# Patient Record
Sex: MALE
[De-identification: names, ages, dates, MRNs, and addresses within clinical notes are randomized per-mention and may not be internally consistent; named-entity substitution may affect disease eponyms.]

---

## 2019-01-01 ENCOUNTER — RESULT REVIEW (OUTPATIENT)
Age: 39
End: 2019-01-01

## 2019-01-01 ENCOUNTER — INPATIENT (INPATIENT)
Facility: HOSPITAL | Age: 39
LOS: 29 days | DRG: 4 | End: 2019-12-10
Attending: INTERNAL MEDICINE | Admitting: INTERNAL MEDICINE
Payer: MEDICAID

## 2019-01-01 VITALS — HEART RATE: 60 BPM | OXYGEN SATURATION: 60 %

## 2019-01-01 VITALS — OXYGEN SATURATION: 99 % | TEMPERATURE: 99 F | RESPIRATION RATE: 26 BRPM | HEART RATE: 110 BPM

## 2019-01-01 DIAGNOSIS — I38 ENDOCARDITIS, VALVE UNSPECIFIED: ICD-10-CM

## 2019-01-01 DIAGNOSIS — I33.9 ACUTE AND SUBACUTE ENDOCARDITIS, UNSPECIFIED: ICD-10-CM

## 2019-01-01 DIAGNOSIS — N17.9 ACUTE KIDNEY FAILURE, UNSPECIFIED: ICD-10-CM

## 2019-01-01 LAB
-  AMPICILLIN/SULBACTAM: SIGNIFICANT CHANGE UP
-  AMPICILLIN/SULBACTAM: SIGNIFICANT CHANGE UP
-  AMPICILLIN: SIGNIFICANT CHANGE UP
-  AMPICILLIN: SIGNIFICANT CHANGE UP
-  AZITHROMYCIN: SIGNIFICANT CHANGE UP
-  CEFAZOLIN: SIGNIFICANT CHANGE UP
-  CEFTRIAXONE: SIGNIFICANT CHANGE UP
-  CEFTRIAXONE: SIGNIFICANT CHANGE UP
-  CHLORAMPHENICOL: SIGNIFICANT CHANGE UP
-  CIPROFLOXACIN: SIGNIFICANT CHANGE UP
-  CIPROFLOXACIN: SIGNIFICANT CHANGE UP
-  CLARITHROMYCIN: SIGNIFICANT CHANGE UP
-  CLINDAMYCIN: SIGNIFICANT CHANGE UP
-  COAGULASE NEGATIVE STAPHYLOCOCCUS: SIGNIFICANT CHANGE UP
-  COAGULASE NEGATIVE STAPHYLOCOCCUS: SIGNIFICANT CHANGE UP
-  DAPTOMYCIN: SIGNIFICANT CHANGE UP
-  ERYTHROMYCIN: SIGNIFICANT CHANGE UP
-  GENTAMICIN: SIGNIFICANT CHANGE UP
-  LEVOFLOXACIN: SIGNIFICANT CHANGE UP
-  LEVOFLOXACIN: SIGNIFICANT CHANGE UP
-  LINEZOLID: SIGNIFICANT CHANGE UP
-  MEROPENEM: SIGNIFICANT CHANGE UP
-  MOXIFLOXACIN(AEROBIC): SIGNIFICANT CHANGE UP
-  OXACILLIN: SIGNIFICANT CHANGE UP
-  PENICILLIN: SIGNIFICANT CHANGE UP
-  RIFAMPIN: SIGNIFICANT CHANGE UP
-  TETRACYCLINE: SIGNIFICANT CHANGE UP
-  TRIMETHOPRIM/SULFAMETHOXAZOLE: SIGNIFICANT CHANGE UP
-  VANCOMYCIN: SIGNIFICANT CHANGE UP
24R-OH-CALCIDIOL SERPL-MCNC: <5 NG/ML — LOW (ref 30–80)
4/8 RATIO: 1.21 RATIO — SIGNIFICANT CHANGE UP (ref 0.9–3.6)
ABS CD8: 104 /UL — LOW (ref 142–740)
ALBUMIN FLD-MCNC: 1.3 G/DL — SIGNIFICANT CHANGE UP
ALBUMIN FLD-MCNC: 1.3 G/DL — SIGNIFICANT CHANGE UP
ALBUMIN SERPL ELPH-MCNC: 1.8 G/DL — LOW (ref 3.3–5)
ALBUMIN SERPL ELPH-MCNC: 1.9 G/DL — LOW (ref 3.3–5)
ALBUMIN SERPL ELPH-MCNC: 2 G/DL — LOW (ref 3.3–5)
ALBUMIN SERPL ELPH-MCNC: 2.1 G/DL — LOW (ref 3.3–5)
ALBUMIN SERPL ELPH-MCNC: 2.2 G/DL — LOW (ref 3.3–5)
ALBUMIN SERPL ELPH-MCNC: 2.2 G/DL — LOW (ref 3.3–5)
ALBUMIN SERPL ELPH-MCNC: 2.3 G/DL — LOW (ref 3.3–5)
ALBUMIN SERPL ELPH-MCNC: 2.4 G/DL — LOW (ref 3.3–5)
ALBUMIN SERPL ELPH-MCNC: 2.5 G/DL — LOW (ref 3.3–5)
ALBUMIN SERPL ELPH-MCNC: 2.5 G/DL — LOW (ref 3.3–5)
ALBUMIN SERPL ELPH-MCNC: 2.6 G/DL — LOW (ref 3.3–5)
ALBUMIN SERPL ELPH-MCNC: 2.7 G/DL — LOW (ref 3.3–5)
ALBUMIN SERPL ELPH-MCNC: 2.7 G/DL — LOW (ref 3.3–5)
ALBUMIN SERPL ELPH-MCNC: 2.8 G/DL — LOW (ref 3.3–5)
ALBUMIN SERPL ELPH-MCNC: 2.9 G/DL — LOW (ref 3.3–5)
ALBUMIN SERPL ELPH-MCNC: 3.4 G/DL — SIGNIFICANT CHANGE UP (ref 3.3–5)
ALP SERPL-CCNC: 100 U/L — SIGNIFICANT CHANGE UP (ref 40–120)
ALP SERPL-CCNC: 103 U/L — SIGNIFICANT CHANGE UP (ref 40–120)
ALP SERPL-CCNC: 105 U/L — SIGNIFICANT CHANGE UP (ref 40–120)
ALP SERPL-CCNC: 116 U/L — SIGNIFICANT CHANGE UP (ref 40–120)
ALP SERPL-CCNC: 119 U/L — SIGNIFICANT CHANGE UP (ref 40–120)
ALP SERPL-CCNC: 124 U/L — HIGH (ref 40–120)
ALP SERPL-CCNC: 125 U/L — HIGH (ref 40–120)
ALP SERPL-CCNC: 128 U/L — HIGH (ref 40–120)
ALP SERPL-CCNC: 140 U/L — HIGH (ref 40–120)
ALP SERPL-CCNC: 154 U/L — HIGH (ref 40–120)
ALP SERPL-CCNC: 157 U/L — HIGH (ref 40–120)
ALP SERPL-CCNC: 177 U/L — HIGH (ref 40–120)
ALP SERPL-CCNC: 218 U/L — HIGH (ref 40–120)
ALP SERPL-CCNC: 221 U/L — HIGH (ref 40–120)
ALP SERPL-CCNC: 227 U/L — HIGH (ref 40–120)
ALP SERPL-CCNC: 70 U/L — SIGNIFICANT CHANGE UP (ref 40–120)
ALP SERPL-CCNC: 71 U/L — SIGNIFICANT CHANGE UP (ref 40–120)
ALP SERPL-CCNC: 75 U/L — SIGNIFICANT CHANGE UP (ref 40–120)
ALP SERPL-CCNC: 75 U/L — SIGNIFICANT CHANGE UP (ref 40–120)
ALP SERPL-CCNC: 81 U/L — SIGNIFICANT CHANGE UP (ref 40–120)
ALP SERPL-CCNC: 82 U/L — SIGNIFICANT CHANGE UP (ref 40–120)
ALP SERPL-CCNC: 82 U/L — SIGNIFICANT CHANGE UP (ref 40–120)
ALP SERPL-CCNC: 83 U/L — SIGNIFICANT CHANGE UP (ref 40–120)
ALP SERPL-CCNC: 84 U/L — SIGNIFICANT CHANGE UP (ref 40–120)
ALP SERPL-CCNC: 84 U/L — SIGNIFICANT CHANGE UP (ref 40–120)
ALP SERPL-CCNC: 85 U/L — SIGNIFICANT CHANGE UP (ref 40–120)
ALP SERPL-CCNC: 86 U/L — SIGNIFICANT CHANGE UP (ref 40–120)
ALP SERPL-CCNC: 86 U/L — SIGNIFICANT CHANGE UP (ref 40–120)
ALP SERPL-CCNC: 88 U/L — SIGNIFICANT CHANGE UP (ref 40–120)
ALP SERPL-CCNC: 91 U/L — SIGNIFICANT CHANGE UP (ref 40–120)
ALP SERPL-CCNC: 94 U/L — SIGNIFICANT CHANGE UP (ref 40–120)
ALP SERPL-CCNC: 97 U/L — SIGNIFICANT CHANGE UP (ref 40–120)
ALT FLD-CCNC: 1012 U/L — HIGH (ref 10–45)
ALT FLD-CCNC: 109 U/L — HIGH (ref 10–45)
ALT FLD-CCNC: 111 U/L — HIGH (ref 10–45)
ALT FLD-CCNC: 114 U/L — HIGH (ref 10–45)
ALT FLD-CCNC: 1249 U/L — HIGH (ref 10–45)
ALT FLD-CCNC: 13 U/L — SIGNIFICANT CHANGE UP (ref 10–45)
ALT FLD-CCNC: 14 U/L — SIGNIFICANT CHANGE UP (ref 10–45)
ALT FLD-CCNC: 1460 U/L — HIGH (ref 10–45)
ALT FLD-CCNC: 15 U/L — SIGNIFICANT CHANGE UP (ref 10–45)
ALT FLD-CCNC: 154 U/L — HIGH (ref 10–45)
ALT FLD-CCNC: 16 U/L — SIGNIFICANT CHANGE UP (ref 10–45)
ALT FLD-CCNC: 19 U/L — SIGNIFICANT CHANGE UP (ref 10–45)
ALT FLD-CCNC: 21 U/L — SIGNIFICANT CHANGE UP (ref 10–45)
ALT FLD-CCNC: 22 U/L — SIGNIFICANT CHANGE UP (ref 10–45)
ALT FLD-CCNC: 221 U/L — HIGH (ref 10–45)
ALT FLD-CCNC: 26 U/L — SIGNIFICANT CHANGE UP (ref 10–45)
ALT FLD-CCNC: 264 U/L — HIGH (ref 10–45)
ALT FLD-CCNC: 28 U/L — SIGNIFICANT CHANGE UP (ref 10–45)
ALT FLD-CCNC: 36 U/L — SIGNIFICANT CHANGE UP (ref 10–45)
ALT FLD-CCNC: 37 U/L — SIGNIFICANT CHANGE UP (ref 10–45)
ALT FLD-CCNC: 37 U/L — SIGNIFICANT CHANGE UP (ref 10–45)
ALT FLD-CCNC: 40 U/L — SIGNIFICANT CHANGE UP (ref 10–45)
ALT FLD-CCNC: 43 U/L — SIGNIFICANT CHANGE UP (ref 10–45)
ALT FLD-CCNC: 465 U/L — HIGH (ref 10–45)
ALT FLD-CCNC: 51 U/L — HIGH (ref 10–45)
ALT FLD-CCNC: 53 U/L — HIGH (ref 10–45)
ALT FLD-CCNC: 642 U/L — HIGH (ref 10–45)
ALT FLD-CCNC: 71 U/L — HIGH (ref 10–45)
ALT FLD-CCNC: 93 U/L — HIGH (ref 10–45)
ANION GAP SERPL CALC-SCNC: 10 MMOL/L — SIGNIFICANT CHANGE UP (ref 5–17)
ANION GAP SERPL CALC-SCNC: 11 MMOL/L — SIGNIFICANT CHANGE UP (ref 5–17)
ANION GAP SERPL CALC-SCNC: 12 MMOL/L — SIGNIFICANT CHANGE UP (ref 5–17)
ANION GAP SERPL CALC-SCNC: 13 MMOL/L — SIGNIFICANT CHANGE UP (ref 5–17)
ANION GAP SERPL CALC-SCNC: 14 MMOL/L — SIGNIFICANT CHANGE UP (ref 5–17)
ANION GAP SERPL CALC-SCNC: 15 MMOL/L — SIGNIFICANT CHANGE UP (ref 5–17)
ANION GAP SERPL CALC-SCNC: 16 MMOL/L — SIGNIFICANT CHANGE UP (ref 5–17)
ANION GAP SERPL CALC-SCNC: 17 MMOL/L — SIGNIFICANT CHANGE UP (ref 5–17)
ANION GAP SERPL CALC-SCNC: 18 MMOL/L — HIGH (ref 5–17)
ANION GAP SERPL CALC-SCNC: 8 MMOL/L — SIGNIFICANT CHANGE UP (ref 5–17)
ANION GAP SERPL CALC-SCNC: 9 MMOL/L — SIGNIFICANT CHANGE UP (ref 5–17)
APPEARANCE UR: ABNORMAL
APPEARANCE UR: CLEAR — SIGNIFICANT CHANGE UP
APTT 50/50 2HOUR INCUB: 39.1 SEC — HIGH (ref 27.5–37.4)
APTT BLD: 29.2 SECS — SIGNIFICANT CHANGE UP (ref 27.5–37.4)
APTT BLD: 30.8 SEC — SIGNIFICANT CHANGE UP (ref 27.5–36.3)
APTT BLD: 32.3 SEC — SIGNIFICANT CHANGE UP (ref 27.5–36.3)
APTT BLD: 33.3 SEC — SIGNIFICANT CHANGE UP (ref 27.5–36.3)
APTT BLD: 33.7 SEC — SIGNIFICANT CHANGE UP (ref 27.5–36.3)
APTT BLD: 34.4 SEC — SIGNIFICANT CHANGE UP (ref 27.5–36.3)
APTT BLD: 35.1 SEC — SIGNIFICANT CHANGE UP (ref 27.5–36.3)
APTT BLD: 35.9 SEC — SIGNIFICANT CHANGE UP (ref 27.5–36.3)
APTT BLD: 36.1 SEC — SIGNIFICANT CHANGE UP (ref 27.5–36.3)
APTT BLD: 36.1 SEC — SIGNIFICANT CHANGE UP (ref 27.5–36.3)
APTT BLD: 36.4 SEC — HIGH (ref 27.5–36.3)
APTT BLD: 37.2 SEC — HIGH (ref 27.5–36.3)
APTT BLD: 38.8 SEC — HIGH (ref 27.5–36.3)
APTT BLD: 39.6 SEC — HIGH (ref 27.5–36.3)
APTT BLD: 40.1 SEC — HIGH (ref 27.5–36.3)
APTT BLD: 40.2 SEC — HIGH (ref 27.5–36.3)
APTT BLD: 40.8 SEC — HIGH (ref 27.5–36.3)
APTT BLD: 41.5 SEC — HIGH (ref 27.5–36.3)
APTT BLD: 41.7 SEC — HIGH (ref 27.5–36.3)
APTT BLD: 43.1 SEC — HIGH (ref 27.5–36.3)
APTT BLD: 43.5 SEC — HIGH (ref 27.5–36.3)
APTT BLD: 44.8 SEC — HIGH (ref 27.5–36.3)
APTT BLD: 46.7 SEC — HIGH (ref 27.5–36.3)
APTT BLD: 54.4 SEC — HIGH (ref 27.5–36.3)
APTT BLD: 98.3 SEC — HIGH (ref 27.5–36.3)
AST SERPL-CCNC: 1078 U/L — HIGH (ref 10–40)
AST SERPL-CCNC: 109 U/L — HIGH (ref 10–40)
AST SERPL-CCNC: 181 U/L — HIGH (ref 10–40)
AST SERPL-CCNC: 19 U/L — SIGNIFICANT CHANGE UP (ref 10–40)
AST SERPL-CCNC: 1918 U/L — HIGH (ref 10–40)
AST SERPL-CCNC: 213 U/L — HIGH (ref 10–40)
AST SERPL-CCNC: 22 U/L — SIGNIFICANT CHANGE UP (ref 10–40)
AST SERPL-CCNC: 22 U/L — SIGNIFICANT CHANGE UP (ref 10–40)
AST SERPL-CCNC: 25 U/L — SIGNIFICANT CHANGE UP (ref 10–40)
AST SERPL-CCNC: 26 U/L — SIGNIFICANT CHANGE UP (ref 10–40)
AST SERPL-CCNC: 27 U/L — SIGNIFICANT CHANGE UP (ref 10–40)
AST SERPL-CCNC: 27 U/L — SIGNIFICANT CHANGE UP (ref 10–40)
AST SERPL-CCNC: 31 U/L — SIGNIFICANT CHANGE UP (ref 10–40)
AST SERPL-CCNC: 32 U/L — SIGNIFICANT CHANGE UP (ref 10–40)
AST SERPL-CCNC: 33 U/L — SIGNIFICANT CHANGE UP (ref 10–40)
AST SERPL-CCNC: 3344 U/L — HIGH (ref 10–40)
AST SERPL-CCNC: 36 U/L — SIGNIFICANT CHANGE UP (ref 10–40)
AST SERPL-CCNC: 37 U/L — SIGNIFICANT CHANGE UP (ref 10–40)
AST SERPL-CCNC: 40 U/L — SIGNIFICANT CHANGE UP (ref 10–40)
AST SERPL-CCNC: 40 U/L — SIGNIFICANT CHANGE UP (ref 10–40)
AST SERPL-CCNC: 45 U/L — HIGH (ref 10–40)
AST SERPL-CCNC: 45 U/L — HIGH (ref 10–40)
AST SERPL-CCNC: 48 U/L — HIGH (ref 10–40)
AST SERPL-CCNC: 52 U/L — HIGH (ref 10–40)
AST SERPL-CCNC: 53 U/L — HIGH (ref 10–40)
AST SERPL-CCNC: 53 U/L — HIGH (ref 10–40)
AST SERPL-CCNC: 5348 U/L — HIGH (ref 10–40)
AST SERPL-CCNC: 581 U/L — HIGH (ref 10–40)
AST SERPL-CCNC: 69 U/L — HIGH (ref 10–40)
AST SERPL-CCNC: 80 U/L — HIGH (ref 10–40)
AST SERPL-CCNC: 81 U/L — HIGH (ref 10–40)
AST SERPL-CCNC: 89 U/L — HIGH (ref 10–40)
B PERT IGG+IGM PNL SER: SIGNIFICANT CHANGE UP
BASE EXCESS BLDA CALC-SCNC: -0.5 MMOL/L — SIGNIFICANT CHANGE UP (ref -2–3)
BASE EXCESS BLDA CALC-SCNC: -1.1 MMOL/L — SIGNIFICANT CHANGE UP (ref -2–3)
BASE EXCESS BLDA CALC-SCNC: -1.1 MMOL/L — SIGNIFICANT CHANGE UP (ref -2–3)
BASE EXCESS BLDA CALC-SCNC: -1.7 MMOL/L — SIGNIFICANT CHANGE UP (ref -2–3)
BASE EXCESS BLDA CALC-SCNC: -3 MMOL/L — LOW (ref -2–3)
BASE EXCESS BLDA CALC-SCNC: -3.3 MMOL/L — LOW (ref -2–3)
BASE EXCESS BLDA CALC-SCNC: -4.6 MMOL/L — LOW (ref -2–3)
BASE EXCESS BLDA CALC-SCNC: -4.7 MMOL/L — LOW (ref -2–3)
BASE EXCESS BLDA CALC-SCNC: 1.8 MMOL/L — SIGNIFICANT CHANGE UP (ref -2–3)
BASOPHILS # BLD AUTO: 0 K/UL — SIGNIFICANT CHANGE UP (ref 0–0.2)
BASOPHILS # BLD AUTO: 0.01 K/UL — SIGNIFICANT CHANGE UP (ref 0–0.2)
BASOPHILS # BLD AUTO: 0.02 K/UL — SIGNIFICANT CHANGE UP (ref 0–0.2)
BASOPHILS # BLD AUTO: 0.02 K/UL — SIGNIFICANT CHANGE UP (ref 0–0.2)
BASOPHILS # BLD AUTO: 0.03 K/UL — SIGNIFICANT CHANGE UP (ref 0–0.2)
BASOPHILS # BLD AUTO: 0.07 K/UL — SIGNIFICANT CHANGE UP (ref 0–0.2)
BASOPHILS # BLD AUTO: 0.08 K/UL — SIGNIFICANT CHANGE UP (ref 0–0.2)
BASOPHILS # BLD AUTO: 0.18 K/UL — SIGNIFICANT CHANGE UP (ref 0–0.2)
BASOPHILS NFR BLD AUTO: 0 % — SIGNIFICANT CHANGE UP (ref 0–2)
BASOPHILS NFR BLD AUTO: 0.1 % — SIGNIFICANT CHANGE UP (ref 0–2)
BASOPHILS NFR BLD AUTO: 0.2 % — SIGNIFICANT CHANGE UP (ref 0–2)
BASOPHILS NFR BLD AUTO: 0.2 % — SIGNIFICANT CHANGE UP (ref 0–2)
BASOPHILS NFR BLD AUTO: 0.3 % — SIGNIFICANT CHANGE UP (ref 0–2)
BASOPHILS NFR BLD AUTO: 0.3 % — SIGNIFICANT CHANGE UP (ref 0–2)
BASOPHILS NFR BLD AUTO: 0.9 % — SIGNIFICANT CHANGE UP (ref 0–2)
BASOPHILS NFR BLD AUTO: 0.9 % — SIGNIFICANT CHANGE UP (ref 0–2)
BASOPHILS NFR BLD AUTO: 1.9 % — SIGNIFICANT CHANGE UP (ref 0–2)
BASOPHILS NFR BLD AUTO: 2 % — SIGNIFICANT CHANGE UP (ref 0–2)
BILIRUB DIRECT SERPL-MCNC: 0.7 MG/DL — HIGH (ref 0–0.2)
BILIRUB DIRECT SERPL-MCNC: 1.2 MG/DL — HIGH (ref 0–0.2)
BILIRUB DIRECT SERPL-MCNC: 1.2 MG/DL — HIGH (ref 0–0.2)
BILIRUB DIRECT SERPL-MCNC: 1.4 MG/DL — HIGH (ref 0–0.2)
BILIRUB DIRECT SERPL-MCNC: 1.5 MG/DL — HIGH (ref 0–0.2)
BILIRUB DIRECT SERPL-MCNC: 1.5 MG/DL — HIGH (ref 0–0.2)
BILIRUB DIRECT SERPL-MCNC: 1.7 MG/DL — HIGH (ref 0–0.2)
BILIRUB DIRECT SERPL-MCNC: 2.9 MG/DL — HIGH (ref 0–0.2)
BILIRUB DIRECT SERPL-MCNC: 5.6 MG/DL — HIGH (ref 0–0.2)
BILIRUB DIRECT SERPL-MCNC: 6 MG/DL — HIGH (ref 0–0.2)
BILIRUB DIRECT SERPL-MCNC: 6.2 MG/DL — HIGH (ref 0–0.2)
BILIRUB DIRECT SERPL-MCNC: 7 MG/DL — HIGH (ref 0–0.2)
BILIRUB DIRECT SERPL-MCNC: 8.5 MG/DL — HIGH (ref 0–0.2)
BILIRUB DIRECT SERPL-MCNC: 8.8 MG/DL — HIGH (ref 0–0.2)
BILIRUB DIRECT SERPL-MCNC: 9.4 MG/DL — HIGH (ref 0–0.2)
BILIRUB INDIRECT FLD-MCNC: 0.3 MG/DL — SIGNIFICANT CHANGE UP (ref 0.2–1)
BILIRUB INDIRECT FLD-MCNC: 0.5 MG/DL — SIGNIFICANT CHANGE UP (ref 0.2–1)
BILIRUB INDIRECT FLD-MCNC: 0.5 MG/DL — SIGNIFICANT CHANGE UP (ref 0.2–1)
BILIRUB INDIRECT FLD-MCNC: 0.6 MG/DL — SIGNIFICANT CHANGE UP (ref 0.2–1)
BILIRUB INDIRECT FLD-MCNC: 0.7 MG/DL — SIGNIFICANT CHANGE UP (ref 0.2–1)
BILIRUB INDIRECT FLD-MCNC: 1.2 MG/DL — HIGH (ref 0.2–1)
BILIRUB INDIRECT FLD-MCNC: 1.5 MG/DL — HIGH (ref 0.2–1)
BILIRUB SERPL-MCNC: 1 MG/DL — SIGNIFICANT CHANGE UP (ref 0.2–1.2)
BILIRUB SERPL-MCNC: 1 MG/DL — SIGNIFICANT CHANGE UP (ref 0.2–1.2)
BILIRUB SERPL-MCNC: 1.2 MG/DL — SIGNIFICANT CHANGE UP (ref 0.2–1.2)
BILIRUB SERPL-MCNC: 1.2 MG/DL — SIGNIFICANT CHANGE UP (ref 0.2–1.2)
BILIRUB SERPL-MCNC: 1.3 MG/DL — HIGH (ref 0.2–1.2)
BILIRUB SERPL-MCNC: 1.3 MG/DL — HIGH (ref 0.2–1.2)
BILIRUB SERPL-MCNC: 1.5 MG/DL — HIGH (ref 0.2–1.2)
BILIRUB SERPL-MCNC: 1.7 MG/DL — HIGH (ref 0.2–1.2)
BILIRUB SERPL-MCNC: 1.7 MG/DL — HIGH (ref 0.2–1.2)
BILIRUB SERPL-MCNC: 1.8 MG/DL — HIGH (ref 0.2–1.2)
BILIRUB SERPL-MCNC: 1.9 MG/DL — HIGH (ref 0.2–1.2)
BILIRUB SERPL-MCNC: 10.5 MG/DL — HIGH (ref 0.2–1.2)
BILIRUB SERPL-MCNC: 10.9 MG/DL — HIGH (ref 0.2–1.2)
BILIRUB SERPL-MCNC: 11.3 MG/DL — HIGH (ref 0.2–1.2)
BILIRUB SERPL-MCNC: 11.8 MG/DL — HIGH (ref 0.2–1.2)
BILIRUB SERPL-MCNC: 2 MG/DL — HIGH (ref 0.2–1.2)
BILIRUB SERPL-MCNC: 2 MG/DL — HIGH (ref 0.2–1.2)
BILIRUB SERPL-MCNC: 2.1 MG/DL — HIGH (ref 0.2–1.2)
BILIRUB SERPL-MCNC: 2.1 MG/DL — HIGH (ref 0.2–1.2)
BILIRUB SERPL-MCNC: 2.4 MG/DL — HIGH (ref 0.2–1.2)
BILIRUB SERPL-MCNC: 2.4 MG/DL — HIGH (ref 0.2–1.2)
BILIRUB SERPL-MCNC: 3.6 MG/DL — HIGH (ref 0.2–1.2)
BILIRUB SERPL-MCNC: 6.2 MG/DL — HIGH (ref 0.2–1.2)
BILIRUB SERPL-MCNC: 6.8 MG/DL — HIGH (ref 0.2–1.2)
BILIRUB SERPL-MCNC: 6.8 MG/DL — HIGH (ref 0.2–1.2)
BILIRUB SERPL-MCNC: 7.2 MG/DL — HIGH (ref 0.2–1.2)
BILIRUB SERPL-MCNC: 7.5 MG/DL — HIGH (ref 0.2–1.2)
BILIRUB SERPL-MCNC: 7.8 MG/DL — HIGH (ref 0.2–1.2)
BILIRUB SERPL-MCNC: 8 MG/DL — HIGH (ref 0.2–1.2)
BILIRUB SERPL-MCNC: 8.4 MG/DL — HIGH (ref 0.2–1.2)
BILIRUB SERPL-MCNC: 8.4 MG/DL — HIGH (ref 0.2–1.2)
BILIRUB SERPL-MCNC: 9.1 MG/DL — HIGH (ref 0.2–1.2)
BILIRUB SERPL-MCNC: 9.5 MG/DL — HIGH (ref 0.2–1.2)
BILIRUB SERPL-MCNC: 9.9 MG/DL — HIGH (ref 0.2–1.2)
BILIRUB SERPL-MCNC: 9.9 MG/DL — HIGH (ref 0.2–1.2)
BILIRUB UR-MCNC: ABNORMAL
BILIRUB UR-MCNC: ABNORMAL
BLD GP AB SCN SERPL QL: NEGATIVE — SIGNIFICANT CHANGE UP
BUN SERPL-MCNC: 10 MG/DL — SIGNIFICANT CHANGE UP (ref 7–23)
BUN SERPL-MCNC: 10 MG/DL — SIGNIFICANT CHANGE UP (ref 7–23)
BUN SERPL-MCNC: 106 MG/DL — HIGH (ref 7–23)
BUN SERPL-MCNC: 106 MG/DL — HIGH (ref 7–23)
BUN SERPL-MCNC: 107 MG/DL — HIGH (ref 7–23)
BUN SERPL-MCNC: 11 MG/DL — SIGNIFICANT CHANGE UP (ref 7–23)
BUN SERPL-MCNC: 12 MG/DL — SIGNIFICANT CHANGE UP (ref 7–23)
BUN SERPL-MCNC: 12 MG/DL — SIGNIFICANT CHANGE UP (ref 7–23)
BUN SERPL-MCNC: 121 MG/DL — HIGH (ref 7–23)
BUN SERPL-MCNC: 13 MG/DL — SIGNIFICANT CHANGE UP (ref 7–23)
BUN SERPL-MCNC: 13 MG/DL — SIGNIFICANT CHANGE UP (ref 7–23)
BUN SERPL-MCNC: 14 MG/DL — SIGNIFICANT CHANGE UP (ref 7–23)
BUN SERPL-MCNC: 15 MG/DL — SIGNIFICANT CHANGE UP (ref 7–23)
BUN SERPL-MCNC: 16 MG/DL — SIGNIFICANT CHANGE UP (ref 7–23)
BUN SERPL-MCNC: 17 MG/DL — SIGNIFICANT CHANGE UP (ref 7–23)
BUN SERPL-MCNC: 18 MG/DL — SIGNIFICANT CHANGE UP (ref 7–23)
BUN SERPL-MCNC: 19 MG/DL — SIGNIFICANT CHANGE UP (ref 7–23)
BUN SERPL-MCNC: 20 MG/DL — SIGNIFICANT CHANGE UP (ref 7–23)
BUN SERPL-MCNC: 20 MG/DL — SIGNIFICANT CHANGE UP (ref 7–23)
BUN SERPL-MCNC: 21 MG/DL — SIGNIFICANT CHANGE UP (ref 7–23)
BUN SERPL-MCNC: 22 MG/DL — SIGNIFICANT CHANGE UP (ref 7–23)
BUN SERPL-MCNC: 23 MG/DL — SIGNIFICANT CHANGE UP (ref 7–23)
BUN SERPL-MCNC: 23 MG/DL — SIGNIFICANT CHANGE UP (ref 7–23)
BUN SERPL-MCNC: 25 MG/DL — HIGH (ref 7–23)
BUN SERPL-MCNC: 26 MG/DL — HIGH (ref 7–23)
BUN SERPL-MCNC: 27 MG/DL — HIGH (ref 7–23)
BUN SERPL-MCNC: 29 MG/DL — HIGH (ref 7–23)
BUN SERPL-MCNC: 29 MG/DL — HIGH (ref 7–23)
BUN SERPL-MCNC: 30 MG/DL — HIGH (ref 7–23)
BUN SERPL-MCNC: 31 MG/DL — HIGH (ref 7–23)
BUN SERPL-MCNC: 35 MG/DL — HIGH (ref 7–23)
BUN SERPL-MCNC: 36 MG/DL — HIGH (ref 7–23)
BUN SERPL-MCNC: 39 MG/DL — HIGH (ref 7–23)
BUN SERPL-MCNC: 43 MG/DL — HIGH (ref 7–23)
BUN SERPL-MCNC: 44 MG/DL — HIGH (ref 7–23)
BUN SERPL-MCNC: 59 MG/DL — HIGH (ref 7–23)
BUN SERPL-MCNC: 66 MG/DL — HIGH (ref 7–23)
BUN SERPL-MCNC: 8 MG/DL — SIGNIFICANT CHANGE UP (ref 7–23)
BUN SERPL-MCNC: 89 MG/DL — HIGH (ref 7–23)
BUN SERPL-MCNC: 9 MG/DL — SIGNIFICANT CHANGE UP (ref 7–23)
BUN SERPL-MCNC: 90 MG/DL — HIGH (ref 7–23)
C DIFF GDH STL QL: NEGATIVE — SIGNIFICANT CHANGE UP
C DIFF GDH STL QL: NEGATIVE — SIGNIFICANT CHANGE UP
C DIFF GDH STL QL: SIGNIFICANT CHANGE UP
C DIFF GDH STL QL: SIGNIFICANT CHANGE UP
CALCIUM SERPL-MCNC: 4.9 MG/DL — CRITICAL LOW (ref 8.4–10.5)
CALCIUM SERPL-MCNC: 5 MG/DL — CRITICAL LOW (ref 8.4–10.5)
CALCIUM SERPL-MCNC: 5.4 MG/DL — CRITICAL LOW (ref 8.4–10.5)
CALCIUM SERPL-MCNC: 5.7 MG/DL — CRITICAL LOW (ref 8.4–10.5)
CALCIUM SERPL-MCNC: 6.1 MG/DL — CRITICAL LOW (ref 8.4–10.5)
CALCIUM SERPL-MCNC: 6.1 MG/DL — CRITICAL LOW (ref 8.4–10.5)
CALCIUM SERPL-MCNC: 6.2 MG/DL — CRITICAL LOW (ref 8.4–10.5)
CALCIUM SERPL-MCNC: 6.4 MG/DL — CRITICAL LOW (ref 8.4–10.5)
CALCIUM SERPL-MCNC: 6.6 MG/DL — LOW (ref 8.4–10.5)
CALCIUM SERPL-MCNC: 7 MG/DL — LOW (ref 8.4–10.5)
CALCIUM SERPL-MCNC: 7.1 MG/DL — LOW (ref 8.4–10.5)
CALCIUM SERPL-MCNC: 7.2 MG/DL — LOW (ref 8.4–10.5)
CALCIUM SERPL-MCNC: 7.5 MG/DL — LOW (ref 8.4–10.5)
CALCIUM SERPL-MCNC: 7.6 MG/DL — LOW (ref 8.4–10.5)
CALCIUM SERPL-MCNC: 7.7 MG/DL — LOW (ref 8.4–10.5)
CALCIUM SERPL-MCNC: 7.7 MG/DL — LOW (ref 8.4–10.5)
CALCIUM SERPL-MCNC: 7.8 MG/DL — LOW (ref 8.4–10.5)
CALCIUM SERPL-MCNC: 7.9 MG/DL — LOW (ref 8.4–10.5)
CALCIUM SERPL-MCNC: 8 MG/DL — LOW (ref 8.4–10.5)
CALCIUM SERPL-MCNC: 8.1 MG/DL — LOW (ref 8.4–10.5)
CALCIUM SERPL-MCNC: 8.2 MG/DL — LOW (ref 8.4–10.5)
CALCIUM SERPL-MCNC: 8.3 MG/DL — LOW (ref 8.4–10.5)
CALCIUM SERPL-MCNC: 8.4 MG/DL — SIGNIFICANT CHANGE UP (ref 8.4–10.5)
CALCIUM SERPL-MCNC: 8.5 MG/DL — SIGNIFICANT CHANGE UP (ref 8.4–10.5)
CALCIUM SERPL-MCNC: 8.6 MG/DL — SIGNIFICANT CHANGE UP (ref 8.4–10.5)
CALCIUM SERPL-MCNC: 8.8 MG/DL — SIGNIFICANT CHANGE UP (ref 8.4–10.5)
CALCIUM SERPL-MCNC: 8.8 MG/DL — SIGNIFICANT CHANGE UP (ref 8.4–10.5)
CALCIUM SERPL-MCNC: 8.9 MG/DL — SIGNIFICANT CHANGE UP (ref 8.4–10.5)
CALCIUM SERPL-MCNC: 9 MG/DL — SIGNIFICANT CHANGE UP (ref 8.4–10.5)
CALCIUM SERPL-MCNC: 9.2 MG/DL — SIGNIFICANT CHANGE UP (ref 8.4–10.5)
CD3 BLASTS SPEC-ACNC: 236 /UL — LOW (ref 672–1870)
CD3 BLASTS SPEC-ACNC: 78 % — SIGNIFICANT CHANGE UP (ref 59–83)
CD4 %: 41 % — SIGNIFICANT CHANGE UP (ref 30–62)
CD8 %: 34 % — SIGNIFICANT CHANGE UP (ref 12–36)
CEA SERPL-MCNC: 4 NG/ML — HIGH (ref 0–3.8)
CHLORIDE SERPL-SCNC: 100 MMOL/L — SIGNIFICANT CHANGE UP (ref 96–108)
CHLORIDE SERPL-SCNC: 101 MMOL/L — SIGNIFICANT CHANGE UP (ref 96–108)
CHLORIDE SERPL-SCNC: 102 MMOL/L — SIGNIFICANT CHANGE UP (ref 96–108)
CHLORIDE SERPL-SCNC: 103 MMOL/L — SIGNIFICANT CHANGE UP (ref 96–108)
CHLORIDE SERPL-SCNC: 104 MMOL/L — SIGNIFICANT CHANGE UP (ref 96–108)
CHLORIDE SERPL-SCNC: 85 MMOL/L — LOW (ref 96–108)
CHLORIDE SERPL-SCNC: 89 MMOL/L — LOW (ref 96–108)
CHLORIDE SERPL-SCNC: 91 MMOL/L — LOW (ref 96–108)
CHLORIDE SERPL-SCNC: 93 MMOL/L — LOW (ref 96–108)
CHLORIDE SERPL-SCNC: 93 MMOL/L — LOW (ref 96–108)
CHLORIDE SERPL-SCNC: 94 MMOL/L — LOW (ref 96–108)
CHLORIDE SERPL-SCNC: 95 MMOL/L — LOW (ref 96–108)
CHLORIDE SERPL-SCNC: 96 MMOL/L — SIGNIFICANT CHANGE UP (ref 96–108)
CHLORIDE SERPL-SCNC: 97 MMOL/L — SIGNIFICANT CHANGE UP (ref 96–108)
CHLORIDE SERPL-SCNC: 97 MMOL/L — SIGNIFICANT CHANGE UP (ref 96–108)
CHLORIDE SERPL-SCNC: 98 MMOL/L — SIGNIFICANT CHANGE UP (ref 96–108)
CHLORIDE SERPL-SCNC: 99 MMOL/L — SIGNIFICANT CHANGE UP (ref 96–108)
CHOLEST FLD-MCNC: 39 MG/DL — SIGNIFICANT CHANGE UP
CHOLEST FLD-MCNC: 40 MG/DL — SIGNIFICANT CHANGE UP
CHOLEST SERPL-MCNC: 146 MG/DL — SIGNIFICANT CHANGE UP (ref 10–199)
CHOLEST SERPL-MCNC: 51 MG/DL — SIGNIFICANT CHANGE UP (ref 10–199)
CK MB CFR SERPL CALC: 1.1 NG/ML — SIGNIFICANT CHANGE UP (ref 0–6.7)
CK MB CFR SERPL CALC: 5.7 NG/ML — SIGNIFICANT CHANGE UP (ref 0–6.7)
CK SERPL-CCNC: 16 U/L — LOW (ref 30–200)
CK SERPL-CCNC: 1664 U/L — HIGH (ref 30–200)
CO2 SERPL-SCNC: 17 MMOL/L — LOW (ref 22–31)
CO2 SERPL-SCNC: 18 MMOL/L — LOW (ref 22–31)
CO2 SERPL-SCNC: 18 MMOL/L — LOW (ref 22–31)
CO2 SERPL-SCNC: 19 MMOL/L — LOW (ref 22–31)
CO2 SERPL-SCNC: 20 MMOL/L — LOW (ref 22–31)
CO2 SERPL-SCNC: 21 MMOL/L — LOW (ref 22–31)
CO2 SERPL-SCNC: 22 MMOL/L — SIGNIFICANT CHANGE UP (ref 22–31)
CO2 SERPL-SCNC: 23 MMOL/L — SIGNIFICANT CHANGE UP (ref 22–31)
CO2 SERPL-SCNC: 23 MMOL/L — SIGNIFICANT CHANGE UP (ref 22–31)
CO2 SERPL-SCNC: 24 MMOL/L — SIGNIFICANT CHANGE UP (ref 22–31)
CO2 SERPL-SCNC: 25 MMOL/L — SIGNIFICANT CHANGE UP (ref 22–31)
CO2 SERPL-SCNC: 26 MMOL/L — SIGNIFICANT CHANGE UP (ref 22–31)
CO2 SERPL-SCNC: 27 MMOL/L — SIGNIFICANT CHANGE UP (ref 22–31)
CO2 SERPL-SCNC: 27 MMOL/L — SIGNIFICANT CHANGE UP (ref 22–31)
COLOR FLD: SIGNIFICANT CHANGE UP
COLOR SPEC: YELLOW — SIGNIFICANT CHANGE UP
COLOR SPEC: YELLOW — SIGNIFICANT CHANGE UP
COMMENT - FLUIDS: SIGNIFICANT CHANGE UP
CORTICOSTEROID BINDING GLOBULIN RESULT: 1 MG/DL — LOW
CORTIS F/TOTAL MFR SERPL: 50 % — SIGNIFICANT CHANGE UP
CORTIS SERPL-MCNC: 13 UG/DL — SIGNIFICANT CHANGE UP
CORTISOL, FREE RESULT: 6.5 UG/DL — HIGH
CREAT FLD-MCNC: 2.81 MG/DL — SIGNIFICANT CHANGE UP
CREAT FLD-MCNC: 2.82 MG/DL — SIGNIFICANT CHANGE UP
CREAT SERPL-MCNC: 0.64 MG/DL — SIGNIFICANT CHANGE UP (ref 0.5–1.3)
CREAT SERPL-MCNC: 0.67 MG/DL — SIGNIFICANT CHANGE UP (ref 0.5–1.3)
CREAT SERPL-MCNC: 0.69 MG/DL — SIGNIFICANT CHANGE UP (ref 0.5–1.3)
CREAT SERPL-MCNC: 0.69 MG/DL — SIGNIFICANT CHANGE UP (ref 0.5–1.3)
CREAT SERPL-MCNC: 0.7 MG/DL — SIGNIFICANT CHANGE UP (ref 0.5–1.3)
CREAT SERPL-MCNC: 0.71 MG/DL — SIGNIFICANT CHANGE UP (ref 0.5–1.3)
CREAT SERPL-MCNC: 0.72 MG/DL — SIGNIFICANT CHANGE UP (ref 0.5–1.3)
CREAT SERPL-MCNC: 0.73 MG/DL — SIGNIFICANT CHANGE UP (ref 0.5–1.3)
CREAT SERPL-MCNC: 0.74 MG/DL — SIGNIFICANT CHANGE UP (ref 0.5–1.3)
CREAT SERPL-MCNC: 0.76 MG/DL — SIGNIFICANT CHANGE UP (ref 0.5–1.3)
CREAT SERPL-MCNC: 0.78 MG/DL — SIGNIFICANT CHANGE UP (ref 0.5–1.3)
CREAT SERPL-MCNC: 0.79 MG/DL — SIGNIFICANT CHANGE UP (ref 0.5–1.3)
CREAT SERPL-MCNC: 0.82 MG/DL — SIGNIFICANT CHANGE UP (ref 0.5–1.3)
CREAT SERPL-MCNC: 0.87 MG/DL — SIGNIFICANT CHANGE UP (ref 0.5–1.3)
CREAT SERPL-MCNC: 0.9 MG/DL — SIGNIFICANT CHANGE UP (ref 0.5–1.3)
CREAT SERPL-MCNC: 0.93 MG/DL — SIGNIFICANT CHANGE UP (ref 0.5–1.3)
CREAT SERPL-MCNC: 0.94 MG/DL — SIGNIFICANT CHANGE UP (ref 0.5–1.3)
CREAT SERPL-MCNC: 0.94 MG/DL — SIGNIFICANT CHANGE UP (ref 0.5–1.3)
CREAT SERPL-MCNC: 0.95 MG/DL — SIGNIFICANT CHANGE UP (ref 0.5–1.3)
CREAT SERPL-MCNC: 0.96 MG/DL — SIGNIFICANT CHANGE UP (ref 0.5–1.3)
CREAT SERPL-MCNC: 0.97 MG/DL — SIGNIFICANT CHANGE UP (ref 0.5–1.3)
CREAT SERPL-MCNC: 0.97 MG/DL — SIGNIFICANT CHANGE UP (ref 0.5–1.3)
CREAT SERPL-MCNC: 0.99 MG/DL — SIGNIFICANT CHANGE UP (ref 0.5–1.3)
CREAT SERPL-MCNC: 1 MG/DL — SIGNIFICANT CHANGE UP (ref 0.5–1.3)
CREAT SERPL-MCNC: 1.02 MG/DL — SIGNIFICANT CHANGE UP (ref 0.5–1.3)
CREAT SERPL-MCNC: 1.03 MG/DL — SIGNIFICANT CHANGE UP (ref 0.5–1.3)
CREAT SERPL-MCNC: 1.04 MG/DL — SIGNIFICANT CHANGE UP (ref 0.5–1.3)
CREAT SERPL-MCNC: 1.04 MG/DL — SIGNIFICANT CHANGE UP (ref 0.5–1.3)
CREAT SERPL-MCNC: 1.05 MG/DL — SIGNIFICANT CHANGE UP (ref 0.5–1.3)
CREAT SERPL-MCNC: 1.07 MG/DL — SIGNIFICANT CHANGE UP (ref 0.5–1.3)
CREAT SERPL-MCNC: 1.11 MG/DL — SIGNIFICANT CHANGE UP (ref 0.5–1.3)
CREAT SERPL-MCNC: 1.16 MG/DL — SIGNIFICANT CHANGE UP (ref 0.5–1.3)
CREAT SERPL-MCNC: 1.17 MG/DL — SIGNIFICANT CHANGE UP (ref 0.5–1.3)
CREAT SERPL-MCNC: 1.21 MG/DL — SIGNIFICANT CHANGE UP (ref 0.5–1.3)
CREAT SERPL-MCNC: 1.42 MG/DL — HIGH (ref 0.5–1.3)
CREAT SERPL-MCNC: 1.44 MG/DL — HIGH (ref 0.5–1.3)
CREAT SERPL-MCNC: 1.52 MG/DL — HIGH (ref 0.5–1.3)
CREAT SERPL-MCNC: 1.54 MG/DL — HIGH (ref 0.5–1.3)
CREAT SERPL-MCNC: 1.58 MG/DL — HIGH (ref 0.5–1.3)
CREAT SERPL-MCNC: 1.63 MG/DL — HIGH (ref 0.5–1.3)
CREAT SERPL-MCNC: 1.68 MG/DL — HIGH (ref 0.5–1.3)
CREAT SERPL-MCNC: 1.69 MG/DL — HIGH (ref 0.5–1.3)
CREAT SERPL-MCNC: 1.85 MG/DL — HIGH (ref 0.5–1.3)
CREAT SERPL-MCNC: 1.99 MG/DL — HIGH (ref 0.5–1.3)
CREAT SERPL-MCNC: 2.02 MG/DL — HIGH (ref 0.5–1.3)
CREAT SERPL-MCNC: 2.13 MG/DL — HIGH (ref 0.5–1.3)
CREAT SERPL-MCNC: 2.2 MG/DL — HIGH (ref 0.5–1.3)
CREAT SERPL-MCNC: 2.2 MG/DL — HIGH (ref 0.5–1.3)
CREAT SERPL-MCNC: 2.26 MG/DL — HIGH (ref 0.5–1.3)
CREAT SERPL-MCNC: 2.29 MG/DL — HIGH (ref 0.5–1.3)
CREAT SERPL-MCNC: 2.42 MG/DL — HIGH (ref 0.5–1.3)
CREAT SERPL-MCNC: 2.47 MG/DL — HIGH (ref 0.5–1.3)
CREAT SERPL-MCNC: 2.54 MG/DL — HIGH (ref 0.5–1.3)
CREAT SERPL-MCNC: 2.69 MG/DL — HIGH (ref 0.5–1.3)
CREAT SERPL-MCNC: 2.88 MG/DL — HIGH (ref 0.5–1.3)
CREAT SERPL-MCNC: 2.92 MG/DL — HIGH (ref 0.5–1.3)
CREAT SERPL-MCNC: 2.94 MG/DL — HIGH (ref 0.5–1.3)
CREAT SERPL-MCNC: 3 MG/DL — HIGH (ref 0.5–1.3)
CREAT SERPL-MCNC: 3.02 MG/DL — HIGH (ref 0.5–1.3)
CREAT SERPL-MCNC: 3.12 MG/DL — HIGH (ref 0.5–1.3)
CREAT SERPL-MCNC: 3.89 MG/DL — HIGH (ref 0.5–1.3)
CREAT SERPL-MCNC: 4.1 MG/DL — HIGH (ref 0.5–1.3)
CREAT SERPL-MCNC: 4.39 MG/DL — HIGH (ref 0.5–1.3)
CREAT SERPL-MCNC: 4.55 MG/DL — HIGH (ref 0.5–1.3)
CREAT SERPL-MCNC: 4.65 MG/DL — HIGH (ref 0.5–1.3)
CREAT SERPL-MCNC: 5.1 MG/DL — HIGH (ref 0.5–1.3)
CULTURE RESULTS: NO GROWTH — SIGNIFICANT CHANGE UP
CULTURE RESULTS: NO GROWTH — SIGNIFICANT CHANGE UP
CULTURE RESULTS: SIGNIFICANT CHANGE UP
D DIMER BLD IA.RAPID-MCNC: 2409 NG/ML DDU — HIGH
DAT POLY-SP REAG RBC QL: NEGATIVE — SIGNIFICANT CHANGE UP
DIFF PNL FLD: ABNORMAL
DIFF PNL FLD: ABNORMAL
EOSINOPHIL # BLD AUTO: 0 K/UL — SIGNIFICANT CHANGE UP (ref 0–0.5)
EOSINOPHIL # BLD AUTO: 0.01 K/UL — SIGNIFICANT CHANGE UP (ref 0–0.5)
EOSINOPHIL # BLD AUTO: 0.02 K/UL — SIGNIFICANT CHANGE UP (ref 0–0.5)
EOSINOPHIL # BLD AUTO: 0.09 K/UL — SIGNIFICANT CHANGE UP (ref 0–0.5)
EOSINOPHIL # BLD AUTO: 0.19 K/UL — SIGNIFICANT CHANGE UP (ref 0–0.5)
EOSINOPHIL NFR BLD AUTO: 0 % — SIGNIFICANT CHANGE UP (ref 0–6)
EOSINOPHIL NFR BLD AUTO: 0.4 % — SIGNIFICANT CHANGE UP (ref 0–6)
EOSINOPHIL NFR BLD AUTO: 1 % — SIGNIFICANT CHANGE UP (ref 0–6)
EOSINOPHIL NFR BLD AUTO: 1 % — SIGNIFICANT CHANGE UP (ref 0–6)
EOSINOPHIL NFR BLD AUTO: 2 % — SIGNIFICANT CHANGE UP (ref 0–6)
FACT VII ACT/NOR PPP: 30 % — LOW (ref 53–149)
FACT VIII ACT/NOR PPP: 322 % — HIGH (ref 51–138)
FIBRINOGEN PPP-MCNC: 318 MG/DL — SIGNIFICANT CHANGE UP (ref 258–438)
FIBRINOGEN PPP-MCNC: 325 MG/DL — SIGNIFICANT CHANGE UP (ref 258–438)
FIBRINOGEN PPP-MCNC: 329 MG/DL — SIGNIFICANT CHANGE UP (ref 258–438)
FIBRINOGEN PPP-MCNC: 341 MG/DL — SIGNIFICANT CHANGE UP (ref 258–438)
FIBRINOGEN PPP-MCNC: 357 MG/DL — SIGNIFICANT CHANGE UP (ref 258–438)
FIBRINOGEN PPP-MCNC: 360 MG/DL — SIGNIFICANT CHANGE UP (ref 258–438)
FIBRINOGEN PPP-MCNC: 365 MG/DL — SIGNIFICANT CHANGE UP (ref 258–438)
FIBRINOGEN PPP-MCNC: 391 MG/DL — SIGNIFICANT CHANGE UP (ref 258–438)
FIBRINOGEN PPP-MCNC: 396 MG/DL — SIGNIFICANT CHANGE UP (ref 258–438)
FIBRINOGEN PPP-MCNC: 396 MG/DL — SIGNIFICANT CHANGE UP (ref 258–438)
FIBRINOGEN PPP-MCNC: 401 MG/DL — SIGNIFICANT CHANGE UP (ref 258–438)
FIBRINOGEN PPP-MCNC: 406 MG/DL — SIGNIFICANT CHANGE UP (ref 258–438)
FIBRINOGEN PPP-MCNC: 418 MG/DL — SIGNIFICANT CHANGE UP (ref 258–438)
FIBRINOGEN PPP-MCNC: 448 MG/DL — HIGH (ref 258–438)
FIBRINOGEN PPP-MCNC: 461 MG/DL — HIGH (ref 258–438)
FIBRINOGEN PPP-MCNC: 475 MG/DL — HIGH (ref 258–438)
FLUID INTAKE SUBSTANCE CLASS: SIGNIFICANT CHANGE UP
FLUID SEGMENTED GRANULOCYTES: 45 % — SIGNIFICANT CHANGE UP
FLUID SEGMENTED GRANULOCYTES: 93 % — SIGNIFICANT CHANGE UP
FLUID SEGMENTED GRANULOCYTES: 95 % — SIGNIFICANT CHANGE UP
FOLATE SERPL-MCNC: 9.3 NG/ML — SIGNIFICANT CHANGE UP
FSP PPP-MCNC: >=5 <20 UG/ML
FUNGITELL B-D-GLUCAN,  BRONCHIAL LAVAGE: SIGNIFICANT CHANGE UP
FUNGITELL B-D-GLUCAN,  BRONCHIAL LAVAGE: SIGNIFICANT CHANGE UP
FUNGITELL B-D-GLUCAN,  BRONCHIAL WASH: SIGNIFICANT CHANGE UP
FUNGITELL: 133 PG/ML — HIGH
FUNGITELL: >500 PG/ML — HIGH
GAS PNL BLDA: SIGNIFICANT CHANGE UP
GAS PNL BLDV: SIGNIFICANT CHANGE UP
GLUCOSE BLDC GLUCOMTR-MCNC: 101 MG/DL — HIGH (ref 70–99)
GLUCOSE BLDC GLUCOMTR-MCNC: 103 MG/DL — HIGH (ref 70–99)
GLUCOSE BLDC GLUCOMTR-MCNC: 103 MG/DL — HIGH (ref 70–99)
GLUCOSE BLDC GLUCOMTR-MCNC: 104 MG/DL — HIGH (ref 70–99)
GLUCOSE BLDC GLUCOMTR-MCNC: 105 MG/DL — HIGH (ref 70–99)
GLUCOSE BLDC GLUCOMTR-MCNC: 106 MG/DL — HIGH (ref 70–99)
GLUCOSE BLDC GLUCOMTR-MCNC: 107 MG/DL — HIGH (ref 70–99)
GLUCOSE BLDC GLUCOMTR-MCNC: 108 MG/DL — HIGH (ref 70–99)
GLUCOSE BLDC GLUCOMTR-MCNC: 109 MG/DL — HIGH (ref 70–99)
GLUCOSE BLDC GLUCOMTR-MCNC: 111 MG/DL — HIGH (ref 70–99)
GLUCOSE BLDC GLUCOMTR-MCNC: 112 MG/DL — HIGH (ref 70–99)
GLUCOSE BLDC GLUCOMTR-MCNC: 114 MG/DL — HIGH (ref 70–99)
GLUCOSE BLDC GLUCOMTR-MCNC: 115 MG/DL — HIGH (ref 70–99)
GLUCOSE BLDC GLUCOMTR-MCNC: 116 MG/DL — HIGH (ref 70–99)
GLUCOSE BLDC GLUCOMTR-MCNC: 117 MG/DL — HIGH (ref 70–99)
GLUCOSE BLDC GLUCOMTR-MCNC: 118 MG/DL — HIGH (ref 70–99)
GLUCOSE BLDC GLUCOMTR-MCNC: 119 MG/DL — HIGH (ref 70–99)
GLUCOSE BLDC GLUCOMTR-MCNC: 120 MG/DL — HIGH (ref 70–99)
GLUCOSE BLDC GLUCOMTR-MCNC: 122 MG/DL — HIGH (ref 70–99)
GLUCOSE BLDC GLUCOMTR-MCNC: 122 MG/DL — HIGH (ref 70–99)
GLUCOSE BLDC GLUCOMTR-MCNC: 123 MG/DL — HIGH (ref 70–99)
GLUCOSE BLDC GLUCOMTR-MCNC: 124 MG/DL — HIGH (ref 70–99)
GLUCOSE BLDC GLUCOMTR-MCNC: 124 MG/DL — HIGH (ref 70–99)
GLUCOSE BLDC GLUCOMTR-MCNC: 128 MG/DL — HIGH (ref 70–99)
GLUCOSE BLDC GLUCOMTR-MCNC: 133 MG/DL — HIGH (ref 70–99)
GLUCOSE BLDC GLUCOMTR-MCNC: 135 MG/DL — HIGH (ref 70–99)
GLUCOSE BLDC GLUCOMTR-MCNC: 135 MG/DL — HIGH (ref 70–99)
GLUCOSE BLDC GLUCOMTR-MCNC: 137 MG/DL — HIGH (ref 70–99)
GLUCOSE BLDC GLUCOMTR-MCNC: 140 MG/DL — HIGH (ref 70–99)
GLUCOSE BLDC GLUCOMTR-MCNC: 142 MG/DL — HIGH (ref 70–99)
GLUCOSE BLDC GLUCOMTR-MCNC: 144 MG/DL — HIGH (ref 70–99)
GLUCOSE BLDC GLUCOMTR-MCNC: 144 MG/DL — HIGH (ref 70–99)
GLUCOSE BLDC GLUCOMTR-MCNC: 145 MG/DL — HIGH (ref 70–99)
GLUCOSE BLDC GLUCOMTR-MCNC: 145 MG/DL — HIGH (ref 70–99)
GLUCOSE BLDC GLUCOMTR-MCNC: 147 MG/DL — HIGH (ref 70–99)
GLUCOSE BLDC GLUCOMTR-MCNC: 151 MG/DL — HIGH (ref 70–99)
GLUCOSE BLDC GLUCOMTR-MCNC: 151 MG/DL — HIGH (ref 70–99)
GLUCOSE BLDC GLUCOMTR-MCNC: 154 MG/DL — HIGH (ref 70–99)
GLUCOSE BLDC GLUCOMTR-MCNC: 160 MG/DL — HIGH (ref 70–99)
GLUCOSE BLDC GLUCOMTR-MCNC: 164 MG/DL — HIGH (ref 70–99)
GLUCOSE BLDC GLUCOMTR-MCNC: 180 MG/DL — HIGH (ref 70–99)
GLUCOSE BLDC GLUCOMTR-MCNC: 30 MG/DL — CRITICAL LOW (ref 70–99)
GLUCOSE BLDC GLUCOMTR-MCNC: 345 MG/DL — HIGH (ref 70–99)
GLUCOSE BLDC GLUCOMTR-MCNC: 40 MG/DL — CRITICAL LOW (ref 70–99)
GLUCOSE BLDC GLUCOMTR-MCNC: 65 MG/DL — LOW (ref 70–99)
GLUCOSE BLDC GLUCOMTR-MCNC: 66 MG/DL — LOW (ref 70–99)
GLUCOSE BLDC GLUCOMTR-MCNC: 66 MG/DL — LOW (ref 70–99)
GLUCOSE BLDC GLUCOMTR-MCNC: 67 MG/DL — LOW (ref 70–99)
GLUCOSE BLDC GLUCOMTR-MCNC: 70 MG/DL — SIGNIFICANT CHANGE UP (ref 70–99)
GLUCOSE BLDC GLUCOMTR-MCNC: 70 MG/DL — SIGNIFICANT CHANGE UP (ref 70–99)
GLUCOSE BLDC GLUCOMTR-MCNC: 73 MG/DL — SIGNIFICANT CHANGE UP (ref 70–99)
GLUCOSE BLDC GLUCOMTR-MCNC: 76 MG/DL — SIGNIFICANT CHANGE UP (ref 70–99)
GLUCOSE BLDC GLUCOMTR-MCNC: 79 MG/DL — SIGNIFICANT CHANGE UP (ref 70–99)
GLUCOSE BLDC GLUCOMTR-MCNC: 80 MG/DL — SIGNIFICANT CHANGE UP (ref 70–99)
GLUCOSE BLDC GLUCOMTR-MCNC: 80 MG/DL — SIGNIFICANT CHANGE UP (ref 70–99)
GLUCOSE BLDC GLUCOMTR-MCNC: 81 MG/DL — SIGNIFICANT CHANGE UP (ref 70–99)
GLUCOSE BLDC GLUCOMTR-MCNC: 82 MG/DL — SIGNIFICANT CHANGE UP (ref 70–99)
GLUCOSE BLDC GLUCOMTR-MCNC: 83 MG/DL — SIGNIFICANT CHANGE UP (ref 70–99)
GLUCOSE BLDC GLUCOMTR-MCNC: 84 MG/DL — SIGNIFICANT CHANGE UP (ref 70–99)
GLUCOSE BLDC GLUCOMTR-MCNC: 85 MG/DL — SIGNIFICANT CHANGE UP (ref 70–99)
GLUCOSE BLDC GLUCOMTR-MCNC: 85 MG/DL — SIGNIFICANT CHANGE UP (ref 70–99)
GLUCOSE BLDC GLUCOMTR-MCNC: 86 MG/DL — SIGNIFICANT CHANGE UP (ref 70–99)
GLUCOSE BLDC GLUCOMTR-MCNC: 86 MG/DL — SIGNIFICANT CHANGE UP (ref 70–99)
GLUCOSE BLDC GLUCOMTR-MCNC: 87 MG/DL — SIGNIFICANT CHANGE UP (ref 70–99)
GLUCOSE BLDC GLUCOMTR-MCNC: 88 MG/DL — SIGNIFICANT CHANGE UP (ref 70–99)
GLUCOSE BLDC GLUCOMTR-MCNC: 88 MG/DL — SIGNIFICANT CHANGE UP (ref 70–99)
GLUCOSE BLDC GLUCOMTR-MCNC: 89 MG/DL — SIGNIFICANT CHANGE UP (ref 70–99)
GLUCOSE BLDC GLUCOMTR-MCNC: 90 MG/DL — SIGNIFICANT CHANGE UP (ref 70–99)
GLUCOSE BLDC GLUCOMTR-MCNC: 91 MG/DL — SIGNIFICANT CHANGE UP (ref 70–99)
GLUCOSE BLDC GLUCOMTR-MCNC: 91 MG/DL — SIGNIFICANT CHANGE UP (ref 70–99)
GLUCOSE BLDC GLUCOMTR-MCNC: 92 MG/DL — SIGNIFICANT CHANGE UP (ref 70–99)
GLUCOSE BLDC GLUCOMTR-MCNC: 92 MG/DL — SIGNIFICANT CHANGE UP (ref 70–99)
GLUCOSE BLDC GLUCOMTR-MCNC: 93 MG/DL — SIGNIFICANT CHANGE UP (ref 70–99)
GLUCOSE BLDC GLUCOMTR-MCNC: 94 MG/DL — SIGNIFICANT CHANGE UP (ref 70–99)
GLUCOSE BLDC GLUCOMTR-MCNC: 95 MG/DL — SIGNIFICANT CHANGE UP (ref 70–99)
GLUCOSE BLDC GLUCOMTR-MCNC: 95 MG/DL — SIGNIFICANT CHANGE UP (ref 70–99)
GLUCOSE BLDC GLUCOMTR-MCNC: 96 MG/DL — SIGNIFICANT CHANGE UP (ref 70–99)
GLUCOSE BLDC GLUCOMTR-MCNC: 97 MG/DL — SIGNIFICANT CHANGE UP (ref 70–99)
GLUCOSE BLDC GLUCOMTR-MCNC: 97 MG/DL — SIGNIFICANT CHANGE UP (ref 70–99)
GLUCOSE BLDC GLUCOMTR-MCNC: 98 MG/DL — SIGNIFICANT CHANGE UP (ref 70–99)
GLUCOSE BLDC GLUCOMTR-MCNC: 99 MG/DL — SIGNIFICANT CHANGE UP (ref 70–99)
GLUCOSE BLDC GLUCOMTR-MCNC: 99 MG/DL — SIGNIFICANT CHANGE UP (ref 70–99)
GLUCOSE FLD-MCNC: 2 MG/DL — SIGNIFICANT CHANGE UP
GLUCOSE FLD-MCNC: 45 MG/DL — SIGNIFICANT CHANGE UP
GLUCOSE SERPL-MCNC: 101 MG/DL — HIGH (ref 70–99)
GLUCOSE SERPL-MCNC: 102 MG/DL — HIGH (ref 70–99)
GLUCOSE SERPL-MCNC: 103 MG/DL — HIGH (ref 70–99)
GLUCOSE SERPL-MCNC: 104 MG/DL — HIGH (ref 70–99)
GLUCOSE SERPL-MCNC: 104 MG/DL — HIGH (ref 70–99)
GLUCOSE SERPL-MCNC: 106 MG/DL — HIGH (ref 70–99)
GLUCOSE SERPL-MCNC: 108 MG/DL — HIGH (ref 70–99)
GLUCOSE SERPL-MCNC: 108 MG/DL — HIGH (ref 70–99)
GLUCOSE SERPL-MCNC: 109 MG/DL — HIGH (ref 70–99)
GLUCOSE SERPL-MCNC: 109 MG/DL — HIGH (ref 70–99)
GLUCOSE SERPL-MCNC: 110 MG/DL — HIGH (ref 70–99)
GLUCOSE SERPL-MCNC: 110 MG/DL — HIGH (ref 70–99)
GLUCOSE SERPL-MCNC: 113 MG/DL — HIGH (ref 70–99)
GLUCOSE SERPL-MCNC: 117 MG/DL — HIGH (ref 70–99)
GLUCOSE SERPL-MCNC: 120 MG/DL — HIGH (ref 70–99)
GLUCOSE SERPL-MCNC: 121 MG/DL — HIGH (ref 70–99)
GLUCOSE SERPL-MCNC: 122 MG/DL — HIGH (ref 70–99)
GLUCOSE SERPL-MCNC: 122 MG/DL — HIGH (ref 70–99)
GLUCOSE SERPL-MCNC: 123 MG/DL — HIGH (ref 70–99)
GLUCOSE SERPL-MCNC: 125 MG/DL — HIGH (ref 70–99)
GLUCOSE SERPL-MCNC: 127 MG/DL — HIGH (ref 70–99)
GLUCOSE SERPL-MCNC: 128 MG/DL — HIGH (ref 70–99)
GLUCOSE SERPL-MCNC: 130 MG/DL — HIGH (ref 70–99)
GLUCOSE SERPL-MCNC: 132 MG/DL — HIGH (ref 70–99)
GLUCOSE SERPL-MCNC: 132 MG/DL — HIGH (ref 70–99)
GLUCOSE SERPL-MCNC: 133 MG/DL — HIGH (ref 70–99)
GLUCOSE SERPL-MCNC: 134 MG/DL — HIGH (ref 70–99)
GLUCOSE SERPL-MCNC: 135 MG/DL — HIGH (ref 70–99)
GLUCOSE SERPL-MCNC: 136 MG/DL — HIGH (ref 70–99)
GLUCOSE SERPL-MCNC: 136 MG/DL — HIGH (ref 70–99)
GLUCOSE SERPL-MCNC: 139 MG/DL — HIGH (ref 70–99)
GLUCOSE SERPL-MCNC: 140 MG/DL — HIGH (ref 70–99)
GLUCOSE SERPL-MCNC: 140 MG/DL — HIGH (ref 70–99)
GLUCOSE SERPL-MCNC: 142 MG/DL — HIGH (ref 70–99)
GLUCOSE SERPL-MCNC: 142 MG/DL — HIGH (ref 70–99)
GLUCOSE SERPL-MCNC: 147 MG/DL — HIGH (ref 70–99)
GLUCOSE SERPL-MCNC: 150 MG/DL — HIGH (ref 70–99)
GLUCOSE SERPL-MCNC: 150 MG/DL — HIGH (ref 70–99)
GLUCOSE SERPL-MCNC: 151 MG/DL — HIGH (ref 70–99)
GLUCOSE SERPL-MCNC: 152 MG/DL — HIGH (ref 70–99)
GLUCOSE SERPL-MCNC: 153 MG/DL — HIGH (ref 70–99)
GLUCOSE SERPL-MCNC: 154 MG/DL — HIGH (ref 70–99)
GLUCOSE SERPL-MCNC: 156 MG/DL — HIGH (ref 70–99)
GLUCOSE SERPL-MCNC: 156 MG/DL — HIGH (ref 70–99)
GLUCOSE SERPL-MCNC: 164 MG/DL — HIGH (ref 70–99)
GLUCOSE SERPL-MCNC: 170 MG/DL — HIGH (ref 70–99)
GLUCOSE SERPL-MCNC: 171 MG/DL — HIGH (ref 70–99)
GLUCOSE SERPL-MCNC: 173 MG/DL — HIGH (ref 70–99)
GLUCOSE SERPL-MCNC: 177 MG/DL — HIGH (ref 70–99)
GLUCOSE SERPL-MCNC: 85 MG/DL — SIGNIFICANT CHANGE UP (ref 70–99)
GLUCOSE SERPL-MCNC: 86 MG/DL — SIGNIFICANT CHANGE UP (ref 70–99)
GLUCOSE SERPL-MCNC: 87 MG/DL — SIGNIFICANT CHANGE UP (ref 70–99)
GLUCOSE SERPL-MCNC: 88 MG/DL — SIGNIFICANT CHANGE UP (ref 70–99)
GLUCOSE SERPL-MCNC: 88 MG/DL — SIGNIFICANT CHANGE UP (ref 70–99)
GLUCOSE SERPL-MCNC: 90 MG/DL — SIGNIFICANT CHANGE UP (ref 70–99)
GLUCOSE SERPL-MCNC: 91 MG/DL — SIGNIFICANT CHANGE UP (ref 70–99)
GLUCOSE SERPL-MCNC: 92 MG/DL — SIGNIFICANT CHANGE UP (ref 70–99)
GLUCOSE SERPL-MCNC: 95 MG/DL — SIGNIFICANT CHANGE UP (ref 70–99)
GLUCOSE SERPL-MCNC: 95 MG/DL — SIGNIFICANT CHANGE UP (ref 70–99)
GLUCOSE SERPL-MCNC: 96 MG/DL — SIGNIFICANT CHANGE UP (ref 70–99)
GLUCOSE SERPL-MCNC: 96 MG/DL — SIGNIFICANT CHANGE UP (ref 70–99)
GLUCOSE SERPL-MCNC: 97 MG/DL — SIGNIFICANT CHANGE UP (ref 70–99)
GLUCOSE SERPL-MCNC: 97 MG/DL — SIGNIFICANT CHANGE UP (ref 70–99)
GLUCOSE SERPL-MCNC: 99 MG/DL — SIGNIFICANT CHANGE UP (ref 70–99)
GLUCOSE UR QL: NEGATIVE — SIGNIFICANT CHANGE UP
GLUCOSE UR QL: NEGATIVE — SIGNIFICANT CHANGE UP
GRAM STN FLD: SIGNIFICANT CHANGE UP
HAPTOGLOB SERPL-MCNC: 103 MG/DL — SIGNIFICANT CHANGE UP (ref 34–200)
HAPTOGLOB SERPL-MCNC: 116 MG/DL — SIGNIFICANT CHANGE UP (ref 34–200)
HAPTOGLOB SERPL-MCNC: 13 MG/DL — LOW (ref 34–200)
HAPTOGLOB SERPL-MCNC: 15 MG/DL — LOW (ref 34–200)
HAPTOGLOB SERPL-MCNC: 18 MG/DL — LOW (ref 34–200)
HAPTOGLOB SERPL-MCNC: 25 MG/DL — LOW (ref 34–200)
HAPTOGLOB SERPL-MCNC: 45 MG/DL — SIGNIFICANT CHANGE UP (ref 34–200)
HAPTOGLOB SERPL-MCNC: 65 MG/DL — SIGNIFICANT CHANGE UP (ref 34–200)
HAPTOGLOB SERPL-MCNC: 70 MG/DL — SIGNIFICANT CHANGE UP (ref 34–200)
HAPTOGLOB SERPL-MCNC: 82 MG/DL — SIGNIFICANT CHANGE UP (ref 34–200)
HAPTOGLOB SERPL-MCNC: 83 MG/DL — SIGNIFICANT CHANGE UP (ref 34–200)
HAPTOGLOB SERPL-MCNC: 86 MG/DL — SIGNIFICANT CHANGE UP (ref 34–200)
HAPTOGLOB SERPL-MCNC: <10 MG/DL — LOW (ref 34–200)
HAV IGM SER-ACNC: SIGNIFICANT CHANGE UP
HAV IGM SER-ACNC: SIGNIFICANT CHANGE UP
HBA1C BLD-MCNC: 5.7 % — HIGH (ref 4–5.6)
HBV CORE AB SER-ACNC: SIGNIFICANT CHANGE UP
HBV CORE IGM SER-ACNC: SIGNIFICANT CHANGE UP
HBV CORE IGM SER-ACNC: SIGNIFICANT CHANGE UP
HBV SURFACE AB SER-ACNC: REACTIVE — SIGNIFICANT CHANGE UP
HBV SURFACE AG SER-ACNC: SIGNIFICANT CHANGE UP
HCO3 BLDA-SCNC: 20 MMOL/L — LOW (ref 21–28)
HCO3 BLDA-SCNC: 21 MMOL/L — SIGNIFICANT CHANGE UP (ref 21–28)
HCO3 BLDA-SCNC: 21 MMOL/L — SIGNIFICANT CHANGE UP (ref 21–28)
HCO3 BLDA-SCNC: 22 MMOL/L — SIGNIFICANT CHANGE UP (ref 21–28)
HCO3 BLDA-SCNC: 23 MMOL/L — SIGNIFICANT CHANGE UP (ref 21–28)
HCO3 BLDA-SCNC: 24 MMOL/L — SIGNIFICANT CHANGE UP (ref 21–28)
HCO3 BLDA-SCNC: 26 MMOL/L — SIGNIFICANT CHANGE UP (ref 21–28)
HCT VFR BLD CALC: 17.9 % — CRITICAL LOW (ref 39–50)
HCT VFR BLD CALC: 18.9 % — CRITICAL LOW (ref 39–50)
HCT VFR BLD CALC: 20.2 % — CRITICAL LOW (ref 39–50)
HCT VFR BLD CALC: 20.6 % — CRITICAL LOW (ref 39–50)
HCT VFR BLD CALC: 20.9 % — CRITICAL LOW (ref 39–50)
HCT VFR BLD CALC: 21.4 % — LOW (ref 39–50)
HCT VFR BLD CALC: 21.5 % — LOW (ref 39–50)
HCT VFR BLD CALC: 21.7 % — LOW (ref 39–50)
HCT VFR BLD CALC: 21.9 % — LOW (ref 39–50)
HCT VFR BLD CALC: 21.9 % — LOW (ref 39–50)
HCT VFR BLD CALC: 22 % — LOW (ref 39–50)
HCT VFR BLD CALC: 22.1 % — LOW (ref 39–50)
HCT VFR BLD CALC: 22.2 % — LOW (ref 39–50)
HCT VFR BLD CALC: 22.3 % — LOW (ref 39–50)
HCT VFR BLD CALC: 22.4 % — LOW (ref 39–50)
HCT VFR BLD CALC: 22.4 % — LOW (ref 39–50)
HCT VFR BLD CALC: 22.7 % — LOW (ref 39–50)
HCT VFR BLD CALC: 22.7 % — LOW (ref 39–50)
HCT VFR BLD CALC: 22.9 % — LOW (ref 39–50)
HCT VFR BLD CALC: 23.1 % — LOW (ref 39–50)
HCT VFR BLD CALC: 23.1 % — LOW (ref 39–50)
HCT VFR BLD CALC: 23.6 % — LOW (ref 39–50)
HCT VFR BLD CALC: 24.4 % — LOW (ref 39–50)
HCT VFR BLD CALC: 24.4 % — LOW (ref 39–50)
HCT VFR BLD CALC: 24.6 % — LOW (ref 39–50)
HCT VFR BLD CALC: 24.7 % — LOW (ref 39–50)
HCT VFR BLD CALC: 24.7 % — LOW (ref 39–50)
HCT VFR BLD CALC: 24.8 % — LOW (ref 39–50)
HCT VFR BLD CALC: 24.9 % — LOW (ref 39–50)
HCT VFR BLD CALC: 25.3 % — LOW (ref 39–50)
HCT VFR BLD CALC: 25.4 % — LOW (ref 39–50)
HCT VFR BLD CALC: 25.4 % — LOW (ref 39–50)
HCT VFR BLD CALC: 25.5 % — LOW (ref 39–50)
HCT VFR BLD CALC: 25.6 % — LOW (ref 39–50)
HCT VFR BLD CALC: 25.6 % — LOW (ref 39–50)
HCT VFR BLD CALC: 25.7 % — LOW (ref 39–50)
HCT VFR BLD CALC: 25.9 % — LOW (ref 39–50)
HCT VFR BLD CALC: 25.9 % — LOW (ref 39–50)
HCT VFR BLD CALC: 26 % — LOW (ref 39–50)
HCT VFR BLD CALC: 26.2 % — LOW (ref 39–50)
HCT VFR BLD CALC: 26.6 % — LOW (ref 39–50)
HCT VFR BLD CALC: 27 % — LOW (ref 39–50)
HCT VFR BLD CALC: 27.1 % — LOW (ref 39–50)
HCT VFR BLD CALC: 27.1 % — LOW (ref 39–50)
HCT VFR BLD CALC: 27.3 % — LOW (ref 39–50)
HCT VFR BLD CALC: 27.4 % — LOW (ref 39–50)
HCT VFR BLD CALC: 28.4 % — LOW (ref 39–50)
HCT VFR BLD CALC: 29.6 % — LOW (ref 39–50)
HCV AB S/CO SERPL IA: 0.19 S/CO — SIGNIFICANT CHANGE UP
HCV AB S/CO SERPL IA: 0.19 S/CO — SIGNIFICANT CHANGE UP
HCV AB S/CO SERPL IA: 0.24 S/CO — SIGNIFICANT CHANGE UP
HCV AB SERPL-IMP: SIGNIFICANT CHANGE UP
HDLC SERPL-MCNC: 12 MG/DL — LOW
HDLC SERPL-MCNC: 9 MG/DL — LOW
HGB BLD-MCNC: 10.1 G/DL — LOW (ref 13–17)
HGB BLD-MCNC: 6 G/DL — CRITICAL LOW (ref 13–17)
HGB BLD-MCNC: 6 G/DL — CRITICAL LOW (ref 13–17)
HGB BLD-MCNC: 6.4 G/DL — CRITICAL LOW (ref 13–17)
HGB BLD-MCNC: 6.9 G/DL — CRITICAL LOW (ref 13–17)
HGB BLD-MCNC: 7 G/DL — CRITICAL LOW (ref 13–17)
HGB BLD-MCNC: 7.1 G/DL — LOW (ref 13–17)
HGB BLD-MCNC: 7.2 G/DL — LOW (ref 13–17)
HGB BLD-MCNC: 7.3 G/DL — LOW (ref 13–17)
HGB BLD-MCNC: 7.3 G/DL — LOW (ref 13–17)
HGB BLD-MCNC: 7.5 G/DL — LOW (ref 13–17)
HGB BLD-MCNC: 7.7 G/DL — LOW (ref 13–17)
HGB BLD-MCNC: 7.7 G/DL — LOW (ref 13–17)
HGB BLD-MCNC: 7.8 G/DL — LOW (ref 13–17)
HGB BLD-MCNC: 8 G/DL — LOW (ref 13–17)
HGB BLD-MCNC: 8.1 G/DL — LOW (ref 13–17)
HGB BLD-MCNC: 8.2 G/DL — LOW (ref 13–17)
HGB BLD-MCNC: 8.2 G/DL — LOW (ref 13–17)
HGB BLD-MCNC: 8.3 G/DL — LOW (ref 13–17)
HGB BLD-MCNC: 8.3 G/DL — LOW (ref 13–17)
HGB BLD-MCNC: 8.4 G/DL — LOW (ref 13–17)
HGB BLD-MCNC: 8.5 G/DL — LOW (ref 13–17)
HGB BLD-MCNC: 8.7 G/DL — LOW (ref 13–17)
HGB BLD-MCNC: 8.8 G/DL — LOW (ref 13–17)
HGB BLD-MCNC: 8.8 G/DL — LOW (ref 13–17)
HGB BLD-MCNC: 8.9 G/DL — LOW (ref 13–17)
HGB BLD-MCNC: 8.9 G/DL — LOW (ref 13–17)
HGB BLD-MCNC: 9.4 G/DL — LOW (ref 13–17)
HGB BLD-MCNC: 9.6 G/DL — LOW (ref 13–17)
HIV 1+2 AB+HIV1 P24 AG SERPL QL IA: SIGNIFICANT CHANGE UP
HIV 1+2 AB+HIV1 P24 AG SERPL QL IA: SIGNIFICANT CHANGE UP
HIV-1 VIRAL LOAD RESULT: SIGNIFICANT CHANGE UP
HIV1 RNA # SERPL NAA+PROBE: SIGNIFICANT CHANGE UP
HIV1 RNA SER-IMP: SIGNIFICANT CHANGE UP
HIV1 RNA SERPL NAA+PROBE-ACNC: SIGNIFICANT CHANGE UP
HIV1 RNA SERPL NAA+PROBE-LOG#: SIGNIFICANT CHANGE UP LG COP/ML
IMM GRANULOCYTES NFR BLD AUTO: 0.6 % — SIGNIFICANT CHANGE UP (ref 0–1.5)
IMM GRANULOCYTES NFR BLD AUTO: 0.7 % — SIGNIFICANT CHANGE UP (ref 0–1.5)
IMM GRANULOCYTES NFR BLD AUTO: 0.8 % — SIGNIFICANT CHANGE UP (ref 0–1.5)
IMM GRANULOCYTES NFR BLD AUTO: 0.8 % — SIGNIFICANT CHANGE UP (ref 0–1.5)
IMM GRANULOCYTES NFR BLD AUTO: 1 % — SIGNIFICANT CHANGE UP (ref 0–1.5)
IMM GRANULOCYTES NFR BLD AUTO: 4.6 % — HIGH (ref 0–1.5)
INR BLD: 1.02 — SIGNIFICANT CHANGE UP (ref 0.88–1.16)
INR BLD: 1.06 — SIGNIFICANT CHANGE UP (ref 0.88–1.16)
INR BLD: 1.13 — SIGNIFICANT CHANGE UP (ref 0.88–1.16)
INR BLD: 1.15 — SIGNIFICANT CHANGE UP (ref 0.88–1.16)
INR BLD: 1.15 — SIGNIFICANT CHANGE UP (ref 0.88–1.16)
INR BLD: 1.16 — SIGNIFICANT CHANGE UP (ref 0.88–1.16)
INR BLD: 1.24 — HIGH (ref 0.88–1.16)
INR BLD: 1.24 — HIGH (ref 0.88–1.16)
INR BLD: 1.26 — HIGH (ref 0.88–1.16)
INR BLD: 1.3 — HIGH (ref 0.88–1.16)
INR BLD: 1.36 — HIGH (ref 0.88–1.16)
INR BLD: 1.42 — HIGH (ref 0.88–1.16)
INR BLD: 1.44 — HIGH (ref 0.88–1.16)
INR BLD: 1.49 — HIGH (ref 0.88–1.16)
INR BLD: 1.5 — HIGH (ref 0.88–1.16)
INR BLD: 1.64 — HIGH (ref 0.88–1.16)
INR BLD: 1.71 — HIGH (ref 0.88–1.16)
INR BLD: 1.9 — HIGH (ref 0.88–1.16)
INR BLD: 1.98 — HIGH (ref 0.88–1.16)
INR BLD: 2.28 — HIGH (ref 0.88–1.16)
IRON SATN MFR SERPL: 51 UG/DL — SIGNIFICANT CHANGE UP (ref 45–165)
IRON SATN MFR SERPL: 53 % — SIGNIFICANT CHANGE UP (ref 16–55)
KETONES UR-MCNC: NEGATIVE — SIGNIFICANT CHANGE UP
KETONES UR-MCNC: NEGATIVE — SIGNIFICANT CHANGE UP
LACTATE SERPL-SCNC: 1.3 MMOL/L — SIGNIFICANT CHANGE UP (ref 0.5–2)
LACTATE SERPL-SCNC: 1.4 MMOL/L — SIGNIFICANT CHANGE UP (ref 0.5–2)
LACTATE SERPL-SCNC: 1.4 MMOL/L — SIGNIFICANT CHANGE UP (ref 0.5–2)
LACTATE SERPL-SCNC: 1.5 MMOL/L — SIGNIFICANT CHANGE UP (ref 0.5–2)
LACTATE SERPL-SCNC: 1.5 MMOL/L — SIGNIFICANT CHANGE UP (ref 0.5–2)
LACTATE SERPL-SCNC: 1.6 MMOL/L — SIGNIFICANT CHANGE UP (ref 0.5–2)
LACTATE SERPL-SCNC: 1.9 MMOL/L — SIGNIFICANT CHANGE UP (ref 0.5–2)
LACTATE SERPL-SCNC: 2 MMOL/L — SIGNIFICANT CHANGE UP (ref 0.5–2)
LACTATE SERPL-SCNC: 2 MMOL/L — SIGNIFICANT CHANGE UP (ref 0.5–2)
LACTATE SERPL-SCNC: 2.1 MMOL/L — HIGH (ref 0.5–2)
LACTATE SERPL-SCNC: 2.2 MMOL/L — HIGH (ref 0.5–2)
LACTATE SERPL-SCNC: 2.9 MMOL/L — HIGH (ref 0.5–2)
LDH SERPL L TO P-CCNC: 316 U/L — HIGH (ref 50–242)
LDH SERPL L TO P-CCNC: 321 U/L — HIGH (ref 50–242)
LDH SERPL L TO P-CCNC: 324 U/L — HIGH (ref 50–242)
LDH SERPL L TO P-CCNC: 334 U/L — HIGH (ref 50–242)
LDH SERPL L TO P-CCNC: 337 U/L — HIGH (ref 50–242)
LDH SERPL L TO P-CCNC: 339 U/L — HIGH (ref 50–242)
LDH SERPL L TO P-CCNC: 340 U/L — HIGH (ref 50–242)
LDH SERPL L TO P-CCNC: 347 U/L — HIGH (ref 50–242)
LDH SERPL L TO P-CCNC: 348 U/L — HIGH (ref 50–242)
LDH SERPL L TO P-CCNC: 348 U/L — HIGH (ref 50–242)
LDH SERPL L TO P-CCNC: 352 U/L — HIGH (ref 50–242)
LDH SERPL L TO P-CCNC: 354 U/L — HIGH (ref 50–242)
LDH SERPL L TO P-CCNC: 365 U/L — HIGH (ref 50–242)
LDH SERPL L TO P-CCNC: 367 U/L — HIGH (ref 50–242)
LDH SERPL L TO P-CCNC: 376 U/L — HIGH (ref 50–242)
LDH SERPL L TO P-CCNC: 382 U/L — HIGH (ref 50–242)
LDH SERPL L TO P-CCNC: 391 U/L — HIGH (ref 50–242)
LDH SERPL L TO P-CCNC: 438 U/L — HIGH (ref 50–242)
LDH SERPL L TO P-CCNC: 634 U/L — HIGH (ref 50–242)
LDH SERPL L TO P-CCNC: 652 U/L — HIGH (ref 50–242)
LDH SERPL L TO P-CCNC: 857 U/L — HIGH (ref 50–242)
LDH SERPL L TO P-CCNC: >1000 U/L — SIGNIFICANT CHANGE UP
LDH SERPL L TO P-CCNC: >2500 U/L — SIGNIFICANT CHANGE UP
LDLC SERPL DIRECT ASSAY-MCNC: 28 MG/DL — SIGNIFICANT CHANGE UP
LEUKOCYTE ESTERASE UR-ACNC: NEGATIVE — SIGNIFICANT CHANGE UP
LEUKOCYTE ESTERASE UR-ACNC: NEGATIVE — SIGNIFICANT CHANGE UP
LIPID PNL WITH DIRECT LDL SERPL: 12 MG/DL — SIGNIFICANT CHANGE UP
LIPID PNL WITH DIRECT LDL SERPL: SIGNIFICANT CHANGE UP MG/DL
LYMPHOCYTES # BLD AUTO: 0.08 K/UL — LOW (ref 1–3.3)
LYMPHOCYTES # BLD AUTO: 0.15 K/UL — LOW (ref 1–3.3)
LYMPHOCYTES # BLD AUTO: 0.16 K/UL — LOW (ref 1–3.3)
LYMPHOCYTES # BLD AUTO: 0.24 K/UL — LOW (ref 1–3.3)
LYMPHOCYTES # BLD AUTO: 0.27 K/UL — LOW (ref 1–3.3)
LYMPHOCYTES # BLD AUTO: 0.27 K/UL — LOW (ref 1–3.3)
LYMPHOCYTES # BLD AUTO: 0.31 K/UL — LOW (ref 1–3.3)
LYMPHOCYTES # BLD AUTO: 0.35 K/UL — LOW (ref 1–3.3)
LYMPHOCYTES # BLD AUTO: 0.38 K/UL — LOW (ref 1–3.3)
LYMPHOCYTES # BLD AUTO: 0.39 K/UL — LOW (ref 1–3.3)
LYMPHOCYTES # BLD AUTO: 0.4 K/UL — LOW (ref 1–3.3)
LYMPHOCYTES # BLD AUTO: 0.41 K/UL — LOW (ref 1–3.3)
LYMPHOCYTES # BLD AUTO: 0.44 K/UL — LOW (ref 1–3.3)
LYMPHOCYTES # BLD AUTO: 0.46 K/UL — LOW (ref 1–3.3)
LYMPHOCYTES # BLD AUTO: 0.5 K/UL — LOW (ref 1–3.3)
LYMPHOCYTES # BLD AUTO: 0.53 K/UL — LOW (ref 1–3.3)
LYMPHOCYTES # BLD AUTO: 0.56 K/UL — LOW (ref 1–3.3)
LYMPHOCYTES # BLD AUTO: 0.57 K/UL — LOW (ref 1–3.3)
LYMPHOCYTES # BLD AUTO: 0.6 K/UL — LOW (ref 1–3.3)
LYMPHOCYTES # BLD AUTO: 0.65 K/UL — LOW (ref 1–3.3)
LYMPHOCYTES # BLD AUTO: 0.66 K/UL — LOW (ref 1–3.3)
LYMPHOCYTES # BLD AUTO: 0.71 K/UL — LOW (ref 1–3.3)
LYMPHOCYTES # BLD AUTO: 0.74 K/UL — LOW (ref 1–3.3)
LYMPHOCYTES # BLD AUTO: 0.83 K/UL — LOW (ref 1–3.3)
LYMPHOCYTES # BLD AUTO: 0.9 % — LOW (ref 13–44)
LYMPHOCYTES # BLD AUTO: 1.33 K/UL — SIGNIFICANT CHANGE UP (ref 1–3.3)
LYMPHOCYTES # BLD AUTO: 1.8 % — LOW (ref 13–44)
LYMPHOCYTES # BLD AUTO: 1.8 % — LOW (ref 13–44)
LYMPHOCYTES # BLD AUTO: 1.9 % — LOW (ref 13–44)
LYMPHOCYTES # BLD AUTO: 1.9 % — LOW (ref 13–44)
LYMPHOCYTES # BLD AUTO: 10 % — LOW (ref 13–44)
LYMPHOCYTES # BLD AUTO: 13.5 % — SIGNIFICANT CHANGE UP (ref 13–44)
LYMPHOCYTES # BLD AUTO: 16 % — SIGNIFICANT CHANGE UP (ref 13–44)
LYMPHOCYTES # BLD AUTO: 2 % — LOW (ref 13–44)
LYMPHOCYTES # BLD AUTO: 2.7 % — LOW (ref 13–44)
LYMPHOCYTES # BLD AUTO: 2.9 % — LOW (ref 13–44)
LYMPHOCYTES # BLD AUTO: 21 % — SIGNIFICANT CHANGE UP (ref 13–44)
LYMPHOCYTES # BLD AUTO: 24 % — SIGNIFICANT CHANGE UP (ref 13–44)
LYMPHOCYTES # BLD AUTO: 3 % — LOW (ref 13–44)
LYMPHOCYTES # BLD AUTO: 3.1 % — LOW (ref 13–44)
LYMPHOCYTES # BLD AUTO: 3.6 % — LOW (ref 13–44)
LYMPHOCYTES # BLD AUTO: 3.6 % — LOW (ref 13–44)
LYMPHOCYTES # BLD AUTO: 3.8 % — LOW (ref 13–44)
LYMPHOCYTES # BLD AUTO: 4.4 % — LOW (ref 13–44)
LYMPHOCYTES # BLD AUTO: 4.4 % — LOW (ref 13–44)
LYMPHOCYTES # BLD AUTO: 4.6 % — LOW (ref 13–44)
LYMPHOCYTES # BLD AUTO: 5.2 % — LOW (ref 13–44)
LYMPHOCYTES # BLD AUTO: 5.7 % — LOW (ref 13–44)
LYMPHOCYTES # BLD AUTO: 5.7 % — LOW (ref 13–44)
LYMPHOCYTES # BLD AUTO: 6.6 % — LOW (ref 13–44)
LYMPHOCYTES # BLD AUTO: 7.2 % — LOW (ref 13–44)
LYMPHOCYTES # BLD AUTO: 8.4 % — LOW (ref 13–44)
LYMPHOCYTES # FLD: 1 % — SIGNIFICANT CHANGE UP
LYMPHOCYTES # FLD: 2 % — SIGNIFICANT CHANGE UP
MAGNESIUM SERPL-MCNC: 1.7 MG/DL — SIGNIFICANT CHANGE UP (ref 1.6–2.6)
MAGNESIUM SERPL-MCNC: 1.8 MG/DL — SIGNIFICANT CHANGE UP (ref 1.6–2.6)
MAGNESIUM SERPL-MCNC: 1.9 MG/DL — SIGNIFICANT CHANGE UP (ref 1.6–2.6)
MAGNESIUM SERPL-MCNC: 2 MG/DL — SIGNIFICANT CHANGE UP (ref 1.6–2.6)
MAGNESIUM SERPL-MCNC: 2.1 MG/DL — SIGNIFICANT CHANGE UP (ref 1.6–2.6)
MAGNESIUM SERPL-MCNC: 2.2 MG/DL — SIGNIFICANT CHANGE UP (ref 1.6–2.6)
MAGNESIUM SERPL-MCNC: 2.3 MG/DL — SIGNIFICANT CHANGE UP (ref 1.6–2.6)
MAGNESIUM SERPL-MCNC: 2.4 MG/DL — SIGNIFICANT CHANGE UP (ref 1.6–2.6)
MAGNESIUM SERPL-MCNC: 2.5 MG/DL — SIGNIFICANT CHANGE UP (ref 1.6–2.6)
MAGNESIUM SERPL-MCNC: 2.8 MG/DL — HIGH (ref 1.6–2.6)
MAGNESIUM SERPL-MCNC: 2.8 MG/DL — HIGH (ref 1.6–2.6)
MAGNESIUM SERPL-MCNC: 3.1 MG/DL — HIGH (ref 1.6–2.6)
MAGNESIUM SERPL-MCNC: 3.2 MG/DL — HIGH (ref 1.6–2.6)
MAGNESIUM SERPL-MCNC: 3.4 MG/DL — HIGH (ref 1.6–2.6)
MCHC RBC-ENTMCNC: 26.9 PG — LOW (ref 27–34)
MCHC RBC-ENTMCNC: 27 PG — SIGNIFICANT CHANGE UP (ref 27–34)
MCHC RBC-ENTMCNC: 27.1 PG — SIGNIFICANT CHANGE UP (ref 27–34)
MCHC RBC-ENTMCNC: 27.1 PG — SIGNIFICANT CHANGE UP (ref 27–34)
MCHC RBC-ENTMCNC: 27.5 PG — SIGNIFICANT CHANGE UP (ref 27–34)
MCHC RBC-ENTMCNC: 27.6 PG — SIGNIFICANT CHANGE UP (ref 27–34)
MCHC RBC-ENTMCNC: 27.7 PG — SIGNIFICANT CHANGE UP (ref 27–34)
MCHC RBC-ENTMCNC: 27.8 PG — SIGNIFICANT CHANGE UP (ref 27–34)
MCHC RBC-ENTMCNC: 28 PG — SIGNIFICANT CHANGE UP (ref 27–34)
MCHC RBC-ENTMCNC: 28 PG — SIGNIFICANT CHANGE UP (ref 27–34)
MCHC RBC-ENTMCNC: 28.1 PG — SIGNIFICANT CHANGE UP (ref 27–34)
MCHC RBC-ENTMCNC: 28.2 PG — SIGNIFICANT CHANGE UP (ref 27–34)
MCHC RBC-ENTMCNC: 28.3 PG — SIGNIFICANT CHANGE UP (ref 27–34)
MCHC RBC-ENTMCNC: 28.4 PG — SIGNIFICANT CHANGE UP (ref 27–34)
MCHC RBC-ENTMCNC: 28.6 PG — SIGNIFICANT CHANGE UP (ref 27–34)
MCHC RBC-ENTMCNC: 28.7 PG — SIGNIFICANT CHANGE UP (ref 27–34)
MCHC RBC-ENTMCNC: 28.9 PG — SIGNIFICANT CHANGE UP (ref 27–34)
MCHC RBC-ENTMCNC: 29.1 PG — SIGNIFICANT CHANGE UP (ref 27–34)
MCHC RBC-ENTMCNC: 29.4 PG — SIGNIFICANT CHANGE UP (ref 27–34)
MCHC RBC-ENTMCNC: 29.5 PG — SIGNIFICANT CHANGE UP (ref 27–34)
MCHC RBC-ENTMCNC: 29.9 PG — SIGNIFICANT CHANGE UP (ref 27–34)
MCHC RBC-ENTMCNC: 30.3 GM/DL — LOW (ref 32–36)
MCHC RBC-ENTMCNC: 30.5 PG — SIGNIFICANT CHANGE UP (ref 27–34)
MCHC RBC-ENTMCNC: 30.7 GM/DL — LOW (ref 32–36)
MCHC RBC-ENTMCNC: 30.7 GM/DL — LOW (ref 32–36)
MCHC RBC-ENTMCNC: 30.7 PG — SIGNIFICANT CHANGE UP (ref 27–34)
MCHC RBC-ENTMCNC: 30.8 GM/DL — LOW (ref 32–36)
MCHC RBC-ENTMCNC: 30.9 GM/DL — LOW (ref 32–36)
MCHC RBC-ENTMCNC: 30.9 PG — SIGNIFICANT CHANGE UP (ref 27–34)
MCHC RBC-ENTMCNC: 31.2 GM/DL — LOW (ref 32–36)
MCHC RBC-ENTMCNC: 31.3 PG — SIGNIFICANT CHANGE UP (ref 27–34)
MCHC RBC-ENTMCNC: 31.4 GM/DL — LOW (ref 32–36)
MCHC RBC-ENTMCNC: 31.4 PG — SIGNIFICANT CHANGE UP (ref 27–34)
MCHC RBC-ENTMCNC: 31.5 PG — SIGNIFICANT CHANGE UP (ref 27–34)
MCHC RBC-ENTMCNC: 31.6 GM/DL — LOW (ref 32–36)
MCHC RBC-ENTMCNC: 31.7 GM/DL — LOW (ref 32–36)
MCHC RBC-ENTMCNC: 31.8 GM/DL — LOW (ref 32–36)
MCHC RBC-ENTMCNC: 31.8 PG — SIGNIFICANT CHANGE UP (ref 27–34)
MCHC RBC-ENTMCNC: 31.9 PG — SIGNIFICANT CHANGE UP (ref 27–34)
MCHC RBC-ENTMCNC: 31.9 PG — SIGNIFICANT CHANGE UP (ref 27–34)
MCHC RBC-ENTMCNC: 32 GM/DL — SIGNIFICANT CHANGE UP (ref 32–36)
MCHC RBC-ENTMCNC: 32 PG — SIGNIFICANT CHANGE UP (ref 27–34)
MCHC RBC-ENTMCNC: 32 PG — SIGNIFICANT CHANGE UP (ref 27–34)
MCHC RBC-ENTMCNC: 32.1 GM/DL — SIGNIFICANT CHANGE UP (ref 32–36)
MCHC RBC-ENTMCNC: 32.1 GM/DL — SIGNIFICANT CHANGE UP (ref 32–36)
MCHC RBC-ENTMCNC: 32.1 PG — SIGNIFICANT CHANGE UP (ref 27–34)
MCHC RBC-ENTMCNC: 32.2 GM/DL — SIGNIFICANT CHANGE UP (ref 32–36)
MCHC RBC-ENTMCNC: 32.3 GM/DL — SIGNIFICANT CHANGE UP (ref 32–36)
MCHC RBC-ENTMCNC: 32.3 PG — SIGNIFICANT CHANGE UP (ref 27–34)
MCHC RBC-ENTMCNC: 32.5 GM/DL — SIGNIFICANT CHANGE UP (ref 32–36)
MCHC RBC-ENTMCNC: 32.5 GM/DL — SIGNIFICANT CHANGE UP (ref 32–36)
MCHC RBC-ENTMCNC: 32.5 PG — SIGNIFICANT CHANGE UP (ref 27–34)
MCHC RBC-ENTMCNC: 32.6 GM/DL — SIGNIFICANT CHANGE UP (ref 32–36)
MCHC RBC-ENTMCNC: 32.6 PG — SIGNIFICANT CHANGE UP (ref 27–34)
MCHC RBC-ENTMCNC: 32.7 GM/DL — SIGNIFICANT CHANGE UP (ref 32–36)
MCHC RBC-ENTMCNC: 32.7 GM/DL — SIGNIFICANT CHANGE UP (ref 32–36)
MCHC RBC-ENTMCNC: 32.7 PG — SIGNIFICANT CHANGE UP (ref 27–34)
MCHC RBC-ENTMCNC: 32.7 PG — SIGNIFICANT CHANGE UP (ref 27–34)
MCHC RBC-ENTMCNC: 32.8 GM/DL — SIGNIFICANT CHANGE UP (ref 32–36)
MCHC RBC-ENTMCNC: 32.9 GM/DL — SIGNIFICANT CHANGE UP (ref 32–36)
MCHC RBC-ENTMCNC: 33 GM/DL — SIGNIFICANT CHANGE UP (ref 32–36)
MCHC RBC-ENTMCNC: 33 GM/DL — SIGNIFICANT CHANGE UP (ref 32–36)
MCHC RBC-ENTMCNC: 33 PG — SIGNIFICANT CHANGE UP (ref 27–34)
MCHC RBC-ENTMCNC: 33.1 GM/DL — SIGNIFICANT CHANGE UP (ref 32–36)
MCHC RBC-ENTMCNC: 33.2 GM/DL — SIGNIFICANT CHANGE UP (ref 32–36)
MCHC RBC-ENTMCNC: 33.2 PG — SIGNIFICANT CHANGE UP (ref 27–34)
MCHC RBC-ENTMCNC: 33.3 GM/DL — SIGNIFICANT CHANGE UP (ref 32–36)
MCHC RBC-ENTMCNC: 33.3 PG — SIGNIFICANT CHANGE UP (ref 27–34)
MCHC RBC-ENTMCNC: 33.5 GM/DL — SIGNIFICANT CHANGE UP (ref 32–36)
MCHC RBC-ENTMCNC: 33.6 GM/DL — SIGNIFICANT CHANGE UP (ref 32–36)
MCHC RBC-ENTMCNC: 33.8 GM/DL — SIGNIFICANT CHANGE UP (ref 32–36)
MCHC RBC-ENTMCNC: 33.9 GM/DL — SIGNIFICANT CHANGE UP (ref 32–36)
MCHC RBC-ENTMCNC: 34.1 GM/DL — SIGNIFICANT CHANGE UP (ref 32–36)
MCHC RBC-ENTMCNC: 34.1 GM/DL — SIGNIFICANT CHANGE UP (ref 32–36)
MCHC RBC-ENTMCNC: 34.3 GM/DL — SIGNIFICANT CHANGE UP (ref 32–36)
MCHC RBC-ENTMCNC: 34.5 GM/DL — SIGNIFICANT CHANGE UP (ref 32–36)
MCV RBC AUTO: 100 FL — SIGNIFICANT CHANGE UP (ref 80–100)
MCV RBC AUTO: 101.8 FL — HIGH (ref 80–100)
MCV RBC AUTO: 101.8 FL — HIGH (ref 80–100)
MCV RBC AUTO: 102.5 FL — HIGH (ref 80–100)
MCV RBC AUTO: 102.7 FL — HIGH (ref 80–100)
MCV RBC AUTO: 104.6 FL — HIGH (ref 80–100)
MCV RBC AUTO: 79.8 FL — LOW (ref 80–100)
MCV RBC AUTO: 80.9 FL — SIGNIFICANT CHANGE UP (ref 80–100)
MCV RBC AUTO: 81.8 FL — SIGNIFICANT CHANGE UP (ref 80–100)
MCV RBC AUTO: 82 FL — SIGNIFICANT CHANGE UP (ref 80–100)
MCV RBC AUTO: 82 FL — SIGNIFICANT CHANGE UP (ref 80–100)
MCV RBC AUTO: 82.4 FL — SIGNIFICANT CHANGE UP (ref 80–100)
MCV RBC AUTO: 82.7 FL — SIGNIFICANT CHANGE UP (ref 80–100)
MCV RBC AUTO: 82.8 FL — SIGNIFICANT CHANGE UP (ref 80–100)
MCV RBC AUTO: 83.3 FL — SIGNIFICANT CHANGE UP (ref 80–100)
MCV RBC AUTO: 83.6 FL — SIGNIFICANT CHANGE UP (ref 80–100)
MCV RBC AUTO: 84.4 FL — SIGNIFICANT CHANGE UP (ref 80–100)
MCV RBC AUTO: 85.1 FL — SIGNIFICANT CHANGE UP (ref 80–100)
MCV RBC AUTO: 87.6 FL — SIGNIFICANT CHANGE UP (ref 80–100)
MCV RBC AUTO: 88 FL — SIGNIFICANT CHANGE UP (ref 80–100)
MCV RBC AUTO: 88.2 FL — SIGNIFICANT CHANGE UP (ref 80–100)
MCV RBC AUTO: 89.7 FL — SIGNIFICANT CHANGE UP (ref 80–100)
MCV RBC AUTO: 90.5 FL — SIGNIFICANT CHANGE UP (ref 80–100)
MCV RBC AUTO: 92.8 FL — SIGNIFICANT CHANGE UP (ref 80–100)
MCV RBC AUTO: 93.8 FL — SIGNIFICANT CHANGE UP (ref 80–100)
MCV RBC AUTO: 94.4 FL — SIGNIFICANT CHANGE UP (ref 80–100)
MCV RBC AUTO: 95.7 FL — SIGNIFICANT CHANGE UP (ref 80–100)
MCV RBC AUTO: 95.7 FL — SIGNIFICANT CHANGE UP (ref 80–100)
MCV RBC AUTO: 95.9 FL — SIGNIFICANT CHANGE UP (ref 80–100)
MCV RBC AUTO: 95.9 FL — SIGNIFICANT CHANGE UP (ref 80–100)
MCV RBC AUTO: 96 FL — SIGNIFICANT CHANGE UP (ref 80–100)
MCV RBC AUTO: 96.2 FL — SIGNIFICANT CHANGE UP (ref 80–100)
MCV RBC AUTO: 97.1 FL — SIGNIFICANT CHANGE UP (ref 80–100)
MCV RBC AUTO: 97.3 FL — SIGNIFICANT CHANGE UP (ref 80–100)
MCV RBC AUTO: 97.4 FL — SIGNIFICANT CHANGE UP (ref 80–100)
MCV RBC AUTO: 97.7 FL — SIGNIFICANT CHANGE UP (ref 80–100)
MCV RBC AUTO: 98.1 FL — SIGNIFICANT CHANGE UP (ref 80–100)
MCV RBC AUTO: 98.3 FL — SIGNIFICANT CHANGE UP (ref 80–100)
MCV RBC AUTO: 98.4 FL — SIGNIFICANT CHANGE UP (ref 80–100)
MCV RBC AUTO: 99.1 FL — SIGNIFICANT CHANGE UP (ref 80–100)
MCV RBC AUTO: 99.2 FL — SIGNIFICANT CHANGE UP (ref 80–100)
MCV RBC AUTO: 99.2 FL — SIGNIFICANT CHANGE UP (ref 80–100)
MCV RBC AUTO: 99.3 FL — SIGNIFICANT CHANGE UP (ref 80–100)
MCV RBC AUTO: 99.3 FL — SIGNIFICANT CHANGE UP (ref 80–100)
MCV RBC AUTO: 99.6 FL — SIGNIFICANT CHANGE UP (ref 80–100)
METHOD TYPE: SIGNIFICANT CHANGE UP
MISCELLANEOUS TEST NAME: SIGNIFICANT CHANGE UP
MONOCYTES # BLD AUTO: 0 K/UL — SIGNIFICANT CHANGE UP (ref 0–0.9)
MONOCYTES # BLD AUTO: 0 K/UL — SIGNIFICANT CHANGE UP (ref 0–0.9)
MONOCYTES # BLD AUTO: 0.05 K/UL — SIGNIFICANT CHANGE UP (ref 0–0.9)
MONOCYTES # BLD AUTO: 0.06 K/UL — SIGNIFICANT CHANGE UP (ref 0–0.9)
MONOCYTES # BLD AUTO: 0.08 K/UL — SIGNIFICANT CHANGE UP (ref 0–0.9)
MONOCYTES # BLD AUTO: 0.09 K/UL — SIGNIFICANT CHANGE UP (ref 0–0.9)
MONOCYTES # BLD AUTO: 0.09 K/UL — SIGNIFICANT CHANGE UP (ref 0–0.9)
MONOCYTES # BLD AUTO: 0.17 K/UL — SIGNIFICANT CHANGE UP (ref 0–0.9)
MONOCYTES # BLD AUTO: 0.18 K/UL — SIGNIFICANT CHANGE UP (ref 0–0.9)
MONOCYTES # BLD AUTO: 0.19 K/UL — SIGNIFICANT CHANGE UP (ref 0–0.9)
MONOCYTES # BLD AUTO: 0.2 K/UL — SIGNIFICANT CHANGE UP (ref 0–0.9)
MONOCYTES # BLD AUTO: 0.25 K/UL — SIGNIFICANT CHANGE UP (ref 0–0.9)
MONOCYTES # BLD AUTO: 0.27 K/UL — SIGNIFICANT CHANGE UP (ref 0–0.9)
MONOCYTES # BLD AUTO: 0.28 K/UL — SIGNIFICANT CHANGE UP (ref 0–0.9)
MONOCYTES # BLD AUTO: 0.3 K/UL — SIGNIFICANT CHANGE UP (ref 0–0.9)
MONOCYTES # BLD AUTO: 0.3 K/UL — SIGNIFICANT CHANGE UP (ref 0–0.9)
MONOCYTES # BLD AUTO: 0.32 K/UL — SIGNIFICANT CHANGE UP (ref 0–0.9)
MONOCYTES # BLD AUTO: 0.33 K/UL — SIGNIFICANT CHANGE UP (ref 0–0.9)
MONOCYTES # BLD AUTO: 0.34 K/UL — SIGNIFICANT CHANGE UP (ref 0–0.9)
MONOCYTES # BLD AUTO: 0.36 K/UL — SIGNIFICANT CHANGE UP (ref 0–0.9)
MONOCYTES # BLD AUTO: 0.36 K/UL — SIGNIFICANT CHANGE UP (ref 0–0.9)
MONOCYTES # BLD AUTO: 0.4 K/UL — SIGNIFICANT CHANGE UP (ref 0–0.9)
MONOCYTES # BLD AUTO: 0.46 K/UL — SIGNIFICANT CHANGE UP (ref 0–0.9)
MONOCYTES # BLD AUTO: 0.67 K/UL — SIGNIFICANT CHANGE UP (ref 0–0.9)
MONOCYTES # BLD AUTO: 0.72 K/UL — SIGNIFICANT CHANGE UP (ref 0–0.9)
MONOCYTES NFR BLD AUTO: 0 % — LOW (ref 2–14)
MONOCYTES NFR BLD AUTO: 0 % — LOW (ref 2–14)
MONOCYTES NFR BLD AUTO: 0.9 % — LOW (ref 2–14)
MONOCYTES NFR BLD AUTO: 1 % — LOW (ref 2–14)
MONOCYTES NFR BLD AUTO: 1.7 % — LOW (ref 2–14)
MONOCYTES NFR BLD AUTO: 1.9 % — LOW (ref 2–14)
MONOCYTES NFR BLD AUTO: 1.9 % — LOW (ref 2–14)
MONOCYTES NFR BLD AUTO: 2 % — SIGNIFICANT CHANGE UP (ref 2–14)
MONOCYTES NFR BLD AUTO: 2.7 % — SIGNIFICANT CHANGE UP (ref 2–14)
MONOCYTES NFR BLD AUTO: 2.9 % — SIGNIFICANT CHANGE UP (ref 2–14)
MONOCYTES NFR BLD AUTO: 2.9 % — SIGNIFICANT CHANGE UP (ref 2–14)
MONOCYTES NFR BLD AUTO: 3.3 % — SIGNIFICANT CHANGE UP (ref 2–14)
MONOCYTES NFR BLD AUTO: 3.7 % — SIGNIFICANT CHANGE UP (ref 2–14)
MONOCYTES NFR BLD AUTO: 3.7 % — SIGNIFICANT CHANGE UP (ref 2–14)
MONOCYTES NFR BLD AUTO: 4.3 % — SIGNIFICANT CHANGE UP (ref 2–14)
MONOCYTES NFR BLD AUTO: 4.4 % — SIGNIFICANT CHANGE UP (ref 2–14)
MONOCYTES NFR BLD AUTO: 5 % — SIGNIFICANT CHANGE UP (ref 2–14)
MONOCYTES NFR BLD AUTO: 5 % — SIGNIFICANT CHANGE UP (ref 2–14)
MONOCYTES NFR BLD AUTO: 7 % — SIGNIFICANT CHANGE UP (ref 2–14)
MONOCYTES NFR BLD AUTO: 7.5 % — SIGNIFICANT CHANGE UP (ref 2–14)
MONOCYTES NFR BLD AUTO: 9 % — SIGNIFICANT CHANGE UP (ref 2–14)
MONOS+MACROS # FLD: 4 % — SIGNIFICANT CHANGE UP
MONOS+MACROS # FLD: 53 % — SIGNIFICANT CHANGE UP
MONOS+MACROS # FLD: 7 % — SIGNIFICANT CHANGE UP
MRSA PCR RESULT.: NEGATIVE — SIGNIFICANT CHANGE UP
MSSA DNA SPEC QL NAA+PROBE: SIGNIFICANT CHANGE UP
MSSA DNA SPEC QL NAA+PROBE: SIGNIFICANT CHANGE UP
NEUTROPHILS # BLD AUTO: 1.84 K/UL — SIGNIFICANT CHANGE UP (ref 1.8–7.4)
NEUTROPHILS # BLD AUTO: 15.29 K/UL — HIGH (ref 1.8–7.4)
NEUTROPHILS # BLD AUTO: 17.08 K/UL — HIGH (ref 1.8–7.4)
NEUTROPHILS # BLD AUTO: 17.21 K/UL — HIGH (ref 1.8–7.4)
NEUTROPHILS # BLD AUTO: 17.62 K/UL — HIGH (ref 1.8–7.4)
NEUTROPHILS # BLD AUTO: 17.76 K/UL — HIGH (ref 1.8–7.4)
NEUTROPHILS # BLD AUTO: 19.03 K/UL — HIGH (ref 1.8–7.4)
NEUTROPHILS # BLD AUTO: 19.1 K/UL — HIGH (ref 1.8–7.4)
NEUTROPHILS # BLD AUTO: 2.35 K/UL — SIGNIFICANT CHANGE UP (ref 1.8–7.4)
NEUTROPHILS # BLD AUTO: 23.17 K/UL — HIGH (ref 1.8–7.4)
NEUTROPHILS # BLD AUTO: 3.05 K/UL — SIGNIFICANT CHANGE UP (ref 1.8–7.4)
NEUTROPHILS # BLD AUTO: 3.74 K/UL — SIGNIFICANT CHANGE UP (ref 1.8–7.4)
NEUTROPHILS # BLD AUTO: 3.91 K/UL — SIGNIFICANT CHANGE UP (ref 1.8–7.4)
NEUTROPHILS # BLD AUTO: 5.07 K/UL — SIGNIFICANT CHANGE UP (ref 1.8–7.4)
NEUTROPHILS # BLD AUTO: 6.78 K/UL — SIGNIFICANT CHANGE UP (ref 1.8–7.4)
NEUTROPHILS # BLD AUTO: 6.82 K/UL — SIGNIFICANT CHANGE UP (ref 1.8–7.4)
NEUTROPHILS # BLD AUTO: 6.93 K/UL — SIGNIFICANT CHANGE UP (ref 1.8–7.4)
NEUTROPHILS # BLD AUTO: 7.26 K/UL — SIGNIFICANT CHANGE UP (ref 1.8–7.4)
NEUTROPHILS # BLD AUTO: 7.8 K/UL — HIGH (ref 1.8–7.4)
NEUTROPHILS # BLD AUTO: 8.15 K/UL — HIGH (ref 1.8–7.4)
NEUTROPHILS # BLD AUTO: 8.15 K/UL — HIGH (ref 1.8–7.4)
NEUTROPHILS # BLD AUTO: 8.28 K/UL — HIGH (ref 1.8–7.4)
NEUTROPHILS # BLD AUTO: 8.33 K/UL — HIGH (ref 1.8–7.4)
NEUTROPHILS # BLD AUTO: 8.56 K/UL — HIGH (ref 1.8–7.4)
NEUTROPHILS # BLD AUTO: 8.92 K/UL — HIGH (ref 1.8–7.4)
NEUTROPHILS # BLD AUTO: 9.76 K/UL — HIGH (ref 1.8–7.4)
NEUTROPHILS # BLD AUTO: 9.78 K/UL — HIGH (ref 1.8–7.4)
NEUTROPHILS NFR BLD AUTO: 65 % — SIGNIFICANT CHANGE UP (ref 43–77)
NEUTROPHILS NFR BLD AUTO: 74 % — SIGNIFICANT CHANGE UP (ref 43–77)
NEUTROPHILS NFR BLD AUTO: 74 % — SIGNIFICANT CHANGE UP (ref 43–77)
NEUTROPHILS NFR BLD AUTO: 77.4 % — HIGH (ref 43–77)
NEUTROPHILS NFR BLD AUTO: 83 % — HIGH (ref 43–77)
NEUTROPHILS NFR BLD AUTO: 85.4 % — HIGH (ref 43–77)
NEUTROPHILS NFR BLD AUTO: 86.9 % — HIGH (ref 43–77)
NEUTROPHILS NFR BLD AUTO: 87 % — HIGH (ref 43–77)
NEUTROPHILS NFR BLD AUTO: 89 % — HIGH (ref 43–77)
NEUTROPHILS NFR BLD AUTO: 89.5 % — HIGH (ref 43–77)
NEUTROPHILS NFR BLD AUTO: 89.5 % — HIGH (ref 43–77)
NEUTROPHILS NFR BLD AUTO: 90 % — HIGH (ref 43–77)
NEUTROPHILS NFR BLD AUTO: 91.3 % — HIGH (ref 43–77)
NEUTROPHILS NFR BLD AUTO: 91.4 % — HIGH (ref 43–77)
NEUTROPHILS NFR BLD AUTO: 91.4 % — HIGH (ref 43–77)
NEUTROPHILS NFR BLD AUTO: 91.8 % — HIGH (ref 43–77)
NEUTROPHILS NFR BLD AUTO: 92.1 % — HIGH (ref 43–77)
NEUTROPHILS NFR BLD AUTO: 92.2 % — HIGH (ref 43–77)
NEUTROPHILS NFR BLD AUTO: 92.6 % — HIGH (ref 43–77)
NEUTROPHILS NFR BLD AUTO: 93.7 % — HIGH (ref 43–77)
NEUTROPHILS NFR BLD AUTO: 94.3 % — HIGH (ref 43–77)
NEUTROPHILS NFR BLD AUTO: 94.4 % — HIGH (ref 43–77)
NEUTROPHILS NFR BLD AUTO: 95.3 % — HIGH (ref 43–77)
NEUTROPHILS NFR BLD AUTO: 95.5 % — HIGH (ref 43–77)
NEUTROPHILS NFR BLD AUTO: 95.5 % — HIGH (ref 43–77)
NEUTROPHILS NFR BLD AUTO: 96.9 % — HIGH (ref 43–77)
NEUTROPHILS NFR BLD AUTO: 97.3 % — HIGH (ref 43–77)
NIGHT BLUE STAIN TISS: SIGNIFICANT CHANGE UP
NITRITE UR-MCNC: NEGATIVE — SIGNIFICANT CHANGE UP
NITRITE UR-MCNC: POSITIVE
NON-GYNECOLOGICAL CYTOLOGY STUDY: SIGNIFICANT CHANGE UP
NRBC # BLD: 0 /100 WBCS — SIGNIFICANT CHANGE UP (ref 0–0)
NRBC # BLD: SIGNIFICANT CHANGE UP /100 WBCS (ref 0–0)
NT-PROBNP SERPL-SCNC: HIGH PG/ML (ref 0–300)
ORGANISM # SPEC MICROSCOPIC CNT: SIGNIFICANT CHANGE UP
PAT CTL 2H: 38.6 SEC — HIGH (ref 27.5–37.4)
PCO2 BLDA: 30 MMHG — LOW (ref 35–48)
PCO2 BLDA: 31 MMHG — LOW (ref 35–48)
PCO2 BLDA: 34 MMHG — LOW (ref 35–48)
PCO2 BLDA: 35 MMHG — SIGNIFICANT CHANGE UP (ref 35–48)
PCO2 BLDA: 35 MMHG — SIGNIFICANT CHANGE UP (ref 35–48)
PCO2 BLDA: 38 MMHG — SIGNIFICANT CHANGE UP (ref 35–48)
PCO2 BLDA: 39 MMHG — SIGNIFICANT CHANGE UP (ref 35–48)
PCO2 BLDA: 39 MMHG — SIGNIFICANT CHANGE UP (ref 35–48)
PCO2 BLDA: 50 MMHG — HIGH (ref 35–48)
PH BLDA: 7.26 — LOW (ref 7.35–7.45)
PH BLDA: 7.35 — SIGNIFICANT CHANGE UP (ref 7.35–7.45)
PH BLDA: 7.41 — SIGNIFICANT CHANGE UP (ref 7.35–7.45)
PH BLDA: 7.41 — SIGNIFICANT CHANGE UP (ref 7.35–7.45)
PH BLDA: 7.43 — SIGNIFICANT CHANGE UP (ref 7.35–7.45)
PH BLDA: 7.44 — SIGNIFICANT CHANGE UP (ref 7.35–7.45)
PH BLDA: 7.44 — SIGNIFICANT CHANGE UP (ref 7.35–7.45)
PH BLDA: 7.45 — SIGNIFICANT CHANGE UP (ref 7.35–7.45)
PH BLDA: 7.48 — HIGH (ref 7.35–7.45)
PH FLD: 7.03 — SIGNIFICANT CHANGE UP
PH FLD: 7.72 — SIGNIFICANT CHANGE UP
PH UR: 6 — SIGNIFICANT CHANGE UP (ref 5–8)
PH UR: 6 — SIGNIFICANT CHANGE UP (ref 5–8)
PHOSPHATE SERPL-MCNC: 2 MG/DL — LOW (ref 2.5–4.5)
PHOSPHATE SERPL-MCNC: 2.1 MG/DL — LOW (ref 2.5–4.5)
PHOSPHATE SERPL-MCNC: 2.3 MG/DL — LOW (ref 2.5–4.5)
PHOSPHATE SERPL-MCNC: 2.3 MG/DL — LOW (ref 2.5–4.5)
PHOSPHATE SERPL-MCNC: 2.5 MG/DL — SIGNIFICANT CHANGE UP (ref 2.5–4.5)
PHOSPHATE SERPL-MCNC: 2.6 MG/DL — SIGNIFICANT CHANGE UP (ref 2.5–4.5)
PHOSPHATE SERPL-MCNC: 2.6 MG/DL — SIGNIFICANT CHANGE UP (ref 2.5–4.5)
PHOSPHATE SERPL-MCNC: 2.7 MG/DL — SIGNIFICANT CHANGE UP (ref 2.5–4.5)
PHOSPHATE SERPL-MCNC: 2.7 MG/DL — SIGNIFICANT CHANGE UP (ref 2.5–4.5)
PHOSPHATE SERPL-MCNC: 2.8 MG/DL — SIGNIFICANT CHANGE UP (ref 2.5–4.5)
PHOSPHATE SERPL-MCNC: 2.9 MG/DL — SIGNIFICANT CHANGE UP (ref 2.5–4.5)
PHOSPHATE SERPL-MCNC: 3 MG/DL — SIGNIFICANT CHANGE UP (ref 2.5–4.5)
PHOSPHATE SERPL-MCNC: 3.1 MG/DL — SIGNIFICANT CHANGE UP (ref 2.5–4.5)
PHOSPHATE SERPL-MCNC: 3.2 MG/DL — SIGNIFICANT CHANGE UP (ref 2.5–4.5)
PHOSPHATE SERPL-MCNC: 3.3 MG/DL — SIGNIFICANT CHANGE UP (ref 2.5–4.5)
PHOSPHATE SERPL-MCNC: 3.3 MG/DL — SIGNIFICANT CHANGE UP (ref 2.5–4.5)
PHOSPHATE SERPL-MCNC: 3.4 MG/DL — SIGNIFICANT CHANGE UP (ref 2.5–4.5)
PHOSPHATE SERPL-MCNC: 3.4 MG/DL — SIGNIFICANT CHANGE UP (ref 2.5–4.5)
PHOSPHATE SERPL-MCNC: 3.5 MG/DL — SIGNIFICANT CHANGE UP (ref 2.5–4.5)
PHOSPHATE SERPL-MCNC: 3.6 MG/DL — SIGNIFICANT CHANGE UP (ref 2.5–4.5)
PHOSPHATE SERPL-MCNC: 3.7 MG/DL — SIGNIFICANT CHANGE UP (ref 2.5–4.5)
PHOSPHATE SERPL-MCNC: 3.8 MG/DL — SIGNIFICANT CHANGE UP (ref 2.5–4.5)
PHOSPHATE SERPL-MCNC: 3.9 MG/DL — SIGNIFICANT CHANGE UP (ref 2.5–4.5)
PHOSPHATE SERPL-MCNC: 4 MG/DL — SIGNIFICANT CHANGE UP (ref 2.5–4.5)
PHOSPHATE SERPL-MCNC: 4 MG/DL — SIGNIFICANT CHANGE UP (ref 2.5–4.5)
PHOSPHATE SERPL-MCNC: 4.1 MG/DL — SIGNIFICANT CHANGE UP (ref 2.5–4.5)
PHOSPHATE SERPL-MCNC: 4.2 MG/DL — SIGNIFICANT CHANGE UP (ref 2.5–4.5)
PHOSPHATE SERPL-MCNC: 4.3 MG/DL — SIGNIFICANT CHANGE UP (ref 2.5–4.5)
PHOSPHATE SERPL-MCNC: 4.3 MG/DL — SIGNIFICANT CHANGE UP (ref 2.5–4.5)
PHOSPHATE SERPL-MCNC: 4.4 MG/DL — SIGNIFICANT CHANGE UP (ref 2.5–4.5)
PHOSPHATE SERPL-MCNC: 4.5 MG/DL — SIGNIFICANT CHANGE UP (ref 2.5–4.5)
PHOSPHATE SERPL-MCNC: 4.6 MG/DL — HIGH (ref 2.5–4.5)
PHOSPHATE SERPL-MCNC: 4.8 MG/DL — HIGH (ref 2.5–4.5)
PHOSPHATE SERPL-MCNC: 4.9 MG/DL — HIGH (ref 2.5–4.5)
PHOSPHATE SERPL-MCNC: 4.9 MG/DL — HIGH (ref 2.5–4.5)
PHOSPHATE SERPL-MCNC: 5 MG/DL — HIGH (ref 2.5–4.5)
PHOSPHATE SERPL-MCNC: 5 MG/DL — HIGH (ref 2.5–4.5)
PHOSPHATE SERPL-MCNC: 5.7 MG/DL — HIGH (ref 2.5–4.5)
PHOSPHATE SERPL-MCNC: 5.8 MG/DL — HIGH (ref 2.5–4.5)
PHOSPHATE SERPL-MCNC: 6.3 MG/DL — HIGH (ref 2.5–4.5)
PHOSPHATE SERPL-MCNC: 6.8 MG/DL — HIGH (ref 2.5–4.5)
PHOSPHATE SERPL-MCNC: 7.9 MG/DL — HIGH (ref 2.5–4.5)
PHOSPHATE SERPL-MCNC: 8.6 MG/DL — HIGH (ref 2.5–4.5)
PHOSPHATE SERPL-MCNC: 8.8 MG/DL — HIGH (ref 2.5–4.5)
PLATELET # BLD AUTO: 101 K/UL — LOW (ref 150–400)
PLATELET # BLD AUTO: 108 K/UL — LOW (ref 150–400)
PLATELET # BLD AUTO: 120 K/UL — LOW (ref 150–400)
PLATELET # BLD AUTO: 142 K/UL — LOW (ref 150–400)
PLATELET # BLD AUTO: 144 K/UL — LOW (ref 150–400)
PLATELET # BLD AUTO: 154 K/UL — SIGNIFICANT CHANGE UP (ref 150–400)
PLATELET # BLD AUTO: 158 K/UL — SIGNIFICANT CHANGE UP (ref 150–400)
PLATELET # BLD AUTO: 163 K/UL — SIGNIFICANT CHANGE UP (ref 150–400)
PLATELET # BLD AUTO: 170 K/UL — SIGNIFICANT CHANGE UP (ref 150–400)
PLATELET # BLD AUTO: 190 K/UL — SIGNIFICANT CHANGE UP (ref 150–400)
PLATELET # BLD AUTO: 200 K/UL — SIGNIFICANT CHANGE UP (ref 150–400)
PLATELET # BLD AUTO: 237 K/UL — SIGNIFICANT CHANGE UP (ref 150–400)
PLATELET # BLD AUTO: 43 K/UL — LOW (ref 150–400)
PLATELET # BLD AUTO: 45 K/UL — LOW (ref 150–400)
PLATELET # BLD AUTO: 46 K/UL — LOW (ref 150–400)
PLATELET # BLD AUTO: 49 K/UL — LOW (ref 150–400)
PLATELET # BLD AUTO: 52 K/UL — LOW (ref 150–400)
PLATELET # BLD AUTO: 53 K/UL — LOW (ref 150–400)
PLATELET # BLD AUTO: 54 K/UL — LOW (ref 150–400)
PLATELET # BLD AUTO: 55 K/UL — LOW (ref 150–400)
PLATELET # BLD AUTO: 55 K/UL — LOW (ref 150–400)
PLATELET # BLD AUTO: 58 K/UL — LOW (ref 150–400)
PLATELET # BLD AUTO: 59 K/UL — LOW (ref 150–400)
PLATELET # BLD AUTO: 59 K/UL — LOW (ref 150–400)
PLATELET # BLD AUTO: 60 K/UL — LOW (ref 150–400)
PLATELET # BLD AUTO: 62 K/UL — LOW (ref 150–400)
PLATELET # BLD AUTO: 64 K/UL — LOW (ref 150–400)
PLATELET # BLD AUTO: 64 K/UL — LOW (ref 150–400)
PLATELET # BLD AUTO: 65 K/UL — LOW (ref 150–400)
PLATELET # BLD AUTO: 66 K/UL — LOW (ref 150–400)
PLATELET # BLD AUTO: 66 K/UL — LOW (ref 150–400)
PLATELET # BLD AUTO: 67 K/UL — LOW (ref 150–400)
PLATELET # BLD AUTO: 68 K/UL — LOW (ref 150–400)
PLATELET # BLD AUTO: 70 K/UL — LOW (ref 150–400)
PLATELET # BLD AUTO: 73 K/UL — LOW (ref 150–400)
PLATELET # BLD AUTO: 75 K/UL — LOW (ref 150–400)
PLATELET # BLD AUTO: 76 K/UL — LOW (ref 150–400)
PLATELET # BLD AUTO: 78 K/UL — LOW (ref 150–400)
PLATELET # BLD AUTO: 82 K/UL — LOW (ref 150–400)
PLATELET # BLD AUTO: 83 K/UL — LOW (ref 150–400)
PLATELET # BLD AUTO: 90 K/UL — LOW (ref 150–400)
PLATELET # BLD AUTO: 90 K/UL — LOW (ref 150–400)
PLATELET # BLD AUTO: 94 K/UL — LOW (ref 150–400)
PLATELET # BLD AUTO: 96 K/UL — LOW (ref 150–400)
PO2 BLDA: 130 MMHG — HIGH (ref 83–108)
PO2 BLDA: 134 MMHG — HIGH (ref 83–108)
PO2 BLDA: 139 MMHG — HIGH (ref 83–108)
PO2 BLDA: 145 MMHG — HIGH (ref 83–108)
PO2 BLDA: 150 MMHG — HIGH (ref 83–108)
PO2 BLDA: 154 MMHG — HIGH (ref 83–108)
PO2 BLDA: 162 MMHG — HIGH (ref 83–108)
PO2 BLDA: 167 MMHG — HIGH (ref 83–108)
PO2 BLDA: 99 MMHG — SIGNIFICANT CHANGE UP (ref 83–108)
POTASSIUM SERPL-MCNC: 2.8 MMOL/L — CRITICAL LOW (ref 3.5–5.3)
POTASSIUM SERPL-MCNC: 2.9 MMOL/L — CRITICAL LOW (ref 3.5–5.3)
POTASSIUM SERPL-MCNC: 3 MMOL/L — LOW (ref 3.5–5.3)
POTASSIUM SERPL-MCNC: 3.2 MMOL/L — LOW (ref 3.5–5.3)
POTASSIUM SERPL-MCNC: 3.3 MMOL/L — LOW (ref 3.5–5.3)
POTASSIUM SERPL-MCNC: 3.3 MMOL/L — LOW (ref 3.5–5.3)
POTASSIUM SERPL-MCNC: 3.4 MMOL/L — LOW (ref 3.5–5.3)
POTASSIUM SERPL-MCNC: 3.5 MMOL/L — SIGNIFICANT CHANGE UP (ref 3.5–5.3)
POTASSIUM SERPL-MCNC: 3.6 MMOL/L — SIGNIFICANT CHANGE UP (ref 3.5–5.3)
POTASSIUM SERPL-MCNC: 3.7 MMOL/L — SIGNIFICANT CHANGE UP (ref 3.5–5.3)
POTASSIUM SERPL-MCNC: 3.8 MMOL/L — SIGNIFICANT CHANGE UP (ref 3.5–5.3)
POTASSIUM SERPL-MCNC: 3.9 MMOL/L — SIGNIFICANT CHANGE UP (ref 3.5–5.3)
POTASSIUM SERPL-MCNC: 4 MMOL/L — SIGNIFICANT CHANGE UP (ref 3.5–5.3)
POTASSIUM SERPL-MCNC: 4.1 MMOL/L — SIGNIFICANT CHANGE UP (ref 3.5–5.3)
POTASSIUM SERPL-MCNC: 4.2 MMOL/L — SIGNIFICANT CHANGE UP (ref 3.5–5.3)
POTASSIUM SERPL-MCNC: 4.3 MMOL/L — SIGNIFICANT CHANGE UP (ref 3.5–5.3)
POTASSIUM SERPL-MCNC: 4.4 MMOL/L — SIGNIFICANT CHANGE UP (ref 3.5–5.3)
POTASSIUM SERPL-MCNC: 4.5 MMOL/L — SIGNIFICANT CHANGE UP (ref 3.5–5.3)
POTASSIUM SERPL-MCNC: 4.6 MMOL/L — SIGNIFICANT CHANGE UP (ref 3.5–5.3)
POTASSIUM SERPL-MCNC: 4.8 MMOL/L — SIGNIFICANT CHANGE UP (ref 3.5–5.3)
POTASSIUM SERPL-MCNC: 4.8 MMOL/L — SIGNIFICANT CHANGE UP (ref 3.5–5.3)
POTASSIUM SERPL-MCNC: 4.9 MMOL/L — SIGNIFICANT CHANGE UP (ref 3.5–5.3)
POTASSIUM SERPL-MCNC: 5 MMOL/L — SIGNIFICANT CHANGE UP (ref 3.5–5.3)
POTASSIUM SERPL-MCNC: 5.1 MMOL/L — SIGNIFICANT CHANGE UP (ref 3.5–5.3)
POTASSIUM SERPL-MCNC: 5.1 MMOL/L — SIGNIFICANT CHANGE UP (ref 3.5–5.3)
POTASSIUM SERPL-MCNC: 5.2 MMOL/L — SIGNIFICANT CHANGE UP (ref 3.5–5.3)
POTASSIUM SERPL-MCNC: 5.7 MMOL/L — HIGH (ref 3.5–5.3)
POTASSIUM SERPL-MCNC: 5.9 MMOL/L — HIGH (ref 3.5–5.3)
POTASSIUM SERPL-MCNC: 5.9 MMOL/L — HIGH (ref 3.5–5.3)
POTASSIUM SERPL-MCNC: 6.1 MMOL/L — HIGH (ref 3.5–5.3)
POTASSIUM SERPL-SCNC: 2.8 MMOL/L — CRITICAL LOW (ref 3.5–5.3)
POTASSIUM SERPL-SCNC: 2.9 MMOL/L — CRITICAL LOW (ref 3.5–5.3)
POTASSIUM SERPL-SCNC: 3 MMOL/L — LOW (ref 3.5–5.3)
POTASSIUM SERPL-SCNC: 3.2 MMOL/L — LOW (ref 3.5–5.3)
POTASSIUM SERPL-SCNC: 3.3 MMOL/L — LOW (ref 3.5–5.3)
POTASSIUM SERPL-SCNC: 3.3 MMOL/L — LOW (ref 3.5–5.3)
POTASSIUM SERPL-SCNC: 3.4 MMOL/L — LOW (ref 3.5–5.3)
POTASSIUM SERPL-SCNC: 3.5 MMOL/L — SIGNIFICANT CHANGE UP (ref 3.5–5.3)
POTASSIUM SERPL-SCNC: 3.6 MMOL/L — SIGNIFICANT CHANGE UP (ref 3.5–5.3)
POTASSIUM SERPL-SCNC: 3.7 MMOL/L — SIGNIFICANT CHANGE UP (ref 3.5–5.3)
POTASSIUM SERPL-SCNC: 3.8 MMOL/L — SIGNIFICANT CHANGE UP (ref 3.5–5.3)
POTASSIUM SERPL-SCNC: 3.9 MMOL/L — SIGNIFICANT CHANGE UP (ref 3.5–5.3)
POTASSIUM SERPL-SCNC: 4 MMOL/L — SIGNIFICANT CHANGE UP (ref 3.5–5.3)
POTASSIUM SERPL-SCNC: 4.1 MMOL/L — SIGNIFICANT CHANGE UP (ref 3.5–5.3)
POTASSIUM SERPL-SCNC: 4.2 MMOL/L — SIGNIFICANT CHANGE UP (ref 3.5–5.3)
POTASSIUM SERPL-SCNC: 4.3 MMOL/L — SIGNIFICANT CHANGE UP (ref 3.5–5.3)
POTASSIUM SERPL-SCNC: 4.4 MMOL/L — SIGNIFICANT CHANGE UP (ref 3.5–5.3)
POTASSIUM SERPL-SCNC: 4.5 MMOL/L — SIGNIFICANT CHANGE UP (ref 3.5–5.3)
POTASSIUM SERPL-SCNC: 4.6 MMOL/L — SIGNIFICANT CHANGE UP (ref 3.5–5.3)
POTASSIUM SERPL-SCNC: 4.8 MMOL/L — SIGNIFICANT CHANGE UP (ref 3.5–5.3)
POTASSIUM SERPL-SCNC: 4.8 MMOL/L — SIGNIFICANT CHANGE UP (ref 3.5–5.3)
POTASSIUM SERPL-SCNC: 4.9 MMOL/L — SIGNIFICANT CHANGE UP (ref 3.5–5.3)
POTASSIUM SERPL-SCNC: 5 MMOL/L — SIGNIFICANT CHANGE UP (ref 3.5–5.3)
POTASSIUM SERPL-SCNC: 5.1 MMOL/L — SIGNIFICANT CHANGE UP (ref 3.5–5.3)
POTASSIUM SERPL-SCNC: 5.1 MMOL/L — SIGNIFICANT CHANGE UP (ref 3.5–5.3)
POTASSIUM SERPL-SCNC: 5.2 MMOL/L — SIGNIFICANT CHANGE UP (ref 3.5–5.3)
POTASSIUM SERPL-SCNC: 5.7 MMOL/L — HIGH (ref 3.5–5.3)
POTASSIUM SERPL-SCNC: 5.9 MMOL/L — HIGH (ref 3.5–5.3)
POTASSIUM SERPL-SCNC: 5.9 MMOL/L — HIGH (ref 3.5–5.3)
POTASSIUM SERPL-SCNC: 6.1 MMOL/L — HIGH (ref 3.5–5.3)
PROT FLD-MCNC: 3.8 G/DL — SIGNIFICANT CHANGE UP
PROT FLD-MCNC: 3.8 G/DL — SIGNIFICANT CHANGE UP
PROT SERPL-MCNC: 5.3 G/DL — LOW (ref 6–8.3)
PROT SERPL-MCNC: 5.5 G/DL — LOW (ref 6–8.3)
PROT SERPL-MCNC: 5.5 G/DL — LOW (ref 6–8.3)
PROT SERPL-MCNC: 5.6 G/DL — LOW (ref 6–8.3)
PROT SERPL-MCNC: 5.6 G/DL — LOW (ref 6–8.3)
PROT SERPL-MCNC: 5.7 G/DL — LOW (ref 6–8.3)
PROT SERPL-MCNC: 5.7 G/DL — LOW (ref 6–8.3)
PROT SERPL-MCNC: 5.8 G/DL — LOW (ref 6–8.3)
PROT SERPL-MCNC: 5.8 G/DL — LOW (ref 6–8.3)
PROT SERPL-MCNC: 6 G/DL — SIGNIFICANT CHANGE UP (ref 6–8.3)
PROT SERPL-MCNC: 6.1 G/DL — SIGNIFICANT CHANGE UP (ref 6–8.3)
PROT SERPL-MCNC: 6.2 G/DL — SIGNIFICANT CHANGE UP (ref 6–8.3)
PROT SERPL-MCNC: 6.3 G/DL — SIGNIFICANT CHANGE UP (ref 6–8.3)
PROT SERPL-MCNC: 6.3 G/DL — SIGNIFICANT CHANGE UP (ref 6–8.3)
PROT SERPL-MCNC: 6.4 G/DL — SIGNIFICANT CHANGE UP (ref 6–8.3)
PROT SERPL-MCNC: 6.5 G/DL — SIGNIFICANT CHANGE UP (ref 6–8.3)
PROT SERPL-MCNC: 6.6 G/DL — SIGNIFICANT CHANGE UP (ref 6–8.3)
PROT SERPL-MCNC: 6.7 G/DL — SIGNIFICANT CHANGE UP (ref 6–8.3)
PROT SERPL-MCNC: 6.8 G/DL — SIGNIFICANT CHANGE UP (ref 6–8.3)
PROT SERPL-MCNC: 7 G/DL — SIGNIFICANT CHANGE UP (ref 6–8.3)
PROT SERPL-MCNC: 7.4 G/DL — SIGNIFICANT CHANGE UP (ref 6–8.3)
PROT UR-MCNC: 100 MG/DL
PROT UR-MCNC: 30 MG/DL
PROTHROM AB SERPL-ACNC: 11.5 SEC — SIGNIFICANT CHANGE UP (ref 10–12.9)
PROTHROM AB SERPL-ACNC: 12 SEC — SIGNIFICANT CHANGE UP (ref 10–12.9)
PROTHROM AB SERPL-ACNC: 12.8 SEC — SIGNIFICANT CHANGE UP (ref 10–12.9)
PROTHROM AB SERPL-ACNC: 13 SEC — HIGH (ref 10–12.9)
PROTHROM AB SERPL-ACNC: 13.1 SEC — HIGH (ref 10–12.9)
PROTHROM AB SERPL-ACNC: 13.2 SEC — HIGH (ref 10–12.9)
PROTHROM AB SERPL-ACNC: 14.1 SEC — HIGH (ref 10–12.9)
PROTHROM AB SERPL-ACNC: 14.1 SEC — HIGH (ref 10–12.9)
PROTHROM AB SERPL-ACNC: 14.3 SEC — HIGH (ref 10–12.9)
PROTHROM AB SERPL-ACNC: 14.8 SEC — HIGH (ref 10–12.9)
PROTHROM AB SERPL-ACNC: 15.5 SEC — HIGH (ref 10–12.9)
PROTHROM AB SERPL-ACNC: 16.2 SEC — HIGH (ref 10–12.9)
PROTHROM AB SERPL-ACNC: 16.4 SEC — HIGH (ref 10–12.9)
PROTHROM AB SERPL-ACNC: 17 SEC — HIGH (ref 10–12.9)
PROTHROM AB SERPL-ACNC: 17.2 SEC — HIGH (ref 10–12.9)
PROTHROM AB SERPL-ACNC: 18.8 SEC — HIGH (ref 10–12.9)
PROTHROM AB SERPL-ACNC: 19.7 SEC — HIGH (ref 10–12.9)
PROTHROM AB SERPL-ACNC: 21.9 SEC — HIGH (ref 10–12.9)
PROTHROM AB SERPL-ACNC: 22.9 SEC — HIGH (ref 10–12.9)
PROTHROM AB SERPL-ACNC: 26.4 SEC — HIGH (ref 10–12.9)
PT 50/50: 13.3 SECS — SIGNIFICANT CHANGE UP (ref 9.7–13.5)
PTH-INTACT FLD-MCNC: 300 PG/ML — HIGH (ref 15–65)
RBC # BLD: 1.82 M/UL — LOW (ref 4.2–5.8)
RBC # BLD: 1.84 M/UL — LOW (ref 4.2–5.8)
RBC # BLD: 1.97 M/UL — LOW (ref 4.2–5.8)
RBC # BLD: 2.17 M/UL — LOW (ref 4.2–5.8)
RBC # BLD: 2.19 M/UL — LOW (ref 4.2–5.8)
RBC # BLD: 2.19 M/UL — LOW (ref 4.2–5.8)
RBC # BLD: 2.2 M/UL — LOW (ref 4.2–5.8)
RBC # BLD: 2.2 M/UL — LOW (ref 4.2–5.8)
RBC # BLD: 2.21 M/UL — LOW (ref 4.2–5.8)
RBC # BLD: 2.25 M/UL — LOW (ref 4.2–5.8)
RBC # BLD: 2.26 M/UL — LOW (ref 4.2–5.8)
RBC # BLD: 2.26 M/UL — LOW (ref 4.2–5.8)
RBC # BLD: 2.29 M/UL — LOW (ref 4.2–5.8)
RBC # BLD: 2.32 M/UL — LOW (ref 4.2–5.8)
RBC # BLD: 2.34 M/UL — LOW (ref 4.2–5.8)
RBC # BLD: 2.36 M/UL — LOW (ref 4.2–5.8)
RBC # BLD: 2.38 M/UL — LOW (ref 4.2–5.8)
RBC # BLD: 2.4 M/UL — LOW (ref 4.2–5.8)
RBC # BLD: 2.4 M/UL — LOW (ref 4.2–5.8)
RBC # BLD: 2.41 M/UL — LOW (ref 4.2–5.8)
RBC # BLD: 2.46 M/UL — LOW (ref 4.2–5.8)
RBC # BLD: 2.46 M/UL — LOW (ref 4.2–5.8)
RBC # BLD: 2.55 M/UL — LOW (ref 4.2–5.8)
RBC # BLD: 2.57 M/UL — LOW (ref 4.2–5.8)
RBC # BLD: 2.58 M/UL — LOW (ref 4.2–5.8)
RBC # BLD: 2.6 M/UL — LOW (ref 4.2–5.8)
RBC # BLD: 2.61 M/UL — LOW (ref 4.2–5.8)
RBC # BLD: 2.63 M/UL — LOW (ref 4.2–5.8)
RBC # BLD: 2.63 M/UL — LOW (ref 4.2–5.8)
RBC # BLD: 2.64 M/UL — LOW (ref 4.2–5.8)
RBC # BLD: 2.68 M/UL — LOW (ref 4.2–5.8)
RBC # BLD: 2.7 M/UL — LOW (ref 4.2–5.8)
RBC # BLD: 2.7 M/UL — LOW (ref 4.2–5.8)
RBC # BLD: 2.72 M/UL — LOW (ref 4.2–5.8)
RBC # BLD: 2.72 M/UL — LOW (ref 4.2–5.8)
RBC # BLD: 2.73 M/UL — LOW (ref 4.2–5.8)
RBC # BLD: 2.74 M/UL — LOW (ref 4.2–5.8)
RBC # BLD: 2.74 M/UL — LOW (ref 4.2–5.8)
RBC # BLD: 2.8 M/UL — LOW (ref 4.2–5.8)
RBC # BLD: 2.82 M/UL — LOW (ref 4.2–5.8)
RBC # BLD: 2.82 M/UL — LOW (ref 4.2–5.8)
RBC # BLD: 2.89 M/UL — LOW (ref 4.2–5.8)
RBC # BLD: 2.91 M/UL — LOW (ref 4.2–5.8)
RBC # BLD: 2.92 M/UL — LOW (ref 4.2–5.8)
RBC # BLD: 2.99 M/UL — LOW (ref 4.2–5.8)
RBC # BLD: 3.01 M/UL — LOW (ref 4.2–5.8)
RBC # BLD: 3.07 M/UL — LOW (ref 4.2–5.8)
RBC # BLD: 3.07 M/UL — LOW (ref 4.2–5.8)
RBC # BLD: 3.08 M/UL — LOW (ref 4.2–5.8)
RBC # BLD: 3.11 M/UL — LOW (ref 4.2–5.8)
RBC # BLD: 3.3 M/UL — LOW (ref 4.2–5.8)
RBC # BLD: 3.35 M/UL — LOW (ref 4.2–5.8)
RBC # BLD: 3.35 M/UL — LOW (ref 4.2–5.8)
RBC # BLD: 3.43 M/UL — LOW (ref 4.2–5.8)
RBC # BLD: 3.61 M/UL — LOW (ref 4.2–5.8)
RBC # FLD: 15.5 % — HIGH (ref 10.3–14.5)
RBC # FLD: 15.8 % — HIGH (ref 10.3–14.5)
RBC # FLD: 15.9 % — HIGH (ref 10.3–14.5)
RBC # FLD: 16 % — HIGH (ref 10.3–14.5)
RBC # FLD: 16.1 % — HIGH (ref 10.3–14.5)
RBC # FLD: 16.2 % — HIGH (ref 10.3–14.5)
RBC # FLD: 16.2 % — HIGH (ref 10.3–14.5)
RBC # FLD: 17.2 % — HIGH (ref 10.3–14.5)
RBC # FLD: 19.4 % — HIGH (ref 10.3–14.5)
RBC # FLD: 19.6 % — HIGH (ref 10.3–14.5)
RBC # FLD: 20.1 % — HIGH (ref 10.3–14.5)
RBC # FLD: 21.3 % — HIGH (ref 10.3–14.5)
RBC # FLD: 22.2 % — HIGH (ref 10.3–14.5)
RBC # FLD: 22.8 % — HIGH (ref 10.3–14.5)
RBC # FLD: 23.2 % — HIGH (ref 10.3–14.5)
RBC # FLD: 23.4 % — HIGH (ref 10.3–14.5)
RBC # FLD: 23.4 % — HIGH (ref 10.3–14.5)
RBC # FLD: 23.9 % — HIGH (ref 10.3–14.5)
RBC # FLD: 24.5 % — HIGH (ref 10.3–14.5)
RBC # FLD: 24.8 % — HIGH (ref 10.3–14.5)
RBC # FLD: 24.8 % — HIGH (ref 10.3–14.5)
RBC # FLD: 25 % — HIGH (ref 10.3–14.5)
RBC # FLD: 25 % — HIGH (ref 10.3–14.5)
RBC # FLD: 25.1 % — HIGH (ref 10.3–14.5)
RBC # FLD: 25.2 % — HIGH (ref 10.3–14.5)
RBC # FLD: 25.2 % — HIGH (ref 10.3–14.5)
RBC # FLD: 25.4 % — HIGH (ref 10.3–14.5)
RBC # FLD: 25.5 % — HIGH (ref 10.3–14.5)
RBC # FLD: 25.6 % — HIGH (ref 10.3–14.5)
RBC # FLD: 25.6 % — HIGH (ref 10.3–14.5)
RBC # FLD: 25.7 % — HIGH (ref 10.3–14.5)
RBC # FLD: 26 % — HIGH (ref 10.3–14.5)
RBC # FLD: 26.2 % — HIGH (ref 10.3–14.5)
RBC # FLD: 26.2 % — HIGH (ref 10.3–14.5)
RBC # FLD: 26.3 % — HIGH (ref 10.3–14.5)
RBC # FLD: 26.5 % — HIGH (ref 10.3–14.5)
RBC # FLD: 26.5 % — HIGH (ref 10.3–14.5)
RBC # FLD: 26.9 % — HIGH (ref 10.3–14.5)
RBC # FLD: 27 % — HIGH (ref 10.3–14.5)
RBC # FLD: SIGNIFICANT CHANGE UP (ref 10.3–14.5)
RCV VOL RI: 236 /UL — HIGH (ref 0–5)
RCV VOL RI: HIGH /UL (ref 0–5)
RCV VOL RI: HIGH /UL (ref 0–5)
RESPIRATORY PATH PNL SPEC CULT: ABNORMAL
RESPIRATORY PATH PNL SPEC CULT: ABNORMAL
RESPIRATORY PATH PNL SPEC CULT: SIGNIFICANT CHANGE UP
RETICS #: 102.7 K/UL — SIGNIFICANT CHANGE UP (ref 25–125)
RETICS #: 113.2 K/UL — SIGNIFICANT CHANGE UP (ref 25–125)
RETICS #: 113.6 K/UL — SIGNIFICANT CHANGE UP (ref 25–125)
RETICS #: 122.9 K/UL — SIGNIFICANT CHANGE UP (ref 25–125)
RETICS #: 126.9 K/UL — HIGH (ref 25–125)
RETICS #: 132.9 K/UL — HIGH (ref 25–125)
RETICS #: 143.4 K/UL — HIGH (ref 25–125)
RETICS #: 145.1 K/UL — HIGH (ref 25–125)
RETICS #: 183.8 K/UL — HIGH (ref 25–125)
RETICS #: 184.7 K/UL — HIGH (ref 25–125)
RETICS #: 185.8 K/UL — HIGH (ref 25–125)
RETICS #: 255.4 K/UL — HIGH (ref 25–125)
RETICS #: 276.1 K/UL — HIGH (ref 25–125)
RETICS #: 99.4 K/UL — SIGNIFICANT CHANGE UP (ref 25–125)
RETICS/RBC NFR: 10.7 % — HIGH (ref 0.5–2.5)
RETICS/RBC NFR: 3.4 % — HIGH (ref 0.5–2.5)
RETICS/RBC NFR: 3.7 % — HIGH (ref 0.5–2.5)
RETICS/RBC NFR: 4 % — HIGH (ref 0.5–2.5)
RETICS/RBC NFR: 4.3 % — HIGH (ref 0.5–2.5)
RETICS/RBC NFR: 4.3 % — HIGH (ref 0.5–2.5)
RETICS/RBC NFR: 4.7 % — HIGH (ref 0.5–2.5)
RETICS/RBC NFR: 5.6 % — HIGH (ref 0.5–2.5)
RETICS/RBC NFR: 5.6 % — HIGH (ref 0.5–2.5)
RETICS/RBC NFR: 6.5 % — HIGH (ref 0.5–2.5)
RETICS/RBC NFR: 6.7 % — HIGH (ref 0.5–2.5)
RETICS/RBC NFR: 6.9 % — HIGH (ref 0.5–2.5)
RETICS/RBC NFR: 7.2 % — HIGH (ref 0.5–2.5)
RETICS/RBC NFR: 9.8 % — HIGH (ref 0.5–2.5)
RH IG SCN BLD-IMP: POSITIVE — SIGNIFICANT CHANGE UP
S AUREUS DNA NOSE QL NAA+PROBE: POSITIVE
SAO2 % BLDA: 98 % — SIGNIFICANT CHANGE UP (ref 95–100)
SAO2 % BLDA: 98 % — SIGNIFICANT CHANGE UP (ref 95–100)
SAO2 % BLDA: 99 % — SIGNIFICANT CHANGE UP (ref 95–100)
SODIUM SERPL-SCNC: 123 MMOL/L — LOW (ref 135–145)
SODIUM SERPL-SCNC: 125 MMOL/L — LOW (ref 135–145)
SODIUM SERPL-SCNC: 127 MMOL/L — LOW (ref 135–145)
SODIUM SERPL-SCNC: 127 MMOL/L — LOW (ref 135–145)
SODIUM SERPL-SCNC: 129 MMOL/L — LOW (ref 135–145)
SODIUM SERPL-SCNC: 129 MMOL/L — LOW (ref 135–145)
SODIUM SERPL-SCNC: 130 MMOL/L — LOW (ref 135–145)
SODIUM SERPL-SCNC: 131 MMOL/L — LOW (ref 135–145)
SODIUM SERPL-SCNC: 132 MMOL/L — LOW (ref 135–145)
SODIUM SERPL-SCNC: 133 MMOL/L — LOW (ref 135–145)
SODIUM SERPL-SCNC: 134 MMOL/L — LOW (ref 135–145)
SODIUM SERPL-SCNC: 135 MMOL/L — SIGNIFICANT CHANGE UP (ref 135–145)
SODIUM SERPL-SCNC: 136 MMOL/L — SIGNIFICANT CHANGE UP (ref 135–145)
SODIUM SERPL-SCNC: 137 MMOL/L — SIGNIFICANT CHANGE UP (ref 135–145)
SODIUM SERPL-SCNC: 138 MMOL/L — SIGNIFICANT CHANGE UP (ref 135–145)
SP GR SPEC: 1.01 — SIGNIFICANT CHANGE UP (ref 1–1.03)
SP GR SPEC: 1.02 — SIGNIFICANT CHANGE UP (ref 1–1.03)
SPECIMEN SOURCE FLD: SIGNIFICANT CHANGE UP
SPECIMEN SOURCE: SIGNIFICANT CHANGE UP
T-CELL CD4 SUBSET PNL BLD: 126 /UL — LOW (ref 489–1457)
TIBC SERPL-MCNC: 96 UG/DL — LOW (ref 220–430)
TOTAL CHOLESTEROL/HDL RATIO MEASUREMENT: 12.2 RATIO — HIGH (ref 3.4–9.6)
TOTAL CHOLESTEROL/HDL RATIO MEASUREMENT: 5.7 RATIO — SIGNIFICANT CHANGE UP (ref 3.4–9.6)
TOTAL NUCLEATED CELL COUNT, BODY FLUID: 112 /UL — HIGH (ref 0–5)
TOTAL NUCLEATED CELL COUNT, BODY FLUID: HIGH /UL (ref 0–5)
TOTAL NUCLEATED CELL COUNT, BODY FLUID: HIGH /UL (ref 0–5)
TRIGL SERPL-MCNC: 150 MG/DL — HIGH (ref 10–149)
TRIGL SERPL-MCNC: 235 MG/DL — HIGH (ref 10–149)
TRIGL SERPL-MCNC: 258 MG/DL — HIGH (ref 10–149)
TRIGL SERPL-MCNC: 320 MG/DL — HIGH (ref 10–149)
TRIGL SERPL-MCNC: 331 MG/DL — HIGH (ref 10–149)
TRIGL SERPL-MCNC: 341 MG/DL — HIGH (ref 10–149)
TRIGL SERPL-MCNC: 350 MG/DL — HIGH (ref 10–149)
TRIGL SERPL-MCNC: 385 MG/DL — HIGH (ref 10–149)
TRIGL SERPL-MCNC: 387 MG/DL — HIGH (ref 10–149)
TRIGL SERPL-MCNC: 418 MG/DL — HIGH (ref 10–149)
TRIGL SERPL-MCNC: 481 MG/DL — HIGH (ref 10–149)
TRIGL SERPL-MCNC: 532 MG/DL — HIGH (ref 10–149)
TRIGL SERPL-MCNC: 558 MG/DL — HIGH (ref 10–149)
TRIGL SERPL-MCNC: 558 MG/DL — HIGH (ref 10–149)
TRIGL SERPL-MCNC: 561 MG/DL — HIGH (ref 10–149)
TROPONIN T SERPL-MCNC: 0.06 NG/ML — CRITICAL HIGH (ref 0–0.01)
TROPONIN T SERPL-MCNC: 0.07 NG/ML — CRITICAL HIGH (ref 0–0.01)
TROPONIN T SERPL-MCNC: 0.07 NG/ML — CRITICAL HIGH (ref 0–0.01)
TROPONIN T SERPL-MCNC: 0.09 NG/ML — CRITICAL HIGH (ref 0–0.01)
TROPONIN T SERPL-MCNC: 0.1 NG/ML — CRITICAL HIGH (ref 0–0.01)
TROPONIN T SERPL-MCNC: 0.12 NG/ML — CRITICAL HIGH (ref 0–0.01)
TROPONIN T SERPL-MCNC: 0.29 NG/ML — CRITICAL HIGH (ref 0–0.01)
TSH SERPL-MCNC: 0.23 UIU/ML — LOW (ref 0.35–4.94)
TSH SERPL-MCNC: 3.86 UIU/ML — SIGNIFICANT CHANGE UP (ref 0.35–4.94)
TUBE TYPE: SIGNIFICANT CHANGE UP
UIBC SERPL-MCNC: 45 UG/DL — LOW (ref 110–370)
UROBILINOGEN FLD QL: 0.2 E.U./DL — SIGNIFICANT CHANGE UP
UROBILINOGEN FLD QL: 1 E.U./DL — SIGNIFICANT CHANGE UP
VANCOMYCIN FLD-MCNC: 37 UG/ML
VANCOMYCIN FLD-MCNC: <4 UG/ML — SIGNIFICANT CHANGE UP
VANCOMYCIN TROUGH SERPL-MCNC: 42.4 UG/ML — CRITICAL HIGH (ref 10–20)
VIT B12 SERPL-MCNC: >2000 PG/ML — HIGH (ref 232–1245)
WBC # BLD: 10.07 K/UL — SIGNIFICANT CHANGE UP (ref 3.8–10.5)
WBC # BLD: 10.17 K/UL — SIGNIFICANT CHANGE UP (ref 3.8–10.5)
WBC # BLD: 10.43 K/UL — SIGNIFICANT CHANGE UP (ref 3.8–10.5)
WBC # BLD: 10.44 K/UL — SIGNIFICANT CHANGE UP (ref 3.8–10.5)
WBC # BLD: 13.02 K/UL — HIGH (ref 3.8–10.5)
WBC # BLD: 15.17 K/UL — HIGH (ref 3.8–10.5)
WBC # BLD: 15.19 K/UL — HIGH (ref 3.8–10.5)
WBC # BLD: 15.73 K/UL — HIGH (ref 3.8–10.5)
WBC # BLD: 16.34 K/UL — HIGH (ref 3.8–10.5)
WBC # BLD: 16.75 K/UL — HIGH (ref 3.8–10.5)
WBC # BLD: 17.71 K/UL — HIGH (ref 3.8–10.5)
WBC # BLD: 18.45 K/UL — HIGH (ref 3.8–10.5)
WBC # BLD: 18.57 K/UL — HIGH (ref 3.8–10.5)
WBC # BLD: 18.6 K/UL — HIGH (ref 3.8–10.5)
WBC # BLD: 18.99 K/UL — HIGH (ref 3.8–10.5)
WBC # BLD: 19.02 K/UL — HIGH (ref 3.8–10.5)
WBC # BLD: 19.6 K/UL — HIGH (ref 3.8–10.5)
WBC # BLD: 19.63 K/UL — HIGH (ref 3.8–10.5)
WBC # BLD: 19.82 K/UL — HIGH (ref 3.8–10.5)
WBC # BLD: 2.48 K/UL — LOW (ref 3.8–10.5)
WBC # BLD: 20.16 K/UL — HIGH (ref 3.8–10.5)
WBC # BLD: 20.17 K/UL — HIGH (ref 3.8–10.5)
WBC # BLD: 20.31 K/UL — HIGH (ref 3.8–10.5)
WBC # BLD: 21.19 K/UL — HIGH (ref 3.8–10.5)
WBC # BLD: 23.78 K/UL — HIGH (ref 3.8–10.5)
WBC # BLD: 24.34 K/UL — HIGH (ref 3.8–10.5)
WBC # BLD: 24.96 K/UL — HIGH (ref 3.8–10.5)
WBC # BLD: 3.36 K/UL — LOW (ref 3.8–10.5)
WBC # BLD: 3.39 K/UL — LOW (ref 3.8–10.5)
WBC # BLD: 4 K/UL — SIGNIFICANT CHANGE UP (ref 3.8–10.5)
WBC # BLD: 4.7 K/UL — SIGNIFICANT CHANGE UP (ref 3.8–10.5)
WBC # BLD: 4.82 K/UL — SIGNIFICANT CHANGE UP (ref 3.8–10.5)
WBC # BLD: 4.83 K/UL — SIGNIFICANT CHANGE UP (ref 3.8–10.5)
WBC # BLD: 4.96 K/UL — SIGNIFICANT CHANGE UP (ref 3.8–10.5)
WBC # BLD: 5.5 K/UL — SIGNIFICANT CHANGE UP (ref 3.8–10.5)
WBC # BLD: 7.16 K/UL — SIGNIFICANT CHANGE UP (ref 3.8–10.5)
WBC # BLD: 7.5 K/UL — SIGNIFICANT CHANGE UP (ref 3.8–10.5)
WBC # BLD: 7.78 K/UL — SIGNIFICANT CHANGE UP (ref 3.8–10.5)
WBC # BLD: 7.97 K/UL — SIGNIFICANT CHANGE UP (ref 3.8–10.5)
WBC # BLD: 8.02 K/UL — SIGNIFICANT CHANGE UP (ref 3.8–10.5)
WBC # BLD: 8.51 K/UL — SIGNIFICANT CHANGE UP (ref 3.8–10.5)
WBC # BLD: 8.62 K/UL — SIGNIFICANT CHANGE UP (ref 3.8–10.5)
WBC # BLD: 8.73 K/UL — SIGNIFICANT CHANGE UP (ref 3.8–10.5)
WBC # BLD: 8.91 K/UL — SIGNIFICANT CHANGE UP (ref 3.8–10.5)
WBC # BLD: 9.25 K/UL — SIGNIFICANT CHANGE UP (ref 3.8–10.5)
WBC # BLD: 9.38 K/UL — SIGNIFICANT CHANGE UP (ref 3.8–10.5)
WBC # BLD: 9.84 K/UL — SIGNIFICANT CHANGE UP (ref 3.8–10.5)
WBC # BLD: 9.85 K/UL — SIGNIFICANT CHANGE UP (ref 3.8–10.5)
WBC # FLD AUTO: 10.07 K/UL — SIGNIFICANT CHANGE UP (ref 3.8–10.5)
WBC # FLD AUTO: 10.17 K/UL — SIGNIFICANT CHANGE UP (ref 3.8–10.5)
WBC # FLD AUTO: 10.43 K/UL — SIGNIFICANT CHANGE UP (ref 3.8–10.5)
WBC # FLD AUTO: 10.44 K/UL — SIGNIFICANT CHANGE UP (ref 3.8–10.5)
WBC # FLD AUTO: 13.02 K/UL — HIGH (ref 3.8–10.5)
WBC # FLD AUTO: 15.17 K/UL — HIGH (ref 3.8–10.5)
WBC # FLD AUTO: 15.19 K/UL — HIGH (ref 3.8–10.5)
WBC # FLD AUTO: 15.73 K/UL — HIGH (ref 3.8–10.5)
WBC # FLD AUTO: 16.34 K/UL — HIGH (ref 3.8–10.5)
WBC # FLD AUTO: 16.75 K/UL — HIGH (ref 3.8–10.5)
WBC # FLD AUTO: 17.71 K/UL — HIGH (ref 3.8–10.5)
WBC # FLD AUTO: 18.45 K/UL — HIGH (ref 3.8–10.5)
WBC # FLD AUTO: 18.57 K/UL — HIGH (ref 3.8–10.5)
WBC # FLD AUTO: 18.6 K/UL — HIGH (ref 3.8–10.5)
WBC # FLD AUTO: 18.99 K/UL — HIGH (ref 3.8–10.5)
WBC # FLD AUTO: 19.02 K/UL — HIGH (ref 3.8–10.5)
WBC # FLD AUTO: 19.6 K/UL — HIGH (ref 3.8–10.5)
WBC # FLD AUTO: 19.63 K/UL — HIGH (ref 3.8–10.5)
WBC # FLD AUTO: 19.82 K/UL — HIGH (ref 3.8–10.5)
WBC # FLD AUTO: 2.48 K/UL — LOW (ref 3.8–10.5)
WBC # FLD AUTO: 20.16 K/UL — HIGH (ref 3.8–10.5)
WBC # FLD AUTO: 20.17 K/UL — HIGH (ref 3.8–10.5)
WBC # FLD AUTO: 20.31 K/UL — HIGH (ref 3.8–10.5)
WBC # FLD AUTO: 21.19 K/UL — HIGH (ref 3.8–10.5)
WBC # FLD AUTO: 23.78 K/UL — HIGH (ref 3.8–10.5)
WBC # FLD AUTO: 24.34 K/UL — HIGH (ref 3.8–10.5)
WBC # FLD AUTO: 24.96 K/UL — HIGH (ref 3.8–10.5)
WBC # FLD AUTO: 3.36 K/UL — LOW (ref 3.8–10.5)
WBC # FLD AUTO: 3.39 K/UL — LOW (ref 3.8–10.5)
WBC # FLD AUTO: 4 K/UL — SIGNIFICANT CHANGE UP (ref 3.8–10.5)
WBC # FLD AUTO: 4.7 K/UL — SIGNIFICANT CHANGE UP (ref 3.8–10.5)
WBC # FLD AUTO: 4.82 K/UL — SIGNIFICANT CHANGE UP (ref 3.8–10.5)
WBC # FLD AUTO: 4.83 K/UL — SIGNIFICANT CHANGE UP (ref 3.8–10.5)
WBC # FLD AUTO: 4.96 K/UL — SIGNIFICANT CHANGE UP (ref 3.8–10.5)
WBC # FLD AUTO: 5.5 K/UL — SIGNIFICANT CHANGE UP (ref 3.8–10.5)
WBC # FLD AUTO: 7.16 K/UL — SIGNIFICANT CHANGE UP (ref 3.8–10.5)
WBC # FLD AUTO: 7.5 K/UL — SIGNIFICANT CHANGE UP (ref 3.8–10.5)
WBC # FLD AUTO: 7.78 K/UL — SIGNIFICANT CHANGE UP (ref 3.8–10.5)
WBC # FLD AUTO: 7.97 K/UL — SIGNIFICANT CHANGE UP (ref 3.8–10.5)
WBC # FLD AUTO: 8.02 K/UL — SIGNIFICANT CHANGE UP (ref 3.8–10.5)
WBC # FLD AUTO: 8.51 K/UL — SIGNIFICANT CHANGE UP (ref 3.8–10.5)
WBC # FLD AUTO: 8.62 K/UL — SIGNIFICANT CHANGE UP (ref 3.8–10.5)
WBC # FLD AUTO: 8.73 K/UL — SIGNIFICANT CHANGE UP (ref 3.8–10.5)
WBC # FLD AUTO: 8.91 K/UL — SIGNIFICANT CHANGE UP (ref 3.8–10.5)
WBC # FLD AUTO: 9.25 K/UL — SIGNIFICANT CHANGE UP (ref 3.8–10.5)
WBC # FLD AUTO: 9.38 K/UL — SIGNIFICANT CHANGE UP (ref 3.8–10.5)
WBC # FLD AUTO: 9.84 K/UL — SIGNIFICANT CHANGE UP (ref 3.8–10.5)
WBC # FLD AUTO: 9.85 K/UL — SIGNIFICANT CHANGE UP (ref 3.8–10.5)

## 2019-01-01 PROCEDURE — 93306 TTE W/DOPPLER COMPLETE: CPT | Mod: 26

## 2019-01-01 PROCEDURE — 71045 X-RAY EXAM CHEST 1 VIEW: CPT | Mod: 26

## 2019-01-01 PROCEDURE — 99233 SBSQ HOSP IP/OBS HIGH 50: CPT

## 2019-01-01 PROCEDURE — 82378 CARCINOEMBRYONIC ANTIGEN: CPT

## 2019-01-01 PROCEDURE — 86901 BLOOD TYPING SEROLOGIC RH(D): CPT

## 2019-01-01 PROCEDURE — 99233 SBSQ HOSP IP/OBS HIGH 50: CPT | Mod: GC

## 2019-01-01 PROCEDURE — 99232 SBSQ HOSP IP/OBS MODERATE 35: CPT | Mod: GC

## 2019-01-01 PROCEDURE — 87536 HIV-1 QUANT&REVRSE TRNSCRPJ: CPT

## 2019-01-01 PROCEDURE — 85025 COMPLETE CBC W/AUTO DIFF WBC: CPT

## 2019-01-01 PROCEDURE — 93308 TTE F-UP OR LMTD: CPT | Mod: 26

## 2019-01-01 PROCEDURE — 93880 EXTRACRANIAL BILAT STUDY: CPT | Mod: 26

## 2019-01-01 PROCEDURE — 71045 X-RAY EXAM CHEST 1 VIEW: CPT | Mod: 26,76

## 2019-01-01 PROCEDURE — 70450 CT HEAD/BRAIN W/O DYE: CPT | Mod: 26

## 2019-01-01 PROCEDURE — 94640 AIRWAY INHALATION TREATMENT: CPT

## 2019-01-01 PROCEDURE — 99291 CRITICAL CARE FIRST HOUR: CPT

## 2019-01-01 PROCEDURE — 87040 BLOOD CULTURE FOR BACTERIA: CPT

## 2019-01-01 PROCEDURE — 83550 IRON BINDING TEST: CPT

## 2019-01-01 PROCEDURE — 93307 TTE W/O DOPPLER COMPLETE: CPT

## 2019-01-01 PROCEDURE — 86923 COMPATIBILITY TEST ELECTRIC: CPT

## 2019-01-01 PROCEDURE — 83970 ASSAY OF PARATHORMONE: CPT

## 2019-01-01 PROCEDURE — 31622 DX BRONCHOSCOPE/WASH: CPT | Mod: GC

## 2019-01-01 PROCEDURE — 74018 RADEX ABDOMEN 1 VIEW: CPT | Mod: 26

## 2019-01-01 PROCEDURE — 87206 SMEAR FLUORESCENT/ACID STAI: CPT

## 2019-01-01 PROCEDURE — 86900 BLOOD TYPING SEROLOGIC ABO: CPT

## 2019-01-01 PROCEDURE — 71045 X-RAY EXAM CHEST 1 VIEW: CPT

## 2019-01-01 PROCEDURE — 94003 VENT MGMT INPAT SUBQ DAY: CPT

## 2019-01-01 PROCEDURE — 90945 DIALYSIS ONE EVALUATION: CPT

## 2019-01-01 PROCEDURE — 99223 1ST HOSP IP/OBS HIGH 75: CPT

## 2019-01-01 PROCEDURE — 88305 TISSUE EXAM BY PATHOLOGIST: CPT

## 2019-01-01 PROCEDURE — 99291 CRITICAL CARE FIRST HOUR: CPT | Mod: 25

## 2019-01-01 PROCEDURE — 93010 ELECTROCARDIOGRAM REPORT: CPT

## 2019-01-01 PROCEDURE — 99231 SBSQ HOSP IP/OBS SF/LOW 25: CPT

## 2019-01-01 PROCEDURE — P9016: CPT

## 2019-01-01 PROCEDURE — 31600 PLANNED TRACHEOSTOMY: CPT

## 2019-01-01 PROCEDURE — 85732 THROMBOPLASTIN TIME PARTIAL: CPT

## 2019-01-01 PROCEDURE — 82310 ASSAY OF CALCIUM: CPT

## 2019-01-01 PROCEDURE — 90945 DIALYSIS ONE EVALUATION: CPT | Mod: GC

## 2019-01-01 PROCEDURE — 84450 TRANSFERASE (AST) (SGOT): CPT

## 2019-01-01 PROCEDURE — 83010 ASSAY OF HAPTOGLOBIN QUANT: CPT

## 2019-01-01 PROCEDURE — 99232 SBSQ HOSP IP/OBS MODERATE 35: CPT

## 2019-01-01 PROCEDURE — 97530 THERAPEUTIC ACTIVITIES: CPT

## 2019-01-01 PROCEDURE — 83036 HEMOGLOBIN GLYCOSYLATED A1C: CPT

## 2019-01-01 PROCEDURE — 87184 SC STD DISK METHOD PER PLATE: CPT

## 2019-01-01 PROCEDURE — 31624 DX BRONCHOSCOPE/LAVAGE: CPT | Mod: GC

## 2019-01-01 PROCEDURE — 87507 IADNA-DNA/RNA PROBE TQ 12-25: CPT

## 2019-01-01 PROCEDURE — 93306 TTE W/DOPPLER COMPLETE: CPT

## 2019-01-01 PROCEDURE — 86880 COOMBS TEST DIRECT: CPT

## 2019-01-01 PROCEDURE — 86704 HEP B CORE ANTIBODY TOTAL: CPT

## 2019-01-01 PROCEDURE — 93880 EXTRACRANIAL BILAT STUDY: CPT

## 2019-01-01 PROCEDURE — 71045 X-RAY EXAM CHEST 1 VIEW: CPT | Mod: 26,77

## 2019-01-01 PROCEDURE — 85230 CLOT FACTOR VII PROCONVERTIN: CPT

## 2019-01-01 PROCEDURE — 74176 CT ABD & PELVIS W/O CONTRAST: CPT | Mod: 26

## 2019-01-01 PROCEDURE — 83735 ASSAY OF MAGNESIUM: CPT

## 2019-01-01 PROCEDURE — 84157 ASSAY OF PROTEIN OTHER: CPT

## 2019-01-01 PROCEDURE — 94002 VENT MGMT INPAT INIT DAY: CPT

## 2019-01-01 PROCEDURE — 82553 CREATINE MB FRACTION: CPT

## 2019-01-01 PROCEDURE — 82330 ASSAY OF CALCIUM: CPT

## 2019-01-01 PROCEDURE — 82746 ASSAY OF FOLIC ACID SERUM: CPT

## 2019-01-01 PROCEDURE — 87340 HEPATITIS B SURFACE AG IA: CPT

## 2019-01-01 PROCEDURE — 70450 CT HEAD/BRAIN W/O DYE: CPT

## 2019-01-01 PROCEDURE — 84478 ASSAY OF TRIGLYCERIDES: CPT

## 2019-01-01 PROCEDURE — 99233 SBSQ HOSP IP/OBS HIGH 50: CPT | Mod: GC,25

## 2019-01-01 PROCEDURE — 82306 VITAMIN D 25 HYDROXY: CPT

## 2019-01-01 PROCEDURE — 93005 ELECTROCARDIOGRAM TRACING: CPT

## 2019-01-01 PROCEDURE — 81001 URINALYSIS AUTO W/SCOPE: CPT

## 2019-01-01 PROCEDURE — 76705 ECHO EXAM OF ABDOMEN: CPT

## 2019-01-01 PROCEDURE — 87015 SPECIMEN INFECT AGNT CONCNTJ: CPT

## 2019-01-01 PROCEDURE — 80048 BASIC METABOLIC PNL TOTAL CA: CPT

## 2019-01-01 PROCEDURE — 82962 GLUCOSE BLOOD TEST: CPT

## 2019-01-01 PROCEDURE — 97110 THERAPEUTIC EXERCISES: CPT

## 2019-01-01 PROCEDURE — 74177 CT ABD & PELVIS W/CONTRAST: CPT

## 2019-01-01 PROCEDURE — 74177 CT ABD & PELVIS W/CONTRAST: CPT | Mod: 26

## 2019-01-01 PROCEDURE — 86803 HEPATITIS C AB TEST: CPT

## 2019-01-01 PROCEDURE — 84311 SPECTROPHOTOMETRY: CPT

## 2019-01-01 PROCEDURE — 76705 ECHO EXAM OF ABDOMEN: CPT | Mod: 26

## 2019-01-01 PROCEDURE — P9035: CPT

## 2019-01-01 PROCEDURE — 84295 ASSAY OF SERUM SODIUM: CPT

## 2019-01-01 PROCEDURE — 82728 ASSAY OF FERRITIN: CPT

## 2019-01-01 PROCEDURE — 87186 SC STD MICRODIL/AGAR DIL: CPT

## 2019-01-01 PROCEDURE — 99231 SBSQ HOSP IP/OBS SF/LOW 25: CPT | Mod: GC

## 2019-01-01 PROCEDURE — 76376 3D RENDER W/INTRP POSTPROCES: CPT | Mod: 26

## 2019-01-01 PROCEDURE — 83880 ASSAY OF NATRIURETIC PEPTIDE: CPT

## 2019-01-01 PROCEDURE — 74174 CTA ABD&PLVS W/CONTRAST: CPT | Mod: 26

## 2019-01-01 PROCEDURE — 74018 RADEX ABDOMEN 1 VIEW: CPT

## 2019-01-01 PROCEDURE — 99292 CRITICAL CARE ADDL 30 MIN: CPT

## 2019-01-01 PROCEDURE — 82248 BILIRUBIN DIRECT: CPT

## 2019-01-01 PROCEDURE — 87046 STOOL CULTR AEROBIC BACT EA: CPT

## 2019-01-01 PROCEDURE — 85240 CLOT FACTOR VIII AHG 1 STAGE: CPT

## 2019-01-01 PROCEDURE — 82570 ASSAY OF URINE CREATININE: CPT

## 2019-01-01 PROCEDURE — 83721 ASSAY OF BLOOD LIPOPROTEIN: CPT

## 2019-01-01 PROCEDURE — 85610 PROTHROMBIN TIME: CPT

## 2019-01-01 PROCEDURE — 84460 ALANINE AMINO (ALT) (SGPT): CPT

## 2019-01-01 PROCEDURE — 36415 COLL VENOUS BLD VENIPUNCTURE: CPT

## 2019-01-01 PROCEDURE — 74176 CT ABD & PELVIS W/O CONTRAST: CPT

## 2019-01-01 PROCEDURE — 87086 URINE CULTURE/COLONY COUNT: CPT

## 2019-01-01 PROCEDURE — 32551 INSERTION OF CHEST TUBE: CPT | Mod: GC

## 2019-01-01 PROCEDURE — 87070 CULTURE OTHR SPECIMN AEROBIC: CPT

## 2019-01-01 PROCEDURE — 74174 CTA ABD&PLVS W/CONTRAST: CPT

## 2019-01-01 PROCEDURE — P9047: CPT

## 2019-01-01 PROCEDURE — 85045 AUTOMATED RETICULOCYTE COUNT: CPT

## 2019-01-01 PROCEDURE — 88342 IMHCHEM/IMCYTCHM 1ST ANTB: CPT

## 2019-01-01 PROCEDURE — 36556 INSERT NON-TUNNEL CV CATH: CPT | Mod: GC

## 2019-01-01 PROCEDURE — 32557 INSERT CATH PLEURA W/ IMAGE: CPT

## 2019-01-01 PROCEDURE — 87252 VIRUS INOCULATION TISSUE: CPT

## 2019-01-01 PROCEDURE — 31500 INSERT EMERGENCY AIRWAY: CPT

## 2019-01-01 PROCEDURE — 86367 STEM CELLS TOTAL COUNT: CPT

## 2019-01-01 PROCEDURE — 84075 ASSAY ALKALINE PHOSPHATASE: CPT

## 2019-01-01 PROCEDURE — 87641 MR-STAPH DNA AMP PROBE: CPT

## 2019-01-01 PROCEDURE — P9045: CPT

## 2019-01-01 PROCEDURE — 82042 OTHER SOURCE ALBUMIN QUAN EA: CPT

## 2019-01-01 PROCEDURE — 87389 HIV-1 AG W/HIV-1&-2 AB AG IA: CPT

## 2019-01-01 PROCEDURE — 31645 BRNCHSC W/THER ASPIR 1ST: CPT

## 2019-01-01 PROCEDURE — 71260 CT THORAX DX C+: CPT | Mod: 26

## 2019-01-01 PROCEDURE — 93312 ECHO TRANSESOPHAGEAL: CPT

## 2019-01-01 PROCEDURE — 89051 BODY FLUID CELL COUNT: CPT

## 2019-01-01 PROCEDURE — 82550 ASSAY OF CK (CPK): CPT

## 2019-01-01 PROCEDURE — 99221 1ST HOSP IP/OBS SF/LOW 40: CPT

## 2019-01-01 PROCEDURE — 83615 LACTATE (LD) (LDH) ENZYME: CPT

## 2019-01-01 PROCEDURE — 87205 SMEAR GRAM STAIN: CPT

## 2019-01-01 PROCEDURE — 85027 COMPLETE CBC AUTOMATED: CPT

## 2019-01-01 PROCEDURE — 90935 HEMODIALYSIS ONE EVALUATION: CPT

## 2019-01-01 PROCEDURE — 93312 ECHO TRANSESOPHAGEAL: CPT | Mod: 26

## 2019-01-01 PROCEDURE — 99252 IP/OBS CONSLTJ NEW/EST SF 35: CPT

## 2019-01-01 PROCEDURE — 99238 HOSP IP/OBS DSCHRG MGMT 30/<: CPT | Mod: GC

## 2019-01-01 PROCEDURE — 87449 NOS EACH ORGANISM AG IA: CPT

## 2019-01-01 PROCEDURE — 90935 HEMODIALYSIS ONE EVALUATION: CPT | Mod: GC

## 2019-01-01 PROCEDURE — 80076 HEPATIC FUNCTION PANEL: CPT

## 2019-01-01 PROCEDURE — 85379 FIBRIN DEGRADATION QUANT: CPT

## 2019-01-01 PROCEDURE — 87324 CLOSTRIDIUM AG IA: CPT

## 2019-01-01 PROCEDURE — 99222 1ST HOSP IP/OBS MODERATE 55: CPT

## 2019-01-01 PROCEDURE — 86706 HEP B SURFACE ANTIBODY: CPT

## 2019-01-01 PROCEDURE — 36430 TRANSFUSION BLD/BLD COMPNT: CPT

## 2019-01-01 PROCEDURE — P9017: CPT

## 2019-01-01 PROCEDURE — ZZZZZ: CPT

## 2019-01-01 PROCEDURE — 85730 THROMBOPLASTIN TIME PARTIAL: CPT

## 2019-01-01 PROCEDURE — 99222 1ST HOSP IP/OBS MODERATE 55: CPT | Mod: 25

## 2019-01-01 PROCEDURE — 83986 ASSAY PH BODY FLUID NOS: CPT

## 2019-01-01 PROCEDURE — 86359 T CELLS TOTAL COUNT: CPT

## 2019-01-01 PROCEDURE — 80053 COMPREHEN METABOLIC PANEL: CPT

## 2019-01-01 PROCEDURE — 87150 DNA/RNA AMPLIFIED PROBE: CPT

## 2019-01-01 PROCEDURE — 80074 ACUTE HEPATITIS PANEL: CPT

## 2019-01-01 PROCEDURE — 82247 BILIRUBIN TOTAL: CPT

## 2019-01-01 PROCEDURE — 80202 ASSAY OF VANCOMYCIN: CPT

## 2019-01-01 PROCEDURE — 84443 ASSAY THYROID STIM HORMONE: CPT

## 2019-01-01 PROCEDURE — 99292 CRITICAL CARE ADDL 30 MIN: CPT | Mod: 25

## 2019-01-01 PROCEDURE — 71260 CT THORAX DX C+: CPT

## 2019-01-01 PROCEDURE — 84484 ASSAY OF TROPONIN QUANT: CPT

## 2019-01-01 PROCEDURE — 80061 LIPID PANEL: CPT

## 2019-01-01 PROCEDURE — 99223 1ST HOSP IP/OBS HIGH 75: CPT | Mod: GC

## 2019-01-01 PROCEDURE — 76937 US GUIDE VASCULAR ACCESS: CPT | Mod: 26

## 2019-01-01 PROCEDURE — 84132 ASSAY OF SERUM POTASSIUM: CPT

## 2019-01-01 PROCEDURE — 87045 FECES CULTURE AEROBIC BACT: CPT

## 2019-01-01 PROCEDURE — 85362 FIBRIN DEGRADATION PRODUCTS: CPT

## 2019-01-01 PROCEDURE — 90937 HEMODIALYSIS REPEATED EVAL: CPT | Mod: GC

## 2019-01-01 PROCEDURE — 82945 GLUCOSE OTHER FLUID: CPT

## 2019-01-01 PROCEDURE — 85384 FIBRINOGEN ACTIVITY: CPT

## 2019-01-01 PROCEDURE — 82803 BLOOD GASES ANY COMBINATION: CPT

## 2019-01-01 PROCEDURE — 87102 FUNGUS ISOLATION CULTURE: CPT

## 2019-01-01 PROCEDURE — 85611 PROTHROMBIN TEST: CPT

## 2019-01-01 PROCEDURE — 87116 MYCOBACTERIA CULTURE: CPT

## 2019-01-01 PROCEDURE — 88112 CYTOPATH CELL ENHANCE TECH: CPT

## 2019-01-01 PROCEDURE — 36556 INSERT NON-TUNNEL CV CATH: CPT

## 2019-01-01 PROCEDURE — 83540 ASSAY OF IRON: CPT

## 2019-01-01 PROCEDURE — 87075 CULTR BACTERIA EXCEPT BLOOD: CPT

## 2019-01-01 PROCEDURE — 97161 PT EVAL LOW COMPLEX 20 MIN: CPT

## 2019-01-01 PROCEDURE — 82607 VITAMIN B-12: CPT

## 2019-01-01 PROCEDURE — 86850 RBC ANTIBODY SCREEN: CPT

## 2019-01-01 PROCEDURE — 84100 ASSAY OF PHOSPHORUS: CPT

## 2019-01-01 PROCEDURE — 83605 ASSAY OF LACTIC ACID: CPT

## 2019-01-01 PROCEDURE — 88312 SPECIAL STAINS GROUP 1: CPT

## 2019-01-01 RX ORDER — RADIOPAQUE PVC MARKERS/BARIUM 24MARKERS
450 CAPSULE ORAL ONCE
Refills: 0 | Status: COMPLETED | OUTPATIENT
Start: 2019-01-01 | End: 2019-01-01

## 2019-01-01 RX ORDER — PANTOPRAZOLE SODIUM 20 MG/1
40 TABLET, DELAYED RELEASE ORAL DAILY
Refills: 0 | Status: DISCONTINUED | OUTPATIENT
Start: 2019-01-01 | End: 2019-01-01

## 2019-01-01 RX ORDER — DORNASE ALFA 1 MG/ML
5 SOLUTION RESPIRATORY (INHALATION) EVERY 12 HOURS
Refills: 0 | Status: DISCONTINUED | OUTPATIENT
Start: 2019-01-01 | End: 2019-01-01

## 2019-01-01 RX ORDER — POTASSIUM CHLORIDE 20 MEQ
10 PACKET (EA) ORAL
Refills: 0 | Status: COMPLETED | OUTPATIENT
Start: 2019-01-01 | End: 2019-01-01

## 2019-01-01 RX ORDER — CALCIUM GLUCONATE 100 MG/ML
2 VIAL (ML) INTRAVENOUS ONCE
Refills: 0 | Status: COMPLETED | OUTPATIENT
Start: 2019-01-01 | End: 2019-01-01

## 2019-01-01 RX ORDER — DOBUTAMINE HCL 250MG/20ML
2.5 VIAL (ML) INTRAVENOUS
Qty: 500 | Refills: 0 | Status: DISCONTINUED | OUTPATIENT
Start: 2019-01-01 | End: 2019-01-01

## 2019-01-01 RX ORDER — MULTIVIT-MIN/FERROUS GLUCONATE 9 MG/15 ML
15 LIQUID (ML) ORAL DAILY
Refills: 0 | Status: DISCONTINUED | OUTPATIENT
Start: 2019-01-01 | End: 2019-01-01

## 2019-01-01 RX ORDER — CHLORHEXIDINE GLUCONATE 213 G/1000ML
1 SOLUTION TOPICAL DAILY
Refills: 0 | Status: DISCONTINUED | OUTPATIENT
Start: 2019-01-01 | End: 2019-01-01

## 2019-01-01 RX ORDER — DEXTROSE 10 % IN WATER 10 %
1000 INTRAVENOUS SOLUTION INTRAVENOUS
Refills: 0 | Status: DISCONTINUED | OUTPATIENT
Start: 2019-01-01 | End: 2019-01-01

## 2019-01-01 RX ORDER — POTASSIUM CHLORIDE 20 MEQ
40 PACKET (EA) ORAL ONCE
Refills: 0 | Status: COMPLETED | OUTPATIENT
Start: 2019-01-01 | End: 2019-01-01

## 2019-01-01 RX ORDER — NEOSTIGMINE METHYLSULFATE 1 MG/ML
2 VIAL (ML) INJECTION ONCE
Refills: 0 | Status: COMPLETED | OUTPATIENT
Start: 2019-01-01 | End: 2019-01-01

## 2019-01-01 RX ORDER — ALTEPLASE 100 MG
10 KIT INTRAVENOUS EVERY 12 HOURS
Refills: 0 | Status: DISCONTINUED | OUTPATIENT
Start: 2019-01-01 | End: 2019-01-01

## 2019-01-01 RX ORDER — POTASSIUM CHLORIDE 20 MEQ
20 PACKET (EA) ORAL
Refills: 0 | Status: COMPLETED | OUTPATIENT
Start: 2019-01-01 | End: 2019-01-01

## 2019-01-01 RX ORDER — DEXTROSE 50 % IN WATER 50 %
25 SYRINGE (ML) INTRAVENOUS ONCE
Refills: 0 | Status: COMPLETED | OUTPATIENT
Start: 2019-01-01 | End: 2019-01-01

## 2019-01-01 RX ORDER — FENTANYL CITRATE 50 UG/ML
0.5 INJECTION INTRAVENOUS
Qty: 2500 | Refills: 0 | Status: DISCONTINUED | OUTPATIENT
Start: 2019-01-01 | End: 2019-01-01

## 2019-01-01 RX ORDER — NAFCILLIN 10 G/100ML
2 INJECTION, POWDER, FOR SOLUTION INTRAVENOUS EVERY 4 HOURS
Refills: 0 | Status: DISCONTINUED | OUTPATIENT
Start: 2019-01-01 | End: 2019-01-01

## 2019-01-01 RX ORDER — ASCORBIC ACID 60 MG
500 TABLET,CHEWABLE ORAL DAILY
Refills: 0 | Status: DISCONTINUED | OUTPATIENT
Start: 2019-01-01 | End: 2019-01-01

## 2019-01-01 RX ORDER — SODIUM CHLORIDE 9 MG/ML
250 INJECTION INTRAMUSCULAR; INTRAVENOUS; SUBCUTANEOUS ONCE
Refills: 0 | Status: COMPLETED | OUTPATIENT
Start: 2019-01-01 | End: 2019-01-01

## 2019-01-01 RX ORDER — ALBUMIN HUMAN 25 %
50 VIAL (ML) INTRAVENOUS EVERY 8 HOURS
Refills: 0 | Status: DISCONTINUED | OUTPATIENT
Start: 2019-01-01 | End: 2019-01-01

## 2019-01-01 RX ORDER — SODIUM CHLORIDE 9 MG/ML
1000 INJECTION, SOLUTION INTRAVENOUS
Refills: 0 | Status: DISCONTINUED | OUTPATIENT
Start: 2019-01-01 | End: 2019-01-01

## 2019-01-01 RX ORDER — IOHEXOL 300 MG/ML
30 INJECTION, SOLUTION INTRAVENOUS ONCE
Refills: 0 | Status: COMPLETED | OUTPATIENT
Start: 2019-01-01 | End: 2019-01-01

## 2019-01-01 RX ORDER — ACETAMINOPHEN 500 MG
650 TABLET ORAL EVERY 6 HOURS
Refills: 0 | Status: DISCONTINUED | OUTPATIENT
Start: 2019-01-01 | End: 2019-01-01

## 2019-01-01 RX ORDER — PROPOFOL 10 MG/ML
5 INJECTION, EMULSION INTRAVENOUS
Qty: 1000 | Refills: 0 | Status: DISCONTINUED | OUTPATIENT
Start: 2019-01-01 | End: 2019-01-01

## 2019-01-01 RX ORDER — QUETIAPINE FUMARATE 200 MG/1
25 TABLET, FILM COATED ORAL ONCE
Refills: 0 | Status: COMPLETED | OUTPATIENT
Start: 2019-01-01 | End: 2019-01-01

## 2019-01-01 RX ORDER — HEPARIN SODIUM 5000 [USP'U]/ML
800 INJECTION INTRAVENOUS; SUBCUTANEOUS
Qty: 25000 | Refills: 0 | Status: DISCONTINUED | OUTPATIENT
Start: 2019-01-01 | End: 2019-01-01

## 2019-01-01 RX ORDER — ACYCLOVIR SODIUM 500 MG
740 VIAL (EA) INTRAVENOUS EVERY 24 HOURS
Refills: 0 | Status: DISCONTINUED | OUTPATIENT
Start: 2019-01-01 | End: 2019-01-01

## 2019-01-01 RX ORDER — ALBUMIN HUMAN 25 %
50 VIAL (ML) INTRAVENOUS
Refills: 0 | Status: COMPLETED | OUTPATIENT
Start: 2019-01-01 | End: 2019-01-01

## 2019-01-01 RX ORDER — CALCIUM GLUCONATE 100 MG/ML
1 VIAL (ML) INTRAVENOUS ONCE
Refills: 0 | Status: DISCONTINUED | OUTPATIENT
Start: 2019-01-01 | End: 2019-01-01

## 2019-01-01 RX ORDER — DEXMEDETOMIDINE HYDROCHLORIDE IN 0.9% SODIUM CHLORIDE 4 UG/ML
0.2 INJECTION INTRAVENOUS
Qty: 200 | Refills: 0 | Status: DISCONTINUED | OUTPATIENT
Start: 2019-01-01 | End: 2019-01-01

## 2019-01-01 RX ORDER — MIDAZOLAM HYDROCHLORIDE 1 MG/ML
4 INJECTION, SOLUTION INTRAMUSCULAR; INTRAVENOUS ONCE
Refills: 0 | Status: DISCONTINUED | OUTPATIENT
Start: 2019-01-01 | End: 2019-01-01

## 2019-01-01 RX ORDER — METOCLOPRAMIDE HCL 10 MG
10 TABLET ORAL ONCE
Refills: 0 | Status: COMPLETED | OUTPATIENT
Start: 2019-01-01 | End: 2019-01-01

## 2019-01-01 RX ORDER — NOREPINEPHRINE BITARTRATE/D5W 8 MG/250ML
0.05 PLASTIC BAG, INJECTION (ML) INTRAVENOUS
Qty: 8 | Refills: 0 | Status: DISCONTINUED | OUTPATIENT
Start: 2019-01-01 | End: 2019-01-01

## 2019-01-01 RX ORDER — MIDODRINE HYDROCHLORIDE 2.5 MG/1
5 TABLET ORAL EVERY 8 HOURS
Refills: 0 | Status: DISCONTINUED | OUTPATIENT
Start: 2019-01-01 | End: 2019-01-01

## 2019-01-01 RX ORDER — ALBUMIN HUMAN 25 %
250 VIAL (ML) INTRAVENOUS ONCE
Refills: 0 | Status: COMPLETED | OUTPATIENT
Start: 2019-01-01 | End: 2019-01-01

## 2019-01-01 RX ORDER — ACYCLOVIR SODIUM 500 MG
600 VIAL (EA) INTRAVENOUS EVERY 24 HOURS
Refills: 0 | Status: DISCONTINUED | OUTPATIENT
Start: 2019-01-01 | End: 2019-01-01

## 2019-01-01 RX ORDER — MAGNESIUM SULFATE 500 MG/ML
1 VIAL (ML) INJECTION ONCE
Refills: 0 | Status: COMPLETED | OUTPATIENT
Start: 2019-01-01 | End: 2019-01-01

## 2019-01-01 RX ORDER — FENTANYL CITRATE 50 UG/ML
100 INJECTION INTRAVENOUS ONCE
Refills: 0 | Status: DISCONTINUED | OUTPATIENT
Start: 2019-01-01 | End: 2019-01-01

## 2019-01-01 RX ORDER — POTASSIUM CHLORIDE 20 MEQ
10 PACKET (EA) ORAL
Refills: 0 | Status: DISCONTINUED | OUTPATIENT
Start: 2019-01-01 | End: 2019-01-01

## 2019-01-01 RX ORDER — VANCOMYCIN HCL 1 G
1000 VIAL (EA) INTRAVENOUS ONCE
Refills: 0 | Status: COMPLETED | OUTPATIENT
Start: 2019-01-01 | End: 2019-01-01

## 2019-01-01 RX ORDER — VITAMIN E 100 UNIT
400 CAPSULE ORAL DAILY
Refills: 0 | Status: DISCONTINUED | OUTPATIENT
Start: 2019-01-01 | End: 2019-01-01

## 2019-01-01 RX ORDER — VASOPRESSIN 20 [USP'U]/ML
0.04 INJECTION INTRAVENOUS
Qty: 50 | Refills: 0 | Status: DISCONTINUED | OUTPATIENT
Start: 2019-01-01 | End: 2019-01-01

## 2019-01-01 RX ORDER — INSULIN HUMAN 100 [IU]/ML
10 INJECTION, SOLUTION SUBCUTANEOUS ONCE
Refills: 0 | Status: COMPLETED | OUTPATIENT
Start: 2019-01-01 | End: 2019-01-01

## 2019-01-01 RX ORDER — FENTANYL CITRATE 50 UG/ML
3.5 INJECTION INTRAVENOUS
Qty: 2500 | Refills: 0 | Status: DISCONTINUED | OUTPATIENT
Start: 2019-01-01 | End: 2019-01-01

## 2019-01-01 RX ORDER — NOREPINEPHRINE BITARTRATE/D5W 8 MG/250ML
0.05 PLASTIC BAG, INJECTION (ML) INTRAVENOUS
Qty: 16 | Refills: 0 | Status: DISCONTINUED | OUTPATIENT
Start: 2019-01-01 | End: 2019-01-01

## 2019-01-01 RX ORDER — INSULIN LISPRO 100/ML
VIAL (ML) SUBCUTANEOUS
Refills: 0 | Status: DISCONTINUED | OUTPATIENT
Start: 2019-01-01 | End: 2019-01-01

## 2019-01-01 RX ORDER — IPRATROPIUM/ALBUTEROL SULFATE 18-103MCG
3 AEROSOL WITH ADAPTER (GRAM) INHALATION EVERY 4 HOURS
Refills: 0 | Status: DISCONTINUED | OUTPATIENT
Start: 2019-01-01 | End: 2019-01-01

## 2019-01-01 RX ORDER — VASOPRESSIN 20 [USP'U]/ML
0.02 INJECTION INTRAVENOUS
Qty: 50 | Refills: 0 | Status: DISCONTINUED | OUTPATIENT
Start: 2019-01-01 | End: 2019-01-01

## 2019-01-01 RX ORDER — DEXMEDETOMIDINE HYDROCHLORIDE IN 0.9% SODIUM CHLORIDE 4 UG/ML
0.3 INJECTION INTRAVENOUS
Qty: 200 | Refills: 0 | Status: DISCONTINUED | OUTPATIENT
Start: 2019-01-01 | End: 2019-01-01

## 2019-01-01 RX ORDER — METRONIDAZOLE 500 MG
500 TABLET ORAL EVERY 8 HOURS
Refills: 0 | Status: DISCONTINUED | OUTPATIENT
Start: 2019-01-01 | End: 2019-01-01

## 2019-01-01 RX ORDER — SODIUM CHLORIDE 9 MG/ML
1000 INJECTION INTRAMUSCULAR; INTRAVENOUS; SUBCUTANEOUS ONCE
Refills: 0 | Status: COMPLETED | OUTPATIENT
Start: 2019-01-01 | End: 2019-01-01

## 2019-01-01 RX ORDER — RADIOPAQUE PVC MARKERS/BARIUM 24MARKERS
30 CAPSULE ORAL ONCE
Refills: 0 | Status: DISCONTINUED | OUTPATIENT
Start: 2019-01-01 | End: 2019-01-01

## 2019-01-01 RX ORDER — ERYTHROPOIETIN 10000 [IU]/ML
10000 INJECTION, SOLUTION INTRAVENOUS; SUBCUTANEOUS ONCE
Refills: 0 | Status: COMPLETED | OUTPATIENT
Start: 2019-01-01 | End: 2019-01-01

## 2019-01-01 RX ORDER — DEXTROSE 50 % IN WATER 50 %
15 SYRINGE (ML) INTRAVENOUS ONCE
Refills: 0 | Status: DISCONTINUED | OUTPATIENT
Start: 2019-01-01 | End: 2019-01-01

## 2019-01-01 RX ORDER — CHLORHEXIDINE GLUCONATE 213 G/1000ML
15 SOLUTION TOPICAL EVERY 12 HOURS
Refills: 0 | Status: DISCONTINUED | OUTPATIENT
Start: 2019-01-01 | End: 2019-01-01

## 2019-01-01 RX ORDER — FENTANYL CITRATE 50 UG/ML
75 INJECTION INTRAVENOUS ONCE
Refills: 0 | Status: DISCONTINUED | OUTPATIENT
Start: 2019-01-01 | End: 2019-01-01

## 2019-01-01 RX ORDER — VORICONAZOLE 10 MG/ML
300 INJECTION, POWDER, LYOPHILIZED, FOR SOLUTION INTRAVENOUS EVERY 12 HOURS
Refills: 0 | Status: DISCONTINUED | OUTPATIENT
Start: 2019-01-01 | End: 2019-01-01

## 2019-01-01 RX ORDER — PROPOFOL 10 MG/ML
45.29 INJECTION, EMULSION INTRAVENOUS
Qty: 1000 | Refills: 0 | Status: DISCONTINUED | OUTPATIENT
Start: 2019-01-01 | End: 2019-01-01

## 2019-01-01 RX ORDER — POLYETHYLENE GLYCOL 3350 17 G/17G
17 POWDER, FOR SOLUTION ORAL EVERY 12 HOURS
Refills: 0 | Status: DISCONTINUED | OUTPATIENT
Start: 2019-01-01 | End: 2019-01-01

## 2019-01-01 RX ORDER — ACYCLOVIR SODIUM 500 MG
370 VIAL (EA) INTRAVENOUS EVERY 24 HOURS
Refills: 0 | Status: COMPLETED | OUTPATIENT
Start: 2019-01-01 | End: 2019-01-01

## 2019-01-01 RX ORDER — PROPOFOL 10 MG/ML
10 INJECTION, EMULSION INTRAVENOUS
Qty: 1000 | Refills: 0 | Status: DISCONTINUED | OUTPATIENT
Start: 2019-01-01 | End: 2019-01-01

## 2019-01-01 RX ORDER — GABAPENTIN 400 MG/1
100 CAPSULE ORAL
Refills: 0 | Status: DISCONTINUED | OUTPATIENT
Start: 2019-01-01 | End: 2019-01-01

## 2019-01-01 RX ORDER — DESMOPRESSIN ACETATE 0.1 MG/1
20 TABLET ORAL ONCE
Refills: 0 | Status: COMPLETED | OUTPATIENT
Start: 2019-01-01 | End: 2019-01-01

## 2019-01-01 RX ORDER — QUETIAPINE FUMARATE 200 MG/1
25 TABLET, FILM COATED ORAL AT BEDTIME
Refills: 0 | Status: DISCONTINUED | OUTPATIENT
Start: 2019-01-01 | End: 2019-01-01

## 2019-01-01 RX ORDER — HYDROCORTISONE 20 MG
25 TABLET ORAL EVERY 12 HOURS
Refills: 0 | Status: DISCONTINUED | OUTPATIENT
Start: 2019-01-01 | End: 2019-01-01

## 2019-01-01 RX ORDER — ACETAMINOPHEN 500 MG
1000 TABLET ORAL ONCE
Refills: 0 | Status: COMPLETED | OUTPATIENT
Start: 2019-01-01 | End: 2019-01-01

## 2019-01-01 RX ORDER — MEROPENEM 1 G/30ML
1000 INJECTION INTRAVENOUS ONCE
Refills: 0 | Status: COMPLETED | OUTPATIENT
Start: 2019-01-01 | End: 2019-01-01

## 2019-01-01 RX ORDER — CISATRACURIUM BESYLATE 2 MG/ML
10 INJECTION INTRAVENOUS ONCE
Refills: 0 | Status: COMPLETED | OUTPATIENT
Start: 2019-01-01 | End: 2019-01-01

## 2019-01-01 RX ORDER — DEXTROSE 50 % IN WATER 50 %
50 SYRINGE (ML) INTRAVENOUS ONCE
Refills: 0 | Status: COMPLETED | OUTPATIENT
Start: 2019-01-01 | End: 2019-01-01

## 2019-01-01 RX ORDER — HEPARIN SODIUM 5000 [USP'U]/ML
1000 INJECTION INTRAVENOUS; SUBCUTANEOUS
Qty: 25000 | Refills: 0 | Status: DISCONTINUED | OUTPATIENT
Start: 2019-01-01 | End: 2019-01-01

## 2019-01-01 RX ORDER — POLYETHYLENE GLYCOL 3350 17 G/17G
17 POWDER, FOR SOLUTION ORAL AT BEDTIME
Refills: 0 | Status: DISCONTINUED | OUTPATIENT
Start: 2019-01-01 | End: 2019-01-01

## 2019-01-01 RX ORDER — DEXTROSE 50 % IN WATER 50 %
25 SYRINGE (ML) INTRAVENOUS ONCE
Refills: 0 | Status: DISCONTINUED | OUTPATIENT
Start: 2019-01-01 | End: 2019-01-01

## 2019-01-01 RX ORDER — CEFTRIAXONE 500 MG/1
2000 INJECTION, POWDER, FOR SOLUTION INTRAMUSCULAR; INTRAVENOUS EVERY 24 HOURS
Refills: 0 | Status: DISCONTINUED | OUTPATIENT
Start: 2019-01-01 | End: 2019-01-01

## 2019-01-01 RX ORDER — CHLORPROMAZINE HCL 10 MG
25 TABLET ORAL EVERY 8 HOURS
Refills: 0 | Status: DISCONTINUED | OUTPATIENT
Start: 2019-01-01 | End: 2019-01-01

## 2019-01-01 RX ORDER — FUROSEMIDE 40 MG
80 TABLET ORAL ONCE
Refills: 0 | Status: COMPLETED | OUTPATIENT
Start: 2019-01-01 | End: 2019-01-01

## 2019-01-01 RX ORDER — MEROPENEM 1 G/30ML
1 INJECTION INTRAVENOUS EVERY 8 HOURS
Refills: 0 | Status: DISCONTINUED | OUTPATIENT
Start: 2019-01-01 | End: 2019-01-01

## 2019-01-01 RX ORDER — HYDROCORTISONE 20 MG
50 TABLET ORAL EVERY 8 HOURS
Refills: 0 | Status: DISCONTINUED | OUTPATIENT
Start: 2019-01-01 | End: 2019-01-01

## 2019-01-01 RX ORDER — DESMOPRESSIN ACETATE 0.1 MG/1
20 TABLET ORAL ONCE
Refills: 0 | Status: DISCONTINUED | OUTPATIENT
Start: 2019-01-01 | End: 2019-01-01

## 2019-01-01 RX ORDER — QUETIAPINE FUMARATE 200 MG/1
100 TABLET, FILM COATED ORAL EVERY 12 HOURS
Refills: 0 | Status: DISCONTINUED | OUTPATIENT
Start: 2019-01-01 | End: 2019-01-01

## 2019-01-01 RX ORDER — INSULIN HUMAN 100 [IU]/ML
10 INJECTION, SOLUTION SUBCUTANEOUS ONCE
Refills: 0 | Status: DISCONTINUED | OUTPATIENT
Start: 2019-01-01 | End: 2019-01-01

## 2019-01-01 RX ORDER — MAGNESIUM SULFATE 500 MG/ML
2 VIAL (ML) INJECTION ONCE
Refills: 0 | Status: COMPLETED | OUTPATIENT
Start: 2019-01-01 | End: 2019-01-01

## 2019-01-01 RX ORDER — HYDROCORTISONE 20 MG
50 TABLET ORAL EVERY 12 HOURS
Refills: 0 | Status: DISCONTINUED | OUTPATIENT
Start: 2019-01-01 | End: 2019-01-01

## 2019-01-01 RX ORDER — QUETIAPINE FUMARATE 200 MG/1
50 TABLET, FILM COATED ORAL EVERY 12 HOURS
Refills: 0 | Status: DISCONTINUED | OUTPATIENT
Start: 2019-01-01 | End: 2019-01-01

## 2019-01-01 RX ORDER — ACYCLOVIR SODIUM 500 MG
370 VIAL (EA) INTRAVENOUS EVERY 24 HOURS
Refills: 0 | Status: DISCONTINUED | OUTPATIENT
Start: 2019-01-01 | End: 2019-01-01

## 2019-01-01 RX ORDER — INSULIN HUMAN 100 [IU]/ML
1 INJECTION, SOLUTION SUBCUTANEOUS
Qty: 100 | Refills: 0 | Status: DISCONTINUED | OUTPATIENT
Start: 2019-01-01 | End: 2019-01-01

## 2019-01-01 RX ORDER — MEROPENEM 1 G/30ML
1000 INJECTION INTRAVENOUS EVERY 8 HOURS
Refills: 0 | Status: DISCONTINUED | OUTPATIENT
Start: 2019-01-01 | End: 2019-01-01

## 2019-01-01 RX ORDER — SENNA PLUS 8.6 MG/1
2 TABLET ORAL AT BEDTIME
Refills: 0 | Status: DISCONTINUED | OUTPATIENT
Start: 2019-01-01 | End: 2019-01-01

## 2019-01-01 RX ORDER — POLYETHYLENE GLYCOL 3350 17 G/17G
17 POWDER, FOR SOLUTION ORAL
Refills: 0 | Status: DISCONTINUED | OUTPATIENT
Start: 2019-01-01 | End: 2019-01-01

## 2019-01-01 RX ORDER — MIDAZOLAM HYDROCHLORIDE 1 MG/ML
6 INJECTION, SOLUTION INTRAMUSCULAR; INTRAVENOUS ONCE
Refills: 0 | Status: DISCONTINUED | OUTPATIENT
Start: 2019-01-01 | End: 2019-01-01

## 2019-01-01 RX ORDER — FENTANYL CITRATE 50 UG/ML
1 INJECTION INTRAVENOUS
Qty: 2500 | Refills: 0 | Status: DISCONTINUED | OUTPATIENT
Start: 2019-01-01 | End: 2019-01-01

## 2019-01-01 RX ORDER — POLYETHYLENE GLYCOL 3350 17 G/17G
17 POWDER, FOR SOLUTION ORAL EVERY 24 HOURS
Refills: 0 | Status: DISCONTINUED | OUTPATIENT
Start: 2019-01-01 | End: 2019-01-01

## 2019-01-01 RX ORDER — CALCIUM ACETATE 667 MG
1334 TABLET ORAL
Refills: 0 | Status: DISCONTINUED | OUTPATIENT
Start: 2019-01-01 | End: 2019-01-01

## 2019-01-01 RX ORDER — FENTANYL CITRATE 50 UG/ML
50 INJECTION INTRAVENOUS ONCE
Refills: 0 | Status: DISCONTINUED | OUTPATIENT
Start: 2019-01-01 | End: 2019-01-01

## 2019-01-01 RX ORDER — CALCIUM GLUCONATE 100 MG/ML
2 VIAL (ML) INTRAVENOUS
Refills: 0 | Status: COMPLETED | OUTPATIENT
Start: 2019-01-01 | End: 2019-01-01

## 2019-01-01 RX ORDER — HEPARIN SODIUM 5000 [USP'U]/ML
5000 INJECTION INTRAVENOUS; SUBCUTANEOUS EVERY 8 HOURS
Refills: 0 | Status: DISCONTINUED | OUTPATIENT
Start: 2019-01-01 | End: 2019-01-01

## 2019-01-01 RX ORDER — CALCIUM GLUCONATE 100 MG/ML
1 VIAL (ML) INTRAVENOUS ONCE
Refills: 0 | Status: COMPLETED | OUTPATIENT
Start: 2019-01-01 | End: 2019-01-01

## 2019-01-01 RX ORDER — HYDROCORTISONE 20 MG
50 TABLET ORAL EVERY 6 HOURS
Refills: 0 | Status: DISCONTINUED | OUTPATIENT
Start: 2019-01-01 | End: 2019-01-01

## 2019-01-01 RX ORDER — GLUCAGON INJECTION, SOLUTION 0.5 MG/.1ML
1 INJECTION, SOLUTION SUBCUTANEOUS ONCE
Refills: 0 | Status: DISCONTINUED | OUTPATIENT
Start: 2019-01-01 | End: 2019-01-01

## 2019-01-01 RX ORDER — ATROPINE SULFATE 0.1 MG/ML
0.5 SYRINGE (ML) INJECTION ONCE
Refills: 0 | Status: DISCONTINUED | OUTPATIENT
Start: 2019-01-01 | End: 2019-01-01

## 2019-01-01 RX ORDER — QUETIAPINE FUMARATE 200 MG/1
25 TABLET, FILM COATED ORAL EVERY 12 HOURS
Refills: 0 | Status: DISCONTINUED | OUTPATIENT
Start: 2019-01-01 | End: 2019-01-01

## 2019-01-01 RX ORDER — INSULIN LISPRO 100/ML
VIAL (ML) SUBCUTANEOUS EVERY 6 HOURS
Refills: 0 | Status: DISCONTINUED | OUTPATIENT
Start: 2019-01-01 | End: 2019-01-01

## 2019-01-01 RX ORDER — QUETIAPINE FUMARATE 200 MG/1
75 TABLET, FILM COATED ORAL EVERY 12 HOURS
Refills: 0 | Status: DISCONTINUED | OUTPATIENT
Start: 2019-01-01 | End: 2019-01-01

## 2019-01-01 RX ORDER — SODIUM CHLORIDE 9 MG/ML
1000 INJECTION INTRAMUSCULAR; INTRAVENOUS; SUBCUTANEOUS
Refills: 0 | Status: DISCONTINUED | OUTPATIENT
Start: 2019-01-01 | End: 2019-01-01

## 2019-01-01 RX ORDER — IOHEXOL 300 MG/ML
30 INJECTION, SOLUTION INTRAVENOUS ONCE
Refills: 0 | Status: DISCONTINUED | OUTPATIENT
Start: 2019-01-01 | End: 2019-01-01

## 2019-01-01 RX ORDER — METOCLOPRAMIDE HCL 10 MG
5 TABLET ORAL EVERY 8 HOURS
Refills: 0 | Status: DISCONTINUED | OUTPATIENT
Start: 2019-01-01 | End: 2019-01-01

## 2019-01-01 RX ORDER — SODIUM CHLORIDE 9 MG/ML
3 INJECTION INTRAMUSCULAR; INTRAVENOUS; SUBCUTANEOUS EVERY 8 HOURS
Refills: 0 | Status: DISCONTINUED | OUTPATIENT
Start: 2019-01-01 | End: 2019-01-01

## 2019-01-01 RX ORDER — ZINC SULFATE TAB 220 MG (50 MG ZINC EQUIVALENT) 220 (50 ZN) MG
220 TAB ORAL DAILY
Refills: 0 | Status: DISCONTINUED | OUTPATIENT
Start: 2019-01-01 | End: 2019-01-01

## 2019-01-01 RX ORDER — MIDODRINE HYDROCHLORIDE 2.5 MG/1
10 TABLET ORAL EVERY 8 HOURS
Refills: 0 | Status: DISCONTINUED | OUTPATIENT
Start: 2019-01-01 | End: 2019-01-01

## 2019-01-01 RX ORDER — CISATRACURIUM BESYLATE 2 MG/ML
20 INJECTION INTRAVENOUS ONCE
Refills: 0 | Status: COMPLETED | OUTPATIENT
Start: 2019-01-01 | End: 2019-01-01

## 2019-01-01 RX ORDER — DESMOPRESSIN ACETATE 0.1 MG/1
30 TABLET ORAL ONCE
Refills: 0 | Status: COMPLETED | OUTPATIENT
Start: 2019-01-01 | End: 2019-01-01

## 2019-01-01 RX ORDER — NOREPINEPHRINE BITARTRATE/D5W 8 MG/250ML
0.03 PLASTIC BAG, INJECTION (ML) INTRAVENOUS
Qty: 8 | Refills: 0 | Status: DISCONTINUED | OUTPATIENT
Start: 2019-01-01 | End: 2019-01-01

## 2019-01-01 RX ORDER — DEXTROSE 50 % IN WATER 50 %
12.5 SYRINGE (ML) INTRAVENOUS ONCE
Refills: 0 | Status: DISCONTINUED | OUTPATIENT
Start: 2019-01-01 | End: 2019-01-01

## 2019-01-01 RX ORDER — PIPERACILLIN AND TAZOBACTAM 4; .5 G/20ML; G/20ML
2.25 INJECTION, POWDER, LYOPHILIZED, FOR SOLUTION INTRAVENOUS EVERY 6 HOURS
Refills: 0 | Status: DISCONTINUED | OUTPATIENT
Start: 2019-01-01 | End: 2019-01-01

## 2019-01-01 RX ORDER — BUMETANIDE 0.25 MG/ML
2 INJECTION INTRAMUSCULAR; INTRAVENOUS
Qty: 20 | Refills: 0 | Status: DISCONTINUED | OUTPATIENT
Start: 2019-01-01 | End: 2019-01-01

## 2019-01-01 RX ORDER — ALBUMIN HUMAN 25 %
50 VIAL (ML) INTRAVENOUS
Refills: 0 | Status: DISCONTINUED | OUTPATIENT
Start: 2019-01-01 | End: 2019-01-01

## 2019-01-01 RX ORDER — FENTANYL CITRATE 50 UG/ML
0.1 INJECTION INTRAVENOUS
Qty: 2500 | Refills: 0 | Status: DISCONTINUED | OUTPATIENT
Start: 2019-01-01 | End: 2019-01-01

## 2019-01-01 RX ORDER — ONDANSETRON 8 MG/1
4 TABLET, FILM COATED ORAL ONCE
Refills: 0 | Status: COMPLETED | OUTPATIENT
Start: 2019-01-01 | End: 2019-01-01

## 2019-01-01 RX ORDER — FENTANYL CITRATE 50 UG/ML
1 INJECTION INTRAVENOUS
Refills: 0 | Status: DISCONTINUED | OUTPATIENT
Start: 2019-01-01 | End: 2019-01-01

## 2019-01-01 RX ORDER — AZITHROMYCIN 500 MG/1
500 TABLET, FILM COATED ORAL EVERY 24 HOURS
Refills: 0 | Status: DISCONTINUED | OUTPATIENT
Start: 2019-01-01 | End: 2019-01-01

## 2019-01-01 RX ORDER — MIDAZOLAM HYDROCHLORIDE 1 MG/ML
0.01 INJECTION, SOLUTION INTRAMUSCULAR; INTRAVENOUS
Qty: 100 | Refills: 0 | Status: DISCONTINUED | OUTPATIENT
Start: 2019-01-01 | End: 2019-01-01

## 2019-01-01 RX ORDER — POTASSIUM CHLORIDE 20 MEQ
20 PACKET (EA) ORAL ONCE
Refills: 0 | Status: COMPLETED | OUTPATIENT
Start: 2019-01-01 | End: 2019-01-01

## 2019-01-01 RX ORDER — ACETAMINOPHEN 500 MG
1000 TABLET ORAL ONCE
Refills: 0 | Status: DISCONTINUED | OUTPATIENT
Start: 2019-01-01 | End: 2019-01-01

## 2019-01-01 RX ORDER — CHLOROTHIAZIDE 500 MG
500 TABLET ORAL ONCE
Refills: 0 | Status: COMPLETED | OUTPATIENT
Start: 2019-01-01 | End: 2019-01-01

## 2019-01-01 RX ORDER — POTASSIUM CHLORIDE 20 MEQ
40 PACKET (EA) ORAL EVERY 4 HOURS
Refills: 0 | Status: COMPLETED | OUTPATIENT
Start: 2019-01-01 | End: 2019-01-01

## 2019-01-01 RX ADMIN — Medication 1 DROP(S): at 13:25

## 2019-01-01 RX ADMIN — SODIUM CHLORIDE 3 MILLILITER(S): 9 INJECTION INTRAMUSCULAR; INTRAVENOUS; SUBCUTANEOUS at 13:36

## 2019-01-01 RX ADMIN — Medication 3 MILLILITER(S): at 17:41

## 2019-01-01 RX ADMIN — ZINC SULFATE TAB 220 MG (50 MG ZINC EQUIVALENT) 220 MILLIGRAM(S): 220 (50 ZN) TAB at 10:24

## 2019-01-01 RX ADMIN — Medication 2 MILLIGRAM(S): at 21:17

## 2019-01-01 RX ADMIN — Medication 400 INTERNATIONAL UNIT(S): at 15:09

## 2019-01-01 RX ADMIN — SODIUM CHLORIDE 50 MILLILITER(S): 9 INJECTION, SOLUTION INTRAVENOUS at 06:46

## 2019-01-01 RX ADMIN — NAFCILLIN 200 GRAM(S): 10 INJECTION, POWDER, FOR SOLUTION INTRAVENOUS at 17:05

## 2019-01-01 RX ADMIN — Medication 25 MILLIGRAM(S): at 18:25

## 2019-01-01 RX ADMIN — Medication 2 MILLIGRAM(S): at 05:36

## 2019-01-01 RX ADMIN — NAFCILLIN 200 GRAM(S): 10 INJECTION, POWDER, FOR SOLUTION INTRAVENOUS at 21:39

## 2019-01-01 RX ADMIN — HEPARIN SODIUM 5000 UNIT(S): 5000 INJECTION INTRAVENOUS; SUBCUTANEOUS at 13:09

## 2019-01-01 RX ADMIN — Medication 3 MILLILITER(S): at 01:29

## 2019-01-01 RX ADMIN — CISATRACURIUM BESYLATE 10 MILLIGRAM(S): 2 INJECTION INTRAVENOUS at 14:00

## 2019-01-01 RX ADMIN — Medication 3 MILLILITER(S): at 01:35

## 2019-01-01 RX ADMIN — NAFCILLIN 200 GRAM(S): 10 INJECTION, POWDER, FOR SOLUTION INTRAVENOUS at 17:13

## 2019-01-01 RX ADMIN — Medication 107.4 MILLIGRAM(S): at 17:32

## 2019-01-01 RX ADMIN — Medication 400 INTERNATIONAL UNIT(S): at 10:25

## 2019-01-01 RX ADMIN — IOHEXOL 30 MILLILITER(S): 300 INJECTION, SOLUTION INTRAVENOUS at 13:52

## 2019-01-01 RX ADMIN — Medication 1 DROP(S): at 12:54

## 2019-01-01 RX ADMIN — ERYTHROPOIETIN 10000 UNIT(S): 10000 INJECTION, SOLUTION INTRAVENOUS; SUBCUTANEOUS at 17:34

## 2019-01-01 RX ADMIN — Medication 1 DROP(S): at 23:13

## 2019-01-01 RX ADMIN — FENTANYL CITRATE 0.74 MICROGRAM(S)/KG/HR: 50 INJECTION INTRAVENOUS at 07:21

## 2019-01-01 RX ADMIN — Medication 1 DROP(S): at 00:19

## 2019-01-01 RX ADMIN — SODIUM CHLORIDE 3 MILLILITER(S): 9 INJECTION INTRAMUSCULAR; INTRAVENOUS; SUBCUTANEOUS at 05:13

## 2019-01-01 RX ADMIN — POLYETHYLENE GLYCOL 3350 17 GRAM(S): 17 POWDER, FOR SOLUTION ORAL at 19:25

## 2019-01-01 RX ADMIN — SODIUM CHLORIDE 110 MILLILITER(S): 9 INJECTION INTRAMUSCULAR; INTRAVENOUS; SUBCUTANEOUS at 03:36

## 2019-01-01 RX ADMIN — QUETIAPINE FUMARATE 100 MILLIGRAM(S): 200 TABLET, FILM COATED ORAL at 05:28

## 2019-01-01 RX ADMIN — PANTOPRAZOLE SODIUM 40 MILLIGRAM(S): 20 TABLET, DELAYED RELEASE ORAL at 11:44

## 2019-01-01 RX ADMIN — Medication 3.45 MICROGRAM(S)/KG/MIN: at 18:58

## 2019-01-01 RX ADMIN — PROPOFOL 2.21 MICROGRAM(S)/KG/MIN: 10 INJECTION, EMULSION INTRAVENOUS at 05:27

## 2019-01-01 RX ADMIN — VORICONAZOLE 300 MILLIGRAM(S): 10 INJECTION, POWDER, LYOPHILIZED, FOR SOLUTION INTRAVENOUS at 06:01

## 2019-01-01 RX ADMIN — HEPARIN SODIUM 5000 UNIT(S): 5000 INJECTION INTRAVENOUS; SUBCUTANEOUS at 21:43

## 2019-01-01 RX ADMIN — Medication 15 MILLILITER(S): at 12:15

## 2019-01-01 RX ADMIN — SODIUM CHLORIDE 3 MILLILITER(S): 9 INJECTION INTRAMUSCULAR; INTRAVENOUS; SUBCUTANEOUS at 21:30

## 2019-01-01 RX ADMIN — NAFCILLIN 200 GRAM(S): 10 INJECTION, POWDER, FOR SOLUTION INTRAVENOUS at 02:05

## 2019-01-01 RX ADMIN — HEPARIN SODIUM 5000 UNIT(S): 5000 INJECTION INTRAVENOUS; SUBCUTANEOUS at 15:08

## 2019-01-01 RX ADMIN — NAFCILLIN 200 GRAM(S): 10 INJECTION, POWDER, FOR SOLUTION INTRAVENOUS at 22:01

## 2019-01-01 RX ADMIN — SODIUM CHLORIDE 3 MILLILITER(S): 9 INJECTION INTRAMUSCULAR; INTRAVENOUS; SUBCUTANEOUS at 14:00

## 2019-01-01 RX ADMIN — CEFTRIAXONE 100 MILLIGRAM(S): 500 INJECTION, POWDER, FOR SOLUTION INTRAMUSCULAR; INTRAVENOUS at 13:06

## 2019-01-01 RX ADMIN — Medication 1 DROP(S): at 17:38

## 2019-01-01 RX ADMIN — Medication 100 MILLIEQUIVALENT(S): at 07:48

## 2019-01-01 RX ADMIN — SODIUM CHLORIDE 3 MILLILITER(S): 9 INJECTION INTRAMUSCULAR; INTRAVENOUS; SUBCUTANEOUS at 21:42

## 2019-01-01 RX ADMIN — PROPOFOL 2.21 MICROGRAM(S)/KG/MIN: 10 INJECTION, EMULSION INTRAVENOUS at 03:19

## 2019-01-01 RX ADMIN — Medication 650 MILLIGRAM(S): at 06:40

## 2019-01-01 RX ADMIN — Medication 1 DROP(S): at 05:29

## 2019-01-01 RX ADMIN — CHLORHEXIDINE GLUCONATE 15 MILLILITER(S): 213 SOLUTION TOPICAL at 12:34

## 2019-01-01 RX ADMIN — NAFCILLIN 200 GRAM(S): 10 INJECTION, POWDER, FOR SOLUTION INTRAVENOUS at 10:06

## 2019-01-01 RX ADMIN — Medication 2 MILLIGRAM(S): at 12:55

## 2019-01-01 RX ADMIN — Medication 3 MILLILITER(S): at 21:33

## 2019-01-01 RX ADMIN — MIDODRINE HYDROCHLORIDE 10 MILLIGRAM(S): 2.5 TABLET ORAL at 18:33

## 2019-01-01 RX ADMIN — Medication 50 MILLILITER(S): at 06:15

## 2019-01-01 RX ADMIN — Medication 100 MILLIEQUIVALENT(S): at 09:30

## 2019-01-01 RX ADMIN — MEROPENEM 100 MILLIGRAM(S): 1 INJECTION INTRAVENOUS at 12:03

## 2019-01-01 RX ADMIN — Medication 25 MILLIGRAM(S): at 17:35

## 2019-01-01 RX ADMIN — Medication 3 MILLILITER(S): at 01:18

## 2019-01-01 RX ADMIN — Medication 650 MILLIGRAM(S): at 06:12

## 2019-01-01 RX ADMIN — Medication 1 DROP(S): at 19:17

## 2019-01-01 RX ADMIN — Medication 50 MILLIGRAM(S): at 17:27

## 2019-01-01 RX ADMIN — HEPARIN SODIUM 5000 UNIT(S): 5000 INJECTION INTRAVENOUS; SUBCUTANEOUS at 13:40

## 2019-01-01 RX ADMIN — Medication 1000 MILLIGRAM(S): at 04:25

## 2019-01-01 RX ADMIN — SODIUM CHLORIDE 3 MILLILITER(S): 9 INJECTION INTRAMUSCULAR; INTRAVENOUS; SUBCUTANEOUS at 05:25

## 2019-01-01 RX ADMIN — SODIUM CHLORIDE 3 MILLILITER(S): 9 INJECTION INTRAMUSCULAR; INTRAVENOUS; SUBCUTANEOUS at 05:21

## 2019-01-01 RX ADMIN — Medication 50 MILLIGRAM(S): at 11:42

## 2019-01-01 RX ADMIN — MEROPENEM 100 MILLIGRAM(S): 1 INJECTION INTRAVENOUS at 19:50

## 2019-01-01 RX ADMIN — SODIUM CHLORIDE 3 MILLILITER(S): 9 INJECTION INTRAMUSCULAR; INTRAVENOUS; SUBCUTANEOUS at 05:43

## 2019-01-01 RX ADMIN — FENTANYL CITRATE 3.68 MICROGRAM(S)/KG/HR: 50 INJECTION INTRAVENOUS at 05:46

## 2019-01-01 RX ADMIN — FENTANYL CITRATE 3.68 MICROGRAM(S)/KG/HR: 50 INJECTION INTRAVENOUS at 19:18

## 2019-01-01 RX ADMIN — Medication 1 DROP(S): at 18:10

## 2019-01-01 RX ADMIN — Medication 3.45 MICROGRAM(S)/KG/MIN: at 23:36

## 2019-01-01 RX ADMIN — Medication 3 MILLILITER(S): at 13:07

## 2019-01-01 RX ADMIN — Medication 2 MILLIGRAM(S): at 17:32

## 2019-01-01 RX ADMIN — HEPARIN SODIUM 5000 UNIT(S): 5000 INJECTION INTRAVENOUS; SUBCUTANEOUS at 16:02

## 2019-01-01 RX ADMIN — POLYETHYLENE GLYCOL 3350 17 GRAM(S): 17 POWDER, FOR SOLUTION ORAL at 21:22

## 2019-01-01 RX ADMIN — SODIUM CHLORIDE 3 MILLILITER(S): 9 INJECTION INTRAMUSCULAR; INTRAVENOUS; SUBCUTANEOUS at 22:40

## 2019-01-01 RX ADMIN — MEROPENEM 100 MILLIGRAM(S): 1 INJECTION INTRAVENOUS at 19:31

## 2019-01-01 RX ADMIN — QUETIAPINE FUMARATE 100 MILLIGRAM(S): 200 TABLET, FILM COATED ORAL at 06:15

## 2019-01-01 RX ADMIN — CHLORHEXIDINE GLUCONATE 15 MILLILITER(S): 213 SOLUTION TOPICAL at 17:30

## 2019-01-01 RX ADMIN — Medication 5 MILLIGRAM(S): at 05:28

## 2019-01-01 RX ADMIN — Medication 50 MILLIGRAM(S): at 05:43

## 2019-01-01 RX ADMIN — MIDODRINE HYDROCHLORIDE 10 MILLIGRAM(S): 2.5 TABLET ORAL at 14:16

## 2019-01-01 RX ADMIN — NAFCILLIN 200 GRAM(S): 10 INJECTION, POWDER, FOR SOLUTION INTRAVENOUS at 05:43

## 2019-01-01 RX ADMIN — Medication 650 MILLIGRAM(S): at 16:52

## 2019-01-01 RX ADMIN — MIDODRINE HYDROCHLORIDE 5 MILLIGRAM(S): 2.5 TABLET ORAL at 11:58

## 2019-01-01 RX ADMIN — NAFCILLIN 200 GRAM(S): 10 INJECTION, POWDER, FOR SOLUTION INTRAVENOUS at 01:02

## 2019-01-01 RX ADMIN — Medication 3 MILLILITER(S): at 09:43

## 2019-01-01 RX ADMIN — Medication 1 DROP(S): at 11:25

## 2019-01-01 RX ADMIN — VORICONAZOLE 300 MILLIGRAM(S): 10 INJECTION, POWDER, LYOPHILIZED, FOR SOLUTION INTRAVENOUS at 05:28

## 2019-01-01 RX ADMIN — MIDODRINE HYDROCHLORIDE 10 MILLIGRAM(S): 2.5 TABLET ORAL at 09:36

## 2019-01-01 RX ADMIN — SODIUM CHLORIDE 250 MILLILITER(S): 9 INJECTION INTRAMUSCULAR; INTRAVENOUS; SUBCUTANEOUS at 01:10

## 2019-01-01 RX ADMIN — CHLORHEXIDINE GLUCONATE 15 MILLILITER(S): 213 SOLUTION TOPICAL at 21:55

## 2019-01-01 RX ADMIN — POLYETHYLENE GLYCOL 3350 17 GRAM(S): 17 POWDER, FOR SOLUTION ORAL at 18:19

## 2019-01-01 RX ADMIN — Medication 100 MILLIEQUIVALENT(S): at 17:27

## 2019-01-01 RX ADMIN — NAFCILLIN 200 GRAM(S): 10 INJECTION, POWDER, FOR SOLUTION INTRAVENOUS at 05:04

## 2019-01-01 RX ADMIN — NAFCILLIN 200 GRAM(S): 10 INJECTION, POWDER, FOR SOLUTION INTRAVENOUS at 18:04

## 2019-01-01 RX ADMIN — PANTOPRAZOLE SODIUM 40 MILLIGRAM(S): 20 TABLET, DELAYED RELEASE ORAL at 14:17

## 2019-01-01 RX ADMIN — Medication 50 MILLIGRAM(S): at 05:47

## 2019-01-01 RX ADMIN — Medication 25 MILLIGRAM(S): at 18:31

## 2019-01-01 RX ADMIN — Medication 50 MILLILITER(S): at 15:40

## 2019-01-01 RX ADMIN — HEPARIN SODIUM 5000 UNIT(S): 5000 INJECTION INTRAVENOUS; SUBCUTANEOUS at 05:44

## 2019-01-01 RX ADMIN — Medication 650 MILLIGRAM(S): at 07:00

## 2019-01-01 RX ADMIN — MIDODRINE HYDROCHLORIDE 10 MILLIGRAM(S): 2.5 TABLET ORAL at 18:10

## 2019-01-01 RX ADMIN — VORICONAZOLE 300 MILLIGRAM(S): 10 INJECTION, POWDER, LYOPHILIZED, FOR SOLUTION INTRAVENOUS at 17:18

## 2019-01-01 RX ADMIN — Medication 1 DROP(S): at 00:27

## 2019-01-01 RX ADMIN — CHLORHEXIDINE GLUCONATE 15 MILLILITER(S): 213 SOLUTION TOPICAL at 05:13

## 2019-01-01 RX ADMIN — NAFCILLIN 200 GRAM(S): 10 INJECTION, POWDER, FOR SOLUTION INTRAVENOUS at 14:12

## 2019-01-01 RX ADMIN — Medication 40 MILLIEQUIVALENT(S): at 06:37

## 2019-01-01 RX ADMIN — Medication 50 MILLIEQUIVALENT(S): at 08:36

## 2019-01-01 RX ADMIN — Medication 50 MILLIEQUIVALENT(S): at 05:36

## 2019-01-01 RX ADMIN — NAFCILLIN 200 GRAM(S): 10 INJECTION, POWDER, FOR SOLUTION INTRAVENOUS at 10:35

## 2019-01-01 RX ADMIN — Medication 100 MILLIEQUIVALENT(S): at 21:55

## 2019-01-01 RX ADMIN — NAFCILLIN 200 GRAM(S): 10 INJECTION, POWDER, FOR SOLUTION INTRAVENOUS at 18:23

## 2019-01-01 RX ADMIN — SODIUM CHLORIDE 25 MILLILITER(S): 9 INJECTION, SOLUTION INTRAVENOUS at 22:25

## 2019-01-01 RX ADMIN — NAFCILLIN 200 GRAM(S): 10 INJECTION, POWDER, FOR SOLUTION INTRAVENOUS at 22:52

## 2019-01-01 RX ADMIN — FENTANYL CITRATE 3.68 MICROGRAM(S)/KG/HR: 50 INJECTION INTRAVENOUS at 08:53

## 2019-01-01 RX ADMIN — Medication 3 MILLILITER(S): at 14:23

## 2019-01-01 RX ADMIN — SODIUM CHLORIDE 3 MILLILITER(S): 9 INJECTION INTRAMUSCULAR; INTRAVENOUS; SUBCUTANEOUS at 06:05

## 2019-01-01 RX ADMIN — HEPARIN SODIUM 5000 UNIT(S): 5000 INJECTION INTRAVENOUS; SUBCUTANEOUS at 05:41

## 2019-01-01 RX ADMIN — Medication 50 MILLIEQUIVALENT(S): at 10:06

## 2019-01-01 RX ADMIN — Medication 50 MILLILITER(S): at 11:12

## 2019-01-01 RX ADMIN — Medication 650 MILLIGRAM(S): at 19:05

## 2019-01-01 RX ADMIN — MIDODRINE HYDROCHLORIDE 5 MILLIGRAM(S): 2.5 TABLET ORAL at 06:09

## 2019-01-01 RX ADMIN — FENTANYL CITRATE 7.36 MICROGRAM(S)/KG/HR: 50 INJECTION INTRAVENOUS at 15:13

## 2019-01-01 RX ADMIN — PROPOFOL 2.21 MICROGRAM(S)/KG/MIN: 10 INJECTION, EMULSION INTRAVENOUS at 20:45

## 2019-01-01 RX ADMIN — Medication 2 MILLIGRAM(S): at 18:10

## 2019-01-01 RX ADMIN — SODIUM CHLORIDE 3 MILLILITER(S): 9 INJECTION INTRAMUSCULAR; INTRAVENOUS; SUBCUTANEOUS at 05:28

## 2019-01-01 RX ADMIN — Medication 25 MILLIGRAM(S): at 06:04

## 2019-01-01 RX ADMIN — Medication 100 GRAM(S): at 15:52

## 2019-01-01 RX ADMIN — NAFCILLIN 200 GRAM(S): 10 INJECTION, POWDER, FOR SOLUTION INTRAVENOUS at 18:57

## 2019-01-01 RX ADMIN — Medication 3 MILLILITER(S): at 21:13

## 2019-01-01 RX ADMIN — NAFCILLIN 200 GRAM(S): 10 INJECTION, POWDER, FOR SOLUTION INTRAVENOUS at 06:09

## 2019-01-01 RX ADMIN — Medication 4.14 MICROGRAM(S)/KG/MIN: at 13:21

## 2019-01-01 RX ADMIN — HEPARIN SODIUM 5000 UNIT(S): 5000 INJECTION INTRAVENOUS; SUBCUTANEOUS at 14:10

## 2019-01-01 RX ADMIN — Medication 50 MILLIGRAM(S): at 05:13

## 2019-01-01 RX ADMIN — Medication 2 MILLIGRAM(S): at 11:55

## 2019-01-01 RX ADMIN — PROPOFOL 2.21 MICROGRAM(S)/KG/MIN: 10 INJECTION, EMULSION INTRAVENOUS at 02:00

## 2019-01-01 RX ADMIN — SODIUM CHLORIDE 3 MILLILITER(S): 9 INJECTION INTRAMUSCULAR; INTRAVENOUS; SUBCUTANEOUS at 21:51

## 2019-01-01 RX ADMIN — NAFCILLIN 200 GRAM(S): 10 INJECTION, POWDER, FOR SOLUTION INTRAVENOUS at 05:28

## 2019-01-01 RX ADMIN — Medication 650 MILLIGRAM(S): at 01:44

## 2019-01-01 RX ADMIN — PANTOPRAZOLE SODIUM 40 MILLIGRAM(S): 20 TABLET, DELAYED RELEASE ORAL at 12:05

## 2019-01-01 RX ADMIN — Medication 1 DROP(S): at 11:54

## 2019-01-01 RX ADMIN — CHLORHEXIDINE GLUCONATE 15 MILLILITER(S): 213 SOLUTION TOPICAL at 10:25

## 2019-01-01 RX ADMIN — CHLORHEXIDINE GLUCONATE 1 APPLICATION(S): 213 SOLUTION TOPICAL at 05:23

## 2019-01-01 RX ADMIN — Medication 1 DROP(S): at 00:08

## 2019-01-01 RX ADMIN — HEPARIN SODIUM 5000 UNIT(S): 5000 INJECTION INTRAVENOUS; SUBCUTANEOUS at 21:59

## 2019-01-01 RX ADMIN — Medication 15 MILLILITER(S): at 11:41

## 2019-01-01 RX ADMIN — Medication 15 MILLILITER(S): at 13:07

## 2019-01-01 RX ADMIN — Medication 107.4 MILLIGRAM(S): at 18:53

## 2019-01-01 RX ADMIN — Medication 50 MILLIEQUIVALENT(S): at 05:59

## 2019-01-01 RX ADMIN — Medication 3 MILLILITER(S): at 05:58

## 2019-01-01 RX ADMIN — Medication 50 MILLIGRAM(S): at 11:04

## 2019-01-01 RX ADMIN — SODIUM CHLORIDE 3 MILLILITER(S): 9 INJECTION INTRAMUSCULAR; INTRAVENOUS; SUBCUTANEOUS at 05:54

## 2019-01-01 RX ADMIN — Medication 500 MILLIGRAM(S): at 10:57

## 2019-01-01 RX ADMIN — Medication 1 DROP(S): at 17:06

## 2019-01-01 RX ADMIN — PROPOFOL 2.21 MICROGRAM(S)/KG/MIN: 10 INJECTION, EMULSION INTRAVENOUS at 06:20

## 2019-01-01 RX ADMIN — Medication 1 DROP(S): at 06:11

## 2019-01-01 RX ADMIN — Medication 1000 MILLIGRAM(S): at 18:36

## 2019-01-01 RX ADMIN — ZINC SULFATE TAB 220 MG (50 MG ZINC EQUIVALENT) 220 MILLIGRAM(S): 220 (50 ZN) TAB at 11:48

## 2019-01-01 RX ADMIN — NAFCILLIN 200 GRAM(S): 10 INJECTION, POWDER, FOR SOLUTION INTRAVENOUS at 06:25

## 2019-01-01 RX ADMIN — ALTEPLASE 10 MILLIGRAM(S): KIT at 21:00

## 2019-01-01 RX ADMIN — SODIUM CHLORIDE 3 MILLILITER(S): 9 INJECTION INTRAMUSCULAR; INTRAVENOUS; SUBCUTANEOUS at 13:11

## 2019-01-01 RX ADMIN — MIDODRINE HYDROCHLORIDE 10 MILLIGRAM(S): 2.5 TABLET ORAL at 09:37

## 2019-01-01 RX ADMIN — PANTOPRAZOLE SODIUM 40 MILLIGRAM(S): 20 TABLET, DELAYED RELEASE ORAL at 09:36

## 2019-01-01 RX ADMIN — Medication 1 DROP(S): at 11:48

## 2019-01-01 RX ADMIN — Medication 1 DROP(S): at 05:37

## 2019-01-01 RX ADMIN — SODIUM CHLORIDE 1000 MILLILITER(S): 9 INJECTION INTRAMUSCULAR; INTRAVENOUS; SUBCUTANEOUS at 02:35

## 2019-01-01 RX ADMIN — Medication 50 MILLIGRAM(S): at 00:13

## 2019-01-01 RX ADMIN — CHLORHEXIDINE GLUCONATE 15 MILLILITER(S): 213 SOLUTION TOPICAL at 17:13

## 2019-01-01 RX ADMIN — Medication 650 MILLIGRAM(S): at 11:24

## 2019-01-01 RX ADMIN — Medication 50 MILLILITER(S): at 07:18

## 2019-01-01 RX ADMIN — Medication 15 MILLILITER(S): at 11:33

## 2019-01-01 RX ADMIN — Medication 100 MILLIGRAM(S): at 02:11

## 2019-01-01 RX ADMIN — Medication 3 MILLILITER(S): at 23:00

## 2019-01-01 RX ADMIN — MIDODRINE HYDROCHLORIDE 10 MILLIGRAM(S): 2.5 TABLET ORAL at 18:52

## 2019-01-01 RX ADMIN — Medication 1 DROP(S): at 05:48

## 2019-01-01 RX ADMIN — Medication 3.45 MICROGRAM(S)/KG/MIN: at 05:37

## 2019-01-01 RX ADMIN — Medication 1 DROP(S): at 23:07

## 2019-01-01 RX ADMIN — MIDODRINE HYDROCHLORIDE 10 MILLIGRAM(S): 2.5 TABLET ORAL at 22:00

## 2019-01-01 RX ADMIN — FENTANYL CITRATE 3.68 MICROGRAM(S)/KG/HR: 50 INJECTION INTRAVENOUS at 04:00

## 2019-01-01 RX ADMIN — Medication 50 MILLIGRAM(S): at 11:54

## 2019-01-01 RX ADMIN — Medication 1 DROP(S): at 10:24

## 2019-01-01 RX ADMIN — Medication 50 MILLIGRAM(S): at 14:40

## 2019-01-01 RX ADMIN — Medication 50 MILLIGRAM(S): at 22:21

## 2019-01-01 RX ADMIN — Medication 15 MILLILITER(S): at 09:31

## 2019-01-01 RX ADMIN — CHLORHEXIDINE GLUCONATE 15 MILLILITER(S): 213 SOLUTION TOPICAL at 16:00

## 2019-01-01 RX ADMIN — Medication 50 MILLIGRAM(S): at 05:44

## 2019-01-01 RX ADMIN — Medication 50 MILLIGRAM(S): at 18:04

## 2019-01-01 RX ADMIN — PROPOFOL 2.21 MICROGRAM(S)/KG/MIN: 10 INJECTION, EMULSION INTRAVENOUS at 19:18

## 2019-01-01 RX ADMIN — PROPOFOL 4.42 MICROGRAM(S)/KG/MIN: 10 INJECTION, EMULSION INTRAVENOUS at 07:29

## 2019-01-01 RX ADMIN — Medication 1 DROP(S): at 01:12

## 2019-01-01 RX ADMIN — MIDODRINE HYDROCHLORIDE 10 MILLIGRAM(S): 2.5 TABLET ORAL at 18:26

## 2019-01-01 RX ADMIN — PROPOFOL 4.42 MICROGRAM(S)/KG/MIN: 10 INJECTION, EMULSION INTRAVENOUS at 00:31

## 2019-01-01 RX ADMIN — SODIUM CHLORIDE 3 MILLILITER(S): 9 INJECTION INTRAMUSCULAR; INTRAVENOUS; SUBCUTANEOUS at 14:13

## 2019-01-01 RX ADMIN — CHLORHEXIDINE GLUCONATE 15 MILLILITER(S): 213 SOLUTION TOPICAL at 18:05

## 2019-01-01 RX ADMIN — Medication 1 DROP(S): at 01:00

## 2019-01-01 RX ADMIN — MIDODRINE HYDROCHLORIDE 10 MILLIGRAM(S): 2.5 TABLET ORAL at 02:10

## 2019-01-01 RX ADMIN — Medication 3 MILLILITER(S): at 13:02

## 2019-01-01 RX ADMIN — CHLORHEXIDINE GLUCONATE 15 MILLILITER(S): 213 SOLUTION TOPICAL at 17:53

## 2019-01-01 RX ADMIN — Medication 1 DROP(S): at 23:17

## 2019-01-01 RX ADMIN — NAFCILLIN 200 GRAM(S): 10 INJECTION, POWDER, FOR SOLUTION INTRAVENOUS at 10:10

## 2019-01-01 RX ADMIN — NAFCILLIN 200 GRAM(S): 10 INJECTION, POWDER, FOR SOLUTION INTRAVENOUS at 02:36

## 2019-01-01 RX ADMIN — HEPARIN SODIUM 5000 UNIT(S): 5000 INJECTION INTRAVENOUS; SUBCUTANEOUS at 13:59

## 2019-01-01 RX ADMIN — Medication 100 GRAM(S): at 08:32

## 2019-01-01 RX ADMIN — QUETIAPINE FUMARATE 25 MILLIGRAM(S): 200 TABLET, FILM COATED ORAL at 22:34

## 2019-01-01 RX ADMIN — NAFCILLIN 200 GRAM(S): 10 INJECTION, POWDER, FOR SOLUTION INTRAVENOUS at 10:56

## 2019-01-01 RX ADMIN — NAFCILLIN 200 GRAM(S): 10 INJECTION, POWDER, FOR SOLUTION INTRAVENOUS at 14:13

## 2019-01-01 RX ADMIN — Medication 2 MILLIGRAM(S): at 19:19

## 2019-01-01 RX ADMIN — SODIUM CHLORIDE 3 MILLILITER(S): 9 INJECTION INTRAMUSCULAR; INTRAVENOUS; SUBCUTANEOUS at 17:46

## 2019-01-01 RX ADMIN — Medication 50 GRAM(S): at 23:15

## 2019-01-01 RX ADMIN — NAFCILLIN 200 GRAM(S): 10 INJECTION, POWDER, FOR SOLUTION INTRAVENOUS at 09:30

## 2019-01-01 RX ADMIN — SODIUM CHLORIDE 3 MILLILITER(S): 9 INJECTION INTRAMUSCULAR; INTRAVENOUS; SUBCUTANEOUS at 06:02

## 2019-01-01 RX ADMIN — CHLORHEXIDINE GLUCONATE 1 APPLICATION(S): 213 SOLUTION TOPICAL at 05:42

## 2019-01-01 RX ADMIN — SODIUM CHLORIDE 3 MILLILITER(S): 9 INJECTION INTRAMUSCULAR; INTRAVENOUS; SUBCUTANEOUS at 21:56

## 2019-01-01 RX ADMIN — NAFCILLIN 200 GRAM(S): 10 INJECTION, POWDER, FOR SOLUTION INTRAVENOUS at 21:48

## 2019-01-01 RX ADMIN — Medication 5 MILLIGRAM(S): at 21:31

## 2019-01-01 RX ADMIN — ZINC SULFATE TAB 220 MG (50 MG ZINC EQUIVALENT) 220 MILLIGRAM(S): 220 (50 ZN) TAB at 11:55

## 2019-01-01 RX ADMIN — NAFCILLIN 200 GRAM(S): 10 INJECTION, POWDER, FOR SOLUTION INTRAVENOUS at 15:34

## 2019-01-01 RX ADMIN — Medication 3 MILLILITER(S): at 23:28

## 2019-01-01 RX ADMIN — NAFCILLIN 200 GRAM(S): 10 INJECTION, POWDER, FOR SOLUTION INTRAVENOUS at 21:25

## 2019-01-01 RX ADMIN — MEROPENEM 100 MILLIGRAM(S): 1 INJECTION INTRAVENOUS at 03:15

## 2019-01-01 RX ADMIN — Medication 4.14 MICROGRAM(S)/KG/MIN: at 01:03

## 2019-01-01 RX ADMIN — Medication 50 MILLILITER(S): at 08:55

## 2019-01-01 RX ADMIN — PROPOFOL 2.21 MICROGRAM(S)/KG/MIN: 10 INJECTION, EMULSION INTRAVENOUS at 21:17

## 2019-01-01 RX ADMIN — Medication 3 MILLILITER(S): at 21:48

## 2019-01-01 RX ADMIN — ZINC SULFATE TAB 220 MG (50 MG ZINC EQUIVALENT) 220 MILLIGRAM(S): 220 (50 ZN) TAB at 12:36

## 2019-01-01 RX ADMIN — DESMOPRESSIN ACETATE 220 MICROGRAM(S): 0.1 TABLET ORAL at 16:44

## 2019-01-01 RX ADMIN — Medication 3 MILLILITER(S): at 09:30

## 2019-01-01 RX ADMIN — NAFCILLIN 200 GRAM(S): 10 INJECTION, POWDER, FOR SOLUTION INTRAVENOUS at 05:27

## 2019-01-01 RX ADMIN — PROPOFOL 2.21 MICROGRAM(S)/KG/MIN: 10 INJECTION, EMULSION INTRAVENOUS at 14:22

## 2019-01-01 RX ADMIN — HEPARIN SODIUM 5000 UNIT(S): 5000 INJECTION INTRAVENOUS; SUBCUTANEOUS at 21:24

## 2019-01-01 RX ADMIN — PANTOPRAZOLE SODIUM 40 MILLIGRAM(S): 20 TABLET, DELAYED RELEASE ORAL at 12:30

## 2019-01-01 RX ADMIN — HEPARIN SODIUM 5000 UNIT(S): 5000 INJECTION INTRAVENOUS; SUBCUTANEOUS at 21:17

## 2019-01-01 RX ADMIN — Medication 500 MILLIGRAM(S): at 11:53

## 2019-01-01 RX ADMIN — SODIUM CHLORIDE 3 MILLILITER(S): 9 INJECTION INTRAMUSCULAR; INTRAVENOUS; SUBCUTANEOUS at 14:04

## 2019-01-01 RX ADMIN — Medication 3.45 MICROGRAM(S)/KG/MIN: at 17:35

## 2019-01-01 RX ADMIN — MIDODRINE HYDROCHLORIDE 10 MILLIGRAM(S): 2.5 TABLET ORAL at 05:07

## 2019-01-01 RX ADMIN — Medication 50 MILLILITER(S): at 00:31

## 2019-01-01 RX ADMIN — PROPOFOL 4.42 MICROGRAM(S)/KG/MIN: 10 INJECTION, EMULSION INTRAVENOUS at 15:42

## 2019-01-01 RX ADMIN — NAFCILLIN 200 GRAM(S): 10 INJECTION, POWDER, FOR SOLUTION INTRAVENOUS at 09:53

## 2019-01-01 RX ADMIN — Medication 250 MILLIGRAM(S): at 12:10

## 2019-01-01 RX ADMIN — HEPARIN SODIUM 5000 UNIT(S): 5000 INJECTION INTRAVENOUS; SUBCUTANEOUS at 21:45

## 2019-01-01 RX ADMIN — CHLORHEXIDINE GLUCONATE 15 MILLILITER(S): 213 SOLUTION TOPICAL at 17:55

## 2019-01-01 RX ADMIN — HEPARIN SODIUM 5000 UNIT(S): 5000 INJECTION INTRAVENOUS; SUBCUTANEOUS at 22:18

## 2019-01-01 RX ADMIN — Medication 4.14 MICROGRAM(S)/KG/MIN: at 00:34

## 2019-01-01 RX ADMIN — FENTANYL CITRATE 50 MICROGRAM(S): 50 INJECTION INTRAVENOUS at 19:50

## 2019-01-01 RX ADMIN — Medication 3.45 MICROGRAM(S)/KG/MIN: at 22:43

## 2019-01-01 RX ADMIN — Medication 50 MILLILITER(S): at 16:40

## 2019-01-01 RX ADMIN — NAFCILLIN 200 GRAM(S): 10 INJECTION, POWDER, FOR SOLUTION INTRAVENOUS at 02:08

## 2019-01-01 RX ADMIN — POLYETHYLENE GLYCOL 3350 17 GRAM(S): 17 POWDER, FOR SOLUTION ORAL at 19:13

## 2019-01-01 RX ADMIN — Medication 50 MILLIGRAM(S): at 14:11

## 2019-01-01 RX ADMIN — Medication 400 INTERNATIONAL UNIT(S): at 09:30

## 2019-01-01 RX ADMIN — FENTANYL CITRATE 3.68 MICROGRAM(S)/KG/HR: 50 INJECTION INTRAVENOUS at 10:06

## 2019-01-01 RX ADMIN — MIDODRINE HYDROCHLORIDE 5 MILLIGRAM(S): 2.5 TABLET ORAL at 13:57

## 2019-01-01 RX ADMIN — SODIUM CHLORIDE 3 MILLILITER(S): 9 INJECTION INTRAMUSCULAR; INTRAVENOUS; SUBCUTANEOUS at 13:07

## 2019-01-01 RX ADMIN — NAFCILLIN 200 GRAM(S): 10 INJECTION, POWDER, FOR SOLUTION INTRAVENOUS at 17:30

## 2019-01-01 RX ADMIN — CHLORHEXIDINE GLUCONATE 1 APPLICATION(S): 213 SOLUTION TOPICAL at 05:08

## 2019-01-01 RX ADMIN — FENTANYL CITRATE 1 PATCH: 50 INJECTION INTRAVENOUS at 11:52

## 2019-01-01 RX ADMIN — CHLORHEXIDINE GLUCONATE 15 MILLILITER(S): 213 SOLUTION TOPICAL at 22:40

## 2019-01-01 RX ADMIN — Medication 50 MILLILITER(S): at 19:26

## 2019-01-01 RX ADMIN — Medication 1 DROP(S): at 00:16

## 2019-01-01 RX ADMIN — Medication 3 MILLILITER(S): at 09:48

## 2019-01-01 RX ADMIN — NAFCILLIN 200 GRAM(S): 10 INJECTION, POWDER, FOR SOLUTION INTRAVENOUS at 13:45

## 2019-01-01 RX ADMIN — ZINC SULFATE TAB 220 MG (50 MG ZINC EQUIVALENT) 220 MILLIGRAM(S): 220 (50 ZN) TAB at 12:58

## 2019-01-01 RX ADMIN — Medication 2: at 23:47

## 2019-01-01 RX ADMIN — PROPOFOL 2.21 MICROGRAM(S)/KG/MIN: 10 INJECTION, EMULSION INTRAVENOUS at 11:54

## 2019-01-01 RX ADMIN — Medication 3 MILLILITER(S): at 17:45

## 2019-01-01 RX ADMIN — QUETIAPINE FUMARATE 25 MILLIGRAM(S): 200 TABLET, FILM COATED ORAL at 23:13

## 2019-01-01 RX ADMIN — NAFCILLIN 200 GRAM(S): 10 INJECTION, POWDER, FOR SOLUTION INTRAVENOUS at 10:55

## 2019-01-01 RX ADMIN — HEPARIN SODIUM 5000 UNIT(S): 5000 INJECTION INTRAVENOUS; SUBCUTANEOUS at 22:52

## 2019-01-01 RX ADMIN — Medication 650 MILLIGRAM(S): at 02:39

## 2019-01-01 RX ADMIN — Medication 3 MILLILITER(S): at 01:27

## 2019-01-01 RX ADMIN — Medication 50 MILLIEQUIVALENT(S): at 00:00

## 2019-01-01 RX ADMIN — Medication 50 MILLIGRAM(S): at 00:54

## 2019-01-01 RX ADMIN — Medication 3 MILLILITER(S): at 13:54

## 2019-01-01 RX ADMIN — MIDODRINE HYDROCHLORIDE 10 MILLIGRAM(S): 2.5 TABLET ORAL at 01:01

## 2019-01-01 RX ADMIN — Medication 3 MILLILITER(S): at 01:56

## 2019-01-01 RX ADMIN — MIDAZOLAM HYDROCHLORIDE 0.74 MG/KG/HR: 1 INJECTION, SOLUTION INTRAMUSCULAR; INTRAVENOUS at 20:00

## 2019-01-01 RX ADMIN — Medication 3 MILLILITER(S): at 16:36

## 2019-01-01 RX ADMIN — Medication 50 MILLIEQUIVALENT(S): at 22:23

## 2019-01-01 RX ADMIN — Medication 264.8 MILLIGRAM(S): at 16:03

## 2019-01-01 RX ADMIN — Medication 650 MILLIGRAM(S): at 16:00

## 2019-01-01 RX ADMIN — FENTANYL CITRATE 3.68 MICROGRAM(S)/KG/HR: 50 INJECTION INTRAVENOUS at 11:56

## 2019-01-01 RX ADMIN — HEPARIN SODIUM 5000 UNIT(S): 5000 INJECTION INTRAVENOUS; SUBCUTANEOUS at 05:42

## 2019-01-01 RX ADMIN — NAFCILLIN 200 GRAM(S): 10 INJECTION, POWDER, FOR SOLUTION INTRAVENOUS at 15:48

## 2019-01-01 RX ADMIN — Medication 500 MILLIGRAM(S): at 12:05

## 2019-01-01 RX ADMIN — Medication 3 MILLILITER(S): at 06:05

## 2019-01-01 RX ADMIN — PROPOFOL 2.21 MICROGRAM(S)/KG/MIN: 10 INJECTION, EMULSION INTRAVENOUS at 20:14

## 2019-01-01 RX ADMIN — Medication 3 MILLILITER(S): at 05:36

## 2019-01-01 RX ADMIN — Medication 400 MILLIGRAM(S): at 18:04

## 2019-01-01 RX ADMIN — SODIUM CHLORIDE 3 MILLILITER(S): 9 INJECTION INTRAMUSCULAR; INTRAVENOUS; SUBCUTANEOUS at 06:11

## 2019-01-01 RX ADMIN — Medication 5 MILLIGRAM(S): at 15:39

## 2019-01-01 RX ADMIN — NAFCILLIN 200 GRAM(S): 10 INJECTION, POWDER, FOR SOLUTION INTRAVENOUS at 21:55

## 2019-01-01 RX ADMIN — SODIUM CHLORIDE 3 MILLILITER(S): 9 INJECTION INTRAMUSCULAR; INTRAVENOUS; SUBCUTANEOUS at 14:08

## 2019-01-01 RX ADMIN — CHLORHEXIDINE GLUCONATE 15 MILLILITER(S): 213 SOLUTION TOPICAL at 22:34

## 2019-01-01 RX ADMIN — HEPARIN SODIUM 5000 UNIT(S): 5000 INJECTION INTRAVENOUS; SUBCUTANEOUS at 22:22

## 2019-01-01 RX ADMIN — QUETIAPINE FUMARATE 100 MILLIGRAM(S): 200 TABLET, FILM COATED ORAL at 17:32

## 2019-01-01 RX ADMIN — Medication 400 INTERNATIONAL UNIT(S): at 11:20

## 2019-01-01 RX ADMIN — VORICONAZOLE 300 MILLIGRAM(S): 10 INJECTION, POWDER, LYOPHILIZED, FOR SOLUTION INTRAVENOUS at 18:59

## 2019-01-01 RX ADMIN — Medication 50 MILLIGRAM(S): at 21:28

## 2019-01-01 RX ADMIN — HEPARIN SODIUM 5000 UNIT(S): 5000 INJECTION INTRAVENOUS; SUBCUTANEOUS at 21:22

## 2019-01-01 RX ADMIN — Medication 3 MILLILITER(S): at 17:03

## 2019-01-01 RX ADMIN — SODIUM CHLORIDE 50 MILLILITER(S): 9 INJECTION, SOLUTION INTRAVENOUS at 01:29

## 2019-01-01 RX ADMIN — Medication 650 MILLIGRAM(S): at 14:10

## 2019-01-01 RX ADMIN — Medication 1 DROP(S): at 18:23

## 2019-01-01 RX ADMIN — Medication 650 MILLIGRAM(S): at 06:46

## 2019-01-01 RX ADMIN — Medication 3 MILLILITER(S): at 22:05

## 2019-01-01 RX ADMIN — MEROPENEM 100 MILLIGRAM(S): 1 INJECTION INTRAVENOUS at 19:13

## 2019-01-01 RX ADMIN — PANTOPRAZOLE SODIUM 40 MILLIGRAM(S): 20 TABLET, DELAYED RELEASE ORAL at 11:51

## 2019-01-01 RX ADMIN — Medication 100 GRAM(S): at 10:11

## 2019-01-01 RX ADMIN — PROPOFOL 2.21 MICROGRAM(S)/KG/MIN: 10 INJECTION, EMULSION INTRAVENOUS at 15:46

## 2019-01-01 RX ADMIN — NAFCILLIN 200 GRAM(S): 10 INJECTION, POWDER, FOR SOLUTION INTRAVENOUS at 06:34

## 2019-01-01 RX ADMIN — Medication 3 MILLILITER(S): at 05:13

## 2019-01-01 RX ADMIN — Medication 3 MILLILITER(S): at 05:01

## 2019-01-01 RX ADMIN — CHLORHEXIDINE GLUCONATE 15 MILLILITER(S): 213 SOLUTION TOPICAL at 21:28

## 2019-01-01 RX ADMIN — NAFCILLIN 200 GRAM(S): 10 INJECTION, POWDER, FOR SOLUTION INTRAVENOUS at 14:08

## 2019-01-01 RX ADMIN — Medication 50 MILLIGRAM(S): at 05:06

## 2019-01-01 RX ADMIN — Medication 523.44 MILLIGRAM(S): at 15:49

## 2019-01-01 RX ADMIN — Medication 1 DROP(S): at 12:15

## 2019-01-01 RX ADMIN — CISATRACURIUM BESYLATE 10 MILLIGRAM(S): 2 INJECTION INTRAVENOUS at 12:40

## 2019-01-01 RX ADMIN — HEPARIN SODIUM 5000 UNIT(S): 5000 INJECTION INTRAVENOUS; SUBCUTANEOUS at 21:36

## 2019-01-01 RX ADMIN — Medication 15 MILLILITER(S): at 11:21

## 2019-01-01 RX ADMIN — SODIUM CHLORIDE 3 MILLILITER(S): 9 INJECTION INTRAMUSCULAR; INTRAVENOUS; SUBCUTANEOUS at 14:10

## 2019-01-01 RX ADMIN — HEPARIN SODIUM 5000 UNIT(S): 5000 INJECTION INTRAVENOUS; SUBCUTANEOUS at 15:49

## 2019-01-01 RX ADMIN — Medication 50 MILLIGRAM(S): at 23:17

## 2019-01-01 RX ADMIN — Medication 500 MILLIGRAM(S): at 05:37

## 2019-01-01 RX ADMIN — Medication 200 GRAM(S): at 21:39

## 2019-01-01 RX ADMIN — NAFCILLIN 200 GRAM(S): 10 INJECTION, POWDER, FOR SOLUTION INTRAVENOUS at 09:28

## 2019-01-01 RX ADMIN — FENTANYL CITRATE 3.68 MICROGRAM(S)/KG/HR: 50 INJECTION INTRAVENOUS at 18:48

## 2019-01-01 RX ADMIN — NAFCILLIN 200 GRAM(S): 10 INJECTION, POWDER, FOR SOLUTION INTRAVENOUS at 02:01

## 2019-01-01 RX ADMIN — Medication 1 MILLIGRAM(S): at 11:35

## 2019-01-01 RX ADMIN — FENTANYL CITRATE 3.68 MICROGRAM(S)/KG/HR: 50 INJECTION INTRAVENOUS at 03:20

## 2019-01-01 RX ADMIN — Medication 650 MILLIGRAM(S): at 18:00

## 2019-01-01 RX ADMIN — Medication 650 MILLIGRAM(S): at 12:32

## 2019-01-01 RX ADMIN — SODIUM CHLORIDE 3 MILLILITER(S): 9 INJECTION INTRAMUSCULAR; INTRAVENOUS; SUBCUTANEOUS at 05:29

## 2019-01-01 RX ADMIN — HEPARIN SODIUM 5000 UNIT(S): 5000 INJECTION INTRAVENOUS; SUBCUTANEOUS at 05:04

## 2019-01-01 RX ADMIN — FENTANYL CITRATE 3.68 MICROGRAM(S)/KG/HR: 50 INJECTION INTRAVENOUS at 17:35

## 2019-01-01 RX ADMIN — Medication 4.14 MICROGRAM(S)/KG/MIN: at 21:00

## 2019-01-01 RX ADMIN — Medication 4.14 MICROGRAM(S)/KG/MIN: at 16:07

## 2019-01-01 RX ADMIN — Medication 3 MILLILITER(S): at 18:01

## 2019-01-01 RX ADMIN — HEPARIN SODIUM 5000 UNIT(S): 5000 INJECTION INTRAVENOUS; SUBCUTANEOUS at 14:13

## 2019-01-01 RX ADMIN — Medication 50 MILLIGRAM(S): at 23:46

## 2019-01-01 RX ADMIN — NAFCILLIN 200 GRAM(S): 10 INJECTION, POWDER, FOR SOLUTION INTRAVENOUS at 21:46

## 2019-01-01 RX ADMIN — Medication 15 MILLILITER(S): at 11:17

## 2019-01-01 RX ADMIN — NAFCILLIN 200 GRAM(S): 10 INJECTION, POWDER, FOR SOLUTION INTRAVENOUS at 14:05

## 2019-01-01 RX ADMIN — Medication 650 MILLIGRAM(S): at 08:36

## 2019-01-01 RX ADMIN — Medication 1 DROP(S): at 05:52

## 2019-01-01 RX ADMIN — MIDODRINE HYDROCHLORIDE 10 MILLIGRAM(S): 2.5 TABLET ORAL at 10:36

## 2019-01-01 RX ADMIN — FENTANYL CITRATE 3.68 MICROGRAM(S)/KG/HR: 50 INJECTION INTRAVENOUS at 07:50

## 2019-01-01 RX ADMIN — AZITHROMYCIN 255 MILLIGRAM(S): 500 TABLET, FILM COATED ORAL at 04:17

## 2019-01-01 RX ADMIN — SODIUM CHLORIDE 3 MILLILITER(S): 9 INJECTION INTRAMUSCULAR; INTRAVENOUS; SUBCUTANEOUS at 05:23

## 2019-01-01 RX ADMIN — Medication 650 MILLIGRAM(S): at 22:39

## 2019-01-01 RX ADMIN — QUETIAPINE FUMARATE 25 MILLIGRAM(S): 200 TABLET, FILM COATED ORAL at 10:22

## 2019-01-01 RX ADMIN — NAFCILLIN 200 GRAM(S): 10 INJECTION, POWDER, FOR SOLUTION INTRAVENOUS at 15:07

## 2019-01-01 RX ADMIN — SODIUM CHLORIDE 3 MILLILITER(S): 9 INJECTION INTRAMUSCULAR; INTRAVENOUS; SUBCUTANEOUS at 21:23

## 2019-01-01 RX ADMIN — CHLORHEXIDINE GLUCONATE 1 APPLICATION(S): 213 SOLUTION TOPICAL at 06:32

## 2019-01-01 RX ADMIN — Medication 650 MILLIGRAM(S): at 05:26

## 2019-01-01 RX ADMIN — Medication 1 DROP(S): at 12:58

## 2019-01-01 RX ADMIN — Medication 500 MILLIGRAM(S): at 14:12

## 2019-01-01 RX ADMIN — Medication 500 MILLIGRAM(S): at 11:35

## 2019-01-01 RX ADMIN — NAFCILLIN 200 GRAM(S): 10 INJECTION, POWDER, FOR SOLUTION INTRAVENOUS at 21:36

## 2019-01-01 RX ADMIN — NAFCILLIN 200 GRAM(S): 10 INJECTION, POWDER, FOR SOLUTION INTRAVENOUS at 06:23

## 2019-01-01 RX ADMIN — SODIUM CHLORIDE 3 MILLILITER(S): 9 INJECTION INTRAMUSCULAR; INTRAVENOUS; SUBCUTANEOUS at 00:32

## 2019-01-01 RX ADMIN — CHLORHEXIDINE GLUCONATE 15 MILLILITER(S): 213 SOLUTION TOPICAL at 21:39

## 2019-01-01 RX ADMIN — Medication 1 DROP(S): at 05:51

## 2019-01-01 RX ADMIN — Medication 500 MILLIGRAM(S): at 21:57

## 2019-01-01 RX ADMIN — CHLORHEXIDINE GLUCONATE 1 APPLICATION(S): 213 SOLUTION TOPICAL at 05:29

## 2019-01-01 RX ADMIN — SODIUM CHLORIDE 2000 MILLILITER(S): 9 INJECTION INTRAMUSCULAR; INTRAVENOUS; SUBCUTANEOUS at 02:50

## 2019-01-01 RX ADMIN — Medication 3 MILLILITER(S): at 17:50

## 2019-01-01 RX ADMIN — Medication 2 MILLIGRAM(S): at 23:51

## 2019-01-01 RX ADMIN — Medication 1 DROP(S): at 11:46

## 2019-01-01 RX ADMIN — NAFCILLIN 200 GRAM(S): 10 INJECTION, POWDER, FOR SOLUTION INTRAVENOUS at 02:00

## 2019-01-01 RX ADMIN — NAFCILLIN 200 GRAM(S): 10 INJECTION, POWDER, FOR SOLUTION INTRAVENOUS at 10:23

## 2019-01-01 RX ADMIN — NAFCILLIN 200 GRAM(S): 10 INJECTION, POWDER, FOR SOLUTION INTRAVENOUS at 17:54

## 2019-01-01 RX ADMIN — NAFCILLIN 200 GRAM(S): 10 INJECTION, POWDER, FOR SOLUTION INTRAVENOUS at 14:03

## 2019-01-01 RX ADMIN — ZINC SULFATE TAB 220 MG (50 MG ZINC EQUIVALENT) 220 MILLIGRAM(S): 220 (50 ZN) TAB at 11:42

## 2019-01-01 RX ADMIN — MIDODRINE HYDROCHLORIDE 10 MILLIGRAM(S): 2.5 TABLET ORAL at 05:44

## 2019-01-01 RX ADMIN — Medication 1 DROP(S): at 06:01

## 2019-01-01 RX ADMIN — POLYETHYLENE GLYCOL 3350 17 GRAM(S): 17 POWDER, FOR SOLUTION ORAL at 17:57

## 2019-01-01 RX ADMIN — Medication 650 MILLIGRAM(S): at 05:42

## 2019-01-01 RX ADMIN — CHLORHEXIDINE GLUCONATE 15 MILLILITER(S): 213 SOLUTION TOPICAL at 21:31

## 2019-01-01 RX ADMIN — CHLORHEXIDINE GLUCONATE 15 MILLILITER(S): 213 SOLUTION TOPICAL at 05:55

## 2019-01-01 RX ADMIN — PROPOFOL 2.21 MICROGRAM(S)/KG/MIN: 10 INJECTION, EMULSION INTRAVENOUS at 07:30

## 2019-01-01 RX ADMIN — MIDODRINE HYDROCHLORIDE 10 MILLIGRAM(S): 2.5 TABLET ORAL at 09:24

## 2019-01-01 RX ADMIN — PANTOPRAZOLE SODIUM 40 MILLIGRAM(S): 20 TABLET, DELAYED RELEASE ORAL at 10:03

## 2019-01-01 RX ADMIN — MIDODRINE HYDROCHLORIDE 5 MILLIGRAM(S): 2.5 TABLET ORAL at 21:26

## 2019-01-01 RX ADMIN — ZINC SULFATE TAB 220 MG (50 MG ZINC EQUIVALENT) 220 MILLIGRAM(S): 220 (50 ZN) TAB at 11:23

## 2019-01-01 RX ADMIN — NAFCILLIN 200 GRAM(S): 10 INJECTION, POWDER, FOR SOLUTION INTRAVENOUS at 18:34

## 2019-01-01 RX ADMIN — Medication 3 MILLILITER(S): at 22:32

## 2019-01-01 RX ADMIN — NAFCILLIN 200 GRAM(S): 10 INJECTION, POWDER, FOR SOLUTION INTRAVENOUS at 18:48

## 2019-01-01 RX ADMIN — PANTOPRAZOLE SODIUM 40 MILLIGRAM(S): 20 TABLET, DELAYED RELEASE ORAL at 11:32

## 2019-01-01 RX ADMIN — HEPARIN SODIUM 5000 UNIT(S): 5000 INJECTION INTRAVENOUS; SUBCUTANEOUS at 14:11

## 2019-01-01 RX ADMIN — FENTANYL CITRATE 3.68 MICROGRAM(S)/KG/HR: 50 INJECTION INTRAVENOUS at 23:16

## 2019-01-01 RX ADMIN — Medication 50 MILLILITER(S): at 17:03

## 2019-01-01 RX ADMIN — CHLORHEXIDINE GLUCONATE 15 MILLILITER(S): 213 SOLUTION TOPICAL at 23:55

## 2019-01-01 RX ADMIN — PANTOPRAZOLE SODIUM 40 MILLIGRAM(S): 20 TABLET, DELAYED RELEASE ORAL at 11:05

## 2019-01-01 RX ADMIN — PROPOFOL 2.21 MICROGRAM(S)/KG/MIN: 10 INJECTION, EMULSION INTRAVENOUS at 15:34

## 2019-01-01 RX ADMIN — Medication 400 MILLIGRAM(S): at 02:34

## 2019-01-01 RX ADMIN — FENTANYL CITRATE 100 MICROGRAM(S): 50 INJECTION INTRAVENOUS at 08:00

## 2019-01-01 RX ADMIN — FENTANYL CITRATE 7.36 MICROGRAM(S)/KG/HR: 50 INJECTION INTRAVENOUS at 18:05

## 2019-01-01 RX ADMIN — NAFCILLIN 200 GRAM(S): 10 INJECTION, POWDER, FOR SOLUTION INTRAVENOUS at 01:01

## 2019-01-01 RX ADMIN — PROPOFOL 4.42 MICROGRAM(S)/KG/MIN: 10 INJECTION, EMULSION INTRAVENOUS at 21:41

## 2019-01-01 RX ADMIN — Medication 3 MILLILITER(S): at 02:27

## 2019-01-01 RX ADMIN — POLYETHYLENE GLYCOL 3350 17 GRAM(S): 17 POWDER, FOR SOLUTION ORAL at 11:35

## 2019-01-01 RX ADMIN — NAFCILLIN 200 GRAM(S): 10 INJECTION, POWDER, FOR SOLUTION INTRAVENOUS at 22:17

## 2019-01-01 RX ADMIN — SODIUM CHLORIDE 3 MILLILITER(S): 9 INJECTION INTRAMUSCULAR; INTRAVENOUS; SUBCUTANEOUS at 21:50

## 2019-01-01 RX ADMIN — NAFCILLIN 200 GRAM(S): 10 INJECTION, POWDER, FOR SOLUTION INTRAVENOUS at 13:52

## 2019-01-01 RX ADMIN — Medication 3 MILLILITER(S): at 00:42

## 2019-01-01 RX ADMIN — Medication 400 INTERNATIONAL UNIT(S): at 11:40

## 2019-01-01 RX ADMIN — Medication 3 MILLILITER(S): at 01:05

## 2019-01-01 RX ADMIN — ZINC SULFATE TAB 220 MG (50 MG ZINC EQUIVALENT) 220 MILLIGRAM(S): 220 (50 ZN) TAB at 12:05

## 2019-01-01 RX ADMIN — Medication 15 MILLILITER(S): at 11:05

## 2019-01-01 RX ADMIN — CHLORHEXIDINE GLUCONATE 15 MILLILITER(S): 213 SOLUTION TOPICAL at 05:42

## 2019-01-01 RX ADMIN — SODIUM CHLORIDE 3 MILLILITER(S): 9 INJECTION INTRAMUSCULAR; INTRAVENOUS; SUBCUTANEOUS at 13:41

## 2019-01-01 RX ADMIN — HEPARIN SODIUM 5000 UNIT(S): 5000 INJECTION INTRAVENOUS; SUBCUTANEOUS at 21:29

## 2019-01-01 RX ADMIN — Medication 2 MILLIGRAM(S): at 23:58

## 2019-01-01 RX ADMIN — Medication 3 MILLILITER(S): at 01:12

## 2019-01-01 RX ADMIN — Medication 3.45 MICROGRAM(S)/KG/MIN: at 06:36

## 2019-01-01 RX ADMIN — HEPARIN SODIUM 5000 UNIT(S): 5000 INJECTION INTRAVENOUS; SUBCUTANEOUS at 06:15

## 2019-01-01 RX ADMIN — IOHEXOL 30 MILLILITER(S): 300 INJECTION, SOLUTION INTRAVENOUS at 18:10

## 2019-01-01 RX ADMIN — Medication 50 MILLIGRAM(S): at 15:48

## 2019-01-01 RX ADMIN — NAFCILLIN 200 GRAM(S): 10 INJECTION, POWDER, FOR SOLUTION INTRAVENOUS at 17:31

## 2019-01-01 RX ADMIN — CEFTRIAXONE 100 MILLIGRAM(S): 500 INJECTION, POWDER, FOR SOLUTION INTRAMUSCULAR; INTRAVENOUS at 12:58

## 2019-01-01 RX ADMIN — PROPOFOL 2.21 MICROGRAM(S)/KG/MIN: 10 INJECTION, EMULSION INTRAVENOUS at 07:43

## 2019-01-01 RX ADMIN — Medication 500 MILLIGRAM(S): at 21:45

## 2019-01-01 RX ADMIN — NAFCILLIN 200 GRAM(S): 10 INJECTION, POWDER, FOR SOLUTION INTRAVENOUS at 10:18

## 2019-01-01 RX ADMIN — FENTANYL CITRATE 7.36 MICROGRAM(S)/KG/HR: 50 INJECTION INTRAVENOUS at 04:09

## 2019-01-01 RX ADMIN — Medication 3 MILLILITER(S): at 05:59

## 2019-01-01 RX ADMIN — Medication 50 MILLIGRAM(S): at 13:56

## 2019-01-01 RX ADMIN — QUETIAPINE FUMARATE 50 MILLIGRAM(S): 200 TABLET, FILM COATED ORAL at 05:44

## 2019-01-01 RX ADMIN — NAFCILLIN 200 GRAM(S): 10 INJECTION, POWDER, FOR SOLUTION INTRAVENOUS at 21:22

## 2019-01-01 RX ADMIN — PROPOFOL 4.42 MICROGRAM(S)/KG/MIN: 10 INJECTION, EMULSION INTRAVENOUS at 02:09

## 2019-01-01 RX ADMIN — FENTANYL CITRATE 0.74 MICROGRAM(S)/KG/HR: 50 INJECTION INTRAVENOUS at 20:00

## 2019-01-01 RX ADMIN — Medication 3 MILLILITER(S): at 21:29

## 2019-01-01 RX ADMIN — NAFCILLIN 200 GRAM(S): 10 INJECTION, POWDER, FOR SOLUTION INTRAVENOUS at 01:24

## 2019-01-01 RX ADMIN — SODIUM CHLORIDE 3 MILLILITER(S): 9 INJECTION INTRAMUSCULAR; INTRAVENOUS; SUBCUTANEOUS at 14:01

## 2019-01-01 RX ADMIN — NAFCILLIN 200 GRAM(S): 10 INJECTION, POWDER, FOR SOLUTION INTRAVENOUS at 14:07

## 2019-01-01 RX ADMIN — FENTANYL CITRATE 1 PATCH: 50 INJECTION INTRAVENOUS at 19:02

## 2019-01-01 RX ADMIN — NAFCILLIN 200 GRAM(S): 10 INJECTION, POWDER, FOR SOLUTION INTRAVENOUS at 22:24

## 2019-01-01 RX ADMIN — Medication 1 DROP(S): at 05:44

## 2019-01-01 RX ADMIN — Medication 100 GRAM(S): at 16:06

## 2019-01-01 RX ADMIN — Medication 25 MILLILITER(S): at 00:50

## 2019-01-01 RX ADMIN — SODIUM CHLORIDE 3 MILLILITER(S): 9 INJECTION INTRAMUSCULAR; INTRAVENOUS; SUBCUTANEOUS at 05:20

## 2019-01-01 RX ADMIN — Medication 400 INTERNATIONAL UNIT(S): at 12:06

## 2019-01-01 RX ADMIN — ZINC SULFATE TAB 220 MG (50 MG ZINC EQUIVALENT) 220 MILLIGRAM(S): 220 (50 ZN) TAB at 11:43

## 2019-01-01 RX ADMIN — FENTANYL CITRATE 3.68 MICROGRAM(S)/KG/HR: 50 INJECTION INTRAVENOUS at 03:25

## 2019-01-01 RX ADMIN — MIDAZOLAM HYDROCHLORIDE 6 MILLIGRAM(S): 1 INJECTION, SOLUTION INTRAMUSCULAR; INTRAVENOUS at 15:30

## 2019-01-01 RX ADMIN — SODIUM CHLORIDE 3 MILLILITER(S): 9 INJECTION INTRAMUSCULAR; INTRAVENOUS; SUBCUTANEOUS at 14:20

## 2019-01-01 RX ADMIN — Medication 3 MILLILITER(S): at 10:01

## 2019-01-01 RX ADMIN — SENNA PLUS 2 TABLET(S): 8.6 TABLET ORAL at 21:41

## 2019-01-01 RX ADMIN — Medication 200 GRAM(S): at 23:30

## 2019-01-01 RX ADMIN — HEPARIN SODIUM 5000 UNIT(S): 5000 INJECTION INTRAVENOUS; SUBCUTANEOUS at 06:05

## 2019-01-01 RX ADMIN — Medication 2 MILLIGRAM(S): at 17:22

## 2019-01-01 RX ADMIN — CHLORHEXIDINE GLUCONATE 1 APPLICATION(S): 213 SOLUTION TOPICAL at 06:11

## 2019-01-01 RX ADMIN — CHLORHEXIDINE GLUCONATE 15 MILLILITER(S): 213 SOLUTION TOPICAL at 10:21

## 2019-01-01 RX ADMIN — PROPOFOL 2.21 MICROGRAM(S)/KG/MIN: 10 INJECTION, EMULSION INTRAVENOUS at 03:15

## 2019-01-01 RX ADMIN — QUETIAPINE FUMARATE 100 MILLIGRAM(S): 200 TABLET, FILM COATED ORAL at 07:23

## 2019-01-01 RX ADMIN — CHLORHEXIDINE GLUCONATE 1 APPLICATION(S): 213 SOLUTION TOPICAL at 05:04

## 2019-01-01 RX ADMIN — NAFCILLIN 200 GRAM(S): 10 INJECTION, POWDER, FOR SOLUTION INTRAVENOUS at 14:41

## 2019-01-01 RX ADMIN — Medication 1 DROP(S): at 06:53

## 2019-01-01 RX ADMIN — Medication 3.45 MICROGRAM(S)/KG/MIN: at 21:48

## 2019-01-01 RX ADMIN — CHLORHEXIDINE GLUCONATE 1 APPLICATION(S): 213 SOLUTION TOPICAL at 05:45

## 2019-01-01 RX ADMIN — NAFCILLIN 200 GRAM(S): 10 INJECTION, POWDER, FOR SOLUTION INTRAVENOUS at 03:19

## 2019-01-01 RX ADMIN — HEPARIN SODIUM 5000 UNIT(S): 5000 INJECTION INTRAVENOUS; SUBCUTANEOUS at 13:39

## 2019-01-01 RX ADMIN — Medication 107.4 MILLIGRAM(S): at 15:36

## 2019-01-01 RX ADMIN — Medication 3 MILLILITER(S): at 02:24

## 2019-01-01 RX ADMIN — Medication 3 MILLILITER(S): at 21:52

## 2019-01-01 RX ADMIN — Medication 400 INTERNATIONAL UNIT(S): at 12:55

## 2019-01-01 RX ADMIN — Medication 1 DROP(S): at 23:28

## 2019-01-01 RX ADMIN — PROPOFOL 2.21 MICROGRAM(S)/KG/MIN: 10 INJECTION, EMULSION INTRAVENOUS at 08:56

## 2019-01-01 RX ADMIN — MIDODRINE HYDROCHLORIDE 5 MILLIGRAM(S): 2.5 TABLET ORAL at 05:04

## 2019-01-01 RX ADMIN — NAFCILLIN 200 GRAM(S): 10 INJECTION, POWDER, FOR SOLUTION INTRAVENOUS at 22:16

## 2019-01-01 RX ADMIN — SODIUM CHLORIDE 3 MILLILITER(S): 9 INJECTION INTRAMUSCULAR; INTRAVENOUS; SUBCUTANEOUS at 06:58

## 2019-01-01 RX ADMIN — NAFCILLIN 200 GRAM(S): 10 INJECTION, POWDER, FOR SOLUTION INTRAVENOUS at 21:59

## 2019-01-01 RX ADMIN — NAFCILLIN 200 GRAM(S): 10 INJECTION, POWDER, FOR SOLUTION INTRAVENOUS at 10:34

## 2019-01-01 RX ADMIN — PROPOFOL 4.42 MICROGRAM(S)/KG/MIN: 10 INJECTION, EMULSION INTRAVENOUS at 12:23

## 2019-01-01 RX ADMIN — Medication 500 MILLIGRAM(S): at 15:10

## 2019-01-01 RX ADMIN — NAFCILLIN 200 GRAM(S): 10 INJECTION, POWDER, FOR SOLUTION INTRAVENOUS at 15:00

## 2019-01-01 RX ADMIN — SODIUM CHLORIDE 3 MILLILITER(S): 9 INJECTION INTRAMUSCULAR; INTRAVENOUS; SUBCUTANEOUS at 21:05

## 2019-01-01 RX ADMIN — Medication 5 MILLIGRAM(S): at 21:45

## 2019-01-01 RX ADMIN — HEPARIN SODIUM 5000 UNIT(S): 5000 INJECTION INTRAVENOUS; SUBCUTANEOUS at 05:47

## 2019-01-01 RX ADMIN — NAFCILLIN 200 GRAM(S): 10 INJECTION, POWDER, FOR SOLUTION INTRAVENOUS at 05:37

## 2019-01-01 RX ADMIN — SODIUM CHLORIDE 3 MILLILITER(S): 9 INJECTION INTRAMUSCULAR; INTRAVENOUS; SUBCUTANEOUS at 06:22

## 2019-01-01 RX ADMIN — Medication 50 MILLILITER(S): at 00:11

## 2019-01-01 RX ADMIN — HEPARIN SODIUM 5000 UNIT(S): 5000 INJECTION INTRAVENOUS; SUBCUTANEOUS at 14:57

## 2019-01-01 RX ADMIN — Medication 3.45 MICROGRAM(S)/KG/MIN: at 16:53

## 2019-01-01 RX ADMIN — ONDANSETRON 4 MILLIGRAM(S): 8 TABLET, FILM COATED ORAL at 08:52

## 2019-01-01 RX ADMIN — Medication 50 MILLIGRAM(S): at 15:04

## 2019-01-01 RX ADMIN — Medication 5 MILLIGRAM(S): at 05:36

## 2019-01-01 RX ADMIN — PROPOFOL 2.21 MICROGRAM(S)/KG/MIN: 10 INJECTION, EMULSION INTRAVENOUS at 03:26

## 2019-01-01 RX ADMIN — CHLORHEXIDINE GLUCONATE 15 MILLILITER(S): 213 SOLUTION TOPICAL at 18:40

## 2019-01-01 RX ADMIN — NAFCILLIN 200 GRAM(S): 10 INJECTION, POWDER, FOR SOLUTION INTRAVENOUS at 01:29

## 2019-01-01 RX ADMIN — Medication 25 MILLIGRAM(S): at 05:28

## 2019-01-01 RX ADMIN — CHLORHEXIDINE GLUCONATE 1 APPLICATION(S): 213 SOLUTION TOPICAL at 05:26

## 2019-01-01 RX ADMIN — NAFCILLIN 200 GRAM(S): 10 INJECTION, POWDER, FOR SOLUTION INTRAVENOUS at 19:12

## 2019-01-01 RX ADMIN — HEPARIN SODIUM 5000 UNIT(S): 5000 INJECTION INTRAVENOUS; SUBCUTANEOUS at 13:42

## 2019-01-01 RX ADMIN — Medication 50 MILLILITER(S): at 14:34

## 2019-01-01 RX ADMIN — CHLORHEXIDINE GLUCONATE 15 MILLILITER(S): 213 SOLUTION TOPICAL at 09:23

## 2019-01-01 RX ADMIN — Medication 25 MILLIGRAM(S): at 05:36

## 2019-01-01 RX ADMIN — Medication 500 MILLIGRAM(S): at 11:51

## 2019-01-01 RX ADMIN — Medication 6.9 MICROGRAM(S)/KG/MIN: at 01:00

## 2019-01-01 RX ADMIN — Medication 2 MILLIGRAM(S): at 05:26

## 2019-01-01 RX ADMIN — MIDODRINE HYDROCHLORIDE 5 MILLIGRAM(S): 2.5 TABLET ORAL at 21:36

## 2019-01-01 RX ADMIN — NAFCILLIN 200 GRAM(S): 10 INJECTION, POWDER, FOR SOLUTION INTRAVENOUS at 05:35

## 2019-01-01 RX ADMIN — Medication 1 DROP(S): at 11:55

## 2019-01-01 RX ADMIN — Medication 400 INTERNATIONAL UNIT(S): at 11:15

## 2019-01-01 RX ADMIN — Medication 50 MILLIEQUIVALENT(S): at 01:24

## 2019-01-01 RX ADMIN — Medication 1 DROP(S): at 00:25

## 2019-01-01 RX ADMIN — NAFCILLIN 200 GRAM(S): 10 INJECTION, POWDER, FOR SOLUTION INTRAVENOUS at 05:25

## 2019-01-01 RX ADMIN — HEPARIN SODIUM 5000 UNIT(S): 5000 INJECTION INTRAVENOUS; SUBCUTANEOUS at 11:43

## 2019-01-01 RX ADMIN — CHLORHEXIDINE GLUCONATE 1 APPLICATION(S): 213 SOLUTION TOPICAL at 05:36

## 2019-01-01 RX ADMIN — HEPARIN SODIUM 5000 UNIT(S): 5000 INJECTION INTRAVENOUS; SUBCUTANEOUS at 22:24

## 2019-01-01 RX ADMIN — Medication 3 MILLILITER(S): at 02:10

## 2019-01-01 RX ADMIN — SODIUM CHLORIDE 3 MILLILITER(S): 9 INJECTION INTRAMUSCULAR; INTRAVENOUS; SUBCUTANEOUS at 06:14

## 2019-01-01 RX ADMIN — Medication 112 MILLIGRAM(S): at 16:18

## 2019-01-01 RX ADMIN — CHLORHEXIDINE GLUCONATE 1 APPLICATION(S): 213 SOLUTION TOPICAL at 05:44

## 2019-01-01 RX ADMIN — FENTANYL CITRATE 3.68 MICROGRAM(S)/KG/HR: 50 INJECTION INTRAVENOUS at 12:02

## 2019-01-01 RX ADMIN — Medication 3 MILLILITER(S): at 09:12

## 2019-01-01 RX ADMIN — NAFCILLIN 200 GRAM(S): 10 INJECTION, POWDER, FOR SOLUTION INTRAVENOUS at 05:47

## 2019-01-01 RX ADMIN — CISATRACURIUM BESYLATE 10 MILLIGRAM(S): 2 INJECTION INTRAVENOUS at 18:32

## 2019-01-01 RX ADMIN — MEROPENEM 100 MILLIGRAM(S): 1 INJECTION INTRAVENOUS at 03:10

## 2019-01-01 RX ADMIN — Medication 3 MILLILITER(S): at 13:11

## 2019-01-01 RX ADMIN — Medication 40 MILLIEQUIVALENT(S): at 10:35

## 2019-01-01 RX ADMIN — Medication 1 DROP(S): at 06:10

## 2019-01-01 RX ADMIN — CHLORHEXIDINE GLUCONATE 15 MILLILITER(S): 213 SOLUTION TOPICAL at 09:12

## 2019-01-01 RX ADMIN — Medication 3 MILLILITER(S): at 14:20

## 2019-01-01 RX ADMIN — CHLORHEXIDINE GLUCONATE 15 MILLILITER(S): 213 SOLUTION TOPICAL at 09:30

## 2019-01-01 RX ADMIN — FENTANYL CITRATE 100 MICROGRAM(S): 50 INJECTION INTRAVENOUS at 07:45

## 2019-01-01 RX ADMIN — POLYETHYLENE GLYCOL 3350 17 GRAM(S): 17 POWDER, FOR SOLUTION ORAL at 09:53

## 2019-01-01 RX ADMIN — POLYETHYLENE GLYCOL 3350 17 GRAM(S): 17 POWDER, FOR SOLUTION ORAL at 18:01

## 2019-01-01 RX ADMIN — PANTOPRAZOLE SODIUM 40 MILLIGRAM(S): 20 TABLET, DELAYED RELEASE ORAL at 11:45

## 2019-01-01 RX ADMIN — NAFCILLIN 200 GRAM(S): 10 INJECTION, POWDER, FOR SOLUTION INTRAVENOUS at 02:31

## 2019-01-01 RX ADMIN — MIDODRINE HYDROCHLORIDE 5 MILLIGRAM(S): 2.5 TABLET ORAL at 14:40

## 2019-01-01 RX ADMIN — HEPARIN SODIUM 5000 UNIT(S): 5000 INJECTION INTRAVENOUS; SUBCUTANEOUS at 15:04

## 2019-01-01 RX ADMIN — FENTANYL CITRATE 3.68 MICROGRAM(S)/KG/HR: 50 INJECTION INTRAVENOUS at 06:58

## 2019-01-01 RX ADMIN — Medication 400 MILLIGRAM(S): at 10:55

## 2019-01-01 RX ADMIN — Medication 3 MILLILITER(S): at 15:02

## 2019-01-01 RX ADMIN — Medication 3 MILLILITER(S): at 13:32

## 2019-01-01 RX ADMIN — PANTOPRAZOLE SODIUM 40 MILLIGRAM(S): 20 TABLET, DELAYED RELEASE ORAL at 13:06

## 2019-01-01 RX ADMIN — FENTANYL CITRATE 3.68 MICROGRAM(S)/KG/HR: 50 INJECTION INTRAVENOUS at 16:31

## 2019-01-01 RX ADMIN — Medication 3 MILLILITER(S): at 13:55

## 2019-01-01 RX ADMIN — FENTANYL CITRATE 3.68 MICROGRAM(S)/KG/HR: 50 INJECTION INTRAVENOUS at 04:11

## 2019-01-01 RX ADMIN — Medication 400 INTERNATIONAL UNIT(S): at 11:43

## 2019-01-01 RX ADMIN — PROPOFOL 2.21 MICROGRAM(S)/KG/MIN: 10 INJECTION, EMULSION INTRAVENOUS at 03:08

## 2019-01-01 RX ADMIN — HEPARIN SODIUM 5000 UNIT(S): 5000 INJECTION INTRAVENOUS; SUBCUTANEOUS at 22:00

## 2019-01-01 RX ADMIN — HEPARIN SODIUM 5000 UNIT(S): 5000 INJECTION INTRAVENOUS; SUBCUTANEOUS at 14:09

## 2019-01-01 RX ADMIN — Medication 15 MILLILITER(S): at 11:53

## 2019-01-01 RX ADMIN — Medication 500 MILLIGRAM(S): at 11:32

## 2019-01-01 RX ADMIN — Medication 3 MILLILITER(S): at 05:30

## 2019-01-01 RX ADMIN — Medication 50 MILLIEQUIVALENT(S): at 10:22

## 2019-01-01 RX ADMIN — Medication 25 MILLIGRAM(S): at 05:35

## 2019-01-01 RX ADMIN — Medication 15 MILLILITER(S): at 11:52

## 2019-01-01 RX ADMIN — NAFCILLIN 200 GRAM(S): 10 INJECTION, POWDER, FOR SOLUTION INTRAVENOUS at 18:03

## 2019-01-01 RX ADMIN — NAFCILLIN 200 GRAM(S): 10 INJECTION, POWDER, FOR SOLUTION INTRAVENOUS at 09:33

## 2019-01-01 RX ADMIN — Medication 50 MILLIGRAM(S): at 13:09

## 2019-01-01 RX ADMIN — Medication 1 DROP(S): at 18:29

## 2019-01-01 RX ADMIN — Medication 50 MILLILITER(S): at 00:50

## 2019-01-01 RX ADMIN — HEPARIN SODIUM 5000 UNIT(S): 5000 INJECTION INTRAVENOUS; SUBCUTANEOUS at 22:40

## 2019-01-01 RX ADMIN — CHLORHEXIDINE GLUCONATE 15 MILLILITER(S): 213 SOLUTION TOPICAL at 21:01

## 2019-01-01 RX ADMIN — Medication 1 DROP(S): at 05:46

## 2019-01-01 RX ADMIN — CHLORHEXIDINE GLUCONATE 15 MILLILITER(S): 213 SOLUTION TOPICAL at 21:49

## 2019-01-01 RX ADMIN — ZINC SULFATE TAB 220 MG (50 MG ZINC EQUIVALENT) 220 MILLIGRAM(S): 220 (50 ZN) TAB at 11:51

## 2019-01-01 RX ADMIN — SODIUM CHLORIDE 3 MILLILITER(S): 9 INJECTION INTRAMUSCULAR; INTRAVENOUS; SUBCUTANEOUS at 21:04

## 2019-01-01 RX ADMIN — Medication 3 MILLILITER(S): at 13:35

## 2019-01-01 RX ADMIN — MEROPENEM 100 MILLIGRAM(S): 1 INJECTION INTRAVENOUS at 12:44

## 2019-01-01 RX ADMIN — Medication 15 MILLILITER(S): at 11:48

## 2019-01-01 RX ADMIN — Medication 1 DROP(S): at 18:05

## 2019-01-01 RX ADMIN — PROPOFOL 2.21 MICROGRAM(S)/KG/MIN: 10 INJECTION, EMULSION INTRAVENOUS at 16:19

## 2019-01-01 RX ADMIN — Medication 50 MILLILITER(S): at 07:03

## 2019-01-01 RX ADMIN — Medication 1 DROP(S): at 12:37

## 2019-01-01 RX ADMIN — MIDODRINE HYDROCHLORIDE 5 MILLIGRAM(S): 2.5 TABLET ORAL at 05:28

## 2019-01-01 RX ADMIN — FENTANYL CITRATE 1 PATCH: 50 INJECTION INTRAVENOUS at 11:07

## 2019-01-01 RX ADMIN — NAFCILLIN 200 GRAM(S): 10 INJECTION, POWDER, FOR SOLUTION INTRAVENOUS at 02:04

## 2019-01-01 RX ADMIN — Medication 500 MILLIGRAM(S): at 13:43

## 2019-01-01 RX ADMIN — Medication 3 MILLILITER(S): at 21:05

## 2019-01-01 RX ADMIN — PROPOFOL 2.21 MICROGRAM(S)/KG/MIN: 10 INJECTION, EMULSION INTRAVENOUS at 00:13

## 2019-01-01 RX ADMIN — Medication 500 MILLIGRAM(S): at 05:28

## 2019-01-01 RX ADMIN — SODIUM CHLORIDE 3 MILLILITER(S): 9 INJECTION INTRAMUSCULAR; INTRAVENOUS; SUBCUTANEOUS at 22:00

## 2019-01-01 RX ADMIN — Medication 15 MILLILITER(S): at 11:45

## 2019-01-01 RX ADMIN — ZINC SULFATE TAB 220 MG (50 MG ZINC EQUIVALENT) 220 MILLIGRAM(S): 220 (50 ZN) TAB at 11:45

## 2019-01-01 RX ADMIN — HEPARIN SODIUM 5000 UNIT(S): 5000 INJECTION INTRAVENOUS; SUBCUTANEOUS at 21:42

## 2019-01-01 RX ADMIN — Medication 1 DROP(S): at 05:04

## 2019-01-01 RX ADMIN — Medication 40 MILLIEQUIVALENT(S): at 05:36

## 2019-01-01 RX ADMIN — MIDODRINE HYDROCHLORIDE 10 MILLIGRAM(S): 2.5 TABLET ORAL at 02:49

## 2019-01-01 RX ADMIN — Medication 400 INTERNATIONAL UNIT(S): at 13:10

## 2019-01-01 RX ADMIN — CHLORHEXIDINE GLUCONATE 15 MILLILITER(S): 213 SOLUTION TOPICAL at 10:34

## 2019-01-01 RX ADMIN — HEPARIN SODIUM 5000 UNIT(S): 5000 INJECTION INTRAVENOUS; SUBCUTANEOUS at 06:24

## 2019-01-01 RX ADMIN — PROPOFOL 2.21 MICROGRAM(S)/KG/MIN: 10 INJECTION, EMULSION INTRAVENOUS at 22:40

## 2019-01-01 RX ADMIN — Medication 200 GRAM(S): at 17:00

## 2019-01-01 RX ADMIN — ZINC SULFATE TAB 220 MG (50 MG ZINC EQUIVALENT) 220 MILLIGRAM(S): 220 (50 ZN) TAB at 14:16

## 2019-01-01 RX ADMIN — NAFCILLIN 200 GRAM(S): 10 INJECTION, POWDER, FOR SOLUTION INTRAVENOUS at 01:49

## 2019-01-01 RX ADMIN — SENNA PLUS 2 TABLET(S): 8.6 TABLET ORAL at 22:01

## 2019-01-01 RX ADMIN — Medication 100 MILLIEQUIVALENT(S): at 17:16

## 2019-01-01 RX ADMIN — Medication 50 MILLIGRAM(S): at 06:26

## 2019-01-01 RX ADMIN — Medication 50 MILLILITER(S): at 16:39

## 2019-01-01 RX ADMIN — SODIUM CHLORIDE 25 MILLILITER(S): 9 INJECTION, SOLUTION INTRAVENOUS at 19:10

## 2019-01-01 RX ADMIN — NAFCILLIN 200 GRAM(S): 10 INJECTION, POWDER, FOR SOLUTION INTRAVENOUS at 21:04

## 2019-01-01 RX ADMIN — HEPARIN SODIUM 5000 UNIT(S): 5000 INJECTION INTRAVENOUS; SUBCUTANEOUS at 06:56

## 2019-01-01 RX ADMIN — Medication 50 MILLILITER(S): at 09:16

## 2019-01-01 RX ADMIN — Medication 3.45 MICROGRAM(S)/KG/MIN: at 11:32

## 2019-01-01 RX ADMIN — ZINC SULFATE TAB 220 MG (50 MG ZINC EQUIVALENT) 220 MILLIGRAM(S): 220 (50 ZN) TAB at 13:06

## 2019-01-01 RX ADMIN — Medication 3 MILLILITER(S): at 13:08

## 2019-01-01 RX ADMIN — SODIUM CHLORIDE 3 MILLILITER(S): 9 INJECTION INTRAMUSCULAR; INTRAVENOUS; SUBCUTANEOUS at 13:29

## 2019-01-01 RX ADMIN — NAFCILLIN 200 GRAM(S): 10 INJECTION, POWDER, FOR SOLUTION INTRAVENOUS at 02:30

## 2019-01-01 RX ADMIN — MIDODRINE HYDROCHLORIDE 5 MILLIGRAM(S): 2.5 TABLET ORAL at 21:28

## 2019-01-01 RX ADMIN — PROPOFOL 2.21 MICROGRAM(S)/KG/MIN: 10 INJECTION, EMULSION INTRAVENOUS at 15:49

## 2019-01-01 RX ADMIN — PROPOFOL 2.21 MICROGRAM(S)/KG/MIN: 10 INJECTION, EMULSION INTRAVENOUS at 17:11

## 2019-01-01 RX ADMIN — Medication 650 MILLIGRAM(S): at 14:34

## 2019-01-01 RX ADMIN — QUETIAPINE FUMARATE 100 MILLIGRAM(S): 200 TABLET, FILM COATED ORAL at 18:57

## 2019-01-01 RX ADMIN — ZINC SULFATE TAB 220 MG (50 MG ZINC EQUIVALENT) 220 MILLIGRAM(S): 220 (50 ZN) TAB at 11:35

## 2019-01-01 RX ADMIN — HEPARIN SODIUM 5000 UNIT(S): 5000 INJECTION INTRAVENOUS; SUBCUTANEOUS at 05:23

## 2019-01-01 RX ADMIN — Medication 650 MILLIGRAM(S): at 07:08

## 2019-01-01 RX ADMIN — MIDODRINE HYDROCHLORIDE 10 MILLIGRAM(S): 2.5 TABLET ORAL at 10:21

## 2019-01-01 RX ADMIN — HEPARIN SODIUM 5000 UNIT(S): 5000 INJECTION INTRAVENOUS; SUBCUTANEOUS at 14:17

## 2019-01-01 RX ADMIN — FENTANYL CITRATE 3.68 MICROGRAM(S)/KG/HR: 50 INJECTION INTRAVENOUS at 15:49

## 2019-01-01 RX ADMIN — Medication 3 MILLILITER(S): at 06:43

## 2019-01-01 RX ADMIN — Medication 15 MILLILITER(S): at 19:15

## 2019-01-01 RX ADMIN — Medication 3 MILLILITER(S): at 06:52

## 2019-01-01 RX ADMIN — HEPARIN SODIUM 5000 UNIT(S): 5000 INJECTION INTRAVENOUS; SUBCUTANEOUS at 05:36

## 2019-01-01 RX ADMIN — Medication 5 MILLIGRAM(S): at 21:22

## 2019-01-01 RX ADMIN — Medication 50 MILLILITER(S): at 08:54

## 2019-01-01 RX ADMIN — PROPOFOL 4.42 MICROGRAM(S)/KG/MIN: 10 INJECTION, EMULSION INTRAVENOUS at 18:05

## 2019-01-01 RX ADMIN — Medication 125 MILLILITER(S): at 17:01

## 2019-01-01 RX ADMIN — Medication 50 MILLIGRAM(S): at 15:35

## 2019-01-01 RX ADMIN — CHLORHEXIDINE GLUCONATE 15 MILLILITER(S): 213 SOLUTION TOPICAL at 21:26

## 2019-01-01 RX ADMIN — HEPARIN SODIUM 5000 UNIT(S): 5000 INJECTION INTRAVENOUS; SUBCUTANEOUS at 05:06

## 2019-01-01 RX ADMIN — SODIUM CHLORIDE 3 MILLILITER(S): 9 INJECTION INTRAMUSCULAR; INTRAVENOUS; SUBCUTANEOUS at 21:32

## 2019-01-01 RX ADMIN — NAFCILLIN 200 GRAM(S): 10 INJECTION, POWDER, FOR SOLUTION INTRAVENOUS at 10:03

## 2019-01-01 RX ADMIN — SODIUM CHLORIDE 3 MILLILITER(S): 9 INJECTION INTRAMUSCULAR; INTRAVENOUS; SUBCUTANEOUS at 05:35

## 2019-01-01 RX ADMIN — Medication 50 MILLIGRAM(S): at 21:35

## 2019-01-01 RX ADMIN — ERYTHROPOIETIN 10000 UNIT(S): 10000 INJECTION, SOLUTION INTRAVENOUS; SUBCUTANEOUS at 18:58

## 2019-01-01 RX ADMIN — QUETIAPINE FUMARATE 100 MILLIGRAM(S): 200 TABLET, FILM COATED ORAL at 17:18

## 2019-01-01 RX ADMIN — NAFCILLIN 200 GRAM(S): 10 INJECTION, POWDER, FOR SOLUTION INTRAVENOUS at 17:27

## 2019-01-01 RX ADMIN — Medication 500 MILLIGRAM(S): at 05:27

## 2019-01-01 RX ADMIN — CHLORHEXIDINE GLUCONATE 15 MILLILITER(S): 213 SOLUTION TOPICAL at 05:41

## 2019-01-01 RX ADMIN — MEROPENEM 100 MILLIGRAM(S): 1 INJECTION INTRAVENOUS at 11:05

## 2019-01-01 RX ADMIN — PANTOPRAZOLE SODIUM 40 MILLIGRAM(S): 20 TABLET, DELAYED RELEASE ORAL at 12:21

## 2019-01-01 RX ADMIN — Medication 50 MILLIEQUIVALENT(S): at 09:29

## 2019-01-01 RX ADMIN — FENTANYL CITRATE 3.68 MICROGRAM(S)/KG/HR: 50 INJECTION INTRAVENOUS at 16:22

## 2019-01-01 RX ADMIN — SODIUM CHLORIDE 3 MILLILITER(S): 9 INJECTION INTRAMUSCULAR; INTRAVENOUS; SUBCUTANEOUS at 13:00

## 2019-01-01 RX ADMIN — FENTANYL CITRATE 7.36 MICROGRAM(S)/KG/HR: 50 INJECTION INTRAVENOUS at 18:04

## 2019-01-01 RX ADMIN — NAFCILLIN 200 GRAM(S): 10 INJECTION, POWDER, FOR SOLUTION INTRAVENOUS at 17:18

## 2019-01-01 RX ADMIN — Medication 50 MILLIGRAM(S): at 21:48

## 2019-01-01 RX ADMIN — Medication 15 MILLILITER(S): at 12:56

## 2019-01-01 RX ADMIN — SODIUM CHLORIDE 3 MILLILITER(S): 9 INJECTION INTRAMUSCULAR; INTRAVENOUS; SUBCUTANEOUS at 21:28

## 2019-01-01 RX ADMIN — Medication 50 MILLIGRAM(S): at 22:34

## 2019-01-01 RX ADMIN — NAFCILLIN 200 GRAM(S): 10 INJECTION, POWDER, FOR SOLUTION INTRAVENOUS at 14:00

## 2019-01-01 RX ADMIN — POLYETHYLENE GLYCOL 3350 17 GRAM(S): 17 POWDER, FOR SOLUTION ORAL at 11:32

## 2019-01-01 RX ADMIN — PROPOFOL 2.21 MICROGRAM(S)/KG/MIN: 10 INJECTION, EMULSION INTRAVENOUS at 04:23

## 2019-01-01 RX ADMIN — QUETIAPINE FUMARATE 100 MILLIGRAM(S): 200 TABLET, FILM COATED ORAL at 18:11

## 2019-01-01 RX ADMIN — PROPOFOL 2.21 MICROGRAM(S)/KG/MIN: 10 INJECTION, EMULSION INTRAVENOUS at 17:35

## 2019-01-01 RX ADMIN — Medication 80 MILLIGRAM(S): at 21:00

## 2019-01-01 RX ADMIN — Medication 3 MILLILITER(S): at 17:39

## 2019-01-01 RX ADMIN — Medication 400 INTERNATIONAL UNIT(S): at 11:51

## 2019-01-01 RX ADMIN — NAFCILLIN 200 GRAM(S): 10 INJECTION, POWDER, FOR SOLUTION INTRAVENOUS at 02:55

## 2019-01-01 RX ADMIN — SODIUM CHLORIDE 3 MILLILITER(S): 9 INJECTION INTRAMUSCULAR; INTRAVENOUS; SUBCUTANEOUS at 21:21

## 2019-01-01 RX ADMIN — PANTOPRAZOLE SODIUM 40 MILLIGRAM(S): 20 TABLET, DELAYED RELEASE ORAL at 12:15

## 2019-01-01 RX ADMIN — Medication 50 MILLIEQUIVALENT(S): at 11:20

## 2019-01-01 RX ADMIN — Medication 1 DROP(S): at 00:17

## 2019-01-01 RX ADMIN — Medication 3 MILLILITER(S): at 17:40

## 2019-01-01 RX ADMIN — MIDODRINE HYDROCHLORIDE 10 MILLIGRAM(S): 2.5 TABLET ORAL at 02:04

## 2019-01-01 RX ADMIN — Medication 3 MILLILITER(S): at 17:25

## 2019-01-01 RX ADMIN — PANTOPRAZOLE SODIUM 40 MILLIGRAM(S): 20 TABLET, DELAYED RELEASE ORAL at 11:35

## 2019-01-01 RX ADMIN — Medication 100 MILLIEQUIVALENT(S): at 09:37

## 2019-01-01 RX ADMIN — Medication 50 MILLILITER(S): at 07:02

## 2019-01-01 RX ADMIN — NAFCILLIN 200 GRAM(S): 10 INJECTION, POWDER, FOR SOLUTION INTRAVENOUS at 01:00

## 2019-01-01 RX ADMIN — Medication 3 MILLILITER(S): at 06:22

## 2019-01-01 RX ADMIN — Medication 3 MILLILITER(S): at 17:28

## 2019-01-01 RX ADMIN — NAFCILLIN 200 GRAM(S): 10 INJECTION, POWDER, FOR SOLUTION INTRAVENOUS at 02:19

## 2019-01-01 RX ADMIN — CHLORHEXIDINE GLUCONATE 15 MILLILITER(S): 213 SOLUTION TOPICAL at 21:22

## 2019-01-01 RX ADMIN — Medication 400 INTERNATIONAL UNIT(S): at 11:33

## 2019-01-01 RX ADMIN — NAFCILLIN 200 GRAM(S): 10 INJECTION, POWDER, FOR SOLUTION INTRAVENOUS at 10:21

## 2019-01-01 RX ADMIN — MIDODRINE HYDROCHLORIDE 10 MILLIGRAM(S): 2.5 TABLET ORAL at 17:17

## 2019-01-01 RX ADMIN — Medication 1 DROP(S): at 05:08

## 2019-01-01 RX ADMIN — Medication 3 MILLILITER(S): at 14:12

## 2019-01-01 RX ADMIN — CHLORHEXIDINE GLUCONATE 15 MILLILITER(S): 213 SOLUTION TOPICAL at 10:56

## 2019-01-01 RX ADMIN — Medication 650 MILLIGRAM(S): at 20:00

## 2019-01-01 RX ADMIN — PANTOPRAZOLE SODIUM 40 MILLIGRAM(S): 20 TABLET, DELAYED RELEASE ORAL at 12:54

## 2019-01-01 RX ADMIN — CHLORHEXIDINE GLUCONATE 15 MILLILITER(S): 213 SOLUTION TOPICAL at 10:23

## 2019-01-01 RX ADMIN — FENTANYL CITRATE 3.68 MICROGRAM(S)/KG/HR: 50 INJECTION INTRAVENOUS at 14:26

## 2019-01-01 RX ADMIN — Medication 50 MILLILITER(S): at 16:53

## 2019-01-01 RX ADMIN — PROPOFOL 2.21 MICROGRAM(S)/KG/MIN: 10 INJECTION, EMULSION INTRAVENOUS at 23:41

## 2019-01-01 RX ADMIN — MIDODRINE HYDROCHLORIDE 10 MILLIGRAM(S): 2.5 TABLET ORAL at 19:03

## 2019-01-01 RX ADMIN — CEFTRIAXONE 100 MILLIGRAM(S): 500 INJECTION, POWDER, FOR SOLUTION INTRAMUSCULAR; INTRAVENOUS at 11:41

## 2019-01-01 RX ADMIN — GABAPENTIN 100 MILLIGRAM(S): 400 CAPSULE ORAL at 10:21

## 2019-01-01 RX ADMIN — Medication 50 MILLILITER(S): at 00:32

## 2019-01-01 RX ADMIN — Medication 500 MILLIGRAM(S): at 11:20

## 2019-01-01 RX ADMIN — Medication 3.45 MICROGRAM(S)/KG/MIN: at 23:10

## 2019-01-01 RX ADMIN — Medication 3 MILLILITER(S): at 19:56

## 2019-01-01 RX ADMIN — NAFCILLIN 200 GRAM(S): 10 INJECTION, POWDER, FOR SOLUTION INTRAVENOUS at 21:17

## 2019-01-01 RX ADMIN — QUETIAPINE FUMARATE 100 MILLIGRAM(S): 200 TABLET, FILM COATED ORAL at 05:36

## 2019-01-01 RX ADMIN — POLYETHYLENE GLYCOL 3350 17 GRAM(S): 17 POWDER, FOR SOLUTION ORAL at 17:27

## 2019-01-01 RX ADMIN — NAFCILLIN 200 GRAM(S): 10 INJECTION, POWDER, FOR SOLUTION INTRAVENOUS at 14:30

## 2019-01-01 RX ADMIN — Medication 1 DROP(S): at 19:03

## 2019-01-01 RX ADMIN — CHLORHEXIDINE GLUCONATE 15 MILLILITER(S): 213 SOLUTION TOPICAL at 06:05

## 2019-01-01 RX ADMIN — PANTOPRAZOLE SODIUM 40 MILLIGRAM(S): 20 TABLET, DELAYED RELEASE ORAL at 12:23

## 2019-01-01 RX ADMIN — Medication 100 MILLIEQUIVALENT(S): at 11:16

## 2019-01-01 RX ADMIN — CHLORHEXIDINE GLUCONATE 15 MILLILITER(S): 213 SOLUTION TOPICAL at 09:50

## 2019-01-01 RX ADMIN — Medication 3 MILLILITER(S): at 14:33

## 2019-01-01 RX ADMIN — CHLORHEXIDINE GLUCONATE 15 MILLILITER(S): 213 SOLUTION TOPICAL at 21:24

## 2019-01-01 RX ADMIN — SODIUM CHLORIDE 3 MILLILITER(S): 9 INJECTION INTRAMUSCULAR; INTRAVENOUS; SUBCUTANEOUS at 14:57

## 2019-01-01 RX ADMIN — PROPOFOL 2.21 MICROGRAM(S)/KG/MIN: 10 INJECTION, EMULSION INTRAVENOUS at 11:14

## 2019-01-01 RX ADMIN — HEPARIN SODIUM 5000 UNIT(S): 5000 INJECTION INTRAVENOUS; SUBCUTANEOUS at 06:30

## 2019-01-01 RX ADMIN — NAFCILLIN 200 GRAM(S): 10 INJECTION, POWDER, FOR SOLUTION INTRAVENOUS at 14:10

## 2019-01-01 RX ADMIN — MIDODRINE HYDROCHLORIDE 10 MILLIGRAM(S): 2.5 TABLET ORAL at 02:08

## 2019-01-01 RX ADMIN — Medication 50 MILLIGRAM(S): at 06:52

## 2019-01-01 RX ADMIN — QUETIAPINE FUMARATE 75 MILLIGRAM(S): 200 TABLET, FILM COATED ORAL at 17:27

## 2019-01-01 RX ADMIN — Medication 10 MILLIGRAM(S): at 11:55

## 2019-01-01 RX ADMIN — Medication 400 INTERNATIONAL UNIT(S): at 14:16

## 2019-01-01 RX ADMIN — NAFCILLIN 200 GRAM(S): 10 INJECTION, POWDER, FOR SOLUTION INTRAVENOUS at 03:15

## 2019-01-01 RX ADMIN — NAFCILLIN 200 GRAM(S): 10 INJECTION, POWDER, FOR SOLUTION INTRAVENOUS at 18:39

## 2019-01-01 RX ADMIN — NAFCILLIN 200 GRAM(S): 10 INJECTION, POWDER, FOR SOLUTION INTRAVENOUS at 21:56

## 2019-01-01 RX ADMIN — Medication 5 MILLIGRAM(S): at 14:12

## 2019-01-01 RX ADMIN — Medication 107.4 MILLIGRAM(S): at 21:11

## 2019-01-01 RX ADMIN — NAFCILLIN 200 GRAM(S): 10 INJECTION, POWDER, FOR SOLUTION INTRAVENOUS at 15:05

## 2019-01-01 RX ADMIN — Medication 3.45 MICROGRAM(S)/KG/MIN: at 16:18

## 2019-01-01 RX ADMIN — HEPARIN SODIUM 5000 UNIT(S): 5000 INJECTION INTRAVENOUS; SUBCUTANEOUS at 21:49

## 2019-01-01 RX ADMIN — MIDODRINE HYDROCHLORIDE 10 MILLIGRAM(S): 2.5 TABLET ORAL at 18:57

## 2019-01-01 RX ADMIN — Medication 15 MILLILITER(S): at 11:36

## 2019-01-01 RX ADMIN — SODIUM CHLORIDE 3 MILLILITER(S): 9 INJECTION INTRAMUSCULAR; INTRAVENOUS; SUBCUTANEOUS at 22:01

## 2019-01-01 RX ADMIN — NAFCILLIN 200 GRAM(S): 10 INJECTION, POWDER, FOR SOLUTION INTRAVENOUS at 10:48

## 2019-01-01 RX ADMIN — Medication 5 MILLIGRAM(S): at 13:43

## 2019-01-01 RX ADMIN — FENTANYL CITRATE 3.68 MICROGRAM(S)/KG/HR: 50 INJECTION INTRAVENOUS at 15:58

## 2019-01-01 RX ADMIN — Medication 650 MILLIGRAM(S): at 17:57

## 2019-01-01 RX ADMIN — Medication 3 MILLILITER(S): at 02:35

## 2019-01-01 RX ADMIN — ZINC SULFATE TAB 220 MG (50 MG ZINC EQUIVALENT) 220 MILLIGRAM(S): 220 (50 ZN) TAB at 09:36

## 2019-01-01 RX ADMIN — Medication 25 MILLIGRAM(S): at 06:09

## 2019-01-01 RX ADMIN — NAFCILLIN 200 GRAM(S): 10 INJECTION, POWDER, FOR SOLUTION INTRAVENOUS at 09:23

## 2019-01-01 RX ADMIN — PROPOFOL 2.21 MICROGRAM(S)/KG/MIN: 10 INJECTION, EMULSION INTRAVENOUS at 11:03

## 2019-01-01 RX ADMIN — Medication 50 MILLIGRAM(S): at 06:09

## 2019-01-01 RX ADMIN — FENTANYL CITRATE 75 MICROGRAM(S): 50 INJECTION INTRAVENOUS at 16:13

## 2019-01-01 RX ADMIN — NAFCILLIN 200 GRAM(S): 10 INJECTION, POWDER, FOR SOLUTION INTRAVENOUS at 17:26

## 2019-01-01 RX ADMIN — FENTANYL CITRATE 7.34 MICROGRAM(S)/KG/HR: 50 INJECTION INTRAVENOUS at 16:19

## 2019-01-01 RX ADMIN — Medication 3 MILLILITER(S): at 10:02

## 2019-01-01 RX ADMIN — QUETIAPINE FUMARATE 75 MILLIGRAM(S): 200 TABLET, FILM COATED ORAL at 05:47

## 2019-01-01 RX ADMIN — Medication 3.45 MICROGRAM(S)/KG/MIN: at 09:25

## 2019-01-01 RX ADMIN — PANTOPRAZOLE SODIUM 40 MILLIGRAM(S): 20 TABLET, DELAYED RELEASE ORAL at 12:02

## 2019-01-01 RX ADMIN — MIDODRINE HYDROCHLORIDE 10 MILLIGRAM(S): 2.5 TABLET ORAL at 03:15

## 2019-01-01 RX ADMIN — Medication 10 MILLIGRAM(S): at 18:09

## 2019-01-01 RX ADMIN — Medication 400 MILLIGRAM(S): at 23:00

## 2019-01-01 RX ADMIN — PROPOFOL 2.21 MICROGRAM(S)/KG/MIN: 10 INJECTION, EMULSION INTRAVENOUS at 04:00

## 2019-01-01 RX ADMIN — MEROPENEM 100 MILLIGRAM(S): 1 INJECTION INTRAVENOUS at 11:53

## 2019-01-01 RX ADMIN — Medication 1 DROP(S): at 00:00

## 2019-01-01 RX ADMIN — SODIUM CHLORIDE 3 MILLILITER(S): 9 INJECTION INTRAMUSCULAR; INTRAVENOUS; SUBCUTANEOUS at 14:07

## 2019-01-01 RX ADMIN — Medication 2: at 11:50

## 2019-01-01 RX ADMIN — ZINC SULFATE TAB 220 MG (50 MG ZINC EQUIVALENT) 220 MILLIGRAM(S): 220 (50 ZN) TAB at 11:32

## 2019-01-01 RX ADMIN — POLYETHYLENE GLYCOL 3350 17 GRAM(S): 17 POWDER, FOR SOLUTION ORAL at 06:51

## 2019-01-01 RX ADMIN — MEROPENEM 100 MILLIGRAM(S): 1 INJECTION INTRAVENOUS at 03:09

## 2019-01-01 RX ADMIN — NAFCILLIN 200 GRAM(S): 10 INJECTION, POWDER, FOR SOLUTION INTRAVENOUS at 22:10

## 2019-01-01 RX ADMIN — PROPOFOL 4.42 MICROGRAM(S)/KG/MIN: 10 INJECTION, EMULSION INTRAVENOUS at 11:35

## 2019-01-01 RX ADMIN — Medication 3 MILLILITER(S): at 01:50

## 2019-01-01 RX ADMIN — PROPOFOL 2.21 MICROGRAM(S)/KG/MIN: 10 INJECTION, EMULSION INTRAVENOUS at 10:26

## 2019-01-01 RX ADMIN — CEFTRIAXONE 100 MILLIGRAM(S): 500 INJECTION, POWDER, FOR SOLUTION INTRAMUSCULAR; INTRAVENOUS at 12:55

## 2019-01-01 RX ADMIN — HEPARIN SODIUM 5000 UNIT(S): 5000 INJECTION INTRAVENOUS; SUBCUTANEOUS at 15:36

## 2019-01-01 RX ADMIN — MIDODRINE HYDROCHLORIDE 10 MILLIGRAM(S): 2.5 TABLET ORAL at 17:22

## 2019-01-01 RX ADMIN — NAFCILLIN 200 GRAM(S): 10 INJECTION, POWDER, FOR SOLUTION INTRAVENOUS at 18:11

## 2019-01-01 RX ADMIN — NAFCILLIN 200 GRAM(S): 10 INJECTION, POWDER, FOR SOLUTION INTRAVENOUS at 17:56

## 2019-01-01 RX ADMIN — MIDAZOLAM HYDROCHLORIDE 4 MILLIGRAM(S): 1 INJECTION, SOLUTION INTRAMUSCULAR; INTRAVENOUS at 07:45

## 2019-01-01 RX ADMIN — PROPOFOL 2.21 MICROGRAM(S)/KG/MIN: 10 INJECTION, EMULSION INTRAVENOUS at 23:29

## 2019-01-01 RX ADMIN — NAFCILLIN 200 GRAM(S): 10 INJECTION, POWDER, FOR SOLUTION INTRAVENOUS at 13:55

## 2019-01-01 RX ADMIN — MIDODRINE HYDROCHLORIDE 10 MILLIGRAM(S): 2.5 TABLET ORAL at 11:24

## 2019-01-01 RX ADMIN — Medication 1 DROP(S): at 19:11

## 2019-01-01 RX ADMIN — IOHEXOL 30 MILLILITER(S): 300 INJECTION, SOLUTION INTRAVENOUS at 14:56

## 2019-01-01 RX ADMIN — ZINC SULFATE TAB 220 MG (50 MG ZINC EQUIVALENT) 220 MILLIGRAM(S): 220 (50 ZN) TAB at 12:15

## 2019-01-01 RX ADMIN — Medication 3 MILLILITER(S): at 17:09

## 2019-01-01 RX ADMIN — PROPOFOL 2.21 MICROGRAM(S)/KG/MIN: 10 INJECTION, EMULSION INTRAVENOUS at 20:50

## 2019-01-01 RX ADMIN — SODIUM CHLORIDE 3 MILLILITER(S): 9 INJECTION INTRAMUSCULAR; INTRAVENOUS; SUBCUTANEOUS at 13:31

## 2019-01-01 RX ADMIN — POLYETHYLENE GLYCOL 3350 17 GRAM(S): 17 POWDER, FOR SOLUTION ORAL at 06:49

## 2019-01-01 RX ADMIN — HEPARIN SODIUM 5000 UNIT(S): 5000 INJECTION INTRAVENOUS; SUBCUTANEOUS at 14:16

## 2019-01-01 RX ADMIN — CHLORHEXIDINE GLUCONATE 1 APPLICATION(S): 213 SOLUTION TOPICAL at 06:26

## 2019-01-01 RX ADMIN — Medication 25 MILLIGRAM(S): at 17:31

## 2019-01-01 RX ADMIN — Medication 3 MILLILITER(S): at 09:33

## 2019-01-01 RX ADMIN — Medication 3 MILLILITER(S): at 06:07

## 2019-01-01 RX ADMIN — Medication 3 MILLILITER(S): at 18:18

## 2019-01-01 RX ADMIN — HEPARIN SODIUM 5000 UNIT(S): 5000 INJECTION INTRAVENOUS; SUBCUTANEOUS at 22:34

## 2019-01-01 RX ADMIN — Medication 100 MILLIEQUIVALENT(S): at 07:05

## 2019-01-01 RX ADMIN — NAFCILLIN 200 GRAM(S): 10 INJECTION, POWDER, FOR SOLUTION INTRAVENOUS at 02:29

## 2019-01-01 RX ADMIN — NAFCILLIN 200 GRAM(S): 10 INJECTION, POWDER, FOR SOLUTION INTRAVENOUS at 05:23

## 2019-01-01 RX ADMIN — FENTANYL CITRATE 3.68 MICROGRAM(S)/KG/HR: 50 INJECTION INTRAVENOUS at 02:00

## 2019-01-01 RX ADMIN — POLYETHYLENE GLYCOL 3350 17 GRAM(S): 17 POWDER, FOR SOLUTION ORAL at 05:45

## 2019-01-01 RX ADMIN — QUETIAPINE FUMARATE 100 MILLIGRAM(S): 200 TABLET, FILM COATED ORAL at 05:27

## 2019-01-01 RX ADMIN — FENTANYL CITRATE 3.68 MICROGRAM(S)/KG/HR: 50 INJECTION INTRAVENOUS at 07:30

## 2019-01-01 RX ADMIN — PANTOPRAZOLE SODIUM 40 MILLIGRAM(S): 20 TABLET, DELAYED RELEASE ORAL at 11:56

## 2019-01-01 RX ADMIN — Medication 3 MILLILITER(S): at 13:36

## 2019-01-01 RX ADMIN — Medication 3 MILLILITER(S): at 01:00

## 2019-01-01 RX ADMIN — Medication 650 MILLIGRAM(S): at 19:42

## 2019-01-01 RX ADMIN — Medication 50 MILLIGRAM(S): at 14:16

## 2019-01-01 RX ADMIN — HEPARIN SODIUM 5000 UNIT(S): 5000 INJECTION INTRAVENOUS; SUBCUTANEOUS at 14:07

## 2019-01-01 RX ADMIN — HEPARIN SODIUM 5000 UNIT(S): 5000 INJECTION INTRAVENOUS; SUBCUTANEOUS at 06:34

## 2019-01-01 RX ADMIN — Medication 1 DROP(S): at 23:58

## 2019-01-01 RX ADMIN — FENTANYL CITRATE 3.68 MICROGRAM(S)/KG/HR: 50 INJECTION INTRAVENOUS at 12:13

## 2019-01-01 RX ADMIN — Medication 500 MILLIGRAM(S): at 15:39

## 2019-01-01 RX ADMIN — Medication 3 MILLILITER(S): at 06:09

## 2019-01-01 RX ADMIN — Medication 3 MILLILITER(S): at 09:09

## 2019-01-01 RX ADMIN — Medication 3 MILLILITER(S): at 03:11

## 2019-01-01 RX ADMIN — NAFCILLIN 200 GRAM(S): 10 INJECTION, POWDER, FOR SOLUTION INTRAVENOUS at 14:16

## 2019-01-01 RX ADMIN — PANTOPRAZOLE SODIUM 40 MILLIGRAM(S): 20 TABLET, DELAYED RELEASE ORAL at 11:42

## 2019-01-01 RX ADMIN — SODIUM CHLORIDE 3 MILLILITER(S): 9 INJECTION INTRAMUSCULAR; INTRAVENOUS; SUBCUTANEOUS at 21:41

## 2019-01-01 RX ADMIN — PANTOPRAZOLE SODIUM 40 MILLIGRAM(S): 20 TABLET, DELAYED RELEASE ORAL at 10:58

## 2019-01-01 RX ADMIN — Medication 50 MILLIGRAM(S): at 05:27

## 2019-01-01 RX ADMIN — NAFCILLIN 200 GRAM(S): 10 INJECTION, POWDER, FOR SOLUTION INTRAVENOUS at 09:50

## 2019-01-01 RX ADMIN — NAFCILLIN 200 GRAM(S): 10 INJECTION, POWDER, FOR SOLUTION INTRAVENOUS at 22:00

## 2019-01-01 RX ADMIN — HEPARIN SODIUM 5000 UNIT(S): 5000 INJECTION INTRAVENOUS; SUBCUTANEOUS at 06:09

## 2019-01-01 RX ADMIN — CHLORHEXIDINE GLUCONATE 15 MILLILITER(S): 213 SOLUTION TOPICAL at 10:06

## 2019-01-01 RX ADMIN — NAFCILLIN 200 GRAM(S): 10 INJECTION, POWDER, FOR SOLUTION INTRAVENOUS at 06:05

## 2019-01-01 RX ADMIN — QUETIAPINE FUMARATE 50 MILLIGRAM(S): 200 TABLET, FILM COATED ORAL at 13:10

## 2019-01-01 RX ADMIN — HEPARIN SODIUM 5000 UNIT(S): 5000 INJECTION INTRAVENOUS; SUBCUTANEOUS at 14:20

## 2019-01-01 RX ADMIN — NAFCILLIN 200 GRAM(S): 10 INJECTION, POWDER, FOR SOLUTION INTRAVENOUS at 02:20

## 2019-01-01 RX ADMIN — PIPERACILLIN AND TAZOBACTAM 200 GRAM(S): 4; .5 INJECTION, POWDER, LYOPHILIZED, FOR SOLUTION INTRAVENOUS at 07:09

## 2019-01-01 RX ADMIN — Medication 50 MILLIGRAM(S): at 17:39

## 2019-01-01 RX ADMIN — Medication 5 MILLIGRAM(S): at 21:57

## 2019-01-01 RX ADMIN — Medication 62.5 MILLIMOLE(S): at 21:04

## 2019-01-01 RX ADMIN — Medication 125 MILLILITER(S): at 18:36

## 2019-01-01 RX ADMIN — Medication 200 GRAM(S): at 05:00

## 2019-01-01 RX ADMIN — HEPARIN SODIUM 5000 UNIT(S): 5000 INJECTION INTRAVENOUS; SUBCUTANEOUS at 21:14

## 2019-01-01 RX ADMIN — Medication 1 DROP(S): at 18:30

## 2019-01-01 RX ADMIN — CHLORHEXIDINE GLUCONATE 15 MILLILITER(S): 213 SOLUTION TOPICAL at 05:23

## 2019-01-01 RX ADMIN — Medication 1 DROP(S): at 18:47

## 2019-01-01 RX ADMIN — Medication 15 MILLILITER(S): at 10:25

## 2019-01-01 RX ADMIN — NAFCILLIN 200 GRAM(S): 10 INJECTION, POWDER, FOR SOLUTION INTRAVENOUS at 10:00

## 2019-01-01 RX ADMIN — Medication 6.9 MICROGRAM(S)/KG/MIN: at 01:51

## 2019-01-01 RX ADMIN — Medication 1 DROP(S): at 05:26

## 2019-01-01 RX ADMIN — HEPARIN SODIUM 5000 UNIT(S): 5000 INJECTION INTRAVENOUS; SUBCUTANEOUS at 06:03

## 2019-01-01 RX ADMIN — PANTOPRAZOLE SODIUM 40 MILLIGRAM(S): 20 TABLET, DELAYED RELEASE ORAL at 12:35

## 2019-01-01 RX ADMIN — NAFCILLIN 200 GRAM(S): 10 INJECTION, POWDER, FOR SOLUTION INTRAVENOUS at 06:39

## 2019-01-01 RX ADMIN — SODIUM CHLORIDE 250 MILLILITER(S): 9 INJECTION INTRAMUSCULAR; INTRAVENOUS; SUBCUTANEOUS at 12:15

## 2019-01-01 RX ADMIN — Medication 15 MILLILITER(S): at 15:14

## 2019-01-01 RX ADMIN — FENTANYL CITRATE 25.76 MICROGRAM(S)/KG/HR: 50 INJECTION INTRAVENOUS at 10:43

## 2019-01-01 RX ADMIN — QUETIAPINE FUMARATE 75 MILLIGRAM(S): 200 TABLET, FILM COATED ORAL at 18:53

## 2019-01-01 RX ADMIN — Medication 1 DROP(S): at 17:23

## 2019-01-01 RX ADMIN — Medication 25 MILLIGRAM(S): at 05:27

## 2019-01-01 RX ADMIN — ALTEPLASE 10 MILLIGRAM(S): KIT at 21:30

## 2019-01-01 RX ADMIN — Medication 3 MILLILITER(S): at 13:27

## 2019-01-01 RX ADMIN — Medication 400 INTERNATIONAL UNIT(S): at 11:53

## 2019-01-01 RX ADMIN — Medication 1000 MILLIGRAM(S): at 11:24

## 2019-01-01 RX ADMIN — NAFCILLIN 200 GRAM(S): 10 INJECTION, POWDER, FOR SOLUTION INTRAVENOUS at 17:44

## 2019-01-01 RX ADMIN — NAFCILLIN 200 GRAM(S): 10 INJECTION, POWDER, FOR SOLUTION INTRAVENOUS at 02:09

## 2019-01-01 RX ADMIN — NAFCILLIN 200 GRAM(S): 10 INJECTION, POWDER, FOR SOLUTION INTRAVENOUS at 19:32

## 2019-01-01 RX ADMIN — QUETIAPINE FUMARATE 25 MILLIGRAM(S): 200 TABLET, FILM COATED ORAL at 11:23

## 2019-01-01 RX ADMIN — Medication 500 MILLIGRAM(S): at 11:04

## 2019-01-01 RX ADMIN — Medication 2: at 17:56

## 2019-01-01 RX ADMIN — PANTOPRAZOLE SODIUM 40 MILLIGRAM(S): 20 TABLET, DELAYED RELEASE ORAL at 11:20

## 2019-01-01 RX ADMIN — Medication 650 MILLIGRAM(S): at 14:30

## 2019-01-01 RX ADMIN — Medication 25 MILLIGRAM(S): at 17:22

## 2019-01-01 RX ADMIN — HEPARIN SODIUM 5000 UNIT(S): 5000 INJECTION INTRAVENOUS; SUBCUTANEOUS at 05:13

## 2019-01-01 RX ADMIN — SODIUM CHLORIDE 3 MILLILITER(S): 9 INJECTION INTRAMUSCULAR; INTRAVENOUS; SUBCUTANEOUS at 05:46

## 2019-01-01 RX ADMIN — CHLORHEXIDINE GLUCONATE 1 APPLICATION(S): 213 SOLUTION TOPICAL at 06:08

## 2019-01-01 RX ADMIN — NAFCILLIN 200 GRAM(S): 10 INJECTION, POWDER, FOR SOLUTION INTRAVENOUS at 22:12

## 2019-01-01 RX ADMIN — QUETIAPINE FUMARATE 75 MILLIGRAM(S): 200 TABLET, FILM COATED ORAL at 06:51

## 2019-01-01 RX ADMIN — FENTANYL CITRATE 3.68 MICROGRAM(S)/KG/HR: 50 INJECTION INTRAVENOUS at 17:30

## 2019-01-01 RX ADMIN — Medication 3 MILLILITER(S): at 21:01

## 2019-01-01 RX ADMIN — Medication 3 MILLILITER(S): at 06:16

## 2019-01-01 RX ADMIN — QUETIAPINE FUMARATE 100 MILLIGRAM(S): 200 TABLET, FILM COATED ORAL at 05:26

## 2019-01-01 RX ADMIN — DORNASE ALFA 5 MILLIGRAM(S): 1 SOLUTION RESPIRATORY (INHALATION) at 21:30

## 2019-01-01 RX ADMIN — Medication 50 MILLILITER(S): at 10:56

## 2019-01-01 RX ADMIN — Medication 1 DROP(S): at 06:08

## 2019-01-01 RX ADMIN — SODIUM CHLORIDE 3 MILLILITER(S): 9 INJECTION INTRAMUSCULAR; INTRAVENOUS; SUBCUTANEOUS at 05:05

## 2019-01-01 RX ADMIN — MIDODRINE HYDROCHLORIDE 10 MILLIGRAM(S): 2.5 TABLET ORAL at 09:28

## 2019-01-01 RX ADMIN — Medication 1 DROP(S): at 05:28

## 2019-01-01 RX ADMIN — VASOPRESSIN 2.4 UNIT(S)/MIN: 20 INJECTION INTRAVENOUS at 11:15

## 2019-01-01 RX ADMIN — NAFCILLIN 200 GRAM(S): 10 INJECTION, POWDER, FOR SOLUTION INTRAVENOUS at 05:44

## 2019-01-01 RX ADMIN — POLYETHYLENE GLYCOL 3350 17 GRAM(S): 17 POWDER, FOR SOLUTION ORAL at 18:36

## 2019-01-01 RX ADMIN — HEPARIN SODIUM 5000 UNIT(S): 5000 INJECTION INTRAVENOUS; SUBCUTANEOUS at 06:04

## 2019-01-01 RX ADMIN — PROPOFOL 4.42 MICROGRAM(S)/KG/MIN: 10 INJECTION, EMULSION INTRAVENOUS at 00:33

## 2019-01-01 RX ADMIN — PROPOFOL 2.21 MICROGRAM(S)/KG/MIN: 10 INJECTION, EMULSION INTRAVENOUS at 18:23

## 2019-01-01 RX ADMIN — VORICONAZOLE 300 MILLIGRAM(S): 10 INJECTION, POWDER, LYOPHILIZED, FOR SOLUTION INTRAVENOUS at 19:14

## 2019-01-01 RX ADMIN — Medication 50 GRAM(S): at 05:36

## 2019-01-01 RX ADMIN — Medication 25 MILLIGRAM(S): at 18:10

## 2019-01-01 RX ADMIN — FENTANYL CITRATE 3.68 MICROGRAM(S)/KG/HR: 50 INJECTION INTRAVENOUS at 00:49

## 2019-01-01 RX ADMIN — PROPOFOL 2.21 MICROGRAM(S)/KG/MIN: 10 INJECTION, EMULSION INTRAVENOUS at 18:29

## 2019-01-01 RX ADMIN — Medication 50 MILLIGRAM(S): at 06:35

## 2019-01-01 RX ADMIN — Medication 3 MILLILITER(S): at 14:14

## 2019-01-01 RX ADMIN — Medication 2 MILLIGRAM(S): at 09:26

## 2019-01-01 RX ADMIN — Medication 650 MILLIGRAM(S): at 21:15

## 2019-01-01 RX ADMIN — Medication 5 MILLIGRAM(S): at 05:35

## 2019-01-01 RX ADMIN — SODIUM CHLORIDE 3 MILLILITER(S): 9 INJECTION INTRAMUSCULAR; INTRAVENOUS; SUBCUTANEOUS at 05:17

## 2019-01-01 RX ADMIN — PANTOPRAZOLE SODIUM 40 MILLIGRAM(S): 20 TABLET, DELAYED RELEASE ORAL at 23:08

## 2019-01-01 RX ADMIN — PANTOPRAZOLE SODIUM 40 MILLIGRAM(S): 20 TABLET, DELAYED RELEASE ORAL at 12:40

## 2019-01-01 RX ADMIN — Medication 50 MILLIGRAM(S): at 21:14

## 2019-01-01 RX ADMIN — VASOPRESSIN 1 UNIT(S)/MIN: 20 INJECTION INTRAVENOUS at 21:00

## 2019-01-01 RX ADMIN — POLYETHYLENE GLYCOL 3350 17 GRAM(S): 17 POWDER, FOR SOLUTION ORAL at 10:07

## 2019-01-01 RX ADMIN — NAFCILLIN 200 GRAM(S): 10 INJECTION, POWDER, FOR SOLUTION INTRAVENOUS at 02:12

## 2019-01-01 RX ADMIN — Medication 500 MILLIGRAM(S): at 12:36

## 2019-01-01 RX ADMIN — HEPARIN SODIUM 5000 UNIT(S): 5000 INJECTION INTRAVENOUS; SUBCUTANEOUS at 21:39

## 2019-01-01 RX ADMIN — MIDODRINE HYDROCHLORIDE 10 MILLIGRAM(S): 2.5 TABLET ORAL at 15:04

## 2019-01-01 RX ADMIN — CHLORHEXIDINE GLUCONATE 15 MILLILITER(S): 213 SOLUTION TOPICAL at 06:26

## 2019-01-01 RX ADMIN — ZINC SULFATE TAB 220 MG (50 MG ZINC EQUIVALENT) 220 MILLIGRAM(S): 220 (50 ZN) TAB at 11:40

## 2019-01-01 RX ADMIN — NAFCILLIN 200 GRAM(S): 10 INJECTION, POWDER, FOR SOLUTION INTRAVENOUS at 06:54

## 2019-01-01 RX ADMIN — NAFCILLIN 200 GRAM(S): 10 INJECTION, POWDER, FOR SOLUTION INTRAVENOUS at 05:06

## 2019-01-01 RX ADMIN — HEPARIN SODIUM 5000 UNIT(S): 5000 INJECTION INTRAVENOUS; SUBCUTANEOUS at 05:55

## 2019-01-01 RX ADMIN — NAFCILLIN 200 GRAM(S): 10 INJECTION, POWDER, FOR SOLUTION INTRAVENOUS at 10:05

## 2019-01-01 RX ADMIN — CHLORHEXIDINE GLUCONATE 15 MILLILITER(S): 213 SOLUTION TOPICAL at 21:59

## 2019-01-01 RX ADMIN — MIDODRINE HYDROCHLORIDE 10 MILLIGRAM(S): 2.5 TABLET ORAL at 01:02

## 2019-01-01 RX ADMIN — SODIUM CHLORIDE 3 MILLILITER(S): 9 INJECTION INTRAMUSCULAR; INTRAVENOUS; SUBCUTANEOUS at 14:24

## 2019-01-01 RX ADMIN — CHLORHEXIDINE GLUCONATE 1 APPLICATION(S): 213 SOLUTION TOPICAL at 05:52

## 2019-01-01 RX ADMIN — SODIUM CHLORIDE 3 MILLILITER(S): 9 INJECTION INTRAMUSCULAR; INTRAVENOUS; SUBCUTANEOUS at 21:39

## 2019-01-01 RX ADMIN — Medication 3 MILLILITER(S): at 23:09

## 2019-01-01 RX ADMIN — CHLORHEXIDINE GLUCONATE 15 MILLILITER(S): 213 SOLUTION TOPICAL at 21:14

## 2019-01-01 RX ADMIN — NAFCILLIN 200 GRAM(S): 10 INJECTION, POWDER, FOR SOLUTION INTRAVENOUS at 13:40

## 2019-01-01 RX ADMIN — Medication 1 DROP(S): at 12:34

## 2019-01-01 RX ADMIN — HEPARIN SODIUM 5000 UNIT(S): 5000 INJECTION INTRAVENOUS; SUBCUTANEOUS at 05:43

## 2019-01-01 RX ADMIN — CHLORHEXIDINE GLUCONATE 1 APPLICATION(S): 213 SOLUTION TOPICAL at 06:05

## 2019-01-01 RX ADMIN — Medication 450 MILLILITER(S): at 14:02

## 2019-01-01 RX ADMIN — PROPOFOL 4.42 MICROGRAM(S)/KG/MIN: 10 INJECTION, EMULSION INTRAVENOUS at 07:51

## 2019-01-01 RX ADMIN — HEPARIN SODIUM 5000 UNIT(S): 5000 INJECTION INTRAVENOUS; SUBCUTANEOUS at 21:04

## 2019-01-01 RX ADMIN — Medication 650 MILLIGRAM(S): at 13:16

## 2019-01-01 RX ADMIN — SODIUM CHLORIDE 3 MILLILITER(S): 9 INJECTION INTRAMUSCULAR; INTRAVENOUS; SUBCUTANEOUS at 22:32

## 2019-01-01 RX ADMIN — CHLORHEXIDINE GLUCONATE 1 APPLICATION(S): 213 SOLUTION TOPICAL at 06:24

## 2019-01-01 RX ADMIN — Medication 3 MILLILITER(S): at 09:24

## 2019-01-01 RX ADMIN — SODIUM CHLORIDE 3 MILLILITER(S): 9 INJECTION INTRAMUSCULAR; INTRAVENOUS; SUBCUTANEOUS at 15:00

## 2019-01-01 RX ADMIN — Medication 3 MILLILITER(S): at 18:02

## 2019-01-01 RX ADMIN — Medication 25 MILLILITER(S): at 05:52

## 2019-01-01 RX ADMIN — PROPOFOL 2.21 MICROGRAM(S)/KG/MIN: 10 INJECTION, EMULSION INTRAVENOUS at 23:40

## 2019-01-01 RX ADMIN — QUETIAPINE FUMARATE 100 MILLIGRAM(S): 200 TABLET, FILM COATED ORAL at 18:28

## 2019-01-01 RX ADMIN — NAFCILLIN 200 GRAM(S): 10 INJECTION, POWDER, FOR SOLUTION INTRAVENOUS at 18:29

## 2019-01-01 RX ADMIN — Medication 3 MILLILITER(S): at 06:15

## 2019-01-01 RX ADMIN — Medication 107.4 MILLIGRAM(S): at 18:33

## 2019-01-01 RX ADMIN — MIDAZOLAM HYDROCHLORIDE 4 MILLIGRAM(S): 1 INJECTION, SOLUTION INTRAMUSCULAR; INTRAVENOUS at 03:32

## 2019-01-01 RX ADMIN — CHLORHEXIDINE GLUCONATE 1 APPLICATION(S): 213 SOLUTION TOPICAL at 05:38

## 2019-01-01 RX ADMIN — Medication 1 DROP(S): at 17:58

## 2019-01-01 RX ADMIN — NAFCILLIN 200 GRAM(S): 10 INJECTION, POWDER, FOR SOLUTION INTRAVENOUS at 18:53

## 2019-01-01 RX ADMIN — CHLORHEXIDINE GLUCONATE 1 APPLICATION(S): 213 SOLUTION TOPICAL at 06:53

## 2019-01-01 RX ADMIN — FENTANYL CITRATE 3.68 MICROGRAM(S)/KG/HR: 50 INJECTION INTRAVENOUS at 11:05

## 2019-01-01 RX ADMIN — POLYETHYLENE GLYCOL 3350 17 GRAM(S): 17 POWDER, FOR SOLUTION ORAL at 17:55

## 2019-01-01 RX ADMIN — MIDODRINE HYDROCHLORIDE 10 MILLIGRAM(S): 2.5 TABLET ORAL at 21:49

## 2019-01-01 RX ADMIN — CHLORHEXIDINE GLUCONATE 1 APPLICATION(S): 213 SOLUTION TOPICAL at 06:35

## 2019-01-01 RX ADMIN — Medication 500 MILLIGRAM(S): at 11:42

## 2019-01-01 RX ADMIN — Medication 1 DROP(S): at 11:44

## 2019-01-01 RX ADMIN — GABAPENTIN 100 MILLIGRAM(S): 400 CAPSULE ORAL at 10:03

## 2019-01-01 RX ADMIN — NAFCILLIN 200 GRAM(S): 10 INJECTION, POWDER, FOR SOLUTION INTRAVENOUS at 22:40

## 2019-01-01 RX ADMIN — Medication 200 GRAM(S): at 22:12

## 2019-01-01 RX ADMIN — SODIUM CHLORIDE 3 MILLILITER(S): 9 INJECTION INTRAMUSCULAR; INTRAVENOUS; SUBCUTANEOUS at 21:01

## 2019-01-01 RX ADMIN — CHLORHEXIDINE GLUCONATE 1 APPLICATION(S): 213 SOLUTION TOPICAL at 06:40

## 2019-01-01 RX ADMIN — FENTANYL CITRATE 3.68 MICROGRAM(S)/KG/HR: 50 INJECTION INTRAVENOUS at 11:35

## 2019-01-01 RX ADMIN — SODIUM CHLORIDE 3 MILLILITER(S): 9 INJECTION INTRAMUSCULAR; INTRAVENOUS; SUBCUTANEOUS at 05:08

## 2019-01-01 RX ADMIN — Medication 50 MILLILITER(S): at 18:38

## 2019-01-01 RX ADMIN — Medication 3 MILLILITER(S): at 09:38

## 2019-01-01 RX ADMIN — CHLORHEXIDINE GLUCONATE 1 APPLICATION(S): 213 SOLUTION TOPICAL at 12:23

## 2019-01-01 RX ADMIN — Medication 1 DROP(S): at 11:40

## 2019-01-01 RX ADMIN — NAFCILLIN 200 GRAM(S): 10 INJECTION, POWDER, FOR SOLUTION INTRAVENOUS at 13:39

## 2019-01-01 RX ADMIN — Medication 6.9 MICROGRAM(S)/KG/MIN: at 13:47

## 2019-01-01 RX ADMIN — Medication 1 DROP(S): at 17:43

## 2019-01-01 RX ADMIN — NAFCILLIN 200 GRAM(S): 10 INJECTION, POWDER, FOR SOLUTION INTRAVENOUS at 10:45

## 2019-01-01 RX ADMIN — Medication 3 MILLILITER(S): at 10:54

## 2019-01-01 RX ADMIN — Medication 1 DROP(S): at 12:36

## 2019-01-01 RX ADMIN — BUMETANIDE 10 MG/HR: 0.25 INJECTION INTRAMUSCULAR; INTRAVENOUS at 23:00

## 2019-01-01 RX ADMIN — HEPARIN SODIUM 5000 UNIT(S): 5000 INJECTION INTRAVENOUS; SUBCUTANEOUS at 21:55

## 2019-01-01 RX ADMIN — Medication 1 DROP(S): at 11:04

## 2019-01-01 RX ADMIN — FENTANYL CITRATE 3.68 MICROGRAM(S)/KG/HR: 50 INJECTION INTRAVENOUS at 21:48

## 2019-01-01 RX ADMIN — Medication 3 MILLILITER(S): at 09:42

## 2019-01-01 RX ADMIN — CHLORHEXIDINE GLUCONATE 15 MILLILITER(S): 213 SOLUTION TOPICAL at 19:31

## 2019-01-01 RX ADMIN — SODIUM CHLORIDE 3 MILLILITER(S): 9 INJECTION INTRAMUSCULAR; INTRAVENOUS; SUBCUTANEOUS at 21:25

## 2019-01-01 RX ADMIN — Medication 1 DROP(S): at 11:45

## 2019-01-01 RX ADMIN — Medication 5 MILLIGRAM(S): at 05:27

## 2019-01-01 RX ADMIN — CHLORHEXIDINE GLUCONATE 15 MILLILITER(S): 213 SOLUTION TOPICAL at 10:48

## 2019-01-01 RX ADMIN — NAFCILLIN 200 GRAM(S): 10 INJECTION, POWDER, FOR SOLUTION INTRAVENOUS at 06:04

## 2019-01-01 RX ADMIN — PROPOFOL 2.21 MICROGRAM(S)/KG/MIN: 10 INJECTION, EMULSION INTRAVENOUS at 06:09

## 2019-01-01 RX ADMIN — Medication 3 MILLILITER(S): at 13:52

## 2019-01-01 RX ADMIN — Medication 1 DROP(S): at 18:36

## 2019-01-01 RX ADMIN — Medication 50 MILLILITER(S): at 09:13

## 2019-01-01 RX ADMIN — MIDODRINE HYDROCHLORIDE 10 MILLIGRAM(S): 2.5 TABLET ORAL at 17:27

## 2019-01-01 RX ADMIN — HEPARIN SODIUM 5000 UNIT(S): 5000 INJECTION INTRAVENOUS; SUBCUTANEOUS at 14:40

## 2019-01-01 RX ADMIN — CHLORHEXIDINE GLUCONATE 15 MILLILITER(S): 213 SOLUTION TOPICAL at 09:33

## 2019-01-01 RX ADMIN — Medication 50 MILLILITER(S): at 17:15

## 2019-01-01 RX ADMIN — Medication 1000 MILLIGRAM(S): at 23:30

## 2019-01-01 RX ADMIN — HEPARIN SODIUM 5000 UNIT(S): 5000 INJECTION INTRAVENOUS; SUBCUTANEOUS at 05:25

## 2019-01-01 RX ADMIN — ZINC SULFATE TAB 220 MG (50 MG ZINC EQUIVALENT) 220 MILLIGRAM(S): 220 (50 ZN) TAB at 10:57

## 2019-01-01 RX ADMIN — Medication 10 MILLIGRAM(S): at 12:54

## 2019-01-01 RX ADMIN — FENTANYL CITRATE 100 MICROGRAM(S): 50 INJECTION INTRAVENOUS at 07:35

## 2019-01-01 RX ADMIN — FENTANYL CITRATE 7.36 MICROGRAM(S)/KG/HR: 50 INJECTION INTRAVENOUS at 16:24

## 2019-01-01 RX ADMIN — NAFCILLIN 200 GRAM(S): 10 INJECTION, POWDER, FOR SOLUTION INTRAVENOUS at 18:26

## 2019-01-01 RX ADMIN — SODIUM CHLORIDE 500 MILLILITER(S): 9 INJECTION INTRAMUSCULAR; INTRAVENOUS; SUBCUTANEOUS at 11:21

## 2019-01-01 RX ADMIN — CHLORHEXIDINE GLUCONATE 15 MILLILITER(S): 213 SOLUTION TOPICAL at 06:55

## 2019-01-01 RX ADMIN — CHLORHEXIDINE GLUCONATE 1 APPLICATION(S): 213 SOLUTION TOPICAL at 05:43

## 2019-01-01 RX ADMIN — CHLORHEXIDINE GLUCONATE 1 APPLICATION(S): 213 SOLUTION TOPICAL at 06:10

## 2019-01-01 RX ADMIN — NAFCILLIN 200 GRAM(S): 10 INJECTION, POWDER, FOR SOLUTION INTRAVENOUS at 14:20

## 2019-01-01 RX ADMIN — Medication 3 MILLILITER(S): at 09:13

## 2019-01-01 RX ADMIN — Medication 3 MILLILITER(S): at 01:11

## 2019-01-01 RX ADMIN — SODIUM CHLORIDE 3 MILLILITER(S): 9 INJECTION INTRAMUSCULAR; INTRAVENOUS; SUBCUTANEOUS at 21:22

## 2019-01-01 RX ADMIN — FENTANYL CITRATE 3.68 MICROGRAM(S)/KG/HR: 50 INJECTION INTRAVENOUS at 18:59

## 2019-01-01 RX ADMIN — HEPARIN SODIUM 5000 UNIT(S): 5000 INJECTION INTRAVENOUS; SUBCUTANEOUS at 14:05

## 2019-01-01 RX ADMIN — Medication 25 MILLIGRAM(S): at 17:06

## 2019-01-01 RX ADMIN — CHLORHEXIDINE GLUCONATE 1 APPLICATION(S): 213 SOLUTION TOPICAL at 05:47

## 2019-01-01 RX ADMIN — NAFCILLIN 200 GRAM(S): 10 INJECTION, POWDER, FOR SOLUTION INTRAVENOUS at 21:44

## 2019-01-01 RX ADMIN — FENTANYL CITRATE 3.68 MICROGRAM(S)/KG/HR: 50 INJECTION INTRAVENOUS at 08:32

## 2019-01-01 RX ADMIN — POLYETHYLENE GLYCOL 3350 17 GRAM(S): 17 POWDER, FOR SOLUTION ORAL at 17:44

## 2019-01-01 RX ADMIN — Medication 500 MILLIGRAM(S): at 03:55

## 2019-01-01 RX ADMIN — Medication 50 MILLIEQUIVALENT(S): at 06:49

## 2019-01-01 RX ADMIN — Medication 3 MILLILITER(S): at 01:15

## 2019-01-01 RX ADMIN — CHLORHEXIDINE GLUCONATE 15 MILLILITER(S): 213 SOLUTION TOPICAL at 21:17

## 2019-01-01 RX ADMIN — Medication 50 MILLIEQUIVALENT(S): at 00:13

## 2019-01-01 RX ADMIN — NAFCILLIN 200 GRAM(S): 10 INJECTION, POWDER, FOR SOLUTION INTRAVENOUS at 01:54

## 2019-01-01 RX ADMIN — NAFCILLIN 200 GRAM(S): 10 INJECTION, POWDER, FOR SOLUTION INTRAVENOUS at 11:39

## 2019-01-01 RX ADMIN — PANTOPRAZOLE SODIUM 40 MILLIGRAM(S): 20 TABLET, DELAYED RELEASE ORAL at 11:40

## 2019-01-01 RX ADMIN — MIDODRINE HYDROCHLORIDE 5 MILLIGRAM(S): 2.5 TABLET ORAL at 14:07

## 2019-01-01 RX ADMIN — CISATRACURIUM BESYLATE 10 MILLIGRAM(S): 2 INJECTION INTRAVENOUS at 12:10

## 2019-01-01 RX ADMIN — Medication 3 MILLILITER(S): at 09:10

## 2019-01-01 RX ADMIN — Medication 3 MILLILITER(S): at 05:44

## 2019-01-01 RX ADMIN — Medication 650 MILLIGRAM(S): at 15:08

## 2019-01-01 RX ADMIN — Medication 25 MILLILITER(S): at 18:58

## 2019-01-01 RX ADMIN — NAFCILLIN 200 GRAM(S): 10 INJECTION, POWDER, FOR SOLUTION INTRAVENOUS at 01:14

## 2019-01-01 RX ADMIN — Medication 650 MILLIGRAM(S): at 22:09

## 2019-01-01 RX ADMIN — MIDODRINE HYDROCHLORIDE 10 MILLIGRAM(S): 2.5 TABLET ORAL at 17:32

## 2019-01-01 RX ADMIN — HEPARIN SODIUM 5000 UNIT(S): 5000 INJECTION INTRAVENOUS; SUBCUTANEOUS at 21:54

## 2019-01-01 RX ADMIN — NAFCILLIN 200 GRAM(S): 10 INJECTION, POWDER, FOR SOLUTION INTRAVENOUS at 18:55

## 2019-01-01 RX ADMIN — NAFCILLIN 200 GRAM(S): 10 INJECTION, POWDER, FOR SOLUTION INTRAVENOUS at 18:05

## 2019-01-01 RX ADMIN — Medication 1 DROP(S): at 17:27

## 2019-01-01 RX ADMIN — NAFCILLIN 200 GRAM(S): 10 INJECTION, POWDER, FOR SOLUTION INTRAVENOUS at 06:35

## 2019-01-01 RX ADMIN — NAFCILLIN 200 GRAM(S): 10 INJECTION, POWDER, FOR SOLUTION INTRAVENOUS at 01:48

## 2019-01-01 RX ADMIN — Medication 650 MILLIGRAM(S): at 18:29

## 2019-01-01 RX ADMIN — Medication 50 MILLIEQUIVALENT(S): at 12:33

## 2019-01-01 RX ADMIN — NAFCILLIN 200 GRAM(S): 10 INJECTION, POWDER, FOR SOLUTION INTRAVENOUS at 09:12

## 2019-01-01 RX ADMIN — CHLORHEXIDINE GLUCONATE 1 APPLICATION(S): 213 SOLUTION TOPICAL at 05:51

## 2019-01-01 RX ADMIN — POLYETHYLENE GLYCOL 3350 17 GRAM(S): 17 POWDER, FOR SOLUTION ORAL at 05:06

## 2019-01-01 RX ADMIN — Medication 15 MILLILITER(S): at 12:37

## 2019-01-01 RX ADMIN — NAFCILLIN 200 GRAM(S): 10 INJECTION, POWDER, FOR SOLUTION INTRAVENOUS at 21:28

## 2019-01-01 RX ADMIN — SODIUM CHLORIDE 3 MILLILITER(S): 9 INJECTION INTRAMUSCULAR; INTRAVENOUS; SUBCUTANEOUS at 17:16

## 2019-01-01 RX ADMIN — Medication 15 MILLILITER(S): at 12:06

## 2019-01-01 RX ADMIN — Medication 3 MILLILITER(S): at 18:05

## 2019-01-01 RX ADMIN — ZINC SULFATE TAB 220 MG (50 MG ZINC EQUIVALENT) 220 MILLIGRAM(S): 220 (50 ZN) TAB at 11:20

## 2019-01-01 RX ADMIN — Medication 50 MILLILITER(S): at 17:09

## 2019-01-01 RX ADMIN — Medication 1 DROP(S): at 23:36

## 2019-01-01 RX ADMIN — Medication 15 MILLILITER(S): at 11:56

## 2019-01-01 RX ADMIN — CISATRACURIUM BESYLATE 20 MILLIGRAM(S): 2 INJECTION INTRAVENOUS at 08:03

## 2019-01-01 RX ADMIN — FENTANYL CITRATE 3.68 MICROGRAM(S)/KG/HR: 50 INJECTION INTRAVENOUS at 21:51

## 2019-01-01 RX ADMIN — Medication 3 MILLILITER(S): at 18:00

## 2019-01-01 RX ADMIN — Medication 125 MILLILITER(S): at 22:30

## 2019-01-01 RX ADMIN — Medication 3 MILLILITER(S): at 06:06

## 2019-01-01 RX ADMIN — SODIUM CHLORIDE 3 MILLILITER(S): 9 INJECTION INTRAMUSCULAR; INTRAVENOUS; SUBCUTANEOUS at 14:17

## 2019-01-01 RX ADMIN — CHLORHEXIDINE GLUCONATE 15 MILLILITER(S): 213 SOLUTION TOPICAL at 21:56

## 2019-01-01 RX ADMIN — Medication 1 DROP(S): at 15:08

## 2019-01-01 RX ADMIN — Medication 400 INTERNATIONAL UNIT(S): at 12:37

## 2019-01-01 RX ADMIN — FENTANYL CITRATE 50 MICROGRAM(S): 50 INJECTION INTRAVENOUS at 20:20

## 2019-01-01 RX ADMIN — Medication 50 MILLIGRAM(S): at 06:13

## 2019-01-01 RX ADMIN — FENTANYL CITRATE 1 PATCH: 50 INJECTION INTRAVENOUS at 06:58

## 2019-01-01 RX ADMIN — Medication 3 MILLILITER(S): at 17:31

## 2019-01-01 RX ADMIN — Medication 3 MILLILITER(S): at 21:21

## 2019-01-01 RX ADMIN — Medication 50 MILLILITER(S): at 01:02

## 2019-01-01 RX ADMIN — NAFCILLIN 200 GRAM(S): 10 INJECTION, POWDER, FOR SOLUTION INTRAVENOUS at 14:35

## 2019-01-01 RX ADMIN — POLYETHYLENE GLYCOL 3350 17 GRAM(S): 17 POWDER, FOR SOLUTION ORAL at 10:21

## 2019-01-01 RX ADMIN — Medication 400 INTERNATIONAL UNIT(S): at 11:55

## 2019-01-01 RX ADMIN — Medication 50 MILLIGRAM(S): at 05:04

## 2019-01-01 RX ADMIN — Medication 50 MILLILITER(S): at 01:13

## 2019-01-01 RX ADMIN — MIDODRINE HYDROCHLORIDE 10 MILLIGRAM(S): 2.5 TABLET ORAL at 19:14

## 2019-01-01 RX ADMIN — PROPOFOL 2.21 MICROGRAM(S)/KG/MIN: 10 INJECTION, EMULSION INTRAVENOUS at 05:45

## 2019-01-01 RX ADMIN — Medication 400 INTERNATIONAL UNIT(S): at 11:35

## 2019-01-01 RX ADMIN — Medication 1 DROP(S): at 07:05

## 2019-01-01 RX ADMIN — NAFCILLIN 200 GRAM(S): 10 INJECTION, POWDER, FOR SOLUTION INTRAVENOUS at 06:01

## 2019-01-01 RX ADMIN — MIDODRINE HYDROCHLORIDE 10 MILLIGRAM(S): 2.5 TABLET ORAL at 02:55

## 2019-01-01 RX ADMIN — QUETIAPINE FUMARATE 100 MILLIGRAM(S): 200 TABLET, FILM COATED ORAL at 18:33

## 2019-01-01 RX ADMIN — CEFTRIAXONE 100 MILLIGRAM(S): 500 INJECTION, POWDER, FOR SOLUTION INTRAMUSCULAR; INTRAVENOUS at 12:15

## 2019-01-01 RX ADMIN — Medication 3 MILLILITER(S): at 17:15

## 2019-01-01 RX ADMIN — NAFCILLIN 200 GRAM(S): 10 INJECTION, POWDER, FOR SOLUTION INTRAVENOUS at 15:38

## 2019-01-01 RX ADMIN — Medication 10 MILLIGRAM(S): at 09:53

## 2019-01-01 RX ADMIN — MIDAZOLAM HYDROCHLORIDE 4 MILLIGRAM(S): 1 INJECTION, SOLUTION INTRAMUSCULAR; INTRAVENOUS at 07:35

## 2019-01-01 RX ADMIN — SODIUM CHLORIDE 3 MILLILITER(S): 9 INJECTION INTRAMUSCULAR; INTRAVENOUS; SUBCUTANEOUS at 06:01

## 2019-01-01 RX ADMIN — DEXMEDETOMIDINE HYDROCHLORIDE IN 0.9% SODIUM CHLORIDE 5.52 MICROGRAM(S)/KG/HR: 4 INJECTION INTRAVENOUS at 18:48

## 2019-01-01 RX ADMIN — PROPOFOL 2.21 MICROGRAM(S)/KG/MIN: 10 INJECTION, EMULSION INTRAVENOUS at 22:30

## 2019-01-01 RX ADMIN — PANTOPRAZOLE SODIUM 40 MILLIGRAM(S): 20 TABLET, DELAYED RELEASE ORAL at 11:48

## 2019-01-01 RX ADMIN — VORICONAZOLE 300 MILLIGRAM(S): 10 INJECTION, POWDER, LYOPHILIZED, FOR SOLUTION INTRAVENOUS at 05:37

## 2019-01-01 RX ADMIN — ZINC SULFATE TAB 220 MG (50 MG ZINC EQUIVALENT) 220 MILLIGRAM(S): 220 (50 ZN) TAB at 11:04

## 2019-01-01 RX ADMIN — Medication 200 GRAM(S): at 05:54

## 2019-01-01 RX ADMIN — Medication 15 MILLILITER(S): at 11:44

## 2019-01-01 RX ADMIN — Medication 400 INTERNATIONAL UNIT(S): at 10:57

## 2019-01-01 RX ADMIN — PROPOFOL 2.21 MICROGRAM(S)/KG/MIN: 10 INJECTION, EMULSION INTRAVENOUS at 09:35

## 2019-01-01 RX ADMIN — Medication 107.4 MILLIGRAM(S): at 18:36

## 2019-01-01 RX ADMIN — PROPOFOL 2.21 MICROGRAM(S)/KG/MIN: 10 INJECTION, EMULSION INTRAVENOUS at 10:54

## 2019-01-01 RX ADMIN — Medication 6.9 MICROGRAM(S)/KG/MIN: at 23:10

## 2019-01-01 RX ADMIN — FENTANYL CITRATE 3.68 MICROGRAM(S)/KG/HR: 50 INJECTION INTRAVENOUS at 12:32

## 2019-01-01 RX ADMIN — NAFCILLIN 200 GRAM(S): 10 INJECTION, POWDER, FOR SOLUTION INTRAVENOUS at 02:25

## 2019-01-01 RX ADMIN — NAFCILLIN 200 GRAM(S): 10 INJECTION, POWDER, FOR SOLUTION INTRAVENOUS at 22:35

## 2019-01-01 RX ADMIN — INSULIN HUMAN 10 UNIT(S): 100 INJECTION, SOLUTION SUBCUTANEOUS at 07:18

## 2019-01-01 RX ADMIN — PROPOFOL 2.21 MICROGRAM(S)/KG/MIN: 10 INJECTION, EMULSION INTRAVENOUS at 19:12

## 2019-01-01 RX ADMIN — Medication 25 MILLIGRAM(S): at 21:34

## 2019-01-01 RX ADMIN — CHLORHEXIDINE GLUCONATE 15 MILLILITER(S): 213 SOLUTION TOPICAL at 21:46

## 2019-01-01 RX ADMIN — CHLORHEXIDINE GLUCONATE 1 APPLICATION(S): 213 SOLUTION TOPICAL at 06:33

## 2019-01-01 RX ADMIN — Medication 50 MILLIGRAM(S): at 17:05

## 2019-01-01 RX ADMIN — SODIUM CHLORIDE 3 MILLILITER(S): 9 INJECTION INTRAMUSCULAR; INTRAVENOUS; SUBCUTANEOUS at 07:09

## 2019-01-01 RX ADMIN — Medication 15 MILLILITER(S): at 12:31

## 2019-01-01 RX ADMIN — Medication 15 MILLILITER(S): at 14:18

## 2019-01-01 RX ADMIN — SENNA PLUS 2 TABLET(S): 8.6 TABLET ORAL at 21:39

## 2019-01-01 RX ADMIN — HEPARIN SODIUM 5000 UNIT(S): 5000 INJECTION INTRAVENOUS; SUBCUTANEOUS at 05:27

## 2019-01-01 RX ADMIN — ZINC SULFATE TAB 220 MG (50 MG ZINC EQUIVALENT) 220 MILLIGRAM(S): 220 (50 ZN) TAB at 15:10

## 2019-01-01 RX ADMIN — Medication 3 MILLILITER(S): at 09:11

## 2019-01-01 RX ADMIN — HEPARIN SODIUM 5000 UNIT(S): 5000 INJECTION INTRAVENOUS; SUBCUTANEOUS at 21:01

## 2019-01-01 RX ADMIN — Medication 3 MILLILITER(S): at 06:14

## 2019-01-01 RX ADMIN — CHLORHEXIDINE GLUCONATE 15 MILLILITER(S): 213 SOLUTION TOPICAL at 22:21

## 2019-01-01 RX ADMIN — Medication 107.4 MILLIGRAM(S): at 17:26

## 2019-01-01 RX ADMIN — Medication 85 MILLIMOLE(S): at 02:54

## 2019-01-01 RX ADMIN — FENTANYL CITRATE 7.36 MICROGRAM(S)/KG/HR: 50 INJECTION INTRAVENOUS at 01:00

## 2019-01-01 RX ADMIN — MIDODRINE HYDROCHLORIDE 10 MILLIGRAM(S): 2.5 TABLET ORAL at 10:34

## 2019-01-01 RX ADMIN — ZINC SULFATE TAB 220 MG (50 MG ZINC EQUIVALENT) 220 MILLIGRAM(S): 220 (50 ZN) TAB at 11:15

## 2019-01-01 RX ADMIN — MIDODRINE HYDROCHLORIDE 10 MILLIGRAM(S): 2.5 TABLET ORAL at 11:45

## 2019-01-01 RX ADMIN — PROPOFOL 2.21 MICROGRAM(S)/KG/MIN: 10 INJECTION, EMULSION INTRAVENOUS at 11:56

## 2019-01-01 RX ADMIN — HEPARIN SODIUM 5000 UNIT(S): 5000 INJECTION INTRAVENOUS; SUBCUTANEOUS at 13:17

## 2019-01-01 RX ADMIN — Medication 2: at 07:26

## 2019-01-01 RX ADMIN — NAFCILLIN 200 GRAM(S): 10 INJECTION, POWDER, FOR SOLUTION INTRAVENOUS at 17:22

## 2019-01-01 RX ADMIN — NAFCILLIN 200 GRAM(S): 10 INJECTION, POWDER, FOR SOLUTION INTRAVENOUS at 14:11

## 2019-01-01 RX ADMIN — NAFCILLIN 200 GRAM(S): 10 INJECTION, POWDER, FOR SOLUTION INTRAVENOUS at 06:03

## 2019-01-01 RX ADMIN — Medication 25 MILLIGRAM(S): at 05:26

## 2019-01-01 RX ADMIN — PROPOFOL 2.21 MICROGRAM(S)/KG/MIN: 10 INJECTION, EMULSION INTRAVENOUS at 10:05

## 2019-01-01 RX ADMIN — FENTANYL CITRATE 75 MICROGRAM(S): 50 INJECTION INTRAVENOUS at 15:30

## 2019-01-01 RX ADMIN — Medication 400 INTERNATIONAL UNIT(S): at 12:15

## 2019-01-01 RX ADMIN — HEPARIN SODIUM 5000 UNIT(S): 5000 INJECTION INTRAVENOUS; SUBCUTANEOUS at 13:20

## 2019-01-01 RX ADMIN — FENTANYL CITRATE 7.36 MICROGRAM(S)/KG/HR: 50 INJECTION INTRAVENOUS at 21:08

## 2019-01-01 RX ADMIN — Medication 1 DROP(S): at 09:36

## 2019-01-01 RX ADMIN — Medication 3 MILLILITER(S): at 10:21

## 2019-01-01 RX ADMIN — Medication 50 MILLIEQUIVALENT(S): at 14:20

## 2019-01-01 RX ADMIN — PANTOPRAZOLE SODIUM 40 MILLIGRAM(S): 20 TABLET, DELAYED RELEASE ORAL at 11:57

## 2019-01-01 RX ADMIN — NAFCILLIN 200 GRAM(S): 10 INJECTION, POWDER, FOR SOLUTION INTRAVENOUS at 10:01

## 2019-01-01 RX ADMIN — PROPOFOL 2.21 MICROGRAM(S)/KG/MIN: 10 INJECTION, EMULSION INTRAVENOUS at 03:09

## 2019-01-01 RX ADMIN — PROPOFOL 2.21 MICROGRAM(S)/KG/MIN: 10 INJECTION, EMULSION INTRAVENOUS at 08:33

## 2019-01-01 RX ADMIN — NAFCILLIN 200 GRAM(S): 10 INJECTION, POWDER, FOR SOLUTION INTRAVENOUS at 21:31

## 2019-01-01 RX ADMIN — MIDODRINE HYDROCHLORIDE 5 MILLIGRAM(S): 2.5 TABLET ORAL at 05:27

## 2019-01-01 RX ADMIN — CHLORHEXIDINE GLUCONATE 15 MILLILITER(S): 213 SOLUTION TOPICAL at 10:54

## 2019-01-01 RX ADMIN — MIDODRINE HYDROCHLORIDE 10 MILLIGRAM(S): 2.5 TABLET ORAL at 01:13

## 2019-01-01 RX ADMIN — FENTANYL CITRATE 3.68 MICROGRAM(S)/KG/HR: 50 INJECTION INTRAVENOUS at 20:56

## 2019-01-01 RX ADMIN — Medication 400 INTERNATIONAL UNIT(S): at 11:04

## 2019-01-01 RX ADMIN — Medication 3 MILLILITER(S): at 01:23

## 2019-01-01 RX ADMIN — PROPOFOL 2.21 MICROGRAM(S)/KG/MIN: 10 INJECTION, EMULSION INTRAVENOUS at 23:36

## 2019-01-01 RX ADMIN — Medication 3.45 MICROGRAM(S)/KG/MIN: at 11:03

## 2019-01-01 RX ADMIN — POLYETHYLENE GLYCOL 3350 17 GRAM(S): 17 POWDER, FOR SOLUTION ORAL at 05:44

## 2019-01-01 RX ADMIN — Medication 5 MILLIGRAM(S): at 14:11

## 2019-01-01 RX ADMIN — MIDODRINE HYDROCHLORIDE 5 MILLIGRAM(S): 2.5 TABLET ORAL at 10:56

## 2019-01-01 RX ADMIN — Medication 50 MILLIGRAM(S): at 12:02

## 2019-01-01 RX ADMIN — CHLORHEXIDINE GLUCONATE 1 APPLICATION(S): 213 SOLUTION TOPICAL at 06:06

## 2019-01-01 RX ADMIN — HEPARIN SODIUM 5000 UNIT(S): 5000 INJECTION INTRAVENOUS; SUBCUTANEOUS at 06:26

## 2019-01-01 RX ADMIN — Medication 107.4 MILLIGRAM(S): at 16:52

## 2019-01-01 RX ADMIN — Medication 400 INTERNATIONAL UNIT(S): at 11:45

## 2019-01-01 RX ADMIN — FENTANYL CITRATE 3.68 MICROGRAM(S)/KG/HR: 50 INJECTION INTRAVENOUS at 07:41

## 2019-01-01 RX ADMIN — POLYETHYLENE GLYCOL 3350 17 GRAM(S): 17 POWDER, FOR SOLUTION ORAL at 06:25

## 2019-01-01 RX ADMIN — NAFCILLIN 200 GRAM(S): 10 INJECTION, POWDER, FOR SOLUTION INTRAVENOUS at 06:28

## 2019-01-01 RX ADMIN — QUETIAPINE FUMARATE 100 MILLIGRAM(S): 200 TABLET, FILM COATED ORAL at 17:22

## 2019-01-01 RX ADMIN — IOHEXOL 30 MILLILITER(S): 300 INJECTION, SOLUTION INTRAVENOUS at 17:23

## 2019-01-01 RX ADMIN — Medication 3 MILLILITER(S): at 21:42

## 2019-01-01 RX ADMIN — CHLORHEXIDINE GLUCONATE 15 MILLILITER(S): 213 SOLUTION TOPICAL at 10:18

## 2019-01-01 RX ADMIN — Medication 3 MILLILITER(S): at 17:04

## 2019-01-01 RX ADMIN — Medication 650 MILLIGRAM(S): at 15:00

## 2019-01-01 RX ADMIN — GABAPENTIN 100 MILLIGRAM(S): 400 CAPSULE ORAL at 10:34

## 2019-01-01 RX ADMIN — Medication 3 MILLILITER(S): at 14:15

## 2019-01-01 RX ADMIN — Medication 40 MILLIEQUIVALENT(S): at 06:11

## 2019-01-01 RX ADMIN — PANTOPRAZOLE SODIUM 40 MILLIGRAM(S): 20 TABLET, DELAYED RELEASE ORAL at 11:28

## 2019-01-01 RX ADMIN — Medication 3 MILLILITER(S): at 17:17

## 2019-01-01 RX ADMIN — CHLORHEXIDINE GLUCONATE 1 APPLICATION(S): 213 SOLUTION TOPICAL at 16:35

## 2019-01-01 RX ADMIN — HEPARIN SODIUM 5000 UNIT(S): 5000 INJECTION INTRAVENOUS; SUBCUTANEOUS at 13:56

## 2019-01-01 RX ADMIN — NAFCILLIN 200 GRAM(S): 10 INJECTION, POWDER, FOR SOLUTION INTRAVENOUS at 14:57

## 2019-01-01 RX ADMIN — NAFCILLIN 200 GRAM(S): 10 INJECTION, POWDER, FOR SOLUTION INTRAVENOUS at 13:17

## 2019-01-01 RX ADMIN — PROPOFOL 4.42 MICROGRAM(S)/KG/MIN: 10 INJECTION, EMULSION INTRAVENOUS at 02:30

## 2019-01-01 RX ADMIN — SODIUM CHLORIDE 3 MILLILITER(S): 9 INJECTION INTRAMUSCULAR; INTRAVENOUS; SUBCUTANEOUS at 18:37

## 2019-01-01 RX ADMIN — Medication 3 MILLILITER(S): at 22:41

## 2019-01-01 RX ADMIN — ZINC SULFATE TAB 220 MG (50 MG ZINC EQUIVALENT) 220 MILLIGRAM(S): 220 (50 ZN) TAB at 11:53

## 2019-01-01 RX ADMIN — Medication 500 MILLIGRAM(S): at 14:17

## 2019-01-01 RX ADMIN — Medication 1 DROP(S): at 11:17

## 2019-01-01 RX ADMIN — Medication 15 MILLILITER(S): at 11:31

## 2019-01-01 RX ADMIN — PROPOFOL 4.42 MICROGRAM(S)/KG/MIN: 10 INJECTION, EMULSION INTRAVENOUS at 03:15

## 2019-01-01 RX ADMIN — HEPARIN SODIUM 10 UNIT(S)/HR: 5000 INJECTION INTRAVENOUS; SUBCUTANEOUS at 00:09

## 2019-01-01 RX ADMIN — HEPARIN SODIUM 5000 UNIT(S): 5000 INJECTION INTRAVENOUS; SUBCUTANEOUS at 21:21

## 2019-01-01 RX ADMIN — Medication 3 MILLILITER(S): at 23:23

## 2019-01-01 RX ADMIN — Medication 50 MILLILITER(S): at 10:09

## 2019-01-01 RX ADMIN — Medication 50 MILLILITER(S): at 01:31

## 2019-01-01 RX ADMIN — Medication 3 MILLILITER(S): at 06:19

## 2019-01-01 RX ADMIN — Medication 50 MILLIGRAM(S): at 21:59

## 2019-01-01 RX ADMIN — Medication 100 MILLIEQUIVALENT(S): at 10:03

## 2019-01-01 RX ADMIN — NAFCILLIN 200 GRAM(S): 10 INJECTION, POWDER, FOR SOLUTION INTRAVENOUS at 01:22

## 2019-01-01 RX ADMIN — Medication 50 MILLIEQUIVALENT(S): at 09:27

## 2019-01-01 RX ADMIN — DORNASE ALFA 5 MILLIGRAM(S): 1 SOLUTION RESPIRATORY (INHALATION) at 21:00

## 2019-01-01 RX ADMIN — SODIUM CHLORIDE 3 MILLILITER(S): 9 INJECTION INTRAMUSCULAR; INTRAVENOUS; SUBCUTANEOUS at 15:16

## 2019-01-01 RX ADMIN — Medication 1 DROP(S): at 05:38

## 2019-01-01 RX ADMIN — CHLORHEXIDINE GLUCONATE 15 MILLILITER(S): 213 SOLUTION TOPICAL at 09:27

## 2019-01-01 RX ADMIN — Medication 3.45 MICROGRAM(S)/KG/MIN: at 09:51

## 2019-01-01 RX ADMIN — POLYETHYLENE GLYCOL 3350 17 GRAM(S): 17 POWDER, FOR SOLUTION ORAL at 11:51

## 2019-01-01 RX ADMIN — Medication 3 MILLILITER(S): at 10:14

## 2019-01-01 RX ADMIN — PANTOPRAZOLE SODIUM 40 MILLIGRAM(S): 20 TABLET, DELAYED RELEASE ORAL at 10:25

## 2019-01-01 RX ADMIN — Medication 500 MILLIGRAM(S): at 11:43

## 2019-01-01 RX ADMIN — NAFCILLIN 200 GRAM(S): 10 INJECTION, POWDER, FOR SOLUTION INTRAVENOUS at 06:44

## 2019-01-01 RX ADMIN — NAFCILLIN 200 GRAM(S): 10 INJECTION, POWDER, FOR SOLUTION INTRAVENOUS at 10:44

## 2019-01-01 RX ADMIN — SODIUM CHLORIDE 3 MILLILITER(S): 9 INJECTION INTRAMUSCULAR; INTRAVENOUS; SUBCUTANEOUS at 21:36

## 2019-01-01 RX ADMIN — Medication 1 DROP(S): at 23:37

## 2019-01-01 RX ADMIN — Medication 2 MILLIGRAM(S): at 23:25

## 2019-01-01 RX ADMIN — Medication 650 MILLIGRAM(S): at 11:43

## 2019-01-01 RX ADMIN — NAFCILLIN 200 GRAM(S): 10 INJECTION, POWDER, FOR SOLUTION INTRAVENOUS at 21:02

## 2019-01-01 RX ADMIN — NAFCILLIN 200 GRAM(S): 10 INJECTION, POWDER, FOR SOLUTION INTRAVENOUS at 10:22

## 2019-01-01 RX ADMIN — Medication 50 MILLIGRAM(S): at 11:11

## 2019-01-01 RX ADMIN — Medication 500 MILLIGRAM(S): at 21:31

## 2019-01-01 RX ADMIN — QUETIAPINE FUMARATE 100 MILLIGRAM(S): 200 TABLET, FILM COATED ORAL at 05:37

## 2019-01-01 RX ADMIN — DESMOPRESSIN ACETATE 230 MICROGRAM(S): 0.1 TABLET ORAL at 20:43

## 2019-01-01 RX ADMIN — HEPARIN SODIUM 5000 UNIT(S): 5000 INJECTION INTRAVENOUS; SUBCUTANEOUS at 06:52

## 2019-01-01 RX ADMIN — Medication 50 MILLILITER(S): at 17:53

## 2019-01-01 RX ADMIN — Medication 1 DROP(S): at 06:27

## 2019-01-01 RX ADMIN — PANTOPRAZOLE SODIUM 40 MILLIGRAM(S): 20 TABLET, DELAYED RELEASE ORAL at 11:54

## 2019-01-01 RX ADMIN — Medication 3 MILLILITER(S): at 21:41

## 2019-01-01 RX ADMIN — Medication 62.5 MILLIMOLE(S): at 07:46

## 2019-01-01 RX ADMIN — Medication 50 MILLIGRAM(S): at 17:54

## 2019-01-01 RX ADMIN — CHLORHEXIDINE GLUCONATE 15 MILLILITER(S): 213 SOLUTION TOPICAL at 09:38

## 2019-01-01 RX ADMIN — Medication 100 MILLIEQUIVALENT(S): at 17:05

## 2019-01-01 RX ADMIN — Medication 1 DROP(S): at 11:32

## 2019-11-08 PROBLEM — Z00.00 ENCOUNTER FOR PREVENTIVE HEALTH EXAMINATION: Status: ACTIVE | Noted: 2019-01-01

## 2019-11-10 NOTE — H&P ADULT - HISTORY OF PRESENT ILLNESS
This is a 38 y/o M w/ PMH of IVDU and active smoker who came to [facility] ED c/o productive cough with hemoptysis, progressive SOB, pleuritic chest pain, nausea, non-bloody emesis, abd pain, chillsx3 days. Pt denied, fever or diarrhea. As per outside hospital charting, at that time pt stated he was starting to cut down on his heroin use for approximately 2 weeks. While in ED pt was found to be septic and hypotensive. Cultures were drawn and pt was empirically started on Vanc/Zosyn/ ?azithromycin. Labs pertinent for elevated LDH (613), +trops, WBC 24, BUN/Cr 65/1.91, lactic acid 3.5. UTox positive for benozs/opitiates/marijuana/cocaine. CT chest revealed b/l PNA w/ multiple cavitary and non-cavitary pulmonary nodules, suggestive of possible septic emboli. Echocardiogram as per report revealed vegetation on TV with "low EF". Pt was admitted to SICU, intubated and with worsening sepsis requiring increased pressors/inotropic support. Today, pt was transferred to [facility] under the care of Dr. Ruelas for possible surgical evaluation. At time on admission, pt was intubated and sedated.

## 2019-11-10 NOTE — H&P ADULT - NSHPPHYSICALEXAM_GEN_ALL_CORE
Physical Exam  CONSTITUTIONAL:                                                              WNL  NEURO:                                                                       WNL                      EYES:                                                                                WNL  ENMT:                                                                               WNL  CV:                                                                                   WNL  RESPIRATORY:                                                                 WNL  GI:                                                                                     WNL  : MAN + / -                                                                  WNL  MUSKULOSKELETAL:                                                       WNL  SKIN / BREAST:                                                                  WNL Physical Exam  CONSTITUTIONAL:  Laying in stretcher, intubated and sedated                                                                NEURO: Intubated and sedated, unresponsive       EYES: pinpoint pupiils, reactive  ENMT:      WNL  CV:      S1S2, tachycardic, no murmurs appreciated   RESPIRATORY:   CTA b/l  GI:  +Bs, soft, NT/ND  : +MAN.   MUSKULOSKELETAL:   WWP, no edema, distal extremities cyanostic/necrosis, track marks along both UE  SKIN / BREAST:    +R IJ TLC, L GROIN A LINE, R groin previous line site.

## 2019-11-10 NOTE — H&P ADULT - ASSESSMENT
40 y/o M w/ PMH of IVDU and active smoker who came to [facility] ED c/o productive cough with hemoptysis, progressive SOB, pleuritic chest pain, nausea, non-bloody emesis, abd pain, chillsx3 days. Pt denied, fever or diarrhea. As per outside hospital charting, at that time pt stated he was starting to cut down on his heroin use for approximately 2 weeks. While in ED pt was found to be septic and hypotensive. Cultures were drawn and pt was empirically started on Vanc/Zosyn/ ?azithromycin. Labs pertinent for elevated LDH (613), +trops, WBC 24, BUN/Cr 65/1.91, lactic acid 3.5. UTox positive for benozs/opitiates/marijuana/cocaine. CT chest revealed b/l PNA w/ multiple cavitary and non-cavitary pulmonary nodules, suggestive of possible septic emboli. Echocardiogram as per report revealed vegetation on TV with "low EF". Pt was admitted to SICU, intubated and with worsening sepsis requiring increased pressors/inotropic support. Today, pt was transferred to [facility] under the care of Dr. Ruelas for possible surgical evaluation. At time on admission, pt was intubated and sedated.     A/P:  Neurovascular: No delirium. Pain well controlled with current regimen.  -continue versed gtt for sedation, transition to propofol     Cardiovascular: +??MSSA endocarditis TV   -continue to monitor on tele   -continue levo/vaso  - will need ELIAN tomorrow   -will need pan scan to assess for septic emboli when medically more stable   -f/u admission blood cultures  -obtain cultures from outside hospital     Respiratory: conitnue mechanical vent   -wean vent as tolerated   -Encourage C+DB and Use of IS 10x / hr while awake.  -CXR- f/u on admission     GI: Stable.  -NPO   -PPX- protonix    Renal / : CAMERON at outside hospital, f/u admission labs.   -raya in place   -Monitor renal function.  -Monitor I/O's.    Endocrine:  no h/o DM or thyroid disease, f/u admission labs     Hematologic: pending     ID: acute TV endocarditis ?? MSSA - awaiting cultures from outside hospital   -as per report on Vano/Zosyn at outside hospital   -Temp- afebrile on arrival   -CBC- pending   -Observe for SIRS/Sepsis Syndrome.    Prophylaxis:  -DVT prophylaxis with 5000 SubQ Heparin q8h???  -SCD's    Disposition:  -ICU for frequent monitoring. 38 y/o M w/ PMH of IVDU and active smoker who came to [facility] ED c/o productive cough with hemoptysis, progressive SOB, pleuritic chest pain, nausea, non-bloody emesis, abd pain, chillsx3 days. Pt denied, fever or diarrhea. As per outside hospital charting, at that time pt stated he was starting to cut down on his heroin use for approximately 2 weeks. While in ED pt was found to be septic and hypotensive. Cultures were drawn and pt was empirically started on Vanc/Zosyn/ ?azithromycin. Labs pertinent for elevated LDH (613), +trops, WBC 24, BUN/Cr 65/1.91, lactic acid 3.5. UTox positive for benozs/opitiates/marijuana/cocaine. CT chest revealed b/l PNA w/ multiple cavitary and non-cavitary pulmonary nodules, suggestive of possible septic emboli. Echocardiogram as per report revealed vegetation on TV with "low EF". Pt was admitted to SICU, intubated and with worsening sepsis requiring increased pressors/inotropic support. Today, pt was transferred to [facility] under the care of Dr. Ruelas for possible surgical evaluation. At time on admission, pt was intubated and sedated.     A/P:  Neurovascular: No delirium. Pain well controlled with current regimen.  -continue versed gtt for sedation, transition to propofol     Cardiovascular: +MSSA endocarditis TV   -continue to monitor on tele   -continue levo/vaso  - will need ELIAN tomorrow   -will need pan scan to assess for septic emboli when medically more stable   -f/u admission blood cultures- in chart   -obtain cultures from outside hospital     Respiratory: conitnue mechanical vent   -wean vent as tolerated   -Encourage C+DB and Use of IS 10x / hr while awake.  -CXR- f/u on admission     GI: Stable.  -NPO   -PPX- protonix    Renal / : CAMERON at outside hospital, f/u admission labs.   -raya in place   -Monitor renal function.  -Monitor I/O's.    Endocrine:  no h/o DM or thyroid disease, f/u admission labs     Hematologic: pending     ID: acute TV endocarditis MSSA - awaiting cultures from outside hospital   -as per report on Vano/Zosyn at outside hospital   -started nafcillin 2fg q4hr for MSSA   -Temp- afebrile on arrival   -CBC- pending   -Observe for SIRS/Sepsis Syndrome.    Prophylaxis:  -DVT prophylaxis with 5000 SubQ Heparin q8h  -SCD's    Disposition:  -ICU for frequent monitoring.

## 2019-11-10 NOTE — PROGRESS NOTE ADULT - SUBJECTIVE AND OBJECTIVE BOX
ULI HOLLAND   MRN#: [id number]     The patient is a 39y Male who was seen, evaluated, & examined with the CTICU staff with a multidisciplinary care plan formulated & implemented.  All available clinical, laboratory, radiographic, pharmacologic, and electrocardiographic data were reviewed & analyzed.      The patient was in the CTICU in critical condition secondary to:     persistent cardiopulmonary dysfunction  vasodilatory shock  hyperlactatemia-acidosis    For support and evaluation & prevention of further decompensation secondary to persistent cardiopulmonary dysfunction, respiratory status required:     supplemental oxygen with full ventilatory support / mechanical ventilation  continuous pulse oximetry monitoring  following ABGs with A-line monitoring  IV Versed infusion  IV Fentanyl infusion     Invasive hemodynamic monitoring with     an A-line was required for continuous MAP/BP monitoring     to ensure adequate cardiovascular support and to evaluate for & help prevent decompensation while receiving     IV Levophed infusion  IV Vasopressin infusion    secondary to     vasodilatory shock  hyperlactatemia-acidosis    In addition:  MSSA tricuspid valve endocarditis with emboli to lungs transferred from outside hospital  Sedated with Versed and Fentanyl - Versed off, Fentanyl decreased to lowest level given active heroin abuse - attempt to assess neuro status once awake  Pressor requirement likely due to septic/vasodilatory shock although he has impaired LV function (EF 25% on my bedside assessment) and central venous sat is 61% so he likely has a component of cardiogenic shock - weaning as tolerated  Vegetation noted on both septal and anterior leaflets on my bedside echo with enlarged RV - ELIAN tomorrow  On vent, PEEP down to 5 from 8, RR down to 20 from 26 - also with likely emboli to lungs  NPO, elevated liver enzymes likely a result of right-sided overload and shock - will trend  Acute kidney injury, poor urine output that is not responding to Lasix 80 IV, B-lines on ultrasound with BNP elevated - starting Bumex drip  Per cultures from outside hospital patient is positive for MSSA although he was on multiple antibiotics including Vancomycin, Zosyn, Ceftriaxone and Azithromycin - Vancomycin level 42 - starting Nafcillin for MSSA, holding all other antibiotics    The patient required critical care management and I personally provided 120 minutes of non-continuous care to the patient, excluding separate procedures, in addition to  discussing the patient and plan at length with the CTICU staff and helping coordinate care.

## 2019-11-11 NOTE — CONSULT NOTE ADULT - ASSESSMENT
patient is a 39 y o White male with PMH of IVDU who was transferred from [facility] in regards to IE with tricupsid valve vegetations.   Nephrology consult is placed in regards to anuric holly and electrolytes disturbances.       holly  oligoanuric  bun/cr noted  ddx atn ( from shock and vancomycin toxicity  and Rhabdomyolysis) vs GN 2nd to infections related gn  ua noted   ck noted  volume status wet  on pressors  electrolytes noted  check c3, c4,   plan to initiate cvvhd with dfr 3500 ml/hr, bfr 200 -250 ml/min, ufr to be started @ 25 cc/hr with goal to achieve even to negative fluid balance  please check calcium, mag, phos, calcium, k q 6hrs while on cvvhd  renally dose abx       acidosis  likely 2nd to uremia  lactate noted    hyperkalemia  K @ 6.1  check ekg to rule changes  likely 2nd to holly  plan to proceed with cvvhd with dfr 3500 ml/hr, bfr 200 -250 ml/min,    electrolytes imbalance  hypocalcemia=-=> check ionized calcium   corrected calcium @ 7.46  mag noted   unsure if patient had received bicarbonate drip  check pth, Vit D   could be 2nd to rhabdomyolysis due to phos binding to calcium   s/p calcium gluconate  continue monitoring patient is a 39 y o White male with PMH of IVDU who was transferred from [facility] in regards to IE with tricupsid valve vegetations.   Nephrology consult is placed in regards to anuric holly and electrolytes disturbances.       holly  oligoanuric  bun/cr noted  ddx atn ( from shock and vancomycin toxicity  and Rhabdomyolysis) vs GN 2nd to infections related gn  ua noted   ck noted  volume status wet  on pressors  electrolytes noted  check c3, c4,   plan to initiate cvvhd with dfr 3500 ml/hr, bfr 200 -250 ml/min, ufr to be started @ 25 cc/hr with goal to achieve even to negative fluid balance  please check calcium, mag, phos, calcium, k q 6hrs while on cvvhd  renally dose abx       acidosis  likely 2nd to uremia  lactate noted    hyperkalemia  K @ 6.1  check ekg to rule changes  likely 2nd to holly  plan to proceed with cvvhd with dfr 3500 ml/hr, bfr 200 -250 ml/min,    electrolytes imbalance  hypocalcemia=-=> check ionized calcium   corrected calcium @ 7.46  mag noted   unsure if patient had received bicarbonate drip  check pth, Vit D   could be 2nd to rhabdomyolysis due to phos binding to calcium   s/p calcium gluconate  continue monitoring    hyperphosphatemia  CRRT to be started  anticipating improvement

## 2019-11-11 NOTE — CONSULT NOTE ADULT - ASSESSMENT
40 y/o M w/ PMH of IVDU and active smoker who came to [facility]    #MSSA endocarditis TV   - ELIAN done today with evidence of severe tricuspid regurgitation   - will need pan scan to assess for septic emboli when medically more stable   - f/u admission blood cultures      #Hypoxic respiratory failure   - 2/2 alveolar hemorrhage in the setting of septic embolic causing numerous cavitaries at OSH CT scan   - s/p bronchoscopy today with bloody   - f/u BAL cultures      GI: Stable.  -NPO   -PPX- protonix    Renal / : CAMERON at outside hospital, f/u admission labs.   -raya in place   -Monitor renal function.  -Monitor I/O's.    Endocrine:  no h/o DM or thyroid disease, f/u admission labs     Hematologic: pending     ID: acute TV endocarditis MSSA - awaiting cultures from outside hospital   -as per report on Vano/Zosyn at outside hospital   -started nafcillin 2fg q4hr for MSSA   -Temp- afebrile on arrival   -CBC- pending   -Observe for SIRS/Sepsis Syndrome.    Dispo: 7east under Dr. Worley   Disscussed with attending Dr. Meir Griffiths PGY2 38 y/o M w/ PMH of IVDU and active smoker who came from [facility] in septic shock secondary to tricuspid valve endocarditis deemed not a surgical candidate with hospital course complicated by acute hypoxic respiratory failure and multisystem organ failure 2/2 hypoperfusion.     #Septic shock 2/2 MSSA endocarditis  - Continues to require multiple pressors, levophed and vasopressin  - ELIAN done today with evidence of severe tricuspid regurgitation with 3.8cmx1.1cm vegetation on posterior leaflet with RV overload, LVEF of 40-45%  - As per CT surgery, not a surgical candidate given right sided nature as well as septic shock requiring medical management prior to evaluation for surgery.   - will need pan scan to assess for septic emboli when medically more stable   - ID consulted, will continued with Nafcillin 2g q4 (Sensitive to Oxacillin as per OSH records)  -Remains afebrile though axillary temps  -Obtain rectal Temp    -f/u H bcx cultures   -s/p bronchoscopy, f/u BAL cultures         #Acute renal failure 2/2 ATN from hypoperfusion  -Minimal urine output,   -Require multiple pressors at OSH   -Vancomycin level 37 on arrival so may be component of drug induced nephropathy  -Renal following, c/w CVVHD as tolerated   -Monitor renal function.  -Monitor I/O's.    #Electrolytes abnormalities   -Requires q6 BMP, Mg, and Phos while on CVVHD  -Hyperkalemia to 6.1, no EKG changes, will repeat after CVVHD  -Hypocalcemia 5, corrected 7.5 (given Calcium Carbonate)     #Hypoxic respiratory failure   - 2/2 alveolar hemorrhage in the setting of septic embolic causing numerous cavitary lesions at OSH CT scan  - CXR with scattered infiltrates and pleural effusions   - remains on Vent, Fi02 40%  - s/p bronchoscopy today with bloody mucosa  - f/u BAL cultures    GI:  -NPO   -PPX- protonix    Lines: right IJ TLC and Ernesto, Left femoral A-line       Dispo: 7[facility] under Dr. Genao   Disscussed with attending Dr. Meir Griffiths PGY2 38 y/o M w/ PMH of IVDU and active smoker who came from [facility] in septic shock secondary to tricuspid valve endocarditis deemed not a surgical candidate with hospital course complicated by acute hypoxic respiratory failure and multisystem organ failure 2/2 hypoperfusion.     #Septic shock 2/2 MSSA endocarditis  - Continues to require multiple pressors, levophed and vasopressin  - ELIAN done today with evidence of severe tricuspid regurgitation with 3.8cmx1.1cm vegetation on posterior leaflet with RV overload, LVEF of 40-45%  - As per CT surgery, not a surgical candidate given right sided nature as well as septic shock requiring medical management prior to evaluation for surgery.   - ID consulted, will continued with Nafcillin 2g q4 (Sensitive to Oxacillin as per OSH records)  - Remains afebrile though borderline elevated axillary temps  - Obtain rectal Temp    - f/u H bcx cultures   - s/p bronchoscopy, f/u BAL cultures         #Acute renal failure 2/2 ATN from hypoperfusion  -Minimal urine output, oliguric with 15-20cc/hr  -Require multiple pressors at OSH   -Vancomycin level 37 on arrival so may be component of drug induced nephropathy  -Renal following, c/w CVVHD as tolerated   -Monitor renal function.  -Monitor I/O's.    #Electrolytes abnormalities   -Requires q6 BMP, Mg, and Phos while on CVVHD  -Hyperkalemia to 6.1, no EKG changes, will repeat after CVVHD  -Hypocalcemia 5, corrected 7.5 (given Calcium Carbonate)     #Hypoxic respiratory failure   - 2/2 alveolar hemorrhage in the setting of septic embolic causing numerous cavitary lesions at OSH CT scan  - CXR with scattered infiltrates and pleural effusions   - remains on Vent, Fi02 40% on CMV,   - Stat ABG for CO2 retention given neuromuscular agent used, will switch to VC/AC with PEEP 5 and Fio2 40%, Tidal volume of 470  - s/p bronchoscopy today with bloody mucosa, f/u BAL culture  - Bilateral pleural effusion now s/p right sided chest tube with serosanguinous fluid (700cc) possibly from volume overload, f/u cultures, send for send count, LDH, and protein.      GI:  -NPO   -PPX- protonix    Lines: right IJ TLC and Ernesto, Left femoral A-line       Dispo: 7east under Dr. Kirshenbaum   Disscussed with attending Dr. Meir Griffiths PGY2 40 y/o M w/ PMH of IVDU and active smoker who came from [facility] in septic shock secondary to tricuspid valve endocarditis deemed not a surgical candidate with hospital course complicated by acute hypoxic respiratory failure and multisystem organ failure 2/2 hypoperfusion.     #Septic shock 2/2 MSSA endocarditis  - Continues to require multiple pressors, levophed and vasopressin  - ELIAN done today with evidence of severe tricuspid regurgitation with 3.8cmx1.1cm vegetation on posterior leaflet with RV overload, LVEF of 40-45%  - As per CT surgery, not a surgical candidate given right sided nature as well as septic shock requiring medical management prior to evaluation for surgery.   - ID consulted, will continued with Nafcillin 2g q4 (Sensitive to Oxacillin as per OSH records)  - Remains afebrile though borderline elevated axillary temps  - Obtain rectal Temp    - f/u H bcx cultures   - s/p bronchoscopy, f/u BAL cultures         #Acute renal failure 2/2 ATN from hypoperfusion  -Minimal urine output, oliguric with 15-20cc/hr  -Require multiple pressors at OSH   -Vancomycin level 37 on arrival so may be component of drug induced nephropathy  -Renal following, c/w CVVHD as tolerated   -Monitor renal function.  -Monitor I/O's.    #Electrolytes abnormalities   -Requires q6 BMP, Mg, and Phos while on CVVHD  -Hyperkalemia to 6.1, no EKG changes, will repeat after CVVHD  -Hypocalcemia 5, corrected 7.5 (given Calcium Carbonate)     #Hypoxic respiratory failure   - 2/2 alveolar hemorrhage in the setting of septic embolic causing numerous cavitary lesions at OSH CT scan  - CXR with scattered infiltrates and pleural effusions   - remains on Vent, Fi02 40% on CMV,   - Stat ABG for CO2 retention given neuromuscular agent used, will switch to VC/AC with PEEP 5 and Fio2 40%, Tidal volume of 470  - s/p bronchoscopy today with bloody mucosa, f/u BAL culture  - Bilateral pleural effusion now s/p right sided chest tube with serosanguinous fluid (700cc) possibly from volume overload, f/u cultures, send for send count, LDH, and protein.      GI:  OG on arrival with 600cc of bilious fluid, no bowel movement since arrival with distended abdomen   -CXR abdomen to evaluate for SBO   -NPO   -PPX- protonix    Lines: right IJ TLC and Ernesto, Left femoral A-line       Dispo: 7east under Dr. Genao   Disscussed with attending Dr. Meir Griffiths PGY2

## 2019-11-11 NOTE — CONSULT NOTE ADULT - SUBJECTIVE AND OBJECTIVE BOX
Renal consult  patient is a 39 y o White male with PMH of IVDU who was transferred from [facility] in regards to IE with tricupsid valve vegetations.   HE was admitted with diagnosis of DKA initially.    Nephrology consult is placed in regards to anuric holly and electrolytes disturbances.   Upon reviewing transfer chart, on , patient had a cr @ 1.86-1.9 compared to now @ 5.30.     This is a 38 y/o M w/ PMH of IVDU and active smoker who came to [facility] ED c/o productive cough with hemoptysis, progressive SOB, pleuritic chest pain, nausea, non-bloody emesis, abd pain, chillsx3 days. Pt denied, fever or diarrhea. As per outside hospital charting, at that time pt stated he was starting to cut down on his heroin use for approximately 2 weeks. While in ED pt was found to be septic and hypotensive. Cultures were drawn and pt was empirically started on Vanc/Zosyn/ ?azithromycin. Labs pertinent for elevated LDH (613), +trops, WBC 24, BUN/Cr 65/1.91, lactic acid 3.5. UTox positive for benozs/opitiates/marijuana/cocaine. CT chest revealed b/l PNA w/ multiple cavitary and non-cavitary pulmonary nodules, suggestive of possible septic emboli. Echocardiogram as per report revealed vegetation on TV with "low EF". Pt was admitted to SICU, intubated and with worsening sepsis requiring increased pressors/inotropic support. Today, pt was transferred to [facility] under the care of Dr. Ruelas for possible surgical evaluation. At time on admission, pt was intubated and sedated. (10 Nov 2019 18:12)      PAST MEDICAL & SURGICAL HISTORY:  Endocarditis  IVDU (intravenous drug user)  Smoker  No significant past surgical history      Allergies    Allergy Status Unknown    Intolerances        FAMILY HISTORY:  No pertinent family history in first degree relatives      SOCIAL HISTORY:    MEDICATIONS  (STANDING):  buMETAnide Infusion 2 mG/Hr (10 mL/Hr) IV Continuous <Continuous>  chlorhexidine 0.12% Liquid 15 milliLiter(s) Oral Mucosa every 12 hours  chlorhexidine 2% Cloths 1 Application(s) Topical daily  CRRT Treatment    <Continuous>  fentaNYL   Infusion 0.1 MICROgram(s)/kG/Hr (0.736 mL/Hr) IV Continuous <Continuous>  heparin  Injectable 5000 Unit(s) SubCutaneous every 8 hours  nafcillin  IVPB 2 Gram(s) IV Intermittent every 4 hours  norepinephrine Infusion 0.03 MICROgram(s)/kG/Min (4.14 mL/Hr) IV Continuous <Continuous>  pantoprazole  Injectable 40 milliGRAM(s) IV Push daily  PureFlow Dialysate RFP-400 (K 2 / Ca 3) 5000 milliLiter(s) (3500 mL/Hr) CRRT <Continuous>  sodium chloride 0.9% lock flush 3 milliLiter(s) IV Push every 8 hours  vasopressin Infusion 0.017 Unit(s)/Min (1 mL/Hr) IV Continuous <Continuous>    MEDICATIONS  (PRN):      REVIEW OF SYSTEMS:    CONSTITUTIONAL: No fever or chills, No fatigue or tiredness.  EYES: No blurred or double vision.  ENT: No recent URI or sore throat  RESPIRATORY: No shortness of breath, cough, hemoptysis  CARDIOVASCULAR: No Chest pain or shortness of breath  GASTROINTESTINAL: NO abdominal or flank pain, No nausea or vomiting, No diarrhea  GENITOURINARY: No dysuria or urinary burning, No difficulty passing urine, No hematuria  NEUROLOGICAL: No headaches or blurred vision  SKIN: No skin rashes   MUSCULOSKELETAL: No arthralgia, Joint pain, leg edema, No muscle pains        PHYSICAL EXAM:  GENERAL: NAD, well-developed, well nourished, alert, awake, no acute distress at present  HEAD:  Atraumatic, Normocephalic,   EYES: Bilateral conjuctiva and sclera normal,  Oral cavity: Oral mucosa dry and pink  NECK: Neck supple, No JVD  CHEST/LUNG: Clear to auscultation bilaterally; No wheeze, no rales, no crepitations  HEART: Regular rate and rhythm. JEFFREY II/VI at LPSB, No gallop, no rub   ABDOMEN: Soft, Nontender, BS+nt, No flank tenderness.   EXTREMITIES: No clubbing, cyanosis, or edema, no calf tenderness  Neurology: AAOx3, no focal neurological deficit  SKIN: No rashes or lesions      CAPILLARY BLOOD GLUCOSE      POCT Blood Glucose.: 119 mg/dL (2019 11:50)  POCT Blood Glucose.: 180 mg/dL (2019 08:26)      I&O's Summary    10 Nov 2019 07:01  -  2019 07:00  --------------------------------------------------------  IN: 1332.4 mL / OUT: 705 mL / NET: 627.4 mL    2019 07:01  -  2019 12:49  --------------------------------------------------------  IN: 70.8 mL / OUT: 70 mL / NET: 0.8 mL          LABS:                            8.1    19.02 )-----------( 58       ( 2019 11:55 )             24.9       PT/INR - ( 2019 11:55 )   PT: 21.9 sec;   INR: 1.90          PTT - ( 2019 11:55 )  PTT:43.1 sec  CARDIAC MARKERS ( 10 Nov 2019 19:28 )  x     / 0.29 ng/mL / 1664 U/L / x     / 5.7 ng/mL      Urinalysis Basic - ( 2019 11:55 )    Color: Yellow / Appearance: Clear / S.020 / pH: x  Gluc: x / Ketone: NEGATIVE  / Bili: Small / Urobili: 1.0 E.U./dL   Blood: x / Protein: 30 mg/dL / Nitrite: NEGATIVE   Leuk Esterase: NEGATIVE / RBC: 5-10 /HPF / WBC < 5 /HPF   Sq Epi: x / Non Sq Epi: 0-5 /HPF / Bacteria: Present /HPF        RADIOLOGY & ADDITIONAL TESTS:    EKG/Telemetry: Reviewed Renal consult  patient is a 39 y o White male with PMH of IVDU who was transferred from [facility] in regards to IE with tricupsid valve vegetations.   Nephrology consult is placed in regards to anuric holly and electrolytes disturbances.   Upon reviewing transfer chart, on , patient had a cr @ 1.86-1.9 compared to now @ 5.30.   patient also did receive vancomycin and zosyn. most recent vanc level @ 37.  Unsure if patient has contrAST exposure there. however, he had ct chest--> multiple cavitary lesions and pulmonary edema  currently on bumex drip  k @ 6.1/abg---7.41/32/146/20  ua proteinuria and blood   He is currently intubated- on Fio2@ 100 % while getting Line placed by inensivists.   Obligatory fluid intake 20-40cc/hr without taking into account abx.  HE is on pressors- levophed @6 ml/hr and vasopressin @ 4 ml/hr   Plan for ELIAN today - bedside        PAST MEDICAL & SURGICAL HISTORY:  Endocarditis  IVDU (intravenous drug user)  Smoker  No significant past surgical history      Allergies    Allergy Status Unknown    Intolerances        FAMILY HISTORY:  No pertinent family history in first degree relatives      SOCIAL HISTORY: polysubstance abuse    MEDICATIONS  (STANDING):  buMETAnide Infusion 2 mG/Hr (10 mL/Hr) IV Continuous <Continuous>  chlorhexidine 0.12% Liquid 15 milliLiter(s) Oral Mucosa every 12 hours  chlorhexidine 2% Cloths 1 Application(s) Topical daily  CRRT Treatment    <Continuous>  fentaNYL   Infusion 0.1 MICROgram(s)/kG/Hr (0.736 mL/Hr) IV Continuous <Continuous>  heparin  Injectable 5000 Unit(s) SubCutaneous every 8 hours  nafcillin  IVPB 2 Gram(s) IV Intermittent every 4 hours  norepinephrine Infusion 0.03 MICROgram(s)/kG/Min (4.14 mL/Hr) IV Continuous <Continuous>  pantoprazole  Injectable 40 milliGRAM(s) IV Push daily  PureFlow Dialysate RFP-400 (K 2 / Ca 3) 5000 milliLiter(s) (3500 mL/Hr) CRRT <Continuous>  sodium chloride 0.9% lock flush 3 milliLiter(s) IV Push every 8 hours  vasopressin Infusion 0.017 Unit(s)/Min (1 mL/Hr) IV Continuous <Continuous>    MEDICATIONS  (PRN):      REVIEW OF SYSTEMS:  NA        PHYSICAL EXAM: Bp112/62, hr 114, osat 100 % on 100 % fio2   GENERAL: intubated, sedated   HEAD:  Atraumatic, Normocephalic,   EYES: Bilateral conjunctiva and sclera normal,  Oral cavity: ET in place  NECK: Neck supple, No JVD  CHEST/LUNG: limited exam   HEART: tachycardic, full exam not performed as procedure ongoing   ABDOMEN: Soft, Nontender, BS+nt, No flank tenderness.   EXTREMITIES: grossly edematous, necrotic toes  Neurology: AAOx3, no focal neurological deficit  SKIN: tattoos, necrotic toes blt       CAPILLARY BLOOD GLUCOSE      POCT Blood Glucose.: 119 mg/dL (2019 11:50)  POCT Blood Glucose.: 180 mg/dL (2019 08:26)      I&O's Summary    10 Nov 2019 07:  -  2019 07:00  --------------------------------------------------------  IN: 1332.4 mL / OUT: 705 mL / NET: 627.4 mL    2019 07:01  -  2019 12:49  --------------------------------------------------------  IN: 70.8 mL / OUT: 70 mL / NET: 0.8 mL          LABS:                            8.1    19.02 )-----------( 58       ( 2019 11:55 )             24.9       PT/INR - ( 2019 11:55 )   PT: 21.9 sec;   INR: 1.90          PTT - ( 2019 11:55 )  PTT:43.1 sec  CARDIAC MARKERS ( 10 Nov 2019 19:28 )  x     / 0.29 ng/mL / 1664 U/L / x     / 5.7 ng/mL      Urinalysis Basic - ( 2019 11:55 )    Color: Yellow / Appearance: Clear / S.020 / pH: x  Gluc: x / Ketone: NEGATIVE  / Bili: Small / Urobili: 1.0 E.U./dL   Blood: x / Protein: 30 mg/dL / Nitrite: NEGATIVE   Leuk Esterase: NEGATIVE / RBC: 5-10 /HPF / WBC < 5 /HPF   Sq Epi: x / Non Sq Epi: 0-5 /HPF / Bacteria: Present /HPF        RADIOLOGY & ADDITIONAL TESTS:    EKG/Telemetry: Reviewed

## 2019-11-11 NOTE — PROGRESS NOTE ADULT - SUBJECTIVE AND OBJECTIVE BOX
OVERNIGHT EVENTS:    SUBJECTIVE / INTERVAL HPI: Patient seen and examined at bedside.     VITAL SIGNS:  Vital Signs Last 24 Hrs  T(C): 37.1 (2019 17:41), Max: 38.1 (2019 09:00)  T(F): 98.7 (2019 17:41), Max: 100.5 (2019 09:00)  HR: 92 (2019 18:00) (92 - 118)  BP: --  BP(mean): --  RR: 18 (2019 18:00) (11 - 36)  SpO2: 100% (2019 18:00) (98% - 100%)    PHYSICAL EXAM:    General: WDWN  HEENT: NC/AT; PERRL, anicteric sclera; MMM  Neck: supple  Cardiovascular: +S1/S2; RRR  Respiratory: CTA B/L; no W/R/R  Gastrointestinal: soft, NT/ND; +BSx4  Extremities: WWP; no edema, clubbing or cyanosis  Vascular: 2+ radial, DP/PT pulses B/L  Neurological: AAOx3; no focal deficits    MEDICATIONS:  MEDICATIONS  (STANDING):  chlorhexidine 0.12% Liquid 15 milliLiter(s) Oral Mucosa every 12 hours  chlorhexidine 2% Cloths 1 Application(s) Topical daily  cisatracurium Injectable 10 milliGRAM(s) IV Push once  CRRT Treatment    <Continuous>  fentaNYL   Infusion 0.1 MICROgram(s)/kG/Hr (0.736 mL/Hr) IV Continuous <Continuous>  heparin  Injectable 5000 Unit(s) SubCutaneous every 8 hours  nafcillin  IVPB 2 Gram(s) IV Intermittent every 4 hours  norepinephrine Infusion 0.03 MICROgram(s)/kG/Min (4.14 mL/Hr) IV Continuous <Continuous>  pantoprazole  Injectable 40 milliGRAM(s) IV Push daily  PureFlow Dialysate RFP-400 (K 2 / Ca 3) 5000 milliLiter(s) (3500 mL/Hr) CRRT <Continuous>  sodium chloride 0.9% lock flush 3 milliLiter(s) IV Push every 8 hours  vasopressin Infusion 0.017 Unit(s)/Min (1 mL/Hr) IV Continuous <Continuous>    MEDICATIONS  (PRN):      ALLERGIES:  Allergies    Allergy Status Unknown    Intolerances        LABS:                        8.1    19.02 )-----------( 58       ( 2019 11:55 )             24.9         138  |  101  |  121<H>  ----------------------------<  132<H>  6.1<H>   |  19<L>  |  5.10<H>    Ca    5.7<LL>      2019 11:55  Phos  8.8       Mg     3.4         TPro  6.3  /  Alb  1.8<L>  /  TBili  8.4<H>  /  DBili  x   /  AST  1918<H>  /  ALT  1012<H>  /  AlkPhos  218<H>      PT/INR - ( 2019 11:55 )   PT: 21.9 sec;   INR: 1.90          PTT - ( 2019 11:55 )  PTT:43.1 sec  Urinalysis Basic - ( 2019 11:55 )    Color: Yellow / Appearance: Clear / S.020 / pH: x  Gluc: x / Ketone: NEGATIVE  / Bili: Small / Urobili: 1.0 E.U./dL   Blood: x / Protein: 30 mg/dL / Nitrite: NEGATIVE   Leuk Esterase: NEGATIVE / RBC: 5-10 /HPF / WBC < 5 /HPF   Sq Epi: x / Non Sq Epi: 0-5 /HPF / Bacteria: Present /HPF      CAPILLARY BLOOD GLUCOSE      POCT Blood Glucose.: 119 mg/dL (2019 11:50)      RADIOLOGY & ADDITIONAL TESTS: Reviewed. HOSPITAL COURSE:  40 y/o M w/ PMH of IVDU and active smoker who came to [facility] ED c/o productive cough with hemoptysis, progressive SOB, pleuritic chest pain, nausea, non-bloody emesis, abd pain, chillsx3 days. Pt denied, fever or diarrhea. As per outside hospital charting, at that time pt stated he was starting to cut down on his heroin use for approximately 2 weeks. While in ED pt was found to be septic and hypotensive. Cultures were drawn and pt was empirically started on Vanc/Zosyn/ ?azithromycin. Labs pertinent for elevated LDH (613), +trops, WBC 24, BUN/Cr 65/1.91, lactic acid 3.5. UTox positive for benozs/opitiates/marijuana/cocaine. CT chest revealed b/l PNA w/ multiple cavitary and non-cavitary pulmonary nodules, suggestive of possible septic emboli. Echocardiogram as per report revealed vegetation on TV with "low EF". Pt was admitted to SICU, intubated and with worsening sepsis requiring increased pressors/inotropic support and transferred to [facility] under the CT surgery service for evaluation for tricuspid valve replacement. A ELIAN performed showed severe right sided tricuspid regurgitation, LV EF 40-45%  with RV volume overload. Patient is being treated with Nafcillin. His coruse has been complicated by septic pulmonary emboli, hypoxic respiratory failure, congestive hepatopathy, renal failure. At this time patient is no longer a surgical candidate and will be transferred to the MICU for further medical management.       SUBJECTIVE / INTERVAL HPI: Patient seen and examined at bedside. Patient resting in bed, intubated sedated on Propofol. Unable to obtain ROS.      VITAL SIGNS:  Vital Signs Last 24 Hrs  T(C): 37.1 (2019 17:41), Max: 38.1 (2019 09:00)  T(F): 98.7 (2019 17:41), Max: 100.5 (2019 09:00)  HR: 92 (2019 18:00) (92 - 118)  BP: --  BP(mean): --  RR: 18 (2019 18:00) (11 - 36)  SpO2: 100% (2019 18:00) (98% - 100%)    PHYSICAL EXAM:    General: WDWN, resting in bed, sedated, unresponsive to vocal/painful stimuli   HEENT: NC/AT; constricted pupils sluggishly react to light, icteric sclera; MMM  Neck: supple  Cardiovascular: +S1/S2; tachycardic normal rhythm   Respiratory: intubated, diffuse rhonchi with decreased breath sounds b/l   Gastrointestinal: soft, NT, distended, no bowel sounds heard   Extremities: pedal edema in feet b/l; ischemic fingertips of toes and right middle finger  Vascular: 2+ radial, DP/PT pulses B/L  Neurological: sedated on propofol, unresponsive to verbal/painful stimuli     MEDICATIONS:  MEDICATIONS  (STANDING):  chlorhexidine 0.12% Liquid 15 milliLiter(s) Oral Mucosa every 12 hours  chlorhexidine 2% Cloths 1 Application(s) Topical daily  cisatracurium Injectable 10 milliGRAM(s) IV Push once  CRRT Treatment    <Continuous>  fentaNYL   Infusion 0.1 MICROgram(s)/kG/Hr (0.736 mL/Hr) IV Continuous <Continuous>  heparin  Injectable 5000 Unit(s) SubCutaneous every 8 hours  nafcillin  IVPB 2 Gram(s) IV Intermittent every 4 hours  norepinephrine Infusion 0.03 MICROgram(s)/kG/Min (4.14 mL/Hr) IV Continuous <Continuous>  pantoprazole  Injectable 40 milliGRAM(s) IV Push daily  PureFlow Dialysate RFP-400 (K 2 / Ca 3) 5000 milliLiter(s) (3500 mL/Hr) CRRT <Continuous>  sodium chloride 0.9% lock flush 3 milliLiter(s) IV Push every 8 hours  vasopressin Infusion 0.017 Unit(s)/Min (1 mL/Hr) IV Continuous <Continuous>    MEDICATIONS  (PRN):      ALLERGIES:  Allergies    Allergy Status Unknown    Intolerances        LABS:                        8.1    19.02 )-----------( 58       ( 2019 11:55 )             24.9     11-11    138  |  101  |  121<H>  ----------------------------<  132<H>  6.1<H>   |  19<L>  |  5.10<H>    Ca    5.7<LL>      2019 11:55  Phos  8.8       Mg     3.4         TPro  6.3  /  Alb  1.8<L>  /  TBili  8.4<H>  /  DBili  x   /  AST  1918<H>  /  ALT  1012<H>  /  AlkPhos  218<H>  1111    PT/INR - ( 2019 11:55 )   PT: 21.9 sec;   INR: 1.90          PTT - ( 2019 11:55 )  PTT:43.1 sec  Urinalysis Basic - ( 2019 11:55 )    Color: Yellow / Appearance: Clear / S.020 / pH: x  Gluc: x / Ketone: NEGATIVE  / Bili: Small / Urobili: 1.0 E.U./dL   Blood: x / Protein: 30 mg/dL / Nitrite: NEGATIVE   Leuk Esterase: NEGATIVE / RBC: 5-10 /HPF / WBC < 5 /HPF   Sq Epi: x / Non Sq Epi: 0-5 /HPF / Bacteria: Present /HPF      CAPILLARY BLOOD GLUCOSE      POCT Blood Glucose.: 119 mg/dL (2019 11:50)      RADIOLOGY & ADDITIONAL TESTS: Reviewed.

## 2019-11-11 NOTE — PROGRESS NOTE ADULT - SUBJECTIVE AND OBJECTIVE BOX
CTICU  CRITICAL  CARE  PROCEDURE  NOTE      				     Name of procedure – Flexible fiberoptic bronchoscopy    Indication –   Respiratory failure    Flexible fiberoptic bronchoscopy was performed under propofol and fentanyl sedation while the patient was intubated for controlled mechanical ventilation  Pre-procedural assessment reveals no risk of Tb  Ventilator settings were adjusted to reduce airway pressures to reduce the risk of ptx  The scope was advanced past the ett which was noted to be 2 cm the karissa   The karissa was sharp  Right LL and RML air way were patent , mucopur thick secretions  Lavaged and sent for culture  Left LL air way  with copious purulent secretions, lavaged, and sent for culture  CXR confirmed no pneumothorax post bronch  There were no complications  The patient tolerated the procedure well

## 2019-11-11 NOTE — PROCEDURAL SAFETY CHECKLIST WITH OR WITHOUT SEDATION - NSPROCEDPERFORMDFREE_GEN_ALL_CORE
89 years old white female known to have renal failure on dialysis comes in with coughing of bright red blood coin-sized lesions for the past couple of days not associate any night sweats fever chills cough weight loss.  There is no chest pain chest pressure orthopnea PND leg swelling leg pain.  The chest x-ray shows bilateral pleural effusions more so on the right than the left increased when compared to prior studies.    Past medical history significant for hypertension hyperlipidemia depression COPD Severy which is unknown she quit smoking 4 years ago end-stage renal failure on dialysis abdominal hernia psoriasis renal artery stenosis diabetes mellitus type 2 chronic low back pain and rheumatoid arthritis.  She had history of stent placement in the renal arteries in the past hysterectomy right arm AV graft placement for dialysis      Social history no longer smoker since quit smoking 4 years ago was a heavy smoker prior to that there is no pulmonary function test available    Allergies (2) Active Reaction  Nickel rash  sulfADIAZINE rash    Patient History: Home Medications  atorvastatin (atorvastatin 20 mg oral tablet) 20 mg = 1 tab, PO, qPM  06/05/2019 19:05  calcium acetate (calcium acetate 667 mg oral tablet) 2,001 mg = 3 tab, PO, TIDWM  06/05/2019 19:05  fluticasone nasal (fluticasone 0.05 mg/inh nasal spray) 1 spray, qDay, PRN Allergy Symptoms  06/05/2019 19:05  guaiFENesin (guaiFENesin 400 mg oral tablet) 400 mg = 1 tab, PO, TID, PRN as needed for cough  06/05/2019 19:05  lutein (lutein 20 mg oral capsule) 20 mg = 1 cap(s), PO, qDay, stop now per anesthesia guidelines, # 30 cap(s), Cap  06/05/2019 19:05  midodrine (midodrine 5 mg oral tablet) 5 mg = 1 tab, PO, Mo & We & Fr, with dialysis  06/05/2019 19:05  multivitamin (Renal Caps oral capsule) 1 cap(s), PO, qPM, Cap  06/05/2019 19:05  ubiquinone (Co-Q10 100 mg oral capsule) 100 mg = 1 cap, PO, qPM, stop now per anesthesia guidelines  06/05/2019 19:05   levothyroxine (levothyroxine 25 mcg (0.025 mg) oral tablet) 25 mcg = 1 tab, PO, qAM  01/03/2019 15:25  psyllium (Metamucil) 5 tab, PO, qDay  01/03/2019 15:24  amLODIPine (amLODIPine 10 mg oral tablet) 10 mg = 1 tab(s), PO, Tab, qPM, # 30 tab(s)  01/03/2019 15:24  mirtazapine (mirtazapine 7.5 mg oral tablet) 15 mg = 2 tab, PO, qHS  02/22/2018 10:23  Medications (15) Active  Scheduled: (9)  amLODIPine 5mg Tab  10 mg 2 tab, PO, qPM  atorvastatin 20mg Tab  20 mg 1 tab, PO, qPM  calcium acetate 667 mg Cap  2,001 mg 3 cap, PO, TIDWM  levothyroxine 25mcg Tab  25 mcg 1 tab, PO, qDayACB  midodrine 5 mg Tab  5 mg 1 tab, PO, Mo & We & Fr  mirtazapine 15 mg Tab  15 mg 1 tab, PO, qHS  multi-vitamin Renal Cap  1 cap, PO, qDay  piperacillin-tazo IVPB  3.375 g 50 mL, IV Piggyback, q12hr  psyllium 3.4g Packet  1 packet, PO, qDay  Continuous: (0)  PRN: (6)  acetaminophen 325mg Tab  650 mg 2 tab, PO, q6hr  albumin human 25% 100 mL PREMIX + Premix diluent 100ml 100 mL  25 g 100 mL, IV Piggyback, PRN  albuterol-ipratropium 2.5 mg-0.5 mg/3 mL Soln UD  3 mL, Nebulized, Q6RT  fluticasone 0.05mg/spray Nasal Spray  50 mcg 1 spray, Each Nostril, qDay  guaiFENesin 200 mg Tab  400 mg 2 tab, PO, TID  ondansetron 4 mg/2 mL Vial  4 mg 2 mL, IV Push, q6hr    Vital Signs (last 24 hrs)_____ Last Charted___________Minimum____________ Maximum____________  Temp    98.2  (JUN 06 07:57) L 97.7 (JACKELINE 06 04:55) 98.2  (JUN 06 07:57)  Heart Rate   74  (JUN 06 07:57) L 56 (JUN 05 22:46) 74  (JUN 06 07:57)  Resp Rate       16  (JUN 06 07:57) 16  (JUN 05 20:00) H 24 (JUN 05 15:57)  SBP    138  (JUN 06 07:57) L 15 (JUN 05 15:57) H 168 (JUN 05 20:00)  DBP    L 58 (JUN 06 07:57) L 41 (JUN 05 18:58) 64  (JUN 05 20:00)  Small size lady no JVD bruits or lymph nodes diminished the bilateral bases regular soft no edema DVT ischemia or cellulitis no clubbing or cyanosis no palpable adenopathies      Labs (Last four charted values)  WBC                  H 12.0 (JUN 06) H  12.3 (JACKELINE 05)   Hgb                  L 9.9 (JUN 06) L 9.5 (JACKELINE 05)   Hct                  L 32 (JUN 06) L 30 (JACKELINE 05)   Plt                  260 (JACKELINE 06) 262 (JACKELINE 05)   Na                   138 (JACKELINE 06) 138 (JACKELINE 05)   K                    4.8 (JACKELINE 06) L 3.3 (JACKELINE 05)   CO2                  26 (JACKELINE 06) H 31 (JACKELINE 05)   Cl                   100 (JUN 06) 97 (JACKELINE 05)   Cr                   H 3.14 (JUN 06) H 2.09 (JACKELINE 05)   BUN                  H 27 (JUN 06) 14 (JACKELINE 05)   Glucose              82 (JACKELINE 06) 90 (JACKELINE 05)   Ca                   9.2 (JUN 06) 9.4 (JACKELINE 05)   PT                   10.4 (JUN 05)   INR                  1.0 (JUN 05)   PTT                  25 (JUN 05)   Troponin             H 0.20 (JACKELINE 05)     Impression  Hemoptysis could be malignancy could be fluid overload or other etiologies pleural effusions are most likely related to a renal etiology however a malignant pleural effusion underlying that may need to be excluded  COPD    Plan  As discussed with the nurse practitioner from [facility]  Thoracentesis and bronchoscopy tomorrow and depending on the results further recommendation will be made  CAT scan of the chest without contrast will be done today.      .   R Pigtail Inertion

## 2019-11-11 NOTE — CONSULT NOTE ADULT - SUBJECTIVE AND OBJECTIVE BOX
ICU CONSULT    Patient is a 39y old  Male who presents with a chief complaint of Endocarditis h/o IVDU (2019 12:48)      38 y/o M w/ PMH of IVDU and active smoker who came to [facility] ED c/o productive cough with hemoptysis, progressive SOB, pleuritic chest pain, nausea, non-bloody emesis, abd pain, chillsx3 days. Pt denied, fever or diarrhea. As per outside hospital charting, at that time pt stated he was starting to cut down on his heroin use for approximately 2 weeks. While in ED pt was found to be septic and hypotensive. Cultures were drawn and pt was empirically started on Vanc/Zosyn/ ?azithromycin. Labs pertinent for elevated LDH (613), +trops, WBC 24, BUN/Cr 65/1.91, lactic acid 3.5. UTox positive for benozs/opitiates/marijuana/cocaine. CT chest revealed b/l PNA w/ multiple cavitary and non-cavitary pulmonary nodules, suggestive of possible septic emboli. Echocardiogram as per report revealed vegetation on TV with "low EF". Pt was admitted to SICU, intubated and with worsening sepsis requiring increased pressors/inotropic support. Today, pt was transferred to [facility] under the care of Dr. Ruelas for possible surgical evaluation. At time on admission, pt was intubated and sedated.      History taken from sibling  PMHx - no medical history   PSx - no surgical history   Meds -   Allergies - nkda  FHx - none   Sx - IV heroin user, alcohol drinker, smoker unclear amount       PHYSICAL EXAM   Vital Signs Last 24 Hrs  T(C): 37.7 (2019 14:00), Max: 38.1 (2019 09:00)  T(F): 99.9 (2019 14:00), Max: 100.5 (2019 09:00)  HR: 98 (2019 17:00) (84 - 118)  BP: --  BP(mean): --  RR: 18 (2019 17:00) (11 - 36)  SpO2: 100% (2019 17:00) (98% - 100%)      General - young male, intubated and sedated on CVVHD occasional spontaneous head movements  HEENT - dmm, pinpoint pupils reactive to light, anicteric sclera  CV - s1/s2, tachycardic, no murmur appreciated  Resp - ventilated, rales b/l with crackles at the bases   Abdomen - soft, mildly distended, hypoactive bowel sounds   Extremities - cold in the extremities, ischemic fingertips primarily bluish/black primarily in the feet b/l and in the hands  Skin - cold to touch, multiple abrasions, tattoos and needle marks      LABS                        8.1    19.02 )-----------( 58       ( 2019 11:55 )             24.9         138  |  101  |  121<H>  ----------------------------<  132<H>  6.1<H>   |  19<L>  |  5.10<H>    Ca    5.7<LL>      2019 11:55  Phos  8.8       Mg     3.4         TPro  6.3  /  Alb  1.8<L>  /  TBili  8.4<H>  /  DBili  x   /  AST  1918<H>  /  ALT  1012<H>  /  AlkPhos  218<H>      PT/INR - ( 2019 11:55 )   PT: 21.9 sec;   INR: 1.90        PTT - ( 2019 11:55 )  PTT:43.1 sec  Lactate, Blood: 2.0 mmoL/L (19 @ 11:55)  Lactate, Blood: 2.2 mmoL/L (19 @ 03:26)  Lactate, Blood: 2.9 mmoL/L (11-10-19 @ 19:28)    Urinalysis Basic - ( 2019 11:55 )    Color: Yellow / Appearance: Clear / S.020 / pH: x  Gluc: x / Ketone: NEGATIVE  / Bili: Small / Urobili: 1.0 E.U./dL   Blood: x / Protein: 30 mg/dL / Nitrite: NEGATIVE   Leuk Esterase: NEGATIVE / RBC: 5-10 /HPF / WBC < 5 /HPF   Sq Epi: x / Non Sq Epi: 0-5 /HPF / Bacteria: Present /HPF ICU CONSULT    Patient is a 39y old  Male who presents with a chief complaint of Endocarditis h/o IVDU (2019 12:48)      40 y/o M w/ PMH of IVDU and active smoker who came to [facility] ED c/o productive cough with hemoptysis, progressive SOB, pleuritic chest pain, nausea, non-bloody emesis, abd pain, chillsx3 days. Pt denied, fever or diarrhea. As per outside hospital charting, at that time pt stated he was starting to cut down on his heroin use for approximately 2 weeks. While in ED pt was found to be septic and hypotensive. Cultures were drawn and pt was empirically started on Vanc/Zosyn/ ?azithromycin. Labs pertinent for elevated LDH (613), +trops, WBC 24, BUN/Cr 65/1.91, lactic acid 3.5. UTox positive for benozs/opitiates/marijuana/cocaine. CT chest revealed b/l PNA w/ multiple cavitary and non-cavitary pulmonary nodules, suggestive of possible septic emboli. Echocardiogram as per report revealed vegetation on TV with "low EF". Pt was admitted to SICU, intubated and with worsening sepsis requiring increased pressors/inotropic support and transferred to [facility] under the CT surgery service for evaluation for tricuspid valve replacement. A ELIAN performed showed right sided      History taken from sibling  PMHx - no medical history   PSx - no surgical history   Meds -   Allergies - nkda  FHx - none   Sx - IV heroin user, alcohol drinker, smoker unclear amount       PHYSICAL EXAM   Vital Signs Last 24 Hrs  T(C): 37.7 (2019 14:00), Max: 38.1 (2019 09:00)  T(F): 99.9 (2019 14:00), Max: 100.5 (2019 09:00)  HR: 98 (2019 17:00) (84 - 118)  BP: --  BP(mean): --  RR: 18 (2019 17:00) (11 - 36)  SpO2: 100% (2019 17:00) (98% - 100%)      General - young male, intubated and sedated on CVVHD occasional spontaneous head movements  HEENT - dmm, pinpoint pupils reactive to light, anicteric sclera  CV - s1/s2, tachycardic, no murmur appreciated  Resp - ventilated, rales b/l with crackles at the bases   Abdomen - soft, mildly distended, hypoactive bowel sounds   Extremities - cold in the extremities, ischemic fingertips primarily bluish/black primarily in the feet b/l and in the hands  Skin - cold to touch, multiple abrasions, tattoos and needle marks      LABS                        8.1    19.02 )-----------( 58       ( 2019 11:55 )             24.9         138  |  101  |  121<H>  ----------------------------<  132<H>  6.1<H>   |  19<L>  |  5.10<H>    Ca    5.7<LL>      2019 11:55  Phos  8.8       Mg     3.4         TPro  6.3  /  Alb  1.8<L>  /  TBili  8.4<H>  /  DBili  x   /  AST  1918<H>  /  ALT  1012<H>  /  AlkPhos  218<H>      PT/INR - ( 2019 11:55 )   PT: 21.9 sec;   INR: 1.90        PTT - ( 2019 11:55 )  PTT:43.1 sec  Lactate, Blood: 2.0 mmoL/L (19 @ 11:55)  Lactate, Blood: 2.2 mmoL/L (19 @ 03:26)  Lactate, Blood: 2.9 mmoL/L (11-10-19 @ 19:28)    Urinalysis Basic - ( 2019 11:55 )    Color: Yellow / Appearance: Clear / S.020 / pH: x  Gluc: x / Ketone: NEGATIVE  / Bili: Small / Urobili: 1.0 E.U./dL   Blood: x / Protein: 30 mg/dL / Nitrite: NEGATIVE   Leuk Esterase: NEGATIVE / RBC: 5-10 /HPF / WBC < 5 /HPF   Sq Epi: x / Non Sq Epi: 0-5 /HPF / Bacteria: Present /HPF

## 2019-11-11 NOTE — PROGRESS NOTE ADULT - SUBJECTIVE AND OBJECTIVE BOX
CTICU  CRITICAL  CARE  attending     Hand off received 					   Pertinent clinical, laboratory, radiographic, hemodynamic, echocardiographic, respiratory data, microbiologic data and chart were reviewed and analyzed frequently throughout the course of the day and night  Patient seen and examined with CTS/ SH attending at bedside  Pt is a 39y , Male, HEALTH ISSUES - PROBLEM Dx:      , FAMILY HISTORY:  No pertinent family history in first degree relatives  PAST MEDICAL & SURGICAL HISTORY:  Endocarditis  IVDU (intravenous drug user)  Smoker  No significant past surgical history    Patient is a 39y old  Male who presents with a chief complaint of Endocarditis h/o IVDU (2019 09:31)      14 system review was unremarkable    Vital signs, hemodynamic and respiratory parameters were reviewed from the bedside nursing flowsheet.  ICU Vital Signs Last 24 Hrs  T(C): 37.2 (2019 10:44), Max: 37.7 (2019 14:00)  T(F): 98.9 (2019 10:44), Max: 99.9 (2019 14:00)  HR: 97 (2019 12:28) (7 - 114)  BP: 92/58 (2019 09:00) (87/59 - 96/62)  BP(mean): 68 (2019 09:00) (65 - 80)  ABP: 100/58 (2019 10:00) (78/46 - 128/70)  ABP(mean): 72 (2019 10:00) (58 - 94)  RR: 20 (2019 10:00) (10 - 36)  SpO2: 99% (2019 12:28) (99% - 100%)    Adult Advanced Hemodynamics Last 24 Hrs  CVP(mm Hg): 6 (2019 11:00) (5 - 20)  CVP(cm H2O): --  CO: --  CI: --  PA: --  PA(mean): --  PCWP: --  SVR: --  SVRI: --  PVR: --  PVRI: --, ABG - ( 2019 00:06 )  pH, Arterial: 7.43  pH, Blood: x     /  pCO2: 35    /  pO2: 137   / HCO3: 23    / Base Excess: -1.5  /  SaO2: 98                Mode: AC/ CMV (Assist Control/ Continuous Mandatory Ventilation)  RR (machine): 18  TV (machine): 500  FiO2: 40  PEEP: 5  ITime: 1  MAP: 9  PIP: 15    Intake and output was reviewed and the fluid balance was calculated  Daily     Daily Weight in k (2019 10:00)  I&O's Summary    2019 07:  -  2019 07:00  --------------------------------------------------------  IN: 1908.6 mL / OUT: 2023 mL / NET: -114.4 mL    2019 07:01  -  2019 12:40  --------------------------------------------------------  IN: 504.8 mL / OUT: 44 mL / NET: 460.8 mL        All lines and drain sites were assessed  [facility]      POCT Blood Glucose.: 93 mg/dL (2019 11:36)    No acute change in mental status  Auscultation of the chest reveals equal bs  Abdomen is soft  Extremities are warm and well perfused  Wounds appear clean and unremarkable  Antibiotics are periop    labs  CBC Full  -  ( 2019 07:05 )  WBC Count : 23.78 K/uL  RBC Count : 2.72 M/uL  Hemoglobin : 7.5 g/dL  Hematocrit : 22.0 %  Platelet Count - Automated : 94 K/uL  Mean Cell Volume : 80.9 fl  Mean Cell Hemoglobin : 27.6 pg  Mean Cell Hemoglobin Concentration : 34.1 gm/dL  Auto Neutrophil # : x  Auto Lymphocyte # : x  Auto Monocyte # : x  Auto Eosinophil # : x  Auto Basophil # : x  Auto Neutrophil % : x  Auto Lymphocyte % : x  Auto Monocyte % : x  Auto Eosinophil % : x  Auto Basophil % : x    12    135  |  100  |  89<H>  ----------------------------<  104<H>  5.2   |  22  |  4.10<H>    Ca    6.1<LL>      2019 04:24  Phos  7.9     -12  Mg     2.8     11-12    TPro  5.5<L>  /  Alb  2.1<L>  /  TBili  8.4<H>  /  DBili  x   /  AST  581<H>  /  ALT  465<H>  /  AlkPhos  154<H>      PT/INR - ( 2019 04:24 )   PT: 19.7 sec;   INR: 1.71          PTT - ( 2019 04:24 )  PTT:43.5 sec  The current medications were reviewed   MEDICATIONS  (STANDING):  bisacodyl Suppository 10 milliGRAM(s) Rectal daily  chlorhexidine 0.12% Liquid 15 milliLiter(s) Oral Mucosa every 12 hours  chlorhexidine 2% Cloths 1 Application(s) Topical daily  CRRT Treatment    <Continuous>  dextrose 5%. 1000 milliLiter(s) (50 mL/Hr) IV Continuous <Continuous>  dextrose 50% Injectable 12.5 Gram(s) IV Push once  dextrose 50% Injectable 25 Gram(s) IV Push once  dextrose 50% Injectable 25 Gram(s) IV Push once  fentaNYL   Infusion 0.1 MICROgram(s)/kG/Hr (0.736 mL/Hr) IV Continuous <Continuous>  heparin  Injectable 5000 Unit(s) SubCutaneous every 8 hours  insulin lispro (HumaLOG) corrective regimen sliding scale   SubCutaneous every 6 hours  nafcillin  IVPB 2 Gram(s) IV Intermittent every 4 hours  norepinephrine Infusion 0.03 MICROgram(s)/kG/Min (4.14 mL/Hr) IV Continuous <Continuous>  pantoprazole  Injectable 40 milliGRAM(s) IV Push daily  polyethylene glycol 3350 17 Gram(s) Oral every 12 hours  propofol Infusion 10 MICROgram(s)/kG/Min (4.416 mL/Hr) IV Continuous <Continuous>  PureFlow Dialysate RFP-400 (K 2 / Ca 3) 5000 milliLiter(s) (3500 mL/Hr) CRRT <Continuous>  sodium chloride 0.9% lock flush 3 milliLiter(s) IV Push every 8 hours    MEDICATIONS  (PRN):  dextrose 40% Gel 15 Gram(s) Oral once PRN Blood Glucose LESS THAN 70 milliGRAM(s)/deciliter  glucagon  Injectable 1 milliGRAM(s) IntraMuscular once PRN Glucose LESS THAN 70 milligrams/deciliter       PROBLEM LIST/ ASSESSMENT:  HEALTH ISSUES - PROBLEM Dx:      ,   Patient is a 39y old  Male who presents with a chief complaint of Endocarditis h/o IVDU (2019 09:31)                  My plan includes :  close hemodynamic, ventilatory and drain monitoring and management     Monitor for arrhythmias and monitor parameters for organ perfusion  beta blockade not administered due to hemodynamic instability and bradycardia  monitor neurologic status  Head of the bed should remain elevated to 45 deg .     monitor adequacy of oxygenation and ventilation and attempt to wean oxygen  antibiotic regimen will be tailored to the clinical, laboratory and microbiologic data    Stress ulcer and VTE prophylaxis will be achieved    Glycemic control is satisfactory  Electrolytes have been repleted as necessary and wound care has been carried out. Pain control has been achieved.   agressive physical therapy and early mobility and ambulation goals will be met   The family was updated about the course and plan  CRITICAL CARE TIME personally provided by me  in evaluation and management, reassessments, review and interpretation of labs and x-rays, ventilator and hemodynamic management, formulating a plan and coordinating care: ___90____ MIN.  Time does not include procedural time.  CTICU ATTENDING     					    Ilia Gross MD

## 2019-11-12 NOTE — CONSULT NOTE ADULT - ASSESSMENT
38 y/o M w/ PMH of IVDU and active smoker who came from 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 After being transferred to  CTICU for surgical evaluation, pt was deemed to not be a surgical candidate and transferred to MICU for medical mgmt. ID consulted for medical management of septic shock 2/2 tricuspid valve MSSA bacteremia    #Septic Shock 2/2 MSSA bacteremia 2/2 IE of tricuspid valve: Patient with ELIAN results demonstrating mobile vegetation of the tricuspid valve with severe TR.   -c/w IV Nafcillin 2g q4hrs   -BCxs NGTD  -monitor for conduction abnormalities  -wean pressors as tolerated   -trend leukocytosis. currently trending down.     #Transaminitis: Possibly 2/2 shock liver in the setting of septic shock. Unlikely antibx induced. 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 After being transferred to  CTICU for surgical evaluation, pt was deemed to not be a surgical candidate and transferred to MICU for medical mgmt. ID consulted for medical management of septic shock 2/2 tricuspid valve MSSA bacteremia    #Septic Shock 2/2 MSSA bacteremia 2/2 IE of tricuspid valve: Patient with LEIAN results demonstrating mobile vegetation of the tricuspid valve with severe TR.   -c/w IV Nafcillin 2g q4hrs   -BCxs NGTD  -monitor for conduction abnormalities  -wean pressors as tolerated   -trend leukocytosis. currently trending down.     #Transaminitis: Possibly 2/2 shock liver in the setting of septic shock. Unlikely antibx induced. would r/o acute viral hepatitis  -f/u hepatitis panel (hep A IgG, hep A IgM, Hep B panel, Hep C IgG, IgM)      Discussed with Dr. 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 After being transferred to  CTICU for surgical evaluation, pt was deemed to not be a surgical candidate and transferred to MICU for medical mgmt. ID consulted for medical management of septic shock 2/2 tricuspid valve MSSA bacteremia    #Septic Shock 2/2 MSSA bacteremia 2/2 IE of tricuspid valve: Patient with ELIAN results demonstrating mobile vegetation of the tricuspid valve with severe TR.   -c/w IV Nafcillin 2g q4hrs   -BCxs NGTD  -monitor for conduction abnormalities  -wean pressors as tolerated   -trend leukocytosis. currently trending down.     #Transaminitis: Possibly 2/2 shock liver in the setting of septic shock. Unlikely antibx induced. would r/o acute viral hepatitis.   -f/u hepatitis panel (hep A IgG, hep A IgM, Hep B panel, Hep C IgG, IgM)   -consider HIV screen  -trend LFTs. Currently downtrending      Discussed with Dr. Chapin

## 2019-11-12 NOTE — PROGRESS NOTE ADULT - ATTENDING COMMENTS
Pt remains on vasopressors and agree with transfusion and repeat VBG sat.  Lactate <2. Keep I=O with CVVHD. Repeat Bc neg but empyema on right noted and left sided cjhest tube to be placed as well. CT Abd ordered with increased NGT output overnight and concern for ischemic bowel  vs SBO noted on abd xray with bowel noted to have thickened wall . risk vs benefit of CT with IV contrast  in setting of renal failure discussed with family/HCP. Surgery consult called. continue NPO and continue Nafcillin. no wean.

## 2019-11-12 NOTE — CHART NOTE - NSCHARTNOTEFT_GEN_A_CORE
Consent was obtained from patient's mother and brother regarding CT abdomen/pelvis with contrast. RIsks and benefits were discussed including patient's low kidney function and the likelihood that the contrast may further worsen his renal function, however the test is necessary in order to rule out possible mesenteric ischemia from septic emboli which may be life threatening and lead to poor prognosis.

## 2019-11-12 NOTE — DIETITIAN INITIAL EVALUATION ADULT. - ADD RECOMMEND
1. Please see EN recs above *endorsed to MD 2. Monitor lytes and replete prn. POC BG q6hrs 3. Pain and bowel regimens per team 4. Micronutrient supplementation per team

## 2019-11-12 NOTE — DIETITIAN INITIAL EVALUATION ADULT. - PERTINENT LABORATORY DATA
WBC 23.78 (H, trending up), H/H 7.5/22%, na 135, K 5.2, Mg 2.8 (H), Phos 7.9 (H), BUN 89, Cr 4.1, , LFTs (H, trending down),

## 2019-11-12 NOTE — CONSULT NOTE ADULT - SUBJECTIVE AND OBJECTIVE BOX
INCOMPLETE NOTE    HPI:  This is a 38 y/o M w/ PMH of IVDU and active smoker who came to [facility] ED c/o productive cough with hemoptysis, progressive SOB, pleuritic chest pain, nausea, non-bloody emesis, abd pain, chillsx3 days. Pt denied, fever or diarrhea. As per outside hospital charting, at that time pt stated he was starting to cut down on his heroin use for approximately 2 weeks. While in ED pt was found to be septic and hypotensive. Cultures were drawn and pt was empirically started on Vanc/Zosyn/ ?azithromycin. Labs pertinent for elevated LDH (613), +trops, WBC 24, BUN/Cr 65/1.91, lactic acid 3.5. UTox positive for benozs/opitiates/marijuana/cocaine. CT chest revealed b/l PNA w/ multiple cavitary and non-cavitary pulmonary nodules, suggestive of possible septic emboli. Echocardiogram as per report revealed vegetation on TV with "low EF". Pt was admitted to SICU, intubated and with worsening sepsis requiring increased pressors/inotropic support. Today, pt was transferred to [facility] under the care of Dr. Ruelas for possible surgical evaluation. At time on admission, pt was intubated and sedated. (10 Nov 2019 18:12)      PAST MEDICAL & SURGICAL HISTORY:  Endocarditis  IVDU (intravenous drug user)  Smoker  No significant past surgical history        REVIEW OF SYSTEMS:  Negative except for above.       MEDICATIONS  (STANDING):  albumin human 25% IVPB 50 milliLiter(s) IV Intermittent every 8 hours  chlorhexidine 0.12% Liquid 15 milliLiter(s) Oral Mucosa every 12 hours  chlorhexidine 2% Cloths 1 Application(s) Topical daily  CRRT Treatment    <Continuous>  desmopressin Injectable 20 MICROGram(s) IV Push once  dextrose 5%. 1000 milliLiter(s) (50 mL/Hr) IV Continuous <Continuous>  dextrose 50% Injectable 12.5 Gram(s) IV Push once  dextrose 50% Injectable 25 Gram(s) IV Push once  dextrose 50% Injectable 25 Gram(s) IV Push once  fentaNYL   Infusion 0.1 MICROgram(s)/kG/Hr (0.736 mL/Hr) IV Continuous <Continuous>  heparin  Injectable 5000 Unit(s) SubCutaneous every 8 hours  insulin lispro (HumaLOG) corrective regimen sliding scale   SubCutaneous every 6 hours  nafcillin  IVPB 2 Gram(s) IV Intermittent every 4 hours  norepinephrine Infusion 0.03 MICROgram(s)/kG/Min (4.14 mL/Hr) IV Continuous <Continuous>  pantoprazole  Injectable 40 milliGRAM(s) IV Push daily  polyethylene glycol 3350 17 Gram(s) Oral every 12 hours  propofol Infusion 10 MICROgram(s)/kG/Min (4.416 mL/Hr) IV Continuous <Continuous>  PureFlow Dialysate RFP-400 (K 2 / Ca 3) 5000 milliLiter(s) (3500 mL/Hr) CRRT <Continuous>  sodium chloride 0.9% lock flush 3 milliLiter(s) IV Push every 8 hours    MEDICATIONS  (PRN):  dextrose 40% Gel 15 Gram(s) Oral once PRN Blood Glucose LESS THAN 70 milliGRAM(s)/deciliter  glucagon  Injectable 1 milliGRAM(s) IntraMuscular once PRN Glucose LESS THAN 70 milligrams/deciliter      Allergies    Allergy Status Unknown    Intolerances        Vital Signs Last 24 Hrs  T(C): 37.2 (2019 10:44), Max: 37.2 (2019 10:44)  T(F): 98.9 (2019 10:44), Max: 98.9 (2019 10:44)  HR: 96 (2019 13:00) (7 - 106)  BP: 92/58 (2019 09:00) (87/59 - 96/62)  BP(mean): 68 (2019 09:00) (65 - 80)  RR: 20 (2019 13:00) (10 - 22)  SpO2: 100% (2019 13:00) (98% - 100%)    PHYSICAL EXAM:  Constitutional: NAD. Well-developed, well nourished  HEENT: Conjunctiva clear, no oral lesion, no sinus tenderness on percussion	  Neck: no JVD, no lymphadenopathy, supple  Respiratory: CTAB. No w/r/r.  Cardiovascular: RRR with no murmurs  Gastrointestinal:soft, (+) BS, no HSM, nondistened, nonrigid.  Extremities: LE WWP b/l. No LE edema b/l.   Vascular: 2+ DP pulses b/l    LABS                        7.5    23.78 )-----------( 94       ( 2019 07:05 )             22.0         135  |  100  |  89<H>  ----------------------------<  104<H>  5.2   |  22  |  4.10<H>    Ca    6.1<LL>      2019 04:24  Phos  7.9       Mg     2.8         TPro  5.5<L>  /  Alb  2.1<L>  /  TBili  8.4<H>  /  DBili  x   /  AST  581<H>  /  ALT  465<H>  /  AlkPhos  154<H>      PT/INR - ( 2019 04:24 )   PT: 19.7 sec;   INR: 1.71          PTT - ( 2019 04:24 )  PTT:43.5 sec  Urinalysis Basic - ( 2019 11:55 )    Color: Yellow / Appearance: Clear / S.020 / pH: x  Gluc: x / Ketone: NEGATIVE  / Bili: Small / Urobili: 1.0 E.U./dL   Blood: x / Protein: 30 mg/dL / Nitrite: NEGATIVE   Leuk Esterase: NEGATIVE / RBC: 5-10 /HPF / WBC < 5 /HPF   Sq Epi: x / Non Sq Epi: 0-5 /HPF / Bacteria: Present /HPF        MICROBIOLOGY:    RADIOLOGY & ADDITIONAL STUDIES: Critical   Care    INCOMPLETE NOTE    HPI:  This is a 40 y/o M w/ PMH of IVDU and active smoker who came to [facility] ED c/o productive cough with hemoptysis, progressive SOB, pleuritic chest pain, nausea, non-bloody emesis, abd pain, chillsx3 days. Pt denied, fever or diarrhea. As per outside hospital charting, at that time pt stated he was starting to cut down on his heroin use for approximately 2 weeks. While in ED pt was found to be septic and hypotensive. Cultures were drawn and pt was empirically started on Vanc/Zosyn/ ?azithromycin. Labs pertinent for elevated LDH (613), +trops, WBC 24, BUN/Cr 65/1.91, lactic acid 3.5. UTox positive for benozs/opitiates/marijuana/cocaine. CT chest revealed b/l PNA w/ multiple cavitary and non-cavitary pulmonary nodules, suggestive of possible septic emboli. Echocardiogram as per report revealed vegetation on TV with "low EF". Pt was admitted to SICU, intubated and with worsening sepsis requiring increased pressors/inotropic support. Today, pt was transferred to [facility] under the care of Dr. Ruelas for possible surgical evaluation. At time on admission, pt was intubated and sedated. (10 Nov 2019 18:12)      PAST MEDICAL & SURGICAL HISTORY:  Endocarditis  IVDU (intravenous drug user)  Smoker  No significant past surgical history        REVIEW OF SYSTEMS:  Negative except for above.       MEDICATIONS  (STANDING):  albumin human 25% IVPB 50 milliLiter(s) IV Intermittent every 8 hours  chlorhexidine 0.12% Liquid 15 milliLiter(s) Oral Mucosa every 12 hours  chlorhexidine 2% Cloths 1 Application(s) Topical daily  CRRT Treatment    <Continuous>  desmopressin Injectable 20 MICROGram(s) IV Push once  dextrose 5%. 1000 milliLiter(s) (50 mL/Hr) IV Continuous <Continuous>  dextrose 50% Injectable 12.5 Gram(s) IV Push once  dextrose 50% Injectable 25 Gram(s) IV Push once  dextrose 50% Injectable 25 Gram(s) IV Push once  fentaNYL   Infusion 0.1 MICROgram(s)/kG/Hr (0.736 mL/Hr) IV Continuous <Continuous>  heparin  Injectable 5000 Unit(s) SubCutaneous every 8 hours  insulin lispro (HumaLOG) corrective regimen sliding scale   SubCutaneous every 6 hours  nafcillin  IVPB 2 Gram(s) IV Intermittent every 4 hours  norepinephrine Infusion 0.03 MICROgram(s)/kG/Min (4.14 mL/Hr) IV Continuous <Continuous>  pantoprazole  Injectable 40 milliGRAM(s) IV Push daily  polyethylene glycol 3350 17 Gram(s) Oral every 12 hours  propofol Infusion 10 MICROgram(s)/kG/Min (4.416 mL/Hr) IV Continuous <Continuous>  PureFlow Dialysate RFP-400 (K 2 / Ca 3) 5000 milliLiter(s) (3500 mL/Hr) CRRT <Continuous>  sodium chloride 0.9% lock flush 3 milliLiter(s) IV Push every 8 hours    MEDICATIONS  (PRN):  dextrose 40% Gel 15 Gram(s) Oral once PRN Blood Glucose LESS THAN 70 milliGRAM(s)/deciliter  glucagon  Injectable 1 milliGRAM(s) IntraMuscular once PRN Glucose LESS THAN 70 milligrams/deciliter      Allergies    Allergy Status Unknown    Intolerances        Vital Signs Last 24 Hrs  T(C): 37.2 (2019 10:44), Max: 37.2 (2019 10:44)  T(F): 98.9 (2019 10:44), Max: 98.9 (2019 10:44)  HR: 96 (2019 13:00) (7 - 106)  BP: 92/58 (2019 09:00) (87/59 - 96/62)  BP(mean): 68 (2019 09:00) (65 - 80)  RR: 20 (2019 13:00) (10 - 22)  SpO2: 100% (2019 13:00) (98% - 100%)    PHYSICAL EXAM:  Constitutional: NAD. Well-developed, well nourished  HEENT: Conjunctiva clear, no oral lesion, no sinus tenderness on percussion	  Neck: no JVD, no lymphadenopathy, supple  Respiratory: CTAB. No w/r/r.  Cardiovascular: RRR with no murmurs  Gastrointestinal:soft, (+) BS, no HSM, nondistened, nonrigid.  Extremities: LE WWP b/l. No LE edema b/l.   Vascular: 2+ DP pulses b/l    LABS                        7.5    23.78 )-----------( 94       ( 2019 07:05 )             22.0         135  |  100  |  89<H>  ----------------------------<  104<H>  5.2   |  22  |  4.10<H>    Ca    6.1<LL>      2019 04:24  Phos  7.9       Mg     2.8         TPro  5.5<L>  /  Alb  2.1<L>  /  TBili  8.4<H>  /  DBili  x   /  AST  581<H>  /  ALT  465<H>  /  AlkPhos  154<H>      PT/INR - ( 2019 04:24 )   PT: 19.7 sec;   INR: 1.71          PTT - ( 2019 04:24 )  PTT:43.5 sec  Urinalysis Basic - ( 2019 11:55 )    Color: Yellow / Appearance: Clear / S.020 / pH: x  Gluc: x / Ketone: NEGATIVE  / Bili: Small / Urobili: 1.0 E.U./dL   Blood: x / Protein: 30 mg/dL / Nitrite: NEGATIVE   Leuk Esterase: NEGATIVE / RBC: 5-10 /HPF / WBC < 5 /HPF   Sq Epi: x / Non Sq Epi: 0-5 /HPF / Bacteria: Present /HPF        MICROBIOLOGY:    RADIOLOGY & ADDITIONAL STUDIES: Critical   Care    HPI:  This is a 40 y/o M w/ PMH of IVDU and active smoker who came to [facility] ED c/o productive cough with hemoptysis, progressive SOB, pleuritic chest pain, nausea, non-bloody emesis, abd pain, chillsx3 days. Pt denied, fever or diarrhea. As per outside hospital charting, at that time pt stated he was starting to cut down on his heroin use for approximately 2 weeks. While in ED pt was found to be septic and hypotensive. Cultures were drawn and pt was empirically started on Vanc/Zosyn/ ?azithromycin. Labs pertinent for elevated LDH (613), +trops, WBC 24, BUN/Cr 65/1.91, lactic acid 3.5. UTox positive for benozs/opitiates/marijuana/cocaine. CT chest revealed b/l PNA w/ multiple cavitary and non-cavitary pulmonary nodules, suggestive of possible septic emboli. Echocardiogram as per report revealed vegetation on TV with "low EF". Pt was admitted to SICU, intubated and with worsening sepsis requiring increased pressors/inotropic support. Today, pt was transferred to [facility] under the care of Dr. Ruelas for possible surgical evaluation. At time on admission, pt was intubated and sedated. (10 Nov 2019 18:12)    Patient intubated and sedated at time of evaluation. Unable to properly assess ROS at this time.     PAST MEDICAL & SURGICAL HISTORY:  Endocarditis  IVDU (intravenous drug user)  Smoker  No significant past surgical history        REVIEW OF SYSTEMS:  Negative except for above.       MEDICATIONS  (STANDING):  albumin human 25% IVPB 50 milliLiter(s) IV Intermittent every 8 hours  chlorhexidine 0.12% Liquid 15 milliLiter(s) Oral Mucosa every 12 hours  chlorhexidine 2% Cloths 1 Application(s) Topical daily  CRRT Treatment    <Continuous>  desmopressin Injectable 20 MICROGram(s) IV Push once  dextrose 5%. 1000 milliLiter(s) (50 mL/Hr) IV Continuous <Continuous>  dextrose 50% Injectable 12.5 Gram(s) IV Push once  dextrose 50% Injectable 25 Gram(s) IV Push once  dextrose 50% Injectable 25 Gram(s) IV Push once  fentaNYL   Infusion 0.1 MICROgram(s)/kG/Hr (0.736 mL/Hr) IV Continuous <Continuous>  heparin  Injectable 5000 Unit(s) SubCutaneous every 8 hours  insulin lispro (HumaLOG) corrective regimen sliding scale   SubCutaneous every 6 hours  nafcillin  IVPB 2 Gram(s) IV Intermittent every 4 hours  norepinephrine Infusion 0.03 MICROgram(s)/kG/Min (4.14 mL/Hr) IV Continuous <Continuous>  pantoprazole  Injectable 40 milliGRAM(s) IV Push daily  polyethylene glycol 3350 17 Gram(s) Oral every 12 hours  propofol Infusion 10 MICROgram(s)/kG/Min (4.416 mL/Hr) IV Continuous <Continuous>  PureFlow Dialysate RFP-400 (K 2 / Ca 3) 5000 milliLiter(s) (3500 mL/Hr) CRRT <Continuous>  sodium chloride 0.9% lock flush 3 milliLiter(s) IV Push every 8 hours    MEDICATIONS  (PRN):  dextrose 40% Gel 15 Gram(s) Oral once PRN Blood Glucose LESS THAN 70 milliGRAM(s)/deciliter  glucagon  Injectable 1 milliGRAM(s) IntraMuscular once PRN Glucose LESS THAN 70 milligrams/deciliter      Allergies    Allergy Status Unknown    Intolerances        Vital Signs Last 24 Hrs  T(C): 37.2 (2019 10:44), Max: 37.2 (2019 10:44)  T(F): 98.9 (2019 10:44), Max: 98.9 (2019 10:44)  HR: 96 (2019 13:00) (7 - 106)  BP: 92/58 (2019 09:00) (87/59 - 96/62)  BP(mean): 68 (2019 09:00) (65 - 80)  RR: 20 (2019 13:00) (10 - 22)  SpO2: 100% (2019 13:00) (98% - 100%)    PHYSICAL EXAM:  Constitutional: NAD. Intubated and sedated.   HEENT: Intubated	  Neck: supple  Respiratory: Rhonchi throuhgout  Cardiovascular: systolic murmur appreciated on left 4th ICS  Gastrointestinal:soft, (+) BS, mildly distended. RUQ tenderness to palpation appreciated by wincing with palpation.   Extremities: Extremities warm throughout. Tips of b/l toes cyanotic     LABS                        7.5    23.78 )-----------( 94       ( 2019 07:05 )             22.0     11-12    135  |  100  |  89<H>  ----------------------------<  104<H>  5.2   |  22  |  4.10<H>    Ca    6.1<LL>      2019 04:24  Phos  7.9       Mg     2.8         TPro  5.5<L>  /  Alb  2.1<L>  /  TBili  8.4<H>  /  DBili  x   /  AST  581<H>  /  ALT  465<H>  /  AlkPhos  154<H>      PT/INR - ( 2019 04:24 )   PT: 19.7 sec;   INR: 1.71          PTT - ( 2019 04:24 )  PTT:43.5 sec  Urinalysis Basic - ( 2019 11:55 )    Color: Yellow / Appearance: Clear / S.020 / pH: x  Gluc: x / Ketone: NEGATIVE  / Bili: Small / Urobili: 1.0 E.U./dL   Blood: x / Protein: 30 mg/dL / Nitrite: NEGATIVE   Leuk Esterase: NEGATIVE / RBC: 5-10 /HPF / WBC < 5 /HPF   Sq Epi: x / Non Sq Epi: 0-5 /HPF / Bacteria: Present /HPF        MICROBIOLOGY:    RADIOLOGY & ADDITIONAL STUDIES:

## 2019-11-12 NOTE — PROGRESS NOTE ADULT - ASSESSMENT
40 y/o M w/ PMH of IVDU and active smoker who came from [facility] in septic shock secondary to tricuspid valve endocarditis deemed not a surgical candidate with hospital course complicated by acute hypoxic respiratory failure and multisystem organ failure 2/2 hypoperfusion. Patient now being transferred from to the MICU for further medical management as patient a poor candidate for surgery at this time     Neuro  Sedated on Propofol, Fentanyl     Cardiovascular   Currently on Levophed, will titrate as needed. Echo with EF 45%. ELIAN with EF 40-45%, 3.8 x1cm vegetation on TR valve, with severe TR, trivial pericardial effusion, L sided pleural effusion. Rv failure likely 2/2 TR.   - Maintain MAP>65.   - Titrate Levophed as necessary.   -Continue with Abx for endocarditis coverage     Respiratory  #Acute hypoxic respiratory failure   2/2 alveolar hemorrhage in the setting of septic embolic causing numerous cavitary lesions at OSH CT scan  - CXR with scattered infiltrates and pleural effusions   - remains on Vent   - Stat ABG for CO2 retention given neuromuscular agent used, on VC/AC with PEEP 5 and Fio2 40%, Tidal volume of 470  - s/p bronchoscopy today with bloody mucosa, f/u BAL culture  - Bilateral pleural effusion now s/p right sided chest tube with serosanguinous fluid (700cc) possibly from volume overload, f/u cultures, send for send count, LDH, and protein.    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   - ID consulted, will continued with Nafcillin 2g q4 (Sensitive to Oxacillin as per OSH records), f/u recs   - Remains afebrile though borderline elevated axillary temps  - Obtain rectal Temp    - f/u [facility] bcx cultures   - s/p bronchoscopy, f/u BAL cultures     Renal  #Acute renal failure 2/2 ATN from hypoperfusion  -Minimal urine output, oliguric with 15-20cc/hr  -Require multiple pressors at OSH   -Vancomycin level 37 on arrival so may be component of drug induced nephropathy  -Renal following, c/w CVVHD as tolerated   -Monitor renal function.  -Monitor I/Os    #Electrolytes abnormalities   -Requires q6 BMP, Mg, and Phos while on CVVHD  -Hyperkalemia to 6.1, no EKG changes, will repeat after CVVHD  -Hypocalcemia 5, corrected 7.5 (given Calcium Carbonate)   -Add Phoslo    GI  OG on arrival with 600cc of bilious fluid, no bowel movement since arrival with distended abdomen   -XR abdomen to evaluate for SBO   -NPO   -PPX- protonix  -Miralax BID   -transaminates and coag derangements likely 2/2 to septic shock (congestive hepatopathy)   -f/u direct bili   -do not advise giving Tylenol in setting of transaminitis     Lines: right IJ TLC and Ernesto, Left femoral A-line     F: PureFlow  E: replete K <4, Mg <2  N: NPO   GI Ppx: Protonix   DVT Ppx: Heparin   Code status: FULL   Dispo: MICU 40 y/o M w/ PMH of IVDU and active smoker who came from [facility] on 11/8/19 in 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 After being transferred to  CTICU for surgical evaluation, pt was deemed to not be a surgical candidate and transferred to MICU for medical mgmt. ID and Nephrology following.    Neuro  Sedated on Propofol, Fentanyl     Cardiovascular     #Hypotension  Most likely 2/2 septic shock from infective endocarditis and RV failure 2/2 tricuspid regurgitation (ELIAN shows EF of 40-50%). Currently on Levophed, will titrate as needed. Echo with EF 45%. ELIAN with EF 40-45%, 3.8 x1cm vegetation on TR valve, with severe TR, trivial pericardial effusion.   - Maintain MAP>65.   - Titrate Levophed as necessary.   - F/U Bubble Echocardiogram to r/o PFO and potential source of paradoxical emboli.  - C/W medical mgmt of infective endocarditis as below.    Respiratory    #Acute hypoxic respiratory failure   Most likely 2/2 alveolar hemorrhage in the setting of septic embolic causing numerous cavitary lesions on CT chest. CXR with scattered infiltrates and pleural effusions.  - C/W ventilator support   - F/U bronchoscopy BAL culture from yesterday    #Bilateral pulmonary effusion  Most likely empyema in setting of septic shock 2/2 infective endocarditis. Right CT in place, confirmed by CXR, draining serosanguinous fluid (700 cc overnight).   - Monitor output of right-sided chest tube  - F/U cytology, LDH, and protein of pleural fluid from R CT  - Place left-sided chest tube for drainage of complex pleural effusion visualized on U/S and CXR, monitor output.    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 Not a surgical candidate as per CT surgery. Lactate 1.3.  - ID consulted, will continued with Nafcillin 2g q4 (Sensitive to Oxacillin as per OSH records), f/u recs   - Monitor temps, has remained afebrile although borderline elevated axillary temps.  - Obtain rectal Temp    - F/U [facility] bcx cultures, x2  - F/U BAL cultures   - Consider CT head for evidence of intracranial embolic events  - F/U CBC and diff    Renal    #Acute renal failure 2/2 ATN from hypoperfusion, Minimal urine output, oliguric with 15-20cc/hr. Vancomycin level 37 on arrival so may be component of drug induced nephropathy  -F/U nephrology recs.  - C/W CVVHD as tolerated   - Monitor renal function for worsening of BUN/Cr  - Monitor I/Os  - F/U CT abdomen/pelvis for evidence of renal infarcts    #Electrolytes abnormalities   -Requires q6 BMP, Mg, and Phos while on CVVHD  -Hyperkalemia to 6.1, no EKG changes, will repeat after CVVHD  -Hypocalcemia 5, corrected 7.5 (given Calcium Carbonate)   -Add Phoslo    GI    OG on arrival with 600cc of bilious fluid, no bowel movement since arrival with distended abdomen. Abd XR read as evidence for SBO vs. bowel ischemia.  -F/U CT abdomen and pelvis for evidence of bowel ischemia vs. SBO  -NPO   -Miralax BID     #Congestive Hepatopathy   Transaminitis, coag derangements and hyperbilirubinemia most likely 2/2 hepatic congestion, due to severe TR in setting of infective endocarditis.   -F/U AST/ALT, d.bili, coags  -F/U Hepatic panel  - Avoid Tylenol    Lines: right IJ TLC and Ernesto, Left femoral A-line     F: PureFlow  E: replete K <4, Mg <2  N: NPO   GI Ppx: Protonix   DVT Ppx: Heparin   Code status: FULL   Dispo: MICU 40 y/o M w/ PMH of IVDU and active smoker who came from [facility] on 11/8/19 in 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 After being transferred to  CTICU for surgical evaluation, pt was deemed to not be a surgical candidate and transferred to MICU for medical mgmt. ID and Nephrology following.    Neuro  Sedated on Propofol, Fentanyl     Cardiovascular     #Hypotension  Most likely 2/2 septic shock from infective endocarditis and RV failure 2/2 tricuspid regurgitation (ELIAN shows EF of 40-50%). Currently on Levophed, will titrate as needed. Echo with EF 45%. ELIAN with EF 40-45%, 3.8 x1cm vegetation on TR valve, with severe TR, trivial pericardial effusion.   - Maintain MAP>65.   - Titrate Levophed as necessary.   - F/U Bubble Echocardiogram to r/o PFO and potential source of paradoxical emboli.  - C/W medical mgmt of infective endocarditis as below.    Respiratory    #Acute hypoxic respiratory failure   Most likely 2/2 alveolar hemorrhage in the setting of septic embolic causing numerous cavitary lesions on CT chest. CXR with scattered infiltrates and pleural effusions.  - C/W ventilator support   - F/U bronchoscopy BAL culture from yesterday    #Bilateral pulmonary effusion  Most likely empyema in setting of septic shock 2/2 infective endocarditis. Right CT in place, confirmed by CXR, draining serosanguinous fluid (700 cc overnight).   - Monitor output of right-sided chest tube  - F/U cytology, LDH, and protein of pleural fluid from R CT  - Place left-sided chest tube for drainage of complex pleural effusion visualized on U/S and CXR, monitor output.    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 Not a surgical candidate as per CT surgery. Lactate 1.3.  - ID consulted, will continued with Nafcillin 2g q4 (Sensitive to Oxacillin as per OSH records), f/u recs   - Monitor temps, has remained afebrile although borderline elevated axillary temps.  - Obtain rectal Temp    - F/U [facility] bcx cultures, x2  - F/U BAL cultures   - Consider CT head for evidence of intracranial embolic events  - F/U CBC and diff    Renal    #Acute renal failure 2/2 ATN from hypoperfusion, Minimal urine output, oliguric with 15-20cc/hr. Vancomycin level 37 on arrival so may be component of drug induced nephropathy  -F/U nephrology recs.  - C/W CVVHD as tolerated   - Monitor renal function for worsening of BUN/Cr  - Monitor I/Os  - F/U CT abdomen/pelvis for evidence of renal infarcts    #Electrolytes abnormalities   -Requires q6 BMP, Mg, and Phos while on CVVHD  -Hyperkalemia to 6.1, no EKG changes, will repeat after CVVHD  -Hypocalcemia 5, corrected 7.5 (given Calcium Carbonate)   -Add Phoslo    GI    OG on arrival with 600cc of bilious fluid, no bowel movement since arrival with distended abdomen. Abd XR read as evidence for SBO vs. bowel ischemia.  -F/U CT abdomen and pelvis for evidence of bowel ischemia vs. SBO  -NPO   -Miralax BID     #Congestive Hepatopathy   Transaminitis, coag derangements and hyperbilirubinemia most likely 2/2 hepatic congestion, due to severe TR in setting of infective endocarditis.   -F/U AST/ALT, d.bili, coags  -F/U Hepatitis panel  - Avoid Tylenol    Lines: right IJ TLC and Ernesto (11/11), Axillary A-line (11/11), Left IJ (11/11)     F: 25% Albumin 50cc  E: replete K <4, Mg <2  N: NPO   GI Ppx: Protonix   DVT Ppx: Heparin   Code status: FULL   Dispo: MICU 40 y/o M w/ PMH of IVDU and active smoker who came from [facility] on 11/8/19 in 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 After being transferred to  CTICU for surgical evaluation, pt was deemed to not be a surgical candidate and transferred to MICU for medical mgmt. ID and Nephrology following.    Neuro  Sedated on Propofol, Fentanyl     Cardiovascular     #Hypotension  Most likely 2/2 septic shock from infective endocarditis and RV failure 2/2 tricuspid regurgitation (ELIAN shows EF of 40-50%). Currently on Levophed, will titrate as needed. Echo with EF 45%. ELIAN with EF 40-45%, 3.8 x1cm vegetation on TR valve, with severe TR, trivial pericardial effusion.   - Maintain MAP>65.   - Titrate Levophed as necessary.   - F/U Bubble Echocardiogram to r/o PFO and potential source of paradoxical emboli.  - C/W medical mgmt of infective endocarditis as below.    Respiratory    #Acute hypoxic respiratory failure   Most likely 2/2 alveolar hemorrhage in the setting of septic embolic causing numerous cavitary lesions on CT chest. CXR with scattered infiltrates and pleural effusions.  - C/W ventilator support   - F/U bronchoscopy BAL culture from yesterday    #Bilateral pulmonary effusion  Most likely empyema in setting of septic shock 2/2 infective endocarditis. Right CT in place, confirmed by CXR, draining serosanguinous fluid (700 cc overnight).   - Monitor output of right-sided chest tube  - F/U cytology, LDH, and protein of pleural fluid from R CT  - Place left-sided chest tube for drainage of complex pleural effusion visualized on U/S and CXR, monitor output.    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 Not a surgical candidate for a tricuspid valve replacement at this time as per CT surgery. Lactate 1.3.  - ID consulted, will continued with Nafcillin 2g q4 (Sensitive to Oxacillin as per OSH records), f/u recs   - Monitor temps, has remained afebrile although borderline elevated axillary temps.  - Obtain rectal Temp    - F/U [facility] bcx cultures, x2  - F/U BAL cultures   - Consider CT head for evidence of intracranial embolic events  - F/U CBC and diff    Renal    #Acute renal failure 2/2 ATN from hypoperfusion, Minimal urine output, oliguric with 15-20cc/hr. Vancomycin level 37 on arrival so may be component of drug induced nephropathy  -F/U nephrology recs.  - C/W CVVHD as tolerated   - Monitor renal function for worsening of BUN/Cr  - Monitor I/Os  - F/U CT abdomen/pelvis for evidence of renal infarcts    #Electrolytes abnormalities   -Requires q6 BMP, Mg, and Phos while on CVVHD  -Hyperkalemia to 6.1, no EKG changes, will repeat after CVVHD  -Hypocalcemia 5, corrected 7.5 (given Calcium Carbonate)   -Add Phoslo    GI    OG on arrival with 600cc of bilious fluid, no bowel movement since arrival with distended abdomen. Abd XR read as evidence for SBO vs. bowel ischemia.  -F/U CT abdomen and pelvis for evidence of bowel ischemia vs. SBO  -NPO   -Miralax BID     #Congestive Hepatopathy   Transaminitis, coag derangements and hyperbilirubinemia most likely 2/2 hepatic congestion, due to severe TR in setting of infective endocarditis.   -F/U AST/ALT, d.bili, coags  -F/U Hepatitis panel  - Avoid Tylenol    Lines: right IJ TLC and Ernesto (11/11), Axillary A-line (11/11), Left IJ (11/11)     F: 25% Albumin 50cc  E: replete K <4, Mg <2  N: NPO   GI Ppx: Protonix   DVT Ppx: Heparin   Code status: FULL   Dispo: MICU

## 2019-11-12 NOTE — PROGRESS NOTE ADULT - ASSESSMENT
patient is a 39 y o White male with PMH of IVDU who was transferred from [facility] in regards to IE with tricupsid valve vegetations.   Nephrology consult is placed in regards to anuric holly and electrolytes disturbances.       holly  oligoanuric  bun/cr noted  ddx atn ( from shock and vancomycin toxicity  and Rhabdomyolysis) vs GN 2nd to infections related gn  ua noted   ck noted  volume status wet  on pressors  electrolytes noted  check c3, c4,   plan to initiate cvvhd with dfr 3500 ml/hr, bfr 200 -250 ml/min, ufr to be started @ 25 cc/hr with goal to achieve even to negative fluid balance  please check calcium, mag, phos, calcium, k q 6hrs while on cvvhd  renally dose abx       acidosis  likely 2nd to uremia  lactate noted    hyperkalemia  K @ 6.1  check ekg to rule changes  likely 2nd to holly  plan to proceed with cvvhd with dfr 3500 ml/hr, bfr 200 -250 ml/min,    electrolytes imbalance  hypocalcemia=-=> check ionized calcium   corrected calcium @ 7.46  mag noted   unsure if patient had received bicarbonate drip  check pth, Vit D   could be 2nd to rhabdomyolysis due to phos binding to calcium   s/p calcium gluconate  continue monitoring    hyperphosphatemia  CRRT to be started  anticipating improvement 38 yo White male with PMH of IVDU who was transferred from [facility] in regards to IE with tricupsid valve vegetations.   Nephrology consult is placed in regards to anuric cameron and electrolytes disturbances.       # CAMERON   - oligoanuric  - ddx ATN (from shock, vancomycin toxicity, rhabdomyolysis) vs infection related GN   - pt with peripheral edema, remains stable with respiratory settings of FiO2 40% and PEEP 5, noted to have CVP of 6, plan for fluid challenge and 1u pRBC as well as add vasopression and possible inotrop to improve hemodynamics   - will continue on cvvhd with net even   - check k, phos, mg Q6hr while on cvvhd (keep K >4, phos >3, Mg >2)    # Infective endocarditis  - renally adjusted ABx  - follow bubble studies to r/o PFO    # Anemia  - Hb 7.5 g/dl  - plan for 1u pRBC as per primary team    # Acidosis  - likely 2nd to uremia  - improved    # Hypocalcemia  - check PTH, Vit D25  - s/p calcium gluconate    Discussed with attending Dr Pinzon and primary team.

## 2019-11-12 NOTE — PROGRESS NOTE ADULT - ATTENDING COMMENTS
CAMERON, endocarditis-  seen and evaluated while on CVVHD  now on & [place]  reviewed with MICU team  tolerating CVVHD- limiting UF  for CT abd with contrast to exclude ischemic bowel given present parameters- agree with urgency despite renal risks

## 2019-11-12 NOTE — DIETITIAN INITIAL EVALUATION ADULT. - OTHER INFO
38 yo/male with PMHx IVDA, presented to OSH w/cough, hemoptysis, and N/V. Found to be septic and hypotensive. CT chest +pna, pulmonary nodules w/septic emboli, and ELIAN +vegetation. Pt ultimately intubated and started on pressors. [facility] where he was deemed not to be a surgical candidate. Course c/b renal failure and B/L pleural effusions, s/p R. CT placement. Started on CVVH overnight. Pt seen in room and discussed during MICU rounds. Pt intubated on VC/AC mode, sedated on propofol @ 5mL/hr (132kcal/day from lipids), and fentanyl. Levophed requirements trending up- MAP 72. Pt hypothermic- kelly hugger in place. R. CT to suction; plan for L. thoracentesis today. NPO w/NGT to LIWS w/bilious output. If output downtrending, will consider replacing sump w/dobhoff and starting TF, per MD. VENEGAS. Skin noted with R. toes necrosis, B/L ankle DTIs and blisters. Will continue to follow per RD protocol.

## 2019-11-12 NOTE — PROCEDURE NOTE - NSPOSTPRCRAD_GEN_A_CORE
post-procedure radiography performed/central line located in the/central line located in the superior vena cava/line adjusted to depth of insertion

## 2019-11-12 NOTE — DIETITIAN INITIAL EVALUATION ADULT. - ENTERAL
As medically feasible, recommend starting Nepro @ 33mL/hr x 24hrs plus 4 ProStat (400kcal, 60g pro) via NGT. Provides: 792mL TV, 1958kcal, 124g pro, 576mL free H2O, 94% RDI, 1.85g/kg IBW protein, 22 npc/kg IBW. Recommend starting at 20mL/hr and advancing by [id number] to goal as tolerated. Additional free H2O per team. Monitor for s/s intolerance; maintain aspiration precautions at all times. Please follow VBF protocol as indicated.

## 2019-11-12 NOTE — DIETITIAN INITIAL EVALUATION ADULT. - NAME AND PHONE
Samantha Gitlin, RD, CDN, [facility], [phone] or available on [facility]

## 2019-11-12 NOTE — PROGRESS NOTE ADULT - SUBJECTIVE AND OBJECTIVE BOX
Patient is a 39y Male seen and evaluated at bedside  Nephrology is following for oligoanuric CAMERON  started on cvvhd  due to hypotension requiring pressors  overnight pt was transferred from  to [facility] and cvvhd restarted at 5 am with net even      Meds:    bisacodyl Suppository 10 daily  chlorhexidine 0.12% Liquid 15 every 12 hours  chlorhexidine 2% Cloths 1 daily  CRRT Treatment  <Continuous>  dextrose 40% Gel 15 once PRN  dextrose 5%. 1000 <Continuous>  dextrose 50% Injectable 12.5 once  dextrose 50% Injectable 25 once  dextrose 50% Injectable 25 once  fentaNYL   Infusion 0.1 <Continuous>  glucagon  Injectable 1 once PRN  heparin  Injectable 5000 every 8 hours  insulin lispro (HumaLOG) corrective regimen sliding scale  every 6 hours  nafcillin  IVPB 2 every 4 hours  norepinephrine Infusion 0.03 <Continuous>  pantoprazole  Injectable 40 daily  polyethylene glycol 3350 17 every 12 hours  propofol Infusion 10 <Continuous>  PureFlow Dialysate RFP-400 (K 2 / Ca 3) 5000 <Continuous>  sodium chloride 0.9% lock flush 3 every 8 hours      T(C): , Max: 37.7 (19 @ 14:00)  T(F): , Max: 99.9 (19 @ 14:00)  HR: 92 (19 @ 09:00)  BP: 91/55 (19 @ 05:00)  BP(mean): 66 (19 @ 05:00)  RR: 19 (19 @ 09:00)  SpO2: 100% (19 @ 09:00)  Wt(kg): --     @ :  -   @ 07:00  --------------------------------------------------------  IN: 1908.6 mL / OUT:  mL / NET: -114.4 mL     @ 07:01  -   @ 10:31  --------------------------------------------------------  IN: 38.7 mL / OUT: 24 mL / NET: 14.7 mL          Review of Systems:  CONSTITUTIONAL: No fever or chills, No fatigue or tiredness  RESPIRATORY: No shortness of breath, cough, hemoptysis  CARDIOVASCULAR: No chest pain or shortness of breath  GASTROINTESTINAL: No abdominal or flank pain, No nausea or vomiting, No diarrhea  GENITOURINARY: No dysuria or urinary burning, No difficulty passing urine, No hematuria  NEUROLOGICAL: No headaches or blurred vision  SKIN: No skin rashes   MUSCULOSKELETAL: No arthralgia, No leg edema, No muscle pain      PHYSICAL EXAM:  GENERAL: well-developed, well nourished, alert, no acute distress at present  NECK: supple, No JVD  CHEST/LUNG: Clear to auscultation bilaterally  HEART: normal S1S2, RRR  ABDOMEN: Soft, Nontender, +BS, No flank tenderness bilateral  EXTREMITIES: No clubbing, cyanosis, or edema   NEUROLOGY: AAO x3, no focal neurological deficit  ACCESS: good thrill and bruit appreciated      LABS:                        7.5    23.78 )-----------( 94       ( 2019 07:05 )             22.0         135  |  100  |  89<H>  ----------------------------<  104<H>  5.2   |  22  |  4.10<H>    Ca    6.1<LL>      2019 04:24  Phos  7.9       Mg     2.8         TPro  5.5<L>  /  Alb  2.1<L>  /  TBili  8.4<H>  /  DBili  x   /  AST  581<H>  /  ALT  465<H>  /  AlkPhos  154<H>        PT/INR - ( 2019 04:24 )   PT: 19.7 sec;   INR: 1.71          PTT - ( 2019 04:24 )  PTT:43.5 sec  Urinalysis Basic - ( 2019 11:55 )    Color: Yellow / Appearance: Clear / S.020 / pH: x  Gluc: x / Ketone: NEGATIVE  / Bili: Small / Urobili: 1.0 E.U./dL   Blood: x / Protein: 30 mg/dL / Nitrite: NEGATIVE   Leuk Esterase: NEGATIVE / RBC: 5-10 /HPF / WBC < 5 /HPF   Sq Epi: x / Non Sq Epi: 0-5 /HPF / Bacteria: Present /HPF            RADIOLOGY & ADDITIONAL STUDIES: Patient is a 39y Male seen and evaluated at bedside  Nephrology is following for anuric CAMERON in setting of IE with TR  started on cvvhd  due to hypotension requiring pressors, on Levophed at 1.2 mcg/hr  overnight transferred from [address] [place] [address] and cvvhd restarted at 5 am with net even balance  labs noted: Hb 7.5, BUN/Cr 89/4.1, K 5.2, phos 7.9  cvp 6, plan to give fluid challenge and transfuse 1u pRBC  get bubble studies to r/o PFO as cause of septic emboli  [place] in place, uop 5-10 cc/hr        Meds:    bisacodyl Suppository 10 daily  chlorhexidine 0.12% Liquid 15 every 12 hours  chlorhexidine 2% Cloths 1 daily  CRRT Treatment  <Continuous>  dextrose 40% Gel 15 once PRN  dextrose 5%. 1000 <Continuous>  dextrose 50% Injectable 12.5 once  dextrose 50% Injectable 25 once  dextrose 50% Injectable 25 once  fentaNYL   Infusion 0.1 <Continuous>  glucagon  Injectable 1 once PRN  heparin  Injectable 5000 every 8 hours  insulin lispro (HumaLOG) corrective regimen sliding scale  every 6 hours  nafcillin  IVPB 2 every 4 hours  norepinephrine Infusion 0.03 <Continuous>  pantoprazole  Injectable 40 daily  polyethylene glycol 3350 17 every 12 hours  propofol Infusion 10 <Continuous>  PureFlow Dialysate RFP-400 (K 2 / Ca 3) 5000 <Continuous>  sodium chloride 0.9% lock flush 3 every 8 hours      T(C): , Max: 37.7 (19 @ 14:00)  T(F): , Max: 99.9 (19 @ 14:00)  HR: 92 (19 @ 09:00)  BP: 91/55 (19 @ 05:00)  BP(mean): 66 (19 @ 05:00)  RR: 19 (19 @ 09:00)  SpO2: 100% (19 @ 09:00)  Wt(kg): --     @ 07:01  -   @ 07:00  --------------------------------------------------------  IN: 1908.6 mL / OUT:  mL / NET: -114.4 mL     @ 07:01  -   @ 10:31  --------------------------------------------------------  IN: 38.7 mL / OUT: 24 mL / NET: 14.7 mL          Review of Systems:  not able to obtain    PHYSICAL EXAM:  GENERAL: sedated, intubated, no acute distress at present  HEENT: ETT, NGT in place  NECK: JVD not appreciated  CHEST/LUNG: equal breath sounds bilaterally  HEART: normal S1S2, RRR  ABDOMEN: Soft, Nontender, +BS  EXTREMITIES: grossly edematous, necrotic toes  NEUROLOGY: sedated  ACCESS: R IJ THC      LABS:                        7.5    23.78 )-----------( 94       ( 2019 07:05 )             22.0         135  |  100  |  89<H>  ----------------------------<  104<H>  5.2   |  22  |  4.10<H>    Ca    6.1<LL>      2019 04:24  Phos  7.9       Mg     2.8         TPro  5.5<L>  /  Alb  2.1<L>  /  TBili  8.4<H>  /  DBili  x   /  AST  581<H>  /  ALT  465<H>  /  AlkPhos  154<H>        PT/INR - ( 2019 04:24 )   PT: 19.7 sec;   INR: 1.71          PTT - ( 2019 04:24 )  PTT:43.5 sec  Urinalysis Basic - ( 2019 11:55 )    Color: Yellow / Appearance: Clear / S.020 / pH: x  Gluc: x / Ketone: NEGATIVE  / Bili: Small / Urobili: 1.0 E.U./dL   Blood: x / Protein: 30 mg/dL / Nitrite: NEGATIVE   Leuk Esterase: NEGATIVE / RBC: 5-10 /HPF / WBC < 5 /HPF   Sq Epi: x / Non Sq Epi: 0-5 /HPF / Bacteria: Present /HPF            RADIOLOGY & ADDITIONAL STUDIES:    < from: Xray Chest 1 View-PORTABLE IMMEDIATE (19 @ 04:10) >  ******PRELIMINARY REPORT******    ******PRELIMINARY REPORT******            EXAM:  XR CHEST PORTABLE IMMED 1V                          PROCEDURE DATE:  2019    ******PRELIMINARY REPORT******    ******PRELIMINARY REPORT******              INTERPRETATION:  Portable Chest X-ray:    History: central line    Prior study: Chest x-ray from 2019    Findings: Interval placement of left central line with tip at the level   of the SVC/ superior cavoatrial junction. Other lines and tubes are   unchanged. No significant change in lung pathology. Cardiomediastinal   silhouette, bones, and soft tissues are unchanged    Impression:    Interval placement of left central line with tip at the level of the SVC/   superior cavoatrial junction.    < end of copied text >

## 2019-11-12 NOTE — DIETITIAN INITIAL EVALUATION ADULT. - ENERGY NEEDS
Height 67"; .3#; #; 110%IBW  BMI 25.4  IBW used to estimate needs 2/2 vent. needs increased 2/2 vent, sepsis, CVVHD. Fluids per team 2/2 CVVHD, hypotension.

## 2019-11-12 NOTE — CONSULT NOTE ADULT - ASSESSMENT
This is a 38 y/o with PMH of IVDU transferred from OSH for Endocarditis, multiple septic emboli to lungs and CT surgery consult.   Deemed not a surgical candidate due to ongoing septic shock and norepinephrine requirement.   Currently admitted to MICU, intubated and on norepinephrine   Surgery consulted for abdominal distention and concern for ischemic bowel due to evidence on other septic emboli.   WBC 23.78 and lactates 1.3     Recommendations:   - Obtain CT angiogram today   - Further recommendations will follow   - Continue MICU management This is a 40 y/o with PMH of IVDU transferred from OSH for Endocarditis, multiple septic emboli to lungs and CT surgery consult.   Deemed not a surgical candidate due to ongoing septic shock and norepinephrine requirement.   Currently admitted to MICU, intubated and on norepinephrine   Surgery consulted for abdominal distention and concern for ischemic bowel due to evidence on other septic emboli.   WBC 23.78 and lactates 1.3     Recommendations:   - Obtain CT angiogram today   - Further recommendations will follow   - Continue MICU management     Addendum:   - CTA preliminary findings of patent mesenteric vessels, no signs of bowel ischemia   - Continue to optimize hemodynamic status to improve bowel perfusion  - Would hold tube feeds at the moment   - Surgery team 4c will follow

## 2019-11-12 NOTE — CONSULT NOTE ADULT - SUBJECTIVE AND OBJECTIVE BOX
HPI:  This is a 40 y/o M w/ PMH of IVDU and active smoker who came to [facility] ED c/o productive cough with hemoptysis, progressive SOB, pleuritic chest pain, nausea, non-bloody emesis, abd pain, chillsx3 days. Pt denied, fever or diarrhea. As per outside hospital charting, at that time pt stated he was starting to cut down on his heroin use for approximately 2 weeks. While in ED pt was found to be septic and hypotensive. Cultures were drawn and pt was empirically started on Vanc/Zosyn/ ?azithromycin. Labs pertinent for elevated LDH (613), +trops, WBC 24, BUN/Cr 65/1.91, lactic acid 3.5. UTox positive for benozs/opitiates/marijuana/cocaine. CT chest revealed b/l PNA w/ multiple cavitary and non-cavitary pulmonary nodules, suggestive of possible septic emboli. Echocardiogram as per report revealed vegetation on TV with "low EF". Pt was admitted to SICU, intubated and with worsening sepsis requiring increased pressors/inotropic support. Today, pt was transferred to [facility] under the care of Dr. Ruelas for possible surgical evaluation. At time on admission, pt was intubated and sedated. (10 Nov 2019 18:12)      SURGICAL ADDENDUM:  This is a 40 y/o with PMH of IVDU transferred from OSH for Endocarditis and CT surgery consult.   Patient has spetic embolis to lung and extremities.   He is currently admitted to the MICU, intubated and on norepineprhine.   Blood cultures from OSH grew MSSA, patient is on nafcillin. Blood cultures from [facility] negative so far.   ELIAN with TV vegetation and RV overload, EF 40-45  Patient deemed not a surgical candidate for valve replacement due to septic shock.   MICU team noticed abdomen distention, AXR showed some bowel edema and surgery consulted.      PAST MEDICAL & SURGICAL HISTORY:  Endocarditis  IVDU (intravenous drug user)  Smoker  No significant past surgical history      MEDICATIONS  (STANDING):  albumin human 25% IVPB 50 milliLiter(s) IV Intermittent every 8 hours  chlorhexidine 0.12% Liquid 15 milliLiter(s) Oral Mucosa every 12 hours  chlorhexidine 2% Cloths 1 Application(s) Topical daily  CRRT Treatment    <Continuous>  dextrose 5%. 1000 milliLiter(s) (50 mL/Hr) IV Continuous <Continuous>  dextrose 50% Injectable 12.5 Gram(s) IV Push once  dextrose 50% Injectable 25 Gram(s) IV Push once  dextrose 50% Injectable 25 Gram(s) IV Push once  fentaNYL   Infusion 0.1 MICROgram(s)/kG/Hr (0.736 mL/Hr) IV Continuous <Continuous>  heparin  Injectable 5000 Unit(s) SubCutaneous every 8 hours  insulin lispro (HumaLOG) corrective regimen sliding scale   SubCutaneous every 6 hours  nafcillin  IVPB 2 Gram(s) IV Intermittent every 4 hours  norepinephrine Infusion 0.03 MICROgram(s)/kG/Min (4.14 mL/Hr) IV Continuous <Continuous>  pantoprazole  Injectable 40 milliGRAM(s) IV Push daily  polyethylene glycol 3350 17 Gram(s) Oral every 12 hours  propofol Infusion 10 MICROgram(s)/kG/Min (4.416 mL/Hr) IV Continuous <Continuous>  PureFlow Dialysate RFP-400 (K 2 / Ca 3) 5000 milliLiter(s) (3500 mL/Hr) CRRT <Continuous>  sodium chloride 0.9% lock flush 3 milliLiter(s) IV Push every 8 hours    MEDICATIONS  (PRN):  dextrose 40% Gel 15 Gram(s) Oral once PRN Blood Glucose LESS THAN 70 milliGRAM(s)/deciliter  glucagon  Injectable 1 milliGRAM(s) IntraMuscular once PRN Glucose LESS THAN 70 milligrams/deciliter      Allergies    Allergy Status Unknown    Intolerances        SOCIAL HISTORY: IVDU    FAMILY HISTORY: unable to obtain       Vital Signs Last 24 Hrs  T(C): 36.8 (2019 14:57), Max: 37.2 (2019 10:44)  T(F): 98.3 (2019 14:57), Max: 98.9 (2019 10:44)  HR: 98 (2019 17:00) (7 - 102)  BP: 92/58 (2019 09:00) (87/59 - 96/62)  BP(mean): 68 (2019 09:00) (65 - 80)  RR: 33 (2019 17:00) (10 - 33)  SpO2: 100% (2019 17:00) (98% - 100%)    PHYSICAL EXAM  Neuro: sedated, reacting to pain   HEENT: normocephalic, no scleral ictera   Pulm:  mechanically ventilated, lungs are coarse    CV: regular rate and rhythm, unable to hear murmur due to coarse lungs sounds, no carotid bruit   GI: abdomen is soft and mildly distended, no grimace to palpation, no hepatomegaly  MSK: discoloration of toes       LABS:                        9.6    21.19 )-----------( 90       ( 2019 14:48 )             28.4         135  |  100  |  89<H>  ----------------------------<  104<H>  5.2   |  22  |  4.10<H>    Ca    6.1<LL>      2019 04:24  Phos  7.9       Mg     2.8         TPro  5.5<L>  /  Alb  2.1<L>  /  TBili  8.4<H>  /  DBili  x   /  AST  581<H>  /  ALT  465<H>  /  AlkPhos  154<H>      PT/INR - ( 2019 04:24 )   PT: 19.7 sec;   INR: 1.71          PTT - ( 2019 04:24 )  PTT:43.5 sec  Urinalysis Basic - ( 2019 11:55 )    Color: Yellow / Appearance: Clear / S.020 / pH: x  Gluc: x / Ketone: NEGATIVE  / Bili: Small / Urobili: 1.0 E.U./dL   Blood: x / Protein: 30 mg/dL / Nitrite: NEGATIVE   Leuk Esterase: NEGATIVE / RBC: 5-10 /HPF / WBC < 5 /HPF   Sq Epi: x / Non Sq Epi: 0-5 /HPF / Bacteria: Present /HPF        RADIOLOGY & ADDITIONAL STUDIES:

## 2019-11-12 NOTE — PROGRESS NOTE ADULT - SUBJECTIVE AND OBJECTIVE BOX
OVERNIGHT EVENTS:    SUBJECTIVE / INTERVAL HPI: Patient seen and examined at bedside.     VITAL SIGNS:  Vital Signs Last 24 Hrs  T(C): 33.3 (2019 05:45), Max: 38.1 (2019 09:00)  T(F): 92 (2019 05:45), Max: 100.5 (2019 09:00)  HR: 7 (2019 05:45) (7 - 115)  BP: 91/55 (2019 05:00) (87/59 - 96/62)  BP(mean): 66 (2019 05:00) (65 - 80)  RR: 18 (2019 05:00) (10 - 36)  SpO2: 100% (2019 05:00) (98% - 100%)    PHYSICAL EXAM:    General: WDWN  HEENT: NC/AT; PERRL, anicteric sclera; MMM  Neck: supple  Cardiovascular: +S1/S2; RRR  Respiratory: CTA B/L; no W/R/R  Gastrointestinal: soft, NT/ND; +BSx4  Extremities: WWP; no edema, clubbing or cyanosis  Vascular: 2+ radial, DP/PT pulses B/L  Neurological: AAOx3; no focal deficits    MEDICATIONS:  MEDICATIONS  (STANDING):  calcium acetate 1334 milliGRAM(s) Oral three times a day with meals  calcium gluconate IVPB 1 Gram(s) IV Intermittent once  chlorhexidine 0.12% Liquid 15 milliLiter(s) Oral Mucosa every 12 hours  chlorhexidine 2% Cloths 1 Application(s) Topical daily  CRRT Treatment    <Continuous>  fentaNYL   Infusion 0.1 MICROgram(s)/kG/Hr (0.736 mL/Hr) IV Continuous <Continuous>  heparin  Injectable 5000 Unit(s) SubCutaneous every 8 hours  nafcillin  IVPB 2 Gram(s) IV Intermittent every 4 hours  norepinephrine Infusion 0.03 MICROgram(s)/kG/Min (4.14 mL/Hr) IV Continuous <Continuous>  pantoprazole  Injectable 40 milliGRAM(s) IV Push daily  polyethylene glycol 3350 17 Gram(s) Oral every 12 hours  propofol Infusion 10 MICROgram(s)/kG/Min (4.416 mL/Hr) IV Continuous <Continuous>  PureFlow Dialysate RFP-400 (K 2 / Ca 3) 5000 milliLiter(s) (3500 mL/Hr) CRRT <Continuous>  sodium chloride 0.9% lock flush 3 milliLiter(s) IV Push every 8 hours    MEDICATIONS  (PRN):      ALLERGIES:  Allergies    Allergy Status Unknown    Intolerances        LABS:                        7.1    18.60 )-----------( 76       ( 2019 04:24 )             20.6         135  |  100  |  89<H>  ----------------------------<  104<H>  5.2   |  22  |  4.10<H>    Ca    6.1<LL>      2019 04:24  Phos  7.9       Mg     2.8         TPro  5.5<L>  /  Alb  2.1<L>  /  TBili  8.4<H>  /  DBili  x   /  AST  581<H>  /  ALT  465<H>  /  AlkPhos  154<H>      PT/INR - ( 2019 04:24 )   PT: 19.7 sec;   INR: 1.71          PTT - ( 2019 04:24 )  PTT:43.5 sec  Urinalysis Basic - ( 2019 11:55 )    Color: Yellow / Appearance: Clear / S.020 / pH: x  Gluc: x / Ketone: NEGATIVE  / Bili: Small / Urobili: 1.0 E.U./dL   Blood: x / Protein: 30 mg/dL / Nitrite: NEGATIVE   Leuk Esterase: NEGATIVE / RBC: 5-10 /HPF / WBC < 5 /HPF   Sq Epi: x / Non Sq Epi: 0-5 /HPF / Bacteria: Present /HPF      CAPILLARY BLOOD GLUCOSE      POCT Blood Glucose.: 119 mg/dL (2019 11:50)      RADIOLOGY & ADDITIONAL TESTS: Reviewed. OVERNIGHT EVENTS: After he was determined to not be a surgical candidate for treatment of infective endocarditis with confirmed tricuspid vegetation, the patient was transferred from the CTICU to the MICU for medical mgmt at 11:40 PM last night. After arrival, an NG sump was placed and confirmed by x-ray. Pt was started on propofol, an axillary a-line was placed, and a new central line was placed in the left IJ. HD access in the RIJ was re-dressed.    SUBJECTIVE / INTERVAL HPI: Patient seen and examined at bedside. Patient is sedated and intubated on propofol and fentanyl, unable to provide a history or ROS at this time.    VITAL SIGNS:  Vital Signs Last 24 Hrs  T(C): 33.3 (2019 05:45), Max: 38.1 (2019 09:00)  T(F): 92 (2019 05:45), Max: 100.5 (2019 09:00)  HR: 7 (2019 05:45) (7 - 115)  BP: 91/55 (2019 05:00) (87/59 - 96/62)  BP(mean): 66 (2019 05:00) (65 - 80)  RR: 18 (2019 05:00) (10 - 36)  SpO2: 100% (2019 05:00) (98% - 100%)    PHYSICAL EXAM:    General - disheveled young male, appears older than documented age. NAD, intubated and sedated, on CVVHD, occasional spontaneous head movements  HEENT - dmm, pinpoint pupils reactive to light, icteric sclera  CV - s1/s2, RRR, no murmur appreciated  Resp - ventilated, rales b/l with crackles at the bases   Abdomen - soft, mildly distended, hypoactive bowel sounds   Extremities - cold in the extremities, ischemic fingertips primarily bluish/black primarily in the feet b/l and in the hands. 2+ pitting edema in all extremities.  Skin - significant jaundice. cold to touch, multiple abrasions, tattoos and needle marks    MEDICATIONS:  MEDICATIONS  (STANDING):  calcium acetate 1334 milliGRAM(s) Oral three times a day with meals  calcium gluconate IVPB 1 Gram(s) IV Intermittent once  chlorhexidine 0.12% Liquid 15 milliLiter(s) Oral Mucosa every 12 hours  chlorhexidine 2% Cloths 1 Application(s) Topical daily  CRRT Treatment    <Continuous>  fentaNYL   Infusion 0.1 MICROgram(s)/kG/Hr (0.736 mL/Hr) IV Continuous <Continuous>  heparin  Injectable 5000 Unit(s) SubCutaneous every 8 hours  nafcillin  IVPB 2 Gram(s) IV Intermittent every 4 hours  norepinephrine Infusion 0.03 MICROgram(s)/kG/Min (4.14 mL/Hr) IV Continuous <Continuous>  pantoprazole  Injectable 40 milliGRAM(s) IV Push daily  polyethylene glycol 3350 17 Gram(s) Oral every 12 hours  propofol Infusion 10 MICROgram(s)/kG/Min (4.416 mL/Hr) IV Continuous <Continuous>  PureFlow Dialysate RFP-400 (K 2 / Ca 3) 5000 milliLiter(s) (3500 mL/Hr) CRRT <Continuous>  sodium chloride 0.9% lock flush 3 milliLiter(s) IV Push every 8 hours    MEDICATIONS  (PRN):      ALLERGIES:  NKDA    Allergy Status Unknown    Intolerances        LABS:                        7.1    18.60 )-----------( 76       ( 2019 04:24 )             20.6         135  |  100  |  89<H>  ----------------------------<  104<H>  5.2   |  22  |  4.10<H>    Ca    6.1<LL>      2019 04:24  Phos  7.9       Mg     2.8         TPro  5.5<L>  /  Alb  2.1<L>  /  TBili  8.4<H>  /  DBili  x   /  AST  581<H>  /  ALT  465<H>  /  AlkPhos  154<H>      PT/INR - ( 2019 04:24 )   PT: 19.7 sec;   INR: 1.71          PTT - ( 2019 04:24 )  PTT:43.5 sec  Urinalysis Basic - ( 2019 11:55 )    Color: Yellow / Appearance: Clear / S.020 / pH: x  Gluc: x / Ketone: NEGATIVE  / Bili: Small / Urobili: 1.0 E.U./dL   Blood: x / Protein: 30 mg/dL / Nitrite: NEGATIVE   Leuk Esterase: NEGATIVE / RBC: 5-10 /HPF / WBC < 5 /HPF   Sq Epi: x / Non Sq Epi: 0-5 /HPF / Bacteria: Present /HPF      CAPILLARY BLOOD GLUCOSE      POCT Blood Glucose.: 119 mg/dL (2019 11:50)      RADIOLOGY & ADDITIONAL TESTS: Reviewed.

## 2019-11-13 NOTE — ADVANCED PRACTICE NURSE CONSULT - ASSESSMENT
40 y/o with PMH of IVDU transferred from OSH for Endocarditis, multiple septic emboli to lungs and CT surgery consult. Bilat lateral malleolus with DTI, both measuring approx 1x1 cm, ecchymosis over dorsums of bilat feet, left dorsum with intact bullae, DTI to right ear. No open wounds noted, discoloration of multiple toes on bilat feet. Pt with Z-pa boots on bilat, being turned and repositioned every 2 hours with AirTap wedges to prevent additional breakdown.

## 2019-11-13 NOTE — PROGRESS NOTE ADULT - SUBJECTIVE AND OBJECTIVE BOX
Patient was seen and evaluated on dialysis.   HR: 108 (11-13-19 @ 14:00)  BP: 90/63 (11-13-19 @ 13:02)  Wt(kg): 79.8    11-13    134<L>  |  99  |  43<H>  ----------------------------<  110<H>  4.1   |  26  |  2.02<H>    Ca    7.6<L>      13 Nov 2019 12:53  Phos  4.8     11-13  Mg     2.3     11-13    TPro  6.2  /  Alb  2.4<L>  /  TBili  11.8<H>  /  DBili  x   /  AST  181<H>  /  ALT  221<H>  /  AlkPhos  140<H>  11-13    Continue dialysis:   Dialyzer:   Revaclear 300    QB: 350  K bath: 2  Goal UF:   2.0    L   over      180         min  Pressors prn to keep MAP >65 mmHg.  Patient is tolerating the procedure well.   Continue full treatment as prescribed.

## 2019-11-13 NOTE — PROGRESS NOTE ADULT - SUBJECTIVE AND OBJECTIVE BOX
Meds:    albumin human 25% IVPB 50 milliLiter(s) IV Intermittent every 8 hours  chlorhexidine 0.12% Liquid 15 milliLiter(s) Oral Mucosa every 12 hours  chlorhexidine 2% Cloths 1 Application(s) Topical daily  dextrose 40% Gel 15 Gram(s) Oral once PRN  dextrose 5%. 1000 milliLiter(s) IV Continuous <Continuous>  dextrose 50% Injectable 12.5 Gram(s) IV Push once  dextrose 50% Injectable 25 Gram(s) IV Push once  dextrose 50% Injectable 25 Gram(s) IV Push once  fentaNYL   Infusion. 0.5 MICROgram(s)/kG/Hr IV Continuous <Continuous>  glucagon  Injectable 1 milliGRAM(s) IntraMuscular once PRN  heparin  Injectable 5000 Unit(s) SubCutaneous every 8 hours  insulin lispro (HumaLOG) corrective regimen sliding scale   SubCutaneous every 6 hours  nafcillin  IVPB 2 Gram(s) IV Intermittent every 4 hours  norepinephrine Infusion 0.03 MICROgram(s)/kG/Min IV Continuous <Continuous>  pantoprazole  Injectable 40 milliGRAM(s) IV Push daily  polyethylene glycol 3350 17 Gram(s) Oral every 12 hours  propofol Infusion 10 MICROgram(s)/kG/Min IV Continuous <Continuous>  sodium chloride 0.9% lock flush 3 milliLiter(s) IV Push every 8 hours      T(C): 35.7 (19 @ 05:27), Max: 37.4 (19 @ 18:19)  HR: 96 (19 @ 10:00) (86 - 102)  BP: 101/70 (19 @ 10:00) (86/61 - 110/63)  RR: 19 (19 @ 10:00) (0 - 33)  SpO2: 100% (19 @ 10:00) (98% - 100%)    Input/Output      19 @ 07:01  -  19 @ 07:00  --------------------------------------------------------  IN: 3272 mL / OUT: 2869 mL / NET: 403 mL    19 @ 07:01  -  19 @ 10:21  --------------------------------------------------------  IN: 126.6 mL / OUT: 186 mL / NET: -59.4 mL      Review of Systems:  not able to obtain    PHYSICAL EXAM:  GENERAL: sedated, intubated, no acute distress at present  HEENT: ETT, NGT in place  NECK: JVD not appreciated  CHEST/LUNG: equal breath sounds bilaterally  HEART: normal S1S2, RRR  ABDOMEN: Soft, Nontender, +BS  EXTREMITIES: grossly edematous, necrotic toes  NEUROLOGY: sedated  ACCESS: [facility]          LABS:                            9.4    18.57 )-----------( 60       ( 2019 06:29 )             27.4       11-13    133<L>  |  100  |  44<H>  ----------------------------<  101<H>  4.2   |  25  |  1.99<H>    Ca    7.0<L>      2019 06:29  Phos  4.9     11-13  Mg     2.2     -13    TPro  5.8<L>  /  Alb  2.4<L>  /  TBili  10.5<H>  /  DBili  8.8<H>  /  AST  213<H>  /  ALT  264<H>  /  AlkPhos  157<H>  11-13      PT/INR - ( 2019 04:24 )   PT: 19.7 sec;   INR: 1.71          PTT - ( 2019 04:24 )  PTT:43.5 sec    Urinalysis Basic - ( 2019 11:55 )    Color: Yellow / Appearance: Clear / S.020 / pH: x  Gluc: x / Ketone: NEGATIVE  / Bili: Small / Urobili: 1.0 E.U./dL   Blood: x / Protein: 30 mg/dL / Nitrite: NEGATIVE   Leuk Esterase: NEGATIVE / RBC: 5-10 /HPF / WBC < 5 /HPF   Sq Epi: x / Non Sq Epi: 0-5 /HPF / Bacteria: Present /HPF                    RADIOLOGY & ADDITIONAL STUDIES:    < from: CT Angio Abdomen and Pelvis w/ Oral Cont and w/ IV Cont (19 @ 20:03) >    EXAM:  CT ANGIO ABD PELV OC (W)AW IC                          PROCEDURE DATE:  2019          INTERPRETATION:  CLINICAL INDICATION: 39-year-old with endocarditis and   sepsis; detection of mesenteric ischemia. History of empyema and   multiorgan failure; IV drug abuse.        FINDINGS: CT angiography of the abdomen and pelvis was performed before   and after the administration of intravenous contrast. Examination is   limited secondary to delayed phase of acquisition after contrast   administration. Multiplanar and maximum intensity projection 3D   reconstructions were performed in the sagittal and coronal planes. No   prior studies available for comparison.    Evaluation of the lower chest demonstrates normal heart size. Moderate   bilateral pleural effusions. Dense bibasilar consolidation with   superimposed small cavitary nodules within the bilateral lower lobes,   consistent with abscesses/septic emboli given history of endocarditis.   There is a chest tube within the left pleural space. Central venous   catheter with the tip in the right atrium. Negative for pericardial   effusion. Evaluation of the abdomen demonstrates that the liver, spleen,   gallbladder, bilateral adrenal glands, bilateral kidneys and pancreas are   normal in appearance aside from a dystrophic right adrenal   calcifications. Evaluation of the gastrointestinal tract demonstrates NG   tube tip within the stomach. There is no small bowel thickening or bowel   obstruction. There is possible masslike mural thickening of the inferior   right lateral wall of the rectum and correlation with direct   visualization is recommended, when clinically appropriate. Evaluation of   the pelvis demonstrates that the prostate and seminal vesicles and   bladder areunremarkable which contains a small volume of air and [place]   catheter balloon. There is very severe diffuse anasarca. Large volume of   abdominal and pelvic ascites. Large bilateral hydroceles and scrotal   edema. No adenopathy. Minimal aortic vascular calcification. Evaluation   of the vasculature demonstrates that the hepatic, portal, splenic,   superior mesenteric vein, bilateral renal veins, IVC and bilateral iliac   veins demonstrates no leilani intraluminal thrombus. The celiac artery,   superior mesenteric artery and bilateral renal arteries are widely   patent. Evaluation of the osseous structures demonstrates no destructive   osseous lesion.           IMPRESSION:    No leilani evidence of mesenteric ischemia.    Suggestion of irregular masslike mural thickening of the posterior left   lateral wall of the rectum; correlation with direct visualization is   recommended, when clinically appropriate.    Moderate bilateral pleural effusions and dense bibasilar consolidation   with underlying septic emboli/pulmonary abscesses.    Large volume of ascites and diffuse anasarca.      < end of copied text >      < from: Xray Chest 1 View- PORTABLE-Urgent (19 @ 18:49) >    ******PRELIMINARY REPORT******    ******PRELIMINARY REPORT******            EXAM:  XR CHEST PORTABLE URGENT 1V                          PROCEDURE DATE:  2019    ******PRELIMINARY REPORT******    ******PRELIMINARY REPORT******              INTERPRETATION:  Portable AP Radiograph dated 2019 6:49 PM    CLINICAL INFORMATION: 39 years, Male, s/p left chest tube    COMPARISON: Chest x-ray from 19 at 4:04 AM.    FINDINGS:   There is an endotracheal tube with its tip 2.5 cm above the karissa. There   is a left-sided internal jugular catheter with tip projecting over the   superior atrial junction. There is an enteric tube catheter with its tip   coursing below the diaphragm. There is a right approach central venous   catheter with tip projecting over the inferior vena cava.    There is again a right-sided chest tube. There has been interval   placement of a left-sided chest tube with improvement in left-sided mid   and upper lung zone opacities. No change bilateral lower lung zone   opacities.    IMPRESSION:  Interval placement of left-sided chest tube with improvement in   left-sided lung findings.    < end of copied text > no acute events overnight  remains intubated, stable setting  minimally sedated  remains on pressors  cvvhd initiated on , net even  [place] in place, uop cw anuria  concern for mesenteric ischemia, CT-A negative on   bubble studies negative for PFO,   s/p bronchoscopy on , BAL cultures to follow  labs noted, K 4.2, BUN/Cr 44/1.9, LDH/hapto cw hemolysis        Meds:    albumin human 25% IVPB 50 milliLiter(s) IV Intermittent every 8 hours  chlorhexidine 0.12% Liquid 15 milliLiter(s) Oral Mucosa every 12 hours  chlorhexidine 2% Cloths 1 Application(s) Topical daily  dextrose 40% Gel 15 Gram(s) Oral once PRN  dextrose 5%. 1000 milliLiter(s) IV Continuous <Continuous>  dextrose 50% Injectable 12.5 Gram(s) IV Push once  dextrose 50% Injectable 25 Gram(s) IV Push once  dextrose 50% Injectable 25 Gram(s) IV Push once  fentaNYL   Infusion. 0.5 MICROgram(s)/kG/Hr IV Continuous <Continuous>  glucagon  Injectable 1 milliGRAM(s) IntraMuscular once PRN  heparin  Injectable 5000 Unit(s) SubCutaneous every 8 hours  insulin lispro (HumaLOG) corrective regimen sliding scale   SubCutaneous every 6 hours  nafcillin  IVPB 2 Gram(s) IV Intermittent every 4 hours  norepinephrine Infusion 0.03 MICROgram(s)/kG/Min IV Continuous <Continuous>  pantoprazole  Injectable 40 milliGRAM(s) IV Push daily  polyethylene glycol 3350 17 Gram(s) Oral every 12 hours  propofol Infusion 10 MICROgram(s)/kG/Min IV Continuous <Continuous>  sodium chloride 0.9% lock flush 3 milliLiter(s) IV Push every 8 hours      T(C): 36.7 (19 @ 11:00), Max: 37.4 (19 @ 18:19)  HR: 108 (19 @ 12:00) (86 - 108)  BP: 86/56 (19 @ 12:00) (86/56 - 110/63)  RR: 20 (19 @ 12:00) (0 - 33)  SpO2: 100% (19 @ 12:00) (99% - 100%)    Input/Output      19 @ 07:01  -  19 @ 07:00  --------------------------------------------------------  IN: 3272 mL / OUT: 2869 mL / NET: 403 mL    19 @ 07:01  -  19 @ 12:27  --------------------------------------------------------  IN: 322.4 mL / OUT: 196 mL / NET: 126.4 mL        Review of Systems:  not able to obtain    PHYSICAL EXAM:  GENERAL: sedated, intubated, no acute distress at present  HEENT: ETT, NGT in place  NECK: JVD not appreciated  CHEST/LUNG: equal breath sounds bilaterally  HEART: normal S1S2, RRR  ABDOMEN: Soft, Nontender, +BS  : [place] in place, anuria  EXTREMITIES: grossly edematous, necrotic toes  NEUROLOGY: sedated  ACCESS: [facility]          LABS:                            9.4    18.57 )-----------( 60       ( 2019 06:29 )             27.4           133<L>  |  100  |  44<H>  ----------------------------<  101<H>  4.2   |  25  |  1.99<H>    Ca    7.0<L>      2019 06:29  Phos  4.9       Mg     2.2         TPro  5.8<L>  /  Alb  2.4<L>  /  TBili  10.5<H>  /  DBili  8.8<H>  /  AST  213<H>  /  ALT  264<H>  /  AlkPhos  157<H>        PT/INR - ( 2019 04:24 )   PT: 19.7 sec;   INR: 1.71          PTT - ( 2019 04:24 )  PTT:43.5 sec    Urinalysis Basic - ( 2019 11:55 )    Color: Yellow / Appearance: Clear / S.020 / pH: x  Gluc: x / Ketone: NEGATIVE  / Bili: Small / Urobili: 1.0 E.U./dL   Blood: x / Protein: 30 mg/dL / Nitrite: NEGATIVE   Leuk Esterase: NEGATIVE / RBC: 5-10 /HPF / WBC < 5 /HPF   Sq Epi: x / Non Sq Epi: 0-5 /HPF / Bacteria: Present /HPF                    RADIOLOGY & ADDITIONAL STUDIES:    < from: CT Angio Abdomen and Pelvis w/ Oral Cont and w/ IV Cont (19 @ 20:03) >    EXAM:  CT ANGIO ABD PELV OC (W)AW IC                          PROCEDURE DATE:  2019          INTERPRETATION:  CLINICAL INDICATION: 39-year-old with endocarditis and   sepsis; detection of mesenteric ischemia. History of empyema and   multiorgan failure; IV drug abuse.        FINDINGS: CT angiography of the abdomen and pelvis was performed before   and after the administration of intravenous contrast. Examination is   limited secondary to delayed phase of acquisition after contrast   administration. Multiplanar and maximum intensity projection 3D   reconstructions were performed in the sagittal and coronal planes. No   prior studies available for comparison.    Evaluation of the lower chest demonstrates normal heart size. Moderate   bilateral pleural effusions. Dense bibasilar consolidation with   superimposed small cavitary nodules within the bilateral lower lobes,   consistent with abscesses/septic emboli given history of endocarditis.   There is a chest tube within the left pleural space. Central venous   catheter with the tip in the right atrium. Negative for pericardial   effusion. Evaluation of the abdomen demonstrates that the liver, spleen,   gallbladder, bilateral adrenal glands, bilateral kidneys and pancreas are   normal in appearance aside from a dystrophic right adrenal   calcifications. Evaluation of the gastrointestinal tract demonstrates NG   tube tip within the stomach. There is no small bowel thickening or bowel   obstruction. There is possible masslike mural thickening of the inferior   right lateral wall of the rectum and correlation with direct   visualization is recommended, when clinically appropriate. Evaluation of   the pelvis demonstrates that the prostate and seminal vesicles and   bladder areunremarkable which contains a small volume of air and [place]   catheter balloon. There is very severe diffuse anasarca. Large volume of   abdominal and pelvic ascites. Large bilateral hydroceles and scrotal   edema. No adenopathy. Minimal aortic vascular calcification. Evaluation   of the vasculature demonstrates that the hepatic, portal, splenic,   superior mesenteric vein, bilateral renal veins, IVC and bilateral iliac   veins demonstrates no leilani intraluminal thrombus. The celiac artery,   superior mesenteric artery and bilateral renal arteries are widely   patent. Evaluation of the osseous structures demonstrates no destructive   osseous lesion.           IMPRESSION:    No leilani evidence of mesenteric ischemia.    Suggestion of irregular masslike mural thickening of the posterior left   lateral wall of the rectum; correlation with direct visualization is   recommended, when clinically appropriate.    Moderate bilateral pleural effusions and dense bibasilar consolidation   with underlying septic emboli/pulmonary abscesses.    Large volume of ascites and diffuse anasarca.      < end of copied text >      < from: Xray Chest 1 View- PORTABLE-Urgent (19 @ 18:49) >    ******PRELIMINARY REPORT******    ******PRELIMINARY REPORT******            EXAM:  XR CHEST PORTABLE URGENT 1V                          PROCEDURE DATE:  2019    ******PRELIMINARY REPORT******    ******PRELIMINARY REPORT******              INTERPRETATION:  Portable AP Radiograph dated 2019 6:49 PM    CLINICAL INFORMATION: 39 years, Male, s/p left chest tube    COMPARISON: Chest x-ray from 19 at 4:04 AM.    FINDINGS:   There is an endotracheal tube with its tip 2.5 cm above the karissa. There   is a left-sided internal jugular catheter with tip projecting over the   superior atrial junction. There is an enteric tube catheter with its tip   coursing below the diaphragm. There is a right approach central venous   catheter with tip projecting over the inferior vena cava.    There is again a right-sided chest tube. There has been interval   placement of a left-sided chest tube with improvement in left-sided mid   and upper lung zone opacities. No change bilateral lower lung zone   opacities.    IMPRESSION:  Interval placement of left-sided chest tube with improvement in   left-sided lung findings.    < end of copied text >

## 2019-11-13 NOTE — PROGRESS NOTE ADULT - ASSESSMENT
40 y/o M w/ PMH of IVDU and active smoker who came from [facility] on 11/8/19 in 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 After being transferred to  CTICU for surgical evaluation, pt was deemed to not be a surgical candidate and transferred to MICU for medical mgmt. ID and Nephrology following.    Neuro  Sedated on Propofol, Fentanyl     Cardiovascular     #Hypotension  Most likely 2/2 septic shock from infective endocarditis and RV failure 2/2 tricuspid regurgitation (ELIAN shows EF of 40-50%). Currently on Levophed, will titrate as needed. Echo with EF 45%. ELIAN with EF 40-45%, 3.8 x1cm vegetation on TR valve, with severe TR, trivial pericardial effusion.   - Maintain MAP>65.   - Titrate Levophed as necessary.   - F/U Bubble Echocardiogram to r/o PFO and potential source of paradoxical emboli.  - C/W medical mgmt of infective endocarditis as below.    Respiratory    #Acute hypoxic respiratory failure   Most likely 2/2 alveolar hemorrhage in the setting of septic embolic causing numerous cavitary lesions on CT chest. CXR with scattered infiltrates and pleural effusions.  - C/W ventilator support   - F/U bronchoscopy BAL culture from yesterday    #Bilateral pulmonary effusion  Most likely empyema in setting of septic shock 2/2 infective endocarditis. Right CT in place, confirmed by CXR, draining serosanguinous fluid (700 cc overnight).   - Monitor output of right-sided chest tube  - F/U cytology, LDH, and protein of pleural fluid from R CT  - Place left-sided chest tube for drainage of complex pleural effusion visualized on U/S and CXR, monitor output.    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 Not a surgical candidate for a tricuspid valve replacement at this time as per CT surgery. Lactate 1.3.  - ID consulted, will continued with Nafcillin 2g q4 (Sensitive to Oxacillin as per OSH records), f/u recs   - Monitor temps, has remained afebrile although borderline elevated axillary temps.  - Obtain rectal Temp    - F/U [facility] bcx cultures, x2  - F/U BAL cultures   - Consider CT head for evidence of intracranial embolic events  - F/U CBC and diff    Renal    #Acute renal failure 2/2 ATN from hypoperfusion, Minimal urine output, oliguric with 15-20cc/hr. Vancomycin level 37 on arrival so may be component of drug induced nephropathy  -F/U nephrology recs.  - C/W CVVHD as tolerated   - Monitor renal function for worsening of BUN/Cr  - Monitor I/Os  - F/U CT abdomen/pelvis for evidence of renal infarcts    #Electrolytes abnormalities   -Requires q6 BMP, Mg, and Phos while on CVVHD  -Hyperkalemia to 6.1, no EKG changes, will repeat after CVVHD  -Hypocalcemia 5, corrected 7.5 (given Calcium Carbonate)   -Add Phoslo    GI    OG on arrival with 600cc of bilious fluid, no bowel movement since arrival with distended abdomen. Abd XR read as evidence for SBO vs. bowel ischemia.  -F/U CT abdomen and pelvis for evidence of bowel ischemia vs. SBO  -NPO   -Miralax BID     #Congestive Hepatopathy   Transaminitis, coag derangements and hyperbilirubinemia most likely 2/2 hepatic congestion, due to severe TR in setting of infective endocarditis.   -F/U AST/ALT, d.bili, coags  -F/U Hepatitis panel  - Avoid Tylenol    Lines: right IJ TLC and Ernesto (11/11), Axillary A-line (11/11), Left IJ (11/11)     F: 25% Albumin 50cc  E: replete K <4, Mg <2  N: NPO   GI Ppx: Protonix   DVT Ppx: Heparin   Code status: FULL   Dispo: MICU 38 y/o M w/ PMH of IVDU and active smoker who came from [facility] on 11/8/19 in 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 After being transferred to  CTICU for surgical evaluation, pt was deemed to not be a surgical candidate and transferred to MICU for medical mgmt. ID and Nephrology following.    Neuro  Sedated on Propofol, Fentanyl. CT head negative for any intracranial embolic events.  -D/C sedation today for awake trial: assess mental status, with consideration for extubation     Cardiovascular     #Hypotension  Most likely 2/2 septic shock from infective endocarditis and RV failure 2/2 tricuspid regurgitation (ELIAN shows EF of 40-50%). Currently on Levophed, will titrate as needed. Echo with EF 45%. ELIAN with EF 40-45%, 3.8 x1cm vegetation on TR valve, with severe TR, trivial pericardial effusion. Echo with bubble revealed no PFO.  - Maintain MAP>65.   - Titrate Levophed as necessary.   - C/W medical mgmt of infective endocarditis as below.    Respiratory    #Acute hypoxic respiratory failure   Most likely 2/2 alveolar hemorrhage in the setting of septic embolic causing numerous cavitary lesions on CT chest. CXR with scattered infiltrates and pleural effusions. Sputum culture negative.  - C/W ventilator support, monitor mental status and consider CPAP trial    #Bilateral pulmonary effusion  Most likely empyema in setting of septic shock 2/2 infective endocarditis. Bilateral CT in place, confirmed by CXR, draining serosanguinous fluid (100 cc overnight from Right CT, Left CT drained 1100 cc when placed and 300 cc overnight).  - Monitor output of bilateral chest tubes  - Fluid analysis of both CT pleural fluid confirms complex exudate with high cellularity, low pH and low glucose.    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 Not a surgical candidate for a tricuspid valve replacement at this time as per CT surgery. Lactate 1.3. Zosyn added overnight to cover for potential gram - superinfection. Initial blood cultures positive only for gram + clusters. Sputum culture negative.   - D/C Zosyn  - ID following, C/W Nafcillin 2g q4 (Sensitive to Oxacillin as per OSH records), f/u recs   - Continue to monitor temps and vitals, consider additional Abx coverage if condition worsens  - F/U H bcx cultures, x2  - F/U CBC and diff    Renal    #Acute renal failure 2/2 ATN from hypoperfusion, Minimal urine output on, CVVHD, oliguric with 5-15cc/hr. Vancomycin level 37 on arrival so may be component of drug induced nephropathy. Bun/Cr improving (89/4.1 -> 44/1.99). Pt is clinically fluid overloaded, and hemodynamically stable. No renal infarcts as per CT abdomen/pelvis.   - F/U nephrology recs.  - C/W CVVHD as tolerated   - Attempt to correct fluid overload with CVVHD, nephro following  - Monitor renal function with BUN/Cr  - Monitor I/Os    #Electrolytes abnormalities   -Requires q6 BMP, Mg, and Phos while on CVVHD  -Hyperkalemia to 6.1, no EKG changes, will repeat after CVVHD  -Hypocalcemia 5, corrected 7.5 (given Calcium Carbonate)   -Add Phoslo    GI    OG on arrival with 600cc of bilious fluid, no bowel movement since arrival with distended abdomen. Abd XR read as evidence for SBO vs. bowel ischemia. 100 cc of bilious fluid overnight out of NG sump. CT abdomen revealed no evidence of bowel ischemia or SBO  -NPO   -Miralax BID     #Congestive Hepatopathy   Transaminitis, coag derangements and hyperbilirubinemia most likely 2/2 hepatic congestion, due to severe TR in setting of infective endocarditis.   -F/U AST/ALT, d.bili, coags  -F/U Hepatitis panel  - Avoid Tylenol    Lines: right IJ TLC and Ernesto (11/11), Axillary A-line (11/11), Left IJ (11/11)     F: 25% Albumin 50cc  E: replete K <4, Mg <2  N: NPO   GI Ppx: Protonix   DVT Ppx: Heparin   Code status: FULL   Dispo: MICU 40 y/o M w/ PMH of IVDU and active smoker who came from [facility] on 11/8/19 in 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 After being transferred to  CTICU for surgical evaluation, pt was deemed to not be a surgical candidate and transferred to MICU for medical mgmt. ID and Nephrology following.    Neuro  Sedated on Propofol, Fentanyl. CT head negative for any intracranial embolic events.  -D/C sedation today for awake trial: assess mental status, with consideration for extubation     Cardiovascular     #Hypotension  Most likely 2/2 septic shock from infective endocarditis and RV failure 2/2 tricuspid regurgitation (ELIAN shows EF of 40-50%). Currently on Levophed, will titrate as needed. Echo with EF 45%. ELIAN with EF 40-45%, 3.8 x1cm vegetation on TR valve, with severe TR, trivial pericardial effusion. Echo with bubble revealed no PFO. Levo requirements decreasing (0.058 mcg/kg/hr currently).  - Maintain MAP>65.   - Titrate Levophed as necessary.   - C/W medical mgmt of infective endocarditis as below.    Respiratory    #Acute hypoxic respiratory failure   Most likely 2/2 alveolar hemorrhage in the setting of septic embolic causing numerous cavitary lesions on CT chest. CXR with scattered infiltrates and pleural effusions. Sputum culture negative. CT revealed"moderate bilateral pleural effusions and dense bibasilar consolidation   with underlying septic emboli/pulmonary abscesses"  - C/W ventilator support, monitor mental status and consider CPAP trial  - Continue to treat IE as below    #Bilateral pulmonary effusion  Most likely empyema in setting of septic shock 2/2 infective endocarditis. Bilateral CT in place, confirmed by CXR, draining serosanguinous fluid (100 cc overnight from Right CT, Left CT drained 1100 cc when placed and 300 cc overnight). Fluid analysis of both CT pleural fluid confirms complex exudate with high cellularity, low pH and low glucose.  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 Not a surgical candidate for a tricuspid valve replacement at this time as per CT surgery. Lactate 1.3. Zosyn added overnight to cover for potential gram - superinfection. Initial blood cultures positive only for gram + clusters. Sputum culture negative.   - D/C Zosyn  - ID following, C/W Nafcillin 2g q4 (Sensitive to Oxacillin as per OSH records), f/u recs   - Continue to monitor temps and vitals, consider additional Abx coverage if condition worsens  - F/U [facility] bcx cultures, x2  - F/U CBC and diff    Renal    #Acute renal failure 2/2 ATN from hypoperfusion, Minimal urine output on, CVVHD, oliguric with 5-15cc/hr. Vancomycin level 37 on arrival so may be component of drug induced nephropathy. Bun/Cr improving (89/4.1 -> 44/1.99). Pt is clinically fluid overloaded, and hemodynamically stable. No renal infarcts as per CT abdomen/pelvis.   - F/U nephrology recs.  - C/W CVVHD as tolerated   - Attempt to correct fluid overload with CVVHD, nephro following  - Monitor renal function with BUN/Cr  - Monitor I/Os    #Electrolytes abnormalities   On CVVHD. Hyperkalemia resolved, hypocalcemia resolved. No EKG changes.  -Requires q6 BMP, Mg, and Phos while on CVVHD  -Monitor Ca     GI    OG on arrival with 600cc of bilious fluid, no bowel movement since arrival with distended abdomen. Abd XR read as evidence for SBO vs. bowel ischemia. 100 cc of bilious fluid overnight out of NG sump. CT abdomen revealed no evidence of bowel ischemia or SBO  - Remove sump  - Place NG tube  - Begin feeds with Jevity 1.5  - Miralax BID   - CT abdomen/pelvis revealed "irregular masslike mural thickening of the posterior left lateral wall of the rectum," F/U with surgery for recs.    #Congestive Hepatopathy   Transaminitis, coag derangements and hyperbilirubinemia most likely 2/2 hepatic congestion, due to severe TR in setting of infective endocarditis. Transaminases and Alk phos improving. Hepatitis panel negative.  - F/U AST/ALT  - Avoid Tylenol    HEME    #Hemolysis  Intravascular hemolysis with haptglobin <10, , T.bili increasing (8.4 -> 11.8), D.Bili 8.8, and clinical jaundice. Most likely 2/2 CVVHD. h/h stable at 10.1/29.6. D-dimer elevate  - Monitor direct and indirect bili, LDH.   - M    Lines: right IJ TLC and Ernesto (11/11), Axillary A-line (11/11), Left IJ (11/11)     F: 25% Albumin 50cc  E: replete K <4, Mg <2  N: NPO   GI Ppx: Protonix   DVT Ppx: Heparin   Code status: FULL   Dispo: MICU 40 y/o M w/ PMH of IVDU and active smoker who came from [facility] on 11/8/19 in 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 After being transferred to  CTICU for surgical evaluation, pt was deemed to not be a surgical candidate and transferred to MICU for medical mgmt. ID and Nephrology following.    Neuro  Sedated on Propofol, Fentanyl. CT head negative for any intracranial embolic events.  -D/C sedation today for awake trial: assess mental status, with consideration for extubation     Cardiovascular     #Hypotension  Most likely 2/2 septic shock from infective endocarditis and RV failure 2/2 tricuspid regurgitation (ELIAN shows EF of 40-50%). Currently on Levophed, will titrate as needed. Echo with EF 45%. ELIAN with EF 40-45%, 3.8 x1cm vegetation on TR valve, with severe TR, trivial pericardial effusion. Echo with bubble revealed no PFO. Levo requirements decreasing (0.058 mcg/kg/hr currently).  - Maintain MAP>65.   - Titrate Levophed as necessary.   - C/W medical mgmt of infective endocarditis as below.    Respiratory    #Acute hypoxic respiratory failure   Most likely 2/2 alveolar hemorrhage in the setting of septic embolic causing numerous cavitary lesions on CT chest. CXR with scattered infiltrates and pleural effusions. Sputum culture negative. CT revealed"moderate bilateral pleural effusions and dense bibasilar consolidation   with underlying septic emboli/pulmonary abscesses"  - C/W ventilator support, monitor mental status and consider CPAP trial  - Continue to treat IE as below    #Bilateral pulmonary effusion  Most likely empyema in setting of septic shock 2/2 infective endocarditis. Bilateral CT in place, confirmed by CXR, draining serosanguinous fluid (100 cc overnight from Right CT, Left CT drained 1100 cc when placed and 300 cc overnight). Fluid analysis of both CT pleural fluid confirms complex exudate with high cellularity, low pH and low glucose.  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 Not a surgical candidate for a tricuspid valve replacement at this time as per CT surgery. Lactate 1.3. Zosyn added overnight to cover for potential gram - superinfection. Initial blood cultures positive only for gram + clusters. Sputum culture negative.   - D/C Zosyn  - ID following, C/W Nafcillin 2g q4 (Sensitive to Oxacillin as per OSH records), f/u recs   - Continue to monitor temps and vitals, consider additional Abx coverage if condition worsens  - F/U [facility] bcx cultures, x2  - F/U CBC and diff    Renal    #Acute renal failure 2/2 ATN from hypoperfusion, Minimal urine output on, CVVHD, oliguric with 5-15cc/hr. Vancomycin level 37 on arrival so may be component of drug induced nephropathy. Bun/Cr improving (89/4.1 -> 44/1.99). Pt is clinically fluid overloaded, and hemodynamically stable. No renal infarcts as per CT abdomen/pelvis.   - F/U nephrology recs.  - C/W CVVHD as tolerated   - Attempt to correct fluid overload with CVVHD, nephro following  - Monitor renal function with BUN/Cr  - Monitor I/Os    #Electrolytes abnormalities   On CVVHD. Hyperkalemia resolved, improved. No EKG changes.  -Requires q6 BMP, Mg, and Phos while on CVVHD  -Monitor serum lytes, Ca     GI    OG on arrival with 600cc of bilious fluid, no bowel movement since arrival with distended abdomen. Abd XR read as evidence for SBO vs. bowel ischemia. 100 cc of bilious fluid overnight out of NG sump. CT abdomen revealed no evidence of bowel ischemia or SBO  - Remove sump  - Place NG tube  - Begin feeds with Jevity 1.5  - Miralax BID   - CT abdomen/pelvis revealed "irregular masslike mural thickening of the posterior left lateral wall of the rectum," F/U with surgery for recs.    #Congestive Hepatopathy   Transaminitis, coag derangements, hyperbilirubinemia most likely 2/2 hepatic congestion, due to severe TR in setting of infective endocarditis. Transaminases and Alk phos improving. Hepatitis panel negative.  - F/U AST/ALT  - Avoid Tylenol    Heme    #Hemolysis  Intravascular hemolysis with haptoglobin <10, , T.bili increasing (8.4 -> 11.8), D.Bili 8.8, and clinical jaundice. Most likely 2/2 CVVHD. h/h stable at 10.1/29.6. D-dimer elevated (2409), normal fibrinogen. Platelets low but stable at 66.  - Heme consulted, follow recs  - Monitor direct and total bili, LDH.     Lines: right IJ TLC and Ernesto (11/11), Axillary A-line (11/11), Left IJ (11/11), right peripheral (11/12)    F: 25% Albumin 50cc  E: replete K <4, Mg <2  N: NPO   GI Ppx: Protonix   DVT Ppx: Heparin   Code status: FULL   Dispo: MICU 38 y/o M w/ PMH of IVDU and active smoker who came from [facility] on 11/8/19 in 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 After being transferred to  CTICU for surgical evaluation, pt was deemed to not be a surgical candidate and transferred to MICU for medical mgmt. ID and Nephrology following.    Neuro  Sedated on Propofol, Fentanyl. CT head negative for any intracranial embolic events.  -D/C sedation today for awake trial: assess mental status, with consideration for extubation     Cardiovascular     #Hypotension  Most likely 2/2 septic shock from infective endocarditis and RV failure 2/2 tricuspid regurgitation (ELIAN shows EF of 40-50%). Currently on Levophed, will titrate as needed. Echo with EF 45%. ELIAN with EF 40-45%, 3.8 x1cm vegetation on TR valve, with severe TR, trivial pericardial effusion. Echo with bubble revealed no PFO. Levo requirements decreasing (0.058 mcg/kg/hr currently).  - Maintain MAP>65.   - Titrate Levophed as necessary.   - C/W medical mgmt of infective endocarditis as below.    Respiratory    #Acute hypoxic respiratory failure   Most likely 2/2 alveolar hemorrhage in the setting of septic embolic causing numerous cavitary lesions on CT chest. CXR with scattered infiltrates and pleural effusions. Sputum culture negative. CT revealed"moderate bilateral pleural effusions and dense bibasilar consolidation   with underlying septic emboli/pulmonary abscesses"  - C/W ventilator support, monitor mental status and consider CPAP trial  - Continue to treat IE as below    #Bilateral pulmonary effusion  Most likely empyema in setting of septic shock 2/2 infective endocarditis. Bilateral CT in place, confirmed by CXR, draining serosanguinous fluid (100 cc overnight from Right CT, Left CT drained 1100 cc when placed and 300 cc overnight). Fluid analysis of both CT pleural fluid confirms complex exudate with high cellularity, low pH and low glucose.  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 Not a surgical candidate for a tricuspid valve replacement at this time as per CT surgery. Lactate 1.3. Zosyn added overnight to cover for potential gram - superinfection. Initial blood cultures positive only for gram + clusters. Sputum culture negative.   - D/C Zosyn  - ID following, C/W Nafcillin 2g q4 (Sensitive to Oxacillin as per OSH records), f/u recs   - Continue to monitor temps and vitals, consider additional Abx coverage if condition worsens  - F/U [facility] bcx cultures, x2  - F/U CBC and diff    Renal    #Acute renal failure 2/2 ATN from hypoperfusion, Minimal urine output on, CVVHD, oliguric with 5-15cc/hr. Vancomycin level 37 on arrival so may be component of drug induced nephropathy. Bun/Cr improving (89/4.1 -> 44/1.99). Pt is clinically fluid overloaded, and hemodynamically stable. No renal infarcts as per CT abdomen/pelvis.   - F/U nephrology recs.  - C/W CVVHD as tolerated   - Attempt to correct fluid overload with CVVHD, goal net -1L today, nephro following  - Monitor renal function with BUN/Cr  - Monitor I/Os    #Electrolytes abnormalities   On CVVHD. Hyperkalemia resolved, improved. No EKG changes.  -Requires q6 BMP, Mg, and Phos while on CVVHD  -Monitor serum lytes, Ca     GI    OG on arrival with 600cc of bilious fluid, no bowel movement since arrival with distended abdomen. Abd XR read as evidence for SBO vs. bowel ischemia. 100 cc of bilious fluid overnight out of NG sump. CT abdomen revealed no evidence of bowel ischemia or SBO  - Remove sump  - Place NG tube  - Begin feeds with Jevity 1.5  - Miralax BID   - CT abdomen/pelvis revealed "irregular masslike mural thickening of the posterior left lateral wall of the rectum," F/U with surgery for recs.    #Congestive Hepatopathy   Transaminitis, coag derangements, hyperbilirubinemia most likely 2/2 hepatic congestion, due to severe TR in setting of infective endocarditis. Transaminases and Alk phos improving. Hepatitis panel negative.  - F/U AST/ALT  - Avoid Tylenol    Heme    #Hemolysis  Intravascular hemolysis with haptoglobin <10, , T.bili increasing (8.4 -> 11.8), D.Bili 8.8, and clinical jaundice. Most likely 2/2 CVVHD. h/h stable at 10.1/29.6. D-dimer elevated (2409), normal fibrinogen. Platelets low but stable at 66.  - Heme consulted, follow recs  - Monitor direct and total bili, LDH.     Lines: right IJ TLC and Ernesto (11/12), Axillary A-line (11/12), Left IJ (11/12), right peripheral (11/12)    F: 25% Albumin 50cc  E: replete K <4, Mg <2  N: Jevity 1.5  GI Ppx: Protonix   DVT Ppx: Heparin   Code status: FULL   Dispo: MICU

## 2019-11-13 NOTE — CONSULT NOTE ADULT - SUBJECTIVE AND OBJECTIVE BOX
***NOTE INCOMPLETE***    Hematology Oncology Consult             The patient denies chest pain or SOB.  No nausea/vomiting/fevers/chills/night sweats.  No fatigue, headaches/dizziness.  Appetite is stable without weight loss.  No abdominal pain/diarrhea/constipation.  No melena or hematochezia.    No dysuria/hematuria.  No history of easy bruising/bleeding.  No gingival bleeding or epistaxis.  No leg pain or leg swelling.    ROS is otherwise negative.    HEALTH MAINTENANCE:  Breast: last mammogram was performed on ....    Cervical: last pap smear was performed on ....  Colon: last colonoscopy was performed on ....  Prostate: last PSA was ....  Lung: a low dose helical CT was performed on....for RF's including x ppy cigs and current smoker/quit <15 yrs ago       PAST MEDICAL & SURGICAL HISTORY:  Endocarditis  IVDU (intravenous drug user)  Smoker  No significant past surgical history      Allergies:      Medications:  heparin  Injectable 5000 Unit(s) SubCutaneous every 8 hours        Social History:    FAMILY HISTORY:  No pertinent family history in first degree relatives      PHYSICAL EXAM:    T(F): 98.1 (11-13-19 @ 11:00), Max: 99.3 (11-12-19 @ 18:19)  HR: 108 (11-13-19 @ 14:00) (86 - 110)  BP: 90/63 (11-13-19 @ 13:02) (86/56 - 110/63)  RR: 23 (11-13-19 @ 14:00) (0 - 33)  SpO2: 100% (11-13-19 @ 14:00) (99% - 100%)  Wt(kg): --    Daily     Daily     Gen: well developed, well nourished, comfortable  HEENT: normocephalic/atraumatic, no conjunctival pallor, no scleral icterus, no oral thrush/mucosal bleeding/mucositis  Neck: supple, no masses, no JVD  Breasts: deferred  Back: nontender  Cardiovascular: RR, nl S1S2, no murmurs/rubs/gallops  Respiratory: clear air entry b/l  Gastrointestinal: BS+, soft, NT/ND, no masses, no splenomegaly, no hepatomegaly, no evidence for ascites  Genitourinary: deferred  Rectal: deferred  Extremities: no clubbing/cyanosis, no edema, no calf tenderness  Vascular:  DP/PT 2+ b/l  Neurological: CN 2-12 grossly intact, no focal deficits  Skin: no rash on visible skin  Lymph Nodes:  no cervical/supraclavicular LAD, no axillary/groin LAD  Musculoskeletal:  full ROM  Psychiatric:  mood stable            Labs:                          9.4    18.57 )-----------( 60       ( 13 Nov 2019 06:29 )             27.4     CBC Full  -  ( 13 Nov 2019 06:29 )  WBC Count : 18.57 K/uL  RBC Count : 3.35 M/uL  Hemoglobin : 9.4 g/dL  Hematocrit : 27.4 %  Platelet Count - Automated : 60 K/uL  Mean Cell Volume : 81.8 fl  Mean Cell Hemoglobin : 28.1 pg  Mean Cell Hemoglobin Concentration : 34.3 gm/dL  Auto Neutrophil # : 17.08 K/uL  Auto Lymphocyte # : 0.56 K/uL  Auto Monocyte # : 0.19 K/uL  Auto Eosinophil # : 0.19 K/uL  Auto Basophil # : 0.00 K/uL  Auto Neutrophil % : 89.0 %  Auto Lymphocyte % : 3.0 %  Auto Monocyte % : 1.0 %  Auto Eosinophil % : 1.0 %  Auto Basophil % : 0.0 %    PT/INR - ( 12 Nov 2019 04:24 )   PT: 19.7 sec;   INR: 1.71          PTT - ( 12 Nov 2019 04:24 )  PTT:43.5 sec    11-13    134<L>  |  99  |  43<H>  ----------------------------<  110<H>  4.1   |  26  |  2.02<H>    Ca    7.6<L>      13 Nov 2019 12:53  Phos  4.8     11-13  Mg     2.3     11-13    TPro  6.2  /  Alb  2.4<L>  /  TBili  11.8<H>  /  DBili  x   /  AST  181<H>  /  ALT  221<H>  /  AlkPhos  140<H>  11-13          Other Labs:    Cultures:    Pathology:    Imaging Studies: ***NOTE INCOMPLETE***    Hematology Oncology Consult     39 with history of IVDU admitted after transfer from [facility]. Patient presented to [facility] ED on 11/10/19 with productive cough with hemoptysis, progressive SOB, pleuritic chest pain, nausea, non-bloody emesis, abd pain, chillsx3 days. Patient admitted to MICU for septic shock 2/2 MSSA endocarditis,currently on IV nafcillin and requiring pressor support w/ Levophed. Also complicated by acute hypoxic respiratory failure requiring intubation and mechanical ventilation, and acute renal failure requiring emergent dialysis. Heme/onc consulted for possible DIC.      ROS: unobtainable, patient intubated and sedated     HEALTH MAINTENANCE:       PAST MEDICAL & SURGICAL HISTORY:  Endocarditis  IVDU (intravenous drug user)  Smoker  No significant past surgical history      Allergies: unknown      Medications:  heparin  Injectable 5000 Unit(s) SubCutaneous every 8 hours      Social History: current everyday smoker; IVDU, heroin use, marijuana use     FAMILY HISTORY:  No pertinent family history in first degree relatives      PHYSICAL EXAM:    T(F): 98.1 (11-13-19 @ 11:00), Max: 99.3 (11-12-19 @ 18:19)  HR: 108 (11-13-19 @ 14:00) (86 - 110)  BP: 90/63 (11-13-19 @ 13:02) (86/56 - 110/63)  RR: 23 (11-13-19 @ 14:00) (0 - 33)  SpO2: 100% (11-13-19 @ 14:00) (99% - 100%)  Wt(kg): --    Daily     Daily     GENERAL: Patient intubated and sedated   HEENT:  atraumatic, normocephalic, conjunctiva and sclera clear; ET tube in place   NECK: supple  CHEST: chest tube in place  LUNG: bibasilar crackles   HEART: regular rate and rhythm; S1 and S2 audible; no murmurs, rubs, or gallops  ABDOMEN: soft, nontender, nondistended; bowel sounds present in all four quadrants  EXTREMITIES:  2+ peripheral pulses bilaterally; no clubbing, cyanosis, or edema  NEUROLOGY: Patient intubated and sedated; moves all extremities       Labs:                          9.4    18.57 )-----------( 60       ( 13 Nov 2019 06:29 )             27.4     CBC Full  -  ( 13 Nov 2019 06:29 )  WBC Count : 18.57 K/uL  RBC Count : 3.35 M/uL  Hemoglobin : 9.4 g/dL  Hematocrit : 27.4 %  Platelet Count - Automated : 60 K/uL  Mean Cell Volume : 81.8 fl  Mean Cell Hemoglobin : 28.1 pg  Mean Cell Hemoglobin Concentration : 34.3 gm/dL  Auto Neutrophil # : 17.08 K/uL  Auto Lymphocyte # : 0.56 K/uL  Auto Monocyte # : 0.19 K/uL  Auto Eosinophil # : 0.19 K/uL  Auto Basophil # : 0.00 K/uL  Auto Neutrophil % : 89.0 %  Auto Lymphocyte % : 3.0 %  Auto Monocyte % : 1.0 %  Auto Eosinophil % : 1.0 %  Auto Basophil % : 0.0 %    PT/INR - ( 12 Nov 2019 04:24 )   PT: 19.7 sec;   INR: 1.71          PTT - ( 12 Nov 2019 04:24 )  PTT:43.5 sec    11-13    134<L>  |  99  |  43<H>  ----------------------------<  110<H>  4.1   |  26  |  2.02<H>    Ca    7.6<L>      13 Nov 2019 12:53  Phos  4.8     11-13  Mg     2.3     11-13    TPro  6.2  /  Alb  2.4<L>  /  TBili  11.8<H>  /  DBili  x   /  AST  181<H>  /  ALT  221<H>  /  AlkPhos  140<H>  11-13            Imaging Studies:    CT abdomen (11/12/19):   No leilani evidence of mesenteric ischemia.     Suggestion of irregular masslike mural thickening of the posterior left   lateral wall of the rectum; correlation with direct visualization is   recommended, when clinically appropriate.     Moderate bilateral pleural effusions and dense bibasilar consolidation with   underlying septic emboli/pulmonary abscesses.     Large volume of ascites and diffuse anasarca.       CXR (11/12/19):  Interval placement of left-sided chest tube with improvement in left-sided   pleural effusion. No pneumothorax. ***NOTE INCOMPLETE***    Hematology Oncology Consult     39 with history of IVDU admitted after transfer from [facility]. Patient presented to [facility] ED on 11/10/19 with productive cough with hemoptysis, progressive SOB, pleuritic chest pain, nausea, non-bloody emesis, abd pain, chillsx3 days. Patient admitted to MICU for septic shock 2/2 MSSA endocarditis,currently on IV nafcillin and requiring pressor support w/ Levophed. Also complicated by acute hypoxic respiratory failure requiring intubation and mechanical ventilation, and acute renal failure requiring emergent dialysis. Heme/onc consulted for possible DIC.    Patient seen and examined at bedside. Remains intubated and minimally sedated and on pressors. Patient awake and lethargic, able to follow simple commands and move all extremities. Rest of ROS unobtainable.       ROS: unobtainable, patient intubated and sedated       PAST MEDICAL & SURGICAL HISTORY:  Endocarditis  IVDU (intravenous drug user)  Smoker  No significant past surgical history      Allergies: unknown      Social History: current everyday smoker; IVDU, heroin use, marijuana use     FAMILY HISTORY:  No pertinent family history in first degree relatives      PHYSICAL EXAM:    T(F): 98.1 (11-13-19 @ 11:00), Max: 99.3 (11-12-19 @ 18:19)  HR: 108 (11-13-19 @ 14:00) (86 - 110)  BP: 90/63 (11-13-19 @ 13:02) (86/56 - 110/63)  RR: 23 (11-13-19 @ 14:00) (0 - 33)  SpO2: 100% (11-13-19 @ 14:00) (99% - 100%)  Wt(kg): --        GENERAL: Patient intubated and minimally sedated   HEENT:  atraumatic, normocephalic, +bilateral scleral icterus; ET tube in place   NECK: supple; RIJ central in in place   CHEST: chest tube in place  LUNG: bibasilar crackles and diffuse bilateral rhonchi   HEART: regular rate and rhythm; S1 and S2 audible; +systolic murmur   ABDOMEN: soft, nontender, nondistended; bowel sounds present in all four quadrants  EXTREMITIES:  2+ peripheral pulses bilaterally; 1+ BLE pitting edema; cyanotic fingertips and toe tips   NEUROLOGY: Patient intubated and minimally sedated; moves all extremities and follows simple commands   Skin: generalized jaundice; needle marks in antecubital area     Labs:                          9.4    18.57 )-----------( 60       ( 13 Nov 2019 06:29 )             27.4     CBC Full  -  ( 13 Nov 2019 06:29 )  WBC Count : 18.57 K/uL  RBC Count : 3.35 M/uL  Hemoglobin : 9.4 g/dL  Hematocrit : 27.4 %  Platelet Count - Automated : 60 K/uL  Mean Cell Volume : 81.8 fl  Mean Cell Hemoglobin : 28.1 pg  Mean Cell Hemoglobin Concentration : 34.3 gm/dL  Auto Neutrophil # : 17.08 K/uL  Auto Lymphocyte # : 0.56 K/uL  Auto Monocyte # : 0.19 K/uL  Auto Eosinophil # : 0.19 K/uL  Auto Basophil # : 0.00 K/uL  Auto Neutrophil % : 89.0 %  Auto Lymphocyte % : 3.0 %  Auto Monocyte % : 1.0 %  Auto Eosinophil % : 1.0 %  Auto Basophil % : 0.0 %    PT/INR - ( 12 Nov 2019 04:24 )   PT: 19.7 sec;   INR: 1.71          PTT - ( 12 Nov 2019 04:24 )  PTT:43.5 sec    11-13    134<L>  |  99  |  43<H>  ----------------------------<  110<H>  4.1   |  26  |  2.02<H>    Ca    7.6<L>      13 Nov 2019 12:53  Phos  4.8     11-13  Mg     2.3     11-13    TPro  6.2  /  Alb  2.4<L>  /  TBili  11.8<H>  /  DBili  x   /  AST  181<H>  /  ALT  221<H>  /  AlkPhos  140<H>  11-13            Imaging Studies:    CT abdomen (11/12/19):   No leilani evidence of mesenteric ischemia.     Suggestion of irregular masslike mural thickening of the posterior left   lateral wall of the rectum; correlation with direct visualization is   recommended, when clinically appropriate.     Moderate bilateral pleural effusions and dense bibasilar consolidation with   underlying septic emboli/pulmonary abscesses.     Large volume of ascites and diffuse anasarca.       CXR (11/12/19):  Interval placement of left-sided chest tube with improvement in left-sided   pleural effusion. No pneumothorax. Hematology Oncology Consult     39 with history of IVDU admitted after transfer from [facility]. Patient presented to [facility] ED on 11/10/19 with productive cough with hemoptysis, progressive SOB, pleuritic chest pain, nausea, non-bloody emesis, abd pain, chillsx3 days. Patient admitted to MICU for septic shock 2/2 MSSA endocarditis,currently on IV nafcillin and requiring pressor support w/ Levophed. Also complicated by acute hypoxic respiratory failure requiring intubation and mechanical ventilation, and acute renal failure requiring emergent dialysis. Heme/onc consulted for possible DIC.    Patient seen and examined at bedside. Remains intubated and minimally sedated and on pressors. Patient awake and lethargic, able to follow simple commands and move all extremities. Rest of ROS unobtainable.       ROS: unobtainable, patient intubated and sedated       PAST MEDICAL & SURGICAL HISTORY:  Endocarditis  IVDU (intravenous drug user)  Smoker  No significant past surgical history      Allergies: unknown      Social History: current everyday smoker; IVDU, heroin use, marijuana use     FAMILY HISTORY:  No pertinent family history in first degree relatives      PHYSICAL EXAM:    T(F): 98.1 (11-13-19 @ 11:00), Max: 99.3 (11-12-19 @ 18:19)  HR: 108 (11-13-19 @ 14:00) (86 - 110)  BP: 90/63 (11-13-19 @ 13:02) (86/56 - 110/63)  RR: 23 (11-13-19 @ 14:00) (0 - 33)  SpO2: 100% (11-13-19 @ 14:00) (99% - 100%)  Wt(kg): --        GENERAL: Patient intubated and minimally sedated   HEENT:  atraumatic, normocephalic, +bilateral scleral icterus; ET tube in place   NECK: supple; RIJ central in in place   CHEST: chest tube in place  LUNG: bibasilar crackles and diffuse bilateral rhonchi   HEART: regular rate and rhythm; S1 and S2 audible; +systolic murmur   ABDOMEN: soft, nontender, nondistended; bowel sounds present in all four quadrants  EXTREMITIES:  2+ peripheral pulses bilaterally; 1+ BLE pitting edema; cyanotic fingertips and toe tips   NEUROLOGY: Patient intubated and minimally sedated; moves all extremities and follows simple commands   Skin: generalized jaundice; needle marks in antecubital area     Labs:                          9.4    18.57 )-----------( 60       ( 13 Nov 2019 06:29 )             27.4     CBC Full  -  ( 13 Nov 2019 06:29 )  WBC Count : 18.57 K/uL  RBC Count : 3.35 M/uL  Hemoglobin : 9.4 g/dL  Hematocrit : 27.4 %  Platelet Count - Automated : 60 K/uL  Mean Cell Volume : 81.8 fl  Mean Cell Hemoglobin : 28.1 pg  Mean Cell Hemoglobin Concentration : 34.3 gm/dL  Auto Neutrophil # : 17.08 K/uL  Auto Lymphocyte # : 0.56 K/uL  Auto Monocyte # : 0.19 K/uL  Auto Eosinophil # : 0.19 K/uL  Auto Basophil # : 0.00 K/uL  Auto Neutrophil % : 89.0 %  Auto Lymphocyte % : 3.0 %  Auto Monocyte % : 1.0 %  Auto Eosinophil % : 1.0 %  Auto Basophil % : 0.0 %    PT/INR - ( 12 Nov 2019 04:24 )   PT: 19.7 sec;   INR: 1.71          PTT - ( 12 Nov 2019 04:24 )  PTT:43.5 sec    11-13    134<L>  |  99  |  43<H>  ----------------------------<  110<H>  4.1   |  26  |  2.02<H>    Ca    7.6<L>      13 Nov 2019 12:53  Phos  4.8     11-13  Mg     2.3     11-13    TPro  6.2  /  Alb  2.4<L>  /  TBili  11.8<H>  /  DBili  x   /  AST  181<H>  /  ALT  221<H>  /  AlkPhos  140<H>  11-13            Imaging Studies:    CT abdomen (11/12/19):   No leilani evidence of mesenteric ischemia.     Suggestion of irregular masslike mural thickening of the posterior left   lateral wall of the rectum; correlation with direct visualization is   recommended, when clinically appropriate.     Moderate bilateral pleural effusions and dense bibasilar consolidation with   underlying septic emboli/pulmonary abscesses.     Large volume of ascites and diffuse anasarca.       CXR (11/12/19):  Interval placement of left-sided chest tube with improvement in left-sided   pleural effusion. No pneumothorax.

## 2019-11-13 NOTE — PROGRESS NOTE ADULT - SUBJECTIVE AND OBJECTIVE BOX
patient seen and examined at bedside   patient remains intubated   on pressors   CT-A negative   minimally sedated     ICU Vital Signs Last 24 Hrs  T(C): 35.7 (13 Nov 2019 05:27), Max: 37.4 (12 Nov 2019 18:19)  T(F): 96.3 (13 Nov 2019 05:27), Max: 99.3 (12 Nov 2019 18:19)  HR: 96 (13 Nov 2019 10:00) (86 - 102)  BP: 101/70 (13 Nov 2019 10:00) (86/61 - 110/63)  BP(mean): 81 (13 Nov 2019 10:00) (68 - 85)  ABP: 102/62 (13 Nov 2019 10:00) (86/54 - 118/78)  ABP(mean): 78 (13 Nov 2019 10:00) (60 - 94)  RR: 19 (13 Nov 2019 10:00) (0 - 33)  SpO2: 100% (13 Nov 2019 10:00) (99% - 100%)    I&O's Summary    12 Nov 2019 07:01  -  13 Nov 2019 07:00  --------------------------------------------------------  IN: 3272 mL / OUT: 2869 mL / NET: 403 mL    13 Nov 2019 07:01  -  13 Nov 2019 11:00  --------------------------------------------------------  IN: 126.6 mL / OUT: 186 mL / NET: -59.4 mL      PHYSICAL EXAM  Neuro: sedated, reacting to pain   HEENT: normocephalic, no scleral ictera   Pulm:  mechanically ventilated, lungs are coarse    CV: regular rate and rhythm, unable to hear murmur due to coarse lungs sounds, no carotid bruit                           9.4    18.57 )-----------( 60       ( 13 Nov 2019 06:29 )             27.4     11-13    133<L>  |  100  |  44<H>  ----------------------------<  101<H>  4.2   |  25  |  1.99<H>    Ca    7.0<L>      13 Nov 2019 06:29  Phos  4.9     11-13  Mg     2.2     11-13    TPro  5.8<L>  /  Alb  2.4<L>  /  TBili  10.5<H>  /  DBili  8.8<H>  /  AST  213<H>  /  ALT  264<H>  /  AlkPhos  157<H>  11-13

## 2019-11-13 NOTE — CONSULT NOTE ADULT - ASSESSMENT
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 Also complicated by acute hypoxic respiratory failure requiring intubation and mechanical ventilation, and acute renal failure requiring emergent dialysis. Heme/onc consulted for possible DIC.      #DIC related to infection  Patient with acute renal failure requiring dialysis  D-dimer elevated to 2409, PT 17.2, INR 1.50, PTT 40.1  Pt also thrombocytopenic on this admission: Plt 66 on 11/13/19  -obtain factor VIII level,  -Treatment of underlying infection (endocarditis) and acute renal failure per primary team    #Anemia  Hb 6.9 -10 on this admission Normocytic.   11/13/19; , Haptoglobin < 10, Tibil 11.8, suggesting possible hemolysis   -obtain Juvencio test, retic count, B12 and folate, iron studies, direct and indirect bilirubin   -Trend CBC; keep active T&S 39 with history of IVDU admitted to MICU on 11/10/19 with 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 Also complicated by acute hypoxic respiratory failure requiring intubation and mechanical ventilation, and acute renal failure requiring emergent dialysis. Heme/onc consulted for management of DIC.     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   1/13/19: dimer elevated to 2409, PT 17.2,  PTT 40.1, Plt 66, fibrinogen 241  No signs of acute bleeding   -f/u factor VIII level  -Treatment of underlying infection (endocarditis) and acute renal failure per primary team  -supportive care: transfuse plts for >10K or or >50K with active bleeding   -transfuse cryoprecipitate if fibrinogen level <100   -Daily coags, fibrinogen    #Anemia  Hb 6.9 -10 on this admission Normocytic.   11/13/19; , Haptoglobin < 10, Tibil 11.8, suggesting possible hemolysis   Iron studies suggesting anemia of chronic disease  Direct Juevncio test negative   -f/u  retic count, B12 and folate, direct and indirect bilirubin   -Trend CBC; keep active T&S; transfuse if actively bleeding or if Hb < 7 g/dL   -Daily hemolysis labs 39 with history of IVDU admitted to MICU on 11/10/19 with 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 Also complicated by acute hypoxic respiratory failure requiring intubation and mechanical ventilation, and acute renal failure requiring emergent dialysis. Heme/onc consulted for management of DIC.     peripheral smear reviewed     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   1/13/19: dimer elevated to 2409, PT 17.2,  PTT 40.1, Plt 66, fibrinogen 241  No signs of acute bleeding   -f/u factor VIII level  -Treatment of underlying infection (endocarditis) and acute renal failure per primary team  -supportive care: transfuse plts for >10K or or >50K with active bleeding   -transfuse cryoprecipitate if fibrinogen level <100   -Daily coags, fibrinogen    #Anemia  Hb 6.9 -10 on this admission Normocytic.   11/13/19; , Haptoglobin < 10, Tibil 11.8, likely related to shock liver with overlapping DIC  Iron studies suggesting anemia of chronic disease  Direct Juvencio test negative   -f/u  retic count, B12 and folate, direct and indirect bilirubin   -Trend CBC; keep active T&S; transfuse if actively bleeding or if Hb < 7 g/dL   -Daily hemolysis labs

## 2019-11-13 NOTE — ADVANCED PRACTICE NURSE CONSULT - RECOMMEDATIONS
Recommend keeping tubing from SCDs away from bony prominences to prevent additional breakdown. Spoke with CARLI Javier.

## 2019-11-13 NOTE — PROGRESS NOTE ADULT - ATTENDING COMMENTS
bilateral CT's in place to drain empyemeas and BC pos from 11/11. Repeat drawn today. continue Nafcilllin.  levophed down trending and lactate normal. Ct abd and surg f/u noted. continue dialysis and begin fluid removal today. Hemolysis may be related to CVVHD memebrane or vegetation/endocarditis. Sedation vacation. Start enteral feeds.

## 2019-11-13 NOTE — PROGRESS NOTE ADULT - ASSESSMENT
38 y/o with PMH of IVDU transferred from OSH for Endocarditis, multiple septic emboli to lungs and CT surgery consult.   Deemed not a surgical candidate due to ongoing septic shock and norepinephrine requirement.   Currently admitted to MICU, intubated and on norepinephrine   Surgery consulted for abdominal distention and concern for ischemic bowel due to evidence on other septic emboli.   CT-A negative for mesenteric ischemia   No acute surgical intervention   Lactate normalized   can re consult surgery with any acute issues / questions

## 2019-11-13 NOTE — PROGRESS NOTE ADULT - SUBJECTIVE AND OBJECTIVE BOX
OVERNIGHT EVENTS:    SUBJECTIVE / INTERVAL HPI: Patient seen and examined at bedside.     VITAL SIGNS:  Vital Signs Last 24 Hrs  T(C): 35.7 (13 Nov 2019 05:27), Max: 37.4 (12 Nov 2019 18:19)  T(F): 96.3 (13 Nov 2019 05:27), Max: 99.3 (12 Nov 2019 18:19)  HR: 102 (13 Nov 2019 11:00) (86 - 102)  BP: 106/61 (13 Nov 2019 11:00) (86/61 - 110/63)  BP(mean): 78 (13 Nov 2019 11:00) (68 - 85)  RR: 20 (13 Nov 2019 11:00) (0 - 33)  SpO2: 100% (13 Nov 2019 11:00) (99% - 100%)    PHYSICAL EXAM:    General: WDWN  HEENT: NC/AT; PERRL, anicteric sclera; MMM  Neck: supple  Cardiovascular: +S1/S2; RRR  Respiratory: CTA B/L; no W/R/R  Gastrointestinal: soft, NT/ND; +BSx4  Extremities: WWP; no edema, clubbing or cyanosis  Vascular: 2+ radial, DP/PT pulses B/L  Neurological: AAOx3; no focal deficits    MEDICATIONS:  MEDICATIONS  (STANDING):  albumin human 25% IVPB 50 milliLiter(s) IV Intermittent every 8 hours  chlorhexidine 0.12% Liquid 15 milliLiter(s) Oral Mucosa every 12 hours  chlorhexidine 2% Cloths 1 Application(s) Topical daily  dextrose 5%. 1000 milliLiter(s) (50 mL/Hr) IV Continuous <Continuous>  dextrose 50% Injectable 12.5 Gram(s) IV Push once  dextrose 50% Injectable 25 Gram(s) IV Push once  dextrose 50% Injectable 25 Gram(s) IV Push once  fentaNYL   Infusion. 0.5 MICROgram(s)/kG/Hr (3.68 mL/Hr) IV Continuous <Continuous>  heparin  Injectable 5000 Unit(s) SubCutaneous every 8 hours  insulin lispro (HumaLOG) corrective regimen sliding scale   SubCutaneous every 6 hours  nafcillin  IVPB 2 Gram(s) IV Intermittent every 4 hours  norepinephrine Infusion 0.03 MICROgram(s)/kG/Min (4.14 mL/Hr) IV Continuous <Continuous>  pantoprazole  Injectable 40 milliGRAM(s) IV Push daily  polyethylene glycol 3350 17 Gram(s) Oral every 12 hours  propofol Infusion 10 MICROgram(s)/kG/Min (4.416 mL/Hr) IV Continuous <Continuous>  sodium chloride 0.9% lock flush 3 milliLiter(s) IV Push every 8 hours    MEDICATIONS  (PRN):  dextrose 40% Gel 15 Gram(s) Oral once PRN Blood Glucose LESS THAN 70 milliGRAM(s)/deciliter  glucagon  Injectable 1 milliGRAM(s) IntraMuscular once PRN Glucose LESS THAN 70 milligrams/deciliter      ALLERGIES:  Allergies    Allergy Status Unknown    Intolerances        LABS:                        9.4    18.57 )-----------( 60       ( 13 Nov 2019 06:29 )             27.4     11-13    133<L>  |  100  |  44<H>  ----------------------------<  101<H>  4.2   |  25  |  1.99<H>    Ca    7.0<L>      13 Nov 2019 06:29  Phos  4.9     11-13  Mg     2.2     11-13    TPro  5.8<L>  /  Alb  2.4<L>  /  TBili  10.5<H>  /  DBili  8.8<H>  /  AST  213<H>  /  ALT  264<H>  /  AlkPhos  157<H>  11-13    PT/INR - ( 12 Nov 2019 04:24 )   PT: 19.7 sec;   INR: 1.71          PTT - ( 12 Nov 2019 04:24 )  PTT:43.5 sec    CAPILLARY BLOOD GLUCOSE      POCT Blood Glucose.: 86 mg/dL (13 Nov 2019 11:34)      RADIOLOGY & ADDITIONAL TESTS: Reviewed. OVERNIGHT EVENTS: Pt was stable overnight. Around 10-11 pm he required uptitration of his levophed drip, to 0.2. This was followed by an episode of hypothermia (Temp of 96 measured rectally at 5 am).    SUBJECTIVE / INTERVAL HPI: Patient seen and examined at bedside.     VITAL SIGNS:  Vital Signs Last 24 Hrs  T(C): 35.7 (13 Nov 2019 05:27), Max: 37.4 (12 Nov 2019 18:19)  T(F): 96.3 (13 Nov 2019 05:27), Max: 99.3 (12 Nov 2019 18:19)  HR: 102 (13 Nov 2019 11:00) (86 - 102)  BP: 106/61 (13 Nov 2019 11:00) (86/61 - 110/63)  BP(mean): 78 (13 Nov 2019 11:00) (68 - 85)  RR: 20 (13 Nov 2019 11:00) (0 - 33)  SpO2: 100% (13 Nov 2019 11:00) (99% - 100%)    PHYSICAL EXAM:    General: WDWN  HEENT: NC/AT; PERRL, anicteric sclera; MMM  Neck: supple  Cardiovascular: +S1/S2; RRR  Respiratory: CTA B/L; no W/R/R  Gastrointestinal: soft, NT/ND; +BSx4  Extremities: WWP; no edema, clubbing or cyanosis  Vascular: 2+ radial, DP/PT pulses B/L  Neurological: AAOx3; no focal deficits    MEDICATIONS:  MEDICATIONS  (STANDING):  albumin human 25% IVPB 50 milliLiter(s) IV Intermittent every 8 hours  chlorhexidine 0.12% Liquid 15 milliLiter(s) Oral Mucosa every 12 hours  chlorhexidine 2% Cloths 1 Application(s) Topical daily  dextrose 5%. 1000 milliLiter(s) (50 mL/Hr) IV Continuous <Continuous>  dextrose 50% Injectable 12.5 Gram(s) IV Push once  dextrose 50% Injectable 25 Gram(s) IV Push once  dextrose 50% Injectable 25 Gram(s) IV Push once  fentaNYL   Infusion. 0.5 MICROgram(s)/kG/Hr (3.68 mL/Hr) IV Continuous <Continuous>  heparin  Injectable 5000 Unit(s) SubCutaneous every 8 hours  insulin lispro (HumaLOG) corrective regimen sliding scale   SubCutaneous every 6 hours  nafcillin  IVPB 2 Gram(s) IV Intermittent every 4 hours  norepinephrine Infusion 0.03 MICROgram(s)/kG/Min (4.14 mL/Hr) IV Continuous <Continuous>  pantoprazole  Injectable 40 milliGRAM(s) IV Push daily  polyethylene glycol 3350 17 Gram(s) Oral every 12 hours  propofol Infusion 10 MICROgram(s)/kG/Min (4.416 mL/Hr) IV Continuous <Continuous>  sodium chloride 0.9% lock flush 3 milliLiter(s) IV Push every 8 hours    MEDICATIONS  (PRN):  dextrose 40% Gel 15 Gram(s) Oral once PRN Blood Glucose LESS THAN 70 milliGRAM(s)/deciliter  glucagon  Injectable 1 milliGRAM(s) IntraMuscular once PRN Glucose LESS THAN 70 milligrams/deciliter      ALLERGIES:  Allergies    Allergy Status Unknown    Intolerances        LABS:                        9.4    18.57 )-----------( 60       ( 13 Nov 2019 06:29 )             27.4     11-13    133<L>  |  100  |  44<H>  ----------------------------<  101<H>  4.2   |  25  |  1.99<H>    Ca    7.0<L>      13 Nov 2019 06:29  Phos  4.9     11-13  Mg     2.2     11-13    TPro  5.8<L>  /  Alb  2.4<L>  /  TBili  10.5<H>  /  DBili  8.8<H>  /  AST  213<H>  /  ALT  264<H>  /  AlkPhos  157<H>  11-13    PT/INR - ( 12 Nov 2019 04:24 )   PT: 19.7 sec;   INR: 1.71          PTT - ( 12 Nov 2019 04:24 )  PTT:43.5 sec    CAPILLARY BLOOD GLUCOSE      POCT Blood Glucose.: 86 mg/dL (13 Nov 2019 11:34)      RADIOLOGY & ADDITIONAL TESTS: Reviewed. OVERNIGHT EVENTS: Pt was stable overnight. Around 10-11 pm he required uptitration of his levophed drip, to 0.2. This was followed by an episode of hypothermia (Temp of 96 measured rectally at 5 am). A Amee hugger was placed, and Zosyn was started to cover for possible superinfection by gram - organism. Temp returned to normal afterwards, and his levophed requirement decreased.     SUBJECTIVE / INTERVAL HPI: Patient seen and examined at bedside. He is intubated and sedated, cannot elicit HPI or ROS.    VITAL SIGNS:  Vital Signs Last 24 Hrs  T(C): 35.7 (13 Nov 2019 05:27), Max: 37.4 (12 Nov 2019 18:19)  T(F): 96.3 (13 Nov 2019 05:27), Max: 99.3 (12 Nov 2019 18:19)  HR: 102 (13 Nov 2019 11:00) (86 - 102)  BP: 106/61 (13 Nov 2019 11:00) (86/61 - 110/63)  BP(mean): 78 (13 Nov 2019 11:00) (68 - 85)  RR: 20 (13 Nov 2019 11:00) (0 - 33)  SpO2: 100% (13 Nov 2019 11:00) (99% - 100%)    PHYSICAL EXAM:    General - disheveled young male, appears older than documented age. NAD, intubated and sedated, on CVVHD, occasional spontaneous head movements  HEENT - dmm, pinpoint pupils sluggishly reactive to light, icteric sclera  CV - s1/s2, RRR, possible blowing systolic murmur appreciated at left sternal border  Resp - ventilated, rales b/l with crackles at the bases, decreased from yesterday  Abdomen - soft, mildly distended, hypoactive bowel sounds   Extremities - cold in the extremities, ischemic fingertips primarily bluish/black primarily in the feet b/l and in the hands. 2+ pitting edema in all extremities.  Skin - significant jaundice. cold to touch in extremities, multiple abrasions, tattoos and needle marks    MEDICATIONS:  MEDICATIONS  (STANDING):  albumin human 25% IVPB 50 milliLiter(s) IV Intermittent every 8 hours  chlorhexidine 0.12% Liquid 15 milliLiter(s) Oral Mucosa every 12 hours  chlorhexidine 2% Cloths 1 Application(s) Topical daily  dextrose 5%. 1000 milliLiter(s) (50 mL/Hr) IV Continuous <Continuous>  dextrose 50% Injectable 12.5 Gram(s) IV Push once  dextrose 50% Injectable 25 Gram(s) IV Push once  dextrose 50% Injectable 25 Gram(s) IV Push once  fentaNYL   Infusion. 0.5 MICROgram(s)/kG/Hr (3.68 mL/Hr) IV Continuous <Continuous>  heparin  Injectable 5000 Unit(s) SubCutaneous every 8 hours  insulin lispro (HumaLOG) corrective regimen sliding scale   SubCutaneous every 6 hours  nafcillin  IVPB 2 Gram(s) IV Intermittent every 4 hours  norepinephrine Infusion 0.03 MICROgram(s)/kG/Min (4.14 mL/Hr) IV Continuous <Continuous>  pantoprazole  Injectable 40 milliGRAM(s) IV Push daily  polyethylene glycol 3350 17 Gram(s) Oral every 12 hours  propofol Infusion 10 MICROgram(s)/kG/Min (4.416 mL/Hr) IV Continuous <Continuous>  sodium chloride 0.9% lock flush 3 milliLiter(s) IV Push every 8 hours    MEDICATIONS  (PRN):  dextrose 40% Gel 15 Gram(s) Oral once PRN Blood Glucose LESS THAN 70 milliGRAM(s)/deciliter  glucagon  Injectable 1 milliGRAM(s) IntraMuscular once PRN Glucose LESS THAN 70 milligrams/deciliter      ALLERGIES:  Allergies    Allergy Status Unknown    Intolerances        LABS:                        9.4    18.57 )-----------( 60       ( 13 Nov 2019 06:29 )             27.4     11-13    133<L>  |  100  |  44<H>  ----------------------------<  101<H>  4.2   |  25  |  1.99<H>    Ca    7.0<L>      13 Nov 2019 06:29  Phos  4.9     11-13  Mg     2.2     11-13    TPro  5.8<L>  /  Alb  2.4<L>  /  TBili  10.5<H>  /  DBili  8.8<H>  /  AST  213<H>  /  ALT  264<H>  /  AlkPhos  157<H>  11-13    PT/INR - ( 12 Nov 2019 04:24 )   PT: 19.7 sec;   INR: 1.71          PTT - ( 12 Nov 2019 04:24 )  PTT:43.5 sec    CAPILLARY BLOOD GLUCOSE      POCT Blood Glucose.: 86 mg/dL (13 Nov 2019 11:34)      RADIOLOGY & ADDITIONAL TESTS: Reviewed. OVERNIGHT EVENTS: Pt was stable overnight. Around 10-11 pm he required uptitration of his levophed drip, to 0.2. This was followed by an episode of hypothermia (Temp of 96 measured rectally at 5 am). A Amee hugger was placed, and Zosyn was started to cover for possible superinfection by gram - organism. Temp returned to normal afterwards, and his levophed requirement decreased.     SUBJECTIVE / INTERVAL HPI: Patient seen and examined at bedside. He is intubated and sedated, cannot elicit HPI or ROS.    VITAL SIGNS:  Vital Signs Last 24 Hrs  T(C): 35.7 (13 Nov 2019 05:27), Max: 37.4 (12 Nov 2019 18:19)  T(F): 96.3 (13 Nov 2019 05:27), Max: 99.3 (12 Nov 2019 18:19)  HR: 102 (13 Nov 2019 11:00) (86 - 102)  BP: 106/61 (13 Nov 2019 11:00) (86/61 - 110/63)  BP(mean): 78 (13 Nov 2019 11:00) (68 - 85)  RR: 20 (13 Nov 2019 11:00) (0 - 33)  SpO2: 100% (13 Nov 2019 11:00) (99% - 100%)    PHYSICAL EXAM:    General - disheveled young male, appears older than documented age. NAD, intubated and sedated, on CVVHD, occasional spontaneous head movements  HEENT - dmm, pinpoint pupils sluggishly reactive to light, icteric sclera  CV - s1/s2, RRR, possible blowing systolic murmur appreciated at left sternal border  Resp - ventilated, rales b/l with crackles at the bases, decreased from yesterday  Abdomen - soft, mildly distended, hypoactive bowel sounds   Extremities - cold in the extremities, ischemic fingertips primarily bluish/black primarily in the feet b/l and in the hands. 2+ pitting edema in all extremities.  Skin - significant jaundice. cold to touch in extremities, multiple abrasions, tattoos and needle marks. Significant anasarca and scrotal edema.    MEDICATIONS:  MEDICATIONS  (STANDING):  albumin human 25% IVPB 50 milliLiter(s) IV Intermittent every 8 hours  chlorhexidine 0.12% Liquid 15 milliLiter(s) Oral Mucosa every 12 hours  chlorhexidine 2% Cloths 1 Application(s) Topical daily  dextrose 5%. 1000 milliLiter(s) (50 mL/Hr) IV Continuous <Continuous>  dextrose 50% Injectable 12.5 Gram(s) IV Push once  dextrose 50% Injectable 25 Gram(s) IV Push once  dextrose 50% Injectable 25 Gram(s) IV Push once  fentaNYL   Infusion. 0.5 MICROgram(s)/kG/Hr (3.68 mL/Hr) IV Continuous <Continuous>  heparin  Injectable 5000 Unit(s) SubCutaneous every 8 hours  insulin lispro (HumaLOG) corrective regimen sliding scale   SubCutaneous every 6 hours  nafcillin  IVPB 2 Gram(s) IV Intermittent every 4 hours  norepinephrine Infusion 0.03 MICROgram(s)/kG/Min (4.14 mL/Hr) IV Continuous <Continuous>  pantoprazole  Injectable 40 milliGRAM(s) IV Push daily  polyethylene glycol 3350 17 Gram(s) Oral every 12 hours  propofol Infusion 10 MICROgram(s)/kG/Min (4.416 mL/Hr) IV Continuous <Continuous>  sodium chloride 0.9% lock flush 3 milliLiter(s) IV Push every 8 hours    MEDICATIONS  (PRN):  dextrose 40% Gel 15 Gram(s) Oral once PRN Blood Glucose LESS THAN 70 milliGRAM(s)/deciliter  glucagon  Injectable 1 milliGRAM(s) IntraMuscular once PRN Glucose LESS THAN 70 milligrams/deciliter      ALLERGIES:  Allergies    Allergy Status Unknown    Intolerances        LABS:                        9.4    18.57 )-----------( 60       ( 13 Nov 2019 06:29 )             27.4     11-13    133<L>  |  100  |  44<H>  ----------------------------<  101<H>  4.2   |  25  |  1.99<H>    Ca    7.0<L>      13 Nov 2019 06:29  Phos  4.9     11-13  Mg     2.2     11-13    TPro  5.8<L>  /  Alb  2.4<L>  /  TBili  10.5<H>  /  DBili  8.8<H>  /  AST  213<H>  /  ALT  264<H>  /  AlkPhos  157<H>  11-13    PT/INR - ( 12 Nov 2019 04:24 )   PT: 19.7 sec;   INR: 1.71          PTT - ( 12 Nov 2019 04:24 )  PTT:43.5 sec    CAPILLARY BLOOD GLUCOSE      POCT Blood Glucose.: 86 mg/dL (13 Nov 2019 11:34)      RADIOLOGY & ADDITIONAL TESTS: Reviewed.

## 2019-11-13 NOTE — PROGRESS NOTE ADULT - ATTENDING COMMENTS
seen and evaluated this AM on CVVHD  tolerating procedure adequately- remains on pressors.  noted to have some hemolysis, and MICU team concerned that CVVHD may be source- hemolysis is uncommon, though not impossible on CVVHD  would consider other etiologies.  Can hold CVVHD this afternoon- proceed with acute HD to optimize clearances  and either restart CVVHD later today or AM OR if needs isolated UF, will add aquapheresis

## 2019-11-13 NOTE — PROGRESS NOTE ADULT - SUBJECTIVE AND OBJECTIVE BOX
INCOMPLETE NOTE    infectious diseases progress note:  ULI HOLLAND is a 39yMale patient    ENDOCARDITIS/SEPSIS        ROS:  CONSTITUTIONAL:  Negative fever or chills, feels well, good appetite  EYES:  Negative  blurry vision or double vision  CARDIOVASCULAR:  Negative for chest pain or palpitations  RESPIRATORY:  Negative for cough, wheezing, or SOB   GASTROINTESTINAL:  Negative for nausea, vomiting, diarrhea, constipation, or abdominal pain  GENITOURINARY:  Negative frequency, urgency or dysuria  NEUROLOGIC:  No headache, confusion, dizziness, lightheadedness    Allergies    Allergy Status Unknown    Intolerances        ANTIBIOTICS/RELEVANT:  antimicrobials  nafcillin  IVPB 2 Gram(s) IV Intermittent every 4 hours    immunologic:    OTHER:  albumin human 25% IVPB 50 milliLiter(s) IV Intermittent every 8 hours  chlorhexidine 0.12% Liquid 15 milliLiter(s) Oral Mucosa every 12 hours  chlorhexidine 2% Cloths 1 Application(s) Topical daily  dextrose 40% Gel 15 Gram(s) Oral once PRN  dextrose 5%. 1000 milliLiter(s) IV Continuous <Continuous>  dextrose 50% Injectable 12.5 Gram(s) IV Push once  dextrose 50% Injectable 25 Gram(s) IV Push once  dextrose 50% Injectable 25 Gram(s) IV Push once  fentaNYL   Infusion. 0.5 MICROgram(s)/kG/Hr IV Continuous <Continuous>  glucagon  Injectable 1 milliGRAM(s) IntraMuscular once PRN  heparin  Injectable 5000 Unit(s) SubCutaneous every 8 hours  insulin lispro (HumaLOG) corrective regimen sliding scale   SubCutaneous every 6 hours  norepinephrine Infusion 0.03 MICROgram(s)/kG/Min IV Continuous <Continuous>  pantoprazole  Injectable 40 milliGRAM(s) IV Push daily  polyethylene glycol 3350 17 Gram(s) Oral every 12 hours  propofol Infusion 10 MICROgram(s)/kG/Min IV Continuous <Continuous>  sodium chloride 0.9% lock flush 3 milliLiter(s) IV Push every 8 hours      Objective:  Vital Signs Last 24 Hrs  T(C): 36.7 (13 Nov 2019 11:00), Max: 37.4 (12 Nov 2019 18:19)  T(F): 98.1 (13 Nov 2019 11:00), Max: 99.3 (12 Nov 2019 18:19)  HR: 109 (13 Nov 2019 13:02) (86 - 109)  BP: 90/63 (13 Nov 2019 13:02) (86/56 - 110/63)  BP(mean): 71 (13 Nov 2019 13:02) (68 - 85)  RR: 20 (13 Nov 2019 13:02) (0 - 33)  SpO2: 100% (13 Nov 2019 13:02) (99% - 100%)    PHYSICAL EXAM:  Constitutional:Well-developed, well nourishe  Eyes:NANCY, EOMI  Ear/Nose/Throat: no oral lesion, no sinus tenderness on percussion	  Neck:no JVD, no lymphadenopathy, supple  Respiratory: CTA hellen  Cardiovascular: S1S2 RRR, no murmurs  Gastrointestinal:soft, (+) BS, no HSM  Extremities:no e/e/c        LABS:                        9.4    18.57 )-----------( 60       ( 13 Nov 2019 06:29 )             27.4     11-13    134<L>  |  99  |  43<H>  ----------------------------<  110<H>  4.1   |  26  |  2.02<H>    Ca    7.6<L>      13 Nov 2019 12:53  Phos  4.8     11-13  Mg     2.3     11-13    TPro  6.2  /  Alb  2.4<L>  /  TBili  11.8<H>  /  DBili  x   /  AST  181<H>  /  ALT  221<H>  /  AlkPhos  140<H>  11-13    PT/INR - ( 12 Nov 2019 04:24 )   PT: 19.7 sec;   INR: 1.71          PTT - ( 12 Nov 2019 04:24 )  PTT:43.5 sec        MICROBIOLOGY:  Culture Results:   Testing in progress (11-13 @ 01:25)  Culture Results:   No growth to date (11-12 @ 19:21)  Culture Results:   No growth at 12 hours (11-12 @ 16:09)  Culture Results:   No growth at 12 hours (11-12 @ 16:09)        RADIOLOGY & ADDITIONAL STUDIES: Infectious diseases progress note:  ULI HOLLAND is a 39yMale patient    ENDOCARDITIS/SEPSIS    Subjective: Patient intubated and sedated. Opens eyes to physical stimulus.     Allergies    Allergy Status Unknown    Intolerances      ANTIBIOTICS/RELEVANT:  antimicrobials  nafcillin  IVPB 2 Gram(s) IV Intermittent every 4 hours    immunologic:    OTHER:  albumin human 25% IVPB 50 milliLiter(s) IV Intermittent every 8 hours  chlorhexidine 0.12% Liquid 15 milliLiter(s) Oral Mucosa every 12 hours  chlorhexidine 2% Cloths 1 Application(s) Topical daily  dextrose 40% Gel 15 Gram(s) Oral once PRN  dextrose 5%. 1000 milliLiter(s) IV Continuous <Continuous>  dextrose 50% Injectable 12.5 Gram(s) IV Push once  dextrose 50% Injectable 25 Gram(s) IV Push once  dextrose 50% Injectable 25 Gram(s) IV Push once  fentaNYL   Infusion. 0.5 MICROgram(s)/kG/Hr IV Continuous <Continuous>  glucagon  Injectable 1 milliGRAM(s) IntraMuscular once PRN  heparin  Injectable 5000 Unit(s) SubCutaneous every 8 hours  insulin lispro (HumaLOG) corrective regimen sliding scale   SubCutaneous every 6 hours  norepinephrine Infusion 0.03 MICROgram(s)/kG/Min IV Continuous <Continuous>  pantoprazole  Injectable 40 milliGRAM(s) IV Push daily  polyethylene glycol 3350 17 Gram(s) Oral every 12 hours  propofol Infusion 10 MICROgram(s)/kG/Min IV Continuous <Continuous>  sodium chloride 0.9% lock flush 3 milliLiter(s) IV Push every 8 hours      Objective:  Vital Signs Last 24 Hrs  T(C): 36.7 (13 Nov 2019 11:00), Max: 37.4 (12 Nov 2019 18:19)  T(F): 98.1 (13 Nov 2019 11:00), Max: 99.3 (12 Nov 2019 18:19)  HR: 109 (13 Nov 2019 13:02) (86 - 109)  BP: 90/63 (13 Nov 2019 13:02) (86/56 - 110/63)  BP(mean): 71 (13 Nov 2019 13:02) (68 - 85)  RR: 20 (13 Nov 2019 13:02) (0 - 33)  SpO2: 100% (13 Nov 2019 13:02) (99% - 100%)    PHYSICAL EXAM:  Constitutional: NAD. Intubated and sedated.   HEENT: Intubated	  Neck: supple  Respiratory: Rhonchi throughout  Cardiovascular: systolic murmur appreciated on left 4th ICS  Gastrointestinal: Distended. Generalized tenderness to palpation appreciated by wincing with palpation. BS normoactive x4 quadrants  Extremities: Extremities warm throughout. Tips of b/l toes cyanotic. B/l pedal edema 2+      LABS:                        9.4    18.57 )-----------( 60       ( 13 Nov 2019 06:29 )             27.4     11-13    134<L>  |  99  |  43<H>  ----------------------------<  110<H>  4.1   |  26  |  2.02<H>    Ca    7.6<L>      13 Nov 2019 12:53  Phos  4.8     11-13  Mg     2.3     11-13    TPro  6.2  /  Alb  2.4<L>  /  TBili  11.8<H>  /  DBili  x   /  AST  181<H>  /  ALT  221<H>  /  AlkPhos  140<H>  11-13    PT/INR - ( 12 Nov 2019 04:24 )   PT: 19.7 sec;   INR: 1.71          PTT - ( 12 Nov 2019 04:24 )  PTT:43.5 sec        MICROBIOLOGY:  Culture Results:   Testing in progress (11-13 @ 01:25)  Culture Results:   No growth to date (11-12 @ 19:21)  Culture Results:   No growth at 12 hours (11-12 @ 16:09)  Culture Results:   No growth at 12 hours (11-12 @ 16:09)        RADIOLOGY & ADDITIONAL STUDIES: Critical Care    Infectious diseases progress note:  ULI HOLLAND is a 39yMale patient    ENDOCARDITIS/SEPSIS    Subjective: Patient intubated and sedated. Opens eyes to physical stimulus.     Allergies    Allergy Status Unknown    Intolerances      ANTIBIOTICS/RELEVANT:  antimicrobials  nafcillin  IVPB 2 Gram(s) IV Intermittent every 4 hours    immunologic:    OTHER:  albumin human 25% IVPB 50 milliLiter(s) IV Intermittent every 8 hours  chlorhexidine 0.12% Liquid 15 milliLiter(s) Oral Mucosa every 12 hours  chlorhexidine 2% Cloths 1 Application(s) Topical daily  dextrose 40% Gel 15 Gram(s) Oral once PRN  dextrose 5%. 1000 milliLiter(s) IV Continuous <Continuous>  dextrose 50% Injectable 12.5 Gram(s) IV Push once  dextrose 50% Injectable 25 Gram(s) IV Push once  dextrose 50% Injectable 25 Gram(s) IV Push once  fentaNYL   Infusion. 0.5 MICROgram(s)/kG/Hr IV Continuous <Continuous>  glucagon  Injectable 1 milliGRAM(s) IntraMuscular once PRN  heparin  Injectable 5000 Unit(s) SubCutaneous every 8 hours  insulin lispro (HumaLOG) corrective regimen sliding scale   SubCutaneous every 6 hours  norepinephrine Infusion 0.03 MICROgram(s)/kG/Min IV Continuous <Continuous>  pantoprazole  Injectable 40 milliGRAM(s) IV Push daily  polyethylene glycol 3350 17 Gram(s) Oral every 12 hours  propofol Infusion 10 MICROgram(s)/kG/Min IV Continuous <Continuous>  sodium chloride 0.9% lock flush 3 milliLiter(s) IV Push every 8 hours      Objective:  Vital Signs Last 24 Hrs  T(C): 36.7 (13 Nov 2019 11:00), Max: 37.4 (12 Nov 2019 18:19)  T(F): 98.1 (13 Nov 2019 11:00), Max: 99.3 (12 Nov 2019 18:19)  HR: 109 (13 Nov 2019 13:02) (86 - 109)  BP: 90/63 (13 Nov 2019 13:02) (86/56 - 110/63)  BP(mean): 71 (13 Nov 2019 13:02) (68 - 85)  RR: 20 (13 Nov 2019 13:02) (0 - 33)  SpO2: 100% (13 Nov 2019 13:02) (99% - 100%)    PHYSICAL EXAM:  Constitutional: NAD. Intubated and sedated.   HEENT: Intubated	  Neck: supple  Respiratory: Rhonchi throughout  Cardiovascular: systolic murmur appreciated on left 4th ICS  Gastrointestinal: Distended. Generalized tenderness to palpation appreciated by wincing with palpation. BS normoactive x4 quadrants  Extremities: Extremities warm throughout. Tips of b/l toes cyanotic. B/l pedal edema 2+      LABS:                        9.4    18.57 )-----------( 60       ( 13 Nov 2019 06:29 )             27.4     11-13    134<L>  |  99  |  43<H>  ----------------------------<  110<H>  4.1   |  26  |  2.02<H>    Ca    7.6<L>      13 Nov 2019 12:53  Phos  4.8     11-13  Mg     2.3     11-13    TPro  6.2  /  Alb  2.4<L>  /  TBili  11.8<H>  /  DBili  x   /  AST  181<H>  /  ALT  221<H>  /  AlkPhos  140<H>  11-13    PT/INR - ( 12 Nov 2019 04:24 )   PT: 19.7 sec;   INR: 1.71          PTT - ( 12 Nov 2019 04:24 )  PTT:43.5 sec        MICROBIOLOGY:  Culture Results:   Testing in progress (11-13 @ 01:25)  Culture Results:   No growth to date (11-12 @ 19:21)  Culture Results:   No growth at 12 hours (11-12 @ 16:09)  Culture Results:   No growth at 12 hours (11-12 @ 16:09)        RADIOLOGY & ADDITIONAL STUDIES:

## 2019-11-13 NOTE — PROGRESS NOTE ADULT - ASSESSMENT
40 yo White male with PMH of IVDU who was transferred from [facility] in regards to IE with tricupsid valve vegetations.   Nephrology consult is placed in regards to anuric cameron and electrolytes disturbances.       # CAMERON   - oligoanuric  - ddx ATN (from shock, vancomycin toxicity, rhabdomyolysis) vs infection related GN   - s/p cvvhd initiated on 11/11, will switch to IHD       # Infective endocarditis  - renally adjusted ABx  - bubble studies nedative          Discussed with attending Dr Pinzon and primary team. 38 yo White male with PMH of IVDU who was transferred from [facility] in regards to IE with tricupsid valve vegetations.   Nephrology consult is placed in regards to anuric cameron and electrolytes disturbances.   CT-A negative for mesenteric ischemia, on 11/12  bubble studies negative for PFO, 11/12  s/p bronchoscopy on 11/12, BAL cultures to follow    # CAMERON   - currently anuric  - ddx ATN (from shock, vancomycin toxicity, rhabdomyolysis) vs infection related GN   - cvvhd initiated on 11/11, net even  - will switch to IHD in setting of hemolysis and need for more clearance, will consider aquapheresis vs cvvhd to keep net negative 1.0 L /24 hr     # Infective endocarditis  - renally adjusted ABx  - bubble studies nedative  - CT-A negative for mesenteric ischemia  - s/p bronchoscopy on 11/12  - BAL cultures, repeat cx to follow    # Anemia  - cw hemolysis, possible transfusion reaction, less likely due to cvvhd  - consider HemOnc evaluation      Discussed with attending Dr Pinzon and primary team.

## 2019-11-14 NOTE — PROGRESS NOTE ADULT - SUBJECTIVE AND OBJECTIVE BOX
INCOMPLETE NOTE    infectious diseases progress note:  ULI HOLLAND is a 39yMale patient    ENDOCARDITIS/SEPSIS    Acute endocarditis due to other organism      ROS:  CONSTITUTIONAL:  Negative fever or chills, feels well, good appetite  EYES:  Negative  blurry vision or double vision  CARDIOVASCULAR:  Negative for chest pain or palpitations  RESPIRATORY:  Negative for cough, wheezing, or SOB   GASTROINTESTINAL:  Negative for nausea, vomiting, diarrhea, constipation, or abdominal pain  GENITOURINARY:  Negative frequency, urgency or dysuria  NEUROLOGIC:  No headache, confusion, dizziness, lightheadedness    Allergies    Allergy Status Unknown    Intolerances        ANTIBIOTICS/RELEVANT:  antimicrobials  nafcillin  IVPB 2 Gram(s) IV Intermittent every 4 hours    immunologic:    OTHER:  albumin human 25% IVPB 50 milliLiter(s) IV Intermittent every 8 hours  ascorbic acid 500 milliGRAM(s) Oral daily  chlorhexidine 0.12% Liquid 15 milliLiter(s) Oral Mucosa every 12 hours  chlorhexidine 2% Cloths 1 Application(s) Topical daily  CRRT Treatment    <Continuous>  dextrose 40% Gel 15 Gram(s) Oral once PRN  dextrose 5%. 1000 milliLiter(s) IV Continuous <Continuous>  dextrose 50% Injectable 12.5 Gram(s) IV Push once  dextrose 50% Injectable 25 Gram(s) IV Push once  dextrose 50% Injectable 25 Gram(s) IV Push once  fentaNYL   Infusion. 0.5 MICROgram(s)/kG/Hr IV Continuous <Continuous>  glucagon  Injectable 1 milliGRAM(s) IntraMuscular once PRN  heparin  Injectable 5000 Unit(s) SubCutaneous every 8 hours  insulin lispro (HumaLOG) corrective regimen sliding scale   SubCutaneous every 6 hours  pantoprazole  Injectable 40 milliGRAM(s) IV Push daily  Phoxillum Filtration BK 4 / 2.5 5000 milliLiter(s) CRRT <Continuous>  polyethylene glycol 3350 17 Gram(s) Oral every 12 hours  propofol Infusion 10 MICROgram(s)/kG/Min IV Continuous <Continuous>  sodium chloride 0.9% lock flush 3 milliLiter(s) IV Push every 8 hours  vitamin E 400 International Unit(s) Oral daily  zinc sulfate 220 milliGRAM(s) Oral daily      Objective:  Vital Signs Last 24 Hrs  T(C): 36.5 (14 Nov 2019 10:30), Max: 37.4 (13 Nov 2019 16:35)  T(F): 97.7 (14 Nov 2019 10:30), Max: 99.4 (13 Nov 2019 16:35)  HR: 100 (14 Nov 2019 11:00) (96 - 122)  BP: 122/72 (14 Nov 2019 10:30) (86/56 - 122/72)  BP(mean): 89 (14 Nov 2019 10:30) (63 - 89)  RR: 20 (14 Nov 2019 11:00) (10 - 32)  SpO2: 100% (14 Nov 2019 11:00) (96% - 100%)    PHYSICAL EXAM:  Constitutional:Well-developed, well nourishe  Eyes:NANCY, EOMI  Ear/Nose/Throat: no oral lesion, no sinus tenderness on percussion	  Neck:no JVD, no lymphadenopathy, supple  Respiratory: CTA hellen  Cardiovascular: S1S2 RRR, no murmurs  Gastrointestinal:soft, (+) BS, no HSM  Extremities:no e/e/c        LABS:                        8.8    20.17 )-----------( 54       ( 14 Nov 2019 09:18 )             26.0     11-14    136  |  102  |  22  ----------------------------<  150<H>  3.0<L>   |  25  |  1.21    Ca    8.0<L>      14 Nov 2019 04:03  Phos  2.6     11-14  Mg     2.0     11-14    TPro  6.1  /  Alb  2.8<L>  /  TBili  11.3<H>  /  DBili  9.4<H>  /  AST  109<H>  /  ALT  154<H>  /  AlkPhos  124<H>  11-14    PT/INR - ( 14 Nov 2019 07:16 )   PT: 17.0 sec;   INR: 1.49          PTT - ( 14 Nov 2019 07:16 )  PTT:40.8 sec        MICROBIOLOGY:        RADIOLOGY & ADDITIONAL STUDIES: INCOMPLETE NOTE    infectious diseases progress note:  ULI HOLLAND is a 39yMale patient    ENDOCARDITIS/SEPSIS    Acute endocarditis due to other organism    Subjective: Patient opens eyes to verbal command however does not contribute in answering questions for ROS.     Allergies    Allergy Status Unknown    Intolerances        ANTIBIOTICS/RELEVANT:  antimicrobials  nafcillin  IVPB 2 Gram(s) IV Intermittent every 4 hours    immunologic:    OTHER:  albumin human 25% IVPB 50 milliLiter(s) IV Intermittent every 8 hours  ascorbic acid 500 milliGRAM(s) Oral daily  chlorhexidine 0.12% Liquid 15 milliLiter(s) Oral Mucosa every 12 hours  chlorhexidine 2% Cloths 1 Application(s) Topical daily  CRRT Treatment    <Continuous>  dextrose 40% Gel 15 Gram(s) Oral once PRN  dextrose 5%. 1000 milliLiter(s) IV Continuous <Continuous>  dextrose 50% Injectable 12.5 Gram(s) IV Push once  dextrose 50% Injectable 25 Gram(s) IV Push once  dextrose 50% Injectable 25 Gram(s) IV Push once  fentaNYL   Infusion. 0.5 MICROgram(s)/kG/Hr IV Continuous <Continuous>  glucagon  Injectable 1 milliGRAM(s) IntraMuscular once PRN  heparin  Injectable 5000 Unit(s) SubCutaneous every 8 hours  insulin lispro (HumaLOG) corrective regimen sliding scale   SubCutaneous every 6 hours  pantoprazole  Injectable 40 milliGRAM(s) IV Push daily  Phoxillum Filtration BK 4 / 2.5 5000 milliLiter(s) CRRT <Continuous>  polyethylene glycol 3350 17 Gram(s) Oral every 12 hours  propofol Infusion 10 MICROgram(s)/kG/Min IV Continuous <Continuous>  sodium chloride 0.9% lock flush 3 milliLiter(s) IV Push every 8 hours  vitamin E 400 International Unit(s) Oral daily  zinc sulfate 220 milliGRAM(s) Oral daily      Objective:  Vital Signs Last 24 Hrs  T(C): 36.5 (14 Nov 2019 10:30), Max: 37.4 (13 Nov 2019 16:35)  T(F): 97.7 (14 Nov 2019 10:30), Max: 99.4 (13 Nov 2019 16:35)  HR: 100 (14 Nov 2019 11:00) (96 - 122)  BP: 122/72 (14 Nov 2019 10:30) (86/56 - 122/72)  BP(mean): 89 (14 Nov 2019 10:30) (63 - 89)  RR: 20 (14 Nov 2019 11:00) (10 - 32)  SpO2: 100% (14 Nov 2019 11:00) (96% - 100%)    PHYSICAL EXAM:  Constitutional: NAD. Intubated and sedated.   HEENT: Intubated, positive scleral icterus	  Neck: supple  Respiratory: Rhonchi and wheezing throughout  Cardiovascular: S1, S2. RRR. systolic murmur appreciated on left 4th ICS  Gastrointestinal: Distended. Generalized tenderness to palpation appreciated by wincing with palpation. BS normoactive x4 quadrants  Extremities: Extremities warm throughout. Tips of b/l toes cyanotic. B/l 2+ pedal edema      LABS:                        8.8    20.17 )-----------( 54       ( 14 Nov 2019 09:18 )             26.0     11-14    136  |  102  |  22  ----------------------------<  150<H>  3.0<L>   |  25  |  1.21    Ca    8.0<L>      14 Nov 2019 04:03  Phos  2.6     11-14  Mg     2.0     11-14    TPro  6.1  /  Alb  2.8<L>  /  TBili  11.3<H>  /  DBili  9.4<H>  /  AST  109<H>  /  ALT  154<H>  /  AlkPhos  124<H>  11-14    PT/INR - ( 14 Nov 2019 07:16 )   PT: 17.0 sec;   INR: 1.49          PTT - ( 14 Nov 2019 07:16 )  PTT:40.8 sec        MICROBIOLOGY:        RADIOLOGY & ADDITIONAL STUDIES: Infectious diseases progress note:  ULI HOLLAND is a 39yMale patient    ENDOCARDITIS/SEPSIS    Acute endocarditis due to other organism    Subjective: Patient opens eyes to verbal command however does not contribute in answering questions for ROS.     Allergies    Allergy Status Unknown    Intolerances        ANTIBIOTICS/RELEVANT:  antimicrobials  nafcillin  IVPB 2 Gram(s) IV Intermittent every 4 hours    immunologic:    OTHER:  albumin human 25% IVPB 50 milliLiter(s) IV Intermittent every 8 hours  ascorbic acid 500 milliGRAM(s) Oral daily  chlorhexidine 0.12% Liquid 15 milliLiter(s) Oral Mucosa every 12 hours  chlorhexidine 2% Cloths 1 Application(s) Topical daily  CRRT Treatment    <Continuous>  dextrose 40% Gel 15 Gram(s) Oral once PRN  dextrose 5%. 1000 milliLiter(s) IV Continuous <Continuous>  dextrose 50% Injectable 12.5 Gram(s) IV Push once  dextrose 50% Injectable 25 Gram(s) IV Push once  dextrose 50% Injectable 25 Gram(s) IV Push once  fentaNYL   Infusion. 0.5 MICROgram(s)/kG/Hr IV Continuous <Continuous>  glucagon  Injectable 1 milliGRAM(s) IntraMuscular once PRN  heparin  Injectable 5000 Unit(s) SubCutaneous every 8 hours  insulin lispro (HumaLOG) corrective regimen sliding scale   SubCutaneous every 6 hours  pantoprazole  Injectable 40 milliGRAM(s) IV Push daily  Phoxillum Filtration BK 4 / 2.5 5000 milliLiter(s) CRRT <Continuous>  polyethylene glycol 3350 17 Gram(s) Oral every 12 hours  propofol Infusion 10 MICROgram(s)/kG/Min IV Continuous <Continuous>  sodium chloride 0.9% lock flush 3 milliLiter(s) IV Push every 8 hours  vitamin E 400 International Unit(s) Oral daily  zinc sulfate 220 milliGRAM(s) Oral daily      Objective:  Vital Signs Last 24 Hrs  T(C): 36.5 (14 Nov 2019 10:30), Max: 37.4 (13 Nov 2019 16:35)  T(F): 97.7 (14 Nov 2019 10:30), Max: 99.4 (13 Nov 2019 16:35)  HR: 100 (14 Nov 2019 11:00) (96 - 122)  BP: 122/72 (14 Nov 2019 10:30) (86/56 - 122/72)  BP(mean): 89 (14 Nov 2019 10:30) (63 - 89)  RR: 20 (14 Nov 2019 11:00) (10 - 32)  SpO2: 100% (14 Nov 2019 11:00) (96% - 100%)    PHYSICAL EXAM:  Constitutional: NAD. Intubated and sedated.   HEENT: Intubated, positive scleral icterus	  Neck: supple  Respiratory: Rhonchi and wheezing throughout  Cardiovascular: S1, S2. RRR. systolic murmur appreciated on left 4th ICS  Gastrointestinal: Distended. Generalized tenderness to palpation appreciated by wincing with palpation. BS normoactive x4 quadrants  Extremities: Extremities warm throughout. Tips of b/l toes cyanotic. B/l 2+ pedal edema      LABS:                        8.8    20.17 )-----------( 54       ( 14 Nov 2019 09:18 )             26.0     11-14    136  |  102  |  22  ----------------------------<  150<H>  3.0<L>   |  25  |  1.21    Ca    8.0<L>      14 Nov 2019 04:03  Phos  2.6     11-14  Mg     2.0     11-14    TPro  6.1  /  Alb  2.8<L>  /  TBili  11.3<H>  /  DBili  9.4<H>  /  AST  109<H>  /  ALT  154<H>  /  AlkPhos  124<H>  11-14    PT/INR - ( 14 Nov 2019 07:16 )   PT: 17.0 sec;   INR: 1.49          PTT - ( 14 Nov 2019 07:16 )  PTT:40.8 sec        MICROBIOLOGY:        RADIOLOGY & ADDITIONAL STUDIES:

## 2019-11-14 NOTE — PROGRESS NOTE ADULT - ATTENDING COMMENTS
I have reviewed the medical record, including laboratory and radiographic studies, examined the patient and discussed the plan with Dr. Carlson, the ID Resident.  Agree with above. He remains very critically ill.  Will continue to follow with you – ID Team 1.

## 2019-11-14 NOTE — PROGRESS NOTE ADULT - ASSESSMENT
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 After being transferred to  CTICU for surgical evaluation, pt was deemed to not be a surgical candidate and transferred to MICU for medical mgmt. ID and Nephrology following.    Neuro  Sedated on Propofol, Fentanyl. CT head negative for any intracranial embolic events.  -D/C sedation today for awake trial: assess mental status, with consideration for extubation     Cardiovascular     #Hypotension  Most likely 2/2 septic shock from infective endocarditis and RV failure 2/2 tricuspid regurgitation (ELIAN shows EF of 40-50%). Currently on Levophed, will titrate as needed. Echo with EF 45%. ELIAN with EF 40-45%, 3.8 x1cm vegetation on TR valve, with severe TR, trivial pericardial effusion. Echo with bubble revealed no PFO. Levo requirements decreasing (0.058 mcg/kg/hr currently).  - Maintain MAP>65.   - Titrate Levophed as necessary.   - C/W medical mgmt of infective endocarditis as below.    Respiratory    #Acute hypoxic respiratory failure   Most likely 2/2 alveolar hemorrhage in the setting of septic embolic causing numerous cavitary lesions on CT chest. CXR with scattered infiltrates and pleural effusions. Sputum culture negative. CT revealed"moderate bilateral pleural effusions and dense bibasilar consolidation   with underlying septic emboli/pulmonary abscesses"  - C/W ventilator support, monitor mental status and consider CPAP trial  - Continue to treat IE as below    #Bilateral pulmonary effusion  Most likely empyema in setting of septic shock 2/2 infective endocarditis. Bilateral CT in place, confirmed by CXR, draining serosanguinous fluid (100 cc overnight from Right CT, Left CT drained 1100 cc when placed and 300 cc overnight). Fluid analysis of both CT pleural fluid confirms complex exudate with high cellularity, low pH and low glucose.  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 Not a surgical candidate for a tricuspid valve replacement at this time as per CT surgery. Lactate 1.3. Zosyn added overnight to cover for potential gram - superinfection. Initial blood cultures positive only for gram + clusters. Sputum culture negative.   - D/C Zosyn  - ID following, C/W Nafcillin 2g q4 (Sensitive to Oxacillin as per OSH records), f/u recs   - Continue to monitor temps and vitals, consider additional Abx coverage if condition worsens  - F/U [facility] bcx cultures, x2  - F/U CBC and diff    Renal    #Acute renal failure 2/2 ATN from hypoperfusion, Minimal urine output on, CVVHD, oliguric with 5-15cc/hr. Vancomycin level 37 on arrival so may be component of drug induced nephropathy. Bun/Cr improving (89/4.1 -> 44/1.99). Pt is clinically fluid overloaded, and hemodynamically stable. No renal infarcts as per CT abdomen/pelvis.   - F/U nephrology recs.  - C/W CVVHD as tolerated   - Attempt to correct fluid overload with CVVHD, goal net -1L today, nephro following  - Monitor renal function with BUN/Cr  - Monitor I/Os    #Electrolytes abnormalities   On CVVHD. Hyperkalemia resolved, improved. No EKG changes.  -Requires q6 BMP, Mg, and Phos while on CVVHD  -Monitor serum lytes, Ca     GI    OG on arrival with 600cc of bilious fluid, no bowel movement since arrival with distended abdomen. Abd XR read as evidence for SBO vs. bowel ischemia. 100 cc of bilious fluid overnight out of NG sump. CT abdomen revealed no evidence of bowel ischemia or SBO  - Remove sump  - Place NG tube  - Begin feeds with Jevity 1.5  - Miralax BID   - CT abdomen/pelvis revealed "irregular masslike mural thickening of the posterior left lateral wall of the rectum," F/U with surgery for recs.    #Congestive Hepatopathy   Transaminitis, coag derangements, hyperbilirubinemia most likely 2/2 hepatic congestion, due to severe TR in setting of infective endocarditis. Transaminases and Alk phos improving. Hepatitis panel negative.  - F/U AST/ALT  - Avoid Tylenol    Heme    #Hemolysis  Intravascular hemolysis with haptoglobin <10, , T.bili increasing (8.4 -> 11.8), D.Bili 8.8, and clinical jaundice. Most likely 2/2 CVVHD. h/h stable at 10.1/29.6. D-dimer elevated (2409), normal fibrinogen. Platelets low but stable at 66.  - Heme consulted, follow recs  - Monitor direct and total bili, LDH.     Lines: right IJ TLC and Ernesto (11/12), Axillary A-line (11/12), Left IJ (11/12), right peripheral (11/12)    F: 25% Albumin 50cc  E: replete K <4, Mg <2  N: Jevity 1.5  GI Ppx: Protonix   DVT Ppx: Heparin   Code status: FULL   Dispo: MICU 38 y/o M w/ PMH of IVDU and active smoker who came from [facility] on 11/8/19 in 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 After being transferred to  CTICU for surgical evaluation, pt was deemed to not be a surgical candidate and transferred to MICU for medical mgmt. ID, Nephrology, heme/onc, surgery following.    Neuro  Light sedatation on Propofol, Fentanyl. CT head negative for any intracranial embolic events.  - Follows commands and moves extremities     Cardiovascular     #Hypotension  Most likely 2/2 septic shock from infective endocarditis and RV failure 2/2 tricuspid regurgitation (ELIAN shows EF of 40-50%). Echo with EF 45%. ELIAN with EF 40-45%, 3.8 x1cm vegetation on TR valve, with severe TR, trivial pericardial effusion. Echo with bubble revealed no PFO. Titrated off Levophed. Can restart if necessary.   - Maintain MAP>65.   - C/W medical mgmt of infective endocarditis as below.    Respiratory    #Acute hypoxic respiratory failure   Most likely 2/2 alveolar hemorrhage in the setting of septic embolic causing numerous cavitary lesions on CT chest. CXR with scattered infiltrates and pleural effusions. Sputum culture negative. CT revealed "moderate bilateral pleural effusions and dense bibasilar consolidation with underlying septic emboli/pulmonary abscesses"  - C/W ventilator support, monitor mental status. Failed CPAP trials in morning and afternoon. Will reassess and attempt in AM   - Continue to treat IE as below    #Bilateral pulmonary effusion  Most likely empyema in setting of septic shock 2/2 infective endocarditis. Bilateral CT in place, confirmed by CXR, continues to drain serosanguinous fluid. Fluid analysis of both CT pleural fluid confirms complex exudate with high cellularity, low pH and low glucose.  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 Not a surgical candidate for a tricuspid valve replacement at this time as per CT surgery. Lactate 2.0 today. Initial blood cultures positive for staph epidermidis, likely a contaminant. Initial sputum culture positive for staph aureus  Repeat blood cultures with NGTD.   - ID following, C/W Nafcillin 2g q4 (Sensitive to Oxacillin as per OSH records), f/u recs   - Continue to monitor temps and vitals, consider additional Abx coverage if condition worsens  - F/u repeat sputum cx   - Monitor CBC     Renal    #Acute renal failure 2/2 ATN from hypoperfusion, Minimal urine output on, CVVHD, oliguric with 5-15cc/hr. Vancomycin level 37 on arrival so may be component of drug induced nephropathy. Bun/Cr continues to improve with CVVHD. Pt is clinically fluid overloaded, and hemodynamically stable. No renal infarcts as per CT abdomen/pelvis.   - F/U nephrology recs.  - C/W CVVHD as tolerated   - Attempt to correct fluid overload with CVVHD, net -1.9L yesterday. Continue goal net -1L today  - Monitor renal function with BUN/Cr  - Monitor I/Os    #Electrolytes abnormalities   On CVVHD. Hyperkalemia resolved. No EKG changes.  -Requires q6 BMP, Mg, and Phos while on CVVHD  -Monitor serum lytes, Ca     GI    OG on arrival with 600cc of bilious fluid, abdomen still distended but had several BMs, some loose. CT abdomen revealed no evidence of bowel ischemia or SBO.  - NG tube in place  -  feeds with Jevity 1.5  - DC Miralax BID as patient with a few loose BMs today   - CT abdomen/pelvis revealed "irregular masslike mural thickening of the posterior left lateral wall of the rectum." Surgery consulted, unlikely perirectal abscess. Recommend GI evaluation for possible scope.    - GI consult, f/u recs     #Congestive Hepatopathy   Transaminitis, coag derangements, hyperbilirubinemia most likely 2/2 hepatic congestion vs hemoylsis, due to severe TR in setting of infective endocarditis. Transaminases and Alk phos improving. Hepatitis panel negative.  - F/U CMP, direct bili   - Avoid Tylenol    Heme    #Hemolysis  Intravascular hemolysis with inital haptoglobin <10, . D-dimer elevated. Fibrinogen 318. Today T.bili elevated but stable, D.Bili increasing at 9.4, and clinical jaundice. h/h stable at 8.8/26. Platelets low but stable at 54. Fact VII/Factor VIII elevated. Unlikely 2/2 CVVHD. Likely 2/2 to sepsis and/or DIC.   - Heme consulted, follow recs  - Monitor direct and total bili, LDH, fibrinogen, platelets   - Transfuse platelets if <10K or bleeding and <50K  - Transfuse pRBCs for hgb <7     Vascular  Vascular surgery consulted in setting of gangrenous distal toes and fingers b/l. Likely 2/2 to pressors. Will follow up recs.     Lines: right IJ TLC and Ernesto (11/12), Axillary A-line (11/12), Left IJ (11/12), right peripheral (11/12)    F: 25% Albumin 50cc  E: replete K <4, Mg <2  N: Jevity 1.5  GI Ppx: Protonix   DVT Ppx: Heparin   Code status: FULL   Dispo: MICU

## 2019-11-14 NOTE — PROGRESS NOTE ADULT - SUBJECTIVE AND OBJECTIVE BOX
no acute events overnight  remains intubated, stable setting  minimally sedated  remains on pressors  cvvhd initiated on , net even  [place] in place, uop cw anuria  concern for mesenteric ischemia, CT-A negative on   bubble studies negative for PFO,   s/p bronchoscopy on , BAL cultures to follow  labs noted, K 4.2, BUN/Cr 44/1.9, LDH/hapto cw hemolysis        Meds:    albumin human 25% IVPB 50 milliLiter(s) IV Intermittent every 8 hours  chlorhexidine 0.12% Liquid 15 milliLiter(s) Oral Mucosa every 12 hours  chlorhexidine 2% Cloths 1 Application(s) Topical daily  dextrose 40% Gel 15 Gram(s) Oral once PRN  dextrose 5%. 1000 milliLiter(s) IV Continuous <Continuous>  dextrose 50% Injectable 12.5 Gram(s) IV Push once  dextrose 50% Injectable 25 Gram(s) IV Push once  dextrose 50% Injectable 25 Gram(s) IV Push once  fentaNYL   Infusion. 0.5 MICROgram(s)/kG/Hr IV Continuous <Continuous>  glucagon  Injectable 1 milliGRAM(s) IntraMuscular once PRN  heparin  Injectable 5000 Unit(s) SubCutaneous every 8 hours  insulin lispro (HumaLOG) corrective regimen sliding scale   SubCutaneous every 6 hours  nafcillin  IVPB 2 Gram(s) IV Intermittent every 4 hours  norepinephrine Infusion 0.03 MICROgram(s)/kG/Min IV Continuous <Continuous>  pantoprazole  Injectable 40 milliGRAM(s) IV Push daily  polyethylene glycol 3350 17 Gram(s) Oral every 12 hours  propofol Infusion 10 MICROgram(s)/kG/Min IV Continuous <Continuous>  sodium chloride 0.9% lock flush 3 milliLiter(s) IV Push every 8 hours      T(C): 36.7 (19 @ 11:00), Max: 37.4 (19 @ 18:19)  HR: 108 (19 @ 12:00) (86 - 108)  BP: 86/56 (19 @ 12:00) (86/56 - 110/63)  RR: 20 (19 @ 12:00) (0 - 33)  SpO2: 100% (19 @ 12:00) (99% - 100%)    Input/Output      19 @ 07:01  -  19 @ 07:00  --------------------------------------------------------  IN: 3272 mL / OUT: 2869 mL / NET: 403 mL    19 @ 07:01  -  19 @ 12:27  --------------------------------------------------------  IN: 322.4 mL / OUT: 196 mL / NET: 126.4 mL        Review of Systems:  not able to obtain    PHYSICAL EXAM:  GENERAL: sedated, intubated, no acute distress at present  HEENT: ETT, NGT in place  NECK: JVD not appreciated  CHEST/LUNG: equal breath sounds bilaterally  HEART: normal S1S2, RRR  ABDOMEN: Soft, Nontender, +BS  : [place] in place, anuria  EXTREMITIES: grossly edematous, necrotic toes  NEUROLOGY: sedated  ACCESS: [facility]          LABS:                            9.4    18.57 )-----------( 60       ( 2019 06:29 )             27.4           133<L>  |  100  |  44<H>  ----------------------------<  101<H>  4.2   |  25  |  1.99<H>    Ca    7.0<L>      2019 06:29  Phos  4.9       Mg     2.2         TPro  5.8<L>  /  Alb  2.4<L>  /  TBili  10.5<H>  /  DBili  8.8<H>  /  AST  213<H>  /  ALT  264<H>  /  AlkPhos  157<H>        PT/INR - ( 2019 04:24 )   PT: 19.7 sec;   INR: 1.71          PTT - ( 2019 04:24 )  PTT:43.5 sec    Urinalysis Basic - ( 2019 11:55 )    Color: Yellow / Appearance: Clear / S.020 / pH: x  Gluc: x / Ketone: NEGATIVE  / Bili: Small / Urobili: 1.0 E.U./dL   Blood: x / Protein: 30 mg/dL / Nitrite: NEGATIVE   Leuk Esterase: NEGATIVE / RBC: 5-10 /HPF / WBC < 5 /HPF   Sq Epi: x / Non Sq Epi: 0-5 /HPF / Bacteria: Present /HPF                    RADIOLOGY & ADDITIONAL STUDIES:    < from: CT Angio Abdomen and Pelvis w/ Oral Cont and w/ IV Cont (19 @ 20:03) >    EXAM:  CT ANGIO ABD PELV OC (W)AW IC                          PROCEDURE DATE:  2019          INTERPRETATION:  CLINICAL INDICATION: 39-year-old with endocarditis and   sepsis; detection of mesenteric ischemia. History of empyema and   multiorgan failure; IV drug abuse.        FINDINGS: CT angiography of the abdomen and pelvis was performed before   and after the administration of intravenous contrast. Examination is   limited secondary to delayed phase of acquisition after contrast   administration. Multiplanar and maximum intensity projection 3D   reconstructions were performed in the sagittal and coronal planes. No   prior studies available for comparison.    Evaluation of the lower chest demonstrates normal heart size. Moderate   bilateral pleural effusions. Dense bibasilar consolidation with   superimposed small cavitary nodules within the bilateral lower lobes,   consistent with abscesses/septic emboli given history of endocarditis.   There is a chest tube within the left pleural space. Central venous   catheter with the tip in the right atrium. Negative for pericardial   effusion. Evaluation of the abdomen demonstrates that the liver, spleen,   gallbladder, bilateral adrenal glands, bilateral kidneys and pancreas are   normal in appearance aside from a dystrophic right adrenal   calcifications. Evaluation of the gastrointestinal tract demonstrates NG   tube tip within the stomach. There is no small bowel thickening or bowel   obstruction. There is possible masslike mural thickening of the inferior   right lateral wall of the rectum and correlation with direct   visualization is recommended, when clinically appropriate. Evaluation of   the pelvis demonstrates that the prostate and seminal vesicles and   bladder areunremarkable which contains a small volume of air and Sevilla   catheter balloon. There is very severe diffuse anasarca. Large volume of   abdominal and pelvic ascites. Large bilateral hydroceles and scrotal   edema. No adenopathy. Minimal aortic vascular calcification. Evaluation   of the vasculature demonstrates that the hepatic, portal, splenic,   superior mesenteric vein, bilateral renal veins, IVC and bilateral iliac   veins demonstrates no leilani intraluminal thrombus. The celiac artery,   superior mesenteric artery and bilateral renal arteries are widely   patent. Evaluation of the osseous structures demonstrates no destructive   osseous lesion.           IMPRESSION:    No leilani evidence of mesenteric ischemia.    Suggestion of irregular masslike mural thickening of the posterior left   lateral wall of the rectum; correlation with direct visualization is   recommended, when clinically appropriate.    Moderate bilateral pleural effusions and dense bibasilar consolidation   with underlying septic emboli/pulmonary abscesses.    Large volume of ascites and diffuse anasarca.      < end of copied text >      < from: Xray Chest 1 View- PORTABLE-Urgent (19 @ 18:49) >    ******PRELIMINARY REPORT******    ******PRELIMINARY REPORT******            EXAM:  XR CHEST PORTABLE URGENT 1V                          PROCEDURE DATE:  2019    ******PRELIMINARY REPORT******    ******PRELIMINARY REPORT******              INTERPRETATION:  Portable AP Radiograph dated 2019 6:49 PM    CLINICAL INFORMATION: 39 years, Male, s/p left chest tube    COMPARISON: Chest x-ray from 19 at 4:04 AM.    FINDINGS:   There is an endotracheal tube with its tip 2.5 cm above the karissa. There   is a left-sided internal jugular catheter with tip projecting over the   superior atrial junction. There is an enteric tube catheter with its tip   coursing below the diaphragm. There is a right approach central venous   catheter with tip projecting over the inferior vena cava.    There is again a right-sided chest tube. There has been interval   placement of a left-sided chest tube with improvement in left-sided mid   and upper lung zone opacities. No change bilateral lower lung zone   opacities.    IMPRESSION:  Interval placement of left-sided chest tube with improvement in   left-sided lung findings.    < end of copied text > no acute events overnight  remains intubated, sedated  hemodynamically stable off pressors  s/p IHD on 11/13, able to tolerate UF 2.4 L  currently on cvvhd, restarted yesterday, net [id number] ml/hr  [place] in place, uop cw anuria  labs reviewed            Meds:    albumin human 25% IVPB 50 milliLiter(s) IV Intermittent every 8 hours  ascorbic acid 500 milliGRAM(s) Oral daily  chlorhexidine 0.12% Liquid 15 milliLiter(s) Oral Mucosa every 12 hours  chlorhexidine 2% Cloths 1 Application(s) Topical daily  CRRT Treatment    <Continuous>  dextrose 40% Gel 15 Gram(s) Oral once PRN  dextrose 5%. 1000 milliLiter(s) IV Continuous <Continuous>  dextrose 50% Injectable 12.5 Gram(s) IV Push once  dextrose 50% Injectable 25 Gram(s) IV Push once  dextrose 50% Injectable 25 Gram(s) IV Push once  fentaNYL   Infusion. 0.5 MICROgram(s)/kG/Hr IV Continuous <Continuous>  glucagon  Injectable 1 milliGRAM(s) IntraMuscular once PRN  heparin  Injectable 5000 Unit(s) SubCutaneous every 8 hours  insulin lispro (HumaLOG) corrective regimen sliding scale   SubCutaneous every 6 hours  multivitamin/minerals/iron Oral Solution (CENTRUM) 15 milliLiter(s) Oral daily  nafcillin  IVPB 2 Gram(s) IV Intermittent every 4 hours  pantoprazole  Injectable 40 milliGRAM(s) IV Push daily  Phoxillum Filtration BK 4 / 2.5 5000 milliLiter(s) CRRT <Continuous>  polyethylene glycol 3350 17 Gram(s) Oral every 12 hours  propofol Infusion 10 MICROgram(s)/kG/Min IV Continuous <Continuous>  sodium chloride 0.9% lock flush 3 milliLiter(s) IV Push every 8 hours  vitamin E 400 International Unit(s) Oral daily  zinc sulfate 220 milliGRAM(s) Oral daily      T(C): 36.5 (11-14-19 @ 10:30), Max: 37.4 (11-13-19 @ 16:35)  HR: 100 (11-14-19 @ 11:00) (96 - 122)  BP: 122/72 (11-14-19 @ 10:30) (86/56 - 122/72)  RR: 20 (11-14-19 @ 11:00) (10 - 32)  SpO2: 100% (11-14-19 @ 11:00) (96% - 100%)    Input/Output      11-13-19 @ 07:01  -  11-14-19 @ 07:00  --------------------------------------------------------  IN: 1782.6 mL / OUT: 3837 mL / NET: -2054.4 mL    11-14-19 @ 07:01  -  11-14-19 @ 11:21  --------------------------------------------------------  IN: 591 mL / OUT: 1007 mL / NET: -416 mL        Review of Systems:  not able to obtain    PHYSICAL EXAM:  GENERAL: sedated, intubated, no acute distress at present  HEENT: ETT, NGT in place  NECK: JVD not appreciated  CHEST/LUNG: equal breath sounds bilaterally  HEART: normal S1S2, RRR  ABDOMEN: Soft, Nontender, +BS  : [place] in place, anuria  EXTREMITIES: grossly edematous, necrotic toes  NEUROLOGY: sedated  ACCESS: [facility]          LABS:                            8.8    20.17 )-----------( 54       ( 14 Nov 2019 09:18 )             26.0       11-14    136  |  102  |  22  ----------------------------<  150<H>  3.0<L>   |  25  |  1.21    Ca    8.0<L>      14 Nov 2019 04:03  Phos  2.6     11-14  Mg     2.0     11-14    TPro  6.1  /  Alb  2.8<L>  /  TBili  11.3<H>  /  DBili  9.4<H>  /  AST  109<H>  /  ALT  154<H>  /  AlkPhos  124<H>  11-14      PT/INR - ( 14 Nov 2019 07:16 )   PT: 17.0 sec;   INR: 1.49          PTT - ( 14 Nov 2019 07:16 )  PTT:40.8 sec        RADIOLOGY & ADDITIONAL STUDIES:     reviewed

## 2019-11-14 NOTE — PROGRESS NOTE ADULT - ASSESSMENT
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 After being transferred to [facility] CTICU for surgical evaluation, pt was decision was made to medically manage at this time and was transferred to MICU for medical mgmt. ID consulted for medical management of septic shock 2/2 tricuspid valve MSSA bacteremia    #Septic Shock 2/2 MSSA bacteremia 2/2 IE of tricuspid valve: Patient with ELIAN results demonstrating mobile vegetation of the tricuspid valve with severe TR. CTA a/p demonstrated evidence moderate b/l pleural effusion with dense bibasilar consolidations with underlying septic emboli/pulmonary abscesses as well as large volume ascites and anasarca. CT head negative for embolic phenomena   -c/w IV Nafcillin 2g q4hrs   -BCxs from [place] (in chart) positive for MSSA bacteremia  -BCxs 11/11 positive for staph epidermidis, likely a contaminant  -BCxs 11/12 NGTD, continue to monitor  -monitor for conduction abnormalities  -monitor HDs   -f/u CT surgery recs regarding possible surgical intervention  -trend leukocytosis. currently stable but elevated    #Empyema: Patient with IE of tricuspid valve with CT evidence of b/l pleural effusions and septic emboli. Now s/p b/l chest tubes.   -sputum cxs 11/11 positive for staph aureus with chest tube cxs on 11/11 positive for staph aureus     -body fluid cxs 11/11 from right chest tube positive for MSSA   -body fluid cxs 11/12 from left chest tube NGTD, continue to monitor  -continue to monitor chest tube output    #Transaminitis: Likely 2/2 shock liver in the setting of septic shock. Unlikely antibx induced  -acute hepatitis panel negative  -HIV negative  -trend LFTs. Currently downtrending      Tentative plan discussed with Dr. 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 After being transferred to [facility] CTICU for surgical evaluation, pt was decision was made to medically manage at this time and was transferred to MICU for medical mgmt. ID consulted for medical management of septic shock 2/2 tricuspid valve MSSA bacteremia    #Septic Shock 2/2 MSSA bacteremia 2/2 IE of tricuspid valve: Patient with ELIAN results demonstrating mobile vegetation of the tricuspid valve with severe TR. CTA a/p demonstrated evidence moderate b/l pleural effusion with dense bibasilar consolidations with underlying septic emboli/pulmonary abscesses as well as large volume ascites and anasarca. CT head negative for embolic phenomena   -c/w IV Nafcillin 2g q4hrs   -BCxs from [place] (in chart) positive for MSSA bacteremia  -BCxs 11/11 positive for staph epidermidis, likely a contaminant  -BCxs 11/12 NGTD, continue to monitor  -monitor for conduction abnormalities  -monitor HDs   -f/u CT surgery recs regarding possible surgical intervention  -trend leukocytosis. currently stable but elevated    #Empyema: Patient with IE of tricuspid valve with CT evidence of b/l pleural effusions and septic emboli. Now s/p b/l chest tubes.   -sputum cxs 11/11 positive for staph aureus with chest tube cxs on 11/11 positive for staph aureus     -body fluid cxs 11/11 from right chest tube positive for MSSA   -body fluid cxs 11/12 from left chest tube NGTD, continue to monitor  -continue to monitor chest tube output    #Transaminitis: Likely 2/2 shock liver in the setting of septic shock. Unlikely antibx induced  -acute hepatitis panel negative  -HIV negative  -trend LFTs. Currently downtrending      Plan discussed with Dr. Simental

## 2019-11-14 NOTE — PROGRESS NOTE ADULT - ASSESSMENT
38 yo White male with PMH of IVDU who was transferred from [facility] in regards to IE with tricupsid valve vegetations.   Nephrology consult is placed in regards to anuric cameron and electrolytes disturbances.   CT-A negative for mesenteric ischemia, on 11/12  bubble studies negative for PFO, 11/12  s/p bronchoscopy on 11/12, BAL cultures to follow    # CAMERON   - currently anuric  - ddx ATN (from shock, vancomycin toxicity, rhabdomyolysis) vs infection related GN   - cvvhd initiated on 11/11, net even  - will switch to IHD in setting of hemolysis and need for more clearance, will consider aquapheresis vs cvvhd to keep net negative 1.0 L /24 hr     # Infective endocarditis  - renally adjusted ABx  - bubble studies nedative  - CT-A negative for mesenteric ischemia  - s/p bronchoscopy on 11/12  - BAL cultures, repeat cx to follow    # Anemia  - cw hemolysis, possible transfusion reaction, less likely due to cvvhd  - consider HemOnc evaluation      Discussed with attending Dr Pinzon and primary team. 40 yo White male with PMH of IVDU who was transferred from [facility] in regards to IE with tricupsid valve vegetations.   Nephrology consult is placed in regards to anuric cameron and electrolytes disturbances.   CT-A negative for mesenteric ischemia, on 11/12  bubble studies negative for PFO, 11/12  s/p bronchoscopy on 11/12, BAL cultures to follow    # CAMERON   - anuric  - ddx ATN (from shock, vancomycin toxicity, rhabdomyolysis) vs infection related GN   - cvvhd initiated on 11/11, switched to IHD 11/13 in setting of hemolysis and need for more clearance with UF 2.4 L tolerated while on pressors  - restarted cvvhd on 11/13 with net negative 100 cc/hr, will continue  - potassium 3.0, supplemented with IV 20 meq KCL x3, will switch dialysate to 4K with phos and calcium, phos noted at 2.6  - monitor lytes Q6hr while on cvvhd, replace as indicated (keep K>4, phos >3, Mg >2)      # Infective endocarditis  - renally adjusted ABx  - bubble studies nedative  - CT-A negative for mesenteric ischemia  - s/p bronchoscopy on 11/12  - BAL cultures, repeat cx to follow      # Anemia/ DIC   - stable Hb  - follow HemOnc recommendations      Discussed with attending Dr Pinzon.

## 2019-11-14 NOTE — PROGRESS NOTE ADULT - ASSESSMENT
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 Also complicated by acute hypoxic respiratory failure requiring intubation and mechanical ventilation, and acute renal failure requiring emergent dialysis. Heme/onc consulted for management of DIC.     peripheral smear reviewed     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   1/13/19: dimer elevated to 2409, PT 17.2,  PTT 40.1, Plt 66, fibrinogen 241  No signs of acute bleeding   -f/u factor VIII level  -Treatment of underlying infection (endocarditis) and acute renal failure per primary team  -supportive care: transfuse plts for >10K or or >50K with active bleeding   -transfuse cryoprecipitate if fibrinogen level <100   -Daily coags, fibrinogen    #Anemia  Hb 6.9 -10 on this admission Normocytic.   11/13/19; , Haptoglobin < 10, Tibil 11.8, likely related to shock liver with overlapping DIC  Iron studies suggesting anemia of chronic disease  Direct Juvencio test negative   -f/u  retic count, B12 and folate, direct and indirect bilirubin   -Trend CBC; keep active T&S; transfuse if actively bleeding or if Hb < 7 g/dL   -Daily hemolysis labs 39 with history of IVDU admitted to MICU on 11/10/19 with 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 Also complicated by acute hypoxic respiratory failure requiring intubation and mechanical ventilation, and acute renal failure requiring emergent dialysis. Heme/onc consulted for management of DIC.     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   1/13/19: d-dimer elevated to 2409, PT 17.2,  PTT 40.1, Plt 66, fibrinogen 241  1/14/19:  PT 17.0,  PTT 40.8, Plt 54, fibrinogen 318  Factor VIII level elevated to 322%, but drawn after patient given FFP   No signs of acute bleeding   peripheral smear reviewed   -Check PT and PTT mixing study   -Treatment of underlying infection (endocarditis) and acute renal failure per primary team  -supportive care: transfuse plts for >10K or or >50K with active bleeding   -transfuse cryoprecipitate if fibrinogen level <100   -Daily coags, fibrinogen    #Anemia  Hb 6.9 -10 on this admission Normocytic.   11/13/19; , Haptoglobin < 10, Tibil 11.8  11/14/19: , Haptoglobin < 10; Tibili 11.3, direct bili 9.4  retic count elevated at 4.7% showing appropriate BM response   likely related to shock liver with overlapping DIC  Iron studies suggesting anemia of chronic disease  Direct Juvencio test negative   -Trend CBC; keep active T&S; transfuse if actively bleeding or if Hb < 7 g/dL   -Daily hemolysis labs 39 with history of IVDU admitted to MICU on 11/10/19 with 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 Also complicated by acute hypoxic respiratory failure requiring intubation and mechanical ventilation, and acute renal failure requiring emergent dialysis. Heme/onc consulted for management of DIC.     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   1/13/19: d-dimer elevated to 2409, PT 17.2,  PTT 40.1, Plt 66, fibrinogen 241  1/14/19:  PT 17.0,  PTT 40.8, Plt 54, fibrinogen 318  Factor VIII level elevated to 322%, but drawn after patient given FFP   PT/PTT mixing studies with correction   No signs of acute bleeding   peripheral smear reviewed    -Treatment of underlying infection (endocarditis) and acute renal failure per primary team  -supportive care: transfuse plts for >10K or or >50K with active bleeding   -transfuse cryoprecipitate if fibrinogen level <100   -Daily coags, fibrinogen    #Anemia  Hb 6.9 - 10 on this admission. Normocytic.   11/13/19; , Haptoglobin < 10, Tibil 11.8  11/14/19: , Haptoglobin < 10; Tibili 11.3, direct bili 9.4  retic count elevated at 4.7% showing appropriate BM response   likely related to shock liver with overlapping DIC  Iron studies suggesting anemia of chronic disease  Direct Juvencio test negative   -Trend CBC; keep active T&S; transfuse if actively bleeding or if Hb < 7 g/dL   -Daily hemolysis labs 39 with history of IVDU admitted to MICU on 11/10/19 with 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 Also complicated by acute hypoxic respiratory failure requiring intubation and mechanical ventilation, and acute renal failure requiring emergent dialysis. Heme/onc consulted for management of DIC.     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   1/13/19: d-dimer elevated to 2409, PT 17.2,  PTT 40.1, Plt 66, fibrinogen 241  1/14/19:  PT 17.0,  PTT 40.8, Plt 54, fibrinogen 318  Factor VIII level elevated to 322%, normally lower in DIC but drawn after patient given FFP, and elevated due to shock liver, Factor VII low despite FFP, likely related to acute liver injury (shortest half -life 3-6hrs)  PT/PTT mixing studies with initial correction, will review 2hr levels   No signs of acute bleeding   peripheral smear reviewed, spherocytes seen, no schistocytes (11/13*)  -Treatment of underlying infection (endocarditis) and acute renal failure per primary team  -supportive care: transfuse plts for >10K or or >50K with active bleeding   -transfuse cryoprecipitate if fibrinogen level <100   -Daily coags, fibrinogen    #Anemia  Hb 6.9 - 10 on this admission. Normocytic.   11/13/19; , Haptoglobin < 10, Tibil 11.8  11/14/19: , Haptoglobin < 10; Tibili 11.3, direct bili 9.4  retic count elevated at 4.7% showing appropriate BM response   likely related to shock liver with overlapping DIC  Iron studies suggesting anemia of chronic disease  Direct Juvencio test negative   -Trend CBC; keep active T&S; transfuse if actively bleeding or if Hb < 7 g/dL   -Daily hemolysis labs       d/w Dr. Flores

## 2019-11-14 NOTE — PROGRESS NOTE ADULT - SUBJECTIVE AND OBJECTIVE BOX
OVERNIGHT EVENTS:    SUBJECTIVE / INTERVAL HPI: Patient seen and examined at bedside.     VITAL SIGNS:  Vital Signs Last 24 Hrs  T(C): 35.9 (14 Nov 2019 14:40), Max: 37.4 (13 Nov 2019 16:35)  T(F): 96.6 (14 Nov 2019 14:40), Max: 99.4 (13 Nov 2019 16:35)  HR: 102 (14 Nov 2019 15:00) (96 - 122)  BP: 122/72 (14 Nov 2019 10:30) (87/47 - 122/72)  BP(mean): 89 (14 Nov 2019 10:30) (63 - 89)  RR: 23 (14 Nov 2019 15:00) (10 - 32)  SpO2: 100% (14 Nov 2019 15:00) (96% - 100%)    PHYSICAL EXAM:    General: WDWN  HEENT: NC/AT; PERRL, anicteric sclera; MMM  Neck: supple  Cardiovascular: +S1/S2; RRR  Respiratory: CTA B/L; no W/R/R  Gastrointestinal: soft, NT/ND; +BSx4  Extremities: WWP; no edema, clubbing or cyanosis  Vascular: 2+ radial, DP/PT pulses B/L  Neurological: AAOx3; no focal deficits    MEDICATIONS:  MEDICATIONS  (STANDING):  albumin human 25% IVPB 50 milliLiter(s) IV Intermittent every 8 hours  ascorbic acid 500 milliGRAM(s) Oral daily  chlorhexidine 0.12% Liquid 15 milliLiter(s) Oral Mucosa every 12 hours  chlorhexidine 2% Cloths 1 Application(s) Topical daily  CRRT Treatment    <Continuous>  dextrose 5%. 1000 milliLiter(s) (50 mL/Hr) IV Continuous <Continuous>  dextrose 50% Injectable 12.5 Gram(s) IV Push once  dextrose 50% Injectable 25 Gram(s) IV Push once  dextrose 50% Injectable 25 Gram(s) IV Push once  fentaNYL   Infusion. 0.5 MICROgram(s)/kG/Hr (3.68 mL/Hr) IV Continuous <Continuous>  heparin  Injectable 5000 Unit(s) SubCutaneous every 8 hours  insulin lispro (HumaLOG) corrective regimen sliding scale   SubCutaneous every 6 hours  multivitamin/minerals/iron Oral Solution (CENTRUM) 15 milliLiter(s) Oral daily  nafcillin  IVPB 2 Gram(s) IV Intermittent every 4 hours  pantoprazole  Injectable 40 milliGRAM(s) IV Push daily  Phoxillum Filtration BK 4 / 2.5 5000 milliLiter(s) (3500 mL/Hr) CRRT <Continuous>  propofol Infusion 10 MICROgram(s)/kG/Min (4.416 mL/Hr) IV Continuous <Continuous>  sodium chloride 0.9% lock flush 3 milliLiter(s) IV Push every 8 hours  vitamin E 400 International Unit(s) Oral daily  zinc sulfate 220 milliGRAM(s) Oral daily    MEDICATIONS  (PRN):  dextrose 40% Gel 15 Gram(s) Oral once PRN Blood Glucose LESS THAN 70 milliGRAM(s)/deciliter  glucagon  Injectable 1 milliGRAM(s) IntraMuscular once PRN Glucose LESS THAN 70 milligrams/deciliter      ALLERGIES:  Allergies    Allergy Status Unknown    Intolerances        LABS:                        8.8    20.17 )-----------( 54       ( 14 Nov 2019 09:18 )             26.0     11-14    136  |  100  |  19  ----------------------------<  127<H>  3.7   |  27  |  1.02    Ca    8.3<L>      14 Nov 2019 11:18  Phos  2.6     11-14  Mg     2.0     11-14    TPro  6.1  /  Alb  2.8<L>  /  TBili  11.3<H>  /  DBili  9.4<H>  /  AST  109<H>  /  ALT  154<H>  /  AlkPhos  124<H>  11-14    PT/INR - ( 14 Nov 2019 07:16 )   PT: 17.0 sec;   INR: 1.49          PTT - ( 14 Nov 2019 07:16 )  PTT:40.8 sec    CAPILLARY BLOOD GLUCOSE      POCT Blood Glucose.: 119 mg/dL (14 Nov 2019 11:14)      RADIOLOGY & ADDITIONAL TESTS: Reviewed.

## 2019-11-14 NOTE — PROGRESS NOTE ADULT - ASSESSMENT
38 y/o M w/ PMH of IVDU and active smoker who came from [facility] on 11/8/19 in 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 After being transferred to  CTICU for surgical evaluation, pt was deemed to not be a surgical candidate and transferred to MICU for medical mgmt. ID and Nephrology following.    Neuro  Sedated on Propofol, Fentanyl. CT head negative for any intracranial embolic events.  -D/C sedation today for awake trial: assess mental status, with consideration for extubation     Cardiovascular     #Hypotension  Most likely 2/2 septic shock from infective endocarditis and RV failure 2/2 tricuspid regurgitation (ELIAN shows EF of 40-50%). Currently on Levophed, will titrate as needed. Echo with EF 45%. ELIAN with EF 40-45%, 3.8 x1cm vegetation on TR valve, with severe TR, trivial pericardial effusion. Echo with bubble revealed no PFO. Levo requirements decreasing (0.058 mcg/kg/hr currently).  - Maintain MAP>65.   - Titrate Levophed as necessary.   - C/W medical mgmt of infective endocarditis as below.    Respiratory    #Acute hypoxic respiratory failure   Most likely 2/2 alveolar hemorrhage in the setting of septic embolic causing numerous cavitary lesions on CT chest. CXR with scattered infiltrates and pleural effusions. Sputum culture negative. CT revealed"moderate bilateral pleural effusions and dense bibasilar consolidation   with underlying septic emboli/pulmonary abscesses"  - C/W ventilator support, monitor mental status and consider CPAP trial  - Continue to treat IE as below    #Bilateral pulmonary effusion  Most likely empyema in setting of septic shock 2/2 infective endocarditis. Bilateral CT in place, confirmed by CXR, draining serosanguinous fluid (100 cc overnight from Right CT, Left CT drained 1100 cc when placed and 300 cc overnight). Fluid analysis of both CT pleural fluid confirms complex exudate with high cellularity, low pH and low glucose.  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 Not a surgical candidate for a tricuspid valve replacement at this time as per CT surgery. Lactate 1.3. Zosyn added overnight to cover for potential gram - superinfection. Initial blood cultures positive only for gram + clusters. Sputum culture negative.   - D/C Zosyn  - ID following, C/W Nafcillin 2g q4 (Sensitive to Oxacillin as per OSH records), f/u recs   - Continue to monitor temps and vitals, consider additional Abx coverage if condition worsens  - F/U [facility] bcx cultures, x2  - F/U CBC and diff    Renal    #Acute renal failure 2/2 ATN from hypoperfusion, Minimal urine output on, CVVHD, oliguric with 5-15cc/hr. Vancomycin level 37 on arrival so may be component of drug induced nephropathy. Bun/Cr improving (89/4.1 -> 44/1.99). Pt is clinically fluid overloaded, and hemodynamically stable. No renal infarcts as per CT abdomen/pelvis.   - F/U nephrology recs.  - C/W CVVHD as tolerated   - Attempt to correct fluid overload with CVVHD, goal net -1L today, nephro following  - Monitor renal function with BUN/Cr  - Monitor I/Os    #Electrolytes abnormalities   On CVVHD. Hyperkalemia resolved, improved. No EKG changes.  -Requires q6 BMP, Mg, and Phos while on CVVHD  -Monitor serum lytes, Ca     GI    OG on arrival with 600cc of bilious fluid, no bowel movement since arrival with distended abdomen. Abd XR read as evidence for SBO vs. bowel ischemia. 100 cc of bilious fluid overnight out of NG sump. CT abdomen revealed no evidence of bowel ischemia or SBO  - Remove sump  - Place NG tube  - Begin feeds with Jevity 1.5  - Miralax BID   - CT abdomen/pelvis revealed "irregular masslike mural thickening of the posterior left lateral wall of the rectum," F/U with surgery for recs.    #Congestive Hepatopathy   Transaminitis, coag derangements, hyperbilirubinemia most likely 2/2 hepatic congestion, due to severe TR in setting of infective endocarditis. Transaminases and Alk phos improving. Hepatitis panel negative.  - F/U AST/ALT  - Avoid Tylenol    Heme    #Hemolysis  Intravascular hemolysis with haptoglobin <10, , T.bili increasing (8.4 -> 11.8), D.Bili 8.8, and clinical jaundice. Most likely 2/2 CVVHD. h/h stable at 10.1/29.6. D-dimer elevated (2409), normal fibrinogen. Platelets low but stable at 66.  - Heme consulted, follow recs  - Monitor direct and total bili, LDH.     Lines: right IJ TLC and Ernesto (11/12), Axillary A-line (11/12), Left IJ (11/12), right peripheral (11/12)    F: 25% Albumin 50cc  E: replete K <4, Mg <2  N: Jevity 1.5  GI Ppx: Protonix   DVT Ppx: Heparin   Code status: FULL   Dispo: MICU

## 2019-11-14 NOTE — CONSULT NOTE ADULT - ASSESSMENT
39M with PMH of IVDU admitted for sepsis 2/2 infective endocarditis requiring multiple pressors, now off pressors, now with ischemic digits on RUE and RLE    Recommendations:  - Pending 39M with PMH of IVDU admitted for sepsis 2/2 infective endocarditis requiring multiple pressors, now off pressors, now with ischemic digits on RUE and RLE    Recommendations:  - Keep off pressors as tolerated  - May need amputation in the future once ischemic tissue fully demarcates, and once patient is medically stable  - Will continue to follow  - Seen and Examined with Chief Resident  - Call [phone] with questions

## 2019-11-14 NOTE — PROGRESS NOTE ADULT - ASSESSMENT
39M PMHx IVDU transferred from OSH for endocarditis, multiple septic emboli to lungs. Surgery called for mesenteric ischemia; negative on CTA. Incidental finding of asymmetric thickening in right posterior rectal wall. Rectal exam negative for perirectal abscess or masses.     CT findings unlikely contributing to acute septic picture.  Too unstable for further workup at this time.  Examined with chief resident.  Will discuss findings with Dr. Biggs in the morning.  Team 4C will continue to follow; please call Team 4 with questions/clinical changes.

## 2019-11-14 NOTE — CONSULT NOTE ADULT - SUBJECTIVE AND OBJECTIVE BOX
Attending: Patricia    HPI:  39M with PMH of IVDU who originally presented to [facility] ED with complaints of cough with hemoptysis, SOB, pleuritic chest pain, nausea, non-bloody emesis, abdominal pain, and chills x3. He was found to be septic and hypotensive. Labs significant for WBC 24, BUN/Cr 65/1.91, lactate 3.5. CT Chest showed b/l PNA with multiple cavitary and non-cavitary pulmonary nodules, suggestive of possible septic emboli. Echo showed tricuspid vegetation. He was admitted to SICU, intubated and placed on pressors. He was subsequently transferred to [facility] to the CT Sx service for possible surgical intervention. Per CT Sx, no intervention for tricuspid vegetation. Currently on IV Abx. Since being at [facility], he is now on the MICU service, was initially on 4 pressors, has now been weaned off as of this morning.    Vascular consulted due to concern for ischemic fingers/toes in the setting of multiple pressor use.    PAST MEDICAL & SURGICAL HISTORY:  Endocarditis  IVDU (intravenous drug user)  Smoker  No significant past surgical history    MEDICATIONS  (STANDING):  albumin human 25% IVPB 50 milliLiter(s) IV Intermittent every 8 hours  ascorbic acid 500 milliGRAM(s) Oral daily  chlorhexidine 0.12% Liquid 15 milliLiter(s) Oral Mucosa every 12 hours  chlorhexidine 2% Cloths 1 Application(s) Topical daily  CRRT Treatment    <Continuous>  dextrose 5%. 1000 milliLiter(s) (50 mL/Hr) IV Continuous <Continuous>  dextrose 50% Injectable 12.5 Gram(s) IV Push once  dextrose 50% Injectable 25 Gram(s) IV Push once  dextrose 50% Injectable 25 Gram(s) IV Push once  fentaNYL   Infusion. 0.5 MICROgram(s)/kG/Hr (3.68 mL/Hr) IV Continuous <Continuous>  heparin  Injectable 5000 Unit(s) SubCutaneous every 8 hours  insulin lispro (HumaLOG) corrective regimen sliding scale   SubCutaneous every 6 hours  multivitamin/minerals/iron Oral Solution (CENTRUM) 15 milliLiter(s) Oral daily  nafcillin  IVPB 2 Gram(s) IV Intermittent every 4 hours  pantoprazole  Injectable 40 milliGRAM(s) IV Push daily  Phoxillum Filtration BK 4 / 2.5 5000 milliLiter(s) (3500 mL/Hr) CRRT <Continuous>  propofol Infusion 10 MICROgram(s)/kG/Min (4.416 mL/Hr) IV Continuous <Continuous>  sodium chloride 0.9% lock flush 3 milliLiter(s) IV Push every 8 hours  vitamin E 400 International Unit(s) Oral daily  zinc sulfate 220 milliGRAM(s) Oral daily    MEDICATIONS  (PRN):  dextrose 40% Gel 15 Gram(s) Oral once PRN Blood Glucose LESS THAN 70 milliGRAM(s)/deciliter  glucagon  Injectable 1 milliGRAM(s) IntraMuscular once PRN Glucose LESS THAN 70 milligrams/deciliter    Allergies  Allergy Status Unknown    FAMILY HISTORY:  No pertinent family history in first degree relatives    Vital Signs Last 24 Hrs  T(C): 36.7 (14 Nov 2019 17:00), Max: 36.8 (13 Nov 2019 22:07)  T(F): 98 (14 Nov 2019 17:00), Max: 98.2 (13 Nov 2019 22:07)  HR: 106 (14 Nov 2019 18:00) (96 - 116)  BP: 122/72 (14 Nov 2019 10:30) (87/47 - 122/72)  BP(mean): 89 (14 Nov 2019 10:30) (63 - 89)  RR: 22 (14 Nov 2019 18:00) (10 - 35)  SpO2: 100% (14 Nov 2019 18:00) (99% - 100%)    I&O's Summary  13 Nov 2019 07:01  -  14 Nov 2019 07:00  --------------------------------------------------------  IN: 1782.6 mL / OUT: 3837 mL / NET: -2054.4 mL    14 Nov 2019 07:01  -  14 Nov 2019 18:39  --------------------------------------------------------  IN: 1312.8 mL / OUT: 2254 mL / NET: -941.2 mL    Physical Exam:  General: Sedated  Pulmonary: Intubated, on vent  Extremities: hands and feet cool to touch  RUE: ischemic/necrotic digits 2 + 3  LUE: cool to touch but no ischemic fingertips  RLE: ischemic/necrotic digits 1-5  LLE: ischemic/necrotic digit 4    Pulses:  RUE: 2+ radial, tri Ulnar, no arch, no digital signals  LUE: 2+ radial, tri Ulnar, tri arch, no digital signals  RLE: 2+ DP, Tri PT  LLE: 2+ DP, Tri PT    LABS:                      8.8    20.17 )-----------( 54       ( 14 Nov 2019 09:18 )             26.0     11-14  136  |  102  |  17  ----------------------------<  128<H>  3.8   |  25  |  0.90    Ca    7.9<L>      14 Nov 2019 17:25  Phos  3.2     11-14  Mg     2.3     11-14    TPro  6.1  /  Alb  2.8<L>  /  TBili  11.3<H>  /  DBili  9.4<H>  /  AST  109<H>  /  ALT  154<H>  /  AlkPhos  124<H>  11-14    PT/INR - ( 14 Nov 2019 07:16 )   PT: 17.0 sec;   INR: 1.49     PTT - ( 14 Nov 2019 07:16 )  PTT:40.8 sec    CAPILLARY BLOOD GLUCOSE  POCT Blood Glucose.: 105 mg/dL (14 Nov 2019 18:30)  POCT Blood Glucose.: 119 mg/dL (14 Nov 2019 11:14)  POCT Blood Glucose.: 145 mg/dL (14 Nov 2019 05:55)  POCT Blood Glucose.: 122 mg/dL (13 Nov 2019 23:37)    LIVER FUNCTIONS - ( 14 Nov 2019 04:03 )  Alb: 2.8 g/dL / Pro: 6.1 g/dL / ALK PHOS: 124 U/L / ALT: 154 U/L / AST: 109 U/L / GGT: x           Cultures:  Culture Results:   Testing in progress (11-13 @ 01:25)  Culture Results:   No growth to date (11-12 @ 19:21)

## 2019-11-14 NOTE — PROGRESS NOTE ADULT - ATTENDING COMMENTS
seen and evaluated while on CVVHD  tolerating the procedure well  will change dialysate to one with phosphate (phoxillum)  reviewed now with pharmacy team and nurse  keeping net neutral- just off pressors- if remains stable, sill begin generating net negative balance

## 2019-11-14 NOTE — PROGRESS NOTE ADULT - ATTENDING COMMENTS
Off pressors. Failed CPAP trial. fluid removal with CVVHD. continue Nafcillin. GI and surgery and Vasc consults appreciated.

## 2019-11-14 NOTE — PROGRESS NOTE ADULT - ASSESSMENT
39M PMHx IVDU transferred from OSH for endocarditis, multiple septic emboli to lungs. Surgery called for mesenteric ischemia; negative on CTA. Incidental finding of asymmetric thickening in right posterior rectal wall. Rectal exam negative for perirectal abscess or masses. Low suspicion for perirectal abscess requiring incision and drainage    CT findings pertaining to the rectum unlikely contributing to acute septic picture.  Recommend GI consult to evaluate rectal wall thickening   Team 4C will sign off at this time

## 2019-11-14 NOTE — PROGRESS NOTE ADULT - SUBJECTIVE AND OBJECTIVE BOX
OVERNIGHT EVENTS:    SUBJECTIVE / INTERVAL HPI: Patient seen and examined at bedside.     VITAL SIGNS:  Vital Signs Last 24 Hrs  T(C): 36.2 (14 Nov 2019 01:03), Max: 37.4 (13 Nov 2019 16:35)  T(F): 97.1 (14 Nov 2019 01:03), Max: 99.4 (13 Nov 2019 16:35)  HR: 104 (14 Nov 2019 06:00) (87 - 122)  BP: 102/61 (14 Nov 2019 02:00) (86/56 - 106/61)  BP(mean): 75 (14 Nov 2019 06:00) (63 - 86)  RR: 16 (14 Nov 2019 06:00) (14 - 25)  SpO2: 100% (14 Nov 2019 06:00) (96% - 100%)    PHYSICAL EXAM:    General: WDWN  HEENT: NC/AT; PERRL, anicteric sclera; MMM  Neck: supple  Cardiovascular: +S1/S2; RRR  Respiratory: CTA B/L; no W/R/R  Gastrointestinal: soft, NT/ND; +BSx4  Extremities: WWP; no edema, clubbing or cyanosis  Vascular: 2+ radial, DP/PT pulses B/L  Neurological: AAOx3; no focal deficits    MEDICATIONS:  MEDICATIONS  (STANDING):  albumin human 25% IVPB 50 milliLiter(s) IV Intermittent every 8 hours  chlorhexidine 0.12% Liquid 15 milliLiter(s) Oral Mucosa every 12 hours  chlorhexidine 2% Cloths 1 Application(s) Topical daily  CRRT Treatment    <Continuous>  dextrose 5%. 1000 milliLiter(s) (50 mL/Hr) IV Continuous <Continuous>  dextrose 50% Injectable 12.5 Gram(s) IV Push once  dextrose 50% Injectable 25 Gram(s) IV Push once  dextrose 50% Injectable 25 Gram(s) IV Push once  fentaNYL   Infusion. 0.5 MICROgram(s)/kG/Hr (3.68 mL/Hr) IV Continuous <Continuous>  heparin  Injectable 5000 Unit(s) SubCutaneous every 8 hours  insulin lispro (HumaLOG) corrective regimen sliding scale   SubCutaneous every 6 hours  nafcillin  IVPB 2 Gram(s) IV Intermittent every 4 hours  norepinephrine Infusion 0.03 MICROgram(s)/kG/Min (4.14 mL/Hr) IV Continuous <Continuous>  pantoprazole  Injectable 40 milliGRAM(s) IV Push daily  polyethylene glycol 3350 17 Gram(s) Oral every 12 hours  potassium chloride  20 mEq/100 mL IVPB 20 milliEquivalent(s) IV Intermittent every 2 hours  propofol Infusion 10 MICROgram(s)/kG/Min (4.416 mL/Hr) IV Continuous <Continuous>  PureFlow Dialysate RFP-400 (K 2 / Ca 3) 5000 milliLiter(s) (3500 mL/Hr) CRRT <Continuous>  sodium chloride 0.9% lock flush 3 milliLiter(s) IV Push every 8 hours    MEDICATIONS  (PRN):  dextrose 40% Gel 15 Gram(s) Oral once PRN Blood Glucose LESS THAN 70 milliGRAM(s)/deciliter  glucagon  Injectable 1 milliGRAM(s) IntraMuscular once PRN Glucose LESS THAN 70 milligrams/deciliter      ALLERGIES:  Allergies    Allergy Status Unknown    Intolerances        LABS:                        8.5    18.45 )-----------( 54       ( 14 Nov 2019 04:03 )             25.9     11-14    136  |  102  |  22  ----------------------------<  150<H>  3.0<L>   |  25  |  1.21    Ca    8.0<L>      14 Nov 2019 04:03  Phos  2.6     11-14  Mg     2.0     11-14    TPro  6.1  /  Alb  2.8<L>  /  TBili  11.3<H>  /  DBili  9.4<H>  /  AST  109<H>  /  ALT  154<H>  /  AlkPhos  124<H>  11-14    PT/INR - ( 13 Nov 2019 14:10 )   PT: 17.2 sec;   INR: 1.50          PTT - ( 13 Nov 2019 14:10 )  PTT:40.1 sec    CAPILLARY BLOOD GLUCOSE      POCT Blood Glucose.: 145 mg/dL (14 Nov 2019 05:55)      RADIOLOGY & ADDITIONAL TESTS: Reviewed. OVERNIGHT EVENTS: CVVHD restarted at 7:30pm. Surgery team consulted regarding rectal wall thickening seen on CT abd/pelvis. Levophed requirements downtrending.     SUBJECTIVE / INTERVAL HPI: Patient seen and examined at bedside. Patient resting in bed, intubated, eyes open.  strength intact, however weak and able to wiggle toes and move UEs. Unable to obtain full ROS.     VITAL SIGNS:  Vital Signs Last 24 Hrs  T(C): 36.2 (14 Nov 2019 01:03), Max: 37.4 (13 Nov 2019 16:35)  T(F): 97.1 (14 Nov 2019 01:03), Max: 99.4 (13 Nov 2019 16:35)  HR: 104 (14 Nov 2019 06:00) (87 - 122)  BP: 102/61 (14 Nov 2019 02:00) (86/56 - 106/61)  BP(mean): 75 (14 Nov 2019 06:00) (63 - 86)  RR: 16 (14 Nov 2019 06:00) (14 - 25)  SpO2: 100% (14 Nov 2019 06:00) (96% - 100%)    PHYSICAL EXAM:    General: WDWN, resting in bed, appears ill and jaundice  HEENT: NC/AT; PERRL, icteric sclera; MMM  Neck: supple  Cardiovascular: +S1/S2; tachycardic, regular rhythm, systolic murmur heard at L sternal border  Respiratory: intubated, diffuse rhonchi with decreased breath sounds b/l   Gastrointestinal: NG tube in place, soft, NT, distended; +BSx4  Extremities: WWP; 2+ pitting edema in LEs/UEs b/l, clubbing or cyanosis  : scrotal edema   Derm: gangrenous toes/fingers b/l   Vascular: 2+ radial, DP/PT pulses B/L  Neurological: awake and alert on light sedation. Follows commands, moves extremities     MEDICATIONS:  MEDICATIONS  (STANDING):  albumin human 25% IVPB 50 milliLiter(s) IV Intermittent every 8 hours  chlorhexidine 0.12% Liquid 15 milliLiter(s) Oral Mucosa every 12 hours  chlorhexidine 2% Cloths 1 Application(s) Topical daily  CRRT Treatment    <Continuous>  dextrose 5%. 1000 milliLiter(s) (50 mL/Hr) IV Continuous <Continuous>  dextrose 50% Injectable 12.5 Gram(s) IV Push once  dextrose 50% Injectable 25 Gram(s) IV Push once  dextrose 50% Injectable 25 Gram(s) IV Push once  fentaNYL   Infusion. 0.5 MICROgram(s)/kG/Hr (3.68 mL/Hr) IV Continuous <Continuous>  heparin  Injectable 5000 Unit(s) SubCutaneous every 8 hours  insulin lispro (HumaLOG) corrective regimen sliding scale   SubCutaneous every 6 hours  nafcillin  IVPB 2 Gram(s) IV Intermittent every 4 hours  norepinephrine Infusion 0.03 MICROgram(s)/kG/Min (4.14 mL/Hr) IV Continuous <Continuous>  pantoprazole  Injectable 40 milliGRAM(s) IV Push daily  polyethylene glycol 3350 17 Gram(s) Oral every 12 hours  potassium chloride  20 mEq/100 mL IVPB 20 milliEquivalent(s) IV Intermittent every 2 hours  propofol Infusion 10 MICROgram(s)/kG/Min (4.416 mL/Hr) IV Continuous <Continuous>  PureFlow Dialysate RFP-400 (K 2 / Ca 3) 5000 milliLiter(s) (3500 mL/Hr) CRRT <Continuous>  sodium chloride 0.9% lock flush 3 milliLiter(s) IV Push every 8 hours    MEDICATIONS  (PRN):  dextrose 40% Gel 15 Gram(s) Oral once PRN Blood Glucose LESS THAN 70 milliGRAM(s)/deciliter  glucagon  Injectable 1 milliGRAM(s) IntraMuscular once PRN Glucose LESS THAN 70 milligrams/deciliter      ALLERGIES:  Allergies    Allergy Status Unknown    Intolerances        LABS:                        8.5    18.45 )-----------( 54       ( 14 Nov 2019 04:03 )             25.9     11-14    136  |  102  |  22  ----------------------------<  150<H>  3.0<L>   |  25  |  1.21    Ca    8.0<L>      14 Nov 2019 04:03  Phos  2.6     11-14  Mg     2.0     11-14    TPro  6.1  /  Alb  2.8<L>  /  TBili  11.3<H>  /  DBili  9.4<H>  /  AST  109<H>  /  ALT  154<H>  /  AlkPhos  124<H>  11-14    PT/INR - ( 13 Nov 2019 14:10 )   PT: 17.2 sec;   INR: 1.50          PTT - ( 13 Nov 2019 14:10 )  PTT:40.1 sec    CAPILLARY BLOOD GLUCOSE      POCT Blood Glucose.: 145 mg/dL (14 Nov 2019 05:55)      RADIOLOGY & ADDITIONAL TESTS: Reviewed.

## 2019-11-14 NOTE — PROGRESS NOTE ADULT - SUBJECTIVE AND OBJECTIVE BOX
***NOTE INCOMPLETE***    INTERVAL HPI/OVERNIGHT EVENTS: CVVHD restarted. Patient weaned off Levophed.     Patient examined at bedside.     REVIEW OF SYSTEMS: unobtainable; patient intubated and sedated     VITAL SIGNS:   Vital Signs Last 24 Hrs  T(C): 36.4 (14 Nov 2019 06:02), Max: 37.4 (13 Nov 2019 16:35)  T(F): 97.6 (14 Nov 2019 06:02), Max: 99.4 (13 Nov 2019 16:35)  HR: 100 (14 Nov 2019 09:00) (96 - 122)  BP: 102/61 (14 Nov 2019 02:00) (86/56 - 106/61)  BP(mean): 87 (14 Nov 2019 07:00) (63 - 87)  RR: 20 (14 Nov 2019 09:00) (12 - 31)  SpO2: 100% (14 Nov 2019 09:00) (96% - 100%)    PHYSICAL EXAM:  GENERAL: Patient intubated and minimally sedated   HEENT:  atraumatic, normocephalic, +bilateral scleral icterus; ET tube in place   NECK: supple; RIJ central in in place   CHEST: chest tube in place  LUNG: bibasilar crackles and diffuse bilateral rhonchi   HEART: regular rate and rhythm; S1 and S2 audible; +systolic murmur   ABDOMEN: soft, nontender, nondistended; bowel sounds present in all four quadrants  EXTREMITIES:  2+ peripheral pulses bilaterally; 1+ BLE pitting edema; cyanotic fingertips and toe tips   NEUROLOGY: Patient intubated and minimally sedated; moves all extremities and follows simple commands   Skin: generalized jaundice; needle marks in antecubital area     LABS:                        8.5    18.45 )-----------( 54       ( 14 Nov 2019 04:03 )             25.9     11-14    136  |  102  |  22  ----------------------------<  150<H>  3.0<L>   |  25  |  1.21    Ca    8.0<L>      14 Nov 2019 04:03  Phos  2.6     11-14  Mg     2.0     11-14    TPro  6.1  /  Alb  2.8<L>  /  TBili  11.3<H>  /  DBili  9.4<H>  /  AST  109<H>  /  ALT  154<H>  /  AlkPhos  124<H>  11-14    LIVER FUNCTIONS - ( 14 Nov 2019 04:03 )  Alb: 2.8 g/dL / Pro: 6.1 g/dL / ALK PHOS: 124 U/L / ALT: 154 U/L / AST: 109 U/L / GGT: x           PT/INR - ( 14 Nov 2019 07:16 )   PT: 17.0 sec;   INR: 1.49          PTT - ( 14 Nov 2019 07:16 )  PTT:40.8 sec      RADIOLOGY & ADDITIONAL TESTS: reviewed ***NOTE INCOMPLETE***    INTERVAL HPI/OVERNIGHT EVENTS: CVVHD restarted. Patient weaned off Levophed.     Patient examined at bedside. Remains intubated and sedated.     REVIEW OF SYSTEMS: unobtainable; patient intubated and sedated     VITAL SIGNS:   Vital Signs Last 24 Hrs  T(C): 36.4 (14 Nov 2019 06:02), Max: 37.4 (13 Nov 2019 16:35)  T(F): 97.6 (14 Nov 2019 06:02), Max: 99.4 (13 Nov 2019 16:35)  HR: 100 (14 Nov 2019 09:00) (96 - 122)  BP: 102/61 (14 Nov 2019 02:00) (86/56 - 106/61)  BP(mean): 87 (14 Nov 2019 07:00) (63 - 87)  RR: 20 (14 Nov 2019 09:00) (12 - 31)  SpO2: 100% (14 Nov 2019 09:00) (96% - 100%)    PHYSICAL EXAM:  GENERAL: Patient intubated and sedated   HEENT:  atraumatic, normocephalic, +bilateral scleral icterus; ET and NG tubes in place   NECK: supple; RIJ central line and axillary a-line in plce   CHEST: chest tube in place  LUNG: bibasilar crackles and diffuse bilateral rhonchi   HEART: regular rate and rhythm; S1 and S2 audible; +systolic murmur   ABDOMEN: soft, nontender, nondistended; bowel sounds present in all four quadrants  : [place] in place   EXTREMITIES:  2+ peripheral pulses bilaterally; 1+ BLE pitting edema; cyanotic fingertips and toe tips   NEUROLOGY: Patient intubated and sedated unresponsive to commands   Skin: generalized jaundice; needle marks in antecubital area     LABS:                        8.5    18.45 )-----------( 54       ( 14 Nov 2019 04:03 )             25.9     11-14    136  |  102  |  22  ----------------------------<  150<H>  3.0<L>   |  25  |  1.21    Ca    8.0<L>      14 Nov 2019 04:03  Phos  2.6     11-14  Mg     2.0     11-14    TPro  6.1  /  Alb  2.8<L>  /  TBili  11.3<H>  /  DBili  9.4<H>  /  AST  109<H>  /  ALT  154<H>  /  AlkPhos  124<H>  11-14    LIVER FUNCTIONS - ( 14 Nov 2019 04:03 )  Alb: 2.8 g/dL / Pro: 6.1 g/dL / ALK PHOS: 124 U/L / ALT: 154 U/L / AST: 109 U/L / GGT: x           PT/INR - ( 14 Nov 2019 07:16 )   PT: 17.0 sec;   INR: 1.49          PTT - ( 14 Nov 2019 07:16 )  PTT:40.8 sec      RADIOLOGY & ADDITIONAL TESTS: reviewed ***NOTE INCOMPLETE***    INTERVAL HPI/OVERNIGHT EVENTS: CVVHD restarted. Patient weaned off Levophed. Continuing with IV antibiotics.     Patient examined at bedside. Remains intubated and sedated.     REVIEW OF SYSTEMS: unobtainable; patient intubated and sedated     VITAL SIGNS:   Vital Signs Last 24 Hrs  T(C): 36.4 (14 Nov 2019 06:02), Max: 37.4 (13 Nov 2019 16:35)  T(F): 97.6 (14 Nov 2019 06:02), Max: 99.4 (13 Nov 2019 16:35)  HR: 100 (14 Nov 2019 09:00) (96 - 122)  BP: 102/61 (14 Nov 2019 02:00) (86/56 - 106/61)  BP(mean): 87 (14 Nov 2019 07:00) (63 - 87)  RR: 20 (14 Nov 2019 09:00) (12 - 31)  SpO2: 100% (14 Nov 2019 09:00) (96% - 100%)    PHYSICAL EXAM:  GENERAL: Patient intubated and sedated   HEENT:  atraumatic, normocephalic, +bilateral scleral icterus; ET and NG tubes in place   NECK: supple; RIJ central line and axillary a-line in plce   CHEST: chest tube in place  LUNG: bibasilar crackles and diffuse bilateral rhonchi   HEART: regular rate and rhythm; S1 and S2 audible; +systolic murmur   ABDOMEN: soft, nontender, nondistended; bowel sounds present in all four quadrants  : [place] in place   EXTREMITIES:  2+ peripheral pulses bilaterally; 1+ BLE pitting edema; cyanotic fingertips and toe tips   NEUROLOGY: Patient intubated and sedated unresponsive to commands   Skin: generalized jaundice; needle marks in antecubital area     LABS:                        8.5    18.45 )-----------( 54       ( 14 Nov 2019 04:03 )             25.9     11-14    136  |  102  |  22  ----------------------------<  150<H>  3.0<L>   |  25  |  1.21    Ca    8.0<L>      14 Nov 2019 04:03  Phos  2.6     11-14  Mg     2.0     11-14    TPro  6.1  /  Alb  2.8<L>  /  TBili  11.3<H>  /  DBili  9.4<H>  /  AST  109<H>  /  ALT  154<H>  /  AlkPhos  124<H>  11-14    LIVER FUNCTIONS - ( 14 Nov 2019 04:03 )  Alb: 2.8 g/dL / Pro: 6.1 g/dL / ALK PHOS: 124 U/L / ALT: 154 U/L / AST: 109 U/L / GGT: x           PT/INR - ( 14 Nov 2019 07:16 )   PT: 17.0 sec;   INR: 1.49          PTT - ( 14 Nov 2019 07:16 )  PTT:40.8 sec      RADIOLOGY & ADDITIONAL TESTS: reviewed INTERVAL HPI/OVERNIGHT EVENTS: CVVHD restarted. Patient weaned off Levophed. Continuing with IV antibiotics.     Patient examined at bedside. Remains intubated and sedated.     REVIEW OF SYSTEMS: unobtainable; patient intubated and sedated     VITAL SIGNS:   Vital Signs Last 24 Hrs  T(C): 36.4 (14 Nov 2019 06:02), Max: 37.4 (13 Nov 2019 16:35)  T(F): 97.6 (14 Nov 2019 06:02), Max: 99.4 (13 Nov 2019 16:35)  HR: 100 (14 Nov 2019 09:00) (96 - 122)  BP: 102/61 (14 Nov 2019 02:00) (86/56 - 106/61)  BP(mean): 87 (14 Nov 2019 07:00) (63 - 87)  RR: 20 (14 Nov 2019 09:00) (12 - 31)  SpO2: 100% (14 Nov 2019 09:00) (96% - 100%)    PHYSICAL EXAM:  GENERAL: Patient intubated and sedated   HEENT:  atraumatic, normocephalic, +bilateral scleral icterus; ET and NG tubes in place   NECK: supple; RIJ central line and axillary a-line in plce   CHEST: chest tube in place  LUNG: bibasilar crackles and diffuse bilateral rhonchi   HEART: regular rate and rhythm; S1 and S2 audible; +systolic murmur   ABDOMEN: soft, nontender, nondistended; bowel sounds present in all four quadrants  : [place] in place   EXTREMITIES:  2+ peripheral pulses bilaterally; 1+ BLE pitting edema; cyanotic fingertips and toe tips   NEUROLOGY: Patient intubated and sedated unresponsive to commands   Skin: generalized jaundice; needle marks in antecubital area     LABS:                        8.5    18.45 )-----------( 54       ( 14 Nov 2019 04:03 )             25.9     11-14    136  |  102  |  22  ----------------------------<  150<H>  3.0<L>   |  25  |  1.21    Ca    8.0<L>      14 Nov 2019 04:03  Phos  2.6     11-14  Mg     2.0     11-14    TPro  6.1  /  Alb  2.8<L>  /  TBili  11.3<H>  /  DBili  9.4<H>  /  AST  109<H>  /  ALT  154<H>  /  AlkPhos  124<H>  11-14    LIVER FUNCTIONS - ( 14 Nov 2019 04:03 )  Alb: 2.8 g/dL / Pro: 6.1 g/dL / ALK PHOS: 124 U/L / ALT: 154 U/L / AST: 109 U/L / GGT: x           PT/INR - ( 14 Nov 2019 07:16 )   PT: 17.0 sec;   INR: 1.49          PTT - ( 14 Nov 2019 07:16 )  PTT:40.8 sec      RADIOLOGY & ADDITIONAL TESTS: reviewed

## 2019-11-14 NOTE — PROGRESS NOTE ADULT - SUBJECTIVE AND OBJECTIVE BOX
SUBJECTIVE: Intubated, sedated, unable to participate in questioning. Surgery reconsulted for asymmetric rectal wall thickening.    General: NAD, resting comfortably in bed, intubated, sedated  C/V: NSR on monitor  Pulm: Nonlabored breathing, no respiratory distress, on VC-AC    RADIOLOGY      There is possible masslike mural thickening of the inferior   right lateral wall of the rectum and correlation with direct   visualization is recommended, when clinically appropriate.

## 2019-11-14 NOTE — PROGRESS NOTE ADULT - SUBJECTIVE AND OBJECTIVE BOX
SUBJECTIVE: Intubated, sedated, unable to participate in questioning. Surgery reconsulted for asymmetric rectal wall thickening.    Vital Signs Last 24 Hrs  T(C): 36.8 (13 Nov 2019 22:07), Max: 37.4 (13 Nov 2019 16:35)  T(F): 98.2 (13 Nov 2019 22:07), Max: 99.4 (13 Nov 2019 16:35)  HR: 102 (14 Nov 2019 00:00) (86 - 122)  BP: 97/72 (14 Nov 2019 00:00) (86/56 - 110/63)  BP(mean): 86 (14 Nov 2019 00:00) (63 - 86)  RR: 16 (14 Nov 2019 00:00) (0 - 25)  SpO2: 100% (14 Nov 2019 00:00) (96% - 100%)  I&O's Detail    12 Nov 2019 07:01  -  13 Nov 2019 07:00  --------------------------------------------------------  IN:    Albumin 25%: 50 mL    Albumin 5%  - 250 mL: 50 mL    Enteral Tube Flush: 1000 mL    fentaNYL  Infusion: 148 mL    norepinephrine Infusion: 589 mL    Packed Red Blood Cells: 300 mL    Plasma: 200 mL    propofol Infusion: 185 mL    Solution: 750 mL  Total IN: 3272 mL    OUT:    Chest Tube: 1510 mL    Chest Tube: 480 mL    Indwelling Catheter - Urethral: 121 mL    Nasoenteral Tube: 200 mL    Other: 558 mL  Total OUT: 2869 mL    Total NET: 403 mL      13 Nov 2019 07:01  -  14 Nov 2019 00:51  --------------------------------------------------------  IN:    Albumin 25%: 100 mL    Enteral Tube Flush: 60 mL    fentaNYL  Infusion: 14.8 mL    fentaNYL Infusion.: 103.6 mL    norepinephrine Infusion: 199 mL    ns in tub fed  [id number]: 240 mL    propofol Infusion: 17.8 mL    propofol Infusion: 66 mL    Solution: 240 mL  Total IN: 1041.2 mL    OUT:    Chest Tube: 60 mL    Indwelling Catheter - Urethral: 70 mL    Other: 247 mL    Other: 2400 mL  Total OUT: 2777 mL    Total NET: -1735.8 mL    General: NAD, resting comfortably in bed, intubated, sedated  C/V: NSR on monitor  Pulm: Nonlabored breathing, no respiratory distress, on VC-AC  Abd: soft, NT/ND, b/l groins soft, no inguinal adenopathy  Rectal: decreased rectal tone, soft chalky stool in the vault, no blood  Extrem: palpable popliteal bilateral, blue discoloration to toes bilaterally, cold toes but warm forefoot and proximally        LABS:                        10.1   19.60 )-----------( 66       ( 13 Nov 2019 14:10 )             29.6     11-13    137  |  102  |  29<H>  ----------------------------<  122<H>  3.8   |  25  |  1.63<H>    Ca    7.1<L>      13 Nov 2019 19:14  Phos  3.7     11-13  Mg     2.0     11-13    TPro  6.2  /  Alb  2.4<L>  /  TBili  11.8<H>  /  DBili  x   /  AST  181<H>  /  ALT  221<H>  /  AlkPhos  140<H>  11-13    RADIOLOGY  < from: CT Angio Abdomen and Pelvis w/ Oral Cont and w/ IV Cont (11.12.19 @ 20:03) >  There is possible masslike mural thickening of the inferior   right lateral wall of the rectum and correlation with direct   visualization is recommended, when clinically appropriate.    < end of copied text >

## 2019-11-15 NOTE — PROGRESS NOTE ADULT - ASSESSMENT
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 After being transferred to [facility] CTICU for surgical evaluation, pt was decision was made to medically manage at this time and was transferred to MICU for medical mgmt. ID consulted for medical management of septic shock 2/2 tricuspid valve MSSA bacteremia    #Septic Shock 2/2 MSSA bacteremia 2/2 IE of tricuspid valve: Patient with ELIAN results demonstrating mobile vegetation of the tricuspid valve with severe TR. CTA a/p demonstrated evidence moderate b/l pleural effusion with dense bibasilar consolidations with underlying septic emboli/pulmonary abscesses as well as large volume ascites and anasarca. CT head negative for embolic phenomena. Although CTH was performed without IV contrast, which would be best for identifying septic emboli, given right sided nature of infection we do not believe that encephalopathy is due to embolic phenomena to the brain  -c/w IV Nafcillin 2g q4hrs   -BCxs from [place] (in chart) positive for MSSA bacteremia  -BCxs 11/11 positive for staph epidermidis, likely a contaminant  -BCxs 11/12 NGTD, continue to monitor  -monitor for conduction abnormalities  -monitor HDs   -f/u CT surgery recs regarding possible surgical intervention  -trend leukocytosis. currently stable but elevated    #Empyema: Patient with IE of tricuspid valve with CT evidence of b/l pleural effusions and septic emboli. Now s/p b/l chest tubes.   -sputum cxs 11/11 positive for staph aureus with chest tube cxs on 11/11 positive for staph aureus     -body fluid cxs 11/11 from right chest tube positive for MSSA   -body fluid cxs 11/12 from left chest tube NGTD, continue to monitor  -continue to monitor chest tube output    #Transaminitis: Likely 2/2 shock liver in the setting of septic shock. Unlikely antibx induced or infectious etiology  -acute hepatitis panel negative  -HIV negative  -trend LFTs. Currently downtrending      Tentative plan discussed with Dr. 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 After being transferred to [facility] CTICU for surgical evaluation, pt was decision was made to medically manage at this time and was transferred to MICU for medical mgmt. ID consulted for medical management of septic shock 2/2 tricuspid valve MSSA bacteremia    #Septic Shock 2/2 MSSA bacteremia 2/2 IE of tricuspid valve: Patient with ELIAN results demonstrating mobile vegetation of the tricuspid valve with severe TR. CTA a/p demonstrated evidence moderate b/l pleural effusion with dense bibasilar consolidations with underlying septic emboli/pulmonary abscesses as well as large volume ascites and anasarca. CT head negative for embolic phenomena. Although CTH was performed without IV contrast, which would be best for identifying septic emboli, given right sided nature of infection we do not believe that encephalopathy is due to embolic phenomena to the brain  -c/w IV Nafcillin 2g q4hrs   -BCxs from [place] (in chart) positive for MSSA bacteremia  -BCxs 11/11 positive for staph epidermidis, likely a contaminant  -BCxs 11/12 NGTD, continue to monitor  -monitor for conduction abnormalities  -monitor HDs   -f/u CT surgery recs regarding possible surgical intervention  -trend leukocytosis. currently stable but elevated    #Empyema: Patient with IE of tricuspid valve with CT evidence of b/l pleural effusions and septic emboli. Now s/p b/l chest tubes.   -sputum cxs 11/11 positive for staph aureus with chest tube cxs on 11/11 positive for staph aureus     -body fluid cxs 11/11 from right chest tube positive for MSSA   -body fluid cxs 11/12 from left chest tube NGTD, continue to monitor  -continue to monitor chest tube output    #Transaminitis: Likely 2/2 shock liver in the setting of septic shock. Unlikely antibx induced or infectious etiology  -acute hepatitis panel negative  -HIV negative  -trend LFTs. Currently downtrending      Plan discussed with Dr. Simental

## 2019-11-15 NOTE — CONSULT NOTE ADULT - ASSESSMENT
40 y/o M smoker w/ PMHx of IVDU presented to [facility] w/ productive cough with hemoptysis found to have septic emboli w/ vegetation of the TV transferred to [facility] for surgical evaluation.  Course complicated by sepsis, possible DIC, acute respiratory failure w/ empyema s/p intubation and b/l thoracostomy tube, CAMERON requiring cvvhd.  Now being tapered off pressor support. GI consulted for evaluation of incidental finding of rectal wall thickening on imaging.    #Rectal lesion:  CT w/ irregular masslike mural thickening of the posterior right lateral wall of the rectum.  Blood culture now with no growth and surgery reports no evidence of abscess formation.  Would plan for flex sig or full colonoscopy for further evaluation.  -Tentative plan for flex sig vs colonoscopy Tuesday  -Keep NPO Monday MN  -CLD on Monday  -Split Dose bowel prep with Golytely on Monday; start at 5PM Monday to complete half, remainder the following Tuesday  morning 5AM-7AM then strict NPO  -If patient remains intubated, would bowel prep via NG tube  -Rest of management per primary team    Recommendation d/w primary team  Case d/w svc attg 38 y/o M smoker w/ PMHx of IVDU presented to [facility] w/ productive cough with hemoptysis found to have septic emboli w/ vegetation of the TV transferred to [facility] for surgical evaluation.  Course complicated by sepsis, DIC, acute respiratory failure w/ empyema s/p intubation and thoracostomy tube, CAMERON requiring cvvhd.  Now being tapered off pressor support. GI consulted for evaluation of incidental finding of rectal wall thickening on imaging.    #Rectal lesion:  CT w/ irregular masslike mural thickening of the posterior right lateral wall of the rectum.  Blood culture now with no growth and surgery reports low suspicion for perirectal abscess.  Would plan for flex sig or full colonoscopy for further evaluation.  -Tentative plan for flex sig vs colonoscopy Tuesday  -Keep NPO Monday MN  -CLD on Monday  -Split Dose bowel prep with Golytely on Monday; start at 5PM Monday to complete half, remainder the following Tuesday  morning 5AM-7AM then strict NPO  -If patient remains intubated, would bowel prep via NG tube  -Rest of management per primary team    Recommendation d/w primary team  Case d/w svc attg

## 2019-11-15 NOTE — PROGRESS NOTE ADULT - SUBJECTIVE AND OBJECTIVE BOX
on cvvhd with net negative 100 cc/hr  net negative 2.0 L/24hr  sings of cartridge clotting  off pressors  remains intubated, on light sedation  failed CPAP trials   bilateral chest tubes for pleural effusion  blood cultures positive for MSSA, on nafcillin  not a surgical candidate for a tricuspid valve replacement at this time as per CT surgery  CT abdomen/pelvis revealed "irregular masslike mural thickening of the posterior left lateral wall of the rectum." Surgery consulted, unlikely perirectal abscess. Recommend GI evaluation for possible scope  CT head negative for any intracranial embolic events              Meds:  albumin human 25% IVPB 50 milliLiter(s) IV Intermittent every 8 hours  ascorbic acid 500 milliGRAM(s) Oral daily  chlorhexidine 0.12% Liquid 15 milliLiter(s) Oral Mucosa every 12 hours  chlorhexidine 2% Cloths 1 Application(s) Topical daily  CRRT Treatment    <Continuous>  dextrose 40% Gel 15 Gram(s) Oral once PRN  dextrose 5%. 1000 milliLiter(s) IV Continuous <Continuous>  dextrose 50% Injectable 12.5 Gram(s) IV Push once  dextrose 50% Injectable 25 Gram(s) IV Push once  dextrose 50% Injectable 25 Gram(s) IV Push once  fentaNYL   Infusion. 0.5 MICROgram(s)/kG/Hr IV Continuous <Continuous>  glucagon  Injectable 1 milliGRAM(s) IntraMuscular once PRN  heparin  Injectable 5000 Unit(s) SubCutaneous every 8 hours  insulin lispro (HumaLOG) corrective regimen sliding scale   SubCutaneous every 6 hours  multivitamin/minerals/iron Oral Solution (CENTRUM) 15 milliLiter(s) Oral daily  nafcillin  IVPB 2 Gram(s) IV Intermittent every 4 hours  pantoprazole  Injectable 40 milliGRAM(s) IV Push daily  polyethylene glycol 3350 17 Gram(s) Oral every 24 hours  propofol Infusion 10 MICROgram(s)/kG/Min IV Continuous <Continuous>  PureFlow Dialysate RFP-401 (K 4 / Ca 3) 5000 milliLiter(s) CRRT <Continuous>  senna 2 Tablet(s) Oral at bedtime  sodium chloride 0.9% lock flush 3 milliLiter(s) IV Push every 8 hours  vitamin E 400 International Unit(s) Oral daily  zinc sulfate 220 milliGRAM(s) Oral daily      T(C): 37.1 (11-15-19 @ 09:00), Max: 37.1 (11-15-19 @ 09:00)  HR: 106 (11-15-19 @ 10:00) (96 - 110)  BP: 110/68  RR: 25 (11-15-19 @ 10:00) (8 - 35)  SpO2: 100% (11-15-19 @ 10:00) (97% - 100%)    Input/Output      11-14-19 @ 07:01  -  11-15-19 @ 07:00  --------------------------------------------------------  IN: 2461 mL / OUT: 4463 mL / NET: -2002 mL    11-15-19 @ 07:01  -  11-15-19 @ 10:58  --------------------------------------------------------  IN: 242.4 mL / OUT: 552 mL / NET: -309.6 mL      Review of Systems:  not able to obtain    PHYSICAL EXAM:  GENERAL: intubated, alert, no acute distress at present  HEENT: ETT, NGT in place  NECK: JVD not appreciated  CHEST/LUNG: equal breath sounds bilaterally, bilateral chest tubes  HEART: normal S1S2, RRR  ABDOMEN: Soft, Nontender, +BS  : [place] in place, anuria  EXTREMITIES: grossly edematous, necrotic toes  NEUROLOGY: no focal deficit  ACCESS: [facility]          LABS:                                         8.2    19.63 )-----------( 49       ( 15 Nov 2019 06:01 )             24.6       11-15    137  |  102  |  15  ----------------------------<  139<H>  3.8   |  26  |  0.93    Ca    8.1<L>      15 Nov 2019 06:01  Phos  2.7     11-15  Mg     2.1     11-15    TPro  6.2  /  Alb  2.6<L>  /  TBili  9.9<H>  /  DBili  8.5<H>  /  AST  81<H>  /  ALT  114<H>  /  AlkPhos  125<H>  11-15      PT/INR - ( 15 Nov 2019 06:01 )   PT: 14.3 sec;   INR: 1.26          PTT - ( 15 Nov 2019 06:01 )  PTT:39.6 sec              RADIOLOGY & ADDITIONAL STUDIES:     < from: Xray Chest 1 View- PORTABLE-Routine (11.15.19 @ 08:41) >  EXAM:  XR CHEST PORTABLE ROUTINE 1V                          PROCEDURE DATE:  11/15/2019          INTERPRETATION:  Portable chest    History: Follow-up abnormal exam    Impression:    Grossly similar appearance to prior exam 11/14/2019 with scattered lung   infiltrates and possible small pleural effusions. Endotracheal tube in   good position. Nasogastric tube tip in region of stomach. No   pneumothorax. No acute lung infiltrate.    < end of copied text >

## 2019-11-15 NOTE — PROVIDER CONTACT NOTE (CHANGE IN STATUS NOTIFICATION) - ASSESSMENT
RR 36-40, . Afebrile. Left chest on water seal and started having bubbling. No subcue air noted. PIP increased to 34. Minimal secretions suctioned. 34 ml of drainage for left chest tube.

## 2019-11-15 NOTE — PROVIDER CONTACT NOTE (CHANGE IN STATUS NOTIFICATION) - RECOMMENDATIONS
Increase fentanyl instead of propofol for sedation to maintain RASS -1. Hold UF at this time and contact renal.

## 2019-11-15 NOTE — PROGRESS NOTE ADULT - SUBJECTIVE AND OBJECTIVE BOX
INCOMPLETE NOTE    Infectious diseases progress note:  ULI HOLLAND is a 39yMale patient    ENDOCARDITIS/SEPSIS    Acute endocarditis due to other organism      ROS:  CONSTITUTIONAL:  Negative fever or chills, feels well, good appetite  EYES:  Negative  blurry vision or double vision  CARDIOVASCULAR:  Negative for chest pain or palpitations  RESPIRATORY:  Negative for cough, wheezing, or SOB   GASTROINTESTINAL:  Negative for nausea, vomiting, diarrhea, constipation, or abdominal pain  GENITOURINARY:  Negative frequency, urgency or dysuria  NEUROLOGIC:  No headache, confusion, dizziness, lightheadedness    Allergies    Allergy Status Unknown    Intolerances        ANTIBIOTICS/RELEVANT:  antimicrobials  nafcillin  IVPB 2 Gram(s) IV Intermittent every 4 hours    immunologic:    OTHER:  albumin human 25% IVPB 50 milliLiter(s) IV Intermittent every 8 hours  ascorbic acid 500 milliGRAM(s) Oral daily  chlorhexidine 0.12% Liquid 15 milliLiter(s) Oral Mucosa every 12 hours  chlorhexidine 2% Cloths 1 Application(s) Topical daily  CRRT Treatment    <Continuous>  dextrose 40% Gel 15 Gram(s) Oral once PRN  dextrose 5%. 1000 milliLiter(s) IV Continuous <Continuous>  dextrose 50% Injectable 12.5 Gram(s) IV Push once  dextrose 50% Injectable 25 Gram(s) IV Push once  dextrose 50% Injectable 25 Gram(s) IV Push once  fentaNYL   Infusion. 0.5 MICROgram(s)/kG/Hr IV Continuous <Continuous>  glucagon  Injectable 1 milliGRAM(s) IntraMuscular once PRN  heparin  Injectable 5000 Unit(s) SubCutaneous every 8 hours  insulin lispro (HumaLOG) corrective regimen sliding scale   SubCutaneous every 6 hours  multivitamin/minerals/iron Oral Solution (CENTRUM) 15 milliLiter(s) Oral daily  pantoprazole  Injectable 40 milliGRAM(s) IV Push daily  polyethylene glycol 3350 17 Gram(s) Oral every 24 hours  propofol Infusion 10 MICROgram(s)/kG/Min IV Continuous <Continuous>  PureFlow Dialysate RFP-401 (K 4 / Ca 3) 5000 milliLiter(s) CRRT <Continuous>  senna 2 Tablet(s) Oral at bedtime  sodium chloride 0.9% lock flush 3 milliLiter(s) IV Push every 8 hours  vitamin E 400 International Unit(s) Oral daily  zinc sulfate 220 milliGRAM(s) Oral daily      Objective:  Vital Signs Last 24 Hrs  T(C): 37.1 (15 Nov 2019 09:00), Max: 37.1 (15 Nov 2019 09:00)  T(F): 98.8 (15 Nov 2019 09:00), Max: 98.8 (15 Nov 2019 09:00)  HR: 104 (15 Nov 2019 12:00) (100 - 110)  BP: --  BP(mean): --  RR: 24 (15 Nov 2019 12:00) (8 - 35)  SpO2: 100% (15 Nov 2019 12:00) (97% - 100%)    PHYSICAL EXAM:  Constitutional:Well-developed, well nourishe  Eyes:NANCY, EOMI  Ear/Nose/Throat: no oral lesion, no sinus tenderness on percussion	  Neck:no JVD, no lymphadenopathy, supple  Respiratory: CTA hellen  Cardiovascular: S1S2 RRR, no murmurs  Gastrointestinal:soft, (+) BS, no HSM  Extremities:no e/e/c        LABS:                        8.1    18.99 )-----------( 52       ( 15 Nov 2019 12:07 )             25.5     11-15    137  |  102  |  13  ----------------------------<  164<H>  3.8   |  27  |  0.82    Ca    8.3<L>      15 Nov 2019 12:07  Phos  2.3     11-15  Mg     1.9     11-15    TPro  6.4  /  Alb  2.8<L>  /  TBili  9.9<H>  /  DBili  x   /  AST  80<H>  /  ALT  109<H>  /  AlkPhos  119  11-15    PT/INR - ( 15 Nov 2019 06:01 )   PT: 14.3 sec;   INR: 1.26          PTT - ( 15 Nov 2019 06:01 )  PTT:39.6 sec        MICROBIOLOGY:        RADIOLOGY & ADDITIONAL STUDIES: INCOMPLETE NOTE    Infectious diseases progress note:  ULI HOLLAND is a 39yMale patient    ENDOCARDITIS/SEPSIS    Acute endocarditis due to other organism    Subjective:     Patient intubated and sedated. Unable to perform ROS.     Allergies    Allergy Status Unknown    Intolerances        ANTIBIOTICS/RELEVANT:  antimicrobials  nafcillin  IVPB 2 Gram(s) IV Intermittent every 4 hours    immunologic:    OTHER:  albumin human 25% IVPB 50 milliLiter(s) IV Intermittent every 8 hours  ascorbic acid 500 milliGRAM(s) Oral daily  chlorhexidine 0.12% Liquid 15 milliLiter(s) Oral Mucosa every 12 hours  chlorhexidine 2% Cloths 1 Application(s) Topical daily  CRRT Treatment    <Continuous>  dextrose 40% Gel 15 Gram(s) Oral once PRN  dextrose 5%. 1000 milliLiter(s) IV Continuous <Continuous>  dextrose 50% Injectable 12.5 Gram(s) IV Push once  dextrose 50% Injectable 25 Gram(s) IV Push once  dextrose 50% Injectable 25 Gram(s) IV Push once  fentaNYL   Infusion. 0.5 MICROgram(s)/kG/Hr IV Continuous <Continuous>  glucagon  Injectable 1 milliGRAM(s) IntraMuscular once PRN  heparin  Injectable 5000 Unit(s) SubCutaneous every 8 hours  insulin lispro (HumaLOG) corrective regimen sliding scale   SubCutaneous every 6 hours  multivitamin/minerals/iron Oral Solution (CENTRUM) 15 milliLiter(s) Oral daily  pantoprazole  Injectable 40 milliGRAM(s) IV Push daily  polyethylene glycol 3350 17 Gram(s) Oral every 24 hours  propofol Infusion 10 MICROgram(s)/kG/Min IV Continuous <Continuous>  PureFlow Dialysate RFP-401 (K 4 / Ca 3) 5000 milliLiter(s) CRRT <Continuous>  senna 2 Tablet(s) Oral at bedtime  sodium chloride 0.9% lock flush 3 milliLiter(s) IV Push every 8 hours  vitamin E 400 International Unit(s) Oral daily  zinc sulfate 220 milliGRAM(s) Oral daily      Objective:  Vital Signs Last 24 Hrs  T(C): 37.1 (15 Nov 2019 09:00), Max: 37.1 (15 Nov 2019 09:00)  T(F): 98.8 (15 Nov 2019 09:00), Max: 98.8 (15 Nov 2019 09:00)  HR: 104 (15 Nov 2019 12:00) (100 - 110)  BP: --  BP(mean): --  RR: 24 (15 Nov 2019 12:00) (8 - 35)  SpO2: 100% (15 Nov 2019 12:00) (97% - 100%)    PHYSICAL EXAM:  Constitutional: NAD. Intubated and sedated.   HEENT: Intubated, positive scleral icterus	  Neck: supple  Respiratory: Rhonchi and wheezing throughout  Cardiovascular: S1, S2. RRR. systolic murmur appreciated on left 4th ICS  Gastrointestinal: Distended. Generalized tenderness to palpation appreciated by wincing with palpation. BS+ x4 quadrants  Extremities: Extremities warm throughout. Tips of b/l toes cyanotic. B/l 2+ pedal edema in upper and lower extremities         LABS:                        8.1    18.99 )-----------( 52       ( 15 Nov 2019 12:07 )             25.5     11-15    137  |  102  |  13  ----------------------------<  164<H>  3.8   |  27  |  0.82    Ca    8.3<L>      15 Nov 2019 12:07  Phos  2.3     11-15  Mg     1.9     11-15    TPro  6.4  /  Alb  2.8<L>  /  TBili  9.9<H>  /  DBili  x   /  AST  80<H>  /  ALT  109<H>  /  AlkPhos  119  11-15    PT/INR - ( 15 Nov 2019 06:01 )   PT: 14.3 sec;   INR: 1.26          PTT - ( 15 Nov 2019 06:01 )  PTT:39.6 sec        MICROBIOLOGY:        RADIOLOGY & ADDITIONAL STUDIES: Infectious diseases progress note:  ULI HOLLAND is a 39yMale patient    ENDOCARDITIS/SEPSIS    Acute endocarditis due to other organism    Subjective:     Patient intubated and sedated. Unable to perform ROS.     Allergies    Allergy Status Unknown    Intolerances        ANTIBIOTICS/RELEVANT:  antimicrobials  nafcillin  IVPB 2 Gram(s) IV Intermittent every 4 hours    immunologic:    OTHER:  albumin human 25% IVPB 50 milliLiter(s) IV Intermittent every 8 hours  ascorbic acid 500 milliGRAM(s) Oral daily  chlorhexidine 0.12% Liquid 15 milliLiter(s) Oral Mucosa every 12 hours  chlorhexidine 2% Cloths 1 Application(s) Topical daily  CRRT Treatment    <Continuous>  dextrose 40% Gel 15 Gram(s) Oral once PRN  dextrose 5%. 1000 milliLiter(s) IV Continuous <Continuous>  dextrose 50% Injectable 12.5 Gram(s) IV Push once  dextrose 50% Injectable 25 Gram(s) IV Push once  dextrose 50% Injectable 25 Gram(s) IV Push once  fentaNYL   Infusion. 0.5 MICROgram(s)/kG/Hr IV Continuous <Continuous>  glucagon  Injectable 1 milliGRAM(s) IntraMuscular once PRN  heparin  Injectable 5000 Unit(s) SubCutaneous every 8 hours  insulin lispro (HumaLOG) corrective regimen sliding scale   SubCutaneous every 6 hours  multivitamin/minerals/iron Oral Solution (CENTRUM) 15 milliLiter(s) Oral daily  pantoprazole  Injectable 40 milliGRAM(s) IV Push daily  polyethylene glycol 3350 17 Gram(s) Oral every 24 hours  propofol Infusion 10 MICROgram(s)/kG/Min IV Continuous <Continuous>  PureFlow Dialysate RFP-401 (K 4 / Ca 3) 5000 milliLiter(s) CRRT <Continuous>  senna 2 Tablet(s) Oral at bedtime  sodium chloride 0.9% lock flush 3 milliLiter(s) IV Push every 8 hours  vitamin E 400 International Unit(s) Oral daily  zinc sulfate 220 milliGRAM(s) Oral daily      Objective:  Vital Signs Last 24 Hrs  T(C): 37.1 (15 Nov 2019 09:00), Max: 37.1 (15 Nov 2019 09:00)  T(F): 98.8 (15 Nov 2019 09:00), Max: 98.8 (15 Nov 2019 09:00)  HR: 104 (15 Nov 2019 12:00) (100 - 110)  BP: --  BP(mean): --  RR: 24 (15 Nov 2019 12:00) (8 - 35)  SpO2: 100% (15 Nov 2019 12:00) (97% - 100%)    PHYSICAL EXAM:  Constitutional: NAD. Intubated and sedated.   HEENT: Intubated, positive scleral icterus	  Neck: supple  Respiratory: Rhonchi and wheezing throughout  Cardiovascular: S1, S2. RRR. systolic murmur appreciated on left 4th ICS  Gastrointestinal: Distended. Generalized tenderness to palpation appreciated by wincing with palpation. BS+ x4 quadrants  Extremities: Extremities warm throughout. Tips of b/l toes cyanotic. B/l 2+ pedal edema in upper and lower extremities         LABS:                        8.1    18.99 )-----------( 52       ( 15 Nov 2019 12:07 )             25.5     11-15    137  |  102  |  13  ----------------------------<  164<H>  3.8   |  27  |  0.82    Ca    8.3<L>      15 Nov 2019 12:07  Phos  2.3     11-15  Mg     1.9     11-15    TPro  6.4  /  Alb  2.8<L>  /  TBili  9.9<H>  /  DBili  x   /  AST  80<H>  /  ALT  109<H>  /  AlkPhos  119  11-15    PT/INR - ( 15 Nov 2019 06:01 )   PT: 14.3 sec;   INR: 1.26          PTT - ( 15 Nov 2019 06:01 )  PTT:39.6 sec        MICROBIOLOGY:        RADIOLOGY & ADDITIONAL STUDIES:

## 2019-11-15 NOTE — PROGRESS NOTE ADULT - SUBJECTIVE AND OBJECTIVE BOX
Vascular Surgery Consult - Progress Note    Subjective:  Intubated/sedated but arousable. Remains off pressors and HD stable.    Vital Signs Last 24 Hrs  T(C): 36.7 (15 Nov 2019 14:30), Max: 37.1 (15 Nov 2019 09:00)  T(F): 98 (15 Nov 2019 14:30), Max: 98.8 (15 Nov 2019 09:00)  HR: 110 (15 Nov 2019 14:00) (100 - 110)  BP: --  BP(mean): --  RR: 22 (15 Nov 2019 14:00) (8 - 35)  SpO2: 100% (15 Nov 2019 14:00) (97% - 100%)    I&O's Summary  14 Nov 2019 07:01  -  15 Nov 2019 07:00  --------------------------------------------------------  IN: 2461 mL / OUT: 4463 mL / NET: -2002 mL    15 Nov 2019 07:01  -  15 Nov 2019 15:02  --------------------------------------------------------  IN: 795.4 mL / OUT: 1465 mL / NET: -669.6 mL    Physical Exam:  General: Sedated  Pulmonary: Intubated, on vent  Extremities: hands and feet cool to touch  RUE: ischemic/necrotic digits 2 + 3  LUE: cool to touch but no ischemic fingertips  RLE: ischemic/necrotic digits 1-5  LLE: ischemic/necrotic digit 4    Pulses:  RUE: 2+ radial, tri Ulnar, no arch, no digital signals  LUE: 2+ radial, tri Ulnar, tri arch, no digital signals  RLE: 2+ DP, Tri PT  LLE: 2+ DP, Tri PT    LABS:                    8.1    18.99 )-----------( 52       ( 15 Nov 2019 12:07 )             25.5     11-15  137  |  102  |  13  ----------------------------<  164<H>  3.8   |  27  |  0.82    Ca    8.3<L>      15 Nov 2019 12:07  Phos  2.3     11-15  Mg     1.9     11-15    TPro  6.4  /  Alb  2.8<L>  /  TBili  9.9<H>  /  DBili  x   /  AST  80<H>  /  ALT  109<H>  /  AlkPhos  119  11-15    PT/INR - ( 15 Nov 2019 06:01 )   PT: 14.3 sec;   INR: 1.26     PTT - ( 15 Nov 2019 06:01 )  PTT:39.6 sec    LIVER FUNCTIONS - ( 15 Nov 2019 12:07 )  Alb: 2.8 g/dL / Pro: 6.4 g/dL / ALK PHOS: 119 U/L / ALT: 109 U/L / AST: 80 U/L / GGT: x           CAPILLARY BLOOD GLUCOSE  POCT Blood Glucose.: 117 mg/dL (15 Nov 2019 11:02)  POCT Blood Glucose.: 135 mg/dL (15 Nov 2019 05:54)  POCT Blood Glucose.: 123 mg/dL (15 Nov 2019 00:02)  POCT Blood Glucose.: 30 mg/dL (14 Nov 2019 23:58)  POCT Blood Glucose.: 105 mg/dL (14 Nov 2019 18:30)

## 2019-11-15 NOTE — CHART NOTE - NSCHARTNOTEFT_GEN_A_CORE
Will change CVVHD settings to keep a goal of net negative fluid balance of 50 cc/hr as patient noted to be hypotensive earlier, requiring initiation of pressors today, 11/15.

## 2019-11-15 NOTE — PROGRESS NOTE ADULT - ASSESSMENT
40 yo White male with PMH of IVDU who was transferred from [facility] in regards to IE with tricupsid valve vegetations.   Nephrology consult is placed in regards to anuric cameron and electrolytes disturbances.   CT-A negative for mesenteric ischemia, on 11/12  bubble studies negative for PFO, 11/12  s/p bronchoscopy on 11/12, BAL cultures to follow  bilateral chest tubes for pleural effusion  blood cultures positive for MSSA, on nafcillin  not a surgical candidate for a tricuspid valve replacement at this time as per CT surgery  CT abdomen/pelvis revealed "irregular masslike mural thickening of the posterior left lateral wall of the rectum." Surgery consulted, unlikely perirectal abscess. Recommend GI evaluation for possible scope  CT head negative for any intracranial embolic events  HemOnc is following for hemolysis likely 2/2 dic/sepsis  Vascular is following for necrotic digits likely 2/2 vasopressors      # CAMERON   - anuric  - ddx ATN (from shock, vancomycin toxicity, rhabdomyolysis) vs infection related GN   - cw CVVHD, with fluid balance of 100 cc/hr net negative  - signs of cartridge clotting, will increase blood flow to 300 cc/min, no AC having thrombocytopenia/ DIC, will consider switching to IHD  - monitor lytes Q6hr while on cvvhd, replace as indicated (keep K>4, phos >3, Mg >2)  - renally adjusted meds/ABx  - monitor H/H, transfuse as indicated    Discussed with attending Dr Pinzon. 40 yo White male with PMH of IVDU who was transferred from [facility] in regards to IE with tricupsid valve vegetations.   Nephrology consult is placed in regards to anuric cameron and electrolytes disturbances.   CT-A negative for mesenteric ischemia, on 11/12  bubble studies negative for PFO, 11/12  s/p bronchoscopy on 11/12, BAL cultures to follow  bilateral chest tubes for pleural effusion  blood cultures positive for MSSA, on nafcillin  not a surgical candidate for a tricuspid valve replacement at this time as per CT surgery  CT abdomen/pelvis revealed "irregular masslike mural thickening of the posterior left lateral wall of the rectum." Surgery consulted, unlikely perirectal abscess. Recommend GI evaluation for possible scope  CT head negative for any intracranial embolic events  HemOnc is following for hemolysis likely 2/2 dic/sepsis  Vascular is following for necrotic digits likely 2/2 vasopressors      # CAMERON   - anuric  - ddx ATN (from shock, vancomycin toxicity, rhabdomyolysis) vs infection related GN   - cw CVVHD, with fluid balance of 100 cc/hr net negative  - signs of cartridge clotting, will increase blood flow to 300 cc/min, no AC having thrombocytopenia/ DIC, will consider switching to IHD  - monitor lytes Q6hr while on cvvhd, replace as indicated (keep K>4, phos >3, Mg >2)  - keep MAP >65 mmHg  - renally adjusted meds/ABx  - monitor H/H, transfuse as indicated    Discussed with attending Dr Pinzon.

## 2019-11-15 NOTE — CHART NOTE - NSCHARTNOTEFT_GEN_A_CORE
Admitting Diagnosis:   Patient is a 39y old  Male who presents with a chief complaint of Endocarditis h/o IVDU (15 Nov 2019 10:44)      PAST MEDICAL & SURGICAL HISTORY:  Endocarditis  IVDU (intravenous drug user)  Smoker  No significant past surgical history      Current Nutrition Order:  Jevity 1.5 Christian @ 40mL/hr x 24hrs plus ProStat TID (300 kcal, 45g protein) via NGT. Provides: 960mL TV, 1740kcal (+348kcal from prop), 106g protein, 730mL free H2O, 96% RDI, 1.58g/kg IBW protein)    PO Intake: Good (%) [   ]  Fair (50-75%) [   ] Poor (<25%) [   ]- NA NPO w/EN    GI Issues: Unable to assess at this time 2/2 vent; 0mL residuals overnight  BM 11/15    Pain: Unable to assess at this time 2/2 vent; sedated    Skin Integrity: Heber 12  B/L ankles DTIs  R. toes necrosis  R. ear DTI     Labs:   11-15    137  |  102  |  15  ----------------------------<  139<H>  3.8   |  26  |  0.93    Ca    8.1<L>      15 Nov 2019 06:01  Phos  2.7     11-15  Mg     2.1     11-15    TPro  6.2  /  Alb  2.6<L>  /  TBili  9.9<H>  /  DBili  8.5<H>  /  AST  81<H>  /  ALT  114<H>  /  AlkPhos  125<H>  11-15    CAPILLARY BLOOD GLUCOSE      POCT Blood Glucose.: 117 mg/dL (15 Nov 2019 11:02)  POCT Blood Glucose.: 135 mg/dL (15 Nov 2019 05:54)  POCT Blood Glucose.: 123 mg/dL (15 Nov 2019 00:02)  POCT Blood Glucose.: 30 mg/dL (14 Nov 2019 23:58)  POCT Blood Glucose.: 105 mg/dL (14 Nov 2019 18:30)      Medications:  MEDICATIONS  (STANDING):  albumin human 25% IVPB 50 milliLiter(s) IV Intermittent every 8 hours  ascorbic acid 500 milliGRAM(s) Oral daily  chlorhexidine 0.12% Liquid 15 milliLiter(s) Oral Mucosa every 12 hours  chlorhexidine 2% Cloths 1 Application(s) Topical daily  CRRT Treatment    <Continuous>  dextrose 5%. 1000 milliLiter(s) (50 mL/Hr) IV Continuous <Continuous>  dextrose 50% Injectable 12.5 Gram(s) IV Push once  dextrose 50% Injectable 25 Gram(s) IV Push once  dextrose 50% Injectable 25 Gram(s) IV Push once  fentaNYL   Infusion. 0.5 MICROgram(s)/kG/Hr (3.68 mL/Hr) IV Continuous <Continuous>  heparin  Injectable 5000 Unit(s) SubCutaneous every 8 hours  insulin lispro (HumaLOG) corrective regimen sliding scale   SubCutaneous every 6 hours  multivitamin/minerals/iron Oral Solution (CENTRUM) 15 milliLiter(s) Oral daily  nafcillin  IVPB 2 Gram(s) IV Intermittent every 4 hours  pantoprazole  Injectable 40 milliGRAM(s) IV Push daily  polyethylene glycol 3350 17 Gram(s) Oral every 24 hours  propofol Infusion 10 MICROgram(s)/kG/Min (4.416 mL/Hr) IV Continuous <Continuous>  PureFlow Dialysate RFP-401 (K 4 / Ca 3) 5000 milliLiter(s) (3500 mL/Hr) CRRT <Continuous>  senna 2 Tablet(s) Oral at bedtime  sodium chloride 0.9% lock flush 3 milliLiter(s) IV Push every 8 hours  vitamin E 400 International Unit(s) Oral daily  zinc sulfate 220 milliGRAM(s) Oral daily    MEDICATIONS  (PRN):  dextrose 40% Gel 15 Gram(s) Oral once PRN Blood Glucose LESS THAN 70 milliGRAM(s)/deciliter  glucagon  Injectable 1 milliGRAM(s) IntraMuscular once PRN Glucose LESS THAN 70 milligrams/deciliter      Weight: 77.4kg (11/13)  Daily     Daily     Weight Change:     Nutrition Focused Physical Exam: Completed [   ]  Not Pertinent [   ]  Muscle Wasting- Temporal [   ]  Clavicle/Pectoral [   ]  Shoulder/Deltoid [   ]  Scapula [   ]  Interosseous [   ]  Quadriceps [   ]  Gastrocnemius [   ]  Fat Wasting- Orbital [   ]  Buccal [   ]  Triceps [   ]  Rib [   ]  Suspect [PCM] 2/2 to physical assessment, [poor intake], and [wt loss]; please see malnutrition chart note.    Estimated energy needs:     Subjective: 38 yo/male with PMHx IVDA, presented to OSH w/cough, hemoptysis, and N/V. Found to be septic and hypotensive. CT chest +pna, pulmonary nodules w/septic emboli, and ELIAN +vegetation. Pt ultimately intubated and started on pressors. Transferred to [facility] CTICU where he was deemed not to be a surgical candidate. Course c/b renal failure and B/L pleural effusions, s/p R. CT placement. Started on CVVH     Previous Nutrition Diagnosis:    Active [   ]  Resolved [   ]    If resolved, new PES:     Goal:    Recommendations:    Education: N/A- ventilator     Risk Level: High [ X  ] Moderate [   ] Low [   ] Admitting Diagnosis:   Patient is a 39y old  Male who presents with a chief complaint of Endocarditis h/o IVDU (15 Nov 2019 10:44)      PAST MEDICAL & SURGICAL HISTORY:  Endocarditis  IVDU (intravenous drug user)  Smoker  No significant past surgical history      Current Nutrition Order:  Jevity 1.5 Christian @ 40mL/hr x 24hrs plus ProStat TID (300 kcal, 45g protein) via NGT. Provides: 960mL TV, 1740kcal (+348kcal from prop), 106g protein, 730mL free H2O, 96% RDI, 1.58g/kg IBW protein)    PO Intake: Good (%) [   ]  Fair (50-75%) [   ] Poor (<25%) [   ]- NA NPO w/EN    GI Issues: Unable to assess at this time 2/2 vent; 0mL residuals overnight  BM 11/15    Pain: Unable to assess at this time 2/2 vent; sedated    Skin Integrity: Heber 12  B/L ankles DTIs  R. toes necrosis  R. ear DTI     Labs:   11-15    137  |  102  |  15  ----------------------------<  139<H>  3.8   |  26  |  0.93    Ca    8.1<L>      15 Nov 2019 06:01  Phos  2.7     11-15  Mg     2.1     11-15    TPro  6.2  /  Alb  2.6<L>  /  TBili  9.9<H>  /  DBili  8.5<H>  /  AST  81<H>  /  ALT  114<H>  /  AlkPhos  125<H>  11-15    CAPILLARY BLOOD GLUCOSE      POCT Blood Glucose.: 117 mg/dL (15 Nov 2019 11:02)  POCT Blood Glucose.: 135 mg/dL (15 Nov 2019 05:54)  POCT Blood Glucose.: 123 mg/dL (15 Nov 2019 00:02)  POCT Blood Glucose.: 30 mg/dL (14 Nov 2019 23:58)  POCT Blood Glucose.: 105 mg/dL (14 Nov 2019 18:30)      Medications:  MEDICATIONS  (STANDING):  albumin human 25% IVPB 50 milliLiter(s) IV Intermittent every 8 hours  ascorbic acid 500 milliGRAM(s) Oral daily  chlorhexidine 0.12% Liquid 15 milliLiter(s) Oral Mucosa every 12 hours  chlorhexidine 2% Cloths 1 Application(s) Topical daily  CRRT Treatment    <Continuous>  dextrose 5%. 1000 milliLiter(s) (50 mL/Hr) IV Continuous <Continuous>  dextrose 50% Injectable 12.5 Gram(s) IV Push once  dextrose 50% Injectable 25 Gram(s) IV Push once  dextrose 50% Injectable 25 Gram(s) IV Push once  fentaNYL   Infusion. 0.5 MICROgram(s)/kG/Hr (3.68 mL/Hr) IV Continuous <Continuous>  heparin  Injectable 5000 Unit(s) SubCutaneous every 8 hours  insulin lispro (HumaLOG) corrective regimen sliding scale   SubCutaneous every 6 hours  multivitamin/minerals/iron Oral Solution (CENTRUM) 15 milliLiter(s) Oral daily  nafcillin  IVPB 2 Gram(s) IV Intermittent every 4 hours  pantoprazole  Injectable 40 milliGRAM(s) IV Push daily  polyethylene glycol 3350 17 Gram(s) Oral every 24 hours  propofol Infusion 10 MICROgram(s)/kG/Min (4.416 mL/Hr) IV Continuous <Continuous>  PureFlow Dialysate RFP-401 (K 4 / Ca 3) 5000 milliLiter(s) (3500 mL/Hr) CRRT <Continuous>  senna 2 Tablet(s) Oral at bedtime  sodium chloride 0.9% lock flush 3 milliLiter(s) IV Push every 8 hours  vitamin E 400 International Unit(s) Oral daily  zinc sulfate 220 milliGRAM(s) Oral daily    MEDICATIONS  (PRN):  dextrose 40% Gel 15 Gram(s) Oral once PRN Blood Glucose LESS THAN 70 milliGRAM(s)/deciliter  glucagon  Injectable 1 milliGRAM(s) IntraMuscular once PRN Glucose LESS THAN 70 milligrams/deciliter      Weight: 77.4kg (11/13)  Daily     Daily     Weight Change: 4kg weight gain from admission, possibly fluid-related d/t HD     Nutrition Focused Physical Exam: Completed [ X-11/15, insignificant at this time. Will continue to re-evaluate  ]  Not Pertinent [   ]  Muscle Wasting- Temporal [X   ]  Clavicle/Pectoral [  X ]  Shoulder/Deltoid [   ]  Scapula [   ]  Interosseous [   ]  Quadriceps [   ]  Gastrocnemius [   ]    Estimated energy needs: IBW (67.3kg) used to estimate needs 2/2 vent. needs increased 2/2 vent, sepsis, CVVHD. Fluids per team 2/2 CVVHD  Calories: 25-30 kcal/kg = 2856-9260 kcal/day  Protein: 1.6-1.8 g/kg = 108-121g protein/day    Subjective: 38 yo/male with PMHx IVDA, presented to OSH w/cough, hemoptysis, and N/V. Found to be septic and hypotensive. CT chest +pna, pulmonary nodules w/septic emboli, and ELIAN +vegetation. Pt ultimately intubated and started on pressors. Transferred to [facility] CTICU where he was deemed not to be a surgical candidate. Course c/b renal failure and B/L pleural effusions, s/p R. CT placement. Started on CVVH. ELIAN w/bubble negative for PFO. CTA negative for mesenteric ischemia and rectal exam negative for perirectal abscess, per surgery note. Pt seen in room and discussed during MICU rounds. Pt remains intubated on VC/AC mode (failed CPAP 2/2 tachypnea), sedated on propofol @ 13.2mL/hr (348kcal/day from lipids), and fentanyl. MAP 83- no longer requiring pressors. Continue to be on CVVHD, though possibly transition to IHD 2/2 stable BP. EN running at goal w/0mL residuals recorded overnight. BM 11/15. B/L chest tubes remain to suction. WBC 19.63 (H), BUN/Cr and Lytes WNL,  (H), POC , 123mg/dL.     Previous Nutrition Diagnosis:  Inadequate Energy Intake RT current NPO status 2/2 vent, sump to suction AEB 0% of EER being met at this time.     Active [   ]  Resolved [ X  ]    If resolved, new PES: Increased protein-calorie needs RT increased demand for protein-calorie intake AEB vent, CVVHD, hypermetabolic state     Goal: Pt will meet % of EER via tolerated route     Recommendations:  1. Cont. with current TF order. Will adjust pending propofol titration. Please cont. to follow VBF protocol as indicated   2. Monitor for s/s intolerance; maintain aspiration precautions at all times   3. Cont. w/micronutrient supplementation   4. Daily wts   5. Pain and bowel regimens per team discretion     Education: N/A- ventilator     Risk Level: High [ X  ] Moderate [   ] Low [   ]

## 2019-11-15 NOTE — CHART NOTE - NSCHARTNOTEFT_GEN_A_CORE
40 y/o M w/ PMH of IVDU and active smoker who went to [facility] on 11/8/19 complaining of productive cough with hemoptysis, progressive SOB, pleuritic chest pain, nausea, non-bloody emesis, abd pain, chillsx3 days. 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 Daniel Ruelas for possible surgical evaluation. Following admission, patient noted to be in acute hypoxic respiratory failure s/p intubation/sedation, acute kidney injury requiring CVVHD, congestive hepatopathy, and mutli-system organ failure 2/2 hypoperfusion. Patient deemed not a surgical candidate, per Dr. Ruelas, and patient transferred to MICU for medical management with IV ABX. ID, nephro, heme/onc, surgery, and vascular following. Primary team called CTSx to re-evalute patient as medical status improving.    Plan:  - Case discussed with Dr. Blanco, deemed not a surgical candidate at this time.    - Continue medical management, c/w IV Nafcillin, Infectious Disease following, appreciate recs.   - Continue to follow blood cx

## 2019-11-15 NOTE — PROGRESS NOTE ADULT - ATTENDING COMMENTS
I have reviewed the medical record, including laboratory and radiographic studies, examined the patient and discussed the plan with Dr. Carlson, the ID Resident.  Agree with above. Will continue to follow with you – ID Team 1.

## 2019-11-15 NOTE — PROVIDER CONTACT NOTE (CHANGE IN STATUS NOTIFICATION) - ASSESSMENT
Blood pressure fluctuating with SBP 79-89 on A-line and correlating with NBP. Currently RASS -1 on propofol at 30 mcgk/kg/hr. Propofol had been increased 1 hour ago from 25 mcg to 30 mcg/kg/hr due to increasing agitation and biting the ETT. UF on CVVHD stopped at this time.

## 2019-11-15 NOTE — CONSULT NOTE ADULT - SUBJECTIVE AND OBJECTIVE BOX
Prelim    HPI:  40 y/o M smoker w/ PMHx of IVDU presented to [facility] w/ productive cough with hemoptysis found to have septic emboli w/ vegetation of the TV transferred to [facility] for surgical evaluation.  Course complicated by acute respiratory failure requiring intubation, CAMERON requir        Allergies    Allergy Status Unknown    Intolerances      Home Medications:    MEDICATIONS:  MEDICATIONS  (STANDING):  albumin human 25% IVPB 50 milliLiter(s) IV Intermittent every 8 hours  ascorbic acid 500 milliGRAM(s) Oral daily  chlorhexidine 0.12% Liquid 15 milliLiter(s) Oral Mucosa every 12 hours  chlorhexidine 2% Cloths 1 Application(s) Topical daily  CRRT Treatment    <Continuous>  dextrose 5%. 1000 milliLiter(s) (50 mL/Hr) IV Continuous <Continuous>  dextrose 50% Injectable 12.5 Gram(s) IV Push once  dextrose 50% Injectable 25 Gram(s) IV Push once  dextrose 50% Injectable 25 Gram(s) IV Push once  fentaNYL   Infusion. 0.5 MICROgram(s)/kG/Hr (3.68 mL/Hr) IV Continuous <Continuous>  heparin  Injectable 5000 Unit(s) SubCutaneous every 8 hours  insulin lispro (HumaLOG) corrective regimen sliding scale   SubCutaneous every 6 hours  multivitamin/minerals/iron Oral Solution (CENTRUM) 15 milliLiter(s) Oral daily  nafcillin  IVPB 2 Gram(s) IV Intermittent every 4 hours  pantoprazole  Injectable 40 milliGRAM(s) IV Push daily  polyethylene glycol 3350 17 Gram(s) Oral every 24 hours  propofol Infusion 10 MICROgram(s)/kG/Min (4.416 mL/Hr) IV Continuous <Continuous>  PureFlow Dialysate RFP-401 (K 4 / Ca 3) 5000 milliLiter(s) (3500 mL/Hr) CRRT <Continuous>  senna 2 Tablet(s) Oral at bedtime  sodium chloride 0.9% lock flush 3 milliLiter(s) IV Push every 8 hours  vitamin E 400 International Unit(s) Oral daily  zinc sulfate 220 milliGRAM(s) Oral daily    MEDICATIONS  (PRN):  dextrose 40% Gel 15 Gram(s) Oral once PRN Blood Glucose LESS THAN 70 milliGRAM(s)/deciliter  glucagon  Injectable 1 milliGRAM(s) IntraMuscular once PRN Glucose LESS THAN 70 milligrams/deciliter    PAST MEDICAL & SURGICAL HISTORY:  Endocarditis  IVDU (intravenous drug user)  Smoker  No significant past surgical history    FAMILY HISTORY:  No pertinent family history in first degree relatives    SOCIAL HISTORY:  Tobacco: [ ] Current, [ ] Former, [ ] Never; Pack Years:  Alcohol:  Illicit Drugs:    REVIEW OF SYSTEMS:  All other 10 review of systems is negative except as indicated in HPI    Vital Signs Last 24 Hrs  T(C): 37.1 (15 Nov 2019 09:00), Max: 37.1 (15 Nov 2019 09:00)  T(F): 98.8 (15 Nov 2019 09:00), Max: 98.8 (15 Nov 2019 09:00)  HR: 106 (15 Nov 2019 10:00) (96 - 110)  BP: --  BP(mean): --  RR: 25 (15 Nov 2019 10:00) (8 - 35)  SpO2: 100% (15 Nov 2019 10:00) (97% - 100%)    11-14 @ 07:01  -  11-15 @ 07:00  --------------------------------------------------------  IN: 2461 mL / OUT: 4463 mL / NET: -2002 mL    11-15 @ 07:01  -  11-15 @ 10:44  --------------------------------------------------------  IN: 242.4 mL / OUT: 552 mL / NET: -309.6 mL      Mode: AC/ CMV (Assist Control/ Continuous Mandatory Ventilation)  RR (machine): 18  TV (machine): 500  FiO2: 40  PEEP: 5  ITime: 1  MAP: 13  PIP: 24    PHYSICAL EXAM:    General: Young male, intubated and sedated  Eyes: moist conjunctivae, sclerae anicteric  HENT: dry mucous membranes, ET tube in place  Neck: Trachea midline, supple  Lungs: Normal respiratory effort and no intercostal retractions  Cardiovascular: RRR, S1S2  Abdomen: Soft, non-tender non-distended; Normal bowel sounds; No rebound or guarding  Extremities: LE swelling, warm to touch  Neurological: sedated, opens eye to verbal stimuli  Skin: Warm and dry.     LABS:                        8.2    19.63 )-----------( 49       ( 15 Nov 2019 06:01 )             24.6     11-15    137  |  102  |  15  ----------------------------<  139<H>  3.8   |  26  |  0.93    Ca    8.1<L>      15 Nov 2019 06:01  Phos  2.7     11-15  Mg     2.1     11-15    TPro  6.2  /  Alb  2.6<L>  /  TBili  9.9<H>  /  DBili  8.5<H>  /  AST  81<H>  /  ALT  114<H>  /  AlkPhos  125<H>  11-15        PT/INR - ( 15 Nov 2019 06:01 )   PT: 14.3 sec;   INR: 1.26          PTT - ( 15 Nov 2019 06:01 )  PTT:39.6 sec    Culture - Acid Fast (collected 13 Nov 2019 12:05)    Culture - Fungal, Body Fluid (collected 13 Nov 2019 01:25)  Source: .Body Fluid pleural fluid  Preliminary Report (13 Nov 2019 08:30):    Testing in progress    Culture - Body Fluid with Gram Stain (collected 12 Nov 2019 19:21)  Source: .Body Fluid pleural fluid  Gram Stain (13 Nov 2019 09:40):    No organisms seen    Few White blood cells  Preliminary Report (13 Nov 2019 12:06):    No growth to date    Culture - Blood (collected 12 Nov 2019 16:09)  Source: .Blood Blood  Preliminary Report (14 Nov 2019 17:00):    No growth at 2 days.    Culture - Blood (collected 12 Nov 2019 16:09)  Source: .Blood Blood  Preliminary Report (14 Nov 2019 17:00):    No growth at 2 days.      RADIOLOGY & ADDITIONAL STUDIES:     < from: CT Angio Abdomen and Pelvis w/ Oral Cont and w/ IV Cont (11.12.19 @ 20:03) >  Suggestion of irregular masslike mural thickening of the posterior RIGHT   lateral wall of the rectum; correlation with direct visualization is   recommended, when clinically appropriate.    < end of copied text >  < from: CT Angio Abdomen and Pelvis w/ Oral Cont and w/ IV Cont (11.12.19 @ 20:03) >   There is very severe diffuse anasarca. Large volume of   abdominal and pelvic ascites.    < end of copied text > HPI:  40 y/o M smoker w/ PMHx of IVDU presented to [facility] w/ productive cough with hemoptysis found to have septic emboli w/ vegetation of the TV transferred to [facility] for surgical evaluation.  Course complicated by sepsis, possible DIC, acute respiratory failure w/ empyema s/p intubation and b/l thoracostomy tube, CAMERON requiring cvvhd.  Now being tapered off pressor support.  During hospital course, evaluation for source control found to have asymmetric rectal wall thickening concerning for alternative etiology.  Surgery consulted and ruled out another infectious source and recommend GI for visualization.  Patient was intubated on evaluation and unable to obtain ROS.          Allergies    Allergy Status Unknown    Intolerances      Home Medications:    MEDICATIONS:  MEDICATIONS  (STANDING):  albumin human 25% IVPB 50 milliLiter(s) IV Intermittent every 8 hours  ascorbic acid 500 milliGRAM(s) Oral daily  chlorhexidine 0.12% Liquid 15 milliLiter(s) Oral Mucosa every 12 hours  chlorhexidine 2% Cloths 1 Application(s) Topical daily  CRRT Treatment    <Continuous>  dextrose 5%. 1000 milliLiter(s) (50 mL/Hr) IV Continuous <Continuous>  dextrose 50% Injectable 12.5 Gram(s) IV Push once  dextrose 50% Injectable 25 Gram(s) IV Push once  dextrose 50% Injectable 25 Gram(s) IV Push once  fentaNYL   Infusion. 0.5 MICROgram(s)/kG/Hr (3.68 mL/Hr) IV Continuous <Continuous>  heparin  Injectable 5000 Unit(s) SubCutaneous every 8 hours  insulin lispro (HumaLOG) corrective regimen sliding scale   SubCutaneous every 6 hours  multivitamin/minerals/iron Oral Solution (CENTRUM) 15 milliLiter(s) Oral daily  nafcillin  IVPB 2 Gram(s) IV Intermittent every 4 hours  pantoprazole  Injectable 40 milliGRAM(s) IV Push daily  polyethylene glycol 3350 17 Gram(s) Oral every 24 hours  propofol Infusion 10 MICROgram(s)/kG/Min (4.416 mL/Hr) IV Continuous <Continuous>  PureFlow Dialysate RFP-401 (K 4 / Ca 3) 5000 milliLiter(s) (3500 mL/Hr) CRRT <Continuous>  senna 2 Tablet(s) Oral at bedtime  sodium chloride 0.9% lock flush 3 milliLiter(s) IV Push every 8 hours  vitamin E 400 International Unit(s) Oral daily  zinc sulfate 220 milliGRAM(s) Oral daily    MEDICATIONS  (PRN):  dextrose 40% Gel 15 Gram(s) Oral once PRN Blood Glucose LESS THAN 70 milliGRAM(s)/deciliter  glucagon  Injectable 1 milliGRAM(s) IntraMuscular once PRN Glucose LESS THAN 70 milligrams/deciliter    PAST MEDICAL & SURGICAL HISTORY:  Endocarditis  IVDU (intravenous drug user)  Smoker  No significant past surgical history    FAMILY HISTORY:  see EMR, unable to obtain from patient    SOCIAL HISTORY:  PER EMR   current everyday smoker  +marijuana, +IVDU + heroin    REVIEW OF SYSTEMS:  All other 10 review of systems is negative except as indicated in HPI    Vital Signs Last 24 Hrs  T(C): 37.1 (15 Nov 2019 09:00), Max: 37.1 (15 Nov 2019 09:00)  T(F): 98.8 (15 Nov 2019 09:00), Max: 98.8 (15 Nov 2019 09:00)  HR: 106 (15 Nov 2019 10:00) (96 - 110)  BP: --  BP(mean): --  RR: 25 (15 Nov 2019 10:00) (8 - 35)  SpO2: 100% (15 Nov 2019 10:00) (97% - 100%)    11-14 @ 07:01  -  11-15 @ 07:00  --------------------------------------------------------  IN: 2461 mL / OUT: 4463 mL / NET: -2002 mL    11-15 @ 07:01  -  11-15 @ 10:44  --------------------------------------------------------  IN: 242.4 mL / OUT: 552 mL / NET: -309.6 mL      Mode: AC/ CMV (Assist Control/ Continuous Mandatory Ventilation)  RR (machine): 18  TV (machine): 500  FiO2: 40  PEEP: 5  ITime: 1  MAP: 13  PIP: 24    PHYSICAL EXAM:    General: Young male, intubated and sedated  Eyes: moist conjunctivae, sclerae anicteric  HENT: dry mucous membranes, ET tube in place  Neck: Trachea midline, supple  Lungs: Normal respiratory effort and no intercostal retractions  Cardiovascular: RRR, S1S2  Abdomen: Soft, non-tender non-distended; Normal bowel sounds; No rebound or guarding  Extremities: LE swelling, warm to touch  Neurological: sedated, opens eye to verbal stimuli  Skin: Warm and dry.     LABS:                        8.2    19.63 )-----------( 49       ( 15 Nov 2019 06:01 )             24.6     11-15    137  |  102  |  15  ----------------------------<  139<H>  3.8   |  26  |  0.93    Ca    8.1<L>      15 Nov 2019 06:01  Phos  2.7     11-15  Mg     2.1     11-15    TPro  6.2  /  Alb  2.6<L>  /  TBili  9.9<H>  /  DBili  8.5<H>  /  AST  81<H>  /  ALT  114<H>  /  AlkPhos  125<H>  11-15        PT/INR - ( 15 Nov 2019 06:01 )   PT: 14.3 sec;   INR: 1.26          PTT - ( 15 Nov 2019 06:01 )  PTT:39.6 sec    Culture - Acid Fast (collected 13 Nov 2019 12:05)    Culture - Fungal, Body Fluid (collected 13 Nov 2019 01:25)  Source: .Body Fluid pleural fluid  Preliminary Report (13 Nov 2019 08:30):    Testing in progress    Culture - Body Fluid with Gram Stain (collected 12 Nov 2019 19:21)  Source: .Body Fluid pleural fluid  Gram Stain (13 Nov 2019 09:40):    No organisms seen    Few White blood cells  Preliminary Report (13 Nov 2019 12:06):    No growth to date    Culture - Blood (collected 12 Nov 2019 16:09)  Source: .Blood Blood  Preliminary Report (14 Nov 2019 17:00):    No growth at 2 days.    Culture - Blood (collected 12 Nov 2019 16:09)  Source: .Blood Blood  Preliminary Report (14 Nov 2019 17:00):    No growth at 2 days.      RADIOLOGY & ADDITIONAL STUDIES:     < from: CT Angio Abdomen and Pelvis w/ Oral Cont and w/ IV Cont (11.12.19 @ 20:03) >  Suggestion of irregular masslike mural thickening of the posterior RIGHT   lateral wall of the rectum; correlation with direct visualization is   recommended, when clinically appropriate.    < end of copied text >  < from: CT Angio Abdomen and Pelvis w/ Oral Cont and w/ IV Cont (11.12.19 @ 20:03) >   There is very severe diffuse anasarca. Large volume of   abdominal and pelvic ascites.    < end of copied text > HPI:  38 y/o M smoker w/ PMHx of IVDU presented to [facility] w/ productive cough with hemoptysis found to have septic emboli w/ vegetation of the TV transferred to [facility] for surgical evaluation.  Course complicated by sepsis, DIC, acute respiratory failure w/ empyema s/p intubation and thoracostomy tube, CAMERON requiring cvvhd.  Now being tapered off pressor support.  During hospital course, evaluation for source control found to have asymmetric rectal wall thickening concerning for alternative etiology.  Surgery consulted and ruled out another infectious source and recommend GI for visualization.  Patient was intubated on evaluation and unable to obtain ROS.          Allergies    Allergy Status Unknown    Intolerances      Home Medications:    MEDICATIONS:  MEDICATIONS  (STANDING):  albumin human 25% IVPB 50 milliLiter(s) IV Intermittent every 8 hours  ascorbic acid 500 milliGRAM(s) Oral daily  chlorhexidine 0.12% Liquid 15 milliLiter(s) Oral Mucosa every 12 hours  chlorhexidine 2% Cloths 1 Application(s) Topical daily  CRRT Treatment    <Continuous>  dextrose 5%. 1000 milliLiter(s) (50 mL/Hr) IV Continuous <Continuous>  dextrose 50% Injectable 12.5 Gram(s) IV Push once  dextrose 50% Injectable 25 Gram(s) IV Push once  dextrose 50% Injectable 25 Gram(s) IV Push once  fentaNYL   Infusion. 0.5 MICROgram(s)/kG/Hr (3.68 mL/Hr) IV Continuous <Continuous>  heparin  Injectable 5000 Unit(s) SubCutaneous every 8 hours  insulin lispro (HumaLOG) corrective regimen sliding scale   SubCutaneous every 6 hours  multivitamin/minerals/iron Oral Solution (CENTRUM) 15 milliLiter(s) Oral daily  nafcillin  IVPB 2 Gram(s) IV Intermittent every 4 hours  pantoprazole  Injectable 40 milliGRAM(s) IV Push daily  polyethylene glycol 3350 17 Gram(s) Oral every 24 hours  propofol Infusion 10 MICROgram(s)/kG/Min (4.416 mL/Hr) IV Continuous <Continuous>  PureFlow Dialysate RFP-401 (K 4 / Ca 3) 5000 milliLiter(s) (3500 mL/Hr) CRRT <Continuous>  senna 2 Tablet(s) Oral at bedtime  sodium chloride 0.9% lock flush 3 milliLiter(s) IV Push every 8 hours  vitamin E 400 International Unit(s) Oral daily  zinc sulfate 220 milliGRAM(s) Oral daily    MEDICATIONS  (PRN):  dextrose 40% Gel 15 Gram(s) Oral once PRN Blood Glucose LESS THAN 70 milliGRAM(s)/deciliter  glucagon  Injectable 1 milliGRAM(s) IntraMuscular once PRN Glucose LESS THAN 70 milligrams/deciliter    PAST MEDICAL & SURGICAL HISTORY:  Endocarditis  IVDU (intravenous drug user)  Smoker  No significant past surgical history    FAMILY HISTORY:  see EMR, unable to obtain from patient    SOCIAL HISTORY:  PER EMR   current everyday smoker  +marijuana, +IVDU + heroin    REVIEW OF SYSTEMS:  All other 10 review of systems is negative except as indicated in HPI    Vital Signs Last 24 Hrs  T(C): 37.1 (15 Nov 2019 09:00), Max: 37.1 (15 Nov 2019 09:00)  T(F): 98.8 (15 Nov 2019 09:00), Max: 98.8 (15 Nov 2019 09:00)  HR: 106 (15 Nov 2019 10:00) (96 - 110)  BP: --  BP(mean): --  RR: 25 (15 Nov 2019 10:00) (8 - 35)  SpO2: 100% (15 Nov 2019 10:00) (97% - 100%)    11-14 @ 07:01  -  11-15 @ 07:00  --------------------------------------------------------  IN: 2461 mL / OUT: 4463 mL / NET: -2002 mL    11-15 @ 07:01  -  11-15 @ 10:44  --------------------------------------------------------  IN: 242.4 mL / OUT: 552 mL / NET: -309.6 mL      Mode: AC/ CMV (Assist Control/ Continuous Mandatory Ventilation)  RR (machine): 18  TV (machine): 500  FiO2: 40  PEEP: 5  ITime: 1  MAP: 13  PIP: 24    PHYSICAL EXAM:    General: Young male, intubated and sedated  Eyes: moist conjunctivae, sclerae anicteric  HENT: dry mucous membranes, ET tube in place  Neck: Trachea midline, supple  Lungs: Normal respiratory effort and no intercostal retractions  Cardiovascular: RRR, S1S2  Abdomen: Soft, non-tender non-distended; Normal bowel sounds; No rebound or guarding  Extremities: LE swelling, discoloration of multiple digits  Neurological: sedated, opens eye to verbal stimuli  Skin: Warm and dry.     LABS:                        8.2    19.63 )-----------( 49       ( 15 Nov 2019 06:01 )             24.6     11-15    137  |  102  |  15  ----------------------------<  139<H>  3.8   |  26  |  0.93    Ca    8.1<L>      15 Nov 2019 06:01  Phos  2.7     11-15  Mg     2.1     11-15    TPro  6.2  /  Alb  2.6<L>  /  TBili  9.9<H>  /  DBili  8.5<H>  /  AST  81<H>  /  ALT  114<H>  /  AlkPhos  125<H>  11-15        PT/INR - ( 15 Nov 2019 06:01 )   PT: 14.3 sec;   INR: 1.26          PTT - ( 15 Nov 2019 06:01 )  PTT:39.6 sec    Culture - Acid Fast (collected 13 Nov 2019 12:05)    Culture - Fungal, Body Fluid (collected 13 Nov 2019 01:25)  Source: .Body Fluid pleural fluid  Preliminary Report (13 Nov 2019 08:30):    Testing in progress    Culture - Body Fluid with Gram Stain (collected 12 Nov 2019 19:21)  Source: .Body Fluid pleural fluid  Gram Stain (13 Nov 2019 09:40):    No organisms seen    Few White blood cells  Preliminary Report (13 Nov 2019 12:06):    No growth to date    Culture - Blood (collected 12 Nov 2019 16:09)  Source: .Blood Blood  Preliminary Report (14 Nov 2019 17:00):    No growth at 2 days.    Culture - Blood (collected 12 Nov 2019 16:09)  Source: .Blood Blood  Preliminary Report (14 Nov 2019 17:00):    No growth at 2 days.      RADIOLOGY & ADDITIONAL STUDIES:     < from: CT Angio Abdomen and Pelvis w/ Oral Cont and w/ IV Cont (11.12.19 @ 20:03) >  Suggestion of irregular masslike mural thickening of the posterior RIGHT   lateral wall of the rectum; correlation with direct visualization is   recommended, when clinically appropriate.    < end of copied text >  < from: CT Angio Abdomen and Pelvis w/ Oral Cont and w/ IV Cont (11.12.19 @ 20:03) >   There is very severe diffuse anasarca. Large volume of   abdominal and pelvic ascites.    < end of copied text >

## 2019-11-15 NOTE — PROVIDER CONTACT NOTE (CHANGE IN STATUS NOTIFICATION) - SITUATION
"On 10/12/2017 at 0948 called patient. Patient stated he is doing well post-operatively. Patient confirmed he has followed up with Dr. Jasmine. Patient states he does not need any further treatment for cancer. Patient states he will have frequent follow-ups with his physicians. Patient states he had a wonderful experience at [facility], patient stated his nurse \"Emilia\" was excellent. Patient denied questions or concerns about follow-up care.   " Patient becoming hypotensive.

## 2019-11-15 NOTE — PROGRESS NOTE ADULT - ATTENDING COMMENTS
seen and evaluated while on CVVHD  tolerating the procedure well  will try to increase net UF  using Phosphate dialysis when available to keep PO$ at 2.5-3 range

## 2019-11-15 NOTE — PROGRESS NOTE ADULT - ASSESSMENT
40 y/o M w/ PMH of IVDU and active smoker who came from [facility] on 11/8/19 in 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 After being transferred to  CTICU for surgical evaluation, pt was deemed to not be a surgical candidate and transferred to MICU for medical mgmt. ID, Nephrology, heme/onc, surgery following.    Neuro  Light sedatation on Propofol, Fentanyl. CT head negative for any intracranial embolic events.  - Follows commands and moves extremities     Cardiovascular     #Hypotension  Most likely 2/2 septic shock from infective endocarditis and RV failure 2/2 tricuspid regurgitation (ELIAN shows EF of 40-50%). Echo with EF 45%. ELIAN with EF 40-45%, 3.8 x1cm vegetation on TR valve, with severe TR, trivial pericardial effusion. Echo with bubble revealed no PFO. Titrated off Levophed. Can restart if necessary.   - Maintain MAP>65.   - C/W medical mgmt of infective endocarditis as below.    Respiratory    #Acute hypoxic respiratory failure   Most likely 2/2 alveolar hemorrhage in the setting of septic embolic causing numerous cavitary lesions on CT chest. CXR with scattered infiltrates and pleural effusions. Sputum culture negative. CT revealed "moderate bilateral pleural effusions and dense bibasilar consolidation with underlying septic emboli/pulmonary abscesses"  - C/W ventilator support, monitor mental status. Failed CPAP trials in morning and afternoon. Will reassess and attempt in AM   - Continue to treat IE as below    #Bilateral pulmonary effusion  Most likely empyema in setting of septic shock 2/2 infective endocarditis. Bilateral CT in place, confirmed by CXR, continues to drain serosanguinous fluid. Fluid analysis of both CT pleural fluid confirms complex exudate with high cellularity, low pH and low glucose.  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 Not a surgical candidate for a tricuspid valve replacement at this time as per CT surgery. Lactate 2.0 today. Initial blood cultures positive for staph epidermidis, likely a contaminant. Initial sputum culture positive for staph aureus  Repeat blood cultures with NGTD.   - ID following, C/W Nafcillin 2g q4 (Sensitive to Oxacillin as per OSH records), f/u recs   - Continue to monitor temps and vitals, consider additional Abx coverage if condition worsens  - F/u repeat sputum cx   - Monitor CBC     Renal    #Acute renal failure 2/2 ATN from hypoperfusion, Minimal urine output on, CVVHD, oliguric with 5-15cc/hr. Vancomycin level 37 on arrival so may be component of drug induced nephropathy. Bun/Cr continues to improve with CVVHD. Pt is clinically fluid overloaded, and hemodynamically stable. No renal infarcts as per CT abdomen/pelvis.   - F/U nephrology recs.  - C/W CVVHD as tolerated   - Attempt to correct fluid overload with CVVHD, net -1.9L yesterday. Continue goal net -1L today  - Monitor renal function with BUN/Cr  - Monitor I/Os    #Electrolytes abnormalities   On CVVHD. Hyperkalemia resolved. No EKG changes.  -Requires q6 BMP, Mg, and Phos while on CVVHD  -Monitor serum lytes, Ca     GI    OG on arrival with 600cc of bilious fluid, abdomen still distended but had several BMs, some loose. CT abdomen revealed no evidence of bowel ischemia or SBO.  - NG tube in place  -  feeds with Jevity 1.5  - DC Miralax BID as patient with a few loose BMs today   - CT abdomen/pelvis revealed "irregular masslike mural thickening of the posterior left lateral wall of the rectum." Surgery consulted, unlikely perirectal abscess. Recommend GI evaluation for possible scope.    - GI consult, f/u recs     #Congestive Hepatopathy   Transaminitis, coag derangements, hyperbilirubinemia most likely 2/2 hepatic congestion vs hemoylsis, due to severe TR in setting of infective endocarditis. Transaminases and Alk phos improving. Hepatitis panel negative.  - F/U CMP, direct bili   - Avoid Tylenol    Heme    #Hemolysis  Intravascular hemolysis with inital haptoglobin <10, . D-dimer elevated. Fibrinogen 318. Today T.bili elevated but stable, D.Bili increasing at 9.4, and clinical jaundice. h/h stable at 8.8/26. Platelets low but stable at 54. Fact VII/Factor VIII elevated. Unlikely 2/2 CVVHD. Likely 2/2 to sepsis and/or DIC.   - Heme consulted, follow recs  - Monitor direct and total bili, LDH, fibrinogen, platelets   - Transfuse platelets if <10K or bleeding and <50K  - Transfuse pRBCs for hgb <7     Vascular  Vascular surgery consulted in setting of gangrenous distal toes and fingers b/l. Likely 2/2 to pressors. Will follow up recs.     Lines: right IJ TLC and Ernesto (11/12), Axillary A-line (11/12), Left IJ (11/12), right peripheral (11/12)    F: 25% Albumin 50cc  E: replete K <4, Mg <2  N: Jevity 1.5  GI Ppx: Protonix   DVT Ppx: Heparin   Code status: FULL   Dispo: MICU 40 y/o M w/ PMH of IVDU and active smoker who came from [facility] on 11/8/19 in 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 After being transferred to  CTICU for surgical evaluation, pt was deemed to not be a surgical candidate and transferred to MICU for medical mgmt. ID, Nephrology, heme/onc, surgery following, vascular.    Neuro  Light sedatation on Propofol, Fentanyl. CT head negative for any intracranial embolic events.  - Follows commands and moves extremities     Cardiovascular     #Hypotension  Resolved. Most likely 2/2 septic shock from infective endocarditis and RV failure 2/2 tricuspid regurgitation (ELIAN shows EF of 40-50%). Echo with EF 45%. ELIAN with EF 40-45%, 3.8 x1cm vegetation on TR valve, with severe TR, trivial pericardial effusion. Echo with bubble revealed no PFO. Titrated off Levophed. Can restart if necessary.   - Maintain MAP>65.   - C/W medical mgmt of infective endocarditis as below.    Respiratory    #Acute hypoxic respiratory failure   Most likely 2/2 alveolar hemorrhage in the setting of septic embolic causing numerous cavitary lesions on CT chest. CXR with scattered infiltrates and pleural effusions. Sputum culture negative. CT revealed "moderate bilateral pleural effusions and dense bibasilar consolidation with underlying septic emboli/pulmonary abscesses." Failed CPAP trials yesterday evening.  - C/W ventilator support, monitor mental status.  - Attempt CPAP trial in PM  - Continue to treat IE as below    #Bilateral pulmonary effusion  Most likely empyema in setting of septic shock 2/2 infective endocarditis. Bilateral CT in place, confirmed by CXR, continues to drain serosanguinous fluid. Fluid analysis of both CT pleural fluid confirms complex exudate with high cellularity, low pH and low glucose. pleural fluid cultures with staph aureus . 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 Not a surgical candidate for a tricuspid valve replacement at this time as per CT surgery. Lactate 2.0 today. Initial blood cultures positive for staph epidermidis, likely a contaminant. Initial sputum culture positive for staph aureus  Repeat blood cultures with NGTD. Sputum cx NGTD.  - ID following, C/W Nafcillin 2g q4 (Sensitive to Oxacillin as per OSH records), f/u recs   - Consult w CT surgery now that pt's condition has improved, to determine if he is now a surgical candidate for repair of his tricuspid valve.  - Continue to monitor temps and vitals, consider additional Abx coverage if condition worsens  - Monitor CBC     Renal    #Acute renal failure most likely 2/2 ATN from hypoperfusion, Minimal urine output, on CVVHD, oliguric with 0-5cc/hr. Vancomycin level 37 on arrival so may be component of drug induced nephropathy. Bun/Cr continues to improve while on CVVHD. Pt is clinically fluid overloaded, and hemodynamically stable. No renal infarcts as per CT abdomen/pelvis. Optimized for Sx as per nephrology.  - F/U nephrology recs.  - C/W CVVHD as tolerated  - Concern that CVVHD is clotting: flow was increased, monitor function, plan to switch to intermittent dialysis when current fails.  - Attempt to correct fluid overload with HD, net -2.0L yesterday. Continue goal net -1L today  - Monitor renal function with BUN/Cr  - Monitor I/Os    #Electrolytes abnormalities   Resolved. On CVVHD. Hyperkalemia resolved. No EKG changes.  -Requires q6 BMP, Mg, and Phos while on CVVHD  -Monitor serum lytes, Ca     GI    OG on arrival with 600cc of bilious fluid, abdomen still distended but had several BMs, some loose. CT abdomen revealed no evidence of bowel ischemia or SBO. SUmp was DCed and replaced with NG tube. CT abdomen/pelvis revealed "irregular masslike mural thickening of the posterior left lateral wall of the rectum." Surgery consulted, unlikely perirectal abscess. Recommend GI evaluation for possible scope.  - NG tube in place  - C/W tube feeds, Jevity 1.5  - F/U GI consult, f/u recs     #Congestive Hepatopathy   Transaminitis, coag derangements, hyperbilirubinemia most likely 2/2 hepatic congestion vs hemolysis, due to severe TR in setting of infective endocarditis. Transaminases and Alk phos improving. Hepatitis panel negative.  - F/U CMP, direct bili   - Avoid Tylenol    Heme    #Hemolysis  Intravascular hemolysis with inital haptoglobin <10, LDH decreasing at 365. D-dimer elevated. Fibrinogen stable 396. Today still clinically jaundiced, with stable bilirubinemia: T.bili 9.9., D.bili 8.5. h/h stable at 8.1/25.5. Platelets decreasing from 54 to 49.. Fact VII/Factor VIII elevated. Unlikely 2/2 CVVHD. Likely 2/2 to sepsis and/or DIC.   - Avoid anticoagulation  - Heme consulted, follow recs  - Monitor direct and total bili, LDH, fibrinogen, platelets   - Transfuse platelets if <10K or bleeding and <50K  - Transfuse pRBCs for hgb <7     Vascular  Vascular surgery consulted in setting of gangrenous distal toes and fingers b/l. Likely 2/2 to pressors. Will follow up recs.     Lines: right IJ TLC and Ernesto (11/12), Axillary A-line (11/12), Left IJ (11/12), right peripheral (11/12)    F: 25% Albumin 50cc  E: replete K <4, Mg <2  N: Jevity 1.5  GI Ppx: Protonix   DVT Ppx: Heparin   Code status: FULL   Dispo: MICU 38 y/o M w/ PMH of IVDU and active smoker who came from [facility] on 11/8/19 in 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 After being transferred to  CTICU for surgical evaluation, pt was deemed to not be a surgical candidate and transferred to MICU for medical mgmt. ID, Nephrology, heme/onc, surgery following, vascular.    Neuro  Light sedatation on Propofol, Fentanyl. CT head negative for any intracranial embolic events.  - Follows commands and moves extremities     Cardiovascular     #Hypotension  Most likely 2/2 septic shock from infective endocarditis and RV failure 2/2 tricuspid regurgitation (ELIAN shows EF of 40-50%). Echo with EF 45%. ELIAN with EF 40-45%, 3.8 x1cm vegetation on TR valve, with severe TR, trivial pericardial effusion. Echo with bubble revealed no PFO. Titrated off Levophed. Can restart if necessary.   - Maintain MAP>65.   - C/W medical mgmt of infective endocarditis as below.    Respiratory    #Acute hypoxic respiratory failure   Most likely 2/2 alveolar hemorrhage in the setting of septic embolic causing numerous cavitary lesions on CT chest. CXR with scattered infiltrates and pleural effusions. Sputum culture negative. CT revealed "moderate bilateral pleural effusions and dense bibasilar consolidation with underlying septic emboli/pulmonary abscesses." Failed CPAP trials yesterday evening.  - C/W ventilator support, monitor mental status.  - Attempt CPAP trial in PM  - Continue to treat IE as below    #Bilateral pulmonary effusion  Most likely empyema in setting of septic shock 2/2 infective endocarditis. Bilateral CT in place, confirmed by CXR, continues to drain serosanguinous fluid. Fluid analysis of both CT pleural fluid confirms complex exudate with high cellularity, low pH and low glucose. pleural fluid cultures with staph aureus . 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 Not a surgical candidate for a tricuspid valve replacement at this time as per CT surgery. Lactate 2.0 today. Initial blood cultures positive for staph epidermidis, likely a contaminant. Initial sputum culture positive for staph aureus  Repeat blood cultures with NGTD. Sputum cx NGTD.  - ID following, C/W Nafcillin 2g q4 (Sensitive to Oxacillin as per OSH records), f/u recs   - Per CT surgery pt is too high risk for any surgery and would likely not survive procedure. Will consult Cardiology to optimize management of his right heart dysfunction.   - Continue to monitor temps and vitals, consider additional Abx coverage if condition worsens  - Monitor CBC     Renal    #Acute renal failure most likely 2/2 ATN from hypoperfusion, Minimal urine output, on CVVHD, oliguric with 0-5cc/hr. Vancomycin level 37 on arrival so may be component of drug induced nephropathy. Bun/Cr continues to improve while on CVVHD. Pt is clinically fluid overloaded, and hemodynamically stable. No renal infarcts as per CT abdomen/pelvis. Optimized for Sx as per nephrology.  - F/U nephrology recs.  - C/W CVVHD as tolerated  - Concern that CVVHD is clotting: flow was increased, monitor function, plan to switch to intermittent dialysis when current fails.  - Attempt to correct fluid overload with HD, net -2.0L yesterday. Continue goal net -1L today  - Monitor renal function with BUN/Cr  - Monitor I/Os    #Electrolytes abnormalities   Improved. On CVVHD. Hyperkalemia resolved. No EKG changes.  -Requires q6 BMP, Mg, and Phos while on CVVHD  -Monitor serum lytes, Ca     GI    OG on arrival with 600cc of bilious fluid, abdomen still distended but had several BMs, some loose. CT abdomen revealed no evidence of bowel ischemia or SBO. SUmp was DCed and replaced with NG tube. CT abdomen/pelvis revealed "irregular masslike mural thickening of the posterior left lateral wall of the rectum." Surgery consulted, unlikely perirectal abscess. Recommend GI evaluation for possible scope.  - NG tube in place  - C/W tube feeds, Jevity 1.5  - F/U GI consult, f/u recs     #Congestive Hepatopathy   Transaminitis, coag derangements, hyperbilirubinemia most likely 2/2 hepatic congestion vs hemolysis, due to severe TR in setting of infective endocarditis. Transaminases and Alk phos improving. Hepatitis panel negative.  - F/U CMP, direct bili   - Avoid Tylenol    Heme    #Hemolysis  Intravascular hemolysis with inital haptoglobin <10, LDH decreasing at 365. D-dimer elevated. Fibrinogen stable 396. Today still clinically jaundiced, with stable bilirubinemia: T.bili 9.9., D.bili 8.5. h/h stable at 8.1/25.5. Platelets decreasing from 54 to 49.. Fact VII/Factor VIII elevated. Unlikely 2/2 CVVHD. Likely 2/2 to sepsis and/or DIC.   - Avoid anticoagulation  - Heme consulted, follow recs  - Monitor direct and total bili, LDH, fibrinogen, platelets   - Transfuse platelets if <10K or bleeding and <50K  - Transfuse pRBCs for hgb <7     Vascular  Vascular surgery consulted in setting of gangrenous distal toes and fingers b/l. Likely 2/2 to pressors. Will follow up recs.     Lines: right IJ TLC and Ernesto (11/12), Axillary A-line (11/12), Left IJ (11/12), right peripheral (11/12)    F: 25% Albumin 50cc  E: replete K <4, Mg <2  N: Jevity 1.5  GI Ppx: Protonix   DVT Ppx: Heparin   Code status: FULL   Dispo: MICU

## 2019-11-15 NOTE — PROGRESS NOTE ADULT - SUBJECTIVE AND OBJECTIVE BOX
OVERNIGHT EVENTS:    SUBJECTIVE / INTERVAL HPI: Patient seen and examined at bedside.     VITAL SIGNS:  Vital Signs Last 24 Hrs  T(C): 36.3 (15 Nov 2019 01:40), Max: 36.7 (14 Nov 2019 17:00)  T(F): 97.4 (15 Nov 2019 01:40), Max: 98 (14 Nov 2019 17:00)  HR: 108 (15 Nov 2019 06:00) (96 - 110)  BP: 122/72 (14 Nov 2019 10:30) (122/72 - 122/72)  BP(mean): 89 (14 Nov 2019 10:30) (87 - 89)  RR: 15 (15 Nov 2019 06:00) (8 - 35)  SpO2: 100% (15 Nov 2019 06:00) (99% - 100%)    PHYSICAL EXAM:    General: WDWN  HEENT: NC/AT; PERRL, anicteric sclera; MMM  Neck: supple  Cardiovascular: +S1/S2; RRR  Respiratory: CTA B/L; no W/R/R  Gastrointestinal: soft, NT/ND; +BSx4  Extremities: WWP; no edema, clubbing or cyanosis  Vascular: 2+ radial, DP/PT pulses B/L  Neurological: AAOx3; no focal deficits    MEDICATIONS:  MEDICATIONS  (STANDING):  albumin human 25% IVPB 50 milliLiter(s) IV Intermittent every 8 hours  ascorbic acid 500 milliGRAM(s) Oral daily  chlorhexidine 0.12% Liquid 15 milliLiter(s) Oral Mucosa every 12 hours  chlorhexidine 2% Cloths 1 Application(s) Topical daily  CRRT Treatment    <Continuous>  dextrose 5%. 1000 milliLiter(s) (50 mL/Hr) IV Continuous <Continuous>  dextrose 50% Injectable 12.5 Gram(s) IV Push once  dextrose 50% Injectable 25 Gram(s) IV Push once  dextrose 50% Injectable 25 Gram(s) IV Push once  fentaNYL   Infusion. 0.5 MICROgram(s)/kG/Hr (3.68 mL/Hr) IV Continuous <Continuous>  heparin  Injectable 5000 Unit(s) SubCutaneous every 8 hours  insulin lispro (HumaLOG) corrective regimen sliding scale   SubCutaneous every 6 hours  multivitamin/minerals/iron Oral Solution (CENTRUM) 15 milliLiter(s) Oral daily  nafcillin  IVPB 2 Gram(s) IV Intermittent every 4 hours  pantoprazole  Injectable 40 milliGRAM(s) IV Push daily  propofol Infusion 10 MICROgram(s)/kG/Min (4.416 mL/Hr) IV Continuous <Continuous>  PureFlow Dialysate RFP-401 (K 4 / Ca 3) 5000 milliLiter(s) (3500 mL/Hr) CRRT <Continuous>  sodium chloride 0.9% lock flush 3 milliLiter(s) IV Push every 8 hours  vitamin E 400 International Unit(s) Oral daily  zinc sulfate 220 milliGRAM(s) Oral daily    MEDICATIONS  (PRN):  dextrose 40% Gel 15 Gram(s) Oral once PRN Blood Glucose LESS THAN 70 milliGRAM(s)/deciliter  glucagon  Injectable 1 milliGRAM(s) IntraMuscular once PRN Glucose LESS THAN 70 milligrams/deciliter      ALLERGIES:  Allergies    Allergy Status Unknown    Intolerances        LABS:                        8.8    20.17 )-----------( 54       ( 14 Nov 2019 09:18 )             26.0     11-15    135  |  101  |  15  ----------------------------<  134<H>  3.8   |  26  |  0.87    Ca    8.3<L>      15 Nov 2019 00:26  Phos  2.9     11-15  Mg     2.1     11-15    TPro  6.1  /  Alb  2.8<L>  /  TBili  11.3<H>  /  DBili  9.4<H>  /  AST  109<H>  /  ALT  154<H>  /  AlkPhos  124<H>  11-14    PT/INR - ( 14 Nov 2019 07:16 )   PT: 17.0 sec;   INR: 1.49          PTT - ( 14 Nov 2019 07:16 )  PTT:40.8 sec    CAPILLARY BLOOD GLUCOSE      POCT Blood Glucose.: 135 mg/dL (15 Nov 2019 05:54)      RADIOLOGY & ADDITIONAL TESTS: Reviewed. OVERNIGHT EVENTS: No acute events overnight. Patient failed second CPAP trial, developed tachypnea. Otherwise stable.    SUBJECTIVE / INTERVAL HPI: Patient seen and examined at bedside. He is intubated and sedated, cannot elicit HPI and ROS. Responds to verbal and tactile stimuli, opens eyes spontaneously, tracks motion,  with his hand.    VITAL SIGNS:  Vital Signs Last 24 Hrs  T(C): 36.3 (15 Nov 2019 01:40), Max: 36.7 (14 Nov 2019 17:00)  T(F): 97.4 (15 Nov 2019 01:40), Max: 98 (14 Nov 2019 17:00)  HR: 108 (15 Nov 2019 06:00) (96 - 110)  BP: 122/72 (14 Nov 2019 10:30) (122/72 - 122/72)  BP(mean): 89 (14 Nov 2019 10:30) (87 - 89)  RR: 15 (15 Nov 2019 06:00) (8 - 35)  SpO2: 100% (15 Nov 2019 06:00) (99% - 100%)    PHYSICAL EXAM:    General: Patient is resting in bed, NAD, appears ill and older than documented age. Significant jaundice.  HEENT: NC/AT; PERRL, icteric sclera; MMM  Neck: supple  Cardiovascular: +S1/S2; tachycardic, regular rhythm, systolic murmur heard at L sternal border  Respiratory: intubated, breathing comfortably on ventilator, diffuse rhonchi with decreased breath sounds b/l   Gastrointestinal: NG tube in place. Abdomen soft, NT, distended; +BSx4  Extremities: WWP; 2+ pitting edema in LEs/UEs b/l. No clubbing. Fingertips and toes are all cyanotic, dusky, and cold to touch bilaterally, toes worse than fingers.  : scrotal edema. Sevilla catheter in place.  Derm: gangrenous toes/fingers b/l, numerous lesions/track marks in bilateral upper extremities. Jaundiced.  Vascular: 2+ radial, DP/PT pulses B/L  Neurological: awake and alert on light sedation. Follows commands, moves extremities, squeezes his hands weakly.    MEDICATIONS:  MEDICATIONS  (STANDING):  albumin human 25% IVPB 50 milliLiter(s) IV Intermittent every 8 hours  ascorbic acid 500 milliGRAM(s) Oral daily  chlorhexidine 0.12% Liquid 15 milliLiter(s) Oral Mucosa every 12 hours  chlorhexidine 2% Cloths 1 Application(s) Topical daily  CRRT Treatment    <Continuous>  dextrose 5%. 1000 milliLiter(s) (50 mL/Hr) IV Continuous <Continuous>  dextrose 50% Injectable 12.5 Gram(s) IV Push once  dextrose 50% Injectable 25 Gram(s) IV Push once  dextrose 50% Injectable 25 Gram(s) IV Push once  fentaNYL   Infusion. 0.5 MICROgram(s)/kG/Hr (3.68 mL/Hr) IV Continuous <Continuous>  heparin  Injectable 5000 Unit(s) SubCutaneous every 8 hours  insulin lispro (HumaLOG) corrective regimen sliding scale   SubCutaneous every 6 hours  multivitamin/minerals/iron Oral Solution (CENTRUM) 15 milliLiter(s) Oral daily  nafcillin  IVPB 2 Gram(s) IV Intermittent every 4 hours  pantoprazole  Injectable 40 milliGRAM(s) IV Push daily  propofol Infusion 10 MICROgram(s)/kG/Min (4.416 mL/Hr) IV Continuous <Continuous>  PureFlow Dialysate RFP-401 (K 4 / Ca 3) 5000 milliLiter(s) (3500 mL/Hr) CRRT <Continuous>  sodium chloride 0.9% lock flush 3 milliLiter(s) IV Push every 8 hours  vitamin E 400 International Unit(s) Oral daily  zinc sulfate 220 milliGRAM(s) Oral daily    MEDICATIONS  (PRN):  dextrose 40% Gel 15 Gram(s) Oral once PRN Blood Glucose LESS THAN 70 milliGRAM(s)/deciliter  glucagon  Injectable 1 milliGRAM(s) IntraMuscular once PRN Glucose LESS THAN 70 milligrams/deciliter      ALLERGIES:  Allergies    Allergy Status Unknown    Intolerances        LABS:                        8.8    20.17 )-----------( 54       ( 14 Nov 2019 09:18 )             26.0     11-15    135  |  101  |  15  ----------------------------<  134<H>  3.8   |  26  |  0.87    Ca    8.3<L>      15 Nov 2019 00:26  Phos  2.9     11-15  Mg     2.1     11-15    TPro  6.1  /  Alb  2.8<L>  /  TBili  11.3<H>  /  DBili  9.4<H>  /  AST  109<H>  /  ALT  154<H>  /  AlkPhos  124<H>  11-14    PT/INR - ( 14 Nov 2019 07:16 )   PT: 17.0 sec;   INR: 1.49          PTT - ( 14 Nov 2019 07:16 )  PTT:40.8 sec    CAPILLARY BLOOD GLUCOSE      POCT Blood Glucose.: 135 mg/dL (15 Nov 2019 05:54)      RADIOLOGY & ADDITIONAL TESTS: Reviewed.

## 2019-11-15 NOTE — PROGRESS NOTE ADULT - ASSESSMENT
39M with PMH of IVDU admitted for sepsis 2/2 infective endocarditis requiring multiple pressors, now off pressors, now with ischemic digits on RUE and RLE    Recommendations:  - Will continue to monitor, may need amputation in the future once ischemic tissue fully demarcates, and once patient is medically stable  - Recommend Hand Surgery consult for ischemic fingers  - Seen and Examined with Attending  - Call [phone] with questions

## 2019-11-16 NOTE — PROGRESS NOTE ADULT - ASSESSMENT
A/p  38 yo male with PMH of IVDU was transferred from [facility] for IE with tricupsid valve vegetations, Nephrology consulted for anuric holly and electrolytes disturbances.

## 2019-11-16 NOTE — PROGRESS NOTE ADULT - SUBJECTIVE AND OBJECTIVE BOX
INTERVAL HPI/OVERNIGHT EVENTS:  Restarted on NE    ROS:  Unobtainable      ANTIBIOTICS/RELEVANT:  Nafcillin 2 g IV q4h (11/10-present)    Pip-tazo 11/13          Vital Signs Last 24 Hrs  T(C): 36.7 (16 Nov 2019 09:00), Max: 36.8 (15 Nov 2019 17:00)  T(F): 98 (16 Nov 2019 09:00), Max: 98.3 (15 Nov 2019 17:00)  HR: 98 (16 Nov 2019 10:00) (98 - 114)  BP: 100/65 (15 Nov 2019 21:00) (89/57 - 100/65)  BP(mean): 77 (15 Nov 2019 21:00) (73 - 77)  RR: 25 (16 Nov 2019 10:00) (15 - 31)  SpO2: 98% (16 Nov 2019 10:00) (95% - 100%)    PHYSICAL EXAM:  Constitutional:  Well-developed, well nourished  Eyes:  Sclerae anicterica, conjunctivae clear, PERRL  Ear/Nose/Throat:  No nasal exudate or sinus tenderness;  No buccal mucosal lesions, no pharyngeal erythema or exudate	  Neck:  Supple, no adenopathy  Respiratory:  Clear bilaterally  Cardiovascular:  RRR, S1S2, no murmur appreciated  Gastrointestinal:  Symmetric, normoactive BS, soft, NT, no masses, guarding or rebound.  No HSM  Extremities:  No edema      LABS:                        8.1    24.34 )-----------( 65       ( 16 Nov 2019 05:34 )             25.3         11-16    135  |  101  |  10  ----------------------------<  154<H>  4.2   |  25  |  0.69    Ca    8.5      16 Nov 2019 05:34  Phos  2.8     11-16  Mg     1.8     11-16    TPro  6.5  /  Alb  2.9<L>  /  TBili  9.5<H>  /  DBili  x   /  AST  69<H>  /  ALT  93<H>  /  AlkPhos  116  11-16          MICROBIOLOGY:  Blood cultures:  11/11 X 2 -MSSA (1/4 bottles);  Staph epi (1/4 bottles);  11/12 X 2 - NG    Sputum culture:  11/11 - mod MSSA  Body fluid - pleural fluid 11/11: rare MSSA;  AFB smear neg  Body fluid - pleural fluid 11/12:  rare MSSA;  fungal culture in progress, AFB semar neg    HIV 1/2 combo 11/12 - NR    RADIOLOGY & ADDITIONAL STUDIES:  < from: Xray Chest 1 View- PORTABLE-Routine (11.16.19 @ 05:52) >  Portable examination of the chest demonstrates no interval change lung   infiltrates in comparison toprior examination of the chest 11/15/2019.   Right effusion. No interval change this remaining support devices in   comparison to prior examination of the chest 11/15/2019. Left chest tube.   Left basilar pneumothorax.    Impression: No interval change lung infiltrates. Left basilar   pneumothorax. Left chest tube noted    < end of copied text > INTERVAL HPI/OVERNIGHT EVENTS:  Restarted on NE    ROS:  Unobtainable      ANTIBIOTICS/RELEVANT:  Nafcillin 2 g IV q4h (11/10-present)    Pip-tazo 11/13          Vital Signs Last 24 Hrs  T(C): 36.7 (16 Nov 2019 09:00), Max: 36.8 (15 Nov 2019 17:00)  T(F): 98 (16 Nov 2019 09:00), Max: 98.3 (15 Nov 2019 17:00)  HR: 98 (16 Nov 2019 10:00) (98 - 114)  BP: 100/65 (15 Nov 2019 21:00) (89/57 - 100/65)  BP(mean): 77 (15 Nov 2019 21:00) (73 - 77)  RR: 25 (16 Nov 2019 10:00) (15 - 31)  SpO2: 98% (16 Nov 2019 10:00) (95% - 100%)    PHYSICAL EXAM:  Constitutional:  Acutely ill-appearing, sedated  Eyes:  Sclerae icteric, subconjunctival hemorrhage on L  Ear/Nose/Throat:  No nasal exudate or sinus tenderness;  No buccal mucosal lesions, no pharyngeal erythema or exudate	  Neck:  Supple, no adenopathy  Respiratory:  Clear bilaterally  Cardiovascular:  RRR, S1S2, no murmur appreciated  Gastrointestinal:  Symmetric, normoactive BS, soft, NT, no masses, guarding or rebound.  No HSM  Extremities:  No edema      LABS:                        8.1    24.34 )-----------( 65       ( 16 Nov 2019 05:34 )             25.3         11-16    135  |  101  |  10  ----------------------------<  154<H>  4.2   |  25  |  0.69    Ca    8.5      16 Nov 2019 05:34  Phos  2.8     11-16  Mg     1.8     11-16    TPro  6.5  /  Alb  2.9<L>  /  TBili  9.5<H>  /  DBili  x   /  AST  69<H>  /  ALT  93<H>  /  AlkPhos  116  11-16          MICROBIOLOGY:  Blood cultures:  11/11 X 2 -MSSA (1/4 bottles);  Staph epi (1/4 bottles);  11/12 X 2 - NG    Sputum culture:  11/11 - mod MSSA  Body fluid - pleural fluid 11/11: rare MSSA;  AFB smear neg  Body fluid - pleural fluid 11/12:  rare MSSA;  fungal culture in progress, AFB semar neg    HIV 1/2 combo 11/12 - NR    RADIOLOGY & ADDITIONAL STUDIES:  < from: Xray Chest 1 View- PORTABLE-Routine (11.16.19 @ 05:52) >  Portable examination of the chest demonstrates no interval change lung   infiltrates in comparison toprior examination of the chest 11/15/2019.   Right effusion. No interval change this remaining support devices in   comparison to prior examination of the chest 11/15/2019. Left chest tube.   Left basilar pneumothorax.    Impression: No interval change lung infiltrates. Left basilar   pneumothorax. Left chest tube noted    < end of copied text > INTERVAL HPI/OVERNIGHT EVENTS:  Restarted on NE    ROS:  Unobtainable      ANTIBIOTICS/RELEVANT:  Nafcillin 2 g IV q4h (11/10-present)    Pip-tazo 11/13      Vital Signs Last 24 Hrs  T(C): 36.7 (16 Nov 2019 09:00), Max: 36.8 (15 Nov 2019 17:00)  T(F): 98 (16 Nov 2019 09:00), Max: 98.3 (15 Nov 2019 17:00)  HR: 98 (16 Nov 2019 10:00) (98 - 114)  BP: 100/65 (15 Nov 2019 21:00) (89/57 - 100/65)  BP(mean): 77 (15 Nov 2019 21:00) (73 - 77)  RR: 25 (16 Nov 2019 10:00) (15 - 31)  SpO2: 98% (16 Nov 2019 10:00) (95% - 100%)    PHYSICAL EXAM:  Constitutional:  Acutely ill-appearing, sedated  Eyes:  Sclerae icteric, subconjunctival hemorrhage OD  Ear/Nose/Throat:  Feeding tube, ETT	  Neck:  RIJ-HD catheter receiving CVVHD;  LIJ TLC  Respiratory:  Coarse BS anteriorly.  L chest tube  Cardiovascular:  Heart sounds obscured by BS  Gastrointestinal:  Symmetric, normoactive BS, softly distended, NT, no masses, guarding or rebound.  No HSM  :  raya catheter in place  Extremities:  Necrotic distal toes - 5/5 on R, 1/5 on L;  multiple track marks.      LABS:                        8.1    24.34 )-----------( 65       ( 16 Nov 2019 05:34 )             25.3         11-16    135  |  101  |  10  ----------------------------<  154<H>  4.2   |  25  |  0.69    Ca    8.5      16 Nov 2019 05:34  Phos  2.8     11-16  Mg     1.8     11-16    TPro  6.5  /  Alb  2.9<L>  /  TBili  9.5<H>  /  DBili  x   /  AST  69<H>  /  ALT  93<H>  /  AlkPhos  116  11-16          MICROBIOLOGY:  Blood cultures:  11/11 X 2 -MSSA (1/4 bottles);  Staph epi (1/4 bottles);  11/12 X 2 - NG    Sputum culture:  11/11 - mod MSSA  Body fluid - pleural fluid 11/11: rare MSSA;  AFB smear neg  Body fluid - pleural fluid 11/12:  rare MSSA;  fungal culture in progress, AFB semar neg    HIV 1/2 combo 11/12 - NR    RADIOLOGY & ADDITIONAL STUDIES:  < from: Xray Chest 1 View- PORTABLE-Routine (11.16.19 @ 05:52) >  Portable examination of the chest demonstrates no interval change lung   infiltrates in comparison toprior examination of the chest 11/15/2019.   Right effusion. No interval change this remaining support devices in   comparison to prior examination of the chest 11/15/2019. Left chest tube.   Left basilar pneumothorax.    Impression: No interval change lung infiltrates. Left basilar   pneumothorax. Left chest tube noted    < end of copied text >

## 2019-11-16 NOTE — PROGRESS NOTE ADULT - ASSESSMENT
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  Multiple septic emboli to lungs, course c/b empyema requiring B chest tubes, as well as multi-organ failure (shock liver, CAMERON requiring CVVHD).   Necrotic digits likely related to pressors.  Afebrile but with ongoing marked leukocytosis.  Per CTS, he is not a surgical candidate.  He remains very critically ill.  Suggest:  - Continue to f/u surveillance blood cultures from 11/12  - Continue nafcillin 2 g IV q4h  Recommendations discussed with primary team.  Will follow with you – ID Team 1.

## 2019-11-16 NOTE — PROGRESS NOTE ADULT - ASSESSMENT
40 y/o M w/ PMH of IVDU and active smoker who came from [facility] on 11/8/19 in 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 After being transferred to  CTICU for surgical evaluation, pt was deemed to not be a surgical candidate and transferred to MICU for medical mgmt. ID, Nephrology, heme/onc, surgery following, vascular.    Neuro  Light sedation on Propofol, Fentanyl. CT head negative for any intracranial embolic events.  - Follows commands and moves extremities     Cardiovascular     #Hypotension  Most likely 2/2 septic shock from infective endocarditis and RV failure 2/2 tricuspid regurgitation (ELIAN shows EF of 40-50%). Echo with EF 45%. ELIAN with EF 40-45%, 3.8 x1cm vegetation on TR valve, with severe TR, trivial pericardial effusion. Echo with bubble revealed no PFO. Restarted on levophed, titrate as appropriate   - Maintain MAP>65.   - C/W medical mgmt of infective endocarditis as below.    Respiratory    #Acute hypoxic respiratory failure   Most likely 2/2 alveolar hemorrhage in the setting of septic embolic causing numerous cavitary lesions on CT chest. CXR with scattered infiltrates and pleural effusions. Sputum culture negative. CT revealed "moderate bilateral pleural effusions and dense bibasilar consolidation with underlying septic emboli/pulmonary abscesses." Failed CPAP trials yesterday evening.  - C/W ventilator support, monitor mental status.  - Attempt CPAP trial as appropriate   - Continue to treat IE as below    #Bilateral pulmonary effusion  Most likely empyema in setting of septic shock 2/2 infective endocarditis. Bilateral CT in place, confirmed by CXR, continues to drain serosanguinous fluid. Fluid analysis of both CT pleural fluid confirms complex exudate with high cellularity, low pH and low glucose. pleural fluid cultures with staph aureus  - Monitor output of bilateral chest tubes  - F/U pleural fluid cultures  - Lung tissue was suctioned from left chest tube w/ air coming from chest tube. CXR consistent for PTX. Chest tube set to sunction.    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 Not a surgical candidate for a tricuspid valve replacement at this time as per CT surgery. Lactate 2.0 today. Initial blood cultures positive for staph epidermidis, likely a contaminant. Initial sputum culture positive for staph aureus  Repeat blood cultures with NGTD. Sputum cx NGTD.  - ID following, C/W Nafcillin 2g q4 (Sensitive to Oxacillin as per OSH records), f/u recs   - Per CT surgery pt is too high risk for any surgery and would likely not survive procedure. Will consult Cardiology to optimize management of his right heart dysfunction.   - Continue to monitor temps and vitals, consider additional Abx coverage if condition worsens  - Monitor CBC     Renal    #Acute renal failure most likely 2/2 ATN from hypoperfusion, Minimal urine output, on CVVHD, oliguric with 0-5cc/hr. Vancomycin level 37 on arrival so may be component of drug induced nephropathy. Bun/Cr continues to improve while on CVVHD. Pt is clinically fluid overloaded, and hemodynamically stable. No renal infarcts as per CT abdomen/pelvis. Optimized for Sx as per nephrology.  - F/U nephrology recs.  - C/W CVVHD as tolerated  - Concern that CVVHD is clotting: flow was increased, monitor function, plan to switch to intermittent dialysis when current fails.  - Attempt to correct fluid overload with HD, net -1.2L. Continue goal net -1L today  - Monitor renal function with BUN/Cr  - Monitor I/Os    #Electrolytes abnormalities   Improved. On CVVHD. Hyperkalemia resolved. No EKG changes.  -Requires q6 BMP, Mg, and Phos while on CVVHD  -Monitor serum lytes, Ca     GI    OG on arrival with 600cc of bilious fluid, abdomen still distended but had several BMs, some loose. CT abdomen revealed no evidence of bowel ischemia or SBO. SUmp was DCed and replaced with NG tube. CT abdomen/pelvis revealed "irregular masslike mural thickening of the posterior left lateral wall of the rectum." Surgery consulted, unlikely perirectal abscess. Recommend GI evaluation for possible scope.  - NG tube in place  - C/W tube feeds, Jevity 1.5  - F/U GI consult, f/u recs.     #Congestive Hepatopathy   Transaminitis, coag derangements, hyperbilirubinemia most likely 2/2 hepatic congestion vs hemolysis, due to severe TR in setting of infective endocarditis. Transaminases and Alk phos improving. Hepatitis panel negative.  - F/U CMP, direct bili   - Avoid Tylenol    Heme    #Hemolysis  Intravascular hemolysis with inital haptoglobin <10, LDH decreasing at 365. D-dimer elevated. Fibrinogen stable 396. Today still clinically jaundiced, with stable bilirubinemia: T.bili 9.9., D.bili 8.5. h/h stable at 8.1/25.5. Platelets decreasing from 54 to 49.. Fact VII/Factor VIII elevated. Unlikely 2/2 CVVHD. Likely 2/2 to sepsis and/or DIC.   - Avoid anticoagulation  - Heme consulted, follow recs  - Monitor direct and total bili, LDH, fibrinogen, platelets   - Transfuse platelets if <10K or bleeding and <50K  - Transfuse pRBCs for hgb <7     Vascular  Vascular surgery consulted in setting of gangrenous distal toes and fingers b/l. Likely 2/2 to pressors. Will follow up recs.     Lines: right IJ TLC and Ernesto (11/12), Axillary A-line (11/12), Left IJ (11/12), right peripheral (11/12)    F: 25% Albumin 50cc  E: replete K <4, Mg <2  N: Jevity 1.5  GI Ppx: Protonix   DVT Ppx: Heparin   Code status: FULL   Dispo: MICU

## 2019-11-16 NOTE — PROGRESS NOTE ADULT - SUBJECTIVE AND OBJECTIVE BOX
OVERNIGHT EVENTS: NIELS. Left chest tube suction ordered for AM    SUBJECTIVE / INTERVAL HPI: Patient seen and examined at bedside.     VITAL SIGNS:  Vital Signs Last 24 Hrs  T(C): 36.1 (16 Nov 2019 17:00), Max: 36.7 (16 Nov 2019 09:00)  T(F): 97 (16 Nov 2019 17:00), Max: 98 (16 Nov 2019 09:00)  HR: 106 (16 Nov 2019 21:00) (98 - 112)  BP: 90/60 (16 Nov 2019 20:00) (90/60 - 90/60)  BP(mean): 69 (16 Nov 2019 20:00) (69 - 69)  RR: 22 (16 Nov 2019 21:00) (15 - 27)  SpO2: 100% (16 Nov 2019 21:00) (95% - 100%)    PHYSICAL EXAM:    General: WDWN  HEENT: NC/AT; PERRL, anicteric sclera; MMM  Neck: supple  Cardiovascular: +S1/S2; RRR  Respiratory: CTA B/L; no W/R/R  Gastrointestinal: soft, NT/ND; +BSx4  Extremities: WWP; no edema, clubbing or cyanosis  Vascular: 2+ radial, DP/PT pulses B/L  Neurological: AAOx3; no focal deficits    MEDICATIONS:  MEDICATIONS  (STANDING):  albumin human 25% IVPB 50 milliLiter(s) IV Intermittent every 8 hours  ascorbic acid 500 milliGRAM(s) Oral daily  chlorhexidine 0.12% Liquid 15 milliLiter(s) Oral Mucosa every 12 hours  chlorhexidine 2% Cloths 1 Application(s) Topical daily  CRRT Treatment    <Continuous>  dextrose 5%. 1000 milliLiter(s) (50 mL/Hr) IV Continuous <Continuous>  dextrose 50% Injectable 12.5 Gram(s) IV Push once  dextrose 50% Injectable 25 Gram(s) IV Push once  dextrose 50% Injectable 25 Gram(s) IV Push once  fentaNYL   Infusion. 0.5 MICROgram(s)/kG/Hr (3.68 mL/Hr) IV Continuous <Continuous>  heparin  Injectable 5000 Unit(s) SubCutaneous every 8 hours  insulin lispro (HumaLOG) corrective regimen sliding scale   SubCutaneous every 6 hours  magnesium sulfate  IVPB 2 Gram(s) IV Intermittent once  multivitamin/minerals/iron Oral Solution (CENTRUM) 15 milliLiter(s) Oral daily  nafcillin  IVPB 2 Gram(s) IV Intermittent every 4 hours  norepinephrine Infusion 0.05 MICROgram(s)/kG/Min (6.9 mL/Hr) IV Continuous <Continuous>  pantoprazole  Injectable 40 milliGRAM(s) IV Push daily  polyethylene glycol 3350 17 Gram(s) Oral every 24 hours  propofol Infusion 10 MICROgram(s)/kG/Min (4.416 mL/Hr) IV Continuous <Continuous>  PureFlow Dialysate RFP-401 (K 4 / Ca 3) 5000 milliLiter(s) (3500 mL/Hr) CRRT <Continuous>  senna 2 Tablet(s) Oral at bedtime  sodium chloride 0.9% lock flush 3 milliLiter(s) IV Push every 8 hours  vitamin E 400 International Unit(s) Oral daily  zinc sulfate 220 milliGRAM(s) Oral daily    MEDICATIONS  (PRN):  dextrose 40% Gel 15 Gram(s) Oral once PRN Blood Glucose LESS THAN 70 milliGRAM(s)/deciliter  glucagon  Injectable 1 milliGRAM(s) IntraMuscular once PRN Glucose LESS THAN 70 milligrams/deciliter      ALLERGIES:  Allergies    Allergy Status Unknown    Intolerances        LABS:                        8.1    20.31 )-----------( 59       ( 16 Nov 2019 12:50 )             24.7     11-16    136  |  101  |  8   ----------------------------<  125<H>  4.3   |  24  |  0.64    Ca    8.9      16 Nov 2019 17:58  Phos  2.1     11-16  Mg     1.8     11-16    TPro  6.5  /  Alb  2.9<L>  /  TBili  9.5<H>  /  DBili  x   /  AST  69<H>  /  ALT  93<H>  /  AlkPhos  116  11-16    PT/INR - ( 15 Nov 2019 06:01 )   PT: 14.3 sec;   INR: 1.26          PTT - ( 15 Nov 2019 06:01 )  PTT:39.6 sec    CAPILLARY BLOOD GLUCOSE      POCT Blood Glucose.: 115 mg/dL (16 Nov 2019 17:28)      RADIOLOGY & ADDITIONAL TESTS: Reviewed. OVERNIGHT EVENTS: NIELS. Left chest tube suction ordered for AM    SUBJECTIVE / INTERVAL HPI: Patient seen and examined at bedside. Pt intubated and sedated. Opens eyes spontaneously and to voice command. Attempts to write to express himself however does not have the dexterity/strength. Denies pain.    VITAL SIGNS:  Vital Signs Last 24 Hrs  T(C): 36.1 (16 Nov 2019 17:00), Max: 36.7 (16 Nov 2019 09:00)  T(F): 97 (16 Nov 2019 17:00), Max: 98 (16 Nov 2019 09:00)  HR: 106 (16 Nov 2019 21:00) (98 - 112)  BP: 90/60 (16 Nov 2019 20:00) (90/60 - 90/60)  BP(mean): 69 (16 Nov 2019 20:00) (69 - 69)  RR: 22 (16 Nov 2019 21:00) (15 - 27)  SpO2: 100% (16 Nov 2019 21:00) (95% - 100%)    PHYSICAL EXAM:    General: Pt lying in bed, intubated and sedated, jaundiced  HEENT: NC/AT; PERRL, scleral icterus; MMM  Neck: supple  Cardiovascular: +S1/S2; RRR, systolic murmur at L sternal border  Respiratory: intubated, rhonchi b/l; no wheezes  Gastrointestinal: soft, NT/ND; +BSx4  Extremities: WWP; Pitting edema in b/l UE and LE. Fingertips and toes are all cyanotic, dusky, and cold to touch bilaterally, toes worse than fingers.  Vascular: 2+ radial, DP/PT pulses B/L  Neurological: alert, awake, follows commands, able to squeeze fingers    MEDICATIONS:  MEDICATIONS  (STANDING):  albumin human 25% IVPB 50 milliLiter(s) IV Intermittent every 8 hours  ascorbic acid 500 milliGRAM(s) Oral daily  chlorhexidine 0.12% Liquid 15 milliLiter(s) Oral Mucosa every 12 hours  chlorhexidine 2% Cloths 1 Application(s) Topical daily  CRRT Treatment    <Continuous>  dextrose 5%. 1000 milliLiter(s) (50 mL/Hr) IV Continuous <Continuous>  dextrose 50% Injectable 12.5 Gram(s) IV Push once  dextrose 50% Injectable 25 Gram(s) IV Push once  dextrose 50% Injectable 25 Gram(s) IV Push once  fentaNYL   Infusion. 0.5 MICROgram(s)/kG/Hr (3.68 mL/Hr) IV Continuous <Continuous>  heparin  Injectable 5000 Unit(s) SubCutaneous every 8 hours  insulin lispro (HumaLOG) corrective regimen sliding scale   SubCutaneous every 6 hours  magnesium sulfate  IVPB 2 Gram(s) IV Intermittent once  multivitamin/minerals/iron Oral Solution (CENTRUM) 15 milliLiter(s) Oral daily  nafcillin  IVPB 2 Gram(s) IV Intermittent every 4 hours  norepinephrine Infusion 0.05 MICROgram(s)/kG/Min (6.9 mL/Hr) IV Continuous <Continuous>  pantoprazole  Injectable 40 milliGRAM(s) IV Push daily  polyethylene glycol 3350 17 Gram(s) Oral every 24 hours  propofol Infusion 10 MICROgram(s)/kG/Min (4.416 mL/Hr) IV Continuous <Continuous>  PureFlow Dialysate RFP-401 (K 4 / Ca 3) 5000 milliLiter(s) (3500 mL/Hr) CRRT <Continuous>  senna 2 Tablet(s) Oral at bedtime  sodium chloride 0.9% lock flush 3 milliLiter(s) IV Push every 8 hours  vitamin E 400 International Unit(s) Oral daily  zinc sulfate 220 milliGRAM(s) Oral daily    MEDICATIONS  (PRN):  dextrose 40% Gel 15 Gram(s) Oral once PRN Blood Glucose LESS THAN 70 milliGRAM(s)/deciliter  glucagon  Injectable 1 milliGRAM(s) IntraMuscular once PRN Glucose LESS THAN 70 milligrams/deciliter      ALLERGIES:  Allergies    Allergy Status Unknown    Intolerances        LABS:                        8.1    20.31 )-----------( 59       ( 16 Nov 2019 12:50 )             24.7     11-16    136  |  101  |  8   ----------------------------<  125<H>  4.3   |  24  |  0.64    Ca    8.9      16 Nov 2019 17:58  Phos  2.1     11-16  Mg     1.8     11-16    TPro  6.5  /  Alb  2.9<L>  /  TBili  9.5<H>  /  DBili  x   /  AST  69<H>  /  ALT  93<H>  /  AlkPhos  116  11-16    PT/INR - ( 15 Nov 2019 06:01 )   PT: 14.3 sec;   INR: 1.26          PTT - ( 15 Nov 2019 06:01 )  PTT:39.6 sec    CAPILLARY BLOOD GLUCOSE      POCT Blood Glucose.: 115 mg/dL (16 Nov 2019 17:28)      RADIOLOGY & ADDITIONAL TESTS: Reviewed.

## 2019-11-16 NOTE — PROGRESS NOTE ADULT - ATTENDING COMMENTS
Patient seen and examined with house-staff during bedside rounds.  Resident note read, including vitals, physical findings, laboratory data, and radiological reports.   Revisions included below.  Direct personal management at bed side and extensive interpretation of the data.  Plan was outlined and discussed in details with the housestaff.  Decision making of high complexity  Action taken for acute disease activity to reflect the level of care provided:  - medication reconciliation  - review laboratory data  resp failure bacteremia endocarditis septic pneumonia empyema PTX septic shock renal failure anemia thrombocytopenia  no wean  continue CT drainage  wean of FiO2  will evaluate for need for another CT in left,  CT is on suction   no air leak  wean of pressors  on HD  prognosis is poor

## 2019-11-16 NOTE — PROGRESS NOTE ADULT - SUBJECTIVE AND OBJECTIVE BOX
O/N Events: hypotensive, back on levo/worsening of pnx  Subjective:  intubated and sedated, but opens eyes and follows command, SANTOS/ remained on CVVHS but UF rate decreased as levo back on board  remained on 40% FiO2/ electrolytes WNL sofar/ still has sig upper thigh and sacral edema   FB -1.2 L from yesterday    VITALS  Vital Signs Last 24 Hrs  T(C): 36.5 (16 Nov 2019 13:00), Max: 36.8 (15 Nov 2019 17:00)  T(F): 97.7 (16 Nov 2019 13:00), Max: 98.3 (15 Nov 2019 17:00)  HR: 104 (16 Nov 2019 13:00) (98 - 114)  BP: 100/65 (15 Nov 2019 21:00) (89/57 - 100/65)  BP(mean): 77 (15 Nov 2019 21:00) (73 - 77)  RR: 22 (16 Nov 2019 13:00) (15 - 31)  SpO2: 100% (16 Nov 2019 13:00) (95% - 100%)    PHYSICAL EXAM  GENERAL: intubated, sedated but arousable   NECK: no JVD  CHEST/LUNG: equal breath sounds bilaterally, bilateral chest tubes  HEART: normal S1S2, RRR  ABDOMEN: Soft, NT/ND  EXTREMITIES: upper tigh and sacral edema present/ necrotic toes present   NEUROLOGY: SANTOS/ follows commands   ACCESS: R IJ THC    MEDICATIONS  (STANDING):  albumin human 25% IVPB 50 milliLiter(s) IV Intermittent every 8 hours  ascorbic acid 500 milliGRAM(s) Oral daily  chlorhexidine 0.12% Liquid 15 milliLiter(s) Oral Mucosa every 12 hours  chlorhexidine 2% Cloths 1 Application(s) Topical daily  CRRT Treatment    <Continuous>  dextrose 5%. 1000 milliLiter(s) (50 mL/Hr) IV Continuous <Continuous>  dextrose 50% Injectable 12.5 Gram(s) IV Push once  dextrose 50% Injectable 25 Gram(s) IV Push once  dextrose 50% Injectable 25 Gram(s) IV Push once  fentaNYL   Infusion. 0.5 MICROgram(s)/kG/Hr (3.68 mL/Hr) IV Continuous <Continuous>  heparin  Injectable 5000 Unit(s) SubCutaneous every 8 hours  insulin lispro (HumaLOG) corrective regimen sliding scale   SubCutaneous every 6 hours  multivitamin/minerals/iron Oral Solution (CENTRUM) 15 milliLiter(s) Oral daily  nafcillin  IVPB 2 Gram(s) IV Intermittent every 4 hours  norepinephrine Infusion 0.05 MICROgram(s)/kG/Min (6.9 mL/Hr) IV Continuous <Continuous>  pantoprazole  Injectable 40 milliGRAM(s) IV Push daily  polyethylene glycol 3350 17 Gram(s) Oral every 24 hours  propofol Infusion 10 MICROgram(s)/kG/Min (4.416 mL/Hr) IV Continuous <Continuous>  PureFlow Dialysate RFP-401 (K 4 / Ca 3) 5000 milliLiter(s) (3500 mL/Hr) CRRT <Continuous>  senna 2 Tablet(s) Oral at bedtime  sodium chloride 0.9% lock flush 3 milliLiter(s) IV Push every 8 hours  vitamin E 400 International Unit(s) Oral daily  zinc sulfate 220 milliGRAM(s) Oral daily    MEDICATIONS  (PRN):  dextrose 40% Gel 15 Gram(s) Oral once PRN Blood Glucose LESS THAN 70 milliGRAM(s)/deciliter  glucagon  Injectable 1 milliGRAM(s) IntraMuscular once PRN Glucose LESS THAN 70 milligrams/deciliter      LABS                        8.1    20.31 )-----------( 59       ( 16 Nov 2019 12:50 )             24.7     11-16    136  |  101  |  9   ----------------------------<  173<H>  4.1   |  24  |  0.67    Ca    8.8      16 Nov 2019 12:13  Phos  2.5     11-16  Mg     1.8     11-16    TPro  6.5  /  Alb  2.9<L>  /  TBili  9.5<H>  /  DBili  x   /  AST  69<H>  /  ALT  93<H>  /  AlkPhos  116  11-16    LIVER FUNCTIONS - ( 16 Nov 2019 05:34 )  Alb: 2.9 g/dL / Pro: 6.5 g/dL / ALK PHOS: 116 U/L / ALT: 93 U/L / AST: 69 U/L / GGT: x           PT/INR - ( 15 Nov 2019 06:01 )   PT: 14.3 sec;   INR: 1.26          PTT - ( 15 Nov 2019 06:01 )  PTT:39.6 sec

## 2019-11-16 NOTE — PROGRESS NOTE ADULT - PROBLEM SELECTOR PLAN 1
Remained anuric, on CVVHD via RIJ HD Cath  as pressor requirment increased from yesterday, still overloaded with anasarca but as volume status overall is improving would cw UF 50 cc per hour for a FBG of at least even for the day  - will reassess later to see if we can increase UF to achieve negative FB   - CVVHD: Qb 300 ml/min/ DFR 3.5 L/hr, UF 50 cc/hr and K bath 4    - lytes are WNL, would need q 6 labs for K/Phos and Mg and supplementation as needed    Will follow Remained anuric, on CVVHD via RIJ HD Cath  UF decreased as pressor requirement increased from yesterday, still overloaded with anasarca but as volume status overall is improving would cw UF 50 cc per hour for a FBG of at least even for the day  - will reassess later to see if we can increase UF to achieve negative FB   - CVVHD: Qb 300 ml/min/ DFR 3.5 L/hr, UF 50 cc/hr and K bath 4  - lytes are WNL, would need q 6 labs for K/Phos and Mg and supplementation as needed    Will follow

## 2019-11-16 NOTE — PROGRESS NOTE ADULT - ATTENDING COMMENTS
I independently performed the key portions of the evaluation and management service provided. I agree with the above history, physical, and plan which I have reviewed and edited where appropriate. I find ESRD, hypotension. Continue dialysis. Keep MAP > 65.  See full note. (Patient seen earlier in day.)

## 2019-11-17 NOTE — PROGRESS NOTE ADULT - PROBLEM SELECTOR PLAN 1
Remained anuric, on CVVHD via RIJ HD Cath  currently UF set at 50 cc net negative an hour, able to titrate down Levo  FB ~ -2L for the past 2 days   as phos is low would consider to change CVVHD setting as below  Qb 300 ml/min   DFR 2.5 L/hr  K 4  would continue same UF for now to titrate off Levo and readjust later for higher UF removal if adequate MAPs  - please repeat BMP q 6 labs for K/Phos and Mg and supplement as needed    Will follow Remained anuric, on CVVHD via RIJ HD Cath  currently UF set at 50 cc net negative an hour, able to titrate down Levo  FB ~ -2L for the past 2 days   as phos is low would consider to change CVVHD setting as below  Qb 300 ml/min   DFR 2.5 L/hr  K 4  would continue same UF for now to titrate off Levo and readjust later for higher UF removal if adequate MAPs  - please repeat BMP q 6 labs for K/Phos and Mg and supplement as needed  possible transition to HD tomorrow     Will follow

## 2019-11-17 NOTE — PROGRESS NOTE ADULT - ASSESSMENT
38 y/o M w/ PMH of IVDU and active smoker who came from [facility] on 11/8/19 in 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 After being transferred to  CTICU for surgical evaluation, pt was deemed to not be a surgical candidate and transferred to MICU for medical mgmt. ID, Nephrology, heme/onc, surgery following, vascular.    Neuro  Light sedation on Propofol, Fentanyl. CT head negative for any intracranial embolic events.  - Follows commands and moves extremities     Cardiovascular     #Hypotension  Most likely 2/2 septic shock from infective endocarditis and RV failure 2/2 tricuspid regurgitation (ELIAN shows EF of 40-50%). Echo with EF 45%. ELIAN with EF 40-45%, 3.8 x1cm vegetation on TR valve, with severe TR, trivial pericardial effusion. Echo with bubble revealed no PFO. Restarted on levophed, titrate as appropriate   - Maintain MAP>65.   - C/W medical mgmt of infective endocarditis as below.    Respiratory    #Acute hypoxic respiratory failure   Most likely 2/2 alveolar hemorrhage in the setting of septic embolic causing numerous cavitary lesions on CT chest. CXR with scattered infiltrates and pleural effusions. Sputum culture negative. CT revealed "moderate bilateral pleural effusions and dense bibasilar consolidation with underlying septic emboli/pulmonary abscesses." Failed CPAP trials yesterday evening.  - C/W ventilator support, monitor mental status.  - Attempt CPAP trial as appropriate   - Continue to treat IE as below    #Bilateral pulmonary effusion  Most likely empyema in setting of septic shock 2/2 infective endocarditis. Bilateral CT in place, confirmed by CXR, continues to drain serosanguinous fluid. Fluid analysis of both CT pleural fluid confirms complex exudate with high cellularity, low pH and low glucose. pleural fluid cultures with staph aureus  - Monitor output of bilateral chest tubes  - F/U pleural fluid cultures  - Lung tissue was suctioned from left chest tube w/ air coming from chest tube. CXR consistent for PTX. Chest tube set to sunction.    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 Not a surgical candidate for a tricuspid valve replacement at this time as per CT surgery. Lactate 2.0 today. Initial blood cultures positive for staph epidermidis, likely a contaminant. Initial sputum culture positive for staph aureus  Repeat blood cultures with NGTD. Sputum cx NGTD.  - ID following, C/W Nafcillin 2g q4 (Sensitive to Oxacillin as per OSH records), f/u recs   - Per CT surgery pt is too high risk for any surgery and would likely not survive procedure. Will consult Cardiology to optimize management of his right heart dysfunction.   - Continue to monitor temps and vitals, consider additional Abx coverage if condition worsens  - Monitor CBC     Renal    #Acute renal failure most likely 2/2 ATN from hypoperfusion, Minimal urine output, on CVVHD, oliguric with 0-5cc/hr. Vancomycin level 37 on arrival so may be component of drug induced nephropathy. Bun/Cr continues to improve while on CVVHD. Pt is clinically fluid overloaded, and hemodynamically stable. No renal infarcts as per CT abdomen/pelvis. Optimized for Sx as per nephrology.  - F/U nephrology recs.  - C/W CVVHD as tolerated  - Concern that CVVHD is clotting: flow was increased, monitor function, plan to switch to intermittent dialysis when current fails.  - Attempt to correct fluid overload with HD, net -1.2L. Continue goal net -1L today  - Monitor renal function with BUN/Cr  - Monitor I/Os    #Electrolytes abnormalities   Improved. On CVVHD. Hyperkalemia resolved. No EKG changes.  -Requires q6 BMP, Mg, and Phos while on CVVHD  -Monitor serum lytes, Ca     GI    OG on arrival with 600cc of bilious fluid, abdomen still distended but had several BMs, some loose. CT abdomen revealed no evidence of bowel ischemia or SBO. SUmp was DCed and replaced with NG tube. CT abdomen/pelvis revealed "irregular masslike mural thickening of the posterior left lateral wall of the rectum." Surgery consulted, unlikely perirectal abscess. Recommend GI evaluation for possible scope.  - NG tube in place  - C/W tube feeds, Jevity 1.5  - F/U GI consult, f/u recs.     #Congestive Hepatopathy   Transaminitis, coag derangements, hyperbilirubinemia most likely 2/2 hepatic congestion vs hemolysis, due to severe TR in setting of infective endocarditis. Transaminases and Alk phos improving. Hepatitis panel negative.  - F/U CMP, direct bili   - Avoid Tylenol    Heme    #Hemolysis  Intravascular hemolysis with inital haptoglobin <10, LDH decreasing at 365. D-dimer elevated. Fibrinogen stable 396. Today still clinically jaundiced, with stable bilirubinemia: T.bili 9.9., D.bili 8.5. h/h stable at 8.1/25.5. Platelets decreasing from 54 to 49.. Fact VII/Factor VIII elevated. Unlikely 2/2 CVVHD. Likely 2/2 to sepsis and/or DIC.   - Avoid anticoagulation  - Heme consulted, follow recs  - Monitor direct and total bili, LDH, fibrinogen, platelets   - Transfuse platelets if <10K or bleeding and <50K  - Transfuse pRBCs for hgb <7     Vascular  Vascular surgery consulted in setting of gangrenous distal toes and fingers b/l. Likely 2/2 to pressors. Will follow up recs.     Lines: right IJ TLC and Ernesto (11/12), Axillary A-line (11/12), Left IJ (11/12), right peripheral (11/12)    F: 25% Albumin 50cc  E: replete K <4, Mg <2  N: Jevity 1.5  GI Ppx: Protonix   DVT Ppx: Heparin   Code status: FULL   Dispo: MICU 40 y/o M w/ PMH of IVDU and active smoker who came from [facility] on 11/8/19 in 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 After being transferred to  CTICU for surgical evaluation, pt was deemed to not be a surgical candidate and transferred to MICU for medical mgmt. ID, Nephrology, heme/onc, surgery following, vascular.    Neuro  Light sedation on Propofol, Fentanyl. CT head negative for any intracranial embolic events.  - Follows commands and moves extremities. Extubated in late morning successfully     Cardiovascular     #Hypotension  Most likely 2/2 septic shock from infective endocarditis and RV failure 2/2 tricuspid regurgitation (ELIAN shows EF of 40-50%). Echo with EF 45%. ELIAN with EF 40-45%, 3.8 x1cm vegetation on TR valve, with severe TR, trivial pericardial effusion. Echo with bubble revealed no PFO. Restarted on levophed, titrate as appropriate   - Maintain MAP>65.   - C/W medical mgmt of infective endocarditis as below.  - Off Levophed     Respiratory    #Acute hypoxic respiratory failure   Most likely 2/2 alveolar hemorrhage in the setting of septic embolic causing numerous cavitary lesions on CT chest. CXR with scattered infiltrates and pleural effusions. Sputum culture negative. CT revealed "moderate bilateral pleural effusions and dense bibasilar consolidation with underlying septic emboli/pulmonary abscesses."   - C/W ventilator support, monitor mental status.  - Extubated successfully, incentive spirometer at bedside   - Continue to treat IE as below    #Bilateral pulmonary effusion  Most likely empyema in setting of septic shock 2/2 infective endocarditis. Bilateral CT in place, confirmed by CXR, continues to drain serosanguinous fluid. Fluid analysis of both CT pleural fluid confirms complex exudate with high cellularity, low pH and low glucose. pleural fluid cultures with staph aureus  - Monitor output of bilateral chest tubes  - F/U pleural fluid cultures  - Lung tissue was suctioned from left chest tube w/ air coming from chest tube. CXR consistent for PTX. Chest tube set to suction.    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 Not a surgical candidate for a tricuspid valve replacement at this time as per CT surgery. Lactate 2.0 today. Initial blood cultures positive for staph epidermidis, likely a contaminant. Initial sputum culture positive for staph aureus  Repeat blood cultures with NGTD. Sputum cx NGTD.  - ID following, C/W Nafcillin 2g q4 (Sensitive to Oxacillin as per OSH records), f/u recs   - Per CT surgery pt is too high risk for any surgery and would likely not survive procedure.   - Cardiology consulted to optimize management of his right heart dysfunction, f/u recs    - Continue to monitor temps and vitals, consider additional Abx coverage if condition worsens  - Monitor CBC     Renal    #Acute renal failure most likely 2/2 ATN from hypoperfusion, Minimal urine output, on CVVHD, oliguric with 0-5cc/hr. Vancomycin level 37 on arrival so may be component of drug induced nephropathy. Bun/Cr continues to improve while on CVVHD. Pt is clinically fluid overloaded, and hemodynamically stable. No renal infarcts as per CT abdomen/pelvis. Optimized for Sx as per nephrology.  - F/U nephrology recs.  - C/W CVVHD as tolerated, will likely switch to intermittent HD tomorrow   - Concern that CVVHD is clotting: flow was increased, monitor function, plan to switch to intermittent dialysis when current fails.  - Continue to correct fluid overload with HD. Goal net -1L   - Monitor renal function with BUN/Cr  - Monitor I/Os    #Electrolytes abnormalities   Improved. On CVVHD. Hyperkalemia resolved. No EKG changes.  -Requires q6 BMP, Mg, and Phos while on CVVHD  -Monitor serum lytes, Ca     GI    OG on arrival with 600cc of bilious fluid, abdomen still distended but had several BMs, some loose. CT abdomen revealed no evidence of bowel ischemia or SBO. SUmp was DCed and replaced with NG tube. CT abdomen/pelvis revealed "irregular masslike mural thickening of the posterior left lateral wall of the rectum." Surgery consulted, unlikely perirectal abscess. Recommend GI evaluation for possible scope.  - NG tube in place  - C/W tube feeds, Jevity 1.5  - F/U GI consult, f/u recs.     #Congestive Hepatopathy   Transaminitis, coag derangements, hyperbilirubinemia most likely 2/2 hepatic congestion vs hemolysis, due to severe TR in setting of infective endocarditis. Transaminases and Alk phos improving. Hepatitis panel negative.  - F/U CMP, direct bili   - Avoid Tylenol    Heme    #Hemolysis  Intravascular hemolysis with inital haptoglobin <10, LDH decreasing at 365. D-dimer elevated. Fibrinogen stable 396. Today still clinically jaundiced, with stable bilirubinemia: T.bili 9.9., D.bili 8.5. h/h stable at 8.1/25.5. Platelets decreasing from 54 to 49.. Fact VII/Factor VIII elevated. Unlikely 2/2 CVVHD. Likely 2/2 to sepsis and/or DIC.   - Avoid anticoagulation  - Heme consulted, follow recs  - Monitor direct and total bili, LDH, fibrinogen, platelets   - Transfuse platelets if <10K or bleeding and <50K  - Transfuse pRBCs for hgb <7     Vascular  Vascular surgery consulted in setting of gangrenous distal toes and fingers b/l. Likely 2/2 to pressors. Will follow up recs.     Lines: right IJ TLC and Ernesto (11/12), Axillary A-line (11/12), Left IJ (11/12), right peripheral (11/12)    F: 25% Albumin 50cc  E: replete K <4, Mg <2  N: Jevity 1.5  GI Ppx: Protonix   DVT Ppx: Heparin   Code status: FULL   Dispo: MICU

## 2019-11-17 NOTE — PROGRESS NOTE ADULT - SUBJECTIVE AND OBJECTIVE BOX
INTERVAL HPI/OVERNIGHT EVENTS:  Extubated this afternoon.  States he feels much better.  Has pain in feet, otherwise feels okay.    CONSTITUTIONAL:  No fever, chills, night sweats  EYES:  No photophobia or visual changes  CARDIOVASCULAR:  No chest pain  RESPIRATORY:  No cough, wheezing, or SOB   GASTROINTESTINAL:  No nausea, vomiting, diarrhea, constipation, or abdominal pain  GENITOURINARY:  No frequency, urgency, dysuria or hematuria  NEUROLOGIC:  No headache, lightheadedness      ANTIBIOTICS/RELEVANT:  Nafcillin 2 g IV q4h (11/10-present)    Pip-tazo 11/13      Vital Signs Last 24 Hrs  T(C): 37.1 (17 Nov 2019 18:48), Max: 37.1 (17 Nov 2019 18:48)  T(F): 98.8 (17 Nov 2019 18:48), Max: 98.8 (17 Nov 2019 18:48)  HR: 96 (17 Nov 2019 20:00) (96 - 112)  BP: --  BP(mean): --  RR: 30 (17 Nov 2019 20:00) (21 - 30)  SpO2: 100% (17 Nov 2019 20:00) (95% - 100%)    PHYSICAL EXAM:  Constitutional:  Awake, interacting  Eyes:  Sclerae icteric, subconjunctival hemorrhage OD  Ear/Nose/Throat:  WNL	  Neck:  RIJ-HD catheter receiving CVVHD;  LIJ TLC  Respiratory:  Coarse BS anteriorly.  L chest tube  Cardiovascular:  Heart sounds obscured by BS  Gastrointestinal:  Symmetric, normoactive BS, softly distended, NT, no masses, guarding or rebound.  No HSM  :  raya catheter in place  Extremities:  Necrotic distal toes - 5/5 on R, 1/5 on L;  multiple track marks.  Dusky finger R hand        LABS:                        7.7    19.82 )-----------( 64       ( 17 Nov 2019 05:25 )             24.7         11-17    133<L>  |  98  |  9   ----------------------------<  108<H>  4.5   |  24  |  0.74    Ca    9.2      17 Nov 2019 18:24  Phos  2.6     11-17  Mg     2.2     11-17    TPro  6.5  /  Alb  2.9<L>  /  TBili  9.5<H>  /  DBili  x   /  AST  69<H>  /  ALT  93<H>  /  AlkPhos  116  11-16      MICROBIOLOGY:  Blood cultures:  11/11 X 2 -MSSA (1/4 bottles);  Staph epi (1/4 bottles);  11/12 X 2 - NG    Sputum culture:  11/11 - mod MSSA  Body fluid - pleural fluid 11/11: rare MSSA;  AFB smear neg  Body fluid - pleural fluid 11/12:  rare MSSA;  fungal culture in progress, AFB semar neg    HIV 1/2 combo 11/12 - NR        RADIOLOGY & ADDITIONAL STUDIES:  < from: Xray Chest 1 View- PORTABLE-Urgent (11.17.19 @ 01:51) >  INTERPRETATION:  Clinical History: Chest tube    Portable examination of the chest demonstrates no interval change   position remaining support devicesin comparison to prior examination of   the chest 11/16/2019. Congestion and/or infiltrates. Right effusion.   Progression left pneumothorax in comparison to prior examination of the   chest 11/16/2019.    Impression: Progression left pneumothorax. No interval change congestion   and/or infiltrates. Right effusion    < end of copied text >

## 2019-11-17 NOTE — PROGRESS NOTE ADULT - SUBJECTIVE AND OBJECTIVE BOX
OVERNIGHT EVENTS:    SUBJECTIVE / INTERVAL HPI: Patient seen and examined at bedside.     VITAL SIGNS:  Vital Signs Last 24 Hrs  T(C): 36.3 (17 Nov 2019 01:30), Max: 36.7 (16 Nov 2019 09:00)  T(F): 97.3 (17 Nov 2019 01:30), Max: 98 (16 Nov 2019 09:00)  HR: 100 (17 Nov 2019 05:00) (98 - 112)  BP: 90/60 (16 Nov 2019 20:00) (90/60 - 90/60)  BP(mean): 69 (16 Nov 2019 20:00) (69 - 69)  RR: 22 (17 Nov 2019 05:00) (20 - 29)  SpO2: 100% (17 Nov 2019 05:00) (95% - 100%)    PHYSICAL EXAM:    General: WDWN  HEENT: NC/AT; PERRL, anicteric sclera; MMM  Neck: supple  Cardiovascular: +S1/S2; RRR  Respiratory: CTA B/L; no W/R/R  Gastrointestinal: soft, NT/ND; +BSx4  Extremities: WWP; no edema, clubbing or cyanosis  Vascular: 2+ radial, DP/PT pulses B/L  Neurological: AAOx3; no focal deficits    MEDICATIONS:  MEDICATIONS  (STANDING):  albumin human 25% IVPB 50 milliLiter(s) IV Intermittent every 8 hours  ascorbic acid 500 milliGRAM(s) Oral daily  chlorhexidine 0.12% Liquid 15 milliLiter(s) Oral Mucosa every 12 hours  chlorhexidine 2% Cloths 1 Application(s) Topical daily  CRRT Treatment    <Continuous>  dextrose 5%. 1000 milliLiter(s) (50 mL/Hr) IV Continuous <Continuous>  dextrose 50% Injectable 12.5 Gram(s) IV Push once  dextrose 50% Injectable 25 Gram(s) IV Push once  dextrose 50% Injectable 25 Gram(s) IV Push once  fentaNYL   Infusion. 0.5 MICROgram(s)/kG/Hr (3.68 mL/Hr) IV Continuous <Continuous>  heparin  Injectable 5000 Unit(s) SubCutaneous every 8 hours  insulin lispro (HumaLOG) corrective regimen sliding scale   SubCutaneous every 6 hours  multivitamin/minerals/iron Oral Solution (CENTRUM) 15 milliLiter(s) Oral daily  nafcillin  IVPB 2 Gram(s) IV Intermittent every 4 hours  norepinephrine Infusion 0.05 MICROgram(s)/kG/Min (6.9 mL/Hr) IV Continuous <Continuous>  pantoprazole  Injectable 40 milliGRAM(s) IV Push daily  polyethylene glycol 3350 17 Gram(s) Oral every 24 hours  propofol Infusion 10 MICROgram(s)/kG/Min (4.416 mL/Hr) IV Continuous <Continuous>  PureFlow Dialysate RFP-401 (K 4 / Ca 3) 5000 milliLiter(s) (3500 mL/Hr) CRRT <Continuous>  senna 2 Tablet(s) Oral at bedtime  sodium chloride 0.9% lock flush 3 milliLiter(s) IV Push every 8 hours  vitamin E 400 International Unit(s) Oral daily  zinc sulfate 220 milliGRAM(s) Oral daily    MEDICATIONS  (PRN):  dextrose 40% Gel 15 Gram(s) Oral once PRN Blood Glucose LESS THAN 70 milliGRAM(s)/deciliter  glucagon  Injectable 1 milliGRAM(s) IntraMuscular once PRN Glucose LESS THAN 70 milligrams/deciliter      ALLERGIES:  Allergies    Allergy Status Unknown    Intolerances        LABS:                        7.7    19.82 )-----------( 64       ( 17 Nov 2019 05:25 )             24.7     11-17    134<L>  |  100  |  8   ----------------------------<  147<H>  4.5   |  24  |  0.70    Ca    8.9      17 Nov 2019 05:25  Phos  2.1     11-17  Mg     2.0     11-17    TPro  6.5  /  Alb  2.9<L>  /  TBili  9.5<H>  /  DBili  x   /  AST  69<H>  /  ALT  93<H>  /  AlkPhos  116  11-16        CAPILLARY BLOOD GLUCOSE      POCT Blood Glucose.: 124 mg/dL (17 Nov 2019 05:23)      RADIOLOGY & ADDITIONAL TESTS: Reviewed. OVERNIGHT EVENTS: Electrolytes repleted Assessed chest tube for air leak with constant bubbling reported. Dressing reinforced. CXR shows tubes unchanged,  bubbling resolved.     SUBJECTIVE / INTERVAL HPI: Patient seen and examined at bedside. Patient resting in bed, intubated on sedation. Follows commands, moves extremities, unable to obtain ROS.     On morning rounds, patient extubated.       VITAL SIGNS:  Vital Signs Last 24 Hrs  T(C): 36.3 (17 Nov 2019 01:30), Max: 36.7 (16 Nov 2019 09:00)  T(F): 97.3 (17 Nov 2019 01:30), Max: 98 (16 Nov 2019 09:00)  HR: 100 (17 Nov 2019 05:00) (98 - 112)  BP: 90/60 (16 Nov 2019 20:00) (90/60 - 90/60)  BP(mean): 69 (16 Nov 2019 20:00) (69 - 69)  RR: 22 (17 Nov 2019 05:00) (20 - 29)  SpO2: 100% (17 Nov 2019 05:00) (95% - 100%)    PHYSICAL EXAM:    General: WDWN, resting in bed, jaundice, NAD   HEENT: NC/AT; pupils react sluggishly to light, icteric sclera; MMM  Neck: supple  Cardiovascular: +S1/S2; systolic murmur appreciated at left sternal border   Respiratory: intubated, diffuse rhonchi b/l with decreased breath sounds   Gastrointestinal: NG tube in place, soft, NT/ND; +BSx4  Extremities: WWP; edema in LES/UEs; no clubbing or cyanosis  Derm: ischemic changes at fingers/toes b/l   Vascular: 2+ radial, DP/PT pulses B/L  Neurological: Awake and alert, moves all four extremities    MEDICATIONS:  MEDICATIONS  (STANDING):  albumin human 25% IVPB 50 milliLiter(s) IV Intermittent every 8 hours  ascorbic acid 500 milliGRAM(s) Oral daily  chlorhexidine 0.12% Liquid 15 milliLiter(s) Oral Mucosa every 12 hours  chlorhexidine 2% Cloths 1 Application(s) Topical daily  CRRT Treatment    <Continuous>  dextrose 5%. 1000 milliLiter(s) (50 mL/Hr) IV Continuous <Continuous>  dextrose 50% Injectable 12.5 Gram(s) IV Push once  dextrose 50% Injectable 25 Gram(s) IV Push once  dextrose 50% Injectable 25 Gram(s) IV Push once  fentaNYL   Infusion. 0.5 MICROgram(s)/kG/Hr (3.68 mL/Hr) IV Continuous <Continuous>  heparin  Injectable 5000 Unit(s) SubCutaneous every 8 hours  insulin lispro (HumaLOG) corrective regimen sliding scale   SubCutaneous every 6 hours  multivitamin/minerals/iron Oral Solution (CENTRUM) 15 milliLiter(s) Oral daily  nafcillin  IVPB 2 Gram(s) IV Intermittent every 4 hours  norepinephrine Infusion 0.05 MICROgram(s)/kG/Min (6.9 mL/Hr) IV Continuous <Continuous>  pantoprazole  Injectable 40 milliGRAM(s) IV Push daily  polyethylene glycol 3350 17 Gram(s) Oral every 24 hours  propofol Infusion 10 MICROgram(s)/kG/Min (4.416 mL/Hr) IV Continuous <Continuous>  PureFlow Dialysate RFP-401 (K 4 / Ca 3) 5000 milliLiter(s) (3500 mL/Hr) CRRT <Continuous>  senna 2 Tablet(s) Oral at bedtime  sodium chloride 0.9% lock flush 3 milliLiter(s) IV Push every 8 hours  vitamin E 400 International Unit(s) Oral daily  zinc sulfate 220 milliGRAM(s) Oral daily    MEDICATIONS  (PRN):  dextrose 40% Gel 15 Gram(s) Oral once PRN Blood Glucose LESS THAN 70 milliGRAM(s)/deciliter  glucagon  Injectable 1 milliGRAM(s) IntraMuscular once PRN Glucose LESS THAN 70 milligrams/deciliter      ALLERGIES:  Allergies    Allergy Status Unknown    Intolerances        LABS:                        7.7    19.82 )-----------( 64       ( 17 Nov 2019 05:25 )             24.7     11-17    134<L>  |  100  |  8   ----------------------------<  147<H>  4.5   |  24  |  0.70    Ca    8.9      17 Nov 2019 05:25  Phos  2.1     11-17  Mg     2.0     11-17    TPro  6.5  /  Alb  2.9<L>  /  TBili  9.5<H>  /  DBili  x   /  AST  69<H>  /  ALT  93<H>  /  AlkPhos  116  11-16        CAPILLARY BLOOD GLUCOSE      POCT Blood Glucose.: 124 mg/dL (17 Nov 2019 05:23)      RADIOLOGY & ADDITIONAL TESTS: Reviewed.

## 2019-11-17 NOTE — PROGRESS NOTE ADULT - SUBJECTIVE AND OBJECTIVE BOX
Patient seen and examined at bedside. Family reports the hand discoloration has gotten better, right middle finger tip necrotic and unchanged.    nafcillin  IVPB 2  heparin  Injectable 5000  nafcillin  IVPB 2  norepinephrine Infusion 0.05      Allergies    Allergy Status Unknown    Intolerances        Vital Signs Last 24 Hrs  T(C): 35.8 (17 Nov 2019 14:32), Max: 36.5 (16 Nov 2019 22:52)  T(F): 96.5 (17 Nov 2019 14:32), Max: 97.7 (16 Nov 2019 22:52)  HR: 106 (17 Nov 2019 15:00) (100 - 112)  BP: 90/60 (16 Nov 2019 20:00) (90/60 - 90/60)  BP(mean): 69 (16 Nov 2019 20:00) (69 - 69)  RR: 28 (17 Nov 2019 15:00) (20 - 30)  SpO2: 100% (17 Nov 2019 15:00) (95% - 100%)  I&O's Summary    16 Nov 2019 07:01  -  17 Nov 2019 07:00  --------------------------------------------------------  IN: 3200.3 mL / OUT: 4020 mL / NET: -819.7 mL    17 Nov 2019 07:01  -  17 Nov 2019 15:30  --------------------------------------------------------  IN: 876 mL / OUT: 1237 mL / NET: -361 mL        Physical Exam:  General: alert and awake  Pulmonary: no respiratory distress  Extremities: hands and feet cool to touch,   RUE: hand cool to touch, ischemic 2nd digit, 3rd fingertip with necrotic ulceration with surrounding erythema  LUE: hand cool to touch but no ischemic fingertips  RLE: ischemic/necrotic digits 1-5  LLE: ischemic/necrotic digit 4    Pulses:  RUE: 2+ radial, tri Ulnar, no arch, no digital signals  LUE: 2+ radial, tri Ulnar, tri arch, no digital signals  RLE: 2+ DP, Tri PT  LLE: 2+ DP, Tri PT    LABS:                        7.7    19.82 )-----------( 64       ( 17 Nov 2019 05:25 )             24.7     11-17    133<L>  |  99  |  9   ----------------------------<  127<H>  4.6   |  22  |  0.72    Ca    9.0      17 Nov 2019 12:07  Phos  2.7     11-17  Mg     1.9     11-17    TPro  6.5  /  Alb  2.9<L>  /  TBili  9.5<H>  /  DBili  x   /  AST  69<H>  /  ALT  93<H>  /  AlkPhos  116  11-16

## 2019-11-17 NOTE — PROGRESS NOTE ADULT - SUBJECTIVE AND OBJECTIVE BOX
Pt seen and examined at bedside.  Chest tube to suction  Blood culture- staph aureus  Plan for extuabation today  Per nurse- no rectal bleeding or diarrhea noted    PERTINENT REVIEW OF SYSTEMS:  CONSTITUTIONAL: No weakness, fevers or chills  HEENT: No visual changes; No vertigo or throat pain   GASTROINTESTINAL: No abdominal or epigastric pain. No nausea, vomiting, or hematemesis; No diarrhea or constipation. No melena or hematochezia.  NEUROLOGICAL: No numbness or weakness  SKIN: No itching, burning, rashes, or lesions     Allergies    Allergy Status Unknown    Intolerances      MEDICATIONS:  MEDICATIONS  (STANDING):  albumin human 25% IVPB 50 milliLiter(s) IV Intermittent every 8 hours  ascorbic acid 500 milliGRAM(s) Oral daily  chlorhexidine 0.12% Liquid 15 milliLiter(s) Oral Mucosa every 12 hours  chlorhexidine 2% Cloths 1 Application(s) Topical daily  CRRT Treatment    <Continuous>  dextrose 5%. 1000 milliLiter(s) (50 mL/Hr) IV Continuous <Continuous>  dextrose 50% Injectable 12.5 Gram(s) IV Push once  dextrose 50% Injectable 25 Gram(s) IV Push once  dextrose 50% Injectable 25 Gram(s) IV Push once  fentaNYL   Infusion. 0.5 MICROgram(s)/kG/Hr (3.68 mL/Hr) IV Continuous <Continuous>  heparin  Injectable 5000 Unit(s) SubCutaneous every 8 hours  insulin lispro (HumaLOG) corrective regimen sliding scale   SubCutaneous every 6 hours  multivitamin/minerals/iron Oral Solution (CENTRUM) 15 milliLiter(s) Oral daily  nafcillin  IVPB 2 Gram(s) IV Intermittent every 4 hours  norepinephrine Infusion 0.05 MICROgram(s)/kG/Min (6.9 mL/Hr) IV Continuous <Continuous>  pantoprazole  Injectable 40 milliGRAM(s) IV Push daily  polyethylene glycol 3350 17 Gram(s) Oral every 24 hours  propofol Infusion 10 MICROgram(s)/kG/Min (4.416 mL/Hr) IV Continuous <Continuous>  PureFlow Dialysate RFP-401 (K 4 / Ca 3) 5000 milliLiter(s) (3500 mL/Hr) CRRT <Continuous>  senna 2 Tablet(s) Oral at bedtime  sodium chloride 0.9% lock flush 3 milliLiter(s) IV Push every 8 hours  vitamin E 400 International Unit(s) Oral daily  zinc sulfate 220 milliGRAM(s) Oral daily    MEDICATIONS  (PRN):  dextrose 40% Gel 15 Gram(s) Oral once PRN Blood Glucose LESS THAN 70 milliGRAM(s)/deciliter  glucagon  Injectable 1 milliGRAM(s) IntraMuscular once PRN Glucose LESS THAN 70 milligrams/deciliter    Vital Signs Last 24 Hrs  T(C): 36.2 (17 Nov 2019 06:12), Max: 36.5 (16 Nov 2019 13:00)  T(F): 97.1 (17 Nov 2019 06:12), Max: 97.7 (16 Nov 2019 13:00)  HR: 106 (17 Nov 2019 11:00) (100 - 112)  BP: 90/60 (16 Nov 2019 20:00) (90/60 - 90/60)  BP(mean): 69 (16 Nov 2019 20:00) (69 - 69)  RR: 27 (17 Nov 2019 11:00) (20 - 30)  SpO2: 100% (17 Nov 2019 11:00) (95% - 100%)    11-16 @ 07:01  -  11-17 @ 07:00  --------------------------------------------------------  IN: 3200.3 mL / OUT: 4020 mL / NET: -819.7 mL    11-17 @ 07:01  -  11-17 @ 11:22  --------------------------------------------------------  IN: 644.4 mL / OUT: 327 mL / NET: 317.4 mL      PHYSICAL EXAM:    General: intubated, but awake and alert  HEENT: MMM, conjunctiva and sclera clear  Gastrointestinal: Soft non-tender non-distended; Normal bowel sounds; No hepatosplenomegaly. No rebound or guarding  Skin: Warm and dry. No obvious rash    LABS:                        7.7    19.82 )-----------( 64       ( 17 Nov 2019 05:25 )             24.7     11-17    134<L>  |  100  |  8   ----------------------------<  147<H>  4.5   |  24  |  0.70    Ca    8.9      17 Nov 2019 05:25  Phos  2.1     11-17  Mg     2.0     11-17    TPro  6.5  /  Alb  2.9<L>  /  TBili  9.5<H>  /  DBili  x   /  AST  69<H>  /  ALT  93<H>  /  AlkPhos  116  11-16                      RADIOLOGY & ADDITIONAL STUDIES: Pt seen and examined at bedside.  Chest tube to suction  Blood culture- staph aureus  Per nurse- no rectal bleeding or diarrhea noted    PERTINENT REVIEW OF SYSTEMS:  CONSTITUTIONAL: No weakness, fevers or chills  HEENT: No visual changes; No vertigo or throat pain   GASTROINTESTINAL: No abdominal or epigastric pain. No nausea, vomiting, or hematemesis; No diarrhea or constipation. No melena or hematochezia.  NEUROLOGICAL: No numbness or weakness  SKIN: No itching, burning, rashes, or lesions     Allergies    Allergy Status Unknown    Intolerances      MEDICATIONS:  MEDICATIONS  (STANDING):  albumin human 25% IVPB 50 milliLiter(s) IV Intermittent every 8 hours  ascorbic acid 500 milliGRAM(s) Oral daily  chlorhexidine 0.12% Liquid 15 milliLiter(s) Oral Mucosa every 12 hours  chlorhexidine 2% Cloths 1 Application(s) Topical daily  CRRT Treatment    <Continuous>  dextrose 5%. 1000 milliLiter(s) (50 mL/Hr) IV Continuous <Continuous>  dextrose 50% Injectable 12.5 Gram(s) IV Push once  dextrose 50% Injectable 25 Gram(s) IV Push once  dextrose 50% Injectable 25 Gram(s) IV Push once  fentaNYL   Infusion. 0.5 MICROgram(s)/kG/Hr (3.68 mL/Hr) IV Continuous <Continuous>  heparin  Injectable 5000 Unit(s) SubCutaneous every 8 hours  insulin lispro (HumaLOG) corrective regimen sliding scale   SubCutaneous every 6 hours  multivitamin/minerals/iron Oral Solution (CENTRUM) 15 milliLiter(s) Oral daily  nafcillin  IVPB 2 Gram(s) IV Intermittent every 4 hours  norepinephrine Infusion 0.05 MICROgram(s)/kG/Min (6.9 mL/Hr) IV Continuous <Continuous>  pantoprazole  Injectable 40 milliGRAM(s) IV Push daily  polyethylene glycol 3350 17 Gram(s) Oral every 24 hours  propofol Infusion 10 MICROgram(s)/kG/Min (4.416 mL/Hr) IV Continuous <Continuous>  PureFlow Dialysate RFP-401 (K 4 / Ca 3) 5000 milliLiter(s) (3500 mL/Hr) CRRT <Continuous>  senna 2 Tablet(s) Oral at bedtime  sodium chloride 0.9% lock flush 3 milliLiter(s) IV Push every 8 hours  vitamin E 400 International Unit(s) Oral daily  zinc sulfate 220 milliGRAM(s) Oral daily    MEDICATIONS  (PRN):  dextrose 40% Gel 15 Gram(s) Oral once PRN Blood Glucose LESS THAN 70 milliGRAM(s)/deciliter  glucagon  Injectable 1 milliGRAM(s) IntraMuscular once PRN Glucose LESS THAN 70 milligrams/deciliter    Vital Signs Last 24 Hrs  T(C): 36.2 (17 Nov 2019 06:12), Max: 36.5 (16 Nov 2019 13:00)  T(F): 97.1 (17 Nov 2019 06:12), Max: 97.7 (16 Nov 2019 13:00)  HR: 106 (17 Nov 2019 11:00) (100 - 112)  BP: 90/60 (16 Nov 2019 20:00) (90/60 - 90/60)  BP(mean): 69 (16 Nov 2019 20:00) (69 - 69)  RR: 27 (17 Nov 2019 11:00) (20 - 30)  SpO2: 100% (17 Nov 2019 11:00) (95% - 100%)    11-16 @ 07:01  -  11-17 @ 07:00  --------------------------------------------------------  IN: 3200.3 mL / OUT: 4020 mL / NET: -819.7 mL    11-17 @ 07:01  -  11-17 @ 11:22  --------------------------------------------------------  IN: 644.4 mL / OUT: 327 mL / NET: 317.4 mL      PHYSICAL EXAM:    General: intubated, but awake and alert  HEENT: MMM, conjunctiva and sclera clear  Gastrointestinal: Soft non-tender non-distended; Normal bowel sounds; No hepatosplenomegaly. No rebound or guarding  Skin: Warm and dry. No obvious rash    LABS:                        7.7    19.82 )-----------( 64       ( 17 Nov 2019 05:25 )             24.7     11-17    134<L>  |  100  |  8   ----------------------------<  147<H>  4.5   |  24  |  0.70    Ca    8.9      17 Nov 2019 05:25  Phos  2.1     11-17  Mg     2.0     11-17    TPro  6.5  /  Alb  2.9<L>  /  TBili  9.5<H>  /  DBili  x   /  AST  69<H>  /  ALT  93<H>  /  AlkPhos  116  11-16                      RADIOLOGY & ADDITIONAL STUDIES:

## 2019-11-17 NOTE — PROGRESS NOTE ADULT - ASSESSMENT
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 Also complicated by acute hypoxic respiratory failure requiring intubation and mechanical ventilation, and acute renal failure requiring emergent dialysis. Heme/onc consulted for management of DIC.       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   1/13/19: d-dimer elevated to 2409, PT 17.2,  PTT 40.1, Plt 66, fibrinogen 241  1/14/19:  PT 17.0,  PTT 40.8, Plt 54, fibrinogen 318  Factor VIII level elevated to 322%, normally lower in DIC but drawn after patient given FFP, and elevated due to shock liver, Factor VII low despite FFP, likely related to acute liver injury (shortest half -life 3-6hrs)  PT/PTT mixing studies with initial correction, will review 2hr levels   No signs of acute bleeding, currently extubated    peripheral smear reviewed, spherocytes seen, no schistocytes (11/13*)  -Treatment of underlying infection (endocarditis) and c/w cvvhd per renal   -supportive care: transfuse plts for >10K or or >50K with active bleeding   -transfuse cryoprecipitate if fibrinogen level <100   -Daily coags, fibrinogen  -vascular f/u     #Anemia  Hb 6.9 - 10 on this admission. Normocytic.   11/14/19: , Haptoglobin < 10; Tibili 11.3, direct bili 9.4  retic count elevated at w/o gross bleeding c/w showing appropriate BM response  f/u urine hemosiderin regarding intravascular hemolysis, however without rise in indirect bili and downtrending LDH, in addition can see low hapto in shock liver, spleen normal appearing on CT imaging   Iron studies suggesting anemia of chronic disease  Direct Juvencio test negative   -Trend CBC; keep active T&S; transfuse if actively bleeding or if Hb < 7 g/dL   -Daily hemolysis labs   -f/u GI regarding irregular masslike mural thickening in rectum       d/w Dr. Flores

## 2019-11-17 NOTE — PROGRESS NOTE ADULT - ASSESSMENT
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  Multiple septic emboli to lungs, course c/b empyema requiring B chest tubes, as well as multi-organ failure (shock liver, CAMERON requiring CVVHD).   Now clinically improving.  Necrotic digits likely related to pressors.  Afebrile but with ongoing marked leukocytosis.  Per CTS, he is not a surgical candidate.    Suggest:  - Continue nafcillin 2 g IV q4h  Will follow with you – ID Team [id number].

## 2019-11-17 NOTE — PROGRESS NOTE ADULT - ASSESSMENT
A/p  40 yo male with PMH of IVDU was transferred from [facility] for IE with tricupsid valve vegetations, Nephrology consulted for anuric holly and electrolytes disturbances.

## 2019-11-17 NOTE — PROGRESS NOTE ADULT - SUBJECTIVE AND OBJECTIVE BOX
O/N Events: NIELS  Subjective:  alert and awake, tolerating CPAP, follows commands, on low dose prop/fentanyl, Levo 0.02 mcg/kg/min  remained on CVVHD, FB ~ -1L, volume overload is improving, phos low required supplementation    VITALS  Vital Signs Last 24 Hrs  T(C): 36.2 (17 Nov 2019 06:12), Max: 36.5 (16 Nov 2019 13:00)  T(F): 97.1 (17 Nov 2019 06:12), Max: 97.7 (16 Nov 2019 13:00)  HR: 104 (17 Nov 2019 10:00) (100 - 112)  BP: 90/60 (16 Nov 2019 20:00) (90/60 - 90/60)  BP(mean): 69 (16 Nov 2019 20:00) (69 - 69)  RR: 21 (17 Nov 2019 10:00) (20 - 30)  SpO2: 100% (17 Nov 2019 10:00) (95% - 100%)    PHYSICAL EXAM  General: Alert and awake, NAD  Eyes: PERRL  Neck: Supple; no JVD  Respiratory: equal breath sounds bilaterally, bilateral chest tubes  HEART: normal S1S2, RRR  ABDOMEN: Soft, NT/ND  EXTREMITIES: upper tight and sacral edema present/ necrotic toes present   NEUROLOGY:alert and awake, follows commands, SANTOS, non focal   ACCESS: R IJ THC    MEDICATIONS  (STANDING):  albumin human 25% IVPB 50 milliLiter(s) IV Intermittent every 8 hours  ascorbic acid 500 milliGRAM(s) Oral daily  chlorhexidine 0.12% Liquid 15 milliLiter(s) Oral Mucosa every 12 hours  chlorhexidine 2% Cloths 1 Application(s) Topical daily  CRRT Treatment    <Continuous>  dextrose 5%. 1000 milliLiter(s) (50 mL/Hr) IV Continuous <Continuous>  dextrose 50% Injectable 12.5 Gram(s) IV Push once  dextrose 50% Injectable 25 Gram(s) IV Push once  dextrose 50% Injectable 25 Gram(s) IV Push once  fentaNYL   Infusion. 0.5 MICROgram(s)/kG/Hr (3.68 mL/Hr) IV Continuous <Continuous>  heparin  Injectable 5000 Unit(s) SubCutaneous every 8 hours  insulin lispro (HumaLOG) corrective regimen sliding scale   SubCutaneous every 6 hours  multivitamin/minerals/iron Oral Solution (CENTRUM) 15 milliLiter(s) Oral daily  nafcillin  IVPB 2 Gram(s) IV Intermittent every 4 hours  norepinephrine Infusion 0.05 MICROgram(s)/kG/Min (6.9 mL/Hr) IV Continuous <Continuous>  pantoprazole  Injectable 40 milliGRAM(s) IV Push daily  polyethylene glycol 3350 17 Gram(s) Oral every 24 hours  propofol Infusion 10 MICROgram(s)/kG/Min (4.416 mL/Hr) IV Continuous <Continuous>  PureFlow Dialysate RFP-401 (K 4 / Ca 3) 5000 milliLiter(s) (3500 mL/Hr) CRRT <Continuous>  senna 2 Tablet(s) Oral at bedtime  sodium chloride 0.9% lock flush 3 milliLiter(s) IV Push every 8 hours  vitamin E 400 International Unit(s) Oral daily  zinc sulfate 220 milliGRAM(s) Oral daily    MEDICATIONS  (PRN):  dextrose 40% Gel 15 Gram(s) Oral once PRN Blood Glucose LESS THAN 70 milliGRAM(s)/deciliter  glucagon  Injectable 1 milliGRAM(s) IntraMuscular once PRN Glucose LESS THAN 70 milligrams/deciliter      LABS                        7.7    19.82 )-----------( 64       ( 17 Nov 2019 05:25 )             24.7     11-17    134<L>  |  100  |  8   ----------------------------<  147<H>  4.5   |  24  |  0.70    Ca    8.9      17 Nov 2019 05:25  Phos  2.1     11-17  Mg     2.0     11-17    TPro  6.5  /  Alb  2.9<L>  /  TBili  9.5<H>  /  DBili  x   /  AST  69<H>  /  ALT  93<H>  /  AlkPhos  116  11-16    LIVER FUNCTIONS - ( 16 Nov 2019 05:34 )  Alb: 2.9 g/dL / Pro: 6.5 g/dL / ALK PHOS: 116 U/L / ALT: 93 U/L / AST: 69 U/L / GGT: x

## 2019-11-17 NOTE — PROGRESS NOTE ADULT - SUBJECTIVE AND OBJECTIVE BOX
Heme/Onc Progress Note (Dr. Flores )    Interval History: Pt seen and examined. Patient extubated, awake. No fevers noted, no new bleeding.     Allergies    Allergy Status Unknown    Intolerances      Medications:  MEDICATIONS  (STANDING):  albumin human 25% IVPB 50 milliLiter(s) IV Intermittent every 8 hours  ascorbic acid 500 milliGRAM(s) Oral daily  chlorhexidine 0.12% Liquid 15 milliLiter(s) Oral Mucosa every 12 hours  chlorhexidine 2% Cloths 1 Application(s) Topical daily  CRRT Treatment    <Continuous>  dextrose 5%. 1000 milliLiter(s) (50 mL/Hr) IV Continuous <Continuous>  dextrose 50% Injectable 12.5 Gram(s) IV Push once  dextrose 50% Injectable 25 Gram(s) IV Push once  dextrose 50% Injectable 25 Gram(s) IV Push once  fentaNYL   Infusion. 0.5 MICROgram(s)/kG/Hr (3.68 mL/Hr) IV Continuous <Continuous>  heparin  Injectable 5000 Unit(s) SubCutaneous every 8 hours  insulin lispro (HumaLOG) corrective regimen sliding scale   SubCutaneous every 6 hours  multivitamin/minerals/iron Oral Solution (CENTRUM) 15 milliLiter(s) Oral daily  nafcillin  IVPB 2 Gram(s) IV Intermittent every 4 hours  norepinephrine Infusion 0.05 MICROgram(s)/kG/Min (6.9 mL/Hr) IV Continuous <Continuous>  pantoprazole  Injectable 40 milliGRAM(s) IV Push daily  polyethylene glycol 3350 17 Gram(s) Oral every 24 hours  propofol Infusion 10 MICROgram(s)/kG/Min (4.416 mL/Hr) IV Continuous <Continuous>  PureFlow Dialysate RFP-401 (K 4 / Ca 3) 5000 milliLiter(s) (3500 mL/Hr) CRRT <Continuous>  senna 2 Tablet(s) Oral at bedtime  sodium chloride 0.9% lock flush 3 milliLiter(s) IV Push every 8 hours  vitamin E 400 International Unit(s) Oral daily  zinc sulfate 220 milliGRAM(s) Oral daily    MEDICATIONS  (PRN):  dextrose 40% Gel 15 Gram(s) Oral once PRN Blood Glucose LESS THAN 70 milliGRAM(s)/deciliter  glucagon  Injectable 1 milliGRAM(s) IntraMuscular once PRN Glucose LESS THAN 70 milligrams/deciliter    heparin  Injectable 5000 Unit(s) SubCutaneous every 8 hours    PHYSICAL EXAM:    T(F): 97.1 (11-17-19 @ 06:12), Max: 97.7 (11-16-19 @ 13:00)  HR: 108 (11-17-19 @ 12:00) (100 - 112)  BP: 90/60 (11-16-19 @ 20:00) (90/60 - 90/60)  RR: 25 (11-17-19 @ 12:00) (20 - 30)  SpO2: 87% (11-17-19 @ 12:00) (87% - 100%)  Wt(kg): --    Daily     Daily     GENERAL: Patient extubated   HEENT:  atraumatic, normocephalic  NECK: supple  CHEST: chest tube in place  LUNG: cta b/l   HEART: +S1S2   ABDOMEN: soft, nontender, nondistended; bowel sounds present in all four quadrants      Labs:                          7.7    19.82 )-----------( 64       ( 17 Nov 2019 05:25 )             24.7     CBC Full  -  ( 17 Nov 2019 05:25 )  WBC Count : 19.82 K/uL  RBC Count : 2.73 M/uL  Hemoglobin : 7.7 g/dL  Hematocrit : 24.7 %  Platelet Count - Automated : 64 K/uL  Mean Cell Volume : 90.5 fl  Mean Cell Hemoglobin : 28.2 pg  Mean Cell Hemoglobin Concentration : 31.2 gm/dL  Auto Neutrophil # : 19.03 K/uL  Auto Lymphocyte # : 0.40 K/uL  Auto Monocyte # : 0.20 K/uL  Auto Eosinophil # : 0.00 K/uL  Auto Basophil # : 0.00 K/uL  Auto Neutrophil % : 90.0 %  Auto Lymphocyte % : 2.0 %  Auto Monocyte % : 1.0 %  Auto Eosinophil % : 0.0 %  Auto Basophil % : 0.0 %        11-17    133<L>  |  99  |  9   ----------------------------<  127<H>  4.6   |  22  |  0.72    Ca    9.0      17 Nov 2019 12:07  Phos  2.7     11-17  Mg     1.9     11-17    TPro  6.5  /  Alb  2.9<L>  /  TBili  9.5<H>  /  DBili  x   /  AST  69<H>  /  ALT  93<H>  /  AlkPhos  116  11-16

## 2019-11-17 NOTE — PROGRESS NOTE ADULT - ASSESSMENT
38 y/o M smoker w/ PMHx of IVDU presented to [facility] w/ productive cough with hemoptysis found to have septic emboli w/ vegetation of the TV transferred to [facility] for surgical evaluation.  Course complicated by sepsis, DIC, acute respiratory failure w/ empyema s/p intubation and thoracostomy tube, CAMERON requiring cvvhd.  Now being tapered off pressor support. GI consulted for evaluation of incidental finding of rectal wall thickening on imaging.    #Rectal lesion:  CT w/ irregular masslike mural thickening of the posterior right lateral wall of the rectum.  Would plan for flex sig or full colonoscopy for further evaluation.  -Tentative plan for flex sig vs colonoscopy Tuesday  -Keep NPO Monday MN  -CLD on Monday  -Split Dose bowel prep with Golytely on Monday; start at 5PM Monday to complete half, remainder the following Tuesday  morning 5AM-7AM then strict NPO  -If patient remains intubated, would bowel prep via NG tube  -Rest of management per primary team    Recommendation d/w primary team  Case d/w [facility] attg

## 2019-11-17 NOTE — PROGRESS NOTE ADULT - ASSESSMENT
39M with PMH of IVDU admitted for sepsis 2/2 infective endocarditis requiring multiple pressors, now off pressors, now with ischemic digits on RUE and RLE    Recommendations:  - Will continue to monitor, may need amputation in the future once ischemic tissue fully demarcates, and once patient is medically optimized  - Recommend Hand Surgery consult for ischemic fingers  - Call [phone] with questions

## 2019-11-18 NOTE — CONSULT NOTE ADULT - SUBJECTIVE AND OBJECTIVE BOX
Surgeon: Dr. West     Requesting Physician: Dr. Berrios    HISTORY OF PRESENT ILLNESS (Need 4):  39y Male w PMH of IVDU and active smoker who went to [facility] on 11/8/19 complaining of productive cough with hemoptysis, progressive SOB, pleuritic chest pain, nausea, non-bloody emesis, abd pain, chillsx3 days. 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 Daniel Ruelas for possible surgical evaluation. Following admission, patient noted to be in acute hypoxic respiratory failure s/p intubation/sedation, acute kidney injury requiring CVVHD, congestive hepatopathy, and mutli-system organ failure 2/2 hypoperfusion. Patient deemed not a surgical candidate, per Dr. Ruelas, and patient transferred to MICU for medical management with IV nafcillin, ID following. Pt Now being tapered off pressor support. Pt with bilateral pigtails in place with new b/l pneumothorax. CT chest done today, concerning for b/l trapped lung and hemothorax. Thoracic surgery consulted for surgical evaluation.     PAST MEDICAL & SURGICAL HISTORY:  Endocarditis  IVDU (intravenous drug user)  Smoker  No significant past surgical history      MEDICATIONS  (STANDING):  albumin human 25% IVPB 50 milliLiter(s) IV Intermittent every 8 hours  alteplase  Injectable for Pleural Effusion 10 milliGRAM(s) IntraPleural. every 12 hours  ascorbic acid 500 milliGRAM(s) Oral daily  chlorhexidine 2% Cloths 1 Application(s) Topical daily  CRRT Treatment    <Continuous>  dextrose 5%. 1000 milliLiter(s) (50 mL/Hr) IV Continuous <Continuous>  dextrose 50% Injectable 12.5 Gram(s) IV Push once  dextrose 50% Injectable 25 Gram(s) IV Push once  dextrose 50% Injectable 25 Gram(s) IV Push once  dornase maikol Solution for Pleural Effusion 5 milliGRAM(s) IntraPleural. every 12 hours  fentaNYL   Infusion. 0.997 MICROgram(s)/kG/Hr (7.34 mL/Hr) IV Continuous <Continuous>  fentaNYL   Patch  50 MICROgram(s)/Hr 1 Patch Transdermal every 72 hours  heparin  Injectable 5000 Unit(s) SubCutaneous every 8 hours  insulin lispro (HumaLOG) corrective regimen sliding scale   SubCutaneous every 6 hours  multivitamin/minerals/iron Oral Solution (CENTRUM) 15 milliLiter(s) Oral daily  nafcillin  IVPB 2 Gram(s) IV Intermittent every 4 hours  Phoxillum Filtration BK 4 / 2.5 5000 milliLiter(s) (2500 mL/Hr) CRRT <Continuous>  polyethylene glycol 3350 17 Gram(s) Oral two times a day  senna 2 Tablet(s) Oral at bedtime  sodium chloride 0.9% lock flush 3 milliLiter(s) IV Push every 8 hours  vitamin E 400 International Unit(s) Oral daily  zinc sulfate 220 milliGRAM(s) Oral daily    MEDICATIONS  (PRN):  dextrose 40% Gel 15 Gram(s) Oral once PRN Blood Glucose LESS THAN 70 milliGRAM(s)/deciliter  glucagon  Injectable 1 milliGRAM(s) IntraMuscular once PRN Glucose LESS THAN 70 milligrams/deciliter      Allergies    Allergy Status Unknown    Intolerances        SOCIAL HISTORY:  current everyday smoker  +marijuana, +IVDU + heroin    FAMILY HISTORY:  No pertinent family history in first degree relatives      Review of Systems (Need 10):  CONSTITUTIONAL: Denies fevers / chills, sweats, fatigue, weight loss, weight gain                                       NEURO:  Denies parathesias, seizures, syncope, confusion                                                                                  EYES:  Denies blurry vision, discharge, pain, loss of vision                                                                                    ENMT:  Denies difficulty hearing, vertigo, dysphagia, epistaxis, recent dental work                                       CV:  Denies chest pain, palpitations, CISNEROS, orthopnea                                                                                           RESPIRATORY:  Denies wWheezing, SOB, cough / sputum, hemoptysis                                                               GI:  Denies nausea, vomiting, diarrhea, constipation, melena                                                                          : Denies hematuria, dysuria, urgency, incontinence                                                                                          MUSKULOSKELETAL:  Denies arthritis, joint swelling, muscle weakness                                                             SKIN/BREAST:  Denies rash, itching, hair loss, masses                                                                                              PSYCH:  Denies depression, anxiety, suicidal ideation                                                                                                HEME/LYMPH:  Denies bruises easily, enlarged lymph nodes, tender lymph nodes                                          ENDOCRINE:  Denies cold intolerance, heat intolerance, polydipsia                                                                      Vital Signs Last 24 Hrs  T(C): 36.5 (18 Nov 2019 19:00), Max: 36.5 (18 Nov 2019 16:00)  T(F): 97.7 (18 Nov 2019 19:00), Max: 97.7 (18 Nov 2019 16:00)  HR: 110 (18 Nov 2019 19:00) (96 - 110)  BP: 95/55 (18 Nov 2019 09:00) (95/55 - 95/55)  BP(mean): 66 (18 Nov 2019 09:00) (66 - 66)  RR: 32 (18 Nov 2019 19:00) (23 - 39)  SpO2: 100% (18 Nov 2019 19:00) (94% - 100%)    Physical Exam (Need 8)    General: Lying in bed, frail, NAD    HEENT: NC/AT, sclera icteric, MMM    Neck: supple    Cardiovascular: RRR, normal s1 s2, no M/R/G    Respiratory: Bilateral pigtails in place, no airleak, non-labored breathing, chest expansion symmetric, CTA b/l, no W/R/R    Gastrointestinal: +BS x4 quadrant, appears distended, no guarding    Extremities: WWP, no pitting edema, no calf tenderness or erythema    Vascular: Fingers and toes appear necrotic, cool to touch                                                          LABS:                        8.5    16.75 )-----------( 82       ( 18 Nov 2019 05:33 )             27.1     11-18    134<L>  |  97  |  13  ----------------------------<  87  4.6   |  26  |  0.79    Ca    8.9      18 Nov 2019 14:25  Phos  2.8     11-18  Mg     2.0     11-18    TPro  7.4  /  Alb  3.4  /  TBili  9.1<H>  /  DBili  x   /  AST  53<H>  /  ALT  71<H>  /  AlkPhos  105  11-18    PT/INR - ( 18 Nov 2019 05:33 )   PT: 14.8 sec;   INR: 1.30          PTT - ( 18 Nov 2019 05:33 )  PTT:40.2 sec            RADIOLOGY & ADDITIONAL STUDIES:

## 2019-11-18 NOTE — PROGRESS NOTE ADULT - SUBJECTIVE AND OBJECTIVE BOX
INTERVAL HPI/OVERNIGHT EVENTS: Patient Extubated yesterday. Received CVVHD. . FS 76 around MN, ordered for 1/2 amp D50 while patient remains NPO.    Patient seen and examined at bedside.     REVIEW OF SYSTEMS: negative except as above     VITAL SIGNS:   Vital Signs Last 24 Hrs  T(C): 36.4 (18 Nov 2019 09:00), Max: 37.1 (17 Nov 2019 18:48)  T(F): 97.6 (18 Nov 2019 09:00), Max: 98.8 (17 Nov 2019 18:48)  HR: 104 (18 Nov 2019 10:00) (96 - 110)  BP: 95/55 (18 Nov 2019 09:00) (95/55 - 95/55)  BP(mean): 66 (18 Nov 2019 09:00) (66 - 66)  RR: 35 (18 Nov 2019 10:00) (23 - 39)  SpO2: 100% (18 Nov 2019 10:00) (94% - 100%)    PHYSICAL EXAM:  GENERAL: NAD; appears stated age   HEENT:  atraumatic, normocephalic, +bilateral scleral icterus; ET and NG tubes in place   NECK: supple; RIJ central line and axillary a-line in plce   CHEST: chest tube in place  LUNG: bibasilar crackles and diffuse bilateral rhonchi   HEART: regular rate and rhythm; S1 and S2 audible; +systolic murmur   ABDOMEN: soft, nontender, nondistended; bowel sounds present in all four quadrants  : [place] in place   EXTREMITIES:  2+ peripheral pulses bilaterally; 1+ BLE pitting edema; cyanotic fingertips and toe tips   NEUROLOGY: AAOx3; no focal deficits   Skin: generalized jaundice; needle marks in antecubital area     LABS:                        8.5    16.75 )-----------( 82       ( 18 Nov 2019 05:33 )             27.1     11-18    132<L>  |  99  |  11  ----------------------------<  92  4.8   |  23  |  0.76    Ca    9.2      18 Nov 2019 05:32  Phos  2.8     11-18  Mg     2.0     11-18    TPro  7.4  /  Alb  3.4  /  TBili  9.1<H>  /  DBili  x   /  AST  53<H>  /  ALT  71<H>  /  AlkPhos  105  11-18    LIVER FUNCTIONS - ( 18 Nov 2019 05:32 )  Alb: 3.4 g/dL / Pro: 7.4 g/dL / ALK PHOS: 105 U/L / ALT: 71 U/L / AST: 53 U/L / GGT: x           PT/INR - ( 18 Nov 2019 05:33 )   PT: 14.8 sec;   INR: 1.30          PTT - ( 18 Nov 2019 05:33 )  PTT:40.2 sec      RADIOLOGY & ADDITIONAL TESTS: reviewed ***NOTE INCOMPLETE***    INTERVAL HPI/OVERNIGHT EVENTS: Patient Extubated yesterday. Received CVVHD. . FS 76 around MN, ordered for 1/2 amp D50 while patient remains NPO.    Patient seen and examined at bedside.     REVIEW OF SYSTEMS: negative except as above     VITAL SIGNS:   Vital Signs Last 24 Hrs  T(C): 36.4 (18 Nov 2019 09:00), Max: 37.1 (17 Nov 2019 18:48)  T(F): 97.6 (18 Nov 2019 09:00), Max: 98.8 (17 Nov 2019 18:48)  HR: 104 (18 Nov 2019 10:00) (96 - 110)  BP: 95/55 (18 Nov 2019 09:00) (95/55 - 95/55)  BP(mean): 66 (18 Nov 2019 09:00) (66 - 66)  RR: 35 (18 Nov 2019 10:00) (23 - 39)  SpO2: 100% (18 Nov 2019 10:00) (94% - 100%)    PHYSICAL EXAM:  GENERAL: NAD; appears stated age   HEENT:  atraumatic, normocephalic, +bilateral scleral icterus; ET and NG tubes in place   NECK: supple; RIJ central line and axillary a-line in plce   CHEST: chest tube in place  LUNG: bibasilar crackles and diffuse bilateral rhonchi   HEART: regular rate and rhythm; S1 and S2 audible; +systolic murmur   ABDOMEN: soft, nontender, nondistended; bowel sounds present in all four quadrants  : [place] in place   EXTREMITIES:  2+ peripheral pulses bilaterally; 1+ BLE pitting edema; cyanotic fingertips and toe tips   NEUROLOGY: AAOx3; no focal deficits   Skin: generalized jaundice; needle marks in antecubital area     LABS:                        8.5    16.75 )-----------( 82       ( 18 Nov 2019 05:33 )             27.1     11-18    132<L>  |  99  |  11  ----------------------------<  92  4.8   |  23  |  0.76    Ca    9.2      18 Nov 2019 05:32  Phos  2.8     11-18  Mg     2.0     11-18    TPro  7.4  /  Alb  3.4  /  TBili  9.1<H>  /  DBili  x   /  AST  53<H>  /  ALT  71<H>  /  AlkPhos  105  11-18    LIVER FUNCTIONS - ( 18 Nov 2019 05:32 )  Alb: 3.4 g/dL / Pro: 7.4 g/dL / ALK PHOS: 105 U/L / ALT: 71 U/L / AST: 53 U/L / GGT: x           PT/INR - ( 18 Nov 2019 05:33 )   PT: 14.8 sec;   INR: 1.30          PTT - ( 18 Nov 2019 05:33 )  PTT:40.2 sec      RADIOLOGY & ADDITIONAL TESTS: reviewed ***NOTE INCOMPLETE***    INTERVAL HPI/OVERNIGHT EVENTS: Patient Extubated yesterday. Received CVVHD. . FS 76 around MN, ordered for 1/2 amp D50 while patient remains NPO.    Patient seen and examined at bedside. Reports being thirsty. Reports diffuse abdominal pain. Denies any recent fevers, chills, headaches, dizziness, CP, SOB, nausea, vomiting, bleeding.     REVIEW OF SYSTEMS: negative except as above     VITAL SIGNS:   Vital Signs Last 24 Hrs  T(C): 36.4 (18 Nov 2019 09:00), Max: 37.1 (17 Nov 2019 18:48)  T(F): 97.6 (18 Nov 2019 09:00), Max: 98.8 (17 Nov 2019 18:48)  HR: 104 (18 Nov 2019 10:00) (96 - 110)  BP: 95/55 (18 Nov 2019 09:00) (95/55 - 95/55)  BP(mean): 66 (18 Nov 2019 09:00) (66 - 66)  RR: 35 (18 Nov 2019 10:00) (23 - 39)  SpO2: 100% (18 Nov 2019 10:00) (94% - 100%)    PHYSICAL EXAM:  GENERAL: NAD; appears stated age   HEENT:  atraumatic, normocephalic, +bilateral scleral icterus   NECK: supple; RIJ central line and axillary a-line in plce   CHEST: chest tube in place  LUNG:  diffuse bilateral rhonchi   HEART: fast rate, regular rhythm; S1 and S2 audible; +systolic murmur   ABDOMEN: soft, moderate diffuse TTP, mildly distended; bowel sounds present in all four quadrants  : [place] in place   EXTREMITIES:  2+ peripheral pulses bilaterally; 1+ BLE pitting edema; cyanotic fingertips and toe tips   NEUROLOGY: AAOx3; no focal deficits   Skin: generalized jaundice; needle marks in antecubital area     LABS:                        8.5    16.75 )-----------( 82       ( 18 Nov 2019 05:33 )             27.1     11-18    132<L>  |  99  |  11  ----------------------------<  92  4.8   |  23  |  0.76    Ca    9.2      18 Nov 2019 05:32  Phos  2.8     11-18  Mg     2.0     11-18    TPro  7.4  /  Alb  3.4  /  TBili  9.1<H>  /  DBili  x   /  AST  53<H>  /  ALT  71<H>  /  AlkPhos  105  11-18    LIVER FUNCTIONS - ( 18 Nov 2019 05:32 )  Alb: 3.4 g/dL / Pro: 7.4 g/dL / ALK PHOS: 105 U/L / ALT: 71 U/L / AST: 53 U/L / GGT: x           PT/INR - ( 18 Nov 2019 05:33 )   PT: 14.8 sec;   INR: 1.30          PTT - ( 18 Nov 2019 05:33 )  PTT:40.2 sec      RADIOLOGY & ADDITIONAL TESTS: reviewed INTERVAL HPI/OVERNIGHT EVENTS: Patient Extubated yesterday. Received CVVHD. . FS 76 around MN, ordered for 1/2 amp D50 while patient remains NPO.    Patient seen and examined at bedside. Reports being thirsty. Reports diffuse abdominal pain. Denies any recent fevers, chills, headaches, dizziness, CP, SOB, nausea, vomiting, bleeding.     REVIEW OF SYSTEMS: negative except as above     VITAL SIGNS:   Vital Signs Last 24 Hrs  T(C): 36.4 (18 Nov 2019 09:00), Max: 37.1 (17 Nov 2019 18:48)  T(F): 97.6 (18 Nov 2019 09:00), Max: 98.8 (17 Nov 2019 18:48)  HR: 104 (18 Nov 2019 10:00) (96 - 110)  BP: 95/55 (18 Nov 2019 09:00) (95/55 - 95/55)  BP(mean): 66 (18 Nov 2019 09:00) (66 - 66)  RR: 35 (18 Nov 2019 10:00) (23 - 39)  SpO2: 100% (18 Nov 2019 10:00) (94% - 100%)    PHYSICAL EXAM:  GENERAL: NAD; appears stated age   HEENT:  atraumatic, normocephalic, +bilateral scleral icterus   NECK: supple; RIJ central line and axillary a-line in plce   CHEST: chest tube in place  LUNG:  diffuse bilateral rhonchi   HEART: fast rate, regular rhythm; S1 and S2 audible; +systolic murmur   ABDOMEN: soft, moderate diffuse TTP, mildly distended; bowel sounds present in all four quadrants  : [place] in place   EXTREMITIES:  2+ peripheral pulses bilaterally; 1+ BLE pitting edema; cyanotic fingertips and toe tips   NEUROLOGY: AAOx3; no focal deficits   Skin: generalized jaundice; needle marks in antecubital area     LABS:                        8.5    16.75 )-----------( 82       ( 18 Nov 2019 05:33 )             27.1     11-18    132<L>  |  99  |  11  ----------------------------<  92  4.8   |  23  |  0.76    Ca    9.2      18 Nov 2019 05:32  Phos  2.8     11-18  Mg     2.0     11-18    TPro  7.4  /  Alb  3.4  /  TBili  9.1<H>  /  DBili  x   /  AST  53<H>  /  ALT  71<H>  /  AlkPhos  105  11-18    LIVER FUNCTIONS - ( 18 Nov 2019 05:32 )  Alb: 3.4 g/dL / Pro: 7.4 g/dL / ALK PHOS: 105 U/L / ALT: 71 U/L / AST: 53 U/L / GGT: x           PT/INR - ( 18 Nov 2019 05:33 )   PT: 14.8 sec;   INR: 1.30          PTT - ( 18 Nov 2019 05:33 )  PTT:40.2 sec      RADIOLOGY & ADDITIONAL TESTS: reviewed

## 2019-11-18 NOTE — PROGRESS NOTE ADULT - ASSESSMENT
40 yo male with PMH of IVDU was transferred from [facility] for IE with tricupsid valve vegetations, Nephrology consulted for anuric cameron and electrolytes disturbances.      # CAMERON, anuric  - on CVVHD via RIJ HD cath  - currently UF set at 50 cc net negative an hour, net negative 1.2 L/24 hr, off pressors with SBP in 90's, will continue the same  - changes HD cartridge q72hr  - keep map >65 mmHg  - phos 2.8, will change dialysate to Phoxillum   - monitor BMP q6hr while on CVVHD for K/Phos and Mg and supplement as needed  - will consider transition to IHD once more hemodynamically stable  - renally adjusted meds/ABx  - monitor H/H, transfuse as indicated      Discussed with attending Dr Pinzon.

## 2019-11-18 NOTE — CONSULT NOTE ADULT - SUBJECTIVE AND OBJECTIVE BOX
HPI:  This is a 40 y/o with PMH of IVDU transferred from OSH for Endocarditis and CT surgery consult.   Patient has septic embolis to lung and extremities.    Blood cultures from OSH grew MSSA, patient is on nafcillin. Blood cultures from H negative or contaminated so far.   ELIAN with TV vegetation and RV overload, EF 40-45  Patient previously deemed not a surgical candidate for valve replacement by CTS due to septic shock.   Surgery team has been previously consulted to r/o ischemic bowel with CTA being negative.   Patient has improved clinically and is now off pressors and extubated.   However MICU team concerned for abdominal distention this morning on rounds, they performed an AXR showing distended loops of bowel   Patient is currently NPO but was on tube feeds until yesterday well tolerated, has had 3 BMs overnight, no N/V.         PAST MEDICAL & SURGICAL HISTORY:  Endocarditis  IVDU (intravenous drug user)  Smoker  No significant past surgical history      MEDICATIONS  (STANDING):  albumin human 25% IVPB 50 milliLiter(s) IV Intermittent every 8 hours  ascorbic acid 500 milliGRAM(s) Oral daily  chlorhexidine 2% Cloths 1 Application(s) Topical daily  CRRT Treatment    <Continuous>  dextrose 5%. 1000 milliLiter(s) (50 mL/Hr) IV Continuous <Continuous>  dextrose 50% Injectable 12.5 Gram(s) IV Push once  dextrose 50% Injectable 25 Gram(s) IV Push once  dextrose 50% Injectable 25 Gram(s) IV Push once  fentaNYL   Infusion. 0.997 MICROgram(s)/kG/Hr (7.34 mL/Hr) IV Continuous <Continuous>  fentaNYL   Patch  50 MICROgram(s)/Hr 1 Patch Transdermal every 72 hours  heparin  Injectable 5000 Unit(s) SubCutaneous every 8 hours  insulin lispro (HumaLOG) corrective regimen sliding scale   SubCutaneous every 6 hours  multivitamin/minerals/iron Oral Solution (CENTRUM) 15 milliLiter(s) Oral daily  nafcillin  IVPB 2 Gram(s) IV Intermittent every 4 hours  Phoxillum Filtration BK 4 / 2.5 5000 milliLiter(s) (2500 mL/Hr) CRRT <Continuous>  polyethylene glycol 3350 17 Gram(s) Oral two times a day  senna 2 Tablet(s) Oral at bedtime  sodium chloride 0.9% lock flush 3 milliLiter(s) IV Push every 8 hours  vitamin E 400 International Unit(s) Oral daily  zinc sulfate 220 milliGRAM(s) Oral daily    MEDICATIONS  (PRN):  dextrose 40% Gel 15 Gram(s) Oral once PRN Blood Glucose LESS THAN 70 milliGRAM(s)/deciliter  glucagon  Injectable 1 milliGRAM(s) IntraMuscular once PRN Glucose LESS THAN 70 milligrams/deciliter      Allergies    Allergy Status Unknown    Intolerances        SOCIAL HISTORY: +IVDU    FAMILY HISTORY: not pertinent to endocarditis       Vital Signs Last 24 Hrs  T(C): 36.3 (18 Nov 2019 12:35), Max: 37.1 (17 Nov 2019 18:48)  T(F): 97.4 (18 Nov 2019 12:35), Max: 98.8 (17 Nov 2019 18:48)  HR: 106 (18 Nov 2019 12:00) (96 - 110)  BP: 95/55 (18 Nov 2019 09:00) (95/55 - 95/55)  BP(mean): 66 (18 Nov 2019 09:00) (66 - 66)  RR: 30 (18 Nov 2019 12:00) (23 - 39)  SpO2: 98% (18 Nov 2019 12:00) (94% - 100%)    PHYSICAL EXAM  Neuro: awake and alert, following commands   HEENT: normocephalic, +scleral ictera   Pulm:  extubated, non labored breathing, lungs are coarse    CV: regular rate and rhythm, unable to hear murmur due to coarse lungs sounds, no carotid bruit   GI: abdomen is soft but mildly distended, no grimace to palpation, no hepatomegaly  MSK: discoloration of toes and fingers     LABS:                        8.5    16.75 )-----------( 82       ( 18 Nov 2019 05:33 )             27.1     11-18    132<L>  |  99  |  11  ----------------------------<  92  4.8   |  23  |  0.76    Ca    9.2      18 Nov 2019 05:32  Phos  2.8     11-18  Mg     2.0     11-18    TPro  7.4  /  Alb  3.4  /  TBili  9.1<H>  /  DBili  x   /  AST  53<H>  /  ALT  71<H>  /  AlkPhos  105  11-18    PT/INR - ( 18 Nov 2019 05:33 )   PT: 14.8 sec;   INR: 1.30          PTT - ( 18 Nov 2019 05:33 )  PTT:40.2 sec      RADIOLOGY & ADDITIONAL STUDIES:

## 2019-11-18 NOTE — PROGRESS NOTE ADULT - ATTENDING COMMENTS
seen and evaluated while oh CVVHD-  tolerating procedure well  increase UF rate as tolerated  restart use of Phoxillum-   reviewed with ICU team

## 2019-11-18 NOTE — PROGRESS NOTE ADULT - SUBJECTIVE AND OBJECTIVE BOX
Pt seen and examined at bedside.  Patient with hypothermia this am but reports doing well.  Denies any pain or discomfort.  Very soft spoken with psychomotor delays to inquiry.     Allergies    Allergy Status Unknown    Intolerances      MEDICATIONS:  MEDICATIONS  (STANDING):  albumin human 25% IVPB 50 milliLiter(s) IV Intermittent every 8 hours  alteplase  Injectable for Pleural Effusion 10 milliGRAM(s) IntraPleural. every 12 hours  ascorbic acid 500 milliGRAM(s) Oral daily  chlorhexidine 2% Cloths 1 Application(s) Topical daily  CRRT Treatment    <Continuous>  dextrose 5%. 1000 milliLiter(s) (50 mL/Hr) IV Continuous <Continuous>  dextrose 50% Injectable 12.5 Gram(s) IV Push once  dextrose 50% Injectable 25 Gram(s) IV Push once  dextrose 50% Injectable 25 Gram(s) IV Push once  dornase maikol Solution for Pleural Effusion 5 milliGRAM(s) IntraPleural. every 12 hours  fentaNYL   Infusion. 0.997 MICROgram(s)/kG/Hr (7.34 mL/Hr) IV Continuous <Continuous>  fentaNYL   Patch  50 MICROgram(s)/Hr 1 Patch Transdermal every 72 hours  heparin  Injectable 5000 Unit(s) SubCutaneous every 8 hours  insulin lispro (HumaLOG) corrective regimen sliding scale   SubCutaneous every 6 hours  iohexol 300 mG (iodine)/mL Oral Solution 30 milliLiter(s) Oral once  multivitamin/minerals/iron Oral Solution (CENTRUM) 15 milliLiter(s) Oral daily  nafcillin  IVPB 2 Gram(s) IV Intermittent every 4 hours  Phoxillum Filtration BK 4 / 2.5 5000 milliLiter(s) (2500 mL/Hr) CRRT <Continuous>  polyethylene glycol 3350 17 Gram(s) Oral two times a day  senna 2 Tablet(s) Oral at bedtime  sodium chloride 0.9% lock flush 3 milliLiter(s) IV Push every 8 hours  vitamin E 400 International Unit(s) Oral daily  zinc sulfate 220 milliGRAM(s) Oral daily    MEDICATIONS  (PRN):  dextrose 40% Gel 15 Gram(s) Oral once PRN Blood Glucose LESS THAN 70 milliGRAM(s)/deciliter  glucagon  Injectable 1 milliGRAM(s) IntraMuscular once PRN Glucose LESS THAN 70 milligrams/deciliter    Vital Signs Last 24 Hrs  T(C): 36.3 (18 Nov 2019 12:35), Max: 37.1 (17 Nov 2019 18:48)  T(F): 97.4 (18 Nov 2019 12:35), Max: 98.8 (17 Nov 2019 18:48)  HR: 106 (18 Nov 2019 14:00) (96 - 108)  BP: 95/55 (18 Nov 2019 09:00) (95/55 - 95/55)  BP(mean): 66 (18 Nov 2019 09:00) (66 - 66)  RR: 27 (18 Nov 2019 14:00) (23 - 39)  SpO2: 100% (18 Nov 2019 14:00) (94% - 100%)    11-17 @ 07:01  -  11-18 @ 07:00  --------------------------------------------------------  IN: 2849.4 mL / OUT: 4031 mL / NET: -1181.6 mL    11-18 @ 07:01  -  11-18 @ 14:51  --------------------------------------------------------  IN: 321.7 mL / OUT: 620 mL / NET: -298.3 mL      PHYSICAL EXAM:  General: frail male comfortable in bed, awake and alert  HEENT: MMM, conjunctiva and sclera icterus  Gastrointestinal: Soft non-tender non-distended; Normal bowel sounds;  No rebound or guarding  Skin: Warm and dry    LABS:                        8.5    16.75 )-----------( 82       ( 18 Nov 2019 05:33 )             27.1     11-18    132<L>  |  99  |  11  ----------------------------<  92  4.8   |  23  |  0.76    Ca    9.2      18 Nov 2019 05:32  Phos  2.8     11-18  Mg     2.0     11-18    TPro  7.4  /  Alb  3.4  /  TBili  9.1<H>  /  DBili  x   /  AST  53<H>  /  ALT  71<H>  /  AlkPhos  105  11-18    PT/INR - ( 18 Nov 2019 05:33 )   PT: 14.8 sec;   INR: 1.30          PTT - ( 18 Nov 2019 05:33 )  PTT:40.2 sec Pt seen and examined at bedside.  Patient with hypothermia this am but reports doing well.  Denies any pain or discomfort.      Allergies    Allergy Status Unknown    Intolerances      MEDICATIONS:  MEDICATIONS  (STANDING):  albumin human 25% IVPB 50 milliLiter(s) IV Intermittent every 8 hours  alteplase  Injectable for Pleural Effusion 10 milliGRAM(s) IntraPleural. every 12 hours  ascorbic acid 500 milliGRAM(s) Oral daily  chlorhexidine 2% Cloths 1 Application(s) Topical daily  CRRT Treatment    <Continuous>  dextrose 5%. 1000 milliLiter(s) (50 mL/Hr) IV Continuous <Continuous>  dextrose 50% Injectable 12.5 Gram(s) IV Push once  dextrose 50% Injectable 25 Gram(s) IV Push once  dextrose 50% Injectable 25 Gram(s) IV Push once  dornase maikol Solution for Pleural Effusion 5 milliGRAM(s) IntraPleural. every 12 hours  fentaNYL   Infusion. 0.997 MICROgram(s)/kG/Hr (7.34 mL/Hr) IV Continuous <Continuous>  fentaNYL   Patch  50 MICROgram(s)/Hr 1 Patch Transdermal every 72 hours  heparin  Injectable 5000 Unit(s) SubCutaneous every 8 hours  insulin lispro (HumaLOG) corrective regimen sliding scale   SubCutaneous every 6 hours  iohexol 300 mG (iodine)/mL Oral Solution 30 milliLiter(s) Oral once  multivitamin/minerals/iron Oral Solution (CENTRUM) 15 milliLiter(s) Oral daily  nafcillin  IVPB 2 Gram(s) IV Intermittent every 4 hours  Phoxillum Filtration BK 4 / 2.5 5000 milliLiter(s) (2500 mL/Hr) CRRT <Continuous>  polyethylene glycol 3350 17 Gram(s) Oral two times a day  senna 2 Tablet(s) Oral at bedtime  sodium chloride 0.9% lock flush 3 milliLiter(s) IV Push every 8 hours  vitamin E 400 International Unit(s) Oral daily  zinc sulfate 220 milliGRAM(s) Oral daily    MEDICATIONS  (PRN):  dextrose 40% Gel 15 Gram(s) Oral once PRN Blood Glucose LESS THAN 70 milliGRAM(s)/deciliter  glucagon  Injectable 1 milliGRAM(s) IntraMuscular once PRN Glucose LESS THAN 70 milligrams/deciliter    Vital Signs Last 24 Hrs  T(C): 36.3 (18 Nov 2019 12:35), Max: 37.1 (17 Nov 2019 18:48)  T(F): 97.4 (18 Nov 2019 12:35), Max: 98.8 (17 Nov 2019 18:48)  HR: 106 (18 Nov 2019 14:00) (96 - 108)  BP: 95/55 (18 Nov 2019 09:00) (95/55 - 95/55)  BP(mean): 66 (18 Nov 2019 09:00) (66 - 66)  RR: 27 (18 Nov 2019 14:00) (23 - 39)  SpO2: 100% (18 Nov 2019 14:00) (94% - 100%)    11-17 @ 07:01  -  11-18 @ 07:00  --------------------------------------------------------  IN: 2849.4 mL / OUT: 4031 mL / NET: -1181.6 mL    11-18 @ 07:01  -  11-18 @ 14:51  --------------------------------------------------------  IN: 321.7 mL / OUT: 620 mL / NET: -298.3 mL      PHYSICAL EXAM:  General: frail male comfortable in bed, awake and alert  HEENT: MMM, conjunctiva and sclera icterus  Gastrointestinal: Soft non-tender non-distended; Normal bowel sounds;  No rebound or guarding  Skin: Warm and dry    LABS:                        8.5    16.75 )-----------( 82       ( 18 Nov 2019 05:33 )             27.1     11-18    132<L>  |  99  |  11  ----------------------------<  92  4.8   |  23  |  0.76    Ca    9.2      18 Nov 2019 05:32  Phos  2.8     11-18  Mg     2.0     11-18    TPro  7.4  /  Alb  3.4  /  TBili  9.1<H>  /  DBili  x   /  AST  53<H>  /  ALT  71<H>  /  AlkPhos  105  11-18    PT/INR - ( 18 Nov 2019 05:33 )   PT: 14.8 sec;   INR: 1.30          PTT - ( 18 Nov 2019 05:33 )  PTT:40.2 sec Pt seen and examined at bedside.  Patient with hypothermia this am but reports doing well.  Denies any pain or discomfort.      Allergies    Allergy Status Unknown    Intolerances      MEDICATIONS:  MEDICATIONS  (STANDING):  albumin human 25% IVPB 50 milliLiter(s) IV Intermittent every 8 hours  alteplase  Injectable for Pleural Effusion 10 milliGRAM(s) IntraPleural. every 12 hours  ascorbic acid 500 milliGRAM(s) Oral daily  chlorhexidine 2% Cloths 1 Application(s) Topical daily  CRRT Treatment    <Continuous>  dextrose 5%. 1000 milliLiter(s) (50 mL/Hr) IV Continuous <Continuous>  dextrose 50% Injectable 12.5 Gram(s) IV Push once  dextrose 50% Injectable 25 Gram(s) IV Push once  dextrose 50% Injectable 25 Gram(s) IV Push once  dornase maikol Solution for Pleural Effusion 5 milliGRAM(s) IntraPleural. every 12 hours  fentaNYL   Infusion. 0.997 MICROgram(s)/kG/Hr (7.34 mL/Hr) IV Continuous <Continuous>  fentaNYL   Patch  50 MICROgram(s)/Hr 1 Patch Transdermal every 72 hours  heparin  Injectable 5000 Unit(s) SubCutaneous every 8 hours  insulin lispro (HumaLOG) corrective regimen sliding scale   SubCutaneous every 6 hours  iohexol 300 mG (iodine)/mL Oral Solution 30 milliLiter(s) Oral once  multivitamin/minerals/iron Oral Solution (CENTRUM) 15 milliLiter(s) Oral daily  nafcillin  IVPB 2 Gram(s) IV Intermittent every 4 hours  Phoxillum Filtration BK 4 / 2.5 5000 milliLiter(s) (2500 mL/Hr) CRRT <Continuous>  polyethylene glycol 3350 17 Gram(s) Oral two times a day  senna 2 Tablet(s) Oral at bedtime  sodium chloride 0.9% lock flush 3 milliLiter(s) IV Push every 8 hours  vitamin E 400 International Unit(s) Oral daily  zinc sulfate 220 milliGRAM(s) Oral daily    MEDICATIONS  (PRN):  dextrose 40% Gel 15 Gram(s) Oral once PRN Blood Glucose LESS THAN 70 milliGRAM(s)/deciliter  glucagon  Injectable 1 milliGRAM(s) IntraMuscular once PRN Glucose LESS THAN 70 milligrams/deciliter    Vital Signs Last 24 Hrs  T(C): 36.3 (18 Nov 2019 12:35), Max: 37.1 (17 Nov 2019 18:48)  T(F): 97.4 (18 Nov 2019 12:35), Max: 98.8 (17 Nov 2019 18:48)  HR: 106 (18 Nov 2019 14:00) (96 - 108)  BP: 95/55 (18 Nov 2019 09:00) (95/55 - 95/55)  BP(mean): 66 (18 Nov 2019 09:00) (66 - 66)  RR: 27 (18 Nov 2019 14:00) (23 - 39)  SpO2: 100% (18 Nov 2019 14:00) (94% - 100%)    11-17 @ 07:01  -  11-18 @ 07:00  --------------------------------------------------------  IN: 2849.4 mL / OUT: 4031 mL / NET: -1181.6 mL    11-18 @ 07:01  -  11-18 @ 14:51  --------------------------------------------------------  IN: 321.7 mL / OUT: 620 mL / NET: -298.3 mL      PHYSICAL EXAM:  General: frail male comfortable in bed, awake and alert  HEENT: MMM, conjunctiva and sclera icterus  Gastrointestinal: Soft non-tender distended; Normal bowel sounds;  No rebound or guarding  Skin: Warm and dry    LABS:                        8.5    16.75 )-----------( 82       ( 18 Nov 2019 05:33 )             27.1     11-18    132<L>  |  99  |  11  ----------------------------<  92  4.8   |  23  |  0.76    Ca    9.2      18 Nov 2019 05:32  Phos  2.8     11-18  Mg     2.0     11-18    TPro  7.4  /  Alb  3.4  /  TBili  9.1<H>  /  DBili  x   /  AST  53<H>  /  ALT  71<H>  /  AlkPhos  105  11-18    PT/INR - ( 18 Nov 2019 05:33 )   PT: 14.8 sec;   INR: 1.30          PTT - ( 18 Nov 2019 05:33 )  PTT:40.2 sec

## 2019-11-18 NOTE — CONSULT NOTE ADULT - ASSESSMENT
39y Male w PMH of IVDU and active smoker who went to [facility] on 11/8/19 complaining of productive cough with hemoptysis, progressive SOB, pleuritic chest pain, nausea, non-bloody emesis, abd pain, chillsx3 days. 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, CTICU, under the care of Dr. Daniel Ruelas for possible surgical evaluation. Following admission, patient noted to be in acute hypoxic respiratory failure s/p intubation/sedation, acute kidney injury requiring CVVHD, congestive hepatopathy, and mutli-system organ failure 2/2 hypoperfusion. Patient deemed not a surgical candidate, per Dr. Ruelas, and patient transferred to MICU for medical management with IV nafcillin, ID following. Pt Now extubated and being tapered off pressor support. Pt with bilateral pigtails in place with new b/l pneumothorax. CT chest done today, concerning for b/l trapped lung and hemothorax. Thoracic surgery consulted for surgical evaluation.     Problem 1: Trapped lung  - B/l pigtail in place draining serosanguinous fluid, no air leak  - CT chest significant for b/l trapped lung, thoracic surgery Dr. West, consulted for possible surgical intervention  - NPO after MN, will discuss with cardiac team on morning rounds  - MICU to put alteplase in CT this evening    Problem 2: TV endocarditis  - IVDU, TV endocarditis, CTS consulted, not a surgical candidate at this time  - Medical Management with IV nafcillin, ID following appreciate reccs  - Will review patient along with thoracic surgery tomorrow to evaluate further surgical plan  - EF 45%    Problem 3: Distended bowel loops  - Gen surg consulted, appreciate reccs. Plan for decompression with rectal tube and NG tube, CT abd/pelvis for further evaluation    Problem 4: Rectal wall thickening   - Incidentally found rectal wall thickening GI consulted, plan for flex sig tomorrow    Problem 5: ARF  - Anuric, on CVVHD  - Management as per nephrology team   - Continue bowel regimen  - GI PPX with protonix

## 2019-11-18 NOTE — PROGRESS NOTE ADULT - ASSESSMENT
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  Multiple septic emboli to lungs, course c/b empyema requiring B chest tubes, as well as multi-organ failure (shock liver, CAMERON requiring CVVHD).   Now clinically improving.  Necrotic digits likely related to pressors.  Afebrile but with ongoing marked leukocytosis although now downtrending.  Per CTS, he is not a surgical candidate.  Patient found to have newly dilated loops of bowel and worsening abdominal distention associated with diarrhea, concern per primary team for C.diff infection. C.diff toxin negative.   Suggest:  - Continue nafcillin 2 g IV q4hrs    Plan discussed with Dr. 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  Multiple septic emboli to lungs, course c/b empyema requiring B chest tubes, as well as multi-organ failure (shock liver, CAMERON requiring CVVHD).   Necrotic digits likely related to pressors.  Afebrile but with ongoing marked leukocytosis although now downtrending.  Per CTS, he is not a surgical candidate.  Patient found to have newly dilated loops of bowel and worsening abdominal distention associated with diarrhea, concern per primary team for C.diff infection. C.diff toxin negative.   Suggest:  - Continue nafcillin 2 g IV q4hrs  - F/U CT C/A/P - read pending    Plan discussed with Dr. Simental

## 2019-11-18 NOTE — PROGRESS NOTE ADULT - SUBJECTIVE AND OBJECTIVE BOX
INCOMPLETE NOTE    Infectious diseases progress note:  ULI HOLLAND is a 39yMale patient    ENDOCARDITIS/SEPSIS    CAMERON (acute kidney injury)  Acute endocarditis due to other organism      ROS:  CONSTITUTIONAL:  Negative fever or chills, feels well, good appetite  EYES:  Negative  blurry vision or double vision  CARDIOVASCULAR:  Negative for chest pain or palpitations  RESPIRATORY:  Negative for cough, wheezing, or SOB   GASTROINTESTINAL:  Negative for nausea, vomiting, diarrhea, constipation, or abdominal pain  GENITOURINARY:  Negative frequency, urgency or dysuria  NEUROLOGIC:  No headache, confusion, dizziness, lightheadedness    Allergies    Allergy Status Unknown    Intolerances        ANTIBIOTICS/RELEVANT:  antimicrobials  nafcillin  IVPB 2 Gram(s) IV Intermittent every 4 hours    immunologic:    OTHER:  albumin human 25% IVPB 50 milliLiter(s) IV Intermittent every 8 hours  ascorbic acid 500 milliGRAM(s) Oral daily  chlorhexidine 2% Cloths 1 Application(s) Topical daily  CRRT Treatment    <Continuous>  dextrose 40% Gel 15 Gram(s) Oral once PRN  dextrose 5%. 1000 milliLiter(s) IV Continuous <Continuous>  dextrose 50% Injectable 12.5 Gram(s) IV Push once  dextrose 50% Injectable 25 Gram(s) IV Push once  dextrose 50% Injectable 25 Gram(s) IV Push once  fentaNYL   Infusion. 0.997 MICROgram(s)/kG/Hr IV Continuous <Continuous>  fentaNYL   Patch  50 MICROgram(s)/Hr 1 Patch Transdermal every 72 hours  glucagon  Injectable 1 milliGRAM(s) IntraMuscular once PRN  heparin  Injectable 5000 Unit(s) SubCutaneous every 8 hours  insulin lispro (HumaLOG) corrective regimen sliding scale   SubCutaneous every 6 hours  multivitamin/minerals/iron Oral Solution (CENTRUM) 15 milliLiter(s) Oral daily  Phoxillum Filtration BK 4 / 2.5 5000 milliLiter(s) CRRT <Continuous>  polyethylene glycol 3350 17 Gram(s) Oral two times a day  senna 2 Tablet(s) Oral at bedtime  sodium chloride 0.9% lock flush 3 milliLiter(s) IV Push every 8 hours  vitamin E 400 International Unit(s) Oral daily  zinc sulfate 220 milliGRAM(s) Oral daily      Objective:  Vital Signs Last 24 Hrs  T(C): 36.3 (18 Nov 2019 12:35), Max: 37.1 (17 Nov 2019 18:48)  T(F): 97.4 (18 Nov 2019 12:35), Max: 98.8 (17 Nov 2019 18:48)  HR: 106 (18 Nov 2019 12:00) (96 - 110)  BP: 95/55 (18 Nov 2019 09:00) (95/55 - 95/55)  BP(mean): 66 (18 Nov 2019 09:00) (66 - 66)  RR: 30 (18 Nov 2019 12:00) (23 - 39)  SpO2: 98% (18 Nov 2019 12:00) (94% - 100%)    PHYSICAL EXAM:  Constitutional:Well-developed, well nourishe  Eyes:NANCY, EOMI  Ear/Nose/Throat: no oral lesion, no sinus tenderness on percussion	  Neck:no JVD, no lymphadenopathy, supple  Respiratory: CTA hellen  Cardiovascular: S1S2 RRR, no murmurs  Gastrointestinal:soft, (+) BS, no HSM  Extremities:no e/e/c        LABS:                        8.5    16.75 )-----------( 82       ( 18 Nov 2019 05:33 )             27.1     11-18    132<L>  |  99  |  11  ----------------------------<  92  4.8   |  23  |  0.76    Ca    9.2      18 Nov 2019 05:32  Phos  2.8     11-18  Mg     2.0     11-18    TPro  7.4  /  Alb  3.4  /  TBili  9.1<H>  /  DBili  x   /  AST  53<H>  /  ALT  71<H>  /  AlkPhos  105  11-18    PT/INR - ( 18 Nov 2019 05:33 )   PT: 14.8 sec;   INR: 1.30          PTT - ( 18 Nov 2019 05:33 )  PTT:40.2 sec        MICROBIOLOGY:        RADIOLOGY & ADDITIONAL STUDIES: Infectious diseases progress note:  ULI HOLLAND is a 39yMale patient    ENDOCARDITIS/SEPSIS    CAMERON (acute kidney injury)  Acute endocarditis due to other organism    Subjective: Patient complains of feeling thirsty. Denied fevers, chills, night sweats, chest pain, nausea, vomiting. . Admitted to some SOB and abdominal pain, and diarrhea    Allergies    Allergy Status Unknown    Intolerances        ANTIBIOTICS/RELEVANT:  antimicrobials  nafcillin  IVPB 2 Gram(s) IV Intermittent every 4 hours    immunologic:    OTHER:  albumin human 25% IVPB 50 milliLiter(s) IV Intermittent every 8 hours  ascorbic acid 500 milliGRAM(s) Oral daily  chlorhexidine 2% Cloths 1 Application(s) Topical daily  CRRT Treatment    <Continuous>  dextrose 40% Gel 15 Gram(s) Oral once PRN  dextrose 5%. 1000 milliLiter(s) IV Continuous <Continuous>  dextrose 50% Injectable 12.5 Gram(s) IV Push once  dextrose 50% Injectable 25 Gram(s) IV Push once  dextrose 50% Injectable 25 Gram(s) IV Push once  fentaNYL   Infusion. 0.997 MICROgram(s)/kG/Hr IV Continuous <Continuous>  fentaNYL   Patch  50 MICROgram(s)/Hr 1 Patch Transdermal every 72 hours  glucagon  Injectable 1 milliGRAM(s) IntraMuscular once PRN  heparin  Injectable 5000 Unit(s) SubCutaneous every 8 hours  insulin lispro (HumaLOG) corrective regimen sliding scale   SubCutaneous every 6 hours  multivitamin/minerals/iron Oral Solution (CENTRUM) 15 milliLiter(s) Oral daily  Phoxillum Filtration BK 4 / 2.5 5000 milliLiter(s) CRRT <Continuous>  polyethylene glycol 3350 17 Gram(s) Oral two times a day  senna 2 Tablet(s) Oral at bedtime  sodium chloride 0.9% lock flush 3 milliLiter(s) IV Push every 8 hours  vitamin E 400 International Unit(s) Oral daily  zinc sulfate 220 milliGRAM(s) Oral daily      Objective:  Vital Signs Last 24 Hrs  T(C): 36.3 (18 Nov 2019 12:35), Max: 37.1 (17 Nov 2019 18:48)  T(F): 97.4 (18 Nov 2019 12:35), Max: 98.8 (17 Nov 2019 18:48)  HR: 106 (18 Nov 2019 12:00) (96 - 110)  BP: 95/55 (18 Nov 2019 09:00) (95/55 - 95/55)  BP(mean): 66 (18 Nov 2019 09:00) (66 - 66)  RR: 30 (18 Nov 2019 12:00) (23 - 39)  SpO2: 98% (18 Nov 2019 12:00) (94% - 100%)    PHYSICAL EXAM:  Constitutional: NAD   HEENT: NCAT, positive scleral icterus, dry MM   Neck: supple  Respiratory: CTA b/l  Cardiovascular: S1, S2. RRR. systolic murmur appreciated on left 4th ICS  Gastrointestinal: Distended. Generalized tenderness to palpation. BS+ x4 quadrants  Extremities: Extremities warm throughout. Tips of b/l toes cyanotic. B/l 2+ pedal edema in upper and lower extremities         LABS:                        8.5    16.75 )-----------( 82       ( 18 Nov 2019 05:33 )             27.1     11-18    132<L>  |  99  |  11  ----------------------------<  92  4.8   |  23  |  0.76    Ca    9.2      18 Nov 2019 05:32  Phos  2.8     11-18  Mg     2.0     11-18    TPro  7.4  /  Alb  3.4  /  TBili  9.1<H>  /  DBili  x   /  AST  53<H>  /  ALT  71<H>  /  AlkPhos  105  11-18    PT/INR - ( 18 Nov 2019 05:33 )   PT: 14.8 sec;   INR: 1.30          PTT - ( 18 Nov 2019 05:33 )  PTT:40.2 sec        MICROBIOLOGY:        RADIOLOGY & ADDITIONAL STUDIES:

## 2019-11-18 NOTE — PROGRESS NOTE ADULT - SUBJECTIVE AND OBJECTIVE BOX
Vascular Surgery Consult - Progress Note    Subjective/Interval Events:  Patient extubated. Briefly required pressors over the weekend, now weaned off again. Patient responsive, follows commands. Denies pain.    Vital Signs Last 24 Hrs  T(C): 36.3 (18 Nov 2019 12:35), Max: 37.1 (17 Nov 2019 18:48)  T(F): 97.4 (18 Nov 2019 12:35), Max: 98.8 (17 Nov 2019 18:48)  HR: 106 (18 Nov 2019 12:00) (96 - 110)  BP: 95/55 (18 Nov 2019 09:00) (95/55 - 95/55)  BP(mean): 66 (18 Nov 2019 09:00) (66 - 66)  RR: 30 (18 Nov 2019 12:00) (23 - 39)  SpO2: 98% (18 Nov 2019 12:00) (94% - 100%)    I&O's Summary  17 Nov 2019 07:01  -  18 Nov 2019 07:00  --------------------------------------------------------  IN: 2849.4 mL / OUT: 4031 mL / NET: -1181.6 mL    18 Nov 2019 07:01  -  18 Nov 2019 13:14  --------------------------------------------------------  IN: 201.9 mL / OUT: 492 mL / NET: -290.1 mL    Physical Exam:  General: alert and awake  Pulmonary: no respiratory distress  Extremities: hands and feet cool to touch,   RUE: hand cool to touch, ischemic 2nd digit, 3rd fingertip with necrotic ulceration with surrounding erythema  LUE: hand cool to touch but no ischemic fingertips  RLE: ischemic/necrotic digits 1-5  LLE: ischemic/necrotic digit 4    Pulses:  RUE: 2+ radial, tri Ulnar, no arch, no digital signals  LUE: 2+ radial, tri Ulnar, tri arch, no digital signals  RLE: 2+ DP, Tri PT  LLE: 2+ DP, Tri PT    LABS:                     8.5    16.75 )-----------( 82       ( 18 Nov 2019 05:33 )             27.1     11-18  132<L>  |  99  |  11  ----------------------------<  92  4.8   |  23  |  0.76    Ca    9.2      18 Nov 2019 05:32  Phos  2.8     11-18  Mg     2.0     11-18    TPro  7.4  /  Alb  3.4  /  TBili  9.1<H>  /  DBili  x   /  AST  53<H>  /  ALT  71<H>  /  AlkPhos  105  11-18    PT/INR - ( 18 Nov 2019 05:33 )   PT: 14.8 sec;   INR: 1.30     PTT - ( 18 Nov 2019 05:33 )  PTT:40.2 sec    LIVER FUNCTIONS - ( 18 Nov 2019 05:32 )  Alb: 3.4 g/dL / Pro: 7.4 g/dL / ALK PHOS: 105 U/L / ALT: 71 U/L / AST: 53 U/L / GGT: x           CAPILLARY BLOOD GLUCOSE  POCT Blood Glucose.: 89 mg/dL (18 Nov 2019 12:06)  POCT Blood Glucose.: 88 mg/dL (18 Nov 2019 06:20)  POCT Blood Glucose.: 108 mg/dL (18 Nov 2019 02:32)  POCT Blood Glucose.: 76 mg/dL (18 Nov 2019 00:17)  POCT Blood Glucose.: 101 mg/dL (17 Nov 2019 18:15)

## 2019-11-18 NOTE — PROGRESS NOTE ADULT - SUBJECTIVE AND OBJECTIVE BOX
Patient is a 39y Male seen and evaluated at bedside.   no acute events overnight  no active complains at present  off pressors, w soft SBP in 90's  extubated over the weekend  on CVVHD, net negative 50 cc/hr  fluid balance net negative 1.2 L/24 hr  anuric      Meds:    albumin human 25% IVPB 50 every 8 hours  ascorbic acid 500 daily  chlorhexidine 2% Cloths 1 daily  CRRT Treatment  <Continuous>  dextrose 40% Gel 15 once PRN  dextrose 5%. 1000 <Continuous>  dextrose 50% Injectable 12.5 once  dextrose 50% Injectable 25 once  dextrose 50% Injectable 25 once  fentaNYL   Infusion. 0.997 <Continuous>  fentaNYL   Patch  50 MICROgram(s)/Hr 1 every 72 hours  glucagon  Injectable 1 once PRN  heparin  Injectable 5000 every 8 hours  insulin lispro (HumaLOG) corrective regimen sliding scale  every 6 hours  multivitamin/minerals/iron Oral Solution (CENTRUM) 15 daily  nafcillin  IVPB 2 every 4 hours  polyethylene glycol 3350 17 two times a day  PureFlow Dialysate RFP-401 (K 4 / Ca 3) 5000 <Continuous>  senna 2 at bedtime  sodium chloride 0.9% lock flush 3 every 8 hours  vitamin E 400 daily  zinc sulfate 220 daily      T(C): , Max: 37.1 (11-17-19 @ 18:48)  T(F): , Max: 98.8 (11-17-19 @ 18:48)  HR: 106 (11-18-19 @ 12:00)  BP: 95/55 (11-18-19 @ 09:00)  BP(mean): 66 (11-18-19 @ 09:00)  RR: 30 (11-18-19 @ 12:00)  SpO2: 98% (11-18-19 @ 12:00)  Wt(kg): --    11-17 @ 07:01  -  11-18 @ 07:00  --------------------------------------------------------  IN: 2849.4 mL / OUT: 4031 mL / NET: -1181.6 mL    11-18 @ 07:01  -  11-18 @ 12:27  --------------------------------------------------------  IN: 201.9 mL / OUT: 492 mL / NET: -290.1 mL        PHYSICAL EXAM  General: Alert and awake, NAD  Eyes: PERRL  Neck: Supple; no JVD  Respiratory: equal breath sounds bilaterally, bilateral chest tubes  HEART: normal S1S2, RRR  ABDOMEN: Soft, NT/ND  EXTREMITIES: upper tight and sacral edema present/ necrotic toes present   NEUROLOGY:alert and awake, follows commands, SANTOS, non focal   ACCESS: R IJ THC        LABS:                        8.5    16.75 )-----------( 82       ( 18 Nov 2019 05:33 )             27.1     11-18    132<L>  |  99  |  11  ----------------------------<  92  4.8   |  23  |  0.76    Ca    9.2      18 Nov 2019 05:32  Phos  2.8     11-18  Mg     2.0     11-18    TPro  7.4  /  Alb  3.4  /  TBili  9.1<H>  /  DBili  x   /  AST  53<H>  /  ALT  71<H>  /  AlkPhos  105  11-18      PT/INR - ( 18 Nov 2019 05:33 )   PT: 14.8 sec;   INR: 1.30          PTT - ( 18 Nov 2019 05:33 )  PTT:40.2 sec          RADIOLOGY & ADDITIONAL STUDIES:    < from: Xray Chest 1 View- PORTABLE-Urgent (11.18.19 @ 11:52) >    EXAM:  XR CHEST PORTABLE URGENT 1V                          PROCEDURE DATE:  11/18/2019          INTERPRETATION:  Clinical history/reason for exam: Left pneumothorax.    Single frontal view.    Comparison: November 18, 2019.    Findings/  impression: Stable positioning of support devices. Left pneumothorax,   decreased. Stable lung pathology. Heart size within normal limits.    < end of copied text >

## 2019-11-18 NOTE — PROGRESS NOTE ADULT - ASSESSMENT
38 y/o M smoker w/ PMHx of IVDU presented to [facility] w/ productive cough with hemoptysis found to have septic emboli w/ vegetation of the TV transferred to [facility] for surgical evaluation.  Course complicated by sepsis, DIC, acute respiratory failure w/ empyema s/p intubation and thoracostomy tube, CAMERON requiring cvvhd.  Now being tapered off pressor support. GI consulted for evaluation of incidental finding of rectal wall thickening on imaging.    #Rectal lesion:  CT w/ irregular masslike mural thickening of the posterior right lateral wall of the rectum.  Would plan for flex sig or full colonoscopy for further evaluation.  -Tentative plan for flex sig Tuesday  -Tap water enema x2, 2 hour prior to procedure  -Keep NPO Monday MN  -Rest of management per primary team    #Hyperbilirubinemia:  Patient with marked elevated bilirubin initially thought to be 2/2 ischemic hepatopathy but persistent elevation on repeat.  Would verify CBD dilation with ultrasound.  -Repeat limited RUQ ultrasound to assess CBD and hepatic biliary ductal dilation    Recommendation d/w primary team  Case d/w svc attg 38 y/o M smoker w/ PMHx of IVDU presented to [facility] w/ productive cough with hemoptysis found to have septic emboli w/ vegetation of the TV transferred to [facility] for surgical evaluation.  Course complicated by sepsis, DIC, acute respiratory failure w/ empyema s/p intubation and thoracostomy tube, CAMERON requiring cvvhd.  Now being tapered off pressor support. GI consulted for evaluation of incidental finding of rectal wall thickening on imaging.    #Rectal lesion:  CT w/ irregular masslike mural thickening of the posterior right lateral wall of the rectum.  Would plan for flex sig or full colonoscopy for further evaluation.  -Tentative plan for flex sig Tuesday  -Tap water enema x2, 2 hour prior to procedure  -Keep NPO Monday MN  -Rest of management per primary team    #Hyperbilirubinemia:  Patient with marked elevated bilirubin initially thought to be 2/2 ischemic hepatopathy but persistent elevation on repeat.  Would verify CBD dilation with ultrasound.  -Repeat limited RUQ ultrasound to assess CBD and hepatic biliary ductal dilation  -Fractionate bilirubin    Recommendation d/w primary team  Case d/w svc attg 40 y/o M smoker w/ PMHx of IVDU presented to [facility] w/ productive cough with hemoptysis found to have septic emboli w/ vegetation of the TV transferred to [facility] for surgical evaluation.  Course complicated by sepsis, DIC, acute respiratory failure w/ empyema s/p intubation and thoracostomy tube, CAMERON requiring cvvhd.  Now being tapered off pressor support. GI consulted for evaluation of incidental finding of rectal wall thickening on imaging.    #Rectal lesion:  CT w/ irregular masslike mural thickening of the posterior right lateral wall of the rectum.  Would plan for flex sig or full colonoscopy for further evaluation.  -Tentative plan for flex sig Tuesday  -Tap water enema x2, 2 hour prior to procedure  -Keep NPO Monday MN  -Rest of management per primary team    #Abd distension  Patient with new abdominal distension with xray notable for dilated bowels.  Patient is on zinc and fentanyl  -Followup abdominal CT  -Started on laxative including miralax and senna    #Hyperbilirubinemia:  Patient with marked elevated bilirubin initially thought to be 2/2 ischemic hepatopathy but persistent elevation on repeat.  Would verify CBD dilation with ultrasound.  -Repeat limited RUQ ultrasound to assess CBD and hepatic biliary ductal dilation  -Fractionate bilirubin    Recommendation d/w primary team  Case d/w [facility]

## 2019-11-18 NOTE — CONSULT NOTE ADULT - ASSESSMENT
This is a 38 y/o with PMH of IVDU transferred from OSH for Endocarditis, multiple septic emboli to lungs and CT surgery consult.   Deemed not a surgical candidate for valve replacement initially due to septic shock   Currently doing better, extubated and off pressors  WBC 16.75 down from 19.82   Had 3 BMs overnight, CDiff being ruled out by primary team  Tolerated TF well yesterday, currently NPO   Surgery consulted for abdominal distention, and finding on XR of distended loops of bowel    Plan: This is a 40 y/o with PMH of IVDU transferred from OSH for Endocarditis, multiple septic emboli to lungs and CT surgery consult.   Deemed not a surgical candidate for valve replacement initially due to septic shock   Currently doing better, extubated and off pressors  WBC 16.75 down from 19.82   Had 3 BMs overnight, CDiff being ruled out by primary team  Tolerated TF well yesterday, currently NPO   Surgery consulted for abdominal distention, and finding on XR of distended loops of bowel  Rectal exam with no mass, no impaction      Recommendations:  - CT with PO and IV contrast   - Rectal tube and NG Tube for decompression   - Surgery Team 4c will follow

## 2019-11-18 NOTE — CHART NOTE - NSCHARTNOTEFT_GEN_A_CORE
[facility] called for cardiology consult.   Patient seen and examined. Chart reviewed.  Full consult to follow tomorrow.   Will be discussed on afternoon rounds.

## 2019-11-18 NOTE — PROGRESS NOTE ADULT - SUBJECTIVE AND OBJECTIVE BOX
OVERNIGHT EVENTS:    SUBJECTIVE / INTERVAL HPI: Patient seen and examined at bedside.     VITAL SIGNS:  Vital Signs Last 24 Hrs  T(C): 35.7 (18 Nov 2019 04:35), Max: 37.1 (17 Nov 2019 18:48)  T(F): 96.3 (18 Nov 2019 04:35), Max: 98.8 (17 Nov 2019 18:48)  HR: 100 (18 Nov 2019 05:00) (96 - 110)  BP: --  BP(mean): --  RR: 26 (18 Nov 2019 05:00) (21 - 39)  SpO2: 100% (18 Nov 2019 05:00) (94% - 100%)    PHYSICAL EXAM:    General: WDWN  HEENT: NC/AT; PERRL, anicteric sclera; MMM  Neck: supple  Cardiovascular: +S1/S2; RRR  Respiratory: CTA B/L; no W/R/R  Gastrointestinal: soft, NT/ND; +BSx4  Extremities: WWP; no edema, clubbing or cyanosis  Vascular: 2+ radial, DP/PT pulses B/L  Neurological: AAOx3; no focal deficits    MEDICATIONS:  MEDICATIONS  (STANDING):  albumin human 25% IVPB 50 milliLiter(s) IV Intermittent every 8 hours  ascorbic acid 500 milliGRAM(s) Oral daily  chlorhexidine 2% Cloths 1 Application(s) Topical daily  CRRT Treatment    <Continuous>  dextrose 5%. 1000 milliLiter(s) (50 mL/Hr) IV Continuous <Continuous>  dextrose 50% Injectable 12.5 Gram(s) IV Push once  dextrose 50% Injectable 25 Gram(s) IV Push once  dextrose 50% Injectable 25 Gram(s) IV Push once  fentaNYL   Infusion. 0.5 MICROgram(s)/kG/Hr (3.68 mL/Hr) IV Continuous <Continuous>  heparin  Injectable 5000 Unit(s) SubCutaneous every 8 hours  insulin lispro (HumaLOG) corrective regimen sliding scale   SubCutaneous every 6 hours  multivitamin/minerals/iron Oral Solution (CENTRUM) 15 milliLiter(s) Oral daily  nafcillin  IVPB 2 Gram(s) IV Intermittent every 4 hours  pantoprazole  Injectable 40 milliGRAM(s) IV Push daily  polyethylene glycol 3350 17 Gram(s) Oral every 24 hours  propofol Infusion 10 MICROgram(s)/kG/Min (4.416 mL/Hr) IV Continuous <Continuous>  PureFlow Dialysate RFP-401 (K 4 / Ca 3) 5000 milliLiter(s) (2500 mL/Hr) CRRT <Continuous>  senna 2 Tablet(s) Oral at bedtime  sodium chloride 0.9% lock flush 3 milliLiter(s) IV Push every 8 hours  vitamin E 400 International Unit(s) Oral daily  zinc sulfate 220 milliGRAM(s) Oral daily    MEDICATIONS  (PRN):  dextrose 40% Gel 15 Gram(s) Oral once PRN Blood Glucose LESS THAN 70 milliGRAM(s)/deciliter  glucagon  Injectable 1 milliGRAM(s) IntraMuscular once PRN Glucose LESS THAN 70 milligrams/deciliter      ALLERGIES:  Allergies    Allergy Status Unknown    Intolerances        LABS:                        8.5    16.75 )-----------( 82       ( 18 Nov 2019 05:33 )             27.1     11-18    133<L>  |  99  |  10  ----------------------------<  97  5.1   |  24  |  0.73    Ca    9.2      18 Nov 2019 00:00  Phos  2.8     11-18  Mg     2.0     11-18      PT/INR - ( 18 Nov 2019 05:33 )   PT: 14.8 sec;   INR: 1.30          PTT - ( 18 Nov 2019 05:33 )  PTT:40.2 sec    CAPILLARY BLOOD GLUCOSE      POCT Blood Glucose.: 108 mg/dL (18 Nov 2019 02:32)      RADIOLOGY & ADDITIONAL TESTS: Reviewed. OVERNIGHT EVENTS:    SUBJECTIVE / INTERVAL HPI: Patient seen and examined at bedside.     VITAL SIGNS:  Vital Signs Last 24 Hrs  T(C): 35.7 (18 Nov 2019 04:35), Max: 37.1 (17 Nov 2019 18:48)  T(F): 96.3 (18 Nov 2019 04:35), Max: 98.8 (17 Nov 2019 18:48)  HR: 100 (18 Nov 2019 05:00) (96 - 110)  BP: --  BP(mean): --  RR: 26 (18 Nov 2019 05:00) (21 - 39)  SpO2: 100% (18 Nov 2019 05:00) (94% - 100%)    PHYSICAL EXAM:    General: WDWN  HEENT: NC/AT; PERRL, anicteric sclera; MMM  Neck: supple  Cardiovascular: +S1/S2; RRR  Respiratory: CTA B/L; no W/R/R  Gastrointestinal: tense distended, tympanitic and diffusely tender; +BSx4  Extremities: WWP; no edema, clubbing or cyanosis  Vascular: 2+ radial, DP/PT pulses B/L  Neurological: AAOx3; no focal deficits    MEDICATIONS:  MEDICATIONS  (STANDING):  albumin human 25% IVPB 50 milliLiter(s) IV Intermittent every 8 hours  ascorbic acid 500 milliGRAM(s) Oral daily  chlorhexidine 2% Cloths 1 Application(s) Topical daily  CRRT Treatment    <Continuous>  dextrose 5%. 1000 milliLiter(s) (50 mL/Hr) IV Continuous <Continuous>  dextrose 50% Injectable 12.5 Gram(s) IV Push once  dextrose 50% Injectable 25 Gram(s) IV Push once  dextrose 50% Injectable 25 Gram(s) IV Push once  fentaNYL   Infusion. 0.5 MICROgram(s)/kG/Hr (3.68 mL/Hr) IV Continuous <Continuous>  heparin  Injectable 5000 Unit(s) SubCutaneous every 8 hours  insulin lispro (HumaLOG) corrective regimen sliding scale   SubCutaneous every 6 hours  multivitamin/minerals/iron Oral Solution (CENTRUM) 15 milliLiter(s) Oral daily  nafcillin  IVPB 2 Gram(s) IV Intermittent every 4 hours  pantoprazole  Injectable 40 milliGRAM(s) IV Push daily  polyethylene glycol 3350 17 Gram(s) Oral every 24 hours  propofol Infusion 10 MICROgram(s)/kG/Min (4.416 mL/Hr) IV Continuous <Continuous>  PureFlow Dialysate RFP-401 (K 4 / Ca 3) 5000 milliLiter(s) (2500 mL/Hr) CRRT <Continuous>  senna 2 Tablet(s) Oral at bedtime  sodium chloride 0.9% lock flush 3 milliLiter(s) IV Push every 8 hours  vitamin E 400 International Unit(s) Oral daily  zinc sulfate 220 milliGRAM(s) Oral daily    MEDICATIONS  (PRN):  dextrose 40% Gel 15 Gram(s) Oral once PRN Blood Glucose LESS THAN 70 milliGRAM(s)/deciliter  glucagon  Injectable 1 milliGRAM(s) IntraMuscular once PRN Glucose LESS THAN 70 milligrams/deciliter      ALLERGIES:  Allergies    Allergy Status Unknown    Intolerances        LABS:                        8.5    16.75 )-----------( 82       ( 18 Nov 2019 05:33 )             27.1     11-18    133<L>  |  99  |  10  ----------------------------<  97  5.1   |  24  |  0.73    Ca    9.2      18 Nov 2019 00:00  Phos  2.8     11-18  Mg     2.0     11-18      PT/INR - ( 18 Nov 2019 05:33 )   PT: 14.8 sec;   INR: 1.30          PTT - ( 18 Nov 2019 05:33 )  PTT:40.2 sec    CAPILLARY BLOOD GLUCOSE      POCT Blood Glucose.: 108 mg/dL (18 Nov 2019 02:32)      RADIOLOGY & ADDITIONAL TESTS: Reviewed. OVERNIGHT EVENTS: Midnight labs checked. Hypothermic to 96.3, warming blanket placed.     SUBJECTIVE / INTERVAL HPI: Patient seen and examined at bedside. Patient resting in bed reporting he is thirsty and requesting water. Admits to abdominal pain. Denies chest pain, sob, nausea, vomiting.     VITAL SIGNS:  Vital Signs Last 24 Hrs  T(C): 35.7 (18 Nov 2019 04:35), Max: 37.1 (17 Nov 2019 18:48)  T(F): 96.3 (18 Nov 2019 04:35), Max: 98.8 (17 Nov 2019 18:48)  HR: 100 (18 Nov 2019 05:00) (96 - 110)  BP: --  BP(mean): --  RR: 26 (18 Nov 2019 05:00) (21 - 39)  SpO2: 100% (18 Nov 2019 05:00) (94% - 100%)      PHYSICAL EXAM:  General: WDWN, resting in bed, appears ill, jaundice  HEENT: NC/AT; PERRL, icteric sclera; MMM  Neck: supple  Cardiovascular: +S1/S2; RRR, systolic murmur heard at LSB   Respiratory: HFNC, scattered rhonchi b/l   Gastrointestinal: soft, diffusely tender to palpation, distended, normoactive BS   Extremities: WWP; edema in LEs/UEs improving, no clubbing or cyanosis  : improvement in scrotal edema   Vascular: 2+ radial, DP/PT pulses B/L  Derm: ischemic toes/fingers   Neurological: AAOx3       MEDICATIONS:  MEDICATIONS  (STANDING):  albumin human 25% IVPB 50 milliLiter(s) IV Intermittent every 8 hours  ascorbic acid 500 milliGRAM(s) Oral daily  chlorhexidine 2% Cloths 1 Application(s) Topical daily  CRRT Treatment    <Continuous>  dextrose 5%. 1000 milliLiter(s) (50 mL/Hr) IV Continuous <Continuous>  dextrose 50% Injectable 12.5 Gram(s) IV Push once  dextrose 50% Injectable 25 Gram(s) IV Push once  dextrose 50% Injectable 25 Gram(s) IV Push once  fentaNYL   Infusion. 0.5 MICROgram(s)/kG/Hr (3.68 mL/Hr) IV Continuous <Continuous>  heparin  Injectable 5000 Unit(s) SubCutaneous every 8 hours  insulin lispro (HumaLOG) corrective regimen sliding scale   SubCutaneous every 6 hours  multivitamin/minerals/iron Oral Solution (CENTRUM) 15 milliLiter(s) Oral daily  nafcillin  IVPB 2 Gram(s) IV Intermittent every 4 hours  pantoprazole  Injectable 40 milliGRAM(s) IV Push daily  polyethylene glycol 3350 17 Gram(s) Oral every 24 hours  propofol Infusion 10 MICROgram(s)/kG/Min (4.416 mL/Hr) IV Continuous <Continuous>  PureFlow Dialysate RFP-401 (K 4 / Ca 3) 5000 milliLiter(s) (2500 mL/Hr) CRRT <Continuous>  senna 2 Tablet(s) Oral at bedtime  sodium chloride 0.9% lock flush 3 milliLiter(s) IV Push every 8 hours  vitamin E 400 International Unit(s) Oral daily  zinc sulfate 220 milliGRAM(s) Oral daily    MEDICATIONS  (PRN):  dextrose 40% Gel 15 Gram(s) Oral once PRN Blood Glucose LESS THAN 70 milliGRAM(s)/deciliter  glucagon  Injectable 1 milliGRAM(s) IntraMuscular once PRN Glucose LESS THAN 70 milligrams/deciliter      ALLERGIES:  Allergies    Allergy Status Unknown    Intolerances        LABS:                        8.5    16.75 )-----------( 82       ( 18 Nov 2019 05:33 )             27.1     11-18    133<L>  |  99  |  10  ----------------------------<  97  5.1   |  24  |  0.73    Ca    9.2      18 Nov 2019 00:00  Phos  2.8     11-18  Mg     2.0     11-18      PT/INR - ( 18 Nov 2019 05:33 )   PT: 14.8 sec;   INR: 1.30          PTT - ( 18 Nov 2019 05:33 )  PTT:40.2 sec    CAPILLARY BLOOD GLUCOSE      POCT Blood Glucose.: 108 mg/dL (18 Nov 2019 02:32)      RADIOLOGY & ADDITIONAL TESTS: Reviewed.

## 2019-11-18 NOTE — CONSULT NOTE ADULT - ATTENDING COMMENTS
Pt see and examined.  Abdomen is some distended, sot, NT.  Decreased NGT output since yesterday.  Vasopressor requirements are some decreased since this AM.  No output per rectum.  WBC is most likely due to empyema.  Xrays reviewed.  Based on existing clinical data unlikely has mesenteric ischemia.  If requires surgery, has very poor prognosis due to multisystem organ failure.  Will await for CTA.
Patient seen and examined with surgical staff. Multiorgan failure and issues related endocarditis and multifocal systemic infection. Abdomen is benign and distended with gas. Likely colonic ileus. No clinical concern for ischemia at this time although at risk.
events noted-  chart reviewed pt examined- case discussed with CTS team.  Seen and evaluated while on CVVHD-  tolerating the procedure well.   c/w present prescription
shock liver, necrotizing pneumonia pleural effusion and septic shock  Patient seen and examined with house-staff during bedside rounds.  Resident note read, including vitals, physical findings, laboratory data, and radiological reports.   Revisions included below.  Direct personal management at bed side and extensive interpretation of the data.  Plan was outlined and discussed in details with the housestaff.  Decision making of high complexityI discissed the case with CT ICU  continue antibiotics  continue pressors  on continius dialysis  CT was inserted  Action taken for acute disease activity to reflect the level of care provided:  - medication reconciliation  - review laboratory data

## 2019-11-18 NOTE — PROGRESS NOTE ADULT - ATTENDING COMMENTS
I have reviewed the medical record, including laboratory and radiographic studies, interviewed and examined the patient and discussed the plan with Dr. Carlson, the ID Resident.  Agree with above. Will continue to follow with you – ID Team 1.

## 2019-11-18 NOTE — PROGRESS NOTE ADULT - ASSESSMENT
38 y/o M w/ PMH of IVDU and active smoker who came from [facility] on 11/8/19 in 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 After being transferred to  CTICU for surgical evaluation, pt was deemed to not be a surgical candidate and transferred to MICU for medical mgmt. ID, Nephrology, heme/onc, surgery following, vascular.    Neuro  Light sedation on Propofol, Fentanyl. CT head negative for any intracranial embolic events.  - Follows commands and moves extremities. Extubated in late morning successfully     Cardiovascular     #Hypotension  Most likely 2/2 septic shock from infective endocarditis and RV failure 2/2 tricuspid regurgitation (ELIAN shows EF of 40-50%). Echo with EF 45%. ELIAN with EF 40-45%, 3.8 x1cm vegetation on TR valve, with severe TR, trivial pericardial effusion. Echo with bubble revealed no PFO. Restarted on levophed, titrate as appropriate   - Maintain MAP>65.   - C/W medical mgmt of infective endocarditis as below.  - Off Levophed     Respiratory    #Acute hypoxic respiratory failure   Most likely 2/2 alveolar hemorrhage in the setting of septic embolic causing numerous cavitary lesions on CT chest. CXR with scattered infiltrates and pleural effusions. Sputum culture negative. CT revealed "moderate bilateral pleural effusions and dense bibasilar consolidation with underlying septic emboli/pulmonary abscesses."   - C/W ventilator support, monitor mental status.  - Extubated successfully, incentive spirometer at bedside   - Continue to treat IE as below    #Bilateral pulmonary effusion  Most likely empyema in setting of septic shock 2/2 infective endocarditis. Bilateral CT in place, confirmed by CXR, continues to drain serosanguinous fluid. Fluid analysis of both CT pleural fluid confirms complex exudate with high cellularity, low pH and low glucose. pleural fluid cultures with staph aureus  - Monitor output of bilateral chest tubes  - F/U pleural fluid cultures  - Lung tissue was suctioned from left chest tube w/ air coming from chest tube. CXR consistent for PTX. Chest tube set to suction.    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 Not a surgical candidate for a tricuspid valve replacement at this time as per CT surgery. Lactate 2.0 today. Initial blood cultures positive for staph epidermidis, likely a contaminant. Initial sputum culture positive for staph aureus  Repeat blood cultures with NGTD. Sputum cx NGTD.  - ID following, C/W Nafcillin 2g q4 (Sensitive to Oxacillin as per OSH records), f/u recs   - Per CT surgery pt is too high risk for any surgery and would likely not survive procedure.   - Cardiology consulted to optimize management of his right heart dysfunction, f/u recs    - Continue to monitor temps and vitals, consider additional Abx coverage if condition worsens  - Monitor CBC     Renal    #Acute renal failure most likely 2/2 ATN from hypoperfusion, Minimal urine output, on CVVHD, oliguric with 0-5cc/hr. Vancomycin level 37 on arrival so may be component of drug induced nephropathy. Bun/Cr continues to improve while on CVVHD. Pt is clinically fluid overloaded, and hemodynamically stable. No renal infarcts as per CT abdomen/pelvis. Optimized for Sx as per nephrology.  - F/U nephrology recs.  - C/W CVVHD as tolerated, will likely switch to intermittent HD tomorrow   - Concern that CVVHD is clotting: flow was increased, monitor function, plan to switch to intermittent dialysis when current fails.  - Continue to correct fluid overload with HD. Goal net -1L   - Monitor renal function with BUN/Cr  - Monitor I/Os    #Electrolytes abnormalities   Improved. On CVVHD. Hyperkalemia resolved. No EKG changes.  -Requires q6 BMP, Mg, and Phos while on CVVHD  -Monitor serum lytes, Ca     GI    OG on arrival with 600cc of bilious fluid, abdomen still distended but had several BMs, some loose. CT abdomen revealed no evidence of bowel ischemia or SBO. SUmp was DCed and replaced with NG tube. CT abdomen/pelvis revealed "irregular masslike mural thickening of the posterior left lateral wall of the rectum." Surgery consulted, unlikely perirectal abscess. Recommend GI evaluation for possible scope.  - NG tube in place  - C/W tube feeds, Jevity 1.5  - F/U GI consult, f/u recs.     #Congestive Hepatopathy   Transaminitis, coag derangements, hyperbilirubinemia most likely 2/2 hepatic congestion vs hemolysis, due to severe TR in setting of infective endocarditis. Transaminases and Alk phos improving. Hepatitis panel negative.  - F/U CMP, direct bili   - Avoid Tylenol    Heme    #Hemolysis  Intravascular hemolysis with inital haptoglobin <10, LDH decreasing at 365. D-dimer elevated. Fibrinogen stable 396. Today still clinically jaundiced, with stable bilirubinemia: T.bili 9.9., D.bili 8.5. h/h stable at 8.1/25.5. Platelets decreasing from 54 to 49.. Fact VII/Factor VIII elevated. Unlikely 2/2 CVVHD. Likely 2/2 to sepsis and/or DIC.   - Avoid anticoagulation  - Heme consulted, follow recs  - Monitor direct and total bili, LDH, fibrinogen, platelets   - Transfuse platelets if <10K or bleeding and <50K  - Transfuse pRBCs for hgb <7     Vascular  Vascular surgery consulted in setting of gangrenous distal toes and fingers b/l. Likely 2/2 to pressors. Will follow up recs.     Lines: right IJ TLC and Ernesto (11/12), Axillary A-line (11/12), Left IJ (11/12), right peripheral (11/12)    F: 25% Albumin 50cc  E: replete K <4, Mg <2  N: Jevity 1.5  GI Ppx: Protonix   DVT Ppx: Heparin   Code status: FULL   Dispo: MICU 38 y/o M w/ PMH of IVDU and active smoker who came from [facility] on 11/8/19 in 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 After being transferred to  CTICU for surgical evaluation, pt was deemed to not be a surgical candidate and transferred to MICU for medical mgmt. ID, Nephrology, heme/onc, surgery following, vascular.    Neuro  Light sedation on Propofol, Fentanyl. DC Propfol and ordered for Fentanyl 50mcg patch, will DC Fentanyl gtt at 11pm. CT head negative for any intracranial embolic events.  - Successful extubated on 11/17. Now AAOx3     Cardiovascular     #Hypotension  Most likely 2/2 septic shock from infective endocarditis and RV failure 2/2 tricuspid regurgitation (ELIAN shows EF of 40-50%). Echo with EF 45%. ELIAN with EF 40-45%, 3.8 x1cm vegetation on TR valve, with severe TR, trivial pericardial effusion. Echo with bubble revealed no PFO. Restarted on levophed, titrate as appropriate   - Maintain MAP>65.   - C/W medical mgmt of infective endocarditis as below.  - Off Levophed   - F/u repeat echo     Respiratory    #Acute hypoxic respiratory failure   Most likely 2/2 alveolar hemorrhage in the setting of septic embolic causing numerous cavitary lesions on CT chest. CXR with scattered infiltrates and pleural effusions. Sputum culture negative. CT revealed "moderate bilateral pleural effusions and dense bibasilar consolidation with underlying septic emboli/pulmonary abscesses."   - Extubated successfully 11/17, incentive spirometer at bedside   - Now on HFNC, monitor respiratory status   - Continue to treat IE as below    #Bilateral pulmonary effusion  Most likely empyema in setting of septic shock 2/2 infective endocarditis. Bilateral CT in place, confirmed by CXR, continues to drain serosanguinous fluid. Fluid analysis of both CT pleural fluid confirms complex exudate with high cellularity, low pH and low glucose. pleural fluid cultures with staph aureus  - Monitor output of bilateral chest tubes  - F/U pleural fluid cultures  - Lung tissue was suctioned from left chest tube w/ air coming from chest tube. CXR consistent for PTX. Chest tube set to suction.  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 Not a surgical candidate for a tricuspid valve replacement at this time as per CT surgery. Lactate 2.0 today. Initial blood cultures positive for staph epidermidis, likely a contaminant. Initial sputum culture positive for staph aureus  Repeat blood cultures with NGTD. Sputum cx NGTD.  - ID following, C/W Nafcillin 2g q4 (Sensitive to Oxacillin as per OSH records), f/u recs   - Per CT surgery pt is too high risk for any surgery and would likely not survive procedure.   - Cardiology consulted to optimize management of his right heart dysfunction, f/u recs    - Continue to monitor temps and vitals, consider additional Abx coverage if condition worsens  - Monitor CBC   - Pt hypothermic overnight, Bcxs drawn, F/u repeat Bcxs    Renal    #Acute renal failure most likely 2/2 ATN from hypoperfusion, Minimal urine output, on CVVHD, oliguric with 0-5cc/hr. Vancomycin level 37 on arrival so may be component of drug induced nephropathy. Bun/Cr continues to improve while on CVVHD. Pt is clinically fluid overloaded, and hemodynamically stable. No renal infarcts as per CT abdomen/pelvis. Optimized for Sx as per nephrology.  - F/U nephrology recs.  - C/W CVVHD as tolerated, will likely switch to intermittent HD tomorrow   - Concern that CVVHD is clotting: flow was increased, monitor function, plan to switch to intermittent dialysis when current fails.  - Continue to correct fluid overload with HD. Goal net -1L   - Monitor renal function with BUN/Cr  - Monitor I/Os    #Electrolytes abnormalities   Improved. On CVVHD. Hyperkalemia resolved. No EKG changes.  -Requires q6 BMP, Mg, and Phos while on CVVHD  -Monitor serum lytes, Ca     GI    OG on arrival with 600cc of bilious fluid, abdomen still distended but had several BMs, some loose. CT abdomen revealed no evidence of bowel ischemia or SBO. SUmp was DCed and replaced with NG tube. CT abdomen/pelvis revealed "irregular masslike mural thickening of the posterior left lateral wall of the rectum." Surgery consulted, unlikely perirectal abscess. Recommend GI evaluation for possible scope.  - NG tube in place  - C/W tube feeds, Jevity 1.5  - F/U GI consult, f/u recs.   - Abdominal xray with air-filled loops of small/large bowel with concern for distal obstruction vs ileus. Surgery consulted. Rectal tube and sump placed. F/u CT abdomen/pelvis     #Congestive Hepatopathy   Transaminitis, coag derangements, hyperbilirubinemia most likely 2/2 hepatic congestion vs hemolysis, due to severe TR in setting of infective endocarditis. Transaminases and Alk phos improving. Hepatitis panel negative.  - F/U CMP, direct bili   - Avoid Tylenol    Heme    #Hemolysis  Intravascular hemolysis with inital haptoglobin <10, LDH decreasing at 365. D-dimer elevated. Fibrinogen stable 396. Today still clinically jaundiced, with stable bilirubinemia: T.bili 9.9., D.bili 8.5. h/h stable at 8.1/25.5. Platelets decreasing from 54 to 49.. Fact VII/Factor VIII elevated. Unlikely 2/2 CVVHD. Likely 2/2 to sepsis and/or DIC.   - Avoid anticoagulation  - Heme consulted, follow recs  - Monitor direct and total bili, LDH, fibrinogen, platelets   - Transfuse platelets if <10K or bleeding and <50K  - Transfuse pRBCs for hgb <7     Vascular  Vascular surgery consulted in setting of gangrenous distal toes and fingers b/l. Likely 2/2 to pressors. Will follow up recs.     Lines: right IJ TLC and Ernesto (11/12), Axillary A-line (11/12), Left IJ (11/12), right peripheral (11/12)    F: 25% Albumin 50cc  E: replete K <4, Mg <2  N: NPO   GI Ppx: Protonix   DVT Ppx: Heparin   Code status: FULL   Dispo: MICU 40 y/o M w/ PMH of IVDU and active smoker who came from [facility] on 11/8/19 in 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 After being transferred to  CTICU for surgical evaluation, pt was deemed to not be a surgical candidate and transferred to MICU for medical mgmt. ID, Nephrology, heme/onc, surgery following, vascular.    Neuro  Light sedation on Propofol, Fentanyl. DC Propfol and ordered for Fentanyl 50mcg patch, will DC Fentanyl gtt at 11pm. CT head negative for any intracranial embolic events.  - Successful extubated on 11/17. Now AAOx3     Cardiovascular     #Hypotension  Most likely 2/2 septic shock from infective endocarditis and RV failure 2/2 tricuspid regurgitation (ELIAN shows EF of 40-50%). Echo with EF 45%. ELIAN with EF 40-45%, 3.8 x1cm vegetation on TR valve, with severe TR, trivial pericardial effusion. Echo with bubble revealed no PFO. Restarted on levophed, titrate as appropriate   - Maintain MAP>65.   - C/W medical mgmt of infective endocarditis as below.  - Off Levophed   - F/u repeat echo     Respiratory    #Acute hypoxic respiratory failure   Most likely 2/2 alveolar hemorrhage in the setting of septic embolic causing numerous cavitary lesions on CT chest. CXR with scattered infiltrates and pleural effusions. Sputum culture negative. CT revealed "moderate bilateral pleural effusions and dense bibasilar consolidation with underlying septic emboli/pulmonary abscesses."   - Extubated successfully 11/17, incentive spirometer at bedside   - Now on HFNC, monitor respiratory status   - Continue to treat IE as below    #Bilateral pulmonary effusion  Most likely empyema in setting of septic shock 2/2 infective endocarditis. Bilateral CT in place, confirmed by CXR, continues to drain serosanguinous fluid. Fluid analysis of both CT pleural fluid confirms complex exudate with high cellularity, low pH and low glucose. pleural fluid cultures with staph aureus  - Monitor output of bilateral chest tubes  - F/U pleural fluid cultures  - Lung tissue was suctioned from left chest tube w/ air coming from chest tube. CXR consistent for PTX. Chest tube set to suction.  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 Not a surgical candidate for a tricuspid valve replacement at this time as per CT surgery. Lactate 2.0 today. Initial blood cultures positive for staph epidermidis, likely a contaminant. Initial sputum culture positive for staph aureus  Repeat blood cultures with NGTD. Sputum cx NGTD.  - ID following, C/W Nafcillin 2g q4 (Sensitive to Oxacillin as per OSH records), f/u recs   - Per CT surgery pt is too high risk for any surgery and would likely not survive procedure.   - Cardiology consulted to optimize management of his right heart dysfunction, f/u recs    - Continue to monitor temps and vitals, consider additional Abx coverage if condition worsens  - Monitor CBC   - Pt hypothermic overnight, Bcxs drawn, F/u repeat Bcxs    Renal    #Acute renal failure most likely 2/2 ATN from hypoperfusion, Minimal urine output, on CVVHD, oliguric with 0-5cc/hr. Vancomycin level 37 on arrival so may be component of drug induced nephropathy. Bun/Cr continues to improve while on CVVHD. Pt is clinically fluid overloaded, and hemodynamically stable. No renal infarcts as per CT abdomen/pelvis. Optimized for Sx as per nephrology.  - F/U nephrology recs.  - C/W CVVHD as tolerated, will likely switch to intermittent HD tomorrow   - Concern that CVVHD is clotting: flow was increased, monitor function, plan to switch to intermittent dialysis when current fails.  - Continue to correct fluid overload with HD. Goal net -1L   - Monitor renal function with BUN/Cr  - Monitor I/Os    #Electrolytes abnormalities   Improved. On CVVHD. Hyperkalemia resolved. No EKG changes.  -Requires q6 BMP, Mg, and Phos while on CVVHD  -Monitor serum lytes, Ca     GI    OG on arrival with 600cc of bilious fluid, abdomen still distended but had several BMs, some loose. CT abdomen revealed no evidence of bowel ischemia or SBO. SUmp was DCed and replaced with NG tube. CT abdomen/pelvis revealed "irregular masslike mural thickening of the posterior left lateral wall of the rectum." Surgery consulted, unlikely perirectal abscess. Recommend GI evaluation for possible scope.  - NG tube in place  - C/W tube feeds, Jevity 1.5  - F/U GI consult, f/u recs.   - Abdominal xray with air-filled loops of small/large bowel with concern for distal obstruction vs ileus. Surgery consulted. Rectal tube and sump placed. F/u CT abdomen/pelvis   -C. diff negative     #Congestive Hepatopathy   Transaminitis, coag derangements, hyperbilirubinemia most likely 2/2 hepatic congestion vs hemolysis, due to severe TR in setting of infective endocarditis. Transaminases and Alk phos improving. Hepatitis panel negative.  - F/U CMP, direct bili   - Avoid Tylenol    Heme    #Hemolysis  Intravascular hemolysis with inital haptoglobin <10, LDH decreasing at 365. D-dimer elevated. Fibrinogen stable 396. Today still clinically jaundiced, with stable bilirubinemia: T.bili 9.9., D.bili 8.5. h/h stable at 8.1/25.5. Platelets decreasing from 54 to 49.. Fact VII/Factor VIII elevated. Unlikely 2/2 CVVHD. Likely 2/2 to sepsis and/or DIC.   - Avoid anticoagulation  - Heme consulted, follow recs  - Monitor direct and total bili, LDH, fibrinogen, platelets   - Transfuse platelets if <10K or bleeding and <50K  - Transfuse pRBCs for hgb <7     Vascular  Vascular surgery consulted in setting of gangrenous distal toes and fingers b/l. Likely 2/2 to pressors. Will follow up recs.     Lines: right IJ TLC and Ernesto (11/12), Axillary A-line (11/12), Left IJ (11/12), right peripheral (11/12)    F: 25% Albumin 50cc  E: replete K <4, Mg <2  N: NPO   GI Ppx: Protonix   DVT Ppx: Heparin   Code status: FULL   Dispo: MICU

## 2019-11-18 NOTE — PROGRESS NOTE ADULT - ASSESSMENT
40 y/o male with history of IVDU admitted to MICU on 11/10/19 with 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 Also complicated by acute hypoxic respiratory failure requiring intubation and mechanical ventilation, and acute renal failure requiring emergent dialysis. Heme/onc consulted for management of DIC.       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   1/13/19: d-dimer elevated to 2409, PT 17.2,  PTT 40.1, Plt 66, fibrinogen 241  1/14/19:  PT 17.0,  PTT 40.8, Plt 54, fibrinogen 318  11/18/10: PT 14.8, PTT 40.2, fibrinogen 448   Factor VIII level elevated to 322%, normally lower in DIC but drawn after patient given FFP, and elevated due to shock liver, Factor VII low despite FFP, likely related to acute liver injury (shortest half -life 3-6hrs)  PT/PTT mixing studies with initial correction, will review 2hr levels   No signs of acute bleeding, currently extubated    peripheral smear (11/13): spherocytes seen, no schistocytes   -Treatment of underlying infection (endocarditis) and c/w CVVHD per renal   -supportive care: transfuse plts for >10K or or >50K with active bleeding   -transfuse cryoprecipitate if fibrinogen level <100   -Daily coags, fibrinogen  -vascular f/u     #Anemia  Hb 6.9 - 10 on this admission. Normocytic.   11/14/19: , Haptoglobin < 10; Tibili 11.3, direct bili 9.4  retic count elevated at w/o gross bleeding c/w showing appropriate BM response  f/u urine hemosiderin regarding intravascular hemolysis, however without rise in indirect bili and downtrending LDH, in addition can see low hapto in shock liver, spleen normal appearing on CT imaging   Iron studies suggesting anemia of chronic disease  Direct Juvencio test negative   -Trend CBC; keep active T&S; transfuse if actively bleeding or if Hb < 7 g/dL   -Daily hemolysis labs   -f/u GI regarding irregular masslike mural thickening in rectum 40 y/o male with history of IVDU admitted to MICU on 11/10/19 with 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 Also complicated by acute hypoxic respiratory failure requiring intubation and mechanical ventilation, and acute renal failure requiring emergent dialysis. Heme/onc consulted for management of DIC.       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   peripheral smear (11/13): spherocytes seen, no schistocytes   1/13/19: d-dimer elevated to 2409, PT 17.2,  PTT 40.1, Plt 66, fibrinogen 241  1/14/19:  PT 17.0,  PTT 40.8, Plt 54, fibrinogen 318  11/18/10: PT 14.8, PTT 40.2, Plt 82, fibrinogen 448   Factor VIII level elevated to 322%, normally lower in DIC but drawn after patient given FFP, and elevated due to shock liver, Factor VII low despite FFP, likely related to acute liver injury (shortest half -life 3-6hrs)  PT/PTT mixing studies with initial correction  No signs of acute bleeding; patient currently extubated    -Treatment of underlying infection (endocarditis) and c/w CVVHD per renal   -supportive care: transfuse plts for >10K or or >50K with active bleeding   -transfuse cryoprecipitate if fibrinogen level <100   -Daily coags, fibrinogen  -vascular f/u     #Anemia  Hb ranging 6.9 - 10 on this admission. Normocytic.   11/14/19: , Haptoglobin < 10; Tibili 11.3, direct bili 9.4  11/18: , Haptoglobin < 10, Tibil 9.1  retic count elevated w/o gross bleeding, c/w appropriate BM response  f/u urine hemosiderin regarding intravascular hemolysis, however without rise in indirect bili and downtrending LDH, in addition can see low haptoglobin  in shock liver, spleen normal appearing on CT imaging   Iron studies suggesting anemia of chronic disease  Direct Juvencio test negative   -Trend CBC; keep active T&S; transfuse if actively bleeding or if Hb < 7 g/dL   -Daily hemolysis labs   -f/u GI regarding irregular masslike mural thickening in rectum 38 y/o male with history of IVDU admitted to MICU on 11/10/19 with 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 Also complicated by acute hypoxic respiratory failure requiring intubation and mechanical ventilation, and acute renal failure requiring emergent dialysis. Heme/onc consulted for management of DIC.       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   peripheral smear (11/13): spherocytes seen, no schistocytes   1/13/19: d-dimer elevated to 2409, PT 17.2,  PTT 40.1, Plt 66, fibrinogen 241  1/14/19:  PT 17.0,  PTT 40.8, Plt 54, fibrinogen 318  11/18/10: PT 14.8, PTT 40.2, Plt 82, fibrinogen 448   Factor VIII level elevated to 322%, normally lower in DIC but drawn after patient given FFP, and elevated due to shock liver, Factor VII low despite FFP, likely related to acute liver injury (shortest half -life 3-6hrs)  PT/PTT mixing studies with initial correction  No signs of acute bleeding; patient currently extubated    -Treatment of underlying infection (endocarditis) and c/w CVVHD per renal   -supportive care: transfuse plts for >10K or or >50K with active bleeding   -transfuse cryoprecipitate if fibrinogen level <100   -Daily coags, fibrinogen  -vascular f/u     #Anemia  Hb ranging 6.9 - 10 on this admission. Normocytic.   11/14/19: , Haptoglobin < 10; Tibili 11.3, direct bili 9.4  11/18: , Haptoglobin < 10, Tibil 9.1  retic count elevated w/o gross bleeding, c/w appropriate BM response  f/u urine hemosiderin regarding intravascular hemolysis, however without rise in indirect bili and downtrending LDH, in addition can see low haptoglobin  in shock liver, spleen normal appearing on CT imaging   Iron studies suggesting anemia of chronic disease  Direct Juvencio test negative   -Trend CBC; keep active T&S; transfuse if actively bleeding or if Hb < 7 g/dL   -Daily hemolysis labs   -f/u GI regarding irregular masslike mural thickening in rectum

## 2019-11-19 NOTE — CONSULT NOTE ADULT - CONSULT REQUESTED DATE/TIME
11-Nov-2019 17:36
11-Nov-2019 12:49
12-Nov-2019 09:00
12-Nov-2019 17:45
13-Nov-2019 14:00
14-Nov-2019 18:39
15-Nov-2019 10:45
18-Nov-2019 12:43
18-Nov-2019 19:44
19-Nov-2019 10:13

## 2019-11-19 NOTE — PROGRESS NOTE ADULT - ASSESSMENT
40 y/o M smoker w/ PMHx of IVDU presented to [facility] w/ productive cough with hemoptysis found to have septic emboli w/ vegetation of the tricuspid valve transferred to [facility] for surgical evaluation.  Course complicated by sepsis, DIC, acute respiratory failure w/ empyema s/p reintubation and thoracostomy tube, CAMERON requiring cvvhd. Surgery reconsulted for abdominal distention with Xray demonstrating colonic and small bowel distention. Patient underwent CT abdomen pelvis on 11/18 which demonstrated colonic distention and 40 y/o M smoker w/ PMHx of IVDU presented to [facility] w/ productive cough with hemoptysis found to have septic emboli w/ vegetation of the tricuspid valve transferred to [facility] for surgical evaluation.  Course complicated by sepsis, DIC, acute respiratory failure w/ empyema s/p reintubation and thoracostomy tube, CAMERON requiring cvvhd. Surgery reconsulted for abdominal distention with Xray demonstrating colonic and small bowel distention. Patient underwent CT abdomen pelvis on 11/18 which demonstrated colonic distention and decompressed small bowel. CT chest demonstrating hydropneumothorax on the right lung and pneumothorax of the left lung.    - Continue rectal tube for colonic decompression 38 y/o M smoker w/ PMHx of IVDU presented to [facility] w/ productive cough with hemoptysis found to have septic emboli w/ vegetation of the tricuspid valve transferred to [facility] for surgical evaluation.  Course complicated by sepsis, DIC, acute respiratory failure w/ empyema s/p reintubation and thoracostomy tube, CAMERON requiring cvvhd. Surgery reconsulted for abdominal distention with Xray demonstrating colonic and small bowel distention. Patient underwent CT abdomen pelvis on 11/18 which demonstrated colonic distention likely secondary to pseudoobstruction and decompressed small bowel. CT chest demonstrating hydropneumothorax on the right lung and pneumothorax of the left lung.     - Continue rectal tube for colonic decompression  - Continue NGT to prevent air swallowing which could worsen colonic decompression  - Minimize narcotic use

## 2019-11-19 NOTE — PROGRESS NOTE ADULT - ASSESSMENT
38 yo male with PMH of IVDU was transferred from [facility] for IE with tricupsid valve vegetations, Nephrology consulted for anuric cameron and electrolytes disturbances.      # CAMERON, anuric  - remains septic, requiring pressors  - restart CVVHD via RIJ HD cath with net even balance, increase by 25 Q/hr as tolerated to keep net negative 1.0 - 1.5 L/24 hr  - keep map >65 mmHg  - potassium 4.6, phos 4.6, cw Phoxillum   - monitor BMP q6hr while on CVVHD for K/Phos and Mg and supplement as needed  - renally adjusted meds/ABx  - monitor H/H, transfuse as indicated      Discussed with attending Dr Pinzon. 40 yo male with PMH of IVDU was transferred from [facility] for IE with tricupsid valve vegetations, Nephrology consulted for anuric cameron and electrolytes disturbances.      # CAMERON, anuric  - remains septic, requiring pressors  - restart CVVHD via RIJ HD cath with net even balance for now, will reassess throughout the day  - keep map >65 mmHg  - monitor BMP q6hr while on CVVHD for K/Phos and Mg and supplement as needed  - monitor H/H, transfuse as indicated  - treat infection  - renally adjusted meds/ABx    Discussed with attending Dr Pinzon. 40 yo male with PMH of IVDU was transferred from [facility] for IE with tricupsid valve vegetations, Nephrology consulted for anuric cameron and electrolytes disturbances.      # CAMERON, anuric  - remains septic, requiring pressors  - CVVHD via RIJ HD cath with net even balance for now, will reassess throughout the day  - keep map >65 mmHg  - monitor BMP q6hr while on CVVHD for K/Phos and Mg and supplement as needed  - monitor H/H, transfuse as indicated  - treat infection  - renally adjusted meds/ABx    Discussed with attending Dr Pinzon.

## 2019-11-19 NOTE — CONSULT NOTE ADULT - ASSESSMENT
39y Male w PMH of IVDU and active smoker who went to [facility] on 11/8/19 complaining of productive cough with hemoptysis, progressive SOB, pleuritic chest pain, nausea, non-bloody emesis, abd pain, chillsx3 days. At OSH, patient found to be in septic shock 2/2 tricuspid valve endocarditis seen on echocardiogram, and patient was transferred to [facility], [facility], under the care of Dr. Daniel Ruelas for possible surgical evaluation. Following admission, patient noted to be in acute hypoxic respiratory failure s/p intubation/sedation, acute kidney injury requiring CVVHD, congestive hepatopathy, and mutli-system organ failure 2/2 hypoperfusion. Patient deemed not a surgical candidate, per Dr. Ruelas, and patient transferred to MICU for medical management with IV nafcillin, ID following. Patient with complicated course and now s/p cardiac arrest likely from sepsis/hypoxic respiratory failure.    Tricuspid valve Endocarditis: Patient with vegetations seen on ELIAN and TTE as above with severe TR. TR is functional from malcoaptation of leaflets from vegetation. Patient with mildly elevated PASP, which likely is not contributing much to TR. Normal RV function. Not a surgical candidate. Patient also with septic emboli to lungs, further causing empyema.   -Continue on Nafcillin per ID recs.  -Patient will likely need to be continued on abx therapy indefinitely in setting of no surgical intervention.  -Patient will have long and complicated course without definite source control.   -If patient becomes more stable, reconsult CT Surgery for tricuspid valve repair/replacement.   -Continue with HD support with pressors.     CHF: EF 45% on initial echo. With global hypokinesis. Likely in setting of sepsis.   -Patient unable to tolerate GDMT.  -When patient no longer septic and HD stable, can discuss starting meds to assist with HF.   -Continue medical managment for IE.     To be discussed on rounds. 39y Male w PMH of IVDU and active smoker who went to [facility] on 11/8/19 complaining of productive cough with hemoptysis, progressive SOB, pleuritic chest pain, nausea, non-bloody emesis, abd pain, chillsx3 days. At OSH, patient found to be in septic shock 2/2 tricuspid valve endocarditis seen on echocardiogram, and patient was transferred to [facility], [facility], under the care of Dr. Daniel Ruelas for possible surgical evaluation. Following admission, patient noted to be in acute hypoxic respiratory failure s/p intubation/sedation, acute kidney injury requiring CVVHD, congestive hepatopathy, and mutli-system organ failure 2/2 hypoperfusion. Patient deemed not a surgical candidate, per Dr. Ruelas, and patient transferred to MICU for medical management with IV nafcillin, ID following. Patient with complicated course and now s/p cardiac arrest likely from sepsis/hypoxic respiratory failure.    Tricuspid valve Endocarditis: Patient with vegetations seen on ELIAN and TTE as above with severe TR. TR is functional from malcoaptation of leaflets from vegetation. Patient with mildly elevated PASP, which likely is not contributing much to TR. Normal RV function. Not a surgical candidate. Patient also with septic emboli to lungs, further causing empyema.   -Continue on Nafcillin per ID recs.  -Patient will likely need to be continued on abx therapy indefinitely in setting of no surgical intervention.  -Patient will have long and complicated course without definite source control.   -If patient becomes more stable, reconsult CT Surgery for tricuspid valve repair/replacement.   -Continue with HD support with pressors.   -Would not recommend repeat echo at this time. Will not change managment. Also, cardiac arrest in setting of bradycardia/PEA, likely would not be caused by abscess effecting conduction system.   -Generally seen with AV endocarditis. Usually will manifest first as 1st degree AVB, which patient does not currently have.     CHF: EF 45% on initial echo. With global hypokinesis. Likely in setting of sepsis.   -Patient unable to tolerate GDMT.  -When patient no longer septic and HD stable, can discuss starting meds to assist with HF.   -Continue medical managment for IE.     Discussed with attending and MICU fellow

## 2019-11-19 NOTE — CONSULT NOTE ADULT - PROVIDER SPECIALTY LIST ADULT
Critical Care
Gastroenterology
Heme/Onc
Infectious Disease
Nephrology
Surgery
CT Surgery
Cardiology
Surgery
Surgery
Vascular Surgery

## 2019-11-19 NOTE — PROCEDURE NOTE - ADDITIONAL PROCEDURE DETAILS
Asked to help with endotracheal intubation. The patient was resuscitated from cardiac arrest, has a pulse, but is obtunded with no mental status and a clenched mandible. He was in critical condition with high dose pressors and unstable vital signs. Rocuronium was given to aid in endotracheal intubation. DLx1 with mac 3 by intensivist. 7.5 ETT to 22cm. +EtCO2. Tube secured and patient sedated post procedure per critical care team.

## 2019-11-19 NOTE — PROGRESS NOTE ADULT - ATTENDING COMMENTS
seen and evaluated while on CVVHD, then system clotted  overnight events noted-  now reintubated, rectal tube in place. on pressors and received PRBCs  for bronchoscopy  PO4 4.6- okay to c/w phoxillum  considering alternating between PO4 rich and PO4 delete dialysate solutions as needed

## 2019-11-19 NOTE — PROGRESS NOTE ADULT - ASSESSMENT
40 y/o M smoker w/ PMHx of IVDU presented to [facility] w/ productive cough with hemoptysis found to have septic emboli w/ vegetation of the TV transferred to [facility] for surgical evaluation.  Course complicated by sepsis, DIC, acute respiratory failure w/ empyema s/p intubation and thoracostomy tube, CAMERON requiring cvvhd, ileus on NGT and rectal decompression, and cardiac arrest. GI consulted for evaluation of incidental finding of rectal wall thickening on imaging.    #Rectal lesion:  CT w/ irregular masslike mural thickening of the posterior right lateral wall of the rectum.  Would plan for nonurgent flex sig when patient is stable  -Will continue to evaluate for timing of flex sig    #Abd distension  Patient with new abdominal distension with xray notable for dilated bowels.  CT Patient is on zinc and fentanyl with rectal tube.  -Continue miralax  -Start dulcolax 10 mg   -Appreciate surgery input    #Hyperbilirubinemia:  Patient with marked elevated bilirubin initially thought to be 2/2 ischemic hepatopathy but persistent elevation on repeat.  Imaging without CBD dilation suggestive of cholestasis from sepsis vs DILI in the setting of recent ischemic hepatopathy  -Fractionate bilirubin  -Daily LFT and INR

## 2019-11-19 NOTE — PROGRESS NOTE ADULT - ASSESSMENT
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  Multiple septic emboli to lungs, course c/b empyema requiring B chest tubes, as well as multi-organ failure (shock liver, CAMERON requiring CVVHD).   Necrotic digits likely related to pressors.  Febrile this AM and ongoing marked leukocytosis although now downtrending.  Per CTS, he is not a surgical candidate.  Patient found to have newly dilated loops of bowel and worsening abdominal distention associated with diarrhea, initial concern per primary team for C.diff infection but C.diff toxin negative, surgery following. Course further complicated by PEA arrest on 11/18 atropine given and CPR initiated, ROSC achieved and patient was reintubated. Per pulmonary/ICU fellow, clots and mucus suctioned. Likely 2/2 to mucus plugging. Also found to have b/l pneumothoraces and increased extent of lobar pneumonia in b/l lower lobes of lungs on CT chest. R S/p chest tube placement and bronch on 11/19  Suggest:  - Continue nafcillin 2 g IV q4hrs  - agree with decision to broaden antibx to include IV Meropenem 1g q8hrs in the setting of clinical decompensation  - would defer IV Vanc for now. Please check MRSA nasal swab first  - f/u bronch cxs     Plan discussed with Dr. 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  Multiple septic emboli to lungs, course c/b empyema requiring B chest tubes, as well as multi-organ failure (shock liver, CAMERON requiring CVVHD).   Necrotic digits likely related to pressors.  Febrile this AM and ongoing marked leukocytosis although now downtrending.  Per CTS, he is not a surgical candidate.  Patient found to have newly dilated loops of bowel and worsening abdominal distention associated with diarrhea, initial concern per primary team for C.diff infection but C.diff toxin negative, surgery following. Course further complicated by PEA arrest on 11/18 atropine given and CPR initiated, ROSC achieved and patient was reintubated. Per pulmonary/ICU fellow, clots and mucus suctioned. Likely 2/2 to mucus plugging. Also found to have b/l pneumothoraces and increased extent of lobar pneumonia in b/l lower lobes of lungs on CT chest. R S/p chest tube placement and bronch on 11/19  Suggest:  - Continue nafcillin 2 g IV q4hrs (drug of choice for high inoculum MSSA infection)  - agree with decision to broaden antibx to include IV Meropenem 1g q8hrs in the setting of clinical decompensation  - would defer IV Vanc for now. Please check MRSA nasal PCR.  - f/u bronch cxs     Plan discussed with Dr. Simental

## 2019-11-19 NOTE — PROGRESS NOTE ADULT - ATTENDING COMMENTS
Patient seen and examined with house-staff during bedside rounds.  Resident note read, including vitals, physical findings, laboratory data, and radiological reports.   Revisions included below.  Direct personal management at bed side and extensive interpretation of the data.  Plan was outlined and discussed in details with the housestaff.  Decision making of high complexity  Action taken for acute disease activity to reflect the level of care provided:  - medication reconciliation  - review laboratory data  Acute respiratory failure with septic shock bilateral pneumonia renal failure bl pneumothorax empyema endocarditis bacteremia    Then follows  Noticed. He    Right chest tube was inserted with air leak with improvement in chest x-ray. Followup on the pneumothorax on the left side.    Continue antibiotic continue hemodialysis transfuse continue antibiotic repeat echocardiogram to rule out any underlying cardiac abscess or perforated bowel. Follow on her do blood culture patient is on pressors we'll recommend tracheostomy

## 2019-11-19 NOTE — PROGRESS NOTE ADULT - ASSESSMENT
39y Male w PMH of IVDU and active smoker who went to [facility] on 11/8/19 complaining of productive cough with hemoptysis, progressive SOB, pleuritic chest pain, nausea, non-bloody emesis, abd pain, chillsx3 days. 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, [facility], under the care of Dr. Daniel Ruelas for possible surgical evaluation. Following admission, patient noted to be in acute hypoxic respiratory failure s/p intubation/sedation, acute kidney injury requiring CVVHD, congestive hepatopathy, and mutli-system organ failure 2/2 hypoperfusion. Patient deemed not a surgical candidate, per Dr. Ruelas, and patient transferred to MICU for medical management with IV nafcillin, ID following. Pt with diffuse septic emboli in bilateral lungs, pigtails in place with new b/l pneumothorax. Abd yesterday with increased distention and distended bowel on AXR. Gen surg consulted and is now s/p decompression with rectal tube abd NG tube. CT chest/abd done yesterday concerning for b/l trapped lung and hemothorax for which thoracic surgery was consulted. Patient was extubated yesterday. Overnight he became bradycardic and arrested, CPR initiated with ROSC. After CPR chest tubes noted to have increased sanguinous drainage, new airleaks     Problem 1: Trapped lung  - L Lungs with bilateral septic emboli and infection. Continue with antibiotic regimen, ID reccs appreciated.   - B/l pigtail in place with increased drainage s/p arrest, with new air leaks in both tubes. Keep chest tubes to suction at all times.   - CT chest significant for b/l pneumothorax septic emboli with cavitary lesions  - D/w Dr. Rossi, Dr. West, Dr. Tracey, pt not a candidate for surgical intervention at this time  - Would recommend anterior bilateral pigtails given new pneumothoraces. Current pigtails are posterior and not optimal to re-expand lung.   - Can continue TPA in pigtails as per primary team, no contraindication     Problem 2: TV endocarditis  - IVDU, TV endocarditis, CTS consulted, not a surgical candidate at this time  - Medical Management with IV nafcillin, ID following appreciate reccs  - EF 45%    Problem 3: Distended bowel loops  - Gen surg consulted,  now s/p decompression with rectal tube and NG tube  - Abd this morning considerably less distended    Problem 4: Rectal wall thickening   - Incidentally found rectal wall thickening GI consulted,  - Flex sig when hemodynamically stable    Problem 5: ARF  - Anuric, on CVVHD  - Management as per nephrology team

## 2019-11-19 NOTE — CHART NOTE - NSCHARTNOTEFT_GEN_A_CORE
MTB PRE-BRONCHOSCOPY RISK ASSESSMENT  ------------------------------------------------------------    Procedure Date: 11/19/19    Provider Name: Becky Denise    Reason for Bronchoscopy: Septic emboli, Pneumonia    Location of Procedure (check one):   [  ] Endoscopy  [  ] Emergency Department  [X ] Intensive Care Unit  [  ] Operating Room  [  ] Other: _________    RISK ASSESSMENT  I. Patient symptoms (check all that apply): >/ 3 of these = SIGNIFICANT RISK for TB  [  ] Coughing > 2 to 3 weeks                 [  ] Unexplained fever >/ 2 weeks   [  ] Unusual weakness or fatigue  [  ] Unexplained weight loss > 10lbs.  [  ] Hemoptysis                                   [  ] Unusual or night sweating  [ x ] NON-APPLICABLE    II. TB history (Check all that apply): >/ 1 of these = SIGNIFICANT RISK for TB  [  ] Sputum smear/culture (+) for acid fast bacilli (AFB)                           [  ] Abnormal CXR or CT suggestive of TB; date(s) & description __________  [  ] Positive TB skin or blood test; date: __________                               [  ] On medication for latent TB or disease; list medication(s) ____________  [  ] TB diagnosed in the past; year treated: _______                                [  ] Inadequately treated TB  [  ] Current close contact of a person known or suspected to have TB  [ x ] NON-APPLICABLE    III. Additional Risk factors for TB (Check all that apply): Consider these in relation to symptoms and history  [  ] Person has conditions placing them at higher risk for TB disease (i.e. immunosuppressive therapy)  [  ] Person has lived in a country for 3 months or more where TB is common  [  ] Person lives in a high risk environment for TB (i.e. long term care, health care worker, incarcerated, homeless)  [x ] Person injects illicit drugs  [  ] Person is HIV positive or at high risk for HIV    ****************************************************************  BASED ON THE TB RISK ASSESSMENT ABOVE, THE PATIENT'S RISK FOR TB IS:                       [x  ] LOW RISK FOR TB            [  ] SIGNIFICANT RISK FOR TB  ****************************************************************    IV. Based on the Determined Risk for TB, the following Action(s) are Recommended:  [  x] Low risk for TB infection --> Proceed with the diagnostic procedure    [  ] Significant risk for TB infection:  1. Perform the procedure in a negative pressure room, with appropriate personal protective equipment (PPE) for healthcare personnel (i.e. N95 respirator)    2. If it is not feasible to move the patient or defer the procedure:    a. Use a single-bedded room in a low traffic area to perform the bronchoscopy procedure    b. Place a portable high-efficiency particulate air (HEPA) filter in the space prior to starting the procedure and keep the door closed. Refer to Infection Control policy titled "Tuberculosis Control Strategy Plan" for additional information.    c. All healthcare personnel in the procedure room shall wear and N95 disposible respirator.    3. Documentation of the tuberculosis risk assessment is to be included in the patient's medical record.

## 2019-11-19 NOTE — PROGRESS NOTE ADULT - ASSESSMENT
38 y/o M w/ PMH of IVDU and active smoker who came from [facility] on 11/8/19 in 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 After being transferred to  CTICU for surgical evaluation, pt was deemed to not be a surgical candidate and transferred to MICU for medical mgmt. ID, Nephrology, heme/onc, surgery following, vascular.    Neuro  Light sedation on Propofol, Fentanyl. DC Propfol and ordered for Fentanyl 50mcg patch, will DC Fentanyl gtt at 11pm. CT head negative for any intracranial embolic events.  - Successful extubated on 11/17. Now AAOx3     Cardiovascular     #Hypotension  Most likely 2/2 septic shock from infective endocarditis and RV failure 2/2 tricuspid regurgitation (ELIAN shows EF of 40-50%). Echo with EF 45%. ELIAN with EF 40-45%, 3.8 x1cm vegetation on TR valve, with severe TR, trivial pericardial effusion. Echo with bubble revealed no PFO. Restarted on levophed, titrate as appropriate   - Maintain MAP>65.   - C/W medical mgmt of infective endocarditis as below.  - Off Levophed   - F/u repeat echo     Respiratory    #Acute hypoxic respiratory failure   Most likely 2/2 alveolar hemorrhage in the setting of septic embolic causing numerous cavitary lesions on CT chest. CXR with scattered infiltrates and pleural effusions. Sputum culture negative. CT revealed "moderate bilateral pleural effusions and dense bibasilar consolidation with underlying septic emboli/pulmonary abscesses."   - Extubated successfully 11/17, incentive spirometer at bedside   - Now on HFNC, monitor respiratory status   - Continue to treat IE as below    #Bilateral pulmonary effusion  Most likely empyema in setting of septic shock 2/2 infective endocarditis. Bilateral CT in place, confirmed by CXR, continues to drain serosanguinous fluid. Fluid analysis of both CT pleural fluid confirms complex exudate with high cellularity, low pH and low glucose. pleural fluid cultures with staph aureus  - Monitor output of bilateral chest tubes  - F/U pleural fluid cultures  - Lung tissue was suctioned from left chest tube w/ air coming from chest tube. CXR consistent for PTX. Chest tube set to suction.  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 Not a surgical candidate for a tricuspid valve replacement at this time as per CT surgery. Lactate 2.0 today. Initial blood cultures positive for staph epidermidis, likely a contaminant. Initial sputum culture positive for staph aureus  Repeat blood cultures with NGTD. Sputum cx NGTD.  - ID following, C/W Nafcillin 2g q4 (Sensitive to Oxacillin as per OSH records), f/u recs   - Per CT surgery pt is too high risk for any surgery and would likely not survive procedure.   - Cardiology consulted to optimize management of his right heart dysfunction, f/u recs    - Continue to monitor temps and vitals, consider additional Abx coverage if condition worsens  - Monitor CBC   - Pt hypothermic overnight, Bcxs drawn, F/u repeat Bcxs    Renal    #Acute renal failure most likely 2/2 ATN from hypoperfusion, Minimal urine output, on CVVHD, oliguric with 0-5cc/hr. Vancomycin level 37 on arrival so may be component of drug induced nephropathy. Bun/Cr continues to improve while on CVVHD. Pt is clinically fluid overloaded, and hemodynamically stable. No renal infarcts as per CT abdomen/pelvis. Optimized for Sx as per nephrology.  - F/U nephrology recs.  - C/W CVVHD as tolerated, will likely switch to intermittent HD tomorrow   - Concern that CVVHD is clotting: flow was increased, monitor function, plan to switch to intermittent dialysis when current fails.  - Continue to correct fluid overload with HD. Goal net -1L   - Monitor renal function with BUN/Cr  - Monitor I/Os    #Electrolytes abnormalities   Improved. On CVVHD. Hyperkalemia resolved. No EKG changes.  -Requires q6 BMP, Mg, and Phos while on CVVHD  -Monitor serum lytes, Ca     GI    OG on arrival with 600cc of bilious fluid, abdomen still distended but had several BMs, some loose. CT abdomen revealed no evidence of bowel ischemia or SBO. SUmp was DCed and replaced with NG tube. CT abdomen/pelvis revealed "irregular masslike mural thickening of the posterior left lateral wall of the rectum." Surgery consulted, unlikely perirectal abscess. Recommend GI evaluation for possible scope.  - NG tube in place  - C/W tube feeds, Jevity 1.5  - F/U GI consult, f/u recs.   - Abdominal xray with air-filled loops of small/large bowel with concern for distal obstruction vs ileus. Surgery consulted. Rectal tube and sump placed. F/u CT abdomen/pelvis   -C. diff negative     #Congestive Hepatopathy   Transaminitis, coag derangements, hyperbilirubinemia most likely 2/2 hepatic congestion vs hemolysis, due to severe TR in setting of infective endocarditis. Transaminases and Alk phos improving. Hepatitis panel negative.  - F/U CMP, direct bili   - Avoid Tylenol    Heme    #Hemolysis  Intravascular hemolysis with inital haptoglobin <10, LDH decreasing at 365. D-dimer elevated. Fibrinogen stable 396. Today still clinically jaundiced, with stable bilirubinemia: T.bili 9.9., D.bili 8.5. h/h stable at 8.1/25.5. Platelets decreasing from 54 to 49.. Fact VII/Factor VIII elevated. Unlikely 2/2 CVVHD. Likely 2/2 to sepsis and/or DIC.   - Avoid anticoagulation  - Heme consulted, follow recs  - Monitor direct and total bili, LDH, fibrinogen, platelets   - Transfuse platelets if <10K or bleeding and <50K  - Transfuse pRBCs for hgb <7     Vascular  Vascular surgery consulted in setting of gangrenous distal toes and fingers b/l. Likely 2/2 to pressors. Will follow up recs.     Lines: right IJ TLC and Ernesto (11/12), Axillary A-line (11/12), Left IJ (11/12), right peripheral (11/12)    F: 25% Albumin 50cc  E: replete K <4, Mg <2  N: NPO   GI Ppx: Protonix   DVT Ppx: Heparin   Code status: FULL   Dispo: MICU 40 y/o M w/ PMH of IVDU and active smoker who came from [facility] on 11/8/19 in 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 After being transferred to  CTICU for surgical evaluation, pt was deemed to not be a surgical candidate and transferred to MICU for medical mgmt. ID, Nephrology, heme/onc, surgery following, vascular.    Neuro  Re-intubated, sedated on Propofol, Fentanyl gtt. Prior CT head negative for any intracranial embolic events.      Cardiovascular     #Hypotension  Most likely 2/2 septic shock from infective endocarditis and RV failure 2/2 tricuspid regurgitation (ELIAN shows EF of 40-50%). Echo with EF 45%. ELIAN with EF 40-45%, 3.8 x1cm vegetation on TR valve, with severe TR, trivial pericardial effusion. Echo with bubble revealed no PFO. Restarted on levophed and Vasopressin.   - Maintain MAP>65.   - C/W medical mgmt of infective endocarditis as below.  - repeat echo with stable vegetation   - Likely became hypotensive and bradycardic overnight in setting of mucous plug vs arrythmia vs most likely septic shock     Respiratory    #Acute hypoxic respiratory failure   Most likely 2/2 alveolar hemorrhage in the setting of septic embolic causing numerous cavitary lesions on CT chest. CXR with scattered infiltrates and pleural effusions. Sputum culture negative. CT revealed "moderate bilateral pleural effusions and dense bibasilar consolidation with underlying septic emboli/pulmonary abscesses."   - Re-intubated on 11/19 for acute hypoxic respiratory failure with increased work of breathing   - CT chest with new b/l loculated pneumothoraces with increase in size of cavitary lesions with worsening lobar PNA   - Additional R chest tube place- now with 2 right chest tubes and 1 left chest tube, may need to insert additional L chest tube--->repeat cxr  with decrease in size of b/l pneumothoraces   - tPAse flush for R chest tube in AM and PM   - Continue to treat IE as below    #Bilateral pulmonary effusion  Most likely empyema in setting of septic shock 2/2 infective endocarditis. Bilateral CT in place, confirmed by CXR, continues to drain serosanguinous fluid. Fluid analysis of both CT pleural fluid confirms complex exudate with high cellularity, low pH and low glucose. pleural fluid cultures with staph aureus. Chest tubes in place as above (2 R chest tubes, 1 L chest tube)   - Monitor output of bilateral chest tubes  - F/U pleural fluid cultures  - CT chest and cxr as above. Chest tube set to suction.      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 Not a surgical candidate for a tricuspid valve replacement at this time as per CT surgery. Lactate 2.0 today. Initial blood cultures positive for staph epidermidis, likely a contaminant. Initial sputum culture positive for staph aureus  Repeat blood cultures with NGTD. Sputum cx NGTD.  - ID following, C/W Nafcillin 2g q4 (Sensitive to Oxacillin as per OSH records), f/u recs   - Start Vancomycin and Meropenem (MRSA/gram neg coverage) in setting of continued septic shock (spiked fever to 101F tachycardic, leukocytosis). F/u MRSA nasal PCR   - F/u repeat Bcxs   - Bronch performed 11/19 with copious purulent sputum- f/u BAL cxs   - Per CT surgery pt is too high risk for any surgery and would likely not survive procedure.   - Cardiology consulted to optimize management of his right heart dysfunction, f/u recs    - Monitor CBC     Renal    #Acute renal failure most likely 2/2 ATN from hypoperfusion, Minimal urine output, on CVVHD, oliguric with 0-5cc/hr. Vancomycin level 37 on arrival so may be component of drug induced nephropathy. Bun/Cr continues to improve while on CVVHD. Pt is clinically fluid overloaded, and hemodynamically stable. No renal infarcts as per CT abdomen/pelvis. Optimized for Sx as per nephrology.  - F/U nephrology recs.  - C/W CVVHD as tolerated, monitor for clotting  - Continue to correct fluid overload with HD. Goal net even for now   - Monitor renal function with BUN/Cr  - Monitor I/Os    #Electrolytes abnormalities   Improved. On CVVHD. Hyperkalemia resolved. No EKG changes.  -Requires q6 BMP, Mg, and Phos while on CVVHD  -Monitor serum lytes     GI    OG on arrival with 600cc of bilious fluid, abdomen still distended but had several BMs, some loose. CT abdomen revealed no evidence of bowel ischemia or SBO. Sump was DCed and replaced with NG tube. CT abdomen/pelvis revealed "irregular masslike mural thickening of the posterior left lateral wall of the rectum." Surgery consulted, unlikely perirectal abscess. Recommend GI evaluation for possible scope.  - NG tube in place  - C/W tube feeds, Jevity 1.5  - F/u GI consult, f/u recs, when stable will go for flex sig   - Abdominal xray with air-filled loops of small/large bowel with concern for distal obstruction vs ileus. Surgery consulted. Rectal tube and sump placed. CT abd/pelvis with abd/pelvic, anasarca, colonic ileus. Rectal tube in place for colonic decompression, continue to monitor   -C. diff negative     #Congestive Hepatopathy   Transaminitis, coag derangements, hyperbilirubinemia most likely 2/2 hepatic congestion vs hemolysis, due to severe TR in setting of infective endocarditis. Transaminases and Alk phos improving. Hepatitis panel negative.  - Monitor CMP, direct bili   - Avoid Tylenol    Heme    #Hemolysis  Intravascular hemolysis with inital haptoglobin <10, LDH decreasing at 365. D-dimer elevated. Fibrinogen stable 396. Today still clinically jaundiced, with stable bilirubinemia: T.bili 9.9., D.bili 8.5. h/h stable at 8.1/25.5. Platelets decreasing from 54 to 49.. Fact VII/Factor VIII elevated. Unlikely 2/2 CVVHD. Likely 2/2 to sepsis and/or DIC.   - Hgb in AM 7, transfused 1u pRBCs   - Avoid anticoagulation  - Heme consulted, follow recs  - Monitor direct and total bili, LDH, fibrinogen, platelets   - Transfuse platelets if <10K or bleeding and <50K  - Transfuse pRBCs for hgb <7     Vascular  Vascular surgery consulted in setting of gangrenous distal toes and fingers b/l. Likely 2/2 to pressors. Will follow up recs.     Lines: right IJ TLC and Ernesto (11/12), Axillary A-line (11/12), Left IJ (11/12), right peripheral (11/12)    F: None   E: replete K <4, Mg <2  N: NPO   GI Ppx: Protonix   DVT Ppx: Heparin   Code status: FULL   Dispo: MICU

## 2019-11-19 NOTE — PROGRESS NOTE ADULT - SUBJECTIVE AND OBJECTIVE BOX
Patient bradycardic to 36 overnight, given atropine, chest compressions performed, pressor requirements increased overnight. CT performed demonstrating colonic distention. Reintubated for airway protection.    ICU Vital Signs Last 24 Hrs  T(C): 38.1 (19 Nov 2019 06:03), Max: 38.1 (19 Nov 2019 06:03)  T(F): 100.6 (19 Nov 2019 06:03), Max: 100.6 (19 Nov 2019 06:03)  HR: 124 (19 Nov 2019 07:00) (36 - 132)  BP: 80/56 (19 Nov 2019 07:00) (80/56 - 110/78)  BP(mean): 66 (19 Nov 2019 07:00) (65 - 94)  ABP: 86/52 (19 Nov 2019 07:00) (32/10 - 124/82)  ABP(mean): 66 (19 Nov 2019 07:00) (16 - 98)  RR: 26 (19 Nov 2019 07:00) (12 - 42)  SpO2: 100% (19 Nov 2019 07:00) (59% - 100%)      LABS/RADIOLOGY RESULTS:                          7.1    15.17 )-----------( 68       ( 19 Nov 2019 05:06 )             23.1   11-19    131<L>  |  98  |  20  ----------------------------<  86  4.6   |  20<L>  |  1.17    Ca    7.8<L>      19 Nov 2019 05:07  Phos  4.6     11-19  Mg     2.1     11-19    TPro  7.4  /  Alb  3.4  /  TBili  9.1<H>  /  DBili  x   /  AST  53<H>  /  ALT  71<H>  /  AlkPhos  105  11-18  PT/INR - ( 18 Nov 2019 05:33 )   PT: 14.8 sec;   INR: 1.30          PTT - ( 18 Nov 2019 05:33 )  PTT:40.2 secBlood Cultures      Respiratory: Intubated  CV: tachycardic  Abdominal: Soft, distended, NGT in place, rectal tube in place  Extremities: Necrosis of toes, extremities warm  Psych: Oriented Patient bradycardic to 36 overnight, given atropine, chest compressions performed, pressor requirements increased overnight. CT performed demonstrating colonic distention. Reintubated for airway protection.    ICU Vital Signs Last 24 Hrs  T(C): 38.1 (19 Nov 2019 06:03), Max: 38.1 (19 Nov 2019 06:03)  T(F): 100.6 (19 Nov 2019 06:03), Max: 100.6 (19 Nov 2019 06:03)  HR: 124 (19 Nov 2019 07:00) (36 - 132)  BP: 80/56 (19 Nov 2019 07:00) (80/56 - 110/78)  BP(mean): 66 (19 Nov 2019 07:00) (65 - 94)  ABP: 86/52 (19 Nov 2019 07:00) (32/10 - 124/82)  ABP(mean): 66 (19 Nov 2019 07:00) (16 - 98)  RR: 26 (19 Nov 2019 07:00) (12 - 42)  SpO2: 100% (19 Nov 2019 07:00) (59% - 100%)      LABS/RADIOLOGY RESULTS:                          7.1    15.17 )-----------( 68       ( 19 Nov 2019 05:06 )             23.1   11-19    131<L>  |  98  |  20  ----------------------------<  86  4.6   |  20<L>  |  1.17    Ca    7.8<L>      19 Nov 2019 05:07  Phos  4.6     11-19  Mg     2.1     11-19    TPro  7.4  /  Alb  3.4  /  TBili  9.1<H>  /  DBili  x   /  AST  53<H>  /  ALT  71<H>  /  AlkPhos  105  11-18  PT/INR - ( 18 Nov 2019 05:33 )   PT: 14.8 sec;   INR: 1.30          PTT - ( 18 Nov 2019 05:33 )  PTT:40.2 secBlood Cultures      Respiratory: Intubated  CV: tachycardic  Abdominal: Soft, distended, NGT in place, rectal tube in place  Extremities: Necrosis of toes, extremities warm

## 2019-11-19 NOTE — PROGRESS NOTE ADULT - SUBJECTIVE AND OBJECTIVE BOX
OVERNIGHT EVENTS:    SUBJECTIVE / INTERVAL HPI: Patient seen and examined at bedside.     VITAL SIGNS:  Vital Signs Last 24 Hrs  T(C): 38.1 (19 Nov 2019 06:03), Max: 38.1 (19 Nov 2019 06:03)  T(F): 100.6 (19 Nov 2019 06:03), Max: 100.6 (19 Nov 2019 06:03)  HR: 124 (19 Nov 2019 07:00) (36 - 132)  BP: 80/56 (19 Nov 2019 07:00) (80/56 - 110/78)  BP(mean): 66 (19 Nov 2019 07:00) (65 - 94)  RR: 26 (19 Nov 2019 07:00) (12 - 42)  SpO2: 100% (19 Nov 2019 07:00) (59% - 100%)    PHYSICAL EXAM:    General: WDWN  HEENT: NC/AT; PERRL, anicteric sclera; MMM  Neck: supple  Cardiovascular: +S1/S2; RRR  Respiratory: CTA B/L; no W/R/R  Gastrointestinal: soft, NT/ND; +BSx4  Extremities: WWP; no edema, clubbing or cyanosis  Vascular: 2+ radial, DP/PT pulses B/L  Neurological: AAOx3; no focal deficits    MEDICATIONS:  MEDICATIONS  (STANDING):  albumin human 25% IVPB 50 milliLiter(s) IV Intermittent every 8 hours  alteplase  Injectable for Pleural Effusion 10 milliGRAM(s) IntraPleural. every 12 hours  ascorbic acid 500 milliGRAM(s) Oral daily  chlorhexidine 2% Cloths 1 Application(s) Topical daily  CRRT Treatment    <Continuous>  dextrose 5%. 1000 milliLiter(s) (50 mL/Hr) IV Continuous <Continuous>  dextrose 50% Injectable 12.5 Gram(s) IV Push once  dextrose 50% Injectable 25 Gram(s) IV Push once  dextrose 50% Injectable 25 Gram(s) IV Push once  dornase maikol Solution for Pleural Effusion 5 milliGRAM(s) IntraPleural. every 12 hours  fentaNYL   Patch  50 MICROgram(s)/Hr 1 Patch Transdermal every 72 hours  heparin  Injectable 5000 Unit(s) SubCutaneous every 8 hours  insulin lispro (HumaLOG) corrective regimen sliding scale   SubCutaneous every 6 hours  multivitamin/minerals/iron Oral Solution (CENTRUM) 15 milliLiter(s) Oral daily  nafcillin  IVPB 2 Gram(s) IV Intermittent every 4 hours  norepinephrine Infusion 0.05 MICROgram(s)/kG/Min (6.9 mL/Hr) IV Continuous <Continuous>  pantoprazole  Injectable 40 milliGRAM(s) IV Push daily  Phoxillum Filtration BK 4 / 2.5 5000 milliLiter(s) (2500 mL/Hr) CRRT <Continuous>  polyethylene glycol 3350 17 Gram(s) Oral two times a day  propofol Infusion 5 MICROgram(s)/kG/Min (2.208 mL/Hr) IV Continuous <Continuous>  senna 2 Tablet(s) Oral at bedtime  sodium chloride 0.9% lock flush 3 milliLiter(s) IV Push every 8 hours  sodium chloride 0.9%. 1000 milliLiter(s) (110 mL/Hr) IV Continuous <Continuous>  vitamin E 400 International Unit(s) Oral daily  zinc sulfate 220 milliGRAM(s) Oral daily    MEDICATIONS  (PRN):  dextrose 40% Gel 15 Gram(s) Oral once PRN Blood Glucose LESS THAN 70 milliGRAM(s)/deciliter  glucagon  Injectable 1 milliGRAM(s) IntraMuscular once PRN Glucose LESS THAN 70 milligrams/deciliter      ALLERGIES:  Allergies    Allergy Status Unknown    Intolerances        LABS:                        7.1    15.17 )-----------( 68       ( 19 Nov 2019 05:06 )             23.1     11-19    131<L>  |  98  |  20  ----------------------------<  86  4.6   |  20<L>  |  1.17    Ca    7.8<L>      19 Nov 2019 05:07  Phos  4.6     11-19  Mg     2.1     11-19    TPro  7.4  /  Alb  3.4  /  TBili  9.1<H>  /  DBili  x   /  AST  53<H>  /  ALT  71<H>  /  AlkPhos  105  11-18    PT/INR - ( 18 Nov 2019 05:33 )   PT: 14.8 sec;   INR: 1.30          PTT - ( 18 Nov 2019 05:33 )  PTT:40.2 sec    CAPILLARY BLOOD GLUCOSE      POCT Blood Glucose.: 83 mg/dL (19 Nov 2019 05:51)      RADIOLOGY & ADDITIONAL TESTS: Reviewed. OVERNIGHT EVENTS: Became bradycardic, hypotensive, given 250cc NS bolus, Levophed started, code called with chest compressions performed. Atropine given, patient intubated. Electrolytes stable.     SUBJECTIVE / INTERVAL HPI: Patient seen and examined at bedside. Patient resting in bed, intubated on sedation, minimally responsive to verbal stimuli. Unable to obtain full ROS.     VITAL SIGNS:  Vital Signs Last 24 Hrs  T(C): 38.1 (19 Nov 2019 06:03), Max: 38.1 (19 Nov 2019 06:03)  T(F): 100.6 (19 Nov 2019 06:03), Max: 100.6 (19 Nov 2019 06:03)  HR: 124 (19 Nov 2019 07:00) (36 - 132)  BP: 80/56 (19 Nov 2019 07:00) (80/56 - 110/78)  BP(mean): 66 (19 Nov 2019 07:00) (65 - 94)  RR: 26 (19 Nov 2019 07:00) (12 - 42)  SpO2: 100% (19 Nov 2019 07:00) (59% - 100%)    PHYSICAL EXAM:    General: WDWN, resting in bed, on sedation, jaundice, ill-appearing  HEENT: NC/AT; PERRL, icteric sclera; MMM  Neck: supple  Cardiovascular: +S1/S2; tachycardic, regular rhythm   Respiratory: intubated, R and L chest tube in place, diffuse rhonchi b/l   Gastrointestinal: NG tube in place, soft, NT, distended; +BSx4  Extremities: WWP; edema in LEs/UEs, no clubbing or cyanosis  Vascular: 2+ radial, DP/PT pulses B/L  Neurological: sedated, minimally responsive to verbal stimuli with eye opening.     MEDICATIONS:  MEDICATIONS  (STANDING):  albumin human 25% IVPB 50 milliLiter(s) IV Intermittent every 8 hours  alteplase  Injectable for Pleural Effusion 10 milliGRAM(s) IntraPleural. every 12 hours  ascorbic acid 500 milliGRAM(s) Oral daily  chlorhexidine 2% Cloths 1 Application(s) Topical daily  CRRT Treatment    <Continuous>  dextrose 5%. 1000 milliLiter(s) (50 mL/Hr) IV Continuous <Continuous>  dextrose 50% Injectable 12.5 Gram(s) IV Push once  dextrose 50% Injectable 25 Gram(s) IV Push once  dextrose 50% Injectable 25 Gram(s) IV Push once  dornase maikol Solution for Pleural Effusion 5 milliGRAM(s) IntraPleural. every 12 hours  fentaNYL   Patch  50 MICROgram(s)/Hr 1 Patch Transdermal every 72 hours  heparin  Injectable 5000 Unit(s) SubCutaneous every 8 hours  insulin lispro (HumaLOG) corrective regimen sliding scale   SubCutaneous every 6 hours  multivitamin/minerals/iron Oral Solution (CENTRUM) 15 milliLiter(s) Oral daily  nafcillin  IVPB 2 Gram(s) IV Intermittent every 4 hours  norepinephrine Infusion 0.05 MICROgram(s)/kG/Min (6.9 mL/Hr) IV Continuous <Continuous>  pantoprazole  Injectable 40 milliGRAM(s) IV Push daily  Phoxillum Filtration BK 4 / 2.5 5000 milliLiter(s) (2500 mL/Hr) CRRT <Continuous>  polyethylene glycol 3350 17 Gram(s) Oral two times a day  propofol Infusion 5 MICROgram(s)/kG/Min (2.208 mL/Hr) IV Continuous <Continuous>  senna 2 Tablet(s) Oral at bedtime  sodium chloride 0.9% lock flush 3 milliLiter(s) IV Push every 8 hours  sodium chloride 0.9%. 1000 milliLiter(s) (110 mL/Hr) IV Continuous <Continuous>  vitamin E 400 International Unit(s) Oral daily  zinc sulfate 220 milliGRAM(s) Oral daily    MEDICATIONS  (PRN):  dextrose 40% Gel 15 Gram(s) Oral once PRN Blood Glucose LESS THAN 70 milliGRAM(s)/deciliter  glucagon  Injectable 1 milliGRAM(s) IntraMuscular once PRN Glucose LESS THAN 70 milligrams/deciliter      ALLERGIES:  Allergies    Allergy Status Unknown    Intolerances        LABS:                        7.1    15.17 )-----------( 68       ( 19 Nov 2019 05:06 )             23.1     11-19    131<L>  |  98  |  20  ----------------------------<  86  4.6   |  20<L>  |  1.17    Ca    7.8<L>      19 Nov 2019 05:07  Phos  4.6     11-19  Mg     2.1     11-19    TPro  7.4  /  Alb  3.4  /  TBili  9.1<H>  /  DBili  x   /  AST  53<H>  /  ALT  71<H>  /  AlkPhos  105  11-18    PT/INR - ( 18 Nov 2019 05:33 )   PT: 14.8 sec;   INR: 1.30          PTT - ( 18 Nov 2019 05:33 )  PTT:40.2 sec    CAPILLARY BLOOD GLUCOSE      POCT Blood Glucose.: 83 mg/dL (19 Nov 2019 05:51)      RADIOLOGY & ADDITIONAL TESTS: Reviewed.

## 2019-11-19 NOTE — PROGRESS NOTE ADULT - ASSESSMENT
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 Also complicated by acute hypoxic respiratory failure requiring intubation and mechanical ventilation, and acute renal failure requiring emergent dialysis. Heme/onc consulted for management of DIC.     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   peripheral smear (11/13): spherocytes seen, no schistocytes   11/13/19: d-dimer elevated to 2409, PT 17.2,  PTT 40.1, Plt 66, fibrinogen 241  11/14/19: PT 17.0,  PTT 40.8, Plt 54, fibrinogen 318  11/18/19: PT 14.8, PTT 40.2, Plt 82, fibrinogen 448   11/19/19: Plt 68; other labs pending   Factor VIII level elevated to 322%, normally lower in DIC but drawn after patient given FFP, and elevated due to shock liver, Factor VII low despite FFP, likely related to acute liver injury (shortest half -life 3-6hrs)  PT/PTT mixing studies with initial correction  No signs of acute bleeding; patient currently extubated    -Treatment of underlying infection (endocarditis) and c/w CVVHD per renal   -supportive care: transfuse plts for >10K or or >50K with active bleeding   -transfuse cryoprecipitate if fibrinogen level <100   -Daily coags, fibrinogen  -vascular f/u     #Anemia  Hb ranging 6.9 - 10 on this admission. Normocytic.   11/14/19: , Haptoglobin < 10; Tibili 11.3, direct bili 9.4  11/18: , Haptoglobin < 10, Tibil 9.1  retic count elevated w/o gross bleeding, c/w appropriate BM response  f/u urine hemosiderin regarding intravascular hemolysis, however without rise in indirect bili and downtrending LDH, in addition can see low haptoglobin  in shock liver, spleen normal appearing on CT imaging   Iron studies suggesting anemia of chronic disease  Direct Juvencio test negative   -Trend CBC; keep active T&S; transfuse if actively bleeding or if Hb < 7 g/dL   -Daily hemolysis labs   -f/u GI regarding irregular masslike mural thickening in rectum 40 y/o male with history of IVDU admitted to MICU on 11/10/19 with 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 Also complicated by acute hypoxic respiratory failure requiring intubation and mechanical ventilation, and acute renal failure requiring emergent dialysis. Heme/onc consulted for management of DIC.     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   peripheral smear (11/13): spherocytes seen, no schistocytes   11/13/19: PT 17.2, PTT 40.1, Plt 66, fibrinogen 241, d-dimer elevated to 2409  11/14/19: PT 17.0, PTT 40.8, Plt 54, fibrinogen 318  11/18/19: PT 14.8, PTT 40.2, Plt 82, fibrinogen 448   11/19/19: PT 16.2, PTT 38.8, Plt 68, fibrinogen 461; patient with brief cardiac arrest episode overnight   Factor VIII level elevated to 322%, normally lower in DIC but drawn after patient given FFP, and elevated due to shock liver, Factor VII low despite FFP, likely related to acute liver injury (shortest half -life 3-6hrs)  PT/PTT mixing studies with initial correction  No signs of acute bleeding; patient currently extubated    -Treatment of underlying infection (endocarditis) and c/w CVVHD per renal   -supportive care: transfuse plts for >10K or or >50K with active bleeding   -transfuse cryoprecipitate if fibrinogen level <100   -Daily coags, fibrinogen  -vascular f/u     #Anemia  Hb ranging 6.9 - 10 on this admission. Normocytic.   11/14/19: , Haptoglobin < 10; Tibili 11.3, direct bili 9.4  11/18: , Haptoglobin < 10, Tibil 9.1  11/19: Hb retic 10.9%, , Tbili  7.5 (direct 6.0, indirect 1.5)  retic count elevated w/o gross bleeding, c/w appropriate BM response  f/u urine hemosiderin regarding intravascular hemolysis, however without rise in indirect bili and downtrending LDH, in addition can see low haptoglobin  in shock liver, spleen normal appearing on CT imaging   Iron studies suggesting anemia of chronic disease  Direct Juvencio test negative   -Trend CBC; keep active T&S; transfuse if actively bleeding or if Hb < 7 g/dL   -f/u GI regarding irregular masslike mural thickening in rectum 40 y/o male with history of IVDU admitted to MICU on 11/10/19 with 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 Also complicated by acute hypoxic respiratory failure requiring intubation and mechanical ventilation, and acute renal failure requiring emergent dialysis. Heme/onc consulted for management of DIC.     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   peripheral smear (11/13): spherocytes seen, no schistocytes   11/13/19: PT 17.2, PTT 40.1, Plt 66, fibrinogen 241, d-dimer elevated to 2409  11/14/19: PT 17.0, PTT 40.8, Plt 54, fibrinogen 318  11/18/19: PT 14.8, PTT 40.2, Plt 82, fibrinogen 448   11/19/19: PT 16.2, PTT 38.8, Plt 68, fibrinogen 461; patient with brief cardiac arrest episode overnight   Factor VIII level elevated to 322%, normally lower in DIC but drawn after patient given FFP, and elevated due to shock liver, Factor VII low despite FFP, likely related to acute liver injury (shortest half -life 3-6hrs)  PT/PTT mixing studies with initial correction  No signs of acute bleeding; patient currently extubated, however with further decompensation requiring re-intubation thought to be secondary to alveolar hemorrhage related to multiple septic emboli     -Treatment of underlying infection (endocarditis) and c/w CVVHD per renal   -supportive care: transfuse plts for >10K or or >50K with active bleeding   -transfuse cryoprecipitate if fibrinogen level <100   -Daily coags, fibrinogen  -vascular f/u     #Anemia  Hb ranging 6.9 - 10 on this admission. Normocytic.   11/14/19: , Haptoglobin < 10; Tibili 11.3, direct bili 9.4  11/18: , Haptoglobin < 10, Tibil 9.1  11/19: Hb retic 10.9%, , Tbili  7.5 (direct 6.0, indirect 1.5), hapto 15   retic count elevated w/o gross bleeding, c/w appropriate BM response  f/u urine hemosiderin regarding intravascular hemolysis, however without rise in indirect bili and downtrending LDH, in addition can see low haptoglobin  in shock liver, spleen normal appearing on CT imaging   Iron studies suggesting anemia of chronic disease  Direct Juvencio test negative   -B12/folate supplemented, anemia of chronic disease   -Trend CBC; keep active T&S; transfuse if actively bleeding or if Hb < 7 g/dL   -f/u GI regarding irregular masslike mural thickening in rectum

## 2019-11-19 NOTE — PROGRESS NOTE ADULT - SUBJECTIVE AND OBJECTIVE BOX
24 hr events    Subjective: Patient seen and examined bedside. Intubated and sedated.      Vital Signs Last 24 Hrs  T(C): 38.3 (19 Nov 2019 10:32), Max: 38.3 (19 Nov 2019 10:32)  T(F): 101 (19 Nov 2019 10:32), Max: 101 (19 Nov 2019 10:32)  HR: 108 (19 Nov 2019 12:00) (36 - 132)  BP: 88/55 (19 Nov 2019 12:00) (80/56 - 110/78)  BP(mean): 63 (19 Nov 2019 12:00) (63 - 94)  RR: 28 (19 Nov 2019 12:00) (6 - 42)  SpO2: 98% (19 Nov 2019 12:00) (59% - 100%)    I&O's Summary    18 Nov 2019 07:01  -  19 Nov 2019 07:00  --------------------------------------------------------  IN: 3429.1 mL / OUT: 1901 mL / NET: 1528.1 mL    19 Nov 2019 07:01  -  19 Nov 2019 12:31  --------------------------------------------------------  IN: 750 mL / OUT: 0 mL / NET: 750 mL        Physical Exam:  General: alert and awake  Pulmonary: no respiratory distress  Extremities: hands and feet cool to touch,   RUE: hand cool to touch, ischemic 2nd digit, 3rd fingertip with necrotic ulceration with surrounding erythema  LUE: hand cool to touch but no ischemic fingertips  RLE: ischemic/necrotic digits 1-5  LLE: ischemic/necrotic digit 4    Pulses:  RUE: 2+ radial, tri Ulnar, no arch, no digital signals  LUE: 2+ radial, tri Ulnar, tri arch, no digital signals  RLE: 2+ DP, Tri PT  LLE: 2+ DP, Tri PT    Lines/drains/tubes:    LABS:                        7.0    15.73 )-----------( 101      ( 19 Nov 2019 10:53 )             22.1     11-19    133<L>  |  101  |  25<H>  ----------------------------<  91  5.1   |  18<L>  |  1.54<H>    Ca    7.8<L>      19 Nov 2019 10:53  Phos  4.3     11-19  Mg     2.1     11-19    TPro  x   /  Alb  x   /  TBili  7.5<H>  /  DBili  6.0<H>  /  AST  48<H>  /  ALT  53<H>  /  AlkPhos  85  11-19    PT/INR - ( 19 Nov 2019 10:53 )   PT: 16.2 sec;   INR: 1.42          PTT - ( 19 Nov 2019 10:53 )  PTT:38.8 sec    LIVER FUNCTIONS - ( 19 Nov 2019 10:53 )  Alb: x     / Pro: x     / ALK PHOS: 85 U/L / ALT: 53 U/L / AST: 48 U/L / GGT: x           CAPILLARY BLOOD GLUCOSE      POCT Blood Glucose.: 108 mg/dL (19 Nov 2019 12:16)  POCT Blood Glucose.: 83 mg/dL (19 Nov 2019 05:51)  POCT Blood Glucose.: 82 mg/dL (19 Nov 2019 03:12)  POCT Blood Glucose.: 70 mg/dL (18 Nov 2019 23:32)  POCT Blood Glucose.: 67 mg/dL (18 Nov 2019 23:30)  POCT Blood Glucose.: 81 mg/dL (18 Nov 2019 17:08)      RADIOLOGY & ADDITIONAL TESTS:

## 2019-11-19 NOTE — PROGRESS NOTE ADULT - SUBJECTIVE AND OBJECTIVE BOX
Patient discussed on morning rounds with Dr. Flo pagan     SUBJECTIVE ASSESSMENT:  39y Male seen and examined at the bedside. He is unresponsive. Overnight events reviewed.         Vital Signs Last 24 Hrs  T(C): 38.3 (19 Nov 2019 10:32), Max: 38.3 (19 Nov 2019 10:32)  T(F): 101 (19 Nov 2019 10:32), Max: 101 (19 Nov 2019 10:32)  HR: 104 (19 Nov 2019 13:00) (36 - 132)  BP: 88/55 (19 Nov 2019 12:00) (80/56 - 110/78)  BP(mean): 63 (19 Nov 2019 12:00) (63 - 94)  RR: 28 (19 Nov 2019 13:00) (6 - 42)  SpO2: 100% (19 Nov 2019 13:00) (59% - 100%)  I&O's Detail    18 Nov 2019 07:01  -  19 Nov 2019 07:00  --------------------------------------------------------  IN:    Albumin 25%: 150 mL    fentaNYL Infusion.: 29.5 mL    fentaNYL Infusion.: 81.4 mL    norepinephrine Infusion: 155 mL    Oral Fluid: 730 mL    propofol Infusion: 13.2 mL    sodium chloride 0.9%: 250 mL    sodium chloride 0.9%: 440 mL    Sodium Chloride 0.9% IV Bolus: 1000 mL    Solution: 580 mL  Total IN: 3429.1 mL    OUT:    Chest Tube: 70 mL    Chest Tube: 350 mL    Indwelling Catheter - Urethral: 5 mL    Nasoenteral Tube: 300 mL    Other: 1026 mL    Rectal Tube: 150 mL  Total OUT: 1901 mL    Total NET: 1528.1 mL      19 Nov 2019 07:01  -  19 Nov 2019 13:13  --------------------------------------------------------  IN:    Albumin 25%: 50 mL    fentaNYL Infusion.: 14.6 mL    norepinephrine Infusion: 205 mL    propofol Infusion: 60.6 mL    sodium chloride 0.9%: 210 mL    Solution: 205 mL    vasopressin Infusion: 4.8 mL  Total IN: 750 mL    OUT:  Total OUT: 0 mL    Total NET: 750 mL          CHEST TUBE:  Yes. AIR LEAKS: Yes. Suction   FREDIS DRAIN:  Yes/.  EPICARDIAL WIRES: Yes  TIE DOWNS: No.  MAN: Yes.    PHYSICAL EXAM:    General: Lying in bed, intubated, sedated, no acute distress    Neurological: unable to assess, sedated    Cardiovascular: RRR, normal s1 s2, no M/R/G    Respiratory: Intubated, bialteral pigtails chest expansion symmetric, rhonchi bilaterally     Gastrointestinal: soft, non-distended    Extremities: no LE edema     Vascular: necrotic fingers and toes    LABS:                        7.0    15.73 )-----------( 101      ( 19 Nov 2019 10:53 )             22.1       COUMADIN:  no    PT/INR - ( 19 Nov 2019 10:53 )   PT: 16.2 sec;   INR: 1.42          PTT - ( 19 Nov 2019 10:53 )  PTT:38.8 sec    11-19    133<L>  |  101  |  25<H>  ----------------------------<  91  5.1   |  18<L>  |  1.54<H>    Ca    7.8<L>      19 Nov 2019 10:53  Phos  4.3     11-19  Mg     2.1     11-19    TPro  x   /  Alb  x   /  TBili  7.5<H>  /  DBili  6.0<H>  /  AST  48<H>  /  ALT  53<H>  /  AlkPhos  85  11-19          MEDICATIONS  (STANDING):  ascorbic acid 500 milliGRAM(s) Oral daily  chlorhexidine 0.12% Liquid 15 milliLiter(s) Oral Mucosa every 12 hours  chlorhexidine 2% Cloths 1 Application(s) Topical daily  CRRT Treatment    <Continuous>  dextrose 5%. 1000 milliLiter(s) (50 mL/Hr) IV Continuous <Continuous>  dextrose 50% Injectable 12.5 Gram(s) IV Push once  dextrose 50% Injectable 25 Gram(s) IV Push once  dextrose 50% Injectable 25 Gram(s) IV Push once  fentaNYL   Infusion. 0.5 MICROgram(s)/kG/Hr (3.68 mL/Hr) IV Continuous <Continuous>  heparin  Injectable 5000 Unit(s) SubCutaneous every 8 hours  hydrocortisone sodium succinate Injectable 50 milliGRAM(s) IV Push every 6 hours  insulin lispro (HumaLOG) corrective regimen sliding scale   SubCutaneous every 6 hours  meropenem  IVPB 1000 milliGRAM(s) IV Intermittent every 8 hours  multivitamin/minerals/iron Oral Solution (CENTRUM) 15 milliLiter(s) Oral daily  nafcillin  IVPB 2 Gram(s) IV Intermittent every 4 hours  norepinephrine Infusion 0.05 MICROgram(s)/kG/Min (3.45 mL/Hr) IV Continuous <Continuous>  pantoprazole  Injectable 40 milliGRAM(s) IV Push daily  polyethylene glycol 3350 17 Gram(s) Oral two times a day  propofol Infusion 5 MICROgram(s)/kG/Min (2.208 mL/Hr) IV Continuous <Continuous>  PureFlow Dialysate RFP-400 (K 2 / Ca 3) 5000 milliLiter(s) (2500 mL/Hr) CRRT <Continuous>  senna 2 Tablet(s) Oral at bedtime  sodium chloride 0.9% lock flush 3 milliLiter(s) IV Push every 8 hours  vancomycin  IVPB 1000 milliGRAM(s) IV Intermittent once  vasopressin Infusion 0.04 Unit(s)/Min (2.4 mL/Hr) IV Continuous <Continuous>  vitamin E 400 International Unit(s) Oral daily  zinc sulfate 220 milliGRAM(s) Oral daily    MEDICATIONS  (PRN):  dextrose 40% Gel 15 Gram(s) Oral once PRN Blood Glucose LESS THAN 70 milliGRAM(s)/deciliter  glucagon  Injectable 1 milliGRAM(s) IntraMuscular once PRN Glucose LESS THAN 70 milligrams/deciliter        RADIOLOGY & ADDITIONAL TESTS:    < from: CT Chest w/ IV Cont (11.18.19 @ 18:42) >  CT CHEST  1.  New bilateral moderate hydropneumothorax. Bilateral chest tubes in   place.  2.  Direct comparison with prior CT (11/12/2019) is somewhat limited due   to suboptimal expansion of lungs secondary to pneumothorax; Multiple   cavitary lesions in both lungs, demonstrating interval increase in size   and extent of associated peripheral consolidation. The differential   diagnosis includes septic embolism, given patient's history. However   follow-up with chest CT to ensure resolution is recommended to exclude   underlying malignancy.  3.  Increased extent of lobar pneumonia in both lower lobes.    CT ABDOMEN AND PELVIS  1.  Colonic ileus, progressed compared to prior CT. No transition point.   Rectal tube in appropriate position. Previously described possible focal   abnormality in the left posterior aspect of the rectum is not well seen   on current study, and may be artifactual.  2.  Heterogeneous enhancement of the spleen may be due to bolus timing.  3.  Moderate abdominopelvic ascites.  4.  Anasarca.    < end of copied text >

## 2019-11-19 NOTE — PROGRESS NOTE ADULT - SUBJECTIVE AND OBJECTIVE BOX
overnight events: bradycardia with subsequent cardiac arrest s/p ROSC, atropine  hypotensive requiring pressors, on Levophed 3.5   febrile, tachycardic  reintubated for airway protection  cvvhd was held, net positive 1.5 L/24 hr  raya in place, remains anuric  rectal tube placed for GI decompression  labs and images reviewed        Meds:  acetaminophen  IVPB .. 1000 milliGRAM(s) IV Intermittent once  albumin human 25% IVPB 50 milliLiter(s) IV Intermittent every 8 hours  alteplase  Injectable for Pleural Effusion 10 milliGRAM(s) IntraPleural. every 12 hours  ascorbic acid 500 milliGRAM(s) Oral daily  chlorhexidine 2% Cloths 1 Application(s) Topical daily  CRRT Treatment    <Continuous>  dextrose 40% Gel 15 Gram(s) Oral once PRN  dextrose 5%. 1000 milliLiter(s) IV Continuous <Continuous>  dextrose 50% Injectable 12.5 Gram(s) IV Push once  dextrose 50% Injectable 25 Gram(s) IV Push once  dextrose 50% Injectable 25 Gram(s) IV Push once  dornase maikol Solution for Pleural Effusion 5 milliGRAM(s) IntraPleural. every 12 hours  fentaNYL   Infusion. 0.5 MICROgram(s)/kG/Hr IV Continuous <Continuous>  glucagon  Injectable 1 milliGRAM(s) IntraMuscular once PRN  heparin  Injectable 5000 Unit(s) SubCutaneous every 8 hours  hydrocortisone sodium succinate Injectable 50 milliGRAM(s) IV Push every 6 hours  insulin lispro (HumaLOG) corrective regimen sliding scale   SubCutaneous every 6 hours  multivitamin/minerals/iron Oral Solution (CENTRUM) 15 milliLiter(s) Oral daily  nafcillin  IVPB 2 Gram(s) IV Intermittent every 4 hours  norepinephrine Infusion 0.05 MICROgram(s)/kG/Min IV Continuous <Continuous>  pantoprazole  Injectable 40 milliGRAM(s) IV Push daily  Phoxillum Filtration BK 4 / 2.5 5000 milliLiter(s) CRRT <Continuous>  polyethylene glycol 3350 17 Gram(s) Oral two times a day  propofol Infusion 5 MICROgram(s)/kG/Min IV Continuous <Continuous>  senna 2 Tablet(s) Oral at bedtime  sodium chloride 0.9% lock flush 3 milliLiter(s) IV Push every 8 hours  sodium chloride 0.9%. 1000 milliLiter(s) IV Continuous <Continuous>  vasopressin Infusion 0.04 Unit(s)/Min IV Continuous <Continuous>  vitamin E 400 International Unit(s) Oral daily  zinc sulfate 220 milliGRAM(s) Oral daily      T(C): 38.3 (11-19-19 @ 10:32), Max: 38.3 (11-19-19 @ 10:32)  HR: 120 (11-19-19 @ 09:00) (36 - 132)  BP: 100/55 (11-19-19 @ 09:00) (80/56 - 110/78)  RR: 28 (11-19-19 @ 09:00) (6 - 42)  SpO2: 100% (11-19-19 @ 09:00) (59% - 100%)    Input/Output      11-18-19 @ 07:01  -  11-19-19 @ 07:00  --------------------------------------------------------  IN: 3429.1 mL / OUT: 1901 mL / NET: 1528.1 mL    11-19-19 @ 07:01  -  11-19-19 @ 10:37  --------------------------------------------------------  IN: 308.4 mL / OUT: 0 mL / NET: 308.4 mL        ROS:  not able to obtain, intubated and sedated        PHYSICAL EXAM  General: intubated and sedated, NAD  Neck: no JVD  Respiratory: equal breath sounds bilaterally, bilateral chest tubes  HEART: normal S1S2, tachycardic  ABDOMEN: Soft, distended; rectal tube in place  : Raya in place, anuric  EXTREMITIES: upper tight and sacral edema present/ necrotic toes present   NEUROLOGY: sedated  ACCESS: R IJ THC, site dry and clean        LABS:                                     7.1    15.17 )-----------( 68       ( 19 Nov 2019 05:06 )             23.1       11-19    131<L>  |  98  |  20  ----------------------------<  86  4.6   |  20<L>  |  1.17    Ca    7.8<L>      19 Nov 2019 05:07  Phos  4.6     11-19  Mg     2.1     11-19    TPro  7.4  /  Alb  3.4  /  TBili  9.1<H>  /  DBili  x   /  AST  53<H>  /  ALT  71<H>  /  AlkPhos  105  11-18      PT/INR - ( 18 Nov 2019 05:33 )   PT: 14.8 sec;   INR: 1.30          PTT - ( 18 Nov 2019 05:33 )  PTT:40.2 sec        CARDIAC MARKERS ( 19 Nov 2019 05:07 )  x     / 0.09 ng/mL / x     / x     / x      CARDIAC MARKERS ( 19 Nov 2019 03:33 )  x     / 0.07 ng/mL / x     / x     / x                RADIOLOGY & ADDITIONAL STUDIES:    < from: Xray Chest 1 View- PORTABLE-Urgent (11.19.19 @ 03:28) >  EXAM:  XR CHEST PORTABLE URGENT 1V                          PROCEDURE DATE:  11/19/2019          INTERPRETATION:  Portable AP Radiograph dated 11/19/2019 3:28 AM    CLINICAL INFORMATION: 39 years, Male, Line Placement    COMPARISON: Chest x-ray from 11/18/2019.    FINDINGS:   Status post placement of an endotracheal tube with its tip 3.3 cm above   the karissa. No change enteric tube catheter, bilateral chest tubes and   bilateral internal jugular central lines.     There has been interval improvement in bilateral small pneumothoraces.   There are again bilateral right greater than left lower lung zone   opacities. Heart size within normal limits.    IMPRESSION:  Endotracheal tube in good position.    < end of copied text >        < from: US Abdomen Limited (11.18.19 @ 22:45) >    EXAM:  US ABDOMEN LIMITED                          PROCEDURE DATE:  11/18/2019          INTERPRETATION:  RIGHT UPPER QUADRANT ULTRASOUND dated 11/18/2019 10:45 PM    INDICATION: Sepsis. Elevated liver function enzymes and bilirubin.    PRIOR STUDIES: Abdominal ultrasound from 11/11/2019.    FINDINGS:     Liver: Normal echogenicity with no visible focal abnormality. Liver   measures 16.9 cm in craniocaudal dimensions.    Main portal vein: Hepatopetal flow.    Intrahepatic ducts: Not dilated.    Common bile duct: Normal diameter, measuring 0.4 cm.    Gallbladder: Gallbladder sludge. No visible gallstones.  No wall   thickening or pericholecystic fluid.    Pancreas: Visualized pancreas is unremarkable.    Abdominal aorta: No abdominal aortic aneurysm is seen.    Inferior vena cava: The visualized portions were normal in appearance.    Right kidney: Length of 12 cm. Increased echogenicity. No focal lesions.     Small right pleural effusion.    IMPRESSION:  1.  Gallbladder sludge. Normal caliber common bile duct.  2.  Increased echogenicity right kidney compatible with medical renal   disease.      < end of copied text >

## 2019-11-19 NOTE — PROGRESS NOTE ADULT - SUBJECTIVE AND OBJECTIVE BOX
INTERVAL HPI/OVERNIGHT EVENTS: 8 pm BMP, Mg, Phos okay, no need for repletion. Fentanyl gtt off by 11 pm and chest tube unclamped as well. SBPs in 80s aftermidnight. filtrate brought to 0. 250mg NS bolus & restarted pressors. Around 2:30 am, patient noted to ewelina down along with SBPs, at which point RN gave Atropine, code blue called, RN began chest compressions, achieved ROSC shortly after compressions. Moving all extremities. Intubated, post-intubation ABG with respiratory acidosis (noted to have bradypnea at time of ABG), patient noted to be alert, breathing over the vent. ABG repeated with no intervention to vent settings after 1st ABG. Repeat ABG okay, no changes needed. Labs drawn post arrest notable for Hgb 7.0 (downtrended from 8.5), persistent WBC, tropinemia with no ischemic changes on EKG. Unclear etiology for arrest    REVIEW OF SYSTEMS: unobtainable; patient intubated and sedated    VITAL SIGNS:   Vital Signs Last 24 Hrs  T(C): 38.3 (19 Nov 2019 10:32), Max: 38.3 (19 Nov 2019 10:32)  T(F): 101 (19 Nov 2019 10:32), Max: 101 (19 Nov 2019 10:32)  HR: 120 (19 Nov 2019 09:00) (36 - 132)  BP: 100/55 (19 Nov 2019 09:00) (80/56 - 110/78)  BP(mean): 75 (19 Nov 2019 09:00) (65 - 94)  RR: 28 (19 Nov 2019 09:00) (6 - 42)  SpO2: 100% (19 Nov 2019 09:00) (59% - 100%)    PHYSICAL EXAM:  GENERAL:   HEENT:  atraumatic, normocephalic, +bilateral scleral icterus   NECK: supple; RIJ central line and axillary a-line in plce   CHEST: chest tube in place  LUNG:  diffuse bilateral rhonchi   HEART: fast rate, regular rhythm; S1 and S2 audible; +systolic murmur   ABDOMEN: soft, moderate diffuse TTP, mildly distended; bowel sounds present in all four quadrants  : [place] in place   EXTREMITIES:  2+ peripheral pulses bilaterally; 1+ BLE pitting edema; cyanotic fingertips and toe tips   NEUROLOGY: patient intubated and sedated; moves all extremities spontaneously   Skin: generalized jaundice; needle marks in antecubital area     LABS:                        7.1    15.17 )-----------( 68       ( 19 Nov 2019 05:06 )             23.1     11-19    131<L>  |  98  |  20  ----------------------------<  86  4.6   |  20<L>  |  1.17    Ca    7.8<L>      19 Nov 2019 05:07  Phos  4.6     11-19  Mg     2.1     11-19    TPro  7.4  /  Alb  3.4  /  TBili  9.1<H>  /  DBili  x   /  AST  53<H>  /  ALT  71<H>  /  AlkPhos  105  11-18    LIVER FUNCTIONS - ( 18 Nov 2019 05:32 )  Alb: 3.4 g/dL / Pro: 7.4 g/dL / ALK PHOS: 105 U/L / ALT: 71 U/L / AST: 53 U/L / GGT: x           PT/INR - ( 18 Nov 2019 05:33 )   PT: 14.8 sec;   INR: 1.30          PTT - ( 18 Nov 2019 05:33 )  PTT:40.2 sec      RADIOLOGY & ADDITIONAL TESTS: reviewed ***NOTE INCOMPLETE***    INTERVAL HPI/OVERNIGHT EVENTS: 8 pm BMP, Mg, Phos okay, no need for repletion. Fentanyl gtt off by 11 pm and chest tube unclamped as well. SBPs in 80s aftermidnight. filtrate brought to 0. 250mg NS bolus & restarted pressors. Around 2:30 am, patient noted to ewelina down along with SBPs, at which point RN gave Atropine, code blue called, RN began chest compressions, achieved ROSC shortly after compressions. Moving all extremities. Intubated, post-intubation ABG with respiratory acidosis (noted to have bradypnea at time of ABG), patient noted to be alert, breathing over the vent. ABG repeated with no intervention to vent settings after 1st ABG. Repeat ABG okay, no changes needed. Labs drawn post arrest notable for Hgb 7.0 (downtrended from 8.5), persistent WBC, tropinemia with no ischemic changes on EKG. Unclear etiology for arrest    REVIEW OF SYSTEMS: unobtainable; patient intubated and sedated    VITAL SIGNS:   Vital Signs Last 24 Hrs  T(C): 38.3 (19 Nov 2019 10:32), Max: 38.3 (19 Nov 2019 10:32)  T(F): 101 (19 Nov 2019 10:32), Max: 101 (19 Nov 2019 10:32)  HR: 120 (19 Nov 2019 09:00) (36 - 132)  BP: 100/55 (19 Nov 2019 09:00) (80/56 - 110/78)  BP(mean): 75 (19 Nov 2019 09:00) (65 - 94)  RR: 28 (19 Nov 2019 09:00) (6 - 42)  SpO2: 100% (19 Nov 2019 09:00) (59% - 100%)    PHYSICAL EXAM:  GENERAL:   HEENT:  atraumatic, normocephalic, +bilateral scleral icterus   NECK: supple; RIJ central line and axillary a-line in plce   CHEST: chest tube in place  LUNG:  diffuse bilateral rhonchi   HEART: fast rate, regular rhythm; S1 and S2 audible; +systolic murmur   ABDOMEN: soft, moderate diffuse TTP, mildly distended; bowel sounds present in all four quadrants  : [place] in place   EXTREMITIES:  2+ peripheral pulses bilaterally; 1+ BLE pitting edema; cyanotic fingertips and toe tips   NEUROLOGY: patient intubated and sedated; moves all extremities spontaneously   Skin: generalized jaundice; needle marks in antecubital area     LABS:                        7.1    15.17 )-----------( 68       ( 19 Nov 2019 05:06 )             23.1     11-19    131<L>  |  98  |  20  ----------------------------<  86  4.6   |  20<L>  |  1.17    Ca    7.8<L>      19 Nov 2019 05:07  Phos  4.6     11-19  Mg     2.1     11-19    TPro  7.4  /  Alb  3.4  /  TBili  9.1<H>  /  DBili  x   /  AST  53<H>  /  ALT  71<H>  /  AlkPhos  105  11-18    LIVER FUNCTIONS - ( 18 Nov 2019 05:32 )  Alb: 3.4 g/dL / Pro: 7.4 g/dL / ALK PHOS: 105 U/L / ALT: 71 U/L / AST: 53 U/L / GGT: x           PT/INR - ( 18 Nov 2019 05:33 )   PT: 14.8 sec;   INR: 1.30          PTT - ( 18 Nov 2019 05:33 )  PTT:40.2 sec      RADIOLOGY & ADDITIONAL TESTS: reviewed ***NOTE INCOMPLETE***    INTERVAL HPI/OVERNIGHT EVENTS: Patient restarted on pressors. Around 2:30 AM, noted to be bradycardic and hypotensive. Atropine given and Code Blue called. ROSC achieved shortly. Patient intubated again. Etiology of arrest unclear per primary team.     Patient seen and examined at bedside. Continues to be intubated and sedated, not responsive to commands. Also continues to be on pressors.     REVIEW OF SYSTEMS: unobtainable; patient intubated and sedated    VITAL SIGNS:   Vital Signs Last 24 Hrs  T(C): 38.3 (19 Nov 2019 10:32), Max: 38.3 (19 Nov 2019 10:32)  T(F): 101 (19 Nov 2019 10:32), Max: 101 (19 Nov 2019 10:32)  HR: 120 (19 Nov 2019 09:00) (36 - 132)  BP: 100/55 (19 Nov 2019 09:00) (80/56 - 110/78)  BP(mean): 75 (19 Nov 2019 09:00) (65 - 94)  RR: 28 (19 Nov 2019 09:00) (6 - 42)  SpO2: 100% (19 Nov 2019 09:00) (59% - 100%)    PHYSICAL EXAM:  GENERAL:   HEENT:  atraumatic, normocephalic, +bilateral scleral icterus; NG and ET tube in place   NECK: supple; RIJ central line and axillary a-line in plce  CHEST: multiple chest tube in places  LUNG:  diffuse bilateral rhonchi and crackles   HEART: fast rate, regular rhythm; S1 and S2 audible; +systolic murmur   ABDOMEN: soft, moderate diffuse TTP, mildly distended; bowel sounds present in all four quadrants  : [place] in place   EXTREMITIES:  faint peripheral pulses bilaterally; 1+ BLE pitting edema; cyanotic fingertips and toe tips   NEUROLOGY: patient intubated and sedated, unresponsive to commands   Skin: generalized jaundice; needle marks in antecubital area     LABS:                        7.1    15.17 )-----------( 68       ( 19 Nov 2019 05:06 )             23.1     11-19    131<L>  |  98  |  20  ----------------------------<  86  4.6   |  20<L>  |  1.17    Ca    7.8<L>      19 Nov 2019 05:07  Phos  4.6     11-19  Mg     2.1     11-19    TPro  7.4  /  Alb  3.4  /  TBili  9.1<H>  /  DBili  x   /  AST  53<H>  /  ALT  71<H>  /  AlkPhos  105  11-18    LIVER FUNCTIONS - ( 18 Nov 2019 05:32 )  Alb: 3.4 g/dL / Pro: 7.4 g/dL / ALK PHOS: 105 U/L / ALT: 71 U/L / AST: 53 U/L / GGT: x           PT/INR - ( 18 Nov 2019 05:33 )   PT: 14.8 sec;   INR: 1.30          PTT - ( 18 Nov 2019 05:33 )  PTT:40.2 sec      RADIOLOGY & ADDITIONAL TESTS:     EXAM: XR CHEST PORTABLE URGENT 1V     PROCEDURE DATE: 11/19/2019         INTERPRETATION: Clinical history/reason for exam: Pneumothorax.     Single frontal view.     Comparison: November 19, 2019     Findings/   impression:New additional right chest tube with decreased right   pneumothorax. Stable positioning of remaining support devices. Left tiny   pneumothorax, decreased.. Stable lung pathology. Heart size within normal   limits. INTERVAL HPI/OVERNIGHT EVENTS: Patient restarted on pressors. Around 2:30 AM, noted to be bradycardic and hypotensive. Atropine given and Code Blue called. ROSC achieved shortly. Patient intubated again. Etiology of arrest unclear per primary team.     Patient seen and examined at bedside. Continues to be intubated and sedated, not responsive to commands. Also continues to be on pressors.     REVIEW OF SYSTEMS: unobtainable; patient intubated and sedated    VITAL SIGNS:   Vital Signs Last 24 Hrs  T(C): 38.3 (19 Nov 2019 10:32), Max: 38.3 (19 Nov 2019 10:32)  T(F): 101 (19 Nov 2019 10:32), Max: 101 (19 Nov 2019 10:32)  HR: 120 (19 Nov 2019 09:00) (36 - 132)  BP: 100/55 (19 Nov 2019 09:00) (80/56 - 110/78)  BP(mean): 75 (19 Nov 2019 09:00) (65 - 94)  RR: 28 (19 Nov 2019 09:00) (6 - 42)  SpO2: 100% (19 Nov 2019 09:00) (59% - 100%)    PHYSICAL EXAM:  GENERAL:   HEENT:  atraumatic, normocephalic, +bilateral scleral icterus; NG and ET tube in place   NECK: supple; RIJ central line and axillary a-line in plce  CHEST: multiple chest tube in places  LUNG:  diffuse bilateral rhonchi and crackles   HEART: fast rate, regular rhythm; S1 and S2 audible; +systolic murmur   ABDOMEN: soft, moderate diffuse TTP, mildly distended; bowel sounds present in all four quadrants  : [place] in place   EXTREMITIES:  faint peripheral pulses bilaterally; 1+ BLE pitting edema; cyanotic fingertips and toe tips   NEUROLOGY: patient intubated and sedated, unresponsive to commands   Skin: generalized jaundice; needle marks in antecubital area     LABS:                        7.1    15.17 )-----------( 68       ( 19 Nov 2019 05:06 )             23.1     11-19    131<L>  |  98  |  20  ----------------------------<  86  4.6   |  20<L>  |  1.17    Ca    7.8<L>      19 Nov 2019 05:07  Phos  4.6     11-19  Mg     2.1     11-19    TPro  7.4  /  Alb  3.4  /  TBili  9.1<H>  /  DBili  x   /  AST  53<H>  /  ALT  71<H>  /  AlkPhos  105  11-18    LIVER FUNCTIONS - ( 18 Nov 2019 05:32 )  Alb: 3.4 g/dL / Pro: 7.4 g/dL / ALK PHOS: 105 U/L / ALT: 71 U/L / AST: 53 U/L / GGT: x           PT/INR - ( 18 Nov 2019 05:33 )   PT: 14.8 sec;   INR: 1.30          PTT - ( 18 Nov 2019 05:33 )  PTT:40.2 sec      RADIOLOGY & ADDITIONAL TESTS:     EXAM: XR CHEST PORTABLE URGENT 1V     PROCEDURE DATE: 11/19/2019         INTERPRETATION: Clinical history/reason for exam: Pneumothorax.     Single frontal view.     Comparison: November 19, 2019     Findings/   impression:New additional right chest tube with decreased right   pneumothorax. Stable positioning of remaining support devices. Left tiny   pneumothorax, decreased.. Stable lung pathology. Heart size within normal   limits.

## 2019-11-19 NOTE — CONSULT NOTE ADULT - CONSULT REASON
right sided endocarditis
Abdominal disention. r/o ischemic bowel
B/L trapped lung, hemothorax
DIC
[place]  oligoanuric
Imaging finding of rectal wall thickening
Ischemic fingers/toes
Septic shock 2/2 MSSA bactermia 2/2 infective endocarditis
Tricuspid valve endocarditis and severe TR
abdominal distention / distended loops of bowel on XR

## 2019-11-19 NOTE — CONSULT NOTE ADULT - SUBJECTIVE AND OBJECTIVE BOX
HPI: Patient currently intubated and sedated. History gathered from primary team and chart    This is a 40 y/o M w/ PMH of IVDU and active smoker who came to [facility] ED c/o productive cough with hemoptysis, progressive SOB, pleuritic chest pain, nausea, non-bloody emesis, abd pain, chillsx3 days. Pt denied, fever or diarrhea. As per outside hospital charting, at that time pt stated he was starting to cut down on his heroin use for approximately 2 weeks. While in ED pt was found to be septic and hypotensive. Cultures were drawn and pt was empirically started on Vanc/Zosyn/ ?azithromycin. Labs pertinent for elevated LDH (613), +trops, WBC 24, BUN/Cr 65/1.91, lactic acid 3.5. UTox positive for benozs/opitiates/marijuana/cocaine. CT chest revealed b/l PNA w/ multiple cavitary and non-cavitary pulmonary nodules, suggestive of possible septic emboli. Echocardiogram as per report revealed vegetation on TV with "low EF". Pt was admitted to SICU, intubated and with worsening sepsis requiring increased pressors/inotropic support. Today, pt was transferred to [facility] under the care of Dr. Ruelas for possible surgical evaluation. At time on admission, pt was intubated and sedated. (10 Nov 2019 18:12)    Echo showed vegetation on TV. Patient being treated for TV MSSA endocarditis. Patient was deemed not a surgical candidate and transferred to MICU for further care. Patient has had complicated course which included B/L chest tube placement for empyema from septic emboli, congestive hepatopathy, DIC, CAMERON requiring CVVHD, hypoxic respiratory failure requiring intubation, and digital necrosis likely 2/2 to pressor use. Patient had been extubated and was sent for CT CAP yesterday to evaluate for further lung pathology and abdominal distention. Later in the night, patient started to become hypoxic and became bradycardic and lost pulse. Patient found to be in PEA arrest and atropine given and CPR initiated, ROSC acheived and patient was reintubated. Per pulmonary/ICU fellow, clots and mucus suctioned. Likely 2/2 to mucus plugging. Patient also found to have B/L pneumothoraces. CTSurgery consulted for anterior CT placement.     On Echo, patient still has persistent vegetations and severe tricuspid regurgitation. Patient is on appropriate abx therapy. CT surgery deemed patient not a surgical candidate due to his instability. Repeat echo shows normal RV function, despite severe TR.       ROS: A 10-point review of systems was otherwise negative.    PAST MEDICAL & SURGICAL HISTORY:  Endocarditis  IVDU (intravenous drug user)  Smoker  No significant past surgical history      SOCIAL HISTORY: +IVDU with heroin. +THC use. Current smoker. Unclear EtOH use.   FAMILY HISTORY:  No pertinent family history in first degree relatives      ALLERGIES: 	  Allergy Status Unknown            MEDICATIONS:  albumin human 25% IVPB 50 milliLiter(s) IV Intermittent every 8 hours  alteplase  Injectable for Pleural Effusion 10 milliGRAM(s) IntraPleural. every 12 hours  ascorbic acid 500 milliGRAM(s) Oral daily  chlorhexidine 2% Cloths 1 Application(s) Topical daily  CRRT Treatment    <Continuous>  dextrose 40% Gel 15 Gram(s) Oral once PRN  dextrose 5%. 1000 milliLiter(s) IV Continuous <Continuous>  dextrose 50% Injectable 12.5 Gram(s) IV Push once  dextrose 50% Injectable 25 Gram(s) IV Push once  dextrose 50% Injectable 25 Gram(s) IV Push once  dornase maikol Solution for Pleural Effusion 5 milliGRAM(s) IntraPleural. every 12 hours  fentaNYL   Patch  50 MICROgram(s)/Hr 1 Patch Transdermal every 72 hours  glucagon  Injectable 1 milliGRAM(s) IntraMuscular once PRN  heparin  Injectable 5000 Unit(s) SubCutaneous every 8 hours  insulin lispro (HumaLOG) corrective regimen sliding scale   SubCutaneous every 6 hours  multivitamin/minerals/iron Oral Solution (CENTRUM) 15 milliLiter(s) Oral daily  nafcillin  IVPB 2 Gram(s) IV Intermittent every 4 hours  norepinephrine Infusion 0.05 MICROgram(s)/kG/Min IV Continuous <Continuous>  pantoprazole  Injectable 40 milliGRAM(s) IV Push daily  Phoxillum Filtration BK 4 / 2.5 5000 milliLiter(s) CRRT <Continuous>  polyethylene glycol 3350 17 Gram(s) Oral two times a day  propofol Infusion 5 MICROgram(s)/kG/Min IV Continuous <Continuous>  senna 2 Tablet(s) Oral at bedtime  sodium chloride 0.9% lock flush 3 milliLiter(s) IV Push every 8 hours  sodium chloride 0.9%. 1000 milliLiter(s) IV Continuous <Continuous>  vitamin E 400 International Unit(s) Oral daily  zinc sulfate 220 milliGRAM(s) Oral daily      PHYSICAL EXAM:  T(C): 38.1 (11-19-19 @ 06:03), Max: 38.1 (11-19-19 @ 06:03)  HR: 120 (11-19-19 @ 09:00) (36 - 132)  BP: 100/55 (11-19-19 @ 09:00) (80/56 - 110/78)  RR: 28 (11-19-19 @ 09:00) (6 - 42)  SpO2: 100% (11-19-19 @ 09:00) (59% - 100%)  Wt(kg): --    GEN: Intubated and sedated.   HEENT: NCAT. Left IJ TLC. Right HD cath.   RESP: Rhonchorus BS B/L with other adventitious BS. B/L Chest tubes.   CV: TAchy. normal s1/s2. Difficult to assess other mrg.   ABD: Soft, Distended and tympanic to percussion.   EXT: Warm. No edema. LEft 1,3,4 toes with necrosis. Right toes with necrosis. DPPT 2+ B/L.   NEURO: Intubated and sedated.     I&O's Summary    18 Nov 2019 07:01  -  19 Nov 2019 07:00  --------------------------------------------------------  IN: 3429.1 mL / OUT: 1901 mL / NET: 1528.1 mL    19 Nov 2019 07:01  -  19 Nov 2019 10:14  --------------------------------------------------------  IN: 308.4 mL / OUT: 0 mL / NET: 308.4 mL        	  LABS:	 	    CARDIAC MARKERS:  CARDIAC MARKERS ( 19 Nov 2019 05:07 )  x     / 0.09 ng/mL / x     / x     / x      CARDIAC MARKERS ( 19 Nov 2019 03:33 )  x     / 0.07 ng/mL / x     / x     / x                                          7.1    15.17 )-----------( 68       ( 19 Nov 2019 05:06 )             23.1     11-19    131<L>  |  98  |  20  ----------------------------<  86  4.6   |  20<L>  |  1.17    Ca    7.8<L>      19 Nov 2019 05:07  Phos  4.6     11-19  Mg     2.1     11-19    TPro  7.4  /  Alb  3.4  /  TBili  9.1<H>  /  DBili  x   /  AST  53<H>  /  ALT  71<H>  /  AlkPhos  105  11-18    proBNP:   Lipid Profile:   HgA1c:   TSH:     TELEMETRY: Sinus tachy	    ECG: Pending 	  RADIOLOGY: < from: CT Chest w/ IV Cont (11.18.19 @ 18:42) >  1.  New bilateral moderate hydropneumothorax. Bilateral chest tubes in   place.  2.  Direct comparison with prior CT (11/12/2019) is somewhat limited due   to suboptimal expansion of lungs secondary to pneumothorax; Multiple   cavitary lesions in both lungs, demonstrating interval increase in size   and extent of associated peripheral consolidation. The differential   diagnosis includes septic embolism, given patient's history. However   follow-up with chest CT to ensure resolution is recommended to exclude   underlying malignancy.  3.  Increased extent of lobar pneumonia in both lower lobes.    CT ABDOMEN AND PELVIS  1.  Colonic ileus, progressed compared to prior CT. No transition point.   Rectal tube in appropriate position. Previously described possible focal   abnormality in the left posterior aspect of the rectum is not well seen   on current study, and may be artifactual.  2.  Heterogeneous enhancement of the spleen may be due to bolus timing.  3.  Moderate abdominopelvic ascites.  4.  Anasarca.      ECHO:< from: Echocardiogram (11.18.19 @ 15:43) >  There is severe tricuspid regurgitation. 3.1 x 1.1 cm mobile   echogenicity noted on the posterior leaflet of the tricuspid leaflet. The   mobile mass prolapses in and out of the tricuspid valve resulting in   malcoaptation of the leaflets. There apears no significant difference   when compared to preior echocardiogram.   2. The rightventricle is normal in size and systolic function.   3. There is a trivial sized pericardial effusion without   echocardiographic evidence of cardiac tamponade.   4. There is mild pulmonary hypertension. Pulmonary artery systolic   pressure (estimated using the tricuspid regurgitant gradient and an   estimate of right atrial pressure) is 41.5 mmHg.    < from: ELIAN w/Doppler (11.11.19 @ 15:55) >  1. No thrombus seen in the left atrium or in the left atrial appendage.   Spectral Doppler reveals normal left atrial appendage velocities.   2. 3.8 x 1.1 cm mobile echogenicity noted on the posterior leaflet of   the tricuspid leaflet. The mobile mass prolapses in and out of the   tricuspid valve resulting in malcoaptation of the leaflets. There is   severe tricuspid regurgitation.   3. The aortic valve is tricuspid with normal structure and function   without stenosis. There is no aortic regurgitation.   4. Structurally normal pulmonic valve with normal leaflet excursion.   There is trace pulmonic regurgitation.   5. Structurally normal mitral valve with normal leaflet excursion. There   is trace mitral regurgitation.   6. The right ventricle is normal in size and systolic function.   7. Left ventricular ejection fraction is 40-45 %. Abnormal septal motion   consistent with RV volume overlaod.   8. There is a trivial sized pericardial effusion without   echocardiographic evidence of cardiac tamponade.   9. Left pleural effusion noted.    < end of copied text >  < from: Echocardiogram (11.11.19 @ 09:43) >  1. Patient is tachycardic throughout the study.   2. Mild global hypokinesis of the left ventriclewith an estimated left   ventricular ejection fraction of 45%.   3. Right ventricular systolic function is moderately reduced. The right   ventricle is moderately dilated.   4. The right atrium is dilated.   5. There is trace mitral regurgitation.   6. There is severe tricuspid regurgitation. Large mobile echogenicty   noted on the atrial aspect of the tricuspid valve (measuring at least 1.6   cm x 1.1 cm, however on subcostal view, appears larger). In the clinical   context, likely represents large vegetation.   7. There is mild pulmonary hypertension. Pulmonary artery systolic   pressure (estimated using the tricuspid regurgitant gradient and an   estimate of right atrial pressure) is 40.5 mmHg.   8. Small pericardial effusion noted.   9. Large bilateral pleural effusions.    < end of copied text > HPI: Patient currently intubated and sedated. History gathered from primary team and chart    This is a 38 y/o M w/ PMH of IVDU and active smoker who came to [facility] ED c/o productive cough with hemoptysis, progressive SOB, pleuritic chest pain, nausea, non-bloody emesis, abd pain, chillsx3 days. Pt denied, fever or diarrhea. As per outside hospital charting, at that time pt stated he was starting to cut down on his heroin use for approximately 2 weeks. While in ED pt was found to be septic and hypotensive. Cultures were drawn and pt was empirically started on Vanc/Zosyn/ ?azithromycin. Labs pertinent for elevated LDH (613), +trops, WBC 24, BUN/Cr 65/1.91, lactic acid 3.5. UTox positive for benozs/opitiates/marijuana/cocaine. CT chest revealed b/l PNA w/ multiple cavitary and non-cavitary pulmonary nodules, suggestive of possible septic emboli. Echocardiogram as per report revealed vegetation on TV with "low EF". Pt was admitted to SICU, intubated and with worsening sepsis requiring increased pressors/inotropic support. Today, pt was transferred to [facility] under the care of Dr. Ruelas for possible surgical evaluation. At time on admission, pt was intubated and sedated. (10 Nov 2019 18:12)    Echo showed vegetation on TV. Patient being treated for TV MSSA endocarditis. Patient was deemed not a surgical candidate and transferred to MICU for further care. Patient has had complicated course which included B/L chest tube placement for empyema from septic emboli, congestive hepatopathy, DIC, CAMERON requiring CVVHD, hypoxic respiratory failure requiring intubation, and digital necrosis likely 2/2 to pressor use. Patient had been extubated and was sent for CT CAP yesterday to evaluate for further lung pathology and abdominal distention. Later in the night, patient started to become hypoxic and became bradycardic and lost pulse. Patient found to be in PEA arrest and atropine given and CPR initiated, ROSC acheived and patient was reintubated. Per pulmonary/ICU fellow, clots and mucus suctioned. Likely 2/2 to mucus plugging. Patient also found to have B/L pneumothoraces. CTSurgery consulted for anterior CT placement.     On Echo, patient still has persistent vegetations and severe tricuspid regurgitation. Patient is on appropriate abx therapy. CT surgery deemed patient not a surgical candidate due to his instability. Repeat echo shows normal RV function, despite severe TR.       ROS: A 10-point review of systems was otherwise negative.    PAST MEDICAL & SURGICAL HISTORY:  Endocarditis  IVDU (intravenous drug user)  Smoker  No significant past surgical history      SOCIAL HISTORY: +IVDU with heroin. +THC use. Current smoker. Unclear EtOH use.   FAMILY HISTORY:  No pertinent family history in first degree relatives      ALLERGIES: 	  Allergy Status Unknown            MEDICATIONS:  albumin human 25% IVPB 50 milliLiter(s) IV Intermittent every 8 hours  alteplase  Injectable for Pleural Effusion 10 milliGRAM(s) IntraPleural. every 12 hours  ascorbic acid 500 milliGRAM(s) Oral daily  chlorhexidine 2% Cloths 1 Application(s) Topical daily  CRRT Treatment    <Continuous>  dextrose 40% Gel 15 Gram(s) Oral once PRN  dextrose 5%. 1000 milliLiter(s) IV Continuous <Continuous>  dextrose 50% Injectable 12.5 Gram(s) IV Push once  dextrose 50% Injectable 25 Gram(s) IV Push once  dextrose 50% Injectable 25 Gram(s) IV Push once  dornase maikol Solution for Pleural Effusion 5 milliGRAM(s) IntraPleural. every 12 hours  fentaNYL   Patch  50 MICROgram(s)/Hr 1 Patch Transdermal every 72 hours  glucagon  Injectable 1 milliGRAM(s) IntraMuscular once PRN  heparin  Injectable 5000 Unit(s) SubCutaneous every 8 hours  insulin lispro (HumaLOG) corrective regimen sliding scale   SubCutaneous every 6 hours  multivitamin/minerals/iron Oral Solution (CENTRUM) 15 milliLiter(s) Oral daily  nafcillin  IVPB 2 Gram(s) IV Intermittent every 4 hours  norepinephrine Infusion 0.05 MICROgram(s)/kG/Min IV Continuous <Continuous>  pantoprazole  Injectable 40 milliGRAM(s) IV Push daily  Phoxillum Filtration BK 4 / 2.5 5000 milliLiter(s) CRRT <Continuous>  polyethylene glycol 3350 17 Gram(s) Oral two times a day  propofol Infusion 5 MICROgram(s)/kG/Min IV Continuous <Continuous>  senna 2 Tablet(s) Oral at bedtime  sodium chloride 0.9% lock flush 3 milliLiter(s) IV Push every 8 hours  sodium chloride 0.9%. 1000 milliLiter(s) IV Continuous <Continuous>  vitamin E 400 International Unit(s) Oral daily  zinc sulfate 220 milliGRAM(s) Oral daily      PHYSICAL EXAM:  T(C): 38.1 (11-19-19 @ 06:03), Max: 38.1 (11-19-19 @ 06:03)  HR: 120 (11-19-19 @ 09:00) (36 - 132)  BP: 100/55 (11-19-19 @ 09:00) (80/56 - 110/78)  RR: 28 (11-19-19 @ 09:00) (6 - 42)  SpO2: 100% (11-19-19 @ 09:00) (59% - 100%)  Wt(kg): --    GEN: Intubated and sedated.   HEENT: NCAT. Left IJ TLC. Right HD cath.   RESP: Rhonchorus BS B/L with other adventitious BS. B/L Chest tubes.   CV: TAchy. normal s1/s2. Difficult to assess other mrg.   ABD: Soft, Distended and tympanic to percussion.   EXT: Warm. No edema. LEft 1,3,4 toes with necrosis. Right toes with necrosis. DPPT 2+ B/L.   NEURO: Intubated and sedated.     I&O's Summary    18 Nov 2019 07:01  -  19 Nov 2019 07:00  --------------------------------------------------------  IN: 3429.1 mL / OUT: 1901 mL / NET: 1528.1 mL    19 Nov 2019 07:01  -  19 Nov 2019 10:14  --------------------------------------------------------  IN: 308.4 mL / OUT: 0 mL / NET: 308.4 mL        	  LABS:	 	    CARDIAC MARKERS:  CARDIAC MARKERS ( 19 Nov 2019 05:07 )  x     / 0.09 ng/mL / x     / x     / x      CARDIAC MARKERS ( 19 Nov 2019 03:33 )  x     / 0.07 ng/mL / x     / x     / x                                          7.1    15.17 )-----------( 68       ( 19 Nov 2019 05:06 )             23.1     11-19    131<L>  |  98  |  20  ----------------------------<  86  4.6   |  20<L>  |  1.17    Ca    7.8<L>      19 Nov 2019 05:07  Phos  4.6     11-19  Mg     2.1     11-19    TPro  7.4  /  Alb  3.4  /  TBili  9.1<H>  /  DBili  x   /  AST  53<H>  /  ALT  71<H>  /  AlkPhos  105  11-18    proBNP:   Lipid Profile:   HgA1c:   TSH:     TELEMETRY: Sinus tachy	    ECG: Sinus tachycardia.  RADIOLOGY: < from: CT Chest w/ IV Cont (11.18.19 @ 18:42) >  1.  New bilateral moderate hydropneumothorax. Bilateral chest tubes in   place.  2.  Direct comparison with prior CT (11/12/2019) is somewhat limited due   to suboptimal expansion of lungs secondary to pneumothorax; Multiple   cavitary lesions in both lungs, demonstrating interval increase in size   and extent of associated peripheral consolidation. The differential   diagnosis includes septic embolism, given patient's history. However   follow-up with chest CT to ensure resolution is recommended to exclude   underlying malignancy.  3.  Increased extent of lobar pneumonia in both lower lobes.    CT ABDOMEN AND PELVIS  1.  Colonic ileus, progressed compared to prior CT. No transition point.   Rectal tube in appropriate position. Previously described possible focal   abnormality in the left posterior aspect of the rectum is not well seen   on current study, and may be artifactual.  2.  Heterogeneous enhancement of the spleen may be due to bolus timing.  3.  Moderate abdominopelvic ascites.  4.  Anasarca.      ECHO:< from: Echocardiogram (11.18.19 @ 15:43) >  There is severe tricuspid regurgitation. 3.1 x 1.1 cm mobile   echogenicity noted on the posterior leaflet of the tricuspid leaflet. The   mobile mass prolapses in and out of the tricuspid valve resulting in   malcoaptation of the leaflets. There apears no significant difference   when compared to preior echocardiogram.   2. The rightventricle is normal in size and systolic function.   3. There is a trivial sized pericardial effusion without   echocardiographic evidence of cardiac tamponade.   4. There is mild pulmonary hypertension. Pulmonary artery systolic   pressure (estimated using the tricuspid regurgitant gradient and an   estimate of right atrial pressure) is 41.5 mmHg.    < from: ELIAN w/Doppler (11.11.19 @ 15:55) >  1. No thrombus seen in the left atrium or in the left atrial appendage.   Spectral Doppler reveals normal left atrial appendage velocities.   2. 3.8 x 1.1 cm mobile echogenicity noted on the posterior leaflet of   the tricuspid leaflet. The mobile mass prolapses in and out of the   tricuspid valve resulting in malcoaptation of the leaflets. There is   severe tricuspid regurgitation.   3. The aortic valve is tricuspid with normal structure and function   without stenosis. There is no aortic regurgitation.   4. Structurally normal pulmonic valve with normal leaflet excursion.   There is trace pulmonic regurgitation.   5. Structurally normal mitral valve with normal leaflet excursion. There   is trace mitral regurgitation.   6. The right ventricle is normal in size and systolic function.   7. Left ventricular ejection fraction is 40-45 %. Abnormal septal motion   consistent with RV volume overlaod.   8. There is a trivial sized pericardial effusion without   echocardiographic evidence of cardiac tamponade.   9. Left pleural effusion noted.    < end of copied text >  < from: Echocardiogram (11.11.19 @ 09:43) >  1. Patient is tachycardic throughout the study.   2. Mild global hypokinesis of the left ventriclewith an estimated left   ventricular ejection fraction of 45%.   3. Right ventricular systolic function is moderately reduced. The right   ventricle is moderately dilated.   4. The right atrium is dilated.   5. There is trace mitral regurgitation.   6. There is severe tricuspid regurgitation. Large mobile echogenicty   noted on the atrial aspect of the tricuspid valve (measuring at least 1.6   cm x 1.1 cm, however on subcostal view, appears larger). In the clinical   context, likely represents large vegetation.   7. There is mild pulmonary hypertension. Pulmonary artery systolic   pressure (estimated using the tricuspid regurgitant gradient and an   estimate of right atrial pressure) is 40.5 mmHg.   8. Small pericardial effusion noted.   9. Large bilateral pleural effusions.    < end of copied text >

## 2019-11-19 NOTE — PROCEDURE NOTE - NSTRACHPOSTINTU_RESP_A_CORE
Appropriate capnography
Breath sounds bilateral/Breath sounds equal/Appropriate capnography/Chest excursion noted/Positive end tidal Co2 noted

## 2019-11-19 NOTE — PROGRESS NOTE ADULT - SUBJECTIVE AND OBJECTIVE BOX
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      ROS:  CONSTITUTIONAL:  Negative fever or chills, feels well, good appetite  EYES:  Negative  blurry vision or double vision  CARDIOVASCULAR:  Negative for chest pain or palpitations  RESPIRATORY:  Negative for cough, wheezing, or SOB   GASTROINTESTINAL:  Negative for nausea, vomiting, diarrhea, constipation, or abdominal pain  GENITOURINARY:  Negative frequency, urgency or dysuria  NEUROLOGIC:  No headache, confusion, dizziness, lightheadedness    Allergies    Allergy Status Unknown    Intolerances        ANTIBIOTICS/RELEVANT:  antimicrobials  meropenem  IVPB 1000 milliGRAM(s) IV Intermittent every 8 hours  nafcillin  IVPB 2 Gram(s) IV Intermittent every 4 hours  vancomycin  IVPB 1000 milliGRAM(s) IV Intermittent once    immunologic:    OTHER:  ascorbic acid 500 milliGRAM(s) Oral daily  chlorhexidine 0.12% Liquid 15 milliLiter(s) Oral Mucosa every 12 hours  chlorhexidine 2% Cloths 1 Application(s) Topical daily  CRRT Treatment    <Continuous>  dextrose 40% Gel 15 Gram(s) Oral once PRN  dextrose 5%. 1000 milliLiter(s) IV Continuous <Continuous>  dextrose 50% Injectable 12.5 Gram(s) IV Push once  dextrose 50% Injectable 25 Gram(s) IV Push once  dextrose 50% Injectable 25 Gram(s) IV Push once  fentaNYL   Infusion. 0.5 MICROgram(s)/kG/Hr IV Continuous <Continuous>  glucagon  Injectable 1 milliGRAM(s) IntraMuscular once PRN  heparin  Injectable 5000 Unit(s) SubCutaneous every 8 hours  hydrocortisone sodium succinate Injectable 50 milliGRAM(s) IV Push every 6 hours  insulin lispro (HumaLOG) corrective regimen sliding scale   SubCutaneous every 6 hours  multivitamin/minerals/iron Oral Solution (CENTRUM) 15 milliLiter(s) Oral daily  norepinephrine Infusion 0.05 MICROgram(s)/kG/Min IV Continuous <Continuous>  pantoprazole  Injectable 40 milliGRAM(s) IV Push daily  polyethylene glycol 3350 17 Gram(s) Oral two times a day  propofol Infusion 5 MICROgram(s)/kG/Min IV Continuous <Continuous>  PureFlow Dialysate RFP-400 (K 2 / Ca 3) 5000 milliLiter(s) CRRT <Continuous>  senna 2 Tablet(s) Oral at bedtime  sodium chloride 0.9% lock flush 3 milliLiter(s) IV Push every 8 hours  vasopressin Infusion 0.04 Unit(s)/Min IV Continuous <Continuous>  vitamin E 400 International Unit(s) Oral daily  zinc sulfate 220 milliGRAM(s) Oral daily      Objective:  Vital Signs Last 24 Hrs  T(C): 38.3 (19 Nov 2019 10:32), Max: 38.3 (19 Nov 2019 10:32)  T(F): 101 (19 Nov 2019 10:32), Max: 101 (19 Nov 2019 10:32)  HR: 104 (19 Nov 2019 13:00) (36 - 132)  BP: 98/58 (19 Nov 2019 13:00) (80/56 - 110/78)  BP(mean): 76 (19 Nov 2019 13:00) (63 - 94)  RR: 28 (19 Nov 2019 13:00) (6 - 42)  SpO2: 100% (19 Nov 2019 13:00) (59% - 100%)    PHYSICAL EXAM:  Constitutional:Well-developed, well nourishe  Eyes:NANCY, EOMI  Ear/Nose/Throat: no oral lesion, no sinus tenderness on percussion	  Neck:no JVD, no lymphadenopathy, supple  Respiratory: CTA hellen  Cardiovascular: S1S2 RRR, no murmurs  Gastrointestinal:soft, (+) BS, no HSM  Extremities:no e/e/c        LABS:                        7.0    15.73 )-----------( 101      ( 19 Nov 2019 10:53 )             22.1     11-19    133<L>  |  101  |  25<H>  ----------------------------<  91  5.1   |  18<L>  |  1.54<H>    Ca    7.8<L>      19 Nov 2019 10:53  Phos  4.3     11-19  Mg     2.1     11-19    TPro  x   /  Alb  x   /  TBili  7.5<H>  /  DBili  6.0<H>  /  AST  48<H>  /  ALT  53<H>  /  AlkPhos  85  11-19    PT/INR - ( 19 Nov 2019 10:53 )   PT: 16.2 sec;   INR: 1.42          PTT - ( 19 Nov 2019 10:53 )  PTT:38.8 sec        MICROBIOLOGY:  Culture Results:   No growth at 12 hours (11-18 @ 20:05)  Culture Results:   No growth at 12 hours (11-18 @ 20:05)        RADIOLOGY & ADDITIONAL STUDIES: Infectious diseases progress note:  ULI HOLLAND is a 39yMale patient    ENDOCARDITIS/SEPSIS    CAMERON (acute kidney injury)  Acute endocarditis due to other organism    Subjective: Patient intubated and sedated, unable to participate in ROS     Allergies    Allergy Status Unknown    Intolerances        ANTIBIOTICS/RELEVANT:  antimicrobials  meropenem  IVPB 1000 milliGRAM(s) IV Intermittent every 8 hours  nafcillin  IVPB 2 Gram(s) IV Intermittent every 4 hours  vancomycin  IVPB 1000 milliGRAM(s) IV Intermittent once    immunologic:    OTHER:  ascorbic acid 500 milliGRAM(s) Oral daily  chlorhexidine 0.12% Liquid 15 milliLiter(s) Oral Mucosa every 12 hours  chlorhexidine 2% Cloths 1 Application(s) Topical daily  CRRT Treatment    <Continuous>  dextrose 40% Gel 15 Gram(s) Oral once PRN  dextrose 5%. 1000 milliLiter(s) IV Continuous <Continuous>  dextrose 50% Injectable 12.5 Gram(s) IV Push once  dextrose 50% Injectable 25 Gram(s) IV Push once  dextrose 50% Injectable 25 Gram(s) IV Push once  fentaNYL   Infusion. 0.5 MICROgram(s)/kG/Hr IV Continuous <Continuous>  glucagon  Injectable 1 milliGRAM(s) IntraMuscular once PRN  heparin  Injectable 5000 Unit(s) SubCutaneous every 8 hours  hydrocortisone sodium succinate Injectable 50 milliGRAM(s) IV Push every 6 hours  insulin lispro (HumaLOG) corrective regimen sliding scale   SubCutaneous every 6 hours  multivitamin/minerals/iron Oral Solution (CENTRUM) 15 milliLiter(s) Oral daily  norepinephrine Infusion 0.05 MICROgram(s)/kG/Min IV Continuous <Continuous>  pantoprazole  Injectable 40 milliGRAM(s) IV Push daily  polyethylene glycol 3350 17 Gram(s) Oral two times a day  propofol Infusion 5 MICROgram(s)/kG/Min IV Continuous <Continuous>  PureFlow Dialysate RFP-400 (K 2 / Ca 3) 5000 milliLiter(s) CRRT <Continuous>  senna 2 Tablet(s) Oral at bedtime  sodium chloride 0.9% lock flush 3 milliLiter(s) IV Push every 8 hours  vasopressin Infusion 0.04 Unit(s)/Min IV Continuous <Continuous>  vitamin E 400 International Unit(s) Oral daily  zinc sulfate 220 milliGRAM(s) Oral daily      Objective:  Vital Signs Last 24 Hrs  T(C): 38.3 (19 Nov 2019 10:32), Max: 38.3 (19 Nov 2019 10:32)  T(F): 101 (19 Nov 2019 10:32), Max: 101 (19 Nov 2019 10:32)  HR: 104 (19 Nov 2019 13:00) (36 - 132)  BP: 98/58 (19 Nov 2019 13:00) (80/56 - 110/78)  BP(mean): 76 (19 Nov 2019 13:00) (63 - 94)  RR: 28 (19 Nov 2019 13:00) (6 - 42)  SpO2: 100% (19 Nov 2019 13:00) (59% - 100%)    PHYSICAL EXAM:  Constitutional: Intubated, sedated   HEENT: NCAT, positive scleral icterus, dry MM   Neck: supple  Respiratory: b/l rhonchi and wheezing throughout  Cardiovascular: S1, S2. RRR. systolic murmur appreciated on left 4th ICS  Gastrointestinal: Distended, improved from yesterday. Generalized tenderness to palpation. BS+ x4 quadrants  Extremities: Extremities warm throughout. Tips of b/l toes cyanotic and cool to touch. B/l 2+ pedal edema in upper and lower extremities         LABS:                        7.0    15.73 )-----------( 101      ( 19 Nov 2019 10:53 )             22.1     11-19    133<L>  |  101  |  25<H>  ----------------------------<  91  5.1   |  18<L>  |  1.54<H>    Ca    7.8<L>      19 Nov 2019 10:53  Phos  4.3     11-19  Mg     2.1     11-19    TPro  x   /  Alb  x   /  TBili  7.5<H>  /  DBili  6.0<H>  /  AST  48<H>  /  ALT  53<H>  /  AlkPhos  85  11-19    PT/INR - ( 19 Nov 2019 10:53 )   PT: 16.2 sec;   INR: 1.42          PTT - ( 19 Nov 2019 10:53 )  PTT:38.8 sec        MICROBIOLOGY:  Culture Results:   No growth at 12 hours (11-18 @ 20:05)  Culture Results:   No growth at 12 hours (11-18 @ 20:05)        RADIOLOGY & ADDITIONAL STUDIES:  < from: CT Chest w/ IV Cont (11.18.19 @ 18:42) >    CT CHEST  1.  New bilateral moderate hydropneumothorax. Bilateral chest tubes in   place.  2.  Direct comparison with prior CT (11/12/2019) is somewhat limited due   to suboptimal expansion of lungs secondary to pneumothorax; Multiple   cavitary lesions in both lungs, demonstrating interval increase in size   and extent of associated peripheral consolidation. The differential   diagnosis includes septic embolism, given patient's history. However   follow-up with chest CT to ensure resolution is recommended to exclude   underlying malignancy.  3.  Increased extent of lobar pneumonia in both lower lobes.    CT ABDOMEN AND PELVIS  1.  Colonic ileus, progressed compared to prior CT. No transition point.   Rectal tube in appropriate position. Previously described possible focal   abnormality in the left posterior aspect of the rectum is not well seen   on current study, and may be artifactual.  2.  Heterogeneous enhancement of the spleen may be due to bolus timing.  3.  Moderate abdominopelvic ascites.  4.  Anasarca.

## 2019-11-19 NOTE — PROGRESS NOTE ADULT - SUBJECTIVE AND OBJECTIVE BOX
Pt seen and examined at bedside.  Overnight, patient developed bradycardia with subsequent cardiac arrest s/p ROSC.  Received atropine and was subsequently intubated.  Also a CT abd was obtained with colonic distension.  Unable to obtain ROS as patient intubated     Allergies    Allergy Status Unknown    Intolerances      MEDICATIONS:  MEDICATIONS  (STANDING):  albumin human 25% IVPB 50 milliLiter(s) IV Intermittent every 8 hours  alteplase  Injectable for Pleural Effusion 10 milliGRAM(s) IntraPleural. every 12 hours  ascorbic acid 500 milliGRAM(s) Oral daily  chlorhexidine 2% Cloths 1 Application(s) Topical daily  CRRT Treatment    <Continuous>  dextrose 5%. 1000 milliLiter(s) (50 mL/Hr) IV Continuous <Continuous>  dextrose 50% Injectable 12.5 Gram(s) IV Push once  dextrose 50% Injectable 25 Gram(s) IV Push once  dextrose 50% Injectable 25 Gram(s) IV Push once  dornase maikol Solution for Pleural Effusion 5 milliGRAM(s) IntraPleural. every 12 hours  fentaNYL   Patch  50 MICROgram(s)/Hr 1 Patch Transdermal every 72 hours  heparin  Injectable 5000 Unit(s) SubCutaneous every 8 hours  insulin lispro (HumaLOG) corrective regimen sliding scale   SubCutaneous every 6 hours  multivitamin/minerals/iron Oral Solution (CENTRUM) 15 milliLiter(s) Oral daily  nafcillin  IVPB 2 Gram(s) IV Intermittent every 4 hours  norepinephrine Infusion 0.05 MICROgram(s)/kG/Min (6.9 mL/Hr) IV Continuous <Continuous>  pantoprazole  Injectable 40 milliGRAM(s) IV Push daily  Phoxillum Filtration BK 4 / 2.5 5000 milliLiter(s) (2500 mL/Hr) CRRT <Continuous>  polyethylene glycol 3350 17 Gram(s) Oral two times a day  propofol Infusion 5 MICROgram(s)/kG/Min (2.208 mL/Hr) IV Continuous <Continuous>  senna 2 Tablet(s) Oral at bedtime  sodium chloride 0.9% lock flush 3 milliLiter(s) IV Push every 8 hours  sodium chloride 0.9%. 1000 milliLiter(s) (110 mL/Hr) IV Continuous <Continuous>  vitamin E 400 International Unit(s) Oral daily  zinc sulfate 220 milliGRAM(s) Oral daily    MEDICATIONS  (PRN):  dextrose 40% Gel 15 Gram(s) Oral once PRN Blood Glucose LESS THAN 70 milliGRAM(s)/deciliter  glucagon  Injectable 1 milliGRAM(s) IntraMuscular once PRN Glucose LESS THAN 70 milligrams/deciliter    Vital Signs Last 24 Hrs  T(C): 38.1 (19 Nov 2019 06:03), Max: 38.1 (19 Nov 2019 06:03)  T(F): 100.6 (19 Nov 2019 06:03), Max: 100.6 (19 Nov 2019 06:03)  HR: 120 (19 Nov 2019 09:00) (36 - 132)  BP: 100/55 (19 Nov 2019 09:00) (80/56 - 110/78)  BP(mean): 75 (19 Nov 2019 09:00) (65 - 94)  RR: 28 (19 Nov 2019 09:00) (6 - 42)  SpO2: 100% (19 Nov 2019 09:00) (59% - 100%)    11-18 @ 07:01  -  11-19 @ 07:00  --------------------------------------------------------  IN: 3429.1 mL / OUT: 1901 mL / NET: 1528.1 mL    11-19 @ 07:01  -  11-19 @ 10:17  --------------------------------------------------------  IN: 308.4 mL / OUT: 0 mL / NET: 308.4 mL      PHYSICAL EXAM:  General: frail male intubated and sedated  HEENT: MMM, conjunctiva and sclera icterus  Gastrointestinal: Soft non-tender distended; Normal bowel sounds;  No rebound or guarding  neuro: sedated, unable to follow command    LABS:                        7.1    15.17 )-----------( 68       ( 19 Nov 2019 05:06 )             23.1     11-19    131<L>  |  98  |  20  ----------------------------<  86  4.6   |  20<L>  |  1.17    Ca    7.8<L>      19 Nov 2019 05:07  Phos  4.6     11-19  Mg     2.1     11-19    TPro  7.4  /  Alb  3.4  /  TBili  9.1<H>  /  DBili  x   /  AST  53<H>  /  ALT  71<H>  /  AlkPhos  105  11-18    PT/INR - ( 18 Nov 2019 05:33 )   PT: 14.8 sec;   INR: 1.30          PTT - ( 18 Nov 2019 05:33 )  PTT:40.2 sec      Culture - Blood (collected 18 Nov 2019 20:05)  Source: .Blood Blood  Preliminary Report (19 Nov 2019 09:00):    No growth at 12 hours    Culture - Blood (collected 18 Nov 2019 20:05)  Source: .Blood Blood  Preliminary Report (19 Nov 2019 09:00):    No growth at 12 hours    Culture - Sputum (collected 18 Nov 2019 16:25)  Source: .Sputum Sputum  Gram Stain (19 Nov 2019 09:21):    Rare epithelial cells    Moderate White blood cells    Moderate Yeast like cells    Few Gram positive cocci in pairs    < from: CT Abdomen and Pelvis w/ Oral Cont and w/ IV Cont (11.18.19 @ 18:41) >   Colonic ileus, progressed compared to prior CT. No transition point.   Rectal tube in appropriate position. Previously described possible focal   abnormality in the left posterior aspect of the rectum is not well seen   on current study, and may be artifactual.    < end of copied text >

## 2019-11-20 NOTE — PROGRESS NOTE ADULT - ASSESSMENT
38 yo male with PMH of IVDU was transferred from [facility] for IE with tricupsid valve vegetations, Nephrology consulted for anuric cameron and electrolytes disturbances.  s/p R chest tube on 11/19 for pneumothorax  s/p brochoscopy on 11/19, pending cultures      # CAMERON, anuric  - no signs of renal recovery  - remains septic, requiring pressors  - CVVHD via RIJ HD cath with net even balance for now, will continue  - keep map >65 mmHg  - monitor BMP q6hr while on CVVHD for K/Phos and Mg and supplement as needed  - monitor H/H, transfuse as indicated  - treat infection  - renally adjusted meds/ABx    Discussed with attending Dr Pinzon.

## 2019-11-20 NOTE — PROGRESS NOTE ADULT - ASSESSMENT
40 y/o M smoker w/ PMHx of IVDU presented to [facility] w/ productive cough with hemoptysis found to have septic emboli w/ vegetation of the TV transferred to [facility] for surgical evaluation.  Course complicated by sepsis, DIC, acute respiratory failure w/ empyema s/p intubation and thoracostomy tube, CAMERON requiring cvvhd, ileus on NGT and rectal decompression, cardiac arrest, and pneumothorax. GI consulted for evaluation of incidental finding of rectal wall thickening on imaging.    #Rectal lesion:  CT w/ irregular masslike mural thickening of the posterior right lateral wall of the rectum.  Would plan for nonurgent flex sig when patient is stable  -Will plan for flex sig when patient is stable    #Abd distension  Patient with new abdominal distension with xray notable for dilated bowels.  CT Patient is on zinc and fentanyl with rectal tube.  -Continue miralax  -Appreciate surgery input    #Hyperbilirubinemia:  Patient with marked elevated bilirubin initially thought to be 2/2 ischemic hepatopathy but persistent elevation on repeat.  Imaging without CBD dilation suggestive of cholestasis from sepsis vs DILI in the setting of recent ischemic hepatopathy.  Now downtrending  -Daily LFT and INR    Please notify GI team when patient if clinically stable for flexible sigmoidoscopy    Case d/w svc attg

## 2019-11-20 NOTE — PROGRESS NOTE ADULT - SUBJECTIVE AND OBJECTIVE BOX
INTERVAL EVENTS: Patient intubated and sedated. Patient had right chest tube placed for pneumothorax.     PAST MEDICAL & SURGICAL HISTORY:  Endocarditis  IVDU (intravenous drug user)  IVDA (intravenous drug abuse) complicating pregnancy  Smoker  No significant past surgical history      MEDICATIONS  (STANDING):  artificial  tears Solution 1 Drop(s) Both EYES every 6 hours  ascorbic acid 500 milliGRAM(s) Oral daily  chlorhexidine 0.12% Liquid 15 milliLiter(s) Oral Mucosa every 12 hours  chlorhexidine 2% Cloths 1 Application(s) Topical daily  CRRT Treatment    <Continuous>  dextrose 5%. 1000 milliLiter(s) (50 mL/Hr) IV Continuous <Continuous>  dextrose 50% Injectable 12.5 Gram(s) IV Push once  dextrose 50% Injectable 25 Gram(s) IV Push once  dextrose 50% Injectable 25 Gram(s) IV Push once  fentaNYL   Infusion. 0.5 MICROgram(s)/kG/Hr (3.68 mL/Hr) IV Continuous <Continuous>  heparin  Injectable 5000 Unit(s) SubCutaneous every 8 hours  hydrocortisone sodium succinate Injectable 50 milliGRAM(s) IV Push every 6 hours  insulin lispro (HumaLOG) corrective regimen sliding scale   SubCutaneous every 6 hours  meropenem  IVPB 1000 milliGRAM(s) IV Intermittent every 8 hours  multivitamin/minerals/iron Oral Solution (CENTRUM) 15 milliLiter(s) Oral daily  nafcillin  IVPB 2 Gram(s) IV Intermittent every 4 hours  norepinephrine Infusion 0.05 MICROgram(s)/kG/Min (3.45 mL/Hr) IV Continuous <Continuous>  pantoprazole  Injectable 40 milliGRAM(s) IV Push daily  polyethylene glycol 3350 17 Gram(s) Oral two times a day  propofol Infusion 5 MICROgram(s)/kG/Min (2.208 mL/Hr) IV Continuous <Continuous>  PureFlow Dialysate RFP-400 (K 2 / Ca 3) 5000 milliLiter(s) (2500 mL/Hr) CRRT <Continuous>  senna 2 Tablet(s) Oral at bedtime  sodium chloride 0.9% lock flush 3 milliLiter(s) IV Push every 8 hours  vitamin E 400 International Unit(s) Oral daily  zinc sulfate 220 milliGRAM(s) Oral daily    MEDICATIONS  (PRN):  dextrose 40% Gel 15 Gram(s) Oral once PRN Blood Glucose LESS THAN 70 milliGRAM(s)/deciliter  glucagon  Injectable 1 milliGRAM(s) IntraMuscular once PRN Glucose LESS THAN 70 milligrams/deciliter      Vital Signs Last 24 Hrs  T(C): 35.9 (20 Nov 2019 10:05), Max: 38.4 (19 Nov 2019 14:00)  T(F): 96.7 (20 Nov 2019 10:05), Max: 101.2 (19 Nov 2019 14:00)  HR: 89 (20 Nov 2019 12:23) (68 - 108)  BP: 90/60 (20 Nov 2019 12:00) (89/55 - 136/87)  BP(mean): 70 (20 Nov 2019 12:00) (65 - 109)  RR: 17 (20 Nov 2019 12:00) (15 - 30)  SpO2: 99% (20 Nov 2019 12:23) (97% - 100%)     PHYSICAL EXAM:  GEN: Intubated and sedated.   HEENT: NCAT. Left IJ TLC. Right HD cath.   RESP: Rhonchorus BS B/L with other adventitious BS. B/L Chest tubes.   CV: TAchy. normal s1/s2. Difficult to assess other mrg.   ABD: Soft, Distended and tympanic to percussion.   EXT: Warm. No edema. LEft 1,3,4 toes with necrosis. Right toes with necrosis. DPPT 2+ B/L.   NEURO: Intubated and sedated.         LABS:                        8.7    10.44 )-----------( 53       ( 20 Nov 2019 07:28 )             25.7     11-20    133<L>  |  99  |  21  ----------------------------<  123<H>  3.6   |  22  |  1.16    Ca    8.2<L>      20 Nov 2019 07:32  Phos  4.2     11-20  Mg     1.9     11-20    TPro  6.1  /  Alb  2.7<L>  /  TBili  6.8<H>  /  DBili  5.6<H>  /  AST  40  /  ALT  43  /  AlkPhos  75  11-20    CARDIAC MARKERS ( 20 Nov 2019 07:32 )  x     / 0.06 ng/mL / x     / x     / x      CARDIAC MARKERS ( 20 Nov 2019 00:17 )  x     / 0.07 ng/mL / 16 U/L / x     / 1.1 ng/mL  CARDIAC MARKERS ( 19 Nov 2019 17:49 )  x     / 0.10 ng/mL / x     / x     / x      CARDIAC MARKERS ( 19 Nov 2019 10:53 )  x     / 0.12 ng/mL / x     / x     / x      CARDIAC MARKERS ( 19 Nov 2019 05:07 )  x     / 0.09 ng/mL / x     / x     / x      CARDIAC MARKERS ( 19 Nov 2019 03:33 )  x     / 0.07 ng/mL / x     / x     / x          PT/INR - ( 20 Nov 2019 07:28 )   PT: 15.5 sec;   INR: 1.36          PTT - ( 20 Nov 2019 07:28 )  PTT:35.1 sec    I&O's Summary    19 Nov 2019 07:01  -  20 Nov 2019 07:00  --------------------------------------------------------  IN: 3243 mL / OUT: 4284 mL / NET: -1041 mL    20 Nov 2019 07:01  -  20 Nov 2019 12:42  --------------------------------------------------------  IN: 193.6 mL / OUT: 361 mL / NET: -167.4 mL      BNP  RADIOLOGY & ADDITIONAL STUDIES:    TELEMETRY: Sinus tachycardia and sinus bradycardia.     EKG: Pending.

## 2019-11-20 NOTE — PROGRESS NOTE ADULT - SUBJECTIVE AND OBJECTIVE BOX
OVERNIGHT EVENTS:    SUBJECTIVE / INTERVAL HPI: Patient seen and examined at bedside.     VITAL SIGNS:  Vital Signs Last 24 Hrs  T(C): 36.4 (20 Nov 2019 02:35), Max: 38.4 (19 Nov 2019 14:00)  T(F): 97.6 (20 Nov 2019 02:35), Max: 101.2 (19 Nov 2019 14:00)  HR: 108 (20 Nov 2019 05:13) (72 - 124)  BP: 104/65 (20 Nov 2019 05:00) (80/56 - 136/87)  BP(mean): 77 (20 Nov 2019 05:00) (63 - 109)  RR: 16 (20 Nov 2019 05:00) (6 - 31)  SpO2: 100% (20 Nov 2019 05:13) (96% - 100%)    PHYSICAL EXAM:    General: WDWN  HEENT: NC/AT; PERRL, anicteric sclera; MMM  Neck: supple  Cardiovascular: +S1/S2; RRR  Respiratory: CTA B/L; no W/R/R  Gastrointestinal: soft, NT/ND; +BSx4  Extremities: WWP; no edema, clubbing or cyanosis  Vascular: 2+ radial, DP/PT pulses B/L  Neurological: AAOx3; no focal deficits    MEDICATIONS:  MEDICATIONS  (STANDING):  ascorbic acid 500 milliGRAM(s) Oral daily  chlorhexidine 0.12% Liquid 15 milliLiter(s) Oral Mucosa every 12 hours  chlorhexidine 2% Cloths 1 Application(s) Topical daily  CRRT Treatment    <Continuous>  dextrose 5%. 1000 milliLiter(s) (50 mL/Hr) IV Continuous <Continuous>  dextrose 50% Injectable 12.5 Gram(s) IV Push once  dextrose 50% Injectable 25 Gram(s) IV Push once  dextrose 50% Injectable 25 Gram(s) IV Push once  dornase maikol Solution for Pleural Effusion 5 milliGRAM(s) IntraPleural. every 12 hours  fentaNYL   Infusion. 0.5 MICROgram(s)/kG/Hr (3.68 mL/Hr) IV Continuous <Continuous>  heparin  Injectable 5000 Unit(s) SubCutaneous every 8 hours  hydrocortisone sodium succinate Injectable 50 milliGRAM(s) IV Push every 6 hours  insulin lispro (HumaLOG) corrective regimen sliding scale   SubCutaneous every 6 hours  meropenem  IVPB 1000 milliGRAM(s) IV Intermittent every 8 hours  multivitamin/minerals/iron Oral Solution (CENTRUM) 15 milliLiter(s) Oral daily  nafcillin  IVPB 2 Gram(s) IV Intermittent every 4 hours  norepinephrine Infusion 0.05 MICROgram(s)/kG/Min (3.45 mL/Hr) IV Continuous <Continuous>  pantoprazole  Injectable 40 milliGRAM(s) IV Push daily  polyethylene glycol 3350 17 Gram(s) Oral two times a day  propofol Infusion 5 MICROgram(s)/kG/Min (2.208 mL/Hr) IV Continuous <Continuous>  PureFlow Dialysate RFP-400 (K 2 / Ca 3) 5000 milliLiter(s) (2500 mL/Hr) CRRT <Continuous>  senna 2 Tablet(s) Oral at bedtime  sodium chloride 0.9% lock flush 3 milliLiter(s) IV Push every 8 hours  vitamin E 400 International Unit(s) Oral daily  zinc sulfate 220 milliGRAM(s) Oral daily    MEDICATIONS  (PRN):  dextrose 40% Gel 15 Gram(s) Oral once PRN Blood Glucose LESS THAN 70 milliGRAM(s)/deciliter  glucagon  Injectable 1 milliGRAM(s) IntraMuscular once PRN Glucose LESS THAN 70 milligrams/deciliter      ALLERGIES:  Allergies    Allergy Status Unknown    Intolerances        LABS:                        7.3    13.02 )-----------( 62       ( 20 Nov 2019 00:18 )             22.7     11-20    131<L>  |  97  |  26<H>  ----------------------------<  132<H>  4.3   |  20<L>  |  1.44<H>    Ca    8.2<L>      20 Nov 2019 00:17  Phos  4.9     11-20  Mg     2.0     11-20    TPro  6.4  /  Alb  2.9<L>  /  TBili  7.8<H>  /  DBili  6.2<H>  /  AST  45<H>  /  ALT  51<H>  /  AlkPhos  84  11-19    PT/INR - ( 20 Nov 2019 01:54 )   PT: 16.4 sec;   INR: 1.44          PTT - ( 20 Nov 2019 01:54 )  PTT:36.1 sec    CAPILLARY BLOOD GLUCOSE      POCT Blood Glucose.: 114 mg/dL (20 Nov 2019 05:39)      RADIOLOGY & ADDITIONAL TESTS: Reviewed. OVERNIGHT EVENTS: BMP monitored overnight. Hgb with inappropriate response s/p 1u pRBCs. Transfused 1 additional u pRBCs.     SUBJECTIVE / INTERVAL HPI: Patient seen and examined at bedside. Patient resting in bed, intubated on sedation. Unresponsive to painful/verbal stimuli. Unable to obtain full ROS.     VITAL SIGNS:  Vital Signs Last 24 Hrs  T(C): 36.4 (20 Nov 2019 02:35), Max: 38.4 (19 Nov 2019 14:00)  T(F): 97.6 (20 Nov 2019 02:35), Max: 101.2 (19 Nov 2019 14:00)  HR: 108 (20 Nov 2019 05:13) (72 - 124)  BP: 104/65 (20 Nov 2019 05:00) (80/56 - 136/87)  BP(mean): 77 (20 Nov 2019 05:00) (63 - 109)  RR: 16 (20 Nov 2019 05:00) (6 - 31)  SpO2: 100% (20 Nov 2019 05:13) (96% - 100%)    PHYSICAL EXAM:    General: WDWN, resting in bed, sedated, jaundice   HEENT: NC/AT; PERRL, icteric sclera; MMM  Neck: supple  Cardiovascular: +S1/S2; RRR, murmur heard best at LSB   Respiratory: intubated, scattered rhonchi and crackles b/l   Gastrointestinal: NGT in place; soft, NT, distended (improved compared to yesterday); +BSx4  Extremities: WWP; edema of LEs/UEs with improvement; no clubbing or cyanosis  Derm: ischemic toes/fingers   Vascular: 2+ radial, DP/PT pulses B/L  Neurological: sedated on Propofol/Fentanyl gtt; unresponsive to painful/verbal stimuli    MEDICATIONS:  MEDICATIONS  (STANDING):  ascorbic acid 500 milliGRAM(s) Oral daily  chlorhexidine 0.12% Liquid 15 milliLiter(s) Oral Mucosa every 12 hours  chlorhexidine 2% Cloths 1 Application(s) Topical daily  CRRT Treatment    <Continuous>  dextrose 5%. 1000 milliLiter(s) (50 mL/Hr) IV Continuous <Continuous>  dextrose 50% Injectable 12.5 Gram(s) IV Push once  dextrose 50% Injectable 25 Gram(s) IV Push once  dextrose 50% Injectable 25 Gram(s) IV Push once  dornase maikol Solution for Pleural Effusion 5 milliGRAM(s) IntraPleural. every 12 hours  fentaNYL   Infusion. 0.5 MICROgram(s)/kG/Hr (3.68 mL/Hr) IV Continuous <Continuous>  heparin  Injectable 5000 Unit(s) SubCutaneous every 8 hours  hydrocortisone sodium succinate Injectable 50 milliGRAM(s) IV Push every 6 hours  insulin lispro (HumaLOG) corrective regimen sliding scale   SubCutaneous every 6 hours  meropenem  IVPB 1000 milliGRAM(s) IV Intermittent every 8 hours  multivitamin/minerals/iron Oral Solution (CENTRUM) 15 milliLiter(s) Oral daily  nafcillin  IVPB 2 Gram(s) IV Intermittent every 4 hours  norepinephrine Infusion 0.05 MICROgram(s)/kG/Min (3.45 mL/Hr) IV Continuous <Continuous>  pantoprazole  Injectable 40 milliGRAM(s) IV Push daily  polyethylene glycol 3350 17 Gram(s) Oral two times a day  propofol Infusion 5 MICROgram(s)/kG/Min (2.208 mL/Hr) IV Continuous <Continuous>  PureFlow Dialysate RFP-400 (K 2 / Ca 3) 5000 milliLiter(s) (2500 mL/Hr) CRRT <Continuous>  senna 2 Tablet(s) Oral at bedtime  sodium chloride 0.9% lock flush 3 milliLiter(s) IV Push every 8 hours  vitamin E 400 International Unit(s) Oral daily  zinc sulfate 220 milliGRAM(s) Oral daily    MEDICATIONS  (PRN):  dextrose 40% Gel 15 Gram(s) Oral once PRN Blood Glucose LESS THAN 70 milliGRAM(s)/deciliter  glucagon  Injectable 1 milliGRAM(s) IntraMuscular once PRN Glucose LESS THAN 70 milligrams/deciliter      ALLERGIES:  Allergies    Allergy Status Unknown    Intolerances        LABS:                        7.3    13.02 )-----------( 62       ( 20 Nov 2019 00:18 )             22.7     11-20    131<L>  |  97  |  26<H>  ----------------------------<  132<H>  4.3   |  20<L>  |  1.44<H>    Ca    8.2<L>      20 Nov 2019 00:17  Phos  4.9     11-20  Mg     2.0     11-20    TPro  6.4  /  Alb  2.9<L>  /  TBili  7.8<H>  /  DBili  6.2<H>  /  AST  45<H>  /  ALT  51<H>  /  AlkPhos  84  11-19    PT/INR - ( 20 Nov 2019 01:54 )   PT: 16.4 sec;   INR: 1.44          PTT - ( 20 Nov 2019 01:54 )  PTT:36.1 sec    CAPILLARY BLOOD GLUCOSE      POCT Blood Glucose.: 114 mg/dL (20 Nov 2019 05:39)      RADIOLOGY & ADDITIONAL TESTS: Reviewed.

## 2019-11-20 NOTE — PROGRESS NOTE ADULT - ASSESSMENT
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  Multiple septic emboli to lungs, course c/b empyema requiring B chest tubes, as well as multi-organ failure (shock liver, CAMERON requiring CVVHD).   Necrotic digits likely related to pressors.  Febrile this AM and ongoing marked leukocytosis although now downtrending.  Per CTS, he is not a surgical candidate.  Patient found to have newly dilated loops of bowel and worsening abdominal distention associated with diarrhea, initial concern per primary team for C.diff infection but C.diff toxin negative, surgery following. Course further complicated by PEA arrest on 11/18 atropine given and CPR initiated, ROSC achieved and patient was reintubated. Per pulmonary/ICU fellow, clots and mucus suctioned. Likely 2/2 to mucus plugging. Also found to have b/l pneumothoraces and increased extent of lobar pneumonia in b/l lower lobes of lungs on CT chest. R S/p chest tube placement and bronch on 11/19. Some improvement today in clinical status given reduction in levophed   Suggest:  - Continue nafcillin 2 g IV q4hrs (drug of choice for high inoculum MSSA infection)  - c/w IV Meropenem 1g q8hrs in the setting of clinical decompensation  - MRSA PCR negative, would defer tx with IV Vanc  - please add on manual diff to assess for bandemia on most recent CBC  -sputum cx 11/18 with MSSA, moderate yeast-like cells  -BCxs 11/18 NGTD, continue to monitor  -BCxs 11/19 NGTD, continue to monitor  -Bronch 11/19 cx with GPR and GNR, continue to monitor for speciation    Plan discussed with Dr. Simental

## 2019-11-20 NOTE — PROGRESS NOTE ADULT - ASSESSMENT
39y Male w PMH of IVDU and active smoker who went to [facility] on 11/8/19 complaining of productive cough with hemoptysis, progressive SOB, pleuritic chest pain, nausea, non-bloody emesis, abd pain, chillsx3 days. At OSH, patient found to be in septic shock 2/2 tricuspid valve endocarditis seen on echocardiogram, and patient was transferred to [facility], CTICU, under the care of Dr. Daniel Ruelas for possible surgical evaluation. Following admission, patient noted to be in acute hypoxic respiratory failure s/p intubation/sedation, acute kidney injury requiring CVVHD, congestive hepatopathy, and mutli-system organ failure 2/2 hypoperfusion. Patient deemed not a surgical candidate, per Dr. Ruelas, and patient transferred to MICU for medical management with IV nafcillin, ID following. Patient with complicated course and now s/p cardiac arrest likely from sepsis/hypoxic respiratory failure.    Tricuspid valve Endocarditis: Patient with vegetations seen on ELIAN and TTE as above with severe TR. TR is functional from malcoaptation of leaflets from vegetation. Patient with mildly elevated PASP, which likely is not contributing much to TR. Normal RV function. Not a surgical candidate. Patient also with septic emboli to lungs, further causing empyema. Patient now with positive BCx again with MSSA.   -Continue on Nafcillin per ID recs.  -Patient will likely need to be continued on abx therapy indefinitely in setting of no surgical intervention.  -Patient will have long and complicated course without definite source control.   -If patient becomes more stable, reconsult CT Surgery for tricuspid valve repair/replacement.   -Continue with HD support with pressors.   -Can repeat TTE given fact now bacteremic again. Check for change in vegetation. This may help guide MICU decision to continue to treat aggressively or pursue palliative care.   -Would not recommend ELIAN at this time. Will not .     CHF: EF 45% on initial echo. With global hypokinesis. Likely in setting of sepsis.   -Patient unable to tolerate GDMT.  -When patient no longer septic and HD stable, can discuss starting meds to assist with HF.   -Continue medical managment for IE.     To be discussed on rounds.

## 2019-11-20 NOTE — CHART NOTE - NSCHARTNOTEFT_GEN_A_CORE
Upon Nutritional Assessment by the Registered Dietitian your patient was determined to meet criteria / has evidence of the following diagnosis/diagnoses:          [ ]  Mild Protein Calorie Malnutrition        [ ]  Moderate Protein Calorie Malnutrition        [x ] Severe Protein Calorie Malnutrition        [ ] Unspecified Protein Calorie Malnutrition        [ ] Underweight / BMI <19        [ ] Morbid Obesity / BMI > 40      Findings as based on:  •  Comprehensive nutrition assessment and consultation  •  Calorie counts (nutrient intake analysis)  •  Food acceptance and intake status from observations by staff  •  Follow up  •  Patient education  •  Intervention secondary to interdisciplinary rounds  •   concerns    8.3kg weight loss from admission  Suspect actual weight loss vs fluid shifts as pt was noted to be anasarcic, is on CVVHD and has notably severe muscle wasting in upper extremities.   Visitors at bedside unaware of pts UBW   Notable severe muscle wasting in upper extremities.     Treatment:    The following diet has been recommended:  1. With current propofol rate, recommend restarting TF w/ Jevity 1.5 @43ml/hr x24hrs +3 prostats (300kcal, 45g pro) via NGT (1032ml TV, 2218kcal, 110g pro, 784ml FW).   2. Monitor for s/s intolerance; maintain aspiration precautions at all times   3. Cont. w/micronutrient supplementation   4. Daily wts   5. Pain and bowel regimens per team discretion   *d/w MD. Order placed for MD verification.       PROVIDER Section:     By signing this assessment you are acknowledging and agree with the diagnosis/diagnoses assigned by the Registered Dietitian    Comments:

## 2019-11-20 NOTE — PROGRESS NOTE ADULT - SUBJECTIVE AND OBJECTIVE BOX
Meds:    artificial  tears Solution 1 Drop(s) Both EYES every 6 hours  ascorbic acid 500 milliGRAM(s) Oral daily  chlorhexidine 0.12% Liquid 15 milliLiter(s) Oral Mucosa every 12 hours  chlorhexidine 2% Cloths 1 Application(s) Topical daily  CRRT Treatment    <Continuous>  dextrose 40% Gel 15 Gram(s) Oral once PRN  dextrose 5%. 1000 milliLiter(s) IV Continuous <Continuous>  dextrose 50% Injectable 12.5 Gram(s) IV Push once  dextrose 50% Injectable 25 Gram(s) IV Push once  dextrose 50% Injectable 25 Gram(s) IV Push once  fentaNYL   Infusion. 0.5 MICROgram(s)/kG/Hr IV Continuous <Continuous>  glucagon  Injectable 1 milliGRAM(s) IntraMuscular once PRN  heparin  Injectable 5000 Unit(s) SubCutaneous every 8 hours  hydrocortisone sodium succinate Injectable 50 milliGRAM(s) IV Push every 6 hours  insulin lispro (HumaLOG) corrective regimen sliding scale   SubCutaneous every 6 hours  meropenem  IVPB 1000 milliGRAM(s) IV Intermittent every 8 hours  multivitamin/minerals/iron Oral Solution (CENTRUM) 15 milliLiter(s) Oral daily  nafcillin  IVPB 2 Gram(s) IV Intermittent every 4 hours  norepinephrine Infusion 0.05 MICROgram(s)/kG/Min IV Continuous <Continuous>  pantoprazole  Injectable 40 milliGRAM(s) IV Push daily  polyethylene glycol 3350 17 Gram(s) Oral two times a day  propofol Infusion 5 MICROgram(s)/kG/Min IV Continuous <Continuous>  PureFlow Dialysate RFP-400 (K 2 / Ca 3) 5000 milliLiter(s) CRRT <Continuous>  senna 2 Tablet(s) Oral at bedtime  sodium chloride 0.9% lock flush 3 milliLiter(s) IV Push every 8 hours  vitamin E 400 International Unit(s) Oral daily  zinc sulfate 220 milliGRAM(s) Oral daily      T(C): 35.9 (11-20-19 @ 10:05), Max: 38 (11-19-19 @ 17:35)  HR: 88 (11-20-19 @ 13:00) (68 - 108)  BP: 96/60 (11-20-19 @ 13:00) (89/55 - 136/87)  RR: 16 (11-20-19 @ 13:00) (15 - 30)  SpO2: 99% (11-20-19 @ 13:00) (97% - 100%)    Input/Output      11-19-19 @ 07:01  -  11-20-19 @ 07:00  --------------------------------------------------------  IN: 3243 mL / OUT: 4284 mL / NET: -1041 mL    11-20-19 @ 07:01  -  11-20-19 @ 14:08  --------------------------------------------------------  IN: 567.6 mL / OUT: 637 mL / NET: -69.4 mL            LABS:                          8.7    10.44 )-----------( 53       ( 20 Nov 2019 07:28 )             25.7       11-20    133<L>  |  99  |  21  ----------------------------<  123<H>  3.6   |  22  |  1.16    Ca    8.2<L>      20 Nov 2019 07:32  Phos  4.2     11-20  Mg     1.9     11-20    TPro  6.1  /  Alb  2.7<L>  /  TBili  6.8<H>  /  DBili  5.6<H>  /  AST  40  /  ALT  43  /  AlkPhos  75  11-20      PT/INR - ( 20 Nov 2019 07:28 )   PT: 15.5 sec;   INR: 1.36          PTT - ( 20 Nov 2019 07:28 )  PTT:35.1 sec        CARDIAC MARKERS ( 20 Nov 2019 07:32 )  x     / 0.06 ng/mL / x     / x     / x      CARDIAC MARKERS ( 20 Nov 2019 00:17 )  x     / 0.07 ng/mL / 16 U/L / x     / 1.1 ng/mL  CARDIAC MARKERS ( 19 Nov 2019 17:49 )  x     / 0.10 ng/mL / x     / x     / x      CARDIAC MARKERS ( 19 Nov 2019 10:53 )  x     / 0.12 ng/mL / x     / x     / x      CARDIAC MARKERS ( 19 Nov 2019 05:07 )  x     / 0.09 ng/mL / x     / x     / x      CARDIAC MARKERS ( 19 Nov 2019 03:33 )  x     / 0.07 ng/mL / x     / x     / x            RADIOLOGY:    < from: Xray Chest 1 View- PORTABLE-Urgent (11.19.19 @ 11:39) >    EXAM:  XR CHEST PORTABLE URGENT 1V                          PROCEDURE DATE:  11/19/2019          INTERPRETATION:  Clinical history/reason for exam: Pneumothorax.    Single frontal view.    Comparison: November 19, 2019    Findings/  impression:New additional right chest tube with decreased right   pneumothorax. Stable positioning of remaining support devices. Left tiny   pneumothorax, decreased.. Stable lung pathology. Heart size within normal   limits.         < end of copied text > on CVVHD with net even fluid balance  net negative ~1.0 L/24 hr from NGT, chest tubes and rectal tube  remains anuric  intubated, sedated, on pressors  s/p R chest tube on 11/19 for pneumothorax  s/p brochoscopy on 11/19, pending cultures          Meds:  artificial  tears Solution 1 Drop(s) Both EYES every 6 hours  ascorbic acid 500 milliGRAM(s) Oral daily  chlorhexidine 0.12% Liquid 15 milliLiter(s) Oral Mucosa every 12 hours  chlorhexidine 2% Cloths 1 Application(s) Topical daily  CRRT Treatment    <Continuous>  dextrose 40% Gel 15 Gram(s) Oral once PRN  dextrose 5%. 1000 milliLiter(s) IV Continuous <Continuous>  dextrose 50% Injectable 12.5 Gram(s) IV Push once  dextrose 50% Injectable 25 Gram(s) IV Push once  dextrose 50% Injectable 25 Gram(s) IV Push once  fentaNYL   Infusion. 0.5 MICROgram(s)/kG/Hr IV Continuous <Continuous>  glucagon  Injectable 1 milliGRAM(s) IntraMuscular once PRN  heparin  Injectable 5000 Unit(s) SubCutaneous every 8 hours  hydrocortisone sodium succinate Injectable 50 milliGRAM(s) IV Push every 6 hours  insulin lispro (HumaLOG) corrective regimen sliding scale   SubCutaneous every 6 hours  meropenem  IVPB 1000 milliGRAM(s) IV Intermittent every 8 hours  multivitamin/minerals/iron Oral Solution (CENTRUM) 15 milliLiter(s) Oral daily  nafcillin  IVPB 2 Gram(s) IV Intermittent every 4 hours  norepinephrine Infusion 0.05 MICROgram(s)/kG/Min IV Continuous <Continuous>  pantoprazole  Injectable 40 milliGRAM(s) IV Push daily  polyethylene glycol 3350 17 Gram(s) Oral two times a day  propofol Infusion 5 MICROgram(s)/kG/Min IV Continuous <Continuous>  PureFlow Dialysate RFP-400 (K 2 / Ca 3) 5000 milliLiter(s) CRRT <Continuous>  senna 2 Tablet(s) Oral at bedtime  sodium chloride 0.9% lock flush 3 milliLiter(s) IV Push every 8 hours  vitamin E 400 International Unit(s) Oral daily  zinc sulfate 220 milliGRAM(s) Oral daily      T(C): 35.9 (11-20-19 @ 10:05), Max: 38 (11-19-19 @ 17:35)  HR: 88 (11-20-19 @ 13:00) (68 - 108)  BP: 96/60 (11-20-19 @ 13:00) (89/55 - 136/87)  RR: 16 (11-20-19 @ 13:00) (15 - 30)  SpO2: 99% (11-20-19 @ 13:00) (97% - 100%)    Input/Output      11-19-19 @ 07:01  -  11-20-19 @ 07:00  --------------------------------------------------------  IN: 3243 mL / OUT: 4284 mL / NET: -1041 mL    11-20-19 @ 07:01  -  11-20-19 @ 14:08  --------------------------------------------------------  IN: 567.6 mL / OUT: 637 mL / NET: -69.4 mL        PHYSICAL EXAM  General: intubated and sedated, NAD  Neck: no JVD  Respiratory: equal breath sounds bilaterally, bilateral chest tubes  HEART: normal S1S2  ABDOMEN: Soft, distended; rectal tube in place  : [place] in place, anuric  EXTREMITIES: upper tight and sacral edema present/ necrotic U/L digits   NEUROLOGY: sedated  ACCESS: R IJ THC, site dry and clean        LABS:                          8.7    10.44 )-----------( 53       ( 20 Nov 2019 07:28 )             25.7       11-20    133<L>  |  99  |  21  ----------------------------<  123<H>  3.6   |  22  |  1.16    Ca    8.2<L>      20 Nov 2019 07:32  Phos  4.2     11-20  Mg     1.9     11-20    TPro  6.1  /  Alb  2.7<L>  /  TBili  6.8<H>  /  DBili  5.6<H>  /  AST  40  /  ALT  43  /  AlkPhos  75  11-20      PT/INR - ( 20 Nov 2019 07:28 )   PT: 15.5 sec;   INR: 1.36          PTT - ( 20 Nov 2019 07:28 )  PTT:35.1 sec        CARDIAC MARKERS ( 20 Nov 2019 07:32 )  x     / 0.06 ng/mL / x     / x     / x      CARDIAC MARKERS ( 20 Nov 2019 00:17 )  x     / 0.07 ng/mL / 16 U/L / x     / 1.1 ng/mL  CARDIAC MARKERS ( 19 Nov 2019 17:49 )  x     / 0.10 ng/mL / x     / x     / x      CARDIAC MARKERS ( 19 Nov 2019 10:53 )  x     / 0.12 ng/mL / x     / x     / x      CARDIAC MARKERS ( 19 Nov 2019 05:07 )  x     / 0.09 ng/mL / x     / x     / x      CARDIAC MARKERS ( 19 Nov 2019 03:33 )  x     / 0.07 ng/mL / x     / x     / x                RADIOLOGY:    < from: Xray Chest 1 View- PORTABLE-Urgent (11.19.19 @ 11:39) >    EXAM:  XR CHEST PORTABLE URGENT 1V                          PROCEDURE DATE:  11/19/2019          INTERPRETATION:  Clinical history/reason for exam: Pneumothorax.    Single frontal view.    Comparison: November 19, 2019    Findings/  impression:New additional right chest tube with decreased right   pneumothorax. Stable positioning of remaining support devices. Left tiny   pneumothorax, decreased.. Stable lung pathology. Heart size within normal   limits.         < end of copied text >

## 2019-11-20 NOTE — PROGRESS NOTE ADULT - SUBJECTIVE AND OBJECTIVE BOX
LABS/RADIOLOGY RESULTS:                          8.7    10.44 )-----------( 53       ( 20 Nov 2019 07:28 )             25.7   11-20    133<L>  |  99  |  21  ----------------------------<  123<H>  3.6   |  22  |  1.16    Ca    8.2<L>      20 Nov 2019 07:32  Phos  4.2     11-20  Mg     1.9     11-20    TPro  6.1  /  Alb  2.7<L>  /  TBili  6.8<H>  /  DBili  5.6<H>  /  AST  40  /  ALT  43  /  AlkPhos  75  11-20  PT/INR - ( 20 Nov 2019 07:28 )   PT: 15.5 sec;   INR: 1.36          PTT - ( 20 Nov 2019 07:28 )  PTT:35.1 secBlood Cultures    Blood Culture--   11-20 @ 01:37    Results  Testing in progress    Organism--    Organism ID--    Urine Culture   11-20 @ 01:37    --       Results  Testing in progress    Organism--    Organism ID--  Blood Culture--   11-19 @ 20:59    Results  Culture in progress    Organism--    Organism ID--    Urine Culture   11-19 @ 20:59    --       Results  Culture in progress    Organism--    Organism ID--  Blood Culture--   11-19 @ 16:49    Results  No growth at 12 hours    Organism--    Organism ID--    Urine Culture   11-19 @ 16:49    --       Results  No growth at 12 hours    Organism--    Organism ID--  Blood Culture--   11-18 @ 20:05    Results  No growth at 1 day.    Organism--    Organism ID--    Urine Culture   11-18 @ 20:05    --       Results  No growth at 1 day.    Organism--    Organism ID--  Blood Culture--   11-18 @ 16:25    Results  Few Staphylococcus aureus  Few Yeast    OrganismStaphylococcus aureus    Organism IDStaphylococcus aureus    Urine Culture   11-18 @ 16:25    --       Results  Few Staphylococcus aureus  Few Yeast    OrganismStaphylococcus aureus    Organism IDStaphylococcus aureus  Patient underwent bronchoscopy yesterday with suctioning of purulent secretions from airways. Afebrile overnight, abdomen less distended and soft    ICU Vital Signs Last 24 Hrs  T(C): 35.9 (20 Nov 2019 10:05), Max: 38.4 (19 Nov 2019 14:00)  T(F): 96.7 (20 Nov 2019 10:05), Max: 101.2 (19 Nov 2019 14:00)  HR: 88 (20 Nov 2019 11:00) (68 - 108)  BP: 94/58 (20 Nov 2019 11:00) (89/55 - 136/87)  BP(mean): 68 (20 Nov 2019 11:00) (65 - 109)  ABP: 90/56 (20 Nov 2019 11:00) (84/50 - 128/76)  ABP(mean): 70 (20 Nov 2019 11:00) (60 - 94)  RR: 17 (20 Nov 2019 11:00) (15 - 30)  SpO2: 100% (20 Nov 2019 11:00) (97% - 100%)      Respiratory: Intubated  CV: tachycardic  Abdominal: Soft, less distended, NGT in place with bilious output, rectal tube in place  Extremities: Necrosis of toes and fingertips, extremities warm Patient underwent bronchoscopy yesterday with suctioning of purulent secretions from airways. Afebrile overnight, abdomen less distended and soft    LABS/RADIOLOGY RESULTS:                          8.7    10.44 )-----------( 53       ( 20 Nov 2019 07:28 )             25.7   11-20    133<L>  |  99  |  21  ----------------------------<  123<H>  3.6   |  22  |  1.16          ICU Vital Signs Last 24 Hrs  T(C): 35.9 (20 Nov 2019 10:05), Max: 38.4 (19 Nov 2019 14:00)  T(F): 96.7 (20 Nov 2019 10:05), Max: 101.2 (19 Nov 2019 14:00)  HR: 88 (20 Nov 2019 11:00) (68 - 108)  BP: 94/58 (20 Nov 2019 11:00) (89/55 - 136/87)  BP(mean): 68 (20 Nov 2019 11:00) (65 - 109)  ABP: 90/56 (20 Nov 2019 11:00) (84/50 - 128/76)  ABP(mean): 70 (20 Nov 2019 11:00) (60 - 94)  RR: 17 (20 Nov 2019 11:00) (15 - 30)  SpO2: 100% (20 Nov 2019 11:00) (97% - 100%)      Respiratory: Intubated  CV: tachycardic  Abdominal: Soft, less distended, NGT in place with bilious output, rectal tube in place  Extremities: Necrosis of toes and fingertips, extremities warm

## 2019-11-20 NOTE — PROGRESS NOTE ADULT - ATTENDING COMMENTS
seen and evaluated while on CVVHD  tolerating the procedure well  adjusting dialysate solution and net balance as needed

## 2019-11-20 NOTE — PROGRESS NOTE ADULT - SUBJECTIVE AND OBJECTIVE BOX
Pt seen and examined at bedside.  Right chest tube placed for pneumothorax.  Remains on CVVHD.  Afebrile overnight and decreasing pressor requirement.  Opens eyes to verbal stimuli.    Allergies    Allergy Status Unknown    Intolerances      MEDICATIONS:  MEDICATIONS  (STANDING):  artificial  tears Solution 1 Drop(s) Both EYES every 6 hours  ascorbic acid 500 milliGRAM(s) Oral daily  chlorhexidine 0.12% Liquid 15 milliLiter(s) Oral Mucosa every 12 hours  chlorhexidine 2% Cloths 1 Application(s) Topical daily  CRRT Treatment    <Continuous>  dextrose 5%. 1000 milliLiter(s) (50 mL/Hr) IV Continuous <Continuous>  dextrose 50% Injectable 12.5 Gram(s) IV Push once  dextrose 50% Injectable 25 Gram(s) IV Push once  dextrose 50% Injectable 25 Gram(s) IV Push once  fentaNYL   Infusion. 0.5 MICROgram(s)/kG/Hr (3.68 mL/Hr) IV Continuous <Continuous>  heparin  Injectable 5000 Unit(s) SubCutaneous every 8 hours  hydrocortisone sodium succinate Injectable 50 milliGRAM(s) IV Push every 6 hours  insulin lispro (HumaLOG) corrective regimen sliding scale   SubCutaneous every 6 hours  meropenem  IVPB 1000 milliGRAM(s) IV Intermittent every 8 hours  multivitamin/minerals/iron Oral Solution (CENTRUM) 15 milliLiter(s) Oral daily  nafcillin  IVPB 2 Gram(s) IV Intermittent every 4 hours  norepinephrine Infusion 0.05 MICROgram(s)/kG/Min (3.45 mL/Hr) IV Continuous <Continuous>  pantoprazole  Injectable 40 milliGRAM(s) IV Push daily  Phoxillum Filtration BK 4 / 2.5 5000 milliLiter(s) (2500 mL/Hr) CRRT <Continuous>  polyethylene glycol 3350 17 Gram(s) Oral two times a day  propofol Infusion 5 MICROgram(s)/kG/Min (2.208 mL/Hr) IV Continuous <Continuous>  senna 2 Tablet(s) Oral at bedtime  sodium chloride 0.9% lock flush 3 milliLiter(s) IV Push every 8 hours  vitamin E 400 International Unit(s) Oral daily  zinc sulfate 220 milliGRAM(s) Oral daily    MEDICATIONS  (PRN):  dextrose 40% Gel 15 Gram(s) Oral once PRN Blood Glucose LESS THAN 70 milliGRAM(s)/deciliter  glucagon  Injectable 1 milliGRAM(s) IntraMuscular once PRN Glucose LESS THAN 70 milligrams/deciliter    Vital Signs Last 24 Hrs  T(C): 36.1 (20 Nov 2019 14:37), Max: 38 (19 Nov 2019 17:35)  T(F): 97 (20 Nov 2019 14:37), Max: 100.4 (19 Nov 2019 17:35)  HR: 88 (20 Nov 2019 16:00) (68 - 108)  BP: 90/59 (20 Nov 2019 16:00) (89/55 - 136/87)  BP(mean): 66 (20 Nov 2019 16:00) (65 - 109)  RR: 17 (20 Nov 2019 16:00) (14 - 26)  SpO2: 99% (20 Nov 2019 16:00) (99% - 100%)    11-19 @ 07:01  -  11-20 @ 07:00  --------------------------------------------------------  IN: 3243 mL / OUT: 4284 mL / NET: -1041 mL    11-20 @ 07:01  -  11-20 @ 16:23  --------------------------------------------------------  IN: 1018.6 mL / OUT: 902 mL / NET: 116.6 mL      PHYSICAL EXAM:  General: frail male intubated   HEENT: MMM, conjunctiva and sclera icterus  Gastrointestinal: Soft non-tender distended; Normal bowel sounds;  No rebound or guarding  neuro: sedated, unable to follow command        LABS:                        8.7    10.44 )-----------( 53       ( 20 Nov 2019 07:28 )             25.7     11-20    134<L>  |  100  |  19  ----------------------------<  133<H>  3.4<L>   |  24  |  1.07    Ca    8.4      20 Nov 2019 14:15  Phos  3.5     11-20  Mg     1.9     11-20    TPro  6.1  /  Alb  2.7<L>  /  TBili  6.8<H>  /  DBili  5.6<H>  /  AST  40  /  ALT  43  /  AlkPhos  75  11-20    PT/INR - ( 20 Nov 2019 07:28 )   PT: 15.5 sec;   INR: 1.36          PTT - ( 20 Nov 2019 07:28 )  PTT:35.1 sec    Culture - Fungal, Bronchial (collected 20 Nov 2019 01:37)  Source: .Bronchial broncial wash  Preliminary Report (20 Nov 2019 08:38):    Testing in progress    Culture - Acid Fast - Bronchial w/Smear (collected 20 Nov 2019 01:37)  Source: .Bronchial broncial wash    Culture - Bronchial (collected 19 Nov 2019 20:59)  Source: Bronch Wash broncial wash  Gram Stain (20 Nov 2019 09:18):    Numerous white blood cells    Rare epithelial cells    Rare Gram Positive Rods    Rare Gram Negative Rods  Preliminary Report (20 Nov 2019 09:19):    Culture in progress    Culture - Blood (collected 19 Nov 2019 16:49)  Source: .Blood Blood  Preliminary Report (20 Nov 2019 05:00):    No growth at 12 hours    Culture - Blood (collected 19 Nov 2019 16:49)  Source: .Blood Blood  Preliminary Report (20 Nov 2019 05:00):    No growth at 12 hours    Culture - Blood (collected 18 Nov 2019 20:05)  Source: .Blood Blood  Preliminary Report (19 Nov 2019 21:01):    No growth at 1 day.    Culture - Blood (collected 18 Nov 2019 20:05)  Source: .Blood Blood  Preliminary Report (19 Nov 2019 21:01):    No growth at 1 day.    Culture - Sputum (collected 18 Nov 2019 16:25)  Source: .Sputum Sputum  Gram Stain (19 Nov 2019 09:21):    Rare epithelial cells    Moderate White blood cells    Moderate Yeast like cells    Few Gram positive cocci in pairs  Final Report (20 Nov 2019 12:20):    Few Staphylococcus aureus    Few Yeast  Organism: Staphylococcus aureus (20 Nov 2019 12:20)  Organism: Staphylococcus aureus (20 Nov 2019 12:20)      RADIOLOGY & ADDITIONAL STUDIES: reviewed

## 2019-11-20 NOTE — CHART NOTE - NSCHARTNOTEFT_GEN_A_CORE
Admitting Diagnosis:   Patient is a 39y old  Male who presents with a chief complaint of Endocarditis h/o IVDU (20 Nov 2019 12:41)      PAST MEDICAL & SURGICAL HISTORY:  Endocarditis  IVDU (intravenous drug user)  Smoker  No significant past surgical history      Current Nutrition Order:  NPO for procedure  Pt has been NPO/off TF since 11/18    PO Intake: Good (%) [   ]  Fair (50-75%) [   ] Poor (<25%) [   ]- NA, EN dependant.  Meeting 0% EER 2/2 feeds turned off at present    GI Issues:   Unable to assess at this time 2/2 vent  W/ colonic distension likely 2/2 pseudoobstruction and decompressed small bowel  Rectal tube in place for colonic decompression   Per Sx, to c/w NGT to prevent air swallowing  C.Diff negative    Pain:   Unable to assess at this time 2/2 vent; sedated    Skin Integrity: Heber 12  B/L ankles DTIs  R. toes necrosis  R. ear DTI   Anasarcic       Labs:   11-20    133<L>  |  99  |  21  ----------------------------<  123<H>  3.6   |  22  |  1.16    Ca    8.2<L>      20 Nov 2019 07:32  Phos  4.2     11-20  Mg     1.9     11-20    TPro  6.1  /  Alb  2.7<L>  /  TBili  6.8<H>  /  DBili  5.6<H>  /  AST  40  /  ALT  43  /  AlkPhos  75  11-20    CAPILLARY BLOOD GLUCOSE      POCT Blood Glucose.: 94 mg/dL (20 Nov 2019 11:42)  POCT Blood Glucose.: 114 mg/dL (20 Nov 2019 05:39)  POCT Blood Glucose.: 164 mg/dL (19 Nov 2019 23:16)  POCT Blood Glucose.: 119 mg/dL (19 Nov 2019 17:38)      Medications:  MEDICATIONS  (STANDING):  artificial  tears Solution 1 Drop(s) Both EYES every 6 hours  ascorbic acid 500 milliGRAM(s) Oral daily  chlorhexidine 0.12% Liquid 15 milliLiter(s) Oral Mucosa every 12 hours  chlorhexidine 2% Cloths 1 Application(s) Topical daily  CRRT Treatment    <Continuous>  dextrose 5%. 1000 milliLiter(s) (50 mL/Hr) IV Continuous <Continuous>  dextrose 50% Injectable 12.5 Gram(s) IV Push once  dextrose 50% Injectable 25 Gram(s) IV Push once  dextrose 50% Injectable 25 Gram(s) IV Push once  fentaNYL   Infusion. 0.5 MICROgram(s)/kG/Hr (3.68 mL/Hr) IV Continuous <Continuous>  heparin  Injectable 5000 Unit(s) SubCutaneous every 8 hours  hydrocortisone sodium succinate Injectable 50 milliGRAM(s) IV Push every 6 hours  insulin lispro (HumaLOG) corrective regimen sliding scale   SubCutaneous every 6 hours  meropenem  IVPB 1000 milliGRAM(s) IV Intermittent every 8 hours  multivitamin/minerals/iron Oral Solution (CENTRUM) 15 milliLiter(s) Oral daily  nafcillin  IVPB 2 Gram(s) IV Intermittent every 4 hours  norepinephrine Infusion 0.05 MICROgram(s)/kG/Min (3.45 mL/Hr) IV Continuous <Continuous>  pantoprazole  Injectable 40 milliGRAM(s) IV Push daily  polyethylene glycol 3350 17 Gram(s) Oral two times a day  propofol Infusion 5 MICROgram(s)/kG/Min (2.208 mL/Hr) IV Continuous <Continuous>  PureFlow Dialysate RFP-400 (K 2 / Ca 3) 5000 milliLiter(s) (2500 mL/Hr) CRRT <Continuous>  senna 2 Tablet(s) Oral at bedtime  sodium chloride 0.9% lock flush 3 milliLiter(s) IV Push every 8 hours  vitamin E 400 International Unit(s) Oral daily  zinc sulfate 220 milliGRAM(s) Oral daily    MEDICATIONS  (PRN):  dextrose 40% Gel 15 Gram(s) Oral once PRN Blood Glucose LESS THAN 70 milliGRAM(s)/deciliter  glucagon  Injectable 1 milliGRAM(s) IntraMuscular once PRN Glucose LESS THAN 70 milligrams/deciliter      Weight: 65.3kg ([facility], 11/20)  77.4kg (11/13)  73.6kg (11/10)  Daily       Weight Change:   8.3kg weight loss from admission, etiology unclear  Suspect actual weight loss vs fluid shifts as pt was noted to be anasarcic, is on CVVHD and has notably severe muscle wasting in upper extremities.   Visitors at bedside unaware of pts UBW     Nutrition Focused Physical Exam: Completed [ X-11/15, 11/20 re-evaluated ]  Not Pertinent [   ]  Muscle Wasting- Temporal [X   ]  Clavicle/Pectoral [  X ]  Shoulder/Deltoid [   ]  Scapula [   ]  Interosseous [   ]  Quadriceps [   ]  Gastrocnemius [   ]   Severe PCM suspected.     Estimated energy needs: IBW (67.3kg) used to estimate needs 2/2 vent.   Needs increased 2/2 vent, sepsis, CVVHD, suspected malnutrition. Fluids per team 2/2 CVVHD  Calories: 30-35 kcal/kg = 2439-4921 kcal/day  Protein: 1.6-1.8 g/kg = 108-121g protein/day    Subjective:   40 yo/male with PMHx IVDA, presented to OSH w/cough, hemoptysis, and N/V. Found to be septic and hypotensive. CT chest +pna, pulmonary nodules w/septic emboli, and ELIAN +vegetation. Pt ultimately intubated and started on pressors. [facility] where he was deemed not to be a surgical candidate. Course c/b renal failure and B/L pleural effusions, s/p R. CT placement. Started on CVVH. ELIAN w/bubble negative for PFO. CTA negative for mesenteric ischemia and rectal exam negative for perirectal abscess, per surgery note. Underwent CT A/P 11/18 which demonstrated colonic distension likely 2/2 pseudoobstruction and decompressed small bowel. Per surgery recs, pt to continue w/ rectal tube for colonic decompression and plan for flex sig once stable. CT chest showing hydropneumothorax on R lunch and pneumothorax of L lunch; additional chest tube (set to suction) added yesterday. S/p bronch 11/19 w/ copious purulent sputum. CT surgery re-evaluated for surgical intervention however still considered to be too high risk. Remains on CVVHD, fluid overload being corrected by renal. Pt seen in room with family at bedside. Pt remains intubated on VC/AC mode, sedated on propofol @ 14mL/hr (369.6kcal/day from lipids). MAP 78- requiring levo for BP support. EN has been off since the 11/18 2/2 distension and GI evals. Plan to restart feeds today per MD. Last BM today 11/20 (diarrhea per flowsheet). WBC WNL, BUN/Cr WNL, Na 133,  (H) 11/17, POC , 124mg/dL.     Previous Nutrition Diagnosis:  Increased protein-calorie needs RT increased demand for protein-calorie intake AEB vent, CVVHD, hypermetabolic state, suspected malnutrition   Active [  x ]  Resolved [   ]    If resolved, new PES:      Goal: Pt will meet % of EER via tolerated route     Recommendations:  1. With current propofol rate, recommend restarting TF w/ Jevity 1.5 @43ml/hr x24hrs +3 prostats/day (300kcal, 45g pro) via NGT (This provides w/ propofol +prostats: 1032ml TV, 2218kcal, 110g pro, 784ml FW).   2. Monitor for s/s intolerance; maintain aspiration precautions at all times   3. Cont. w/micronutrient supplementation   4. Daily wts   5. Pain and bowel regimens per team discretion   *d/w MD. Order placed for MD verification.     Education: N/A- ventilator     Risk Level: High [ X  ] Moderate [   ] Low [   ].

## 2019-11-20 NOTE — PROGRESS NOTE ADULT - ATTENDING COMMENTS
Patient seen and examined with house-staff during bedside rounds.  Resident note read, including vitals, physical findings, laboratory data, and radiological reports.   Revisions included below.  Direct personal management at bed side and extensive interpretation of the data.  Plan was outlined and discussed in details with the housestaff.  Decision making of high complexity  Action taken for acute disease activity to reflect the level of care provided:  - medication reconciliation  - review laboratory data  I discussed the case with the family. Continue antibiotic. Followup on the culture from the bronchoscopy. The Gram stain was consistent with gram-negative rods. Follow on fungal and viral analysis. Continue mechanical ventilation. The anterior chest tube on the right still have fairly. TPA on the posterior right 2. Patient will be scheduled for tracheostomy. Continue to food. Continue weaning off pressors. The patient is on vasopressin. Avoid fluid overload. I discussed the case with cardiology and we'll repeat echocardiogram

## 2019-11-20 NOTE — PROGRESS NOTE ADULT - SUBJECTIVE AND OBJECTIVE BOX
INCOMPLETE NOTE    Infectious diseases progress note:  ULI HOLLAND is a 39yMale patient    ENDOCARDITIS/SEPSIS    CAMERON (acute kidney injury)  Acute endocarditis due to other organism      ROS:  CONSTITUTIONAL:  Negative fever or chills, feels well, good appetite  EYES:  Negative  blurry vision or double vision  CARDIOVASCULAR:  Negative for chest pain or palpitations  RESPIRATORY:  Negative for cough, wheezing, or SOB   GASTROINTESTINAL:  Negative for nausea, vomiting, diarrhea, constipation, or abdominal pain  GENITOURINARY:  Negative frequency, urgency or dysuria  NEUROLOGIC:  No headache, confusion, dizziness, lightheadedness    Allergies    Allergy Status Unknown    Intolerances        ANTIBIOTICS/RELEVANT:  antimicrobials  meropenem  IVPB 1000 milliGRAM(s) IV Intermittent every 8 hours  nafcillin  IVPB 2 Gram(s) IV Intermittent every 4 hours    immunologic:    OTHER:  artificial  tears Solution 1 Drop(s) Both EYES every 6 hours  ascorbic acid 500 milliGRAM(s) Oral daily  chlorhexidine 0.12% Liquid 15 milliLiter(s) Oral Mucosa every 12 hours  chlorhexidine 2% Cloths 1 Application(s) Topical daily  CRRT Treatment    <Continuous>  dextrose 40% Gel 15 Gram(s) Oral once PRN  dextrose 5%. 1000 milliLiter(s) IV Continuous <Continuous>  dextrose 50% Injectable 12.5 Gram(s) IV Push once  dextrose 50% Injectable 25 Gram(s) IV Push once  dextrose 50% Injectable 25 Gram(s) IV Push once  fentaNYL   Infusion. 0.5 MICROgram(s)/kG/Hr IV Continuous <Continuous>  glucagon  Injectable 1 milliGRAM(s) IntraMuscular once PRN  heparin  Injectable 5000 Unit(s) SubCutaneous every 8 hours  hydrocortisone sodium succinate Injectable 50 milliGRAM(s) IV Push every 6 hours  insulin lispro (HumaLOG) corrective regimen sliding scale   SubCutaneous every 6 hours  multivitamin/minerals/iron Oral Solution (CENTRUM) 15 milliLiter(s) Oral daily  norepinephrine Infusion 0.05 MICROgram(s)/kG/Min IV Continuous <Continuous>  pantoprazole  Injectable 40 milliGRAM(s) IV Push daily  polyethylene glycol 3350 17 Gram(s) Oral two times a day  propofol Infusion 5 MICROgram(s)/kG/Min IV Continuous <Continuous>  PureFlow Dialysate RFP-400 (K 2 / Ca 3) 5000 milliLiter(s) CRRT <Continuous>  senna 2 Tablet(s) Oral at bedtime  sodium chloride 0.9% lock flush 3 milliLiter(s) IV Push every 8 hours  vitamin E 400 International Unit(s) Oral daily  zinc sulfate 220 milliGRAM(s) Oral daily      Objective:  Vital Signs Last 24 Hrs  T(C): 35.9 (20 Nov 2019 10:05), Max: 38 (19 Nov 2019 17:35)  T(F): 96.7 (20 Nov 2019 10:05), Max: 100.4 (19 Nov 2019 17:35)  HR: 88 (20 Nov 2019 13:00) (68 - 108)  BP: 96/60 (20 Nov 2019 13:00) (89/55 - 136/87)  BP(mean): 69 (20 Nov 2019 13:00) (65 - 109)  RR: 16 (20 Nov 2019 13:00) (15 - 30)  SpO2: 99% (20 Nov 2019 13:00) (97% - 100%)    PHYSICAL EXAM:  Constitutional:Well-developed, well nourishe  Eyes:NANCY, EOMI  Ear/Nose/Throat: no oral lesion, no sinus tenderness on percussion	  Neck:no JVD, no lymphadenopathy, supple  Respiratory: CTA hellen  Cardiovascular: S1S2 RRR, no murmurs  Gastrointestinal:soft, (+) BS, no HSM  Extremities:no e/e/c        LABS:                        8.7    10.44 )-----------( 53       ( 20 Nov 2019 07:28 )             25.7     11-20    133<L>  |  99  |  21  ----------------------------<  123<H>  3.6   |  22  |  1.16    Ca    8.2<L>      20 Nov 2019 07:32  Phos  4.2     11-20  Mg     1.9     11-20    TPro  6.1  /  Alb  2.7<L>  /  TBili  6.8<H>  /  DBili  5.6<H>  /  AST  40  /  ALT  43  /  AlkPhos  75  11-20    PT/INR - ( 20 Nov 2019 07:28 )   PT: 15.5 sec;   INR: 1.36          PTT - ( 20 Nov 2019 07:28 )  PTT:35.1 sec        MICROBIOLOGY:  Culture Results:   Testing in progress (11-20 @ 01:37)  Culture Results:   Culture in progress (11-19 @ 20:59)  Culture Results:   No growth at 12 hours (11-19 @ 16:49)  Culture Results:   No growth at 12 hours (11-19 @ 16:49)        RADIOLOGY & ADDITIONAL STUDIES: Infectious diseases progress note:  ULI HOLLAND is a 39yMale patient    ENDOCARDITIS/SEPSIS    CAMERON (acute kidney injury)  Acute endocarditis due to other organism    Subjective:  Patient intubated, able to follow commands although ROS limited given patient intermittently answering questions. Patient reported no chest pain or SOB but does admit to abdominal pain      Allergies    Allergy Status Unknown    Intolerances        ANTIBIOTICS/RELEVANT:  antimicrobials  meropenem  IVPB 1000 milliGRAM(s) IV Intermittent every 8 hours  nafcillin  IVPB 2 Gram(s) IV Intermittent every 4 hours    immunologic:    OTHER:  artificial  tears Solution 1 Drop(s) Both EYES every 6 hours  ascorbic acid 500 milliGRAM(s) Oral daily  chlorhexidine 0.12% Liquid 15 milliLiter(s) Oral Mucosa every 12 hours  chlorhexidine 2% Cloths 1 Application(s) Topical daily  CRRT Treatment    <Continuous>  dextrose 40% Gel 15 Gram(s) Oral once PRN  dextrose 5%. 1000 milliLiter(s) IV Continuous <Continuous>  dextrose 50% Injectable 12.5 Gram(s) IV Push once  dextrose 50% Injectable 25 Gram(s) IV Push once  dextrose 50% Injectable 25 Gram(s) IV Push once  fentaNYL   Infusion. 0.5 MICROgram(s)/kG/Hr IV Continuous <Continuous>  glucagon  Injectable 1 milliGRAM(s) IntraMuscular once PRN  heparin  Injectable 5000 Unit(s) SubCutaneous every 8 hours  hydrocortisone sodium succinate Injectable 50 milliGRAM(s) IV Push every 6 hours  insulin lispro (HumaLOG) corrective regimen sliding scale   SubCutaneous every 6 hours  multivitamin/minerals/iron Oral Solution (CENTRUM) 15 milliLiter(s) Oral daily  norepinephrine Infusion 0.05 MICROgram(s)/kG/Min IV Continuous <Continuous>  pantoprazole  Injectable 40 milliGRAM(s) IV Push daily  polyethylene glycol 3350 17 Gram(s) Oral two times a day  propofol Infusion 5 MICROgram(s)/kG/Min IV Continuous <Continuous>  PureFlow Dialysate RFP-400 (K 2 / Ca 3) 5000 milliLiter(s) CRRT <Continuous>  senna 2 Tablet(s) Oral at bedtime  sodium chloride 0.9% lock flush 3 milliLiter(s) IV Push every 8 hours  vitamin E 400 International Unit(s) Oral daily  zinc sulfate 220 milliGRAM(s) Oral daily      Objective:  Vital Signs Last 24 Hrs  T(C): 35.9 (20 Nov 2019 10:05), Max: 38 (19 Nov 2019 17:35)  T(F): 96.7 (20 Nov 2019 10:05), Max: 100.4 (19 Nov 2019 17:35)  HR: 88 (20 Nov 2019 13:00) (68 - 108)  BP: 96/60 (20 Nov 2019 13:00) (89/55 - 136/87)  BP(mean): 69 (20 Nov 2019 13:00) (65 - 109)  RR: 16 (20 Nov 2019 13:00) (15 - 30)  SpO2: 99% (20 Nov 2019 13:00) (97% - 100%)    PHYSICAL EXAM:  Constitutional: Intubated  HEENT: NCAT, positive scleral icterus, dry MM   Neck: supple  Respiratory: b/l rhonchi throughout  Cardiovascular: S1, S2. RRR. systolic murmur appreciated on left 4th ICS  Gastrointestinal: Distended but soft, improved from yesterday. Generalized tenderness to palpation. BS+ x4 quadrants  Extremities: Extremities warm throughout. Tips of b/l toes cyanotic and cool to touch. B/l 2+ pedal edema in upper and lower extremities         LABS:                        8.7    10.44 )-----------( 53       ( 20 Nov 2019 07:28 )             25.7     11-20    133<L>  |  99  |  21  ----------------------------<  123<H>  3.6   |  22  |  1.16    Ca    8.2<L>      20 Nov 2019 07:32  Phos  4.2     11-20  Mg     1.9     11-20    TPro  6.1  /  Alb  2.7<L>  /  TBili  6.8<H>  /  DBili  5.6<H>  /  AST  40  /  ALT  43  /  AlkPhos  75  11-20    PT/INR - ( 20 Nov 2019 07:28 )   PT: 15.5 sec;   INR: 1.36          PTT - ( 20 Nov 2019 07:28 )  PTT:35.1 sec        MICROBIOLOGY:  Culture Results:   Testing in progress (11-20 @ 01:37)  Culture Results:   Culture in progress (11-19 @ 20:59)  Culture Results:   No growth at 12 hours (11-19 @ 16:49)  Culture Results:   No growth at 12 hours (11-19 @ 16:49)        RADIOLOGY & ADDITIONAL STUDIES:

## 2019-11-20 NOTE — PROGRESS NOTE ADULT - ASSESSMENT
40 y/o M smoker w/ PMHx of IVDU presented to [facility] w/ productive cough with hemoptysis found to have septic emboli w/ vegetation of the tricuspid valve transferred to [facility] for surgical evaluation.  Course complicated by sepsis, DIC, acute respiratory failure w/ empyema s/p reintubation and thoracostomy tube, CAMERON requiring cvvhd. Surgery reconsulted for abdominal distention with Xray demonstrating colonic and small bowel distention. Patient underwent CT abdomen pelvis on 11/18 which demonstrated colonic distention likely secondary to pseudoobstruction and decompressed small bowel. CT chest demonstrating hydropneumothorax on the right lung and pneumothorax of the left lung.     - Continue rectal tube for colonic decompression  - Continue NGT to prevent air swallowing which could worsen colonic decompression  - Minimize narcotic use

## 2019-11-20 NOTE — PROGRESS NOTE ADULT - ASSESSMENT
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 After being transferred to  CTICU for surgical evaluation, pt was deemed to not be a surgical candidate and transferred to MICU for medical mgmt. ID, Nephrology, heme/onc, surgery following, vascular.    Neuro  Re-intubated, sedated on Propofol, Fentanyl gtt. Prior CT head negative for any intracranial embolic events.      Cardiovascular     #Hypotension  Most likely 2/2 septic shock from infective endocarditis and RV failure 2/2 tricuspid regurgitation (ELIAN shows EF of 40-50%). Echo with EF 45%. ELIAN with EF 40-45%, 3.8 x1cm vegetation on TR valve, with severe TR, trivial pericardial effusion. Echo with bubble revealed no PFO. Restarted on levophed and Vasopressin.   - Maintain MAP>65.   - C/W medical mgmt of infective endocarditis as below.  - repeat echo with stable vegetation   - Likely became hypotensive and bradycardic overnight in setting of mucous plug vs arrythmia vs most likely septic shock     Respiratory    #Acute hypoxic respiratory failure   Most likely 2/2 alveolar hemorrhage in the setting of septic embolic causing numerous cavitary lesions on CT chest. CXR with scattered infiltrates and pleural effusions. Sputum culture negative. CT revealed "moderate bilateral pleural effusions and dense bibasilar consolidation with underlying septic emboli/pulmonary abscesses."   - Re-intubated on 11/19 for acute hypoxic respiratory failure with increased work of breathing   - CT chest with new b/l loculated pneumothoraces with increase in size of cavitary lesions with worsening lobar PNA   - Additional R chest tube place- now with 2 right chest tubes and 1 left chest tube, may need to insert additional L chest tube--->repeat cxr  with decrease in size of b/l pneumothoraces   - tPAse flush for R chest tube in AM and PM   - Continue to treat IE as below    #Bilateral pulmonary effusion  Most likely empyema in setting of septic shock 2/2 infective endocarditis. Bilateral CT in place, confirmed by CXR, continues to drain serosanguinous fluid. Fluid analysis of both CT pleural fluid confirms complex exudate with high cellularity, low pH and low glucose. pleural fluid cultures with staph aureus. Chest tubes in place as above (2 R chest tubes, 1 L chest tube)   - Monitor output of bilateral chest tubes  - F/U pleural fluid cultures  - CT chest and cxr as above. Chest tube set to suction.      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 Not a surgical candidate for a tricuspid valve replacement at this time as per CT surgery. Lactate 2.0 today. Initial blood cultures positive for staph epidermidis, likely a contaminant. Initial sputum culture positive for staph aureus  Repeat blood cultures with NGTD. Sputum cx NGTD.  - ID following, C/W Nafcillin 2g q4 (Sensitive to Oxacillin as per OSH records), f/u recs   - Start Vancomycin and Meropenem (MRSA/gram neg coverage) in setting of continued septic shock (spiked fever to 101F tachycardic, leukocytosis). F/u MRSA nasal PCR   - F/u repeat Bcxs   - Bronch performed 11/19 with copious purulent sputum- f/u BAL cxs   - Per CT surgery pt is too high risk for any surgery and would likely not survive procedure.   - Cardiology consulted to optimize management of his right heart dysfunction, f/u recs    - Monitor CBC     Renal    #Acute renal failure most likely 2/2 ATN from hypoperfusion, Minimal urine output, on CVVHD, oliguric with 0-5cc/hr. Vancomycin level 37 on arrival so may be component of drug induced nephropathy. Bun/Cr continues to improve while on CVVHD. Pt is clinically fluid overloaded, and hemodynamically stable. No renal infarcts as per CT abdomen/pelvis. Optimized for Sx as per nephrology.  - F/U nephrology recs.  - C/W CVVHD as tolerated, monitor for clotting  - Continue to correct fluid overload with HD. Goal net even for now   - Monitor renal function with BUN/Cr  - Monitor I/Os    #Electrolytes abnormalities   Improved. On CVVHD. Hyperkalemia resolved. No EKG changes.  -Requires q6 BMP, Mg, and Phos while on CVVHD  -Monitor serum lytes     GI    OG on arrival with 600cc of bilious fluid, abdomen still distended but had several BMs, some loose. CT abdomen revealed no evidence of bowel ischemia or SBO. Sump was DCed and replaced with NG tube. CT abdomen/pelvis revealed "irregular masslike mural thickening of the posterior left lateral wall of the rectum." Surgery consulted, unlikely perirectal abscess. Recommend GI evaluation for possible scope.  - NG tube in place  - C/W tube feeds, Jevity 1.5  - F/u GI consult, f/u recs, when stable will go for flex sig   - Abdominal xray with air-filled loops of small/large bowel with concern for distal obstruction vs ileus. Surgery consulted. Rectal tube and sump placed. CT abd/pelvis with abd/pelvic, anasarca, colonic ileus. Rectal tube in place for colonic decompression, continue to monitor   -C. diff negative     #Congestive Hepatopathy   Transaminitis, coag derangements, hyperbilirubinemia most likely 2/2 hepatic congestion vs hemolysis, due to severe TR in setting of infective endocarditis. Transaminases and Alk phos improving. Hepatitis panel negative.  - Monitor CMP, direct bili   - Avoid Tylenol    Heme    #Hemolysis  Intravascular hemolysis with inital haptoglobin <10, LDH decreasing at 365. D-dimer elevated. Fibrinogen stable 396. Today still clinically jaundiced, with stable bilirubinemia: T.bili 9.9., D.bili 8.5. h/h stable at 8.1/25.5. Platelets decreasing from 54 to 49.. Fact VII/Factor VIII elevated. Unlikely 2/2 CVVHD. Likely 2/2 to sepsis and/or DIC.   - Hgb in AM 7, transfused 1u pRBCs   - Avoid anticoagulation  - Heme consulted, follow recs  - Monitor direct and total bili, LDH, fibrinogen, platelets   - Transfuse platelets if <10K or bleeding and <50K  - Transfuse pRBCs for hgb <7     Vascular  Vascular surgery consulted in setting of gangrenous distal toes and fingers b/l. Likely 2/2 to pressors. Will follow up recs.     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 After being transferred to  CTICU for surgical evaluation, pt was deemed to not be a surgical candidate and transferred to MICU for medical mgmt. ID, Nephrology, heme/onc, surgery following, vascular.    Neuro  Re-intubated, sedated on Propofol, Fentanyl gtt. Prior CT head negative for any intracranial embolic events.      Cardiovascular     #Hypotension  Most likely 2/2 septic shock from infective endocarditis and RV failure 2/2 tricuspid regurgitation (ELIAN shows EF of 40-50%). Echo with EF 45%. ELIAN with EF 40-45%, 3.8 x1cm vegetation on TR valve, with severe TR, trivial pericardial effusion. Echo with bubble revealed no PFO. Now off Vasopressin, wean off Levophed as tolerated.   - Maintain MAP>65.   - C/W medical mgmt of infective endocarditis as below.  - repeat echo 11/18 with stable vegetation  - Likely became hypotensive and bradycardic overnight (11//19) in setting of mucous plug vs arrythmia vs most likely septic shock   - repeat echo 11/20 with large stable vegetation, severe TR, markedly dilated IVC with increased R atrial pressure- EF 60%       Respiratory    #Acute hypoxic respiratory failure   Most likely 2/2 alveolar hemorrhage in the setting of septic embolic causing numerous cavitary lesions on CT chest. CXR with scattered infiltrates and pleural effusions. Sputum culture negative. CT revealed "moderate bilateral pleural effusions and dense bibasilar consolidation with underlying septic emboli/pulmonary abscesses."   - Re-intubated on 11/19 for acute hypoxic respiratory failure with increased work of breathing   - CT chest with new b/l loculated pneumothoraces with increase in size of cavitary lesions with worsening lobar PNA   - Additional R chest tube place- now with 2 right chest tubes and 1 left chest tube, may need to insert additional L chest tube--->repeat cxr  with decrease in size of b/l pneumothoraces   - tPAse flush for R chest tube   - Continue to treat IE as below    #Bilateral pulmonary effusion  Most likely empyema in setting of septic shock 2/2 infective endocarditis. Bilateral CT in place, confirmed by CXR, continues to drain serosanguinous fluid. Fluid analysis of both CT pleural fluid confirms complex exudate with high cellularity, low pH and low glucose. pleural fluid cultures with staph aureus. Chest tubes in place as above (2 R chest tubes, 1 L chest tube)   - Monitor output of bilateral chest tubes  - R pleural fluid cultures with staph aureus, L pleural fluid cultures with NGTD   - CT chest and cxr as above. Chest tube set to suction.      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 Not a surgical candidate for a tricuspid valve replacement at this time as per CT surgery. Lactate 2.0 today. Initial blood cultures positive for staph epidermidis, likely a contaminant. Initial sputum culture positive for staph aureus  Repeat blood cultures with NGTD. Sputum cx NGTD.  - ID following, C/W Nafcillin 2g q4 (Sensitive to Oxacillin as per OSH records), f/u recs   - Continue Meropenem (gram neg coverage) in setting of septic shock on night of 11/18 (spiked fever to 101F tachycardic, leukocytosis)  - DC Vancomycin as MRSA PCR negative   - repeat Bcxs with NGTD   - Bronch performed 11/19 with copious purulent sputum-  BAL cxs with numerous wbcs, rare gram positive rods/ rare gram negative rods, AFB negative   - Per CT surgery pt is too high risk for any surgery and would likely not survive procedure.   - Cardiology consulted to optimize management of his right heart dysfunction, f/u recs    - Monitor CBC     Renal    #Acute renal failure most likely 2/2 ATN from hypoperfusion, Minimal urine output, on CVVHD, oliguric with 0-5cc/hr. Vancomycin level 37 on arrival so may be component of drug induced nephropathy. Bun/Cr continues to improve while on CVVHD. Pt is clinically fluid overloaded but now improved and hemodynamically stable. No renal infarcts as per CT abdomen/pelvis. Optimized for Sx as per nephrology.  - F/U nephrology recs.  - C/W CVVHD as tolerated, monitor for clotting  - Continue to correct fluid overload with HD  - Monitor renal function with BUN/Cr  - Monitor I/Os  - DC raya, blader scan, with straight cath if needed     #Electrolytes abnormalities   Improved. On CVVHD. Hyperkalemia resolved. No EKG changes.  -Requires q6 BMP, Mg, and Phos while on CVVHD  -Monitor serum lytes       GI    OG on arrival with 600cc of bilious fluid, abdomen still distended but had several BMs, some loose. CT abdomen revealed no evidence of bowel ischemia or SBO. Sump was DCed and replaced with NG tube. CT abdomen/pelvis revealed "irregular masslike mural thickening of the posterior left lateral wall of the rectum." Surgery consulted, unlikely perirectal abscess. Recommend GI evaluation for possible scope.  - NG tube in place  - C/W tube feeds, Jevity 1.5  - F/u GI consult, f/u recs, when stable will go for flex sig   - Abdominal xray with air-filled loops of small/large bowel with concern for distal obstruction vs ileus. Surgery consulted. Rectal tube and sump placed. CT abd/pelvis with abd/pelvic, anasarca, colonic ileus. Rectal tube in place for colonic decompression, continue to monitor   -C. diff negative     #Congestive Hepatopathy   Transaminitis, coag derangements, hyperbilirubinemia most likely 2/2 hepatic congestion vs hemolysis, due to severe TR in setting of infective endocarditis. Transaminases and Alk phos improving. Hepatitis panel negative.  - Monitor CMP, direct bili   - Avoid Tylenol    Heme    #Hemolysis  Intravascular hemolysis with inital haptoglobin <10, LDH decreasing at 365. D-dimer elevated. Fibrinogen stable 396. Today still clinically jaundiced, with stable bilirubinemia: T.bili 9.9., D.bili 8.5. h/h stable at 8.1/25.5. Platelets decreasing from 54 to 49.. Fact VII/Factor VIII elevated. Unlikely 2/2 CVVHD. Likely 2/2 to sepsis and/or DIC.   - Hgb in AM 8.7, s/p 2u pRBCs   - Avoid anticoagulation  - Heme consulted, follow recs  - Monitor direct and total bili, LDH, fibrinogen, platelets   - Transfuse platelets if <10K or bleeding and <50K  - Transfuse pRBCs for hgb <7     Vascular  Vascular surgery consulted in setting of gangrenous distal toes and fingers b/l. Likely 2/2 to pressors. Will follow up recs.     Lines: right IJ TLC and Ernesto (11/12), Axillary A-line (11/12), Left IJ (11/12), right peripheral (11/12)    F: None   E: replete K <4, Mg <2  N: Jevity 1.5   GI Ppx: Protonix   DVT Ppx: Heparin   Code status: FULL   Dispo: MICU

## 2019-11-21 NOTE — PROGRESS NOTE ADULT - SUBJECTIVE AND OBJECTIVE BOX
patient seen and examined at bedside   abdominal exam improved   no acute events overnight   afebrile     ICU Vital Signs Last 24 Hrs  T(C): 36.2 (21 Nov 2019 01:01), Max: 36.7 (20 Nov 2019 22:10)  T(F): 97.1 (21 Nov 2019 01:01), Max: 98.1 (20 Nov 2019 22:10)  HR: 96 (21 Nov 2019 06:00) (68 - 105)  BP: 97/62 (21 Nov 2019 06:00) (83/59 - 121/75)  BP(mean): 73 (21 Nov 2019 06:00) (63 - 90)  ABP: 84/50 (21 Nov 2019 06:00) (84/50 - 122/88)  ABP(mean): 62 (21 Nov 2019 06:00) (62 - 100)  RR: 20 (21 Nov 2019 06:00) (14 - 25)  SpO2: 99% (21 Nov 2019 06:00) (99% - 100%)    I&O's Summary    19 Nov 2019 07:01  -  20 Nov 2019 07:00  --------------------------------------------------------  IN: 3243 mL / OUT: 4284 mL / NET: -1041 mL    20 Nov 2019 07:01  -  21 Nov 2019 06:39  --------------------------------------------------------  IN: 2512.6 mL / OUT: 2678 mL / NET: -165.4 mL      Respiratory: Intubated  CV: tachycardic  Abdominal: Soft, less distended, NGT in place with bilious output, rectal tube in place                          8.7    10.44 )-----------( 53       ( 20 Nov 2019 07:28 )             25.7     11-21    132<L>  |  101  |  18  ----------------------------<  140<H>  3.9   |  22  |  1.00    Ca    8.1<L>      21 Nov 2019 03:01  Phos  2.9     11-21  Mg     2.3     11-21    TPro  6.1  /  Alb  2.7<L>  /  TBili  6.8<H>  /  DBili  5.6<H>  /  AST  40  /  ALT  43  /  AlkPhos  75  11-20

## 2019-11-21 NOTE — PROGRESS NOTE ADULT - ATTENDING COMMENTS
I have reviewed the medical record, including laboratory and radiographic studies, interviewed and examined the patient and discussed the plan with Dr. Carlson, the ID Resident, MICU team and Dr. Worley.  Agree with above. Will continue to follow with you – ID Team 1.

## 2019-11-21 NOTE — PROGRESS NOTE ADULT - ASSESSMENT
38 y/o M smoker w/ PMHx of IVDU presented to [facility] w/ productive cough with hemoptysis found to have septic emboli w/ vegetation of the tricuspid valve transferred to [facility] for surgical evaluation.  Course complicated by sepsis, DIC, acute respiratory failure w/ empyema s/p reintubation and thoracostomy tube, CAMERON requiring cvvhd. Surgery reconsulted for abdominal distention with Xray demonstrating colonic and small bowel distention. Patient underwent CT abdomen pelvis on 11/18 which demonstrated colonic distention likely secondary to pseudoobstruction and decompressed small bowel. CT chest demonstrating hydropneumothorax on the right lung and pneumothorax of the left lung.     - Continue rectal tube for colonic decompression  - Continue NGT to prevent air swallowing which could worsen colonic decompression  - Minimize narcotic use  - plan for GI flex sigmoidoscopy when more stable 38 y/o M smoker w/ PMHx of IVDU presented to [facility] w/ productive cough with hemoptysis found to have septic emboli w/ vegetation of the tricuspid valve transferred to [facility] for surgical evaluation.  Course complicated by sepsis, DIC, acute respiratory failure w/ empyema s/p reintubation and thoracostomy tube, CAMERON requiring cvvhd. Surgery reconsulted for abdominal distention with Xray demonstrating colonic and small bowel distention. Patient underwent CT abdomen pelvis on 11/18 which demonstrated colonic distention likely secondary to pseudoobstruction and decompressed small bowel. CT chest demonstrating hydropneumothorax on the right lung and pneumothorax of the left lung.     - Continue rectal tube for colonic decompression  - would repeat axr   - Minimize narcotic use  - plan for GI flex sigmoidoscopy when more stable

## 2019-11-21 NOTE — PROGRESS NOTE ADULT - ASSESSMENT
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 After being transferred to  CTICU for surgical evaluation, pt was deemed to not be a surgical candidate and transferred to MICU for medical mgmt. ID, Nephrology, heme/onc, surgery following, vascular.    Neuro  Re-intubated, sedated on Propofol, Fentanyl gtt. Prior CT head negative for any intracranial embolic events.      Cardiovascular     #Hypotension  Most likely 2/2 septic shock from infective endocarditis and RV failure 2/2 tricuspid regurgitation (ELIAN shows EF of 40-50%). Echo with EF 45%. ELIAN with EF 40-45%, 3.8 x1cm vegetation on TR valve, with severe TR, trivial pericardial effusion. Echo with bubble revealed no PFO. Now off Vasopressin, wean off Levophed as tolerated.   - Maintain MAP>65.   - C/W medical mgmt of infective endocarditis as below.  - repeat echo 11/18 with stable vegetation  - Likely became hypotensive and bradycardic overnight (11//19) in setting of mucous plug vs arrythmia vs most likely septic shock   - repeat echo 11/20 with large stable vegetation, severe TR, markedly dilated IVC with increased R atrial pressure- EF 60%       Respiratory    #Acute hypoxic respiratory failure   Most likely 2/2 alveolar hemorrhage in the setting of septic embolic causing numerous cavitary lesions on CT chest. CXR with scattered infiltrates and pleural effusions. Sputum culture negative. CT revealed "moderate bilateral pleural effusions and dense bibasilar consolidation with underlying septic emboli/pulmonary abscesses."   - Re-intubated on 11/19 for acute hypoxic respiratory failure with increased work of breathing   - CT chest with new b/l loculated pneumothoraces with increase in size of cavitary lesions with worsening lobar PNA   - Additional R chest tube place- now with 2 right chest tubes and 1 left chest tube, may need to insert additional L chest tube--->repeat cxr  with decrease in size of b/l pneumothoraces   - tPAse flush for R chest tube   - Continue to treat IE as below    #Bilateral pulmonary effusion  Most likely empyema in setting of septic shock 2/2 infective endocarditis. Bilateral CT in place, confirmed by CXR, continues to drain serosanguinous fluid. Fluid analysis of both CT pleural fluid confirms complex exudate with high cellularity, low pH and low glucose. pleural fluid cultures with staph aureus. Chest tubes in place as above (2 R chest tubes, 1 L chest tube)   - Monitor output of bilateral chest tubes  - R pleural fluid cultures with staph aureus, L pleural fluid cultures with NGTD   - CT chest and cxr as above. Chest tube set to suction.      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 Not a surgical candidate for a tricuspid valve replacement at this time as per CT surgery. Lactate 2.0 today. Initial blood cultures positive for staph epidermidis, likely a contaminant. Initial sputum culture positive for staph aureus  Repeat blood cultures with NGTD. Sputum cx NGTD.  - ID following, C/W Nafcillin 2g q4 (Sensitive to Oxacillin as per OSH records), f/u recs   - Continue Meropenem (gram neg coverage) in setting of septic shock on night of 11/18 (spiked fever to 101F tachycardic, leukocytosis)  - DC Vancomycin as MRSA PCR negative   - repeat Bcxs with NGTD   - Bronch performed 11/19 with copious purulent sputum-  BAL cxs with numerous wbcs, rare gram positive rods/ rare gram negative rods, AFB negative   - Per CT surgery pt is too high risk for any surgery and would likely not survive procedure.   - Cardiology consulted to optimize management of his right heart dysfunction, f/u recs    - Monitor CBC     Renal    #Acute renal failure most likely 2/2 ATN from hypoperfusion, Minimal urine output, on CVVHD, oliguric with 0-5cc/hr. Vancomycin level 37 on arrival so may be component of drug induced nephropathy. Bun/Cr continues to improve while on CVVHD. Pt is clinically fluid overloaded but now improved and hemodynamically stable. No renal infarcts as per CT abdomen/pelvis. Optimized for Sx as per nephrology.  - F/U nephrology recs.  - C/W CVVHD as tolerated, monitor for clotting  - Continue to correct fluid overload with HD  - Monitor renal function with BUN/Cr  - Monitor I/Os  - DC raya, blader scan, with straight cath if needed     #Electrolytes abnormalities   Improved. On CVVHD. Hyperkalemia resolved. No EKG changes.  -Requires q6 BMP, Mg, and Phos while on CVVHD  -Monitor serum lytes       GI    OG on arrival with 600cc of bilious fluid, abdomen still distended but had several BMs, some loose. CT abdomen revealed no evidence of bowel ischemia or SBO. Sump was DCed and replaced with NG tube. CT abdomen/pelvis revealed "irregular masslike mural thickening of the posterior left lateral wall of the rectum." Surgery consulted, unlikely perirectal abscess. Recommend GI evaluation for possible scope.  - NG tube in place  - C/W tube feeds, Jevity 1.5  - F/u GI consult, f/u recs, when stable will go for flex sig   - Abdominal xray with air-filled loops of small/large bowel with concern for distal obstruction vs ileus. Surgery consulted. Rectal tube and sump placed. CT abd/pelvis with abd/pelvic, anasarca, colonic ileus. Rectal tube in place for colonic decompression, continue to monitor   -C. diff negative     #Congestive Hepatopathy   Transaminitis, coag derangements, hyperbilirubinemia most likely 2/2 hepatic congestion vs hemolysis, due to severe TR in setting of infective endocarditis. Transaminases and Alk phos improving. Hepatitis panel negative.  - Monitor CMP, direct bili   - Avoid Tylenol    Heme    #Hemolysis  Intravascular hemolysis with inital haptoglobin <10, LDH decreasing at 365. D-dimer elevated. Fibrinogen stable 396. Today still clinically jaundiced, with stable bilirubinemia: T.bili 9.9., D.bili 8.5. h/h stable at 8.1/25.5. Platelets decreasing from 54 to 49.. Fact VII/Factor VIII elevated. Unlikely 2/2 CVVHD. Likely 2/2 to sepsis and/or DIC.   - Hgb in AM 8.7, s/p 2u pRBCs   - Avoid anticoagulation  - Heme consulted, follow recs  - Monitor direct and total bili, LDH, fibrinogen, platelets   - Transfuse platelets if <10K or bleeding and <50K  - Transfuse pRBCs for hgb <7     Vascular  Vascular surgery consulted in setting of gangrenous distal toes and fingers b/l. Likely 2/2 to pressors. Will follow up recs.     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 After being transferred to  CTICU for surgical evaluation, pt was deemed to not be a surgical candidate and transferred to MICU for medical mgmt. ID, Nephrology, heme/onc, surgery following, vascular.    Neuro  Intubated, sedated on Propofol, Fentanyl gtt. Prior CT head negative for any intracranial embolic events.      Cardiovascular     #Hypotension  Most likely 2/2 septic shock from infective endocarditis and RV failure 2/2 tricuspid regurgitation (ELIAN shows EF of 40-50%). Echo with EF 45%. ELIAN with EF 40-45%, 3.8 x1cm vegetation on TR valve, with severe TR, trivial pericardial effusion. Echo with bubble revealed no PFO. Now off Vasopressin, wean off Levophed as tolerated.   - Maintain MAP>65.   - C/W medical mgmt of infective endocarditis as below.  - repeat echo 11/18 with stable vegetation  - Likely became hypotensive and bradycardic overnight (11/18-11/19) in setting of mucous plug vs arrythmia vs most likely septic shock   - repeat echo 11/20 with large stable vegetation, severe TR, markedly dilated IVC with increased R atrial pressure- EF 60%       Respiratory    #Acute hypoxic respiratory failure   Most likely 2/2 alveolar hemorrhage in the setting of septic embolic causing numerous cavitary lesions on CT chest. CXR with scattered infiltrates and pleural effusions. Sputum culture negative. CT revealed "moderate bilateral pleural effusions and dense bibasilar consolidation with underlying septic emboli/pulmonary abscesses."   - Re-intubated on 11/19 for acute hypoxic respiratory failure with increased work of breathing   - CT chest with new b/l loculated pneumothoraces with increase in size of cavitary lesions with worsening lobar PNA   - now with 2 right chest tubes and 1 left chest tube-->repeat cxr  with decrease in size of b/l pneumothoraces   - cxr 11/21 with small right pneumothorax   - Hold off on trach for now, will reconsider for tomorrow or Monday depending on clinical status and need for further tx   - Start Duonebs q4  - Continue to treat IE as below    #Bilateral pulmonary effusion  Most likely empyema in setting of septic shock 2/2 infective endocarditis. Bilateral CT in place, confirmed by CXR, continues to drain serosanguinous fluid. Fluid analysis of both CT pleural fluid confirms complex exudate with high cellularity, low pH and low glucose. pleural fluid cultures with staph aureus. Chest tubes in place as above (2 R chest tubes, 1 L chest tube)   - Monitor output of bilateral chest tubes  - R pleural fluid cultures with staph aureus, L pleural fluid cultures with NGTD   - CT chest and cxr as above. Chest tube set to suction.      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 Not a surgical candidate for a tricuspid valve replacement at this time as per CT surgery. Lactate 2.0 today. Initial blood cultures positive for staph epidermidis, likely a contaminant. Initial sputum culture positive for staph aureus  Repeat blood cultures with NGTD. Sputum cx NGTD.  - ID following, C/W Nafcillin 2g q4 (Sensitive to Oxacillin as per OSH records), f/u recs   - Continue Meropenem (gram neg coverage) in setting of septic shock on night of 11/18 (spiked fever to 101F tachycardic, leukocytosis)  - DC Vancomycin as MRSA PCR negative   - repeat Bcxs with NGTD   - Bronch performed 11/19 with copious purulent sputum-  BAL cxs with numerous wbcs, rare gram positive rods/ rare gram negative rods, AFB negative. F/u cytopathology and cultures  - Bronch findings with possible herpetic tracheobronchitis, awaiting results as above. Will likely repeat bronch tomorrow with possible trach for tomorrow or Monday   - Per CT surgery pt is too high risk for any surgery and would likely not survive procedure.   - Cardiology consulted to optimize management of his right heart dysfunction, f/u recs    - Monitor CBC     Renal    #Acute renal failure most likely 2/2 ATN from hypoperfusion, Minimal urine output, on CVVHD, oliguric with 0-5cc/hr. Vancomycin level 37 on arrival so may be component of drug induced nephropathy. Bun/Cr continues to improve while on CVVHD. Pt is clinically fluid overloaded but now improved and hemodynamically stable. No renal infarcts as per CT abdomen/pelvis. Optimized for Sx as per nephrology.  - F/U nephrology recs.  - C/W CVVHD as tolerated, monitor for clotting  - Continue to correct fluid overload with HD  - Monitor renal function with BUN/Cr  - Monitor I/Os  - DC raya, blader scan, with straight cath if needed     #Electrolytes abnormalities   Improved. On CVVHD. Hyperkalemia resolved. No EKG changes.  -Requires q6 BMP, Mg, and Phos while on CVVHD  -Monitor serum lytes       GI    OG on arrival with 600cc of bilious fluid, abdomen still distended but had several BMs, some loose. CT abdomen revealed no evidence of bowel ischemia or SBO. Sump was DCed and replaced with NG tube. CT abdomen/pelvis revealed "irregular masslike mural thickening of the posterior left lateral wall of the rectum." Surgery consulted, unlikely perirectal abscess. Recommend GI evaluation for possible scope.  - NG tube in place  - C/W tube feeds, Jevity 1.5  - F/u GI consult, f/u recs, when stable will go for flex sig   - Abdominal xray with air-filled loops of small/large bowel with concern for distal obstruction vs ileus. Surgery consulted. Rectal tube and sump placed. CT abd/pelvis with abd/pelvic, anasarca, colonic ileus. Rectal tube in place for colonic decompression, continue to monitor. Abdomen still distended but significantly improved- f/u repeat abd xray   -C. diff negative     #Congestive Hepatopathy   Transaminitis, coag derangements, hyperbilirubinemia most likely 2/2 hepatic congestion vs hemolysis, due to severe TR in setting of infective endocarditis. Transaminases and Alk phos improving. Hepatitis panel negative.  - Monitor CMP, direct bili   - Avoid Tylenol    Heme    #Hemolysis  Intravascular hemolysis with inital haptoglobin <10, LDH decreasing at 365. D-dimer elevated. Fibrinogen stable 396. Today still clinically jaundiced, with stable bilirubinemia: T.bili 9.9., D.bili 8.5. h/h stable at 8.1/25.5. Platelets decreasing from 54 to 49.. Fact VII/Factor VIII elevated. Unlikely 2/2 CVVHD. Likely 2/2 to sepsis and/or DIC.   - s/p 2u pRBCs (11/19-11/20). Hgb stable at 8.5   - Avoid anticoagulation  - Heme consulted, follow recs  - Monitor direct and total bili, LDH, fibrinogen, platelets   - Transfuse platelets if <10K or bleeding and <50K  - Transfuse pRBCs for hgb <7     Vascular  Vascular surgery consulted in setting of gangrenous distal toes and fingers b/l. Likely 2/2 to pressors. Will follow up recs.     Lines: right IJ TLC and Ernesto (11/12), Axillary A-line (11/12), Left IJ (11/12), right peripheral (11/12)    Endocrinology  -Continue Hydrocortisone 50mg q6- stress dose steroids    F: None   E: replete K <4, Mg <2  N: Jevity 1.5   GI Ppx: Protonix   DVT Ppx: Heparin   Code status: FULL   Dispo: MICU

## 2019-11-21 NOTE — PROGRESS NOTE ADULT - SUBJECTIVE AND OBJECTIVE BOX
OVERNIGHT EVENTS:    SUBJECTIVE / INTERVAL HPI: Patient seen and examined at bedside.     VITAL SIGNS:  Vital Signs Last 24 Hrs  T(C): 36.2 (21 Nov 2019 01:01), Max: 36.7 (20 Nov 2019 22:10)  T(F): 97.1 (21 Nov 2019 01:01), Max: 98.1 (20 Nov 2019 22:10)  HR: 100 (21 Nov 2019 05:34) (68 - 105)  BP: 102/60 (21 Nov 2019 05:00) (83/59 - 121/75)  BP(mean): 69 (21 Nov 2019 05:00) (63 - 90)  RR: 19 (21 Nov 2019 05:34) (14 - 25)  SpO2: 99% (21 Nov 2019 05:34) (99% - 100%)    PHYSICAL EXAM:    General: WDWN  HEENT: NC/AT; PERRL, anicteric sclera; MMM  Neck: supple  Cardiovascular: +S1/S2; RRR  Respiratory: CTA B/L; no W/R/R  Gastrointestinal: soft, NT/ND; +BSx4  Extremities: WWP; no edema, clubbing or cyanosis  Vascular: 2+ radial, DP/PT pulses B/L  Neurological: AAOx3; no focal deficits    MEDICATIONS:  MEDICATIONS  (STANDING):  artificial  tears Solution 1 Drop(s) Both EYES every 6 hours  ascorbic acid 500 milliGRAM(s) Oral daily  chlorhexidine 0.12% Liquid 15 milliLiter(s) Oral Mucosa every 12 hours  chlorhexidine 2% Cloths 1 Application(s) Topical daily  CRRT Treatment    <Continuous>  dextrose 5%. 1000 milliLiter(s) (50 mL/Hr) IV Continuous <Continuous>  dextrose 50% Injectable 12.5 Gram(s) IV Push once  dextrose 50% Injectable 25 Gram(s) IV Push once  dextrose 50% Injectable 25 Gram(s) IV Push once  dornase maikol Solution for Pleural Effusion 5 milliGRAM(s) IntraPleural. every 12 hours  fentaNYL   Infusion. 0.5 MICROgram(s)/kG/Hr (3.68 mL/Hr) IV Continuous <Continuous>  heparin  Injectable 5000 Unit(s) SubCutaneous every 8 hours  hydrocortisone sodium succinate Injectable 50 milliGRAM(s) IV Push every 6 hours  insulin lispro (HumaLOG) corrective regimen sliding scale   SubCutaneous every 6 hours  meropenem  IVPB 1000 milliGRAM(s) IV Intermittent every 8 hours  multivitamin/minerals/iron Oral Solution (CENTRUM) 15 milliLiter(s) Oral daily  nafcillin  IVPB 2 Gram(s) IV Intermittent every 4 hours  norepinephrine Infusion 0.05 MICROgram(s)/kG/Min (3.45 mL/Hr) IV Continuous <Continuous>  pantoprazole  Injectable 40 milliGRAM(s) IV Push daily  Phoxillum Filtration BK 4 / 2.5 5000 milliLiter(s) (2500 mL/Hr) CRRT <Continuous>  polyethylene glycol 3350 17 Gram(s) Oral two times a day  propofol Infusion 5 MICROgram(s)/kG/Min (2.208 mL/Hr) IV Continuous <Continuous>  senna 2 Tablet(s) Oral at bedtime  sodium chloride 0.9% lock flush 3 milliLiter(s) IV Push every 8 hours  vitamin E 400 International Unit(s) Oral daily  zinc sulfate 220 milliGRAM(s) Oral daily    MEDICATIONS  (PRN):  dextrose 40% Gel 15 Gram(s) Oral once PRN Blood Glucose LESS THAN 70 milliGRAM(s)/deciliter  glucagon  Injectable 1 milliGRAM(s) IntraMuscular once PRN Glucose LESS THAN 70 milligrams/deciliter      ALLERGIES:  Allergies    Allergy Status Unknown    Intolerances        LABS:                        8.7    10.44 )-----------( 53       ( 20 Nov 2019 07:28 )             25.7     11-21    132<L>  |  101  |  18  ----------------------------<  140<H>  3.9   |  22  |  1.00    Ca    8.1<L>      21 Nov 2019 03:01  Phos  2.9     11-21  Mg     2.3     11-21    TPro  6.1  /  Alb  2.7<L>  /  TBili  6.8<H>  /  DBili  5.6<H>  /  AST  40  /  ALT  43  /  AlkPhos  75  11-20    PT/INR - ( 20 Nov 2019 07:28 )   PT: 15.5 sec;   INR: 1.36          PTT - ( 20 Nov 2019 07:28 )  PTT:35.1 sec    CAPILLARY BLOOD GLUCOSE      POCT Blood Glucose.: 151 mg/dL (21 Nov 2019 05:51)      RADIOLOGY & ADDITIONAL TESTS: Reviewed. OVERNIGHT EVENTS: 8:30pm BMP with K+ 3.5, repleted. 2AM BMP wnl. Levophed requirements stable.     SUBJECTIVE / INTERVAL HPI: Patient seen and examined at bedside. Patient resting in bed, intubated on sedation. Eyes open and responds to questions. Denies abdominal pain. Follows commands,  strength intact.     VITAL SIGNS:  Vital Signs Last 24 Hrs  T(C): 36.2 (21 Nov 2019 01:01), Max: 36.7 (20 Nov 2019 22:10)  T(F): 97.1 (21 Nov 2019 01:01), Max: 98.1 (20 Nov 2019 22:10)  HR: 100 (21 Nov 2019 05:34) (68 - 105)  BP: 102/60 (21 Nov 2019 05:00) (83/59 - 121/75)  BP(mean): 69 (21 Nov 2019 05:00) (63 - 90)  RR: 19 (21 Nov 2019 05:34) (14 - 25)  SpO2: 99% (21 Nov 2019 05:34) (99% - 100%)    PHYSICAL EXAM:    General: WDWN, resting in bed, eyes open, jaundice  HEENT: NC/AT; PERRL, icteric sclera; MMM  Neck: supple  Cardiovascular: +S1/S2; systolic murmur heard loudest at the LSB   Respiratory: intubated, diffuse rhonchi and crackles b/l   Gastrointestinal: NGT, soft, NT, mildly distended; +BSx4  Extremities: WWP; edema in LEs/UEs, no clubbing or cyanosis  Skin: ischemic toes/fingers  Vascular: 2+ radial, DP/PT pulses B/L  Neurological: on sedation but awake and alert, follows commands.  strength intact     MEDICATIONS:  MEDICATIONS  (STANDING):  artificial  tears Solution 1 Drop(s) Both EYES every 6 hours  ascorbic acid 500 milliGRAM(s) Oral daily  chlorhexidine 0.12% Liquid 15 milliLiter(s) Oral Mucosa every 12 hours  chlorhexidine 2% Cloths 1 Application(s) Topical daily  CRRT Treatment    <Continuous>  dextrose 5%. 1000 milliLiter(s) (50 mL/Hr) IV Continuous <Continuous>  dextrose 50% Injectable 12.5 Gram(s) IV Push once  dextrose 50% Injectable 25 Gram(s) IV Push once  dextrose 50% Injectable 25 Gram(s) IV Push once  dornase maikol Solution for Pleural Effusion 5 milliGRAM(s) IntraPleural. every 12 hours  fentaNYL   Infusion. 0.5 MICROgram(s)/kG/Hr (3.68 mL/Hr) IV Continuous <Continuous>  heparin  Injectable 5000 Unit(s) SubCutaneous every 8 hours  hydrocortisone sodium succinate Injectable 50 milliGRAM(s) IV Push every 6 hours  insulin lispro (HumaLOG) corrective regimen sliding scale   SubCutaneous every 6 hours  meropenem  IVPB 1000 milliGRAM(s) IV Intermittent every 8 hours  multivitamin/minerals/iron Oral Solution (CENTRUM) 15 milliLiter(s) Oral daily  nafcillin  IVPB 2 Gram(s) IV Intermittent every 4 hours  norepinephrine Infusion 0.05 MICROgram(s)/kG/Min (3.45 mL/Hr) IV Continuous <Continuous>  pantoprazole  Injectable 40 milliGRAM(s) IV Push daily  Phoxillum Filtration BK 4 / 2.5 5000 milliLiter(s) (2500 mL/Hr) CRRT <Continuous>  polyethylene glycol 3350 17 Gram(s) Oral two times a day  propofol Infusion 5 MICROgram(s)/kG/Min (2.208 mL/Hr) IV Continuous <Continuous>  senna 2 Tablet(s) Oral at bedtime  sodium chloride 0.9% lock flush 3 milliLiter(s) IV Push every 8 hours  vitamin E 400 International Unit(s) Oral daily  zinc sulfate 220 milliGRAM(s) Oral daily    MEDICATIONS  (PRN):  dextrose 40% Gel 15 Gram(s) Oral once PRN Blood Glucose LESS THAN 70 milliGRAM(s)/deciliter  glucagon  Injectable 1 milliGRAM(s) IntraMuscular once PRN Glucose LESS THAN 70 milligrams/deciliter      ALLERGIES:  Allergies    Allergy Status Unknown    Intolerances        LABS:                        8.7    10.44 )-----------( 53       ( 20 Nov 2019 07:28 )             25.7     11-21    132<L>  |  101  |  18  ----------------------------<  140<H>  3.9   |  22  |  1.00    Ca    8.1<L>      21 Nov 2019 03:01  Phos  2.9     11-21  Mg     2.3     11-21    TPro  6.1  /  Alb  2.7<L>  /  TBili  6.8<H>  /  DBili  5.6<H>  /  AST  40  /  ALT  43  /  AlkPhos  75  11-20    PT/INR - ( 20 Nov 2019 07:28 )   PT: 15.5 sec;   INR: 1.36          PTT - ( 20 Nov 2019 07:28 )  PTT:35.1 sec    CAPILLARY BLOOD GLUCOSE      POCT Blood Glucose.: 151 mg/dL (21 Nov 2019 05:51)      RADIOLOGY & ADDITIONAL TESTS: Reviewed.

## 2019-11-21 NOTE — PROGRESS NOTE ADULT - ASSESSMENT
39y Male w PMH of IVDU and active smoker who went to [facility] on 11/8/19 complaining of productive cough with hemoptysis, progressive SOB, pleuritic chest pain, nausea, non-bloody emesis, abd pain, chillsx3 days. 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, CTICU, under the care of Dr. Daniel Ruelas for possible surgical evaluation. Following admission, patient noted to be in acute hypoxic respiratory failure s/p intubation/sedation, acute kidney injury requiring CVVHD, congestive hepatopathy, and mutli-system organ failure 2/2 hypoperfusion. Patient deemed not a surgical candidate, per Dr. Ruelas, and patient transferred to MICU for medical management with IV nafcillin, ID following. Pt with diffuse septic emboli in bilateral lungs, pigtails in place with new b/l pneumothorax. Abd yesterday with increased distention and distended bowel on AXR. Gen surg consulted and is now s/p decompression with rectal tube abd NG tube. CT chest/abd done yesterday concerning for b/l trapped lung and hemothorax for which thoracic surgery was consulted. Patient was extubated however, overnight he became bradycardic and arrested, CPR initiated with ROSC. After CPR chest tubes noted to have increased sanguinous drainage, new airleaks. Thoracic team following for CT management.     Plan:   - consider additional placement of left apical pigtail by IR, if left PTX expanding on serial CXRs  -continue daily serial CXRs   - continue CTs to suction at all times and while on positive pressure   - consider CT chest to better evaluate PTXs and lung disease, if pt becomes more stable   - continue medical management as per primary team   -appreciate ID recs   - +endocarditis- not a surgical candidate at this time with CT surgery- continue medical management 39y Male w PMH of IVDU and active smoker who went to [facility] on 11/8/19 complaining of productive cough with hemoptysis, progressive SOB, pleuritic chest pain, nausea, non-bloody emesis, abd pain, chillsx3 days. 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 Daniel Ruelas for possible surgical evaluation. Following admission, patient noted to be in acute hypoxic respiratory failure s/p intubation/sedation, acute kidney injury requiring CVVHD, congestive hepatopathy, and mutli-system organ failure 2/2 hypoperfusion. Patient deemed not a surgical candidate, per Dr. Ruelas, and patient transferred to MICU for medical management with IV nafcillin, ID following. Pt with diffuse septic emboli in bilateral lungs, pigtails in place with new b/l pneumothorax. Abd yesterday with increased distention and distended bowel on AXR. Gen surg consulted and is now s/p decompression with rectal tube abd NG tube. CT chest/abd done yesterday concerning for b/l trapped lung and hemothorax for which thoracic surgery was consulted. Patient was extubated however, overnight he became bradycardic and arrested, CPR initiated with ROSC. After CPR chest tubes noted to have increased sanguinous drainage, new airleaks. Thoracic team following for CT management. Pt still with improved mental status (following verbal commands). Gtts: Propofol/Fentanyl/Levo 0.1mcg    Plan:   - consider additional placement of left apical pigtail by IR, if left PTX expanding on serial CXRs  -continue daily serial CXRs   - continue CTs to suction at all times and while on positive pressure   - consider CT chest to better evaluate PTXs and lung disease, if pt becomes more stable   - continue medical management as per primary team   -appreciate ID recs   - +endocarditis- not a surgical candidate at this time with CT surgery- continue medical management

## 2019-11-21 NOTE — PROGRESS NOTE ADULT - SUBJECTIVE AND OBJECTIVE BOX
***NOTE INCOMPLETE***    INTERVAL HPI/OVERNIGHT EVENTS: no acute events overnight.    REVIEW OF SYSTEMS: unobtainable     VITAL SIGNS:   Vital Signs Last 24 Hrs  T(C): 37.4 (21 Nov 2019 11:00), Max: 37.4 (21 Nov 2019 11:00)  T(F): 99.4 (21 Nov 2019 11:00), Max: 99.4 (21 Nov 2019 11:00)  HR: 98 (21 Nov 2019 11:00) (84 - 105)  BP: 100/65 (21 Nov 2019 11:00) (83/59 - 110/66)  BP(mean): 76 (21 Nov 2019 11:00) (63 - 81)  RR: 30 (21 Nov 2019 11:00) (14 - 33)  SpO2: 100% (21 Nov 2019 11:00) (99% - 100%)    PHYSICAL EXAM:  GENERAL: patient intubated and sedated  HEENT:  atraumatic, normocephalic, +bilateral scleral icterus; NG and ET tube in place   NECK: supple; RIJ central line and axillary a-line in plce  CHEST: multiple chest tube in places  LUNG:  diffuse bilateral rhonchi and crackles   HEART: fast rate, regular rhythm; S1 and S2 audible; +systolic murmur   ABDOMEN: soft, moderate diffuse TTP, mildly distended; bowel sounds present in all four quadrants  : [place] in place   EXTREMITIES:  faint peripheral pulses bilaterally; 1+ BLE pitting edema; cyanotic fingertips and toe tips   NEUROLOGY: patient intubated and sedated, unresponsive to commands   Skin: generalized jaundice; needle marks in antecubital area   LABS:                        8.5    10.07 )-----------( 58       ( 21 Nov 2019 06:58 )             25.9     11-21    133<L>  |  102  |  17  ----------------------------<  153<H>  3.6   |  22  |  0.99    Ca    8.1<L>      21 Nov 2019 06:58  Phos  2.8     11-21  Mg     2.4     11-21    TPro  6.3  /  Alb  2.6<L>  /  TBili  3.6<H>  /  DBili  2.9<H>  /  AST  36  /  ALT  40  /  AlkPhos  84  11-21    LIVER FUNCTIONS - ( 21 Nov 2019 06:58 )  Alb: 2.6 g/dL / Pro: 6.3 g/dL / ALK PHOS: 84 U/L / ALT: 40 U/L / AST: 36 U/L / GGT: x           PT/INR - ( 21 Nov 2019 06:58 )   PT: 13.0 sec;   INR: 1.15          PTT - ( 21 Nov 2019 06:58 )  PTT:33.3 sec      RADIOLOGY & ADDITIONAL TESTS: reviewed ***NOTE INCOMPLETE***    INTERVAL HPI/OVERNIGHT EVENTS: no acute events overnight. Patient still on Levophed, w/ stable pressor requirements. Continues to require pressors. Patient w/ PTX, considering anterior chest tube. Continues to be on CVVHD.     REVIEW OF SYSTEMS: unobtainable     VITAL SIGNS:   Vital Signs Last 24 Hrs  T(C): 37.4 (21 Nov 2019 11:00), Max: 37.4 (21 Nov 2019 11:00)  T(F): 99.4 (21 Nov 2019 11:00), Max: 99.4 (21 Nov 2019 11:00)  HR: 98 (21 Nov 2019 11:00) (84 - 105)  BP: 100/65 (21 Nov 2019 11:00) (83/59 - 110/66)  BP(mean): 76 (21 Nov 2019 11:00) (63 - 81)  RR: 30 (21 Nov 2019 11:00) (14 - 33)  SpO2: 100% (21 Nov 2019 11:00) (99% - 100%)    PHYSICAL EXAM:  GENERAL: patient intubated and sedated  HEENT:  atraumatic, normocephalic, +bilateral scleral icterus; NG and ET tube in place   NECK: supple; RIJ central line and axillary a-line in plce  CHEST: multiple chest tube in places  LUNG:  diffuse bilateral rhonchi and crackles   HEART: fast rate, regular rhythm; S1 and S2 audible; +systolic murmur   ABDOMEN: soft, moderate diffuse TTP, mildly distended; bowel sounds present in all four quadrants  : [place] in place   EXTREMITIES:  faint peripheral pulses bilaterally; 1+ BLE pitting edema; cyanotic fingertips and toe tips   NEUROLOGY: patient intubated and sedated, unresponsive to commands   Skin: generalized jaundice; needle marks in antecubital area   LABS:                        8.5    10.07 )-----------( 58       ( 21 Nov 2019 06:58 )             25.9     11-21    133<L>  |  102  |  17  ----------------------------<  153<H>  3.6   |  22  |  0.99    Ca    8.1<L>      21 Nov 2019 06:58  Phos  2.8     11-21  Mg     2.4     11-21    TPro  6.3  /  Alb  2.6<L>  /  TBili  3.6<H>  /  DBili  2.9<H>  /  AST  36  /  ALT  40  /  AlkPhos  84  11-21    LIVER FUNCTIONS - ( 21 Nov 2019 06:58 )  Alb: 2.6 g/dL / Pro: 6.3 g/dL / ALK PHOS: 84 U/L / ALT: 40 U/L / AST: 36 U/L / GGT: x           PT/INR - ( 21 Nov 2019 06:58 )   PT: 13.0 sec;   INR: 1.15          PTT - ( 21 Nov 2019 06:58 )  PTT:33.3 sec      RADIOLOGY & ADDITIONAL TESTS: reviewed INTERVAL HPI/OVERNIGHT EVENTS: no acute events overnight. Patient still on Levophed, w/ stable pressor requirements. Continues to require pressors. Patient w/ PTX, considering anterior chest tube. Continues to be on CVVHD. Patient examined at bedside. Continues to be intubated and sedated.    REVIEW OF SYSTEMS: unobtainable     VITAL SIGNS:   Vital Signs Last 24 Hrs  T(C): 37.4 (21 Nov 2019 11:00), Max: 37.4 (21 Nov 2019 11:00)  T(F): 99.4 (21 Nov 2019 11:00), Max: 99.4 (21 Nov 2019 11:00)  HR: 98 (21 Nov 2019 11:00) (84 - 105)  BP: 100/65 (21 Nov 2019 11:00) (83/59 - 110/66)  BP(mean): 76 (21 Nov 2019 11:00) (63 - 81)  RR: 30 (21 Nov 2019 11:00) (14 - 33)  SpO2: 100% (21 Nov 2019 11:00) (99% - 100%)    PHYSICAL EXAM:  GENERAL: patient intubated and sedated  HEENT:  atraumatic, normocephalic, +bilateral scleral icterus; NG and ET tube in place   NECK: supple; RIJ central line and axillary a-line in plce  CHEST: multiple chest tube in places  LUNG:  diffuse bilateral rhonchi and crackles   HEART: fast rate, regular rhythm; S1 and S2 audible; +systolic murmur   ABDOMEN: soft, moderate diffuse TTP, mildly distended; bowel sounds present in all four quadrants  : [place] in place   EXTREMITIES:  faint peripheral pulses bilaterally; 1+ BLE pitting edema; cyanotic fingertips and toe tips   NEUROLOGY: patient intubated and sedated, unresponsive to commands   Skin: generalized jaundice; needle marks in antecubital area   LABS:                        8.5    10.07 )-----------( 58       ( 21 Nov 2019 06:58 )             25.9     11-21    133<L>  |  102  |  17  ----------------------------<  153<H>  3.6   |  22  |  0.99    Ca    8.1<L>      21 Nov 2019 06:58  Phos  2.8     11-21  Mg     2.4     11-21    TPro  6.3  /  Alb  2.6<L>  /  TBili  3.6<H>  /  DBili  2.9<H>  /  AST  36  /  ALT  40  /  AlkPhos  84  11-21    LIVER FUNCTIONS - ( 21 Nov 2019 06:58 )  Alb: 2.6 g/dL / Pro: 6.3 g/dL / ALK PHOS: 84 U/L / ALT: 40 U/L / AST: 36 U/L / GGT: x           PT/INR - ( 21 Nov 2019 06:58 )   PT: 13.0 sec;   INR: 1.15          PTT - ( 21 Nov 2019 06:58 )  PTT:33.3 sec      RADIOLOGY & ADDITIONAL TESTS: reviewed

## 2019-11-21 NOTE — PROGRESS NOTE ADULT - SUBJECTIVE AND OBJECTIVE BOX
continue on CVVHD with net even fluid balance  having all tubes output is net negative 1.5 L/48 hr  intubated, off sedation, responsive, follows commands  less pressors requirements, on Levophed at 0.1 mcg  [place] removed, daily bladder scan  not a surgical candidate at this time with CT surgery for endocarditis  continue medical management             Meds:    albuterol/ipratropium for Nebulization 3 milliLiter(s) Nebulizer every 4 hours  artificial  tears Solution 1 Drop(s) Both EYES every 6 hours  ascorbic acid 500 milliGRAM(s) Oral daily  chlorhexidine 0.12% Liquid 15 milliLiter(s) Oral Mucosa every 12 hours  chlorhexidine 2% Cloths 1 Application(s) Topical daily  CRRT Treatment    <Continuous>  dextrose 40% Gel 15 Gram(s) Oral once PRN  dextrose 5%. 1000 milliLiter(s) IV Continuous <Continuous>  dextrose 50% Injectable 12.5 Gram(s) IV Push once  dextrose 50% Injectable 25 Gram(s) IV Push once  dextrose 50% Injectable 25 Gram(s) IV Push once  fentaNYL   Infusion. 0.5 MICROgram(s)/kG/Hr IV Continuous <Continuous>  glucagon  Injectable 1 milliGRAM(s) IntraMuscular once PRN  heparin  Injectable 5000 Unit(s) SubCutaneous every 8 hours  hydrocortisone sodium succinate Injectable 50 milliGRAM(s) IV Push every 6 hours  insulin lispro (HumaLOG) corrective regimen sliding scale   SubCutaneous every 6 hours  meropenem  IVPB 1000 milliGRAM(s) IV Intermittent every 8 hours  multivitamin/minerals/iron Oral Solution (CENTRUM) 15 milliLiter(s) Oral daily  nafcillin  IVPB 2 Gram(s) IV Intermittent every 4 hours  norepinephrine Infusion 0.05 MICROgram(s)/kG/Min IV Continuous <Continuous>  pantoprazole  Injectable 40 milliGRAM(s) IV Push daily  Phoxillum Filtration BK 4 / 2.5 5000 milliLiter(s) CRRT <Continuous>  polyethylene glycol 3350 17 Gram(s) Oral two times a day  propofol Infusion 5 MICROgram(s)/kG/Min IV Continuous <Continuous>  senna 2 Tablet(s) Oral at bedtime  sodium chloride 0.9% lock flush 3 milliLiter(s) IV Push every 8 hours  vitamin E 400 International Unit(s) Oral daily  zinc sulfate 220 milliGRAM(s) Oral daily      T(C): 37.4 (11-21-19 @ 11:00), Max: 37.4 (11-21-19 @ 11:00)  HR: 94 (11-21-19 @ 13:00) (84 - 105)  BP: 104/64 (11-21-19 @ 13:00) (83/59 - 110/66)  RR: 27 (11-21-19 @ 13:00) (9 - 33)  SpO2: 99% (11-21-19 @ 13:00) (99% - 100%)    Input/Output      11-20-19 @ 07:01  -  11-21-19 @ 07:00  --------------------------------------------------------  IN: 2921.3 mL / OUT: 3378 mL / NET: -456.7 mL    11-21-19 @ 07:01  -  11-21-19 @ 14:17  --------------------------------------------------------  IN: 740.9 mL / OUT: 935 mL / NET: -194.1 mL        PHYSICAL EXAM  General: intubated, alert, NAD  Neck: no JVD  Respiratory: equal breath sounds bilaterally, bilateral chest tubes  HEART: normal S1S2  ABDOMEN: Soft, distended; rectal tube in place, +BS  EXTREMITIES: upper tight and sacral edema present/ necrotic U/L digits   NEUROLOGY: alert  ACCESS: R IJ THC, site dry and clean        LABS:                                                 8.5    10.07 )-----------( 58       ( 21 Nov 2019 06:58 )             25.9       11-21    135  |  103  |  17  ----------------------------<  170<H>  3.7   |  21<L>  |  0.99    Ca    8.1<L>      21 Nov 2019 13:50  Phos  3.1     11-21  Mg     2.4     11-21    TPro  6.3  /  Alb  2.6<L>  /  TBili  3.6<H>  /  DBili  2.9<H>  /  AST  36  /  ALT  40  /  AlkPhos  84  11-21      PT/INR - ( 21 Nov 2019 06:58 )   PT: 13.0 sec;   INR: 1.15          PTT - ( 21 Nov 2019 06:58 )  PTT:33.3 sec        CARDIAC MARKERS ( 20 Nov 2019 07:32 )  x     / 0.06 ng/mL / x     / x     / x      CARDIAC MARKERS ( 20 Nov 2019 00:17 )  x     / 0.07 ng/mL / 16 U/L / x     / 1.1 ng/mL  CARDIAC MARKERS ( 19 Nov 2019 17:49 )  x     / 0.10 ng/mL / x     / x     / x                RADIOLOGY:    < from: Xray Chest 1 View- PORTABLE-Urgent (11.20.19 @ 14:33) >    EXAM:  XR CHEST PORTABLE URGENT 1V                          PROCEDURE DATE:  11/20/2019          INTERPRETATION:  Portable chest    History: Nasogastric tube placement confirmation exam    Impression:    A nasogastric tube is appearing since prior exam earlier same day with   tip in region of stomach. The nasogastric tube on the prior exam has been   removed. No other definite change.        < end of copied text > continue on CVVHD with net even fluid balance (access pressure 109, venous 110, effluent 130)  having all tubes output is net negative 1.5 L/48 hr  intubated, off sedation, responsive, follows commands  less pressors requirements, on Levophed at 0.1 mcg  [place] removed, daily bladder scan  not a surgical candidate at this time with CT surgery for endocarditis  continue medical management             Meds:    albuterol/ipratropium for Nebulization 3 milliLiter(s) Nebulizer every 4 hours  artificial  tears Solution 1 Drop(s) Both EYES every 6 hours  ascorbic acid 500 milliGRAM(s) Oral daily  chlorhexidine 0.12% Liquid 15 milliLiter(s) Oral Mucosa every 12 hours  chlorhexidine 2% Cloths 1 Application(s) Topical daily  CRRT Treatment    <Continuous>  dextrose 40% Gel 15 Gram(s) Oral once PRN  dextrose 5%. 1000 milliLiter(s) IV Continuous <Continuous>  dextrose 50% Injectable 12.5 Gram(s) IV Push once  dextrose 50% Injectable 25 Gram(s) IV Push once  dextrose 50% Injectable 25 Gram(s) IV Push once  fentaNYL   Infusion. 0.5 MICROgram(s)/kG/Hr IV Continuous <Continuous>  glucagon  Injectable 1 milliGRAM(s) IntraMuscular once PRN  heparin  Injectable 5000 Unit(s) SubCutaneous every 8 hours  hydrocortisone sodium succinate Injectable 50 milliGRAM(s) IV Push every 6 hours  insulin lispro (HumaLOG) corrective regimen sliding scale   SubCutaneous every 6 hours  meropenem  IVPB 1000 milliGRAM(s) IV Intermittent every 8 hours  multivitamin/minerals/iron Oral Solution (CENTRUM) 15 milliLiter(s) Oral daily  nafcillin  IVPB 2 Gram(s) IV Intermittent every 4 hours  norepinephrine Infusion 0.05 MICROgram(s)/kG/Min IV Continuous <Continuous>  pantoprazole  Injectable 40 milliGRAM(s) IV Push daily  Phoxillum Filtration BK 4 / 2.5 5000 milliLiter(s) CRRT <Continuous>  polyethylene glycol 3350 17 Gram(s) Oral two times a day  propofol Infusion 5 MICROgram(s)/kG/Min IV Continuous <Continuous>  senna 2 Tablet(s) Oral at bedtime  sodium chloride 0.9% lock flush 3 milliLiter(s) IV Push every 8 hours  vitamin E 400 International Unit(s) Oral daily  zinc sulfate 220 milliGRAM(s) Oral daily      T(C): 37.4 (11-21-19 @ 11:00), Max: 37.4 (11-21-19 @ 11:00)  HR: 94 (11-21-19 @ 13:00) (84 - 105)  BP: 104/64 (11-21-19 @ 13:00) (83/59 - 110/66)  RR: 27 (11-21-19 @ 13:00) (9 - 33)  SpO2: 99% (11-21-19 @ 13:00) (99% - 100%)    Input/Output      11-20-19 @ 07:01  -  11-21-19 @ 07:00  --------------------------------------------------------  IN: 2921.3 mL / OUT: 3378 mL / NET: -456.7 mL    11-21-19 @ 07:01  -  11-21-19 @ 14:17  --------------------------------------------------------  IN: 740.9 mL / OUT: 935 mL / NET: -194.1 mL        PHYSICAL EXAM  General: intubated, alert, NAD  Neck: no JVD  Respiratory: equal breath sounds bilaterally, bilateral chest tubes  HEART: normal S1S2  ABDOMEN: Soft, distended; rectal tube in place, +BS  EXTREMITIES: upper tight and sacral edema present/ necrotic U/L digits   NEUROLOGY: alert  ACCESS: R IJ [facility], site dry and clean        LABS:                                                 8.5    10.07 )-----------( 58       ( 21 Nov 2019 06:58 )             25.9       11-21    135  |  103  |  17  ----------------------------<  170<H>  3.7   |  21<L>  |  0.99    Ca    8.1<L>      21 Nov 2019 13:50  Phos  3.1     11-21  Mg     2.4     11-21    TPro  6.3  /  Alb  2.6<L>  /  TBili  3.6<H>  /  DBili  2.9<H>  /  AST  36  /  ALT  40  /  AlkPhos  84  11-21      PT/INR - ( 21 Nov 2019 06:58 )   PT: 13.0 sec;   INR: 1.15          PTT - ( 21 Nov 2019 06:58 )  PTT:33.3 sec        CARDIAC MARKERS ( 20 Nov 2019 07:32 )  x     / 0.06 ng/mL / x     / x     / x      CARDIAC MARKERS ( 20 Nov 2019 00:17 )  x     / 0.07 ng/mL / 16 U/L / x     / 1.1 ng/mL  CARDIAC MARKERS ( 19 Nov 2019 17:49 )  x     / 0.10 ng/mL / x     / x     / x                RADIOLOGY:    < from: Xray Chest 1 View- PORTABLE-Urgent (11.20.19 @ 14:33) >    EXAM:  XR CHEST PORTABLE URGENT 1V                          PROCEDURE DATE:  11/20/2019          INTERPRETATION:  Portable chest    History: Nasogastric tube placement confirmation exam    Impression:    A nasogastric tube is appearing since prior exam earlier same day with   tip in region of stomach. The nasogastric tube on the prior exam has been   removed. No other definite change.        < end of copied text >

## 2019-11-21 NOTE — PROGRESS NOTE ADULT - ATTENDING COMMENTS
seen and evaluated while on CVVHD  tolerating the procedure well  Access pressure good.  electrolytes acceptable  c/w regimen as outlined above

## 2019-11-21 NOTE — PROGRESS NOTE ADULT - ASSESSMENT
40 yo male with PMH of IVDU was transferred from [facility] for IE with tricupsid valve vegetations, Nephrology consulted for anuric cmaeron and electrolytes disturbances.  s/p R chest tube on 11/19 for pneumothorax  s/p brochoscopy on 11/19, pending cultures  not a surgical candidate at this time with CT surgery for endocarditis  continue medical management       # CAMERON, anuric  - no signs of renal recovery  - remains septic with less pressor support over 24 hr  - continue CVVHD via RIJ HD cath with net even balance, net negative 1.5 L/48 hr via all tubes output  - keep map >65 mmHg  - monitor BMP q6hr while on CVVHD for K/Phos and Mg and supplement as needed, currently on phoxillum 4K (K 3.6, Ph 2.8), will continue   - daily bladder scan  - monitor H/H, transfuse as indicated  - treat infection  - renally adjusted meds/ABx    Discussed with attending Dr Pinzon.

## 2019-11-21 NOTE — PROGRESS NOTE ADULT - SUBJECTIVE AND OBJECTIVE BOX
Patient discussed on morning rounds with       Operation / Date:     SUBJECTIVE ASSESSMENT:  39y Male         Vital Signs Last 24 Hrs  T(C): 37.4 (21 Nov 2019 11:00), Max: 37.4 (21 Nov 2019 11:00)  T(F): 99.4 (21 Nov 2019 11:00), Max: 99.4 (21 Nov 2019 11:00)  HR: 98 (21 Nov 2019 11:00) (84 - 105)  BP: 100/65 (21 Nov 2019 11:00) (83/59 - 110/66)  BP(mean): 76 (21 Nov 2019 11:00) (63 - 81)  RR: 30 (21 Nov 2019 11:00) (14 - 33)  SpO2: 100% (21 Nov 2019 11:00) (99% - 100%)  I&O's Detail    20 Nov 2019 07:01  -  21 Nov 2019 07:00  --------------------------------------------------------  IN:    Enteral Tube Flush: 230 mL    fentaNYL Infusion.: 31.4 mL    fentaNYL Infusion.: 160 mL    norepinephrine Infusion: 276.7 mL    ns in tub fed  [id number]: 588 mL    propofol Infusion: 280 mL    propofol Infusion: 55.2 mL    Solution: 600 mL    Solution: 150 mL    Solution: 550 mL  Total IN: 2921.3 mL    OUT:    Chest Tube: 230 mL    Chest Tube: 180 mL    Chest Tube: 120 mL    Indwelling Catheter - Urethral: 2 mL    Nasoenteral Tube: 225 mL    Other: 2521 mL    Rectal Tube: 100 mL  Total OUT: 3378 mL    Total NET: -456.7 mL      21 Nov 2019 07:01  -  21 Nov 2019 12:09  --------------------------------------------------------  IN:    fentaNYL Infusion.: 35 mL    norepinephrine Infusion: 46 mL    ns in tub fed  [id number]: 172 mL    propofol Infusion: 67.1 mL    Solution: 100 mL  Total IN: 420.1 mL    OUT:    Other: 503 mL    Rectal Tube: 100 mL  Total OUT: 603 mL    Total NET: -182.9 mL          CHEST TUBE:  Yes/No. AIR LEAKS: Yes/No. Suction / H2O SEAL.   FREDIS DRAIN:  Yes/No.  EPICARDIAL WIRES: Yes/No.  TIE DOWNS: Yes/No.  MAN: Yes/No.    PHYSICAL EXAM:    General:     Neurological:    Cardiovascular:    Respiratory:    Gastrointestinal:    Extremities:    Vascular:    Incision Sites:    LABS:                        8.5    10.07 )-----------( 58       ( 21 Nov 2019 06:58 )             25.9       COUMADIN:  Yes/No. REASON: .    PT/INR - ( 21 Nov 2019 06:58 )   PT: 13.0 sec;   INR: 1.15          PTT - ( 21 Nov 2019 06:58 )  PTT:33.3 sec    11-21    133<L>  |  102  |  17  ----------------------------<  153<H>  3.6   |  22  |  0.99    Ca    8.1<L>      21 Nov 2019 06:58  Phos  2.8     11-21  Mg     2.4     11-21    TPro  6.3  /  Alb  2.6<L>  /  TBili  3.6<H>  /  DBili  2.9<H>  /  AST  36  /  ALT  40  /  AlkPhos  84  11-21          MEDICATIONS  (STANDING):  albuterol/ipratropium for Nebulization 3 milliLiter(s) Nebulizer every 4 hours  artificial  tears Solution 1 Drop(s) Both EYES every 6 hours  ascorbic acid 500 milliGRAM(s) Oral daily  chlorhexidine 0.12% Liquid 15 milliLiter(s) Oral Mucosa every 12 hours  chlorhexidine 2% Cloths 1 Application(s) Topical daily  CRRT Treatment    <Continuous>  dextrose 5%. 1000 milliLiter(s) (50 mL/Hr) IV Continuous <Continuous>  dextrose 50% Injectable 12.5 Gram(s) IV Push once  dextrose 50% Injectable 25 Gram(s) IV Push once  dextrose 50% Injectable 25 Gram(s) IV Push once  fentaNYL   Infusion. 0.5 MICROgram(s)/kG/Hr (3.68 mL/Hr) IV Continuous <Continuous>  heparin  Injectable 5000 Unit(s) SubCutaneous every 8 hours  hydrocortisone sodium succinate Injectable 50 milliGRAM(s) IV Push every 6 hours  insulin lispro (HumaLOG) corrective regimen sliding scale   SubCutaneous every 6 hours  meropenem  IVPB 1000 milliGRAM(s) IV Intermittent every 8 hours  multivitamin/minerals/iron Oral Solution (CENTRUM) 15 milliLiter(s) Oral daily  nafcillin  IVPB 2 Gram(s) IV Intermittent every 4 hours  norepinephrine Infusion 0.05 MICROgram(s)/kG/Min (3.45 mL/Hr) IV Continuous <Continuous>  pantoprazole  Injectable 40 milliGRAM(s) IV Push daily  Phoxillum Filtration BK 4 / 2.5 5000 milliLiter(s) (2500 mL/Hr) CRRT <Continuous>  polyethylene glycol 3350 17 Gram(s) Oral two times a day  propofol Infusion 5 MICROgram(s)/kG/Min (2.208 mL/Hr) IV Continuous <Continuous>  senna 2 Tablet(s) Oral at bedtime  sodium chloride 0.9% lock flush 3 milliLiter(s) IV Push every 8 hours  vitamin E 400 International Unit(s) Oral daily  zinc sulfate 220 milliGRAM(s) Oral daily    MEDICATIONS  (PRN):  dextrose 40% Gel 15 Gram(s) Oral once PRN Blood Glucose LESS THAN 70 milliGRAM(s)/deciliter  glucagon  Injectable 1 milliGRAM(s) IntraMuscular once PRN Glucose LESS THAN 70 milligrams/deciliter        RADIOLOGY & ADDITIONAL TESTS:  CT CHEST: < from: CT Chest w/ IV Cont (11.18.19 @ 18:42) >  1.  New bilateral moderate hydropneumothorax. Bilateral chest tubes in   place.  2.  Direct comparison with prior CT (11/12/2019) is somewhat limited due   to suboptimal expansion of lungs secondary to pneumothorax; Multiple   cavitary lesions in both lungs, demonstrating interval increase in size   and extent of associated peripheral consolidation. The differential   diagnosis includes septic embolism, given patient's history. However   follow-up with chest CT to ensure resolution is recommended to exclude   underlying malignancy.  3.  Increased extent of lobar pneumonia in both lower lobes.    < end of copied text >    CXR: < from: Xray Chest 1 View- PORTABLE-Routine (11.20.19 @ 07:56) >  Bilateral lung infiltrates and small pleural effusions similar to prior   exam 11/19/2019. Endotracheal tube in good position. Nasogastric tube tip   in region of stomach. No pneumothorax. Chest tubes and venous catheters   unchanged.    < end of copied text > Patient discussed on morning rounds with Dr. West     Operation / Date: b/l PTX s/p pigtail placement (not placed by CT surgery)    SUBJECTIVE ASSESSMENT:  39y Male seen and examined at the bedside. Pt interactive during exam and following simple verbal commands.     Vital Signs Last 24 Hrs  T(C): 37.4 (21 Nov 2019 11:00), Max: 37.4 (21 Nov 2019 11:00)  T(F): 99.4 (21 Nov 2019 11:00), Max: 99.4 (21 Nov 2019 11:00)  HR: 98 (21 Nov 2019 11:00) (84 - 105)  BP: 100/65 (21 Nov 2019 11:00) (83/59 - 110/66)  BP(mean): 76 (21 Nov 2019 11:00) (63 - 81)  RR: 30 (21 Nov 2019 11:00) (14 - 33)  SpO2: 100% (21 Nov 2019 11:00) (99% - 100%)  I&O's Detail    20 Nov 2019 07:01  -  21 Nov 2019 07:00  --------------------------------------------------------  IN:    Enteral Tube Flush: 230 mL    fentaNYL Infusion.: 31.4 mL    fentaNYL Infusion.: 160 mL    norepinephrine Infusion: 276.7 mL    ns in tub fed  [id number]: 588 mL    propofol Infusion: 280 mL    propofol Infusion: 55.2 mL    Solution: 600 mL    Solution: 150 mL    Solution: 550 mL  Total IN: 2921.3 mL    OUT:    Chest Tube: 230 mL    Chest Tube: 180 mL    Chest Tube: 120 mL    Indwelling Catheter - Urethral: 2 mL    Nasoenteral Tube: 225 mL    Other: 2521 mL    Rectal Tube: 100 mL  Total OUT: 3378 mL    Total NET: -456.7 mL      21 Nov 2019 07:01  -  21 Nov 2019 12:09  --------------------------------------------------------  IN:    fentaNYL Infusion.: 35 mL    norepinephrine Infusion: 46 mL    ns in tub fed  [id number]: 172 mL    propofol Infusion: 67.1 mL    Solution: 100 mL  Total IN: 420.1 mL    OUT:    Other: 503 mL    Rectal Tube: 100 mL  Total OUT: 603 mL    Total NET: -182.9 mL          CHEST TUBE:  Yes. AIR LEAKS: Yes Suction. Pt with persistent right sided airleak.   FREDIS DRAIN:  No.  EPICARDIAL WIRES: No.  TIE DOWNS: No.  MAN: Yes  +fecal tube    PHYSICAL EXAM:    General: NAD, laying in bed     Neurological: grossly intact, following simple verbal commands     Cardiovascular: RRR, +murmur    Respiratory: diffuse rhonchi b/l w/ mechannical breath sounds    Gastrointestinal: NT-ND, soft, +NGT for TFs    Extremities: warm and well perfused, +trace edema at the ankles b/l, fingertip and toe blackish discolorations    Vascular: +2 DP b/l     Gtts: Propofol/fentanyl  Levo 0.1mcg      LABS:                        8.5    10.07 )-----------( 58       ( 21 Nov 2019 06:58 )             25.9       PT/INR - ( 21 Nov 2019 06:58 )   PT: 13.0 sec;   INR: 1.15          PTT - ( 21 Nov 2019 06:58 )  PTT:33.3 sec    11-21    133<L>  |  102  |  17  ----------------------------<  153<H>  3.6   |  22  |  0.99    Ca    8.1<L>      21 Nov 2019 06:58  Phos  2.8     11-21  Mg     2.4     11-21    TPro  6.3  /  Alb  2.6<L>  /  TBili  3.6<H>  /  DBili  2.9<H>  /  AST  36  /  ALT  40  /  AlkPhos  84  11-21          MEDICATIONS  (STANDING):  albuterol/ipratropium for Nebulization 3 milliLiter(s) Nebulizer every 4 hours  artificial  tears Solution 1 Drop(s) Both EYES every 6 hours  ascorbic acid 500 milliGRAM(s) Oral daily  chlorhexidine 0.12% Liquid 15 milliLiter(s) Oral Mucosa every 12 hours  chlorhexidine 2% Cloths 1 Application(s) Topical daily  CRRT Treatment    <Continuous>  dextrose 5%. 1000 milliLiter(s) (50 mL/Hr) IV Continuous <Continuous>  dextrose 50% Injectable 12.5 Gram(s) IV Push once  dextrose 50% Injectable 25 Gram(s) IV Push once  dextrose 50% Injectable 25 Gram(s) IV Push once  fentaNYL   Infusion. 0.5 MICROgram(s)/kG/Hr (3.68 mL/Hr) IV Continuous <Continuous>  heparin  Injectable 5000 Unit(s) SubCutaneous every 8 hours  hydrocortisone sodium succinate Injectable 50 milliGRAM(s) IV Push every 6 hours  insulin lispro (HumaLOG) corrective regimen sliding scale   SubCutaneous every 6 hours  meropenem  IVPB 1000 milliGRAM(s) IV Intermittent every 8 hours  multivitamin/minerals/iron Oral Solution (CENTRUM) 15 milliLiter(s) Oral daily  nafcillin  IVPB 2 Gram(s) IV Intermittent every 4 hours  norepinephrine Infusion 0.05 MICROgram(s)/kG/Min (3.45 mL/Hr) IV Continuous <Continuous>  pantoprazole  Injectable 40 milliGRAM(s) IV Push daily  Phoxillum Filtration BK 4 / 2.5 5000 milliLiter(s) (2500 mL/Hr) CRRT <Continuous>  polyethylene glycol 3350 17 Gram(s) Oral two times a day  propofol Infusion 5 MICROgram(s)/kG/Min (2.208 mL/Hr) IV Continuous <Continuous>  senna 2 Tablet(s) Oral at bedtime  sodium chloride 0.9% lock flush 3 milliLiter(s) IV Push every 8 hours  vitamin E 400 International Unit(s) Oral daily  zinc sulfate 220 milliGRAM(s) Oral daily    MEDICATIONS  (PRN):  dextrose 40% Gel 15 Gram(s) Oral once PRN Blood Glucose LESS THAN 70 milliGRAM(s)/deciliter  glucagon  Injectable 1 milliGRAM(s) IntraMuscular once PRN Glucose LESS THAN 70 milligrams/deciliter        RADIOLOGY & ADDITIONAL TESTS:  CT CHEST: < from: CT Chest w/ IV Cont (11.18.19 @ 18:42) >  1.  New bilateral moderate hydropneumothorax. Bilateral chest tubes in   place.  2.  Direct comparison with prior CT (11/12/2019) is somewhat limited due   to suboptimal expansion of lungs secondary to pneumothorax; Multiple   cavitary lesions in both lungs, demonstrating interval increase in size   and extent of associated peripheral consolidation. The differential   diagnosis includes septic embolism, given patient's history. However   follow-up with chest CT to ensure resolution is recommended to exclude   underlying malignancy.  3.  Increased extent of lobar pneumonia in both lower lobes.    < end of copied text >    CXR: < from: Xray Chest 1 View- PORTABLE-Routine (11.20.19 @ 07:56) >  Bilateral lung infiltrates and small pleural effusions similar to prior   exam 11/19/2019. Endotracheal tube in good position. Nasogastric tube tip   in region of stomach. No pneumothorax. Chest tubes and venous catheters   unchanged.    < end of copied text >

## 2019-11-21 NOTE — PROGRESS NOTE ADULT - SUBJECTIVE AND OBJECTIVE BOX
Pt seen and examined at bedside.  No acute event overnight, remains on levophed.  Opens eyes to provider verbal stimuli.  Elevated HR    Allergies    Allergy Status Unknown    Intolerances      MEDICATIONS:  MEDICATIONS  (STANDING):  albuterol/ipratropium for Nebulization 3 milliLiter(s) Nebulizer every 4 hours  artificial  tears Solution 1 Drop(s) Both EYES every 6 hours  ascorbic acid 500 milliGRAM(s) Oral daily  chlorhexidine 0.12% Liquid 15 milliLiter(s) Oral Mucosa every 12 hours  chlorhexidine 2% Cloths 1 Application(s) Topical daily  CRRT Treatment    <Continuous>  dextrose 5%. 1000 milliLiter(s) (50 mL/Hr) IV Continuous <Continuous>  dextrose 50% Injectable 12.5 Gram(s) IV Push once  dextrose 50% Injectable 25 Gram(s) IV Push once  dextrose 50% Injectable 25 Gram(s) IV Push once  fentaNYL   Infusion. 0.5 MICROgram(s)/kG/Hr (3.68 mL/Hr) IV Continuous <Continuous>  heparin  Injectable 5000 Unit(s) SubCutaneous every 8 hours  hydrocortisone sodium succinate Injectable 50 milliGRAM(s) IV Push every 6 hours  insulin lispro (HumaLOG) corrective regimen sliding scale   SubCutaneous every 6 hours  meropenem  IVPB 1000 milliGRAM(s) IV Intermittent every 8 hours  multivitamin/minerals/iron Oral Solution (CENTRUM) 15 milliLiter(s) Oral daily  nafcillin  IVPB 2 Gram(s) IV Intermittent every 4 hours  norepinephrine Infusion 0.05 MICROgram(s)/kG/Min (3.45 mL/Hr) IV Continuous <Continuous>  pantoprazole  Injectable 40 milliGRAM(s) IV Push daily  Phoxillum Filtration BK 4 / 2.5 5000 milliLiter(s) (2500 mL/Hr) CRRT <Continuous>  polyethylene glycol 3350 17 Gram(s) Oral two times a day  propofol Infusion 5 MICROgram(s)/kG/Min (2.208 mL/Hr) IV Continuous <Continuous>  senna 2 Tablet(s) Oral at bedtime  sodium chloride 0.9% lock flush 3 milliLiter(s) IV Push every 8 hours  vitamin E 400 International Unit(s) Oral daily  zinc sulfate 220 milliGRAM(s) Oral daily    MEDICATIONS  (PRN):  dextrose 40% Gel 15 Gram(s) Oral once PRN Blood Glucose LESS THAN 70 milliGRAM(s)/deciliter  glucagon  Injectable 1 milliGRAM(s) IntraMuscular once PRN Glucose LESS THAN 70 milligrams/deciliter    Vital Signs Last 24 Hrs  T(C): 36.1 (21 Nov 2019 06:03), Max: 36.7 (20 Nov 2019 22:10)  T(F): 97 (21 Nov 2019 06:03), Max: 98.1 (20 Nov 2019 22:10)  HR: 102 (21 Nov 2019 08:30) (84 - 105)  BP: 110/66 (21 Nov 2019 08:00) (83/59 - 110/66)  BP(mean): 79 (21 Nov 2019 08:00) (63 - 81)  RR: 33 (21 Nov 2019 08:30) (14 - 33)  SpO2: 100% (21 Nov 2019 08:30) (99% - 100%)    11-20 @ 07:01  -  11-21 @ 07:00  --------------------------------------------------------  IN: 2921.3 mL / OUT: 3378 mL / NET: -456.7 mL    11-21 @ 07:01  -  11-21 @ 09:50  --------------------------------------------------------  IN: 76.5 mL / OUT: 170 mL / NET: -93.5 mL      PHYSICAL EXAM:  General: frail male intubated   HEENT: MMM, conjunctiva and sclera icterus  Gastrointestinal: Soft non-tender distended; Normal bowel sounds;  No rebound or guarding  neuro: sedated, follows some command    LABS:                        8.5    10.07 )-----------( 58       ( 21 Nov 2019 06:58 )             25.9     11-21    133<L>  |  102  |  17  ----------------------------<  153<H>  3.6   |  22  |  0.99    Ca    8.1<L>      21 Nov 2019 06:58  Phos  2.8     11-21  Mg     2.4     11-21    TPro  6.3  /  Alb  2.6<L>  /  TBili  3.6<H>  /  DBili  2.9<H>  /  AST  36  /  ALT  40  /  AlkPhos  84  11-21    PT/INR - ( 21 Nov 2019 06:58 )   PT: 13.0 sec;   INR: 1.15          PTT - ( 21 Nov 2019 06:58 )  PTT:33.3 sec                  Culture - Fungal, Bronchial (collected 20 Nov 2019 01:37)  Source: .Bronchial broncial wash  Preliminary Report (20 Nov 2019 08:38):    Testing in progress    Culture - Acid Fast - Bronchial w/Smear (collected 20 Nov 2019 01:37)  Source: .Bronchial broncial wash    Culture - Bronchial (collected 19 Nov 2019 20:59)  Source: Bronch Wash broncial wash  Gram Stain (20 Nov 2019 09:18):    Numerous white blood cells    Rare epithelial cells    Rare Gram Positive Rods    Rare Gram Negative Rods  Preliminary Report (20 Nov 2019 09:19):    Culture in progress    Culture - Blood (collected 19 Nov 2019 16:49)  Source: .Blood Blood  Preliminary Report (20 Nov 2019 17:00):    No growth at 1 day.    Culture - Blood (collected 19 Nov 2019 16:49)  Source: .Blood Blood  Preliminary Report (20 Nov 2019 17:00):    No growth at 1 day.    Culture - Blood (collected 18 Nov 2019 20:05)  Source: .Blood Blood  Preliminary Report (20 Nov 2019 21:00):    No growth at 2 days.    Culture - Blood (collected 18 Nov 2019 20:05)  Source: .Blood Blood  Preliminary Report (20 Nov 2019 21:00):    No growth at 2 days.    Culture - Sputum (collected 18 Nov 2019 16:25)  Source: .Sputum Sputum  Gram Stain (19 Nov 2019 09:21):    Rare epithelial cells    Moderate White blood cells    Moderate Yeast like cells    Few Gram positive cocci in pairs  Final Report (20 Nov 2019 12:20):    Few Staphylococcus aureus    Few Yeast  Organism: Staphylococcus aureus (20 Nov 2019 12:20)  Organism: Staphylococcus aureus (20 Nov 2019 12:20)      RADIOLOGY & ADDITIONAL STUDIES:

## 2019-11-21 NOTE — PROGRESS NOTE ADULT - ASSESSMENT
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 Also complicated by acute hypoxic respiratory failure requiring intubation and mechanical ventilation, and acute renal failure requiring emergent dialysis. Heme/onc consulted for management of DIC.     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   peripheral smear (11/13): spherocytes seen, no schistocytes   11/13/19: PT 17.2, PTT 40.1, Plt 66, fibrinogen 241, d-dimer elevated to 2409  11/14/19: PT 17.0, PTT 40.8, Plt 54, fibrinogen 318  11/18/19: PT 14.8, PTT 40.2, Plt 82, fibrinogen 448   11/19/19: PT 16.2, PTT 38.8, Plt 68, fibrinogen 461; patient with brief cardiac arrest episode overnight   Factor VIII level elevated to 322%, normally lower in DIC but drawn after patient given FFP, and elevated due to shock liver, Factor VII low despite FFP, likely related to acute liver injury (shortest half -life 3-6hrs)  PT/PTT mixing studies with initial correction  No signs of acute bleeding; patient currently extubated, however with further decompensation requiring re-intubation thought to be secondary to alveolar hemorrhage related to multiple septic emboli     -Treatment of underlying infection (endocarditis) and c/w CVVHD per renal   -supportive care: transfuse plts for >10K or or >50K with active bleeding   -transfuse cryoprecipitate if fibrinogen level <100   -Daily coags, fibrinogen  -vascular f/u     #Anemia  Hb ranging 6.9 - 10 on this admission. Normocytic.   11/14/19: , Haptoglobin < 10; Tibili 11.3, direct bili 9.4  11/18: , Haptoglobin < 10, Tibil 9.1  11/19: Hb retic 10.9%, , Tbili  7.5 (direct 6.0, indirect 1.5), hapto 15   retic count elevated w/o gross bleeding, c/w appropriate BM response  f/u urine hemosiderin regarding intravascular hemolysis, however without rise in indirect bili and downtrending LDH, in addition can see low haptoglobin  in shock liver, spleen normal appearing on CT imaging   Iron studies suggesting anemia of chronic disease  Direct Juvencio test negative   -B12/folate supplemented, anemia of chronic disease   -Trend CBC; keep active T&S; transfuse if actively bleeding or if Hb < 7 g/dL   -f/u GI regarding irregular masslike mural thickening in rectum 38 y/o male with history of IVDU admitted to MICU on 11/10/19 with 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 Also complicated by acute hypoxic respiratory failure requiring intubation and mechanical ventilation, and acute renal failure requiring emergent dialysis. Heme/onc consulted for management of DIC.     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   peripheral smear (11/13): spherocytes seen, no schistocytes   11/13/19: PT 17.2, PTT 40.1, Plt 66, fibrinogen 241, d-dimer elevated to 2409  11/14/19: PT 17.0, PTT 40.8, Plt 54, fibrinogen 318  11/18/19: PT 14.8, PTT 40.2, Plt 82, fibrinogen 448   11/19/19: PT 16.2, PTT 38.8, Plt 68, fibrinogen 461; patient with brief cardiac arrest episode overnight   11/21/19: PT 13.0, PTT 33.3, Plt 58, fibrinogen 365  Factor VIII level elevated to 322%, normally lower in DIC but drawn after patient given FFP, and elevated due to shock liver, Factor VII low despite FFP, likely related to acute liver injury (shortest half -life 3-6hrs)  PT/PTT mixing studies with initial correction  No signs of acute bleeding; patient currently intubated; also with multiple septic emboli and pneumothroax   -Treatment of underlying infection (endocarditis) and c/w CVVHD per renal   -supportive care: transfuse plts for >10K or or >50K with active bleeding   -transfuse cryoprecipitate if fibrinogen level <100   -Daily coags, fibrinogen  -vascular f/u     #Anemia  Hb ranging 6.9 - 10 on this admission. Normocytic.   11/14/19: , Haptoglobin < 10; Tibili 11.3, direct bili 9.4  11/18: , Haptoglobin < 10, Tibil 9.1  11/19: Hb 7.0 retic 10.9%, , Tbili  7.5 (direct 6.0, indirect 1.5), hapto 15   11/21: Hb 8.5, retic 6.9%, , hapto 45  retic count elevated w/o gross bleeding, c/w appropriate BM response  Iron studies suggesting anemia of chronic disease  Direct Juvencio test negative   -B12/folate supplemented, anemia of chronic disease   -Trend CBC; keep active T&S; transfuse if actively bleeding or if Hb < 7 g/dL   -f/u GI regarding irregular masslike mural thickening in rectum 40 y/o male with history of IVDU admitted to MICU on 11/10/19 with 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 Also complicated by acute hypoxic respiratory failure requiring intubation and mechanical ventilation, and acute renal failure requiring emergent dialysis. Heme/onc consulted for management of DIC.     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   peripheral smear (11/13): spherocytes seen, no schistocytes   11/13/19: PT 17.2, PTT 40.1, Plt 66, fibrinogen 241, d-dimer elevated to 2409  11/14/19: PT 17.0, PTT 40.8, Plt 54, fibrinogen 318  11/18/19: PT 14.8, PTT 40.2, Plt 82, fibrinogen 448   11/19/19: PT 16.2, PTT 38.8, Plt 68, fibrinogen 461; patient with brief cardiac arrest episode overnight   11/21/19: PT 13.0, PTT 33.3, Plt 58, fibrinogen 365  Factor VIII level elevated to 322%, normally lower in DIC but drawn after patient given FFP, and elevated due to shock liver, Factor VII low despite FFP, likely related to acute liver injury (shortest half -life 3-6hrs)  PT/PTT mixing studies with initial correction  No signs of acute bleeding; patient currently intubated; also with multiple septic emboli and pneumothroax   -Treatment of underlying infection (endocarditis) and c/w CVVHD per renal   -supportive care: transfuse plts for >10K or or >50K with active bleeding   -transfuse cryoprecipitate if fibrinogen level <100   -Daily coags, fibrinogen  -vascular f/u     #Anemia  Hb ranging 6.9 - 10 on this admission. Normocytic.   11/14/19: , Haptoglobin < 10; Tibili 11.3, direct bili 9.4  11/18: , Haptoglobin < 10, Tibil 9.1  11/19: Hb 7.0 retic 10.9%, , Tbili  7.5 (direct 6.0, indirect 1.5), hapto 15   11/21: Hb 8.5, retic 6.9%, , hapto 45  retic count elevated w/o gross bleeding, c/w appropriate BM response  Iron studies suggesting anemia of chronic disease  Direct Juvencio test negative   -B12/folate supplemented, anemia of chronic disease   -Trend CBC; keep active T&S; transfuse if actively bleeding or if Hb < 7 g/dL   -f/u GI regarding irregular masslike mural thickening in rectum 38 y/o male with history of IVDU admitted to MICU on 11/10/19 with 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 Also complicated by acute hypoxic respiratory failure requiring intubation and mechanical ventilation, and acute renal failure requiring emergent dialysis. Heme/onc consulted for management of DIC.     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   peripheral smear (11/13): spherocytes seen, no schistocytes   11/19/19: PT 16.2, PTT 38.8, Plt 68, fibrinogen 461; patient with brief cardiac arrest episode overnight   11/21/19: PT 13.0, PTT 33.3, Plt 58, fibrinogen 365  Factor VIII level elevated to 322%, normally lower in DIC but drawn after patient given FFP, and elevated due to shock liver, Factor VII low despite FFP, likely related to acute liver injury (shortest half -life 3-6hrs)  PT/PTT mixing studies with initial correction  No signs of acute bleeding; patient currently intubated; also with multiple septic emboli and pneumothroax   -Treatment of underlying infection (endocarditis) and c/w CVVHD per renal   -supportive care: transfuse plts for >10K or or >50K with active bleeding   -transfuse cryoprecipitate if fibrinogen level <100   -Daily coags, fibrinogen  -vascular f/u     #Anemia  Hb ranging 6.9 - 10 on this admission. Normocytic.   11/19: Hb 7.0 retic 10.9%, , Tbili  7.5 (direct 6.0, indirect 1.5), hapto 15   11/21: Hb 8.5, retic 6.9%, , hapto 45  retic count elevated w/o gross bleeding, c/w appropriate BM response  Iron studies suggesting anemia of chronic disease  Direct Juvencio test negative   -B12/folate supplemented, anemia of chronic disease   -Trend CBC; keep active T&S; transfuse if actively bleeding or if Hb < 7 g/dL   -f/u GI regarding irregular masslike mural thickening in rectum

## 2019-11-21 NOTE — PROGRESS NOTE ADULT - SUBJECTIVE AND OBJECTIVE BOX
24 hr events    Subjective: Patient seen and examined bedside. More alert. Communicating with writing. NAOE.      Vital Signs Last 24 Hrs  T(C): 36.1 (21 Nov 2019 06:03), Max: 36.7 (20 Nov 2019 22:10)  T(F): 97 (21 Nov 2019 06:03), Max: 98.1 (20 Nov 2019 22:10)  HR: 102 (21 Nov 2019 10:00) (84 - 105)  BP: 107/63 (21 Nov 2019 10:00) (83/59 - 110/66)  BP(mean): 73 (21 Nov 2019 10:00) (63 - 81)  RR: 28 (21 Nov 2019 10:00) (14 - 33)  SpO2: 100% (21 Nov 2019 10:00) (99% - 100%)    I&O's Summary    20 Nov 2019 07:01  -  21 Nov 2019 07:00  --------------------------------------------------------  IN: 2921.3 mL / OUT: 3378 mL / NET: -456.7 mL    21 Nov 2019 07:01  -  21 Nov 2019 10:39  --------------------------------------------------------  IN: 254.5 mL / OUT: 428 mL / NET: -173.5 mL        Physical Exam:  General: alert and awake  Pulmonary: no respiratory distress  Extremities: hands and feet cool to touch,   RUE: hand cool to touch, ischemic 2nd digit, 3rd fingertip with necrotic ulceration with surrounding erythema  LUE: hand cool to touch but no ischemic fingertips  RLE: ischemic/necrotic digits 1-5  LLE: ischemic/necrotic digit 4    Lines/drains/tubes:    LABS:                        8.5    10.07 )-----------( 58       ( 21 Nov 2019 06:58 )             25.9     11-21    133<L>  |  102  |  17  ----------------------------<  153<H>  3.6   |  22  |  0.99    Ca    8.1<L>      21 Nov 2019 06:58  Phos  2.8     11-21  Mg     2.4     11-21    TPro  6.3  /  Alb  2.6<L>  /  TBili  3.6<H>  /  DBili  2.9<H>  /  AST  36  /  ALT  40  /  AlkPhos  84  11-21    PT/INR - ( 21 Nov 2019 06:58 )   PT: 13.0 sec;   INR: 1.15          PTT - ( 21 Nov 2019 06:58 )  PTT:33.3 sec    LIVER FUNCTIONS - ( 21 Nov 2019 06:58 )  Alb: 2.6 g/dL / Pro: 6.3 g/dL / ALK PHOS: 84 U/L / ALT: 40 U/L / AST: 36 U/L / GGT: x           CAPILLARY BLOOD GLUCOSE      POCT Blood Glucose.: 151 mg/dL (21 Nov 2019 05:51)  POCT Blood Glucose.: 133 mg/dL (20 Nov 2019 23:48)  POCT Blood Glucose.: 122 mg/dL (20 Nov 2019 17:53)  POCT Blood Glucose.: 94 mg/dL (20 Nov 2019 11:42)      RADIOLOGY & ADDITIONAL TESTS:

## 2019-11-21 NOTE — PROGRESS NOTE ADULT - ASSESSMENT
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  Multiple septic emboli to lungs, course c/b empyema requiring B chest tubes, as well as multi-organ failure (shock liver, CAMERON requiring CVVHD).   Necrotic digits likely related to pressors.  Febrile this AM and ongoing marked leukocytosis although now downtrending.  Per CTS, he is not a surgical candidate.  Patient found to have newly dilated loops of bowel and worsening abdominal distention associated with diarrhea, initial concern per primary team for C.diff infection but C.diff toxin negative, surgery following. Course further complicated by PEA arrest on 11/18 atropine given and CPR initiated, ROSC achieved and patient was reintubated. Per pulmonary/ICU fellow, clots and mucus suctioned. Likely 2/2 to mucus plugging. Also found to have b/l pneumothoraces and increased extent of lobar pneumonia in b/l lower lobes of lungs on CT chest. R S/p chest tube placement and bronch on 11/19.     Suggest:  - Continue nafcillin 2 g IV q4hrs (drug of choice for high inoculum MSSA infection)  - c/w IV Meropenem 1g q8hrs for now pending further bronch cxs   - MRSA PCR negative, would defer tx with IV Vanc  -sputum cx 11/18 with MSSA, moderate yeast-like cells  -BCxs 11/18 NGTD, continue to monitor  -BCxs 11/19 NGTD, continue to monitor  -Bronch 11/19 cx with staph aureus and moderate yeast, continue to monitor. Yeast likely colonizer     Plan discussed with Dr. Simental

## 2019-11-21 NOTE — PROGRESS NOTE ADULT - SUBJECTIVE AND OBJECTIVE BOX
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      ROS:  CONSTITUTIONAL:  Negative fever or chills, feels well, good appetite  EYES:  Negative  blurry vision or double vision  CARDIOVASCULAR:  Negative for chest pain or palpitations  RESPIRATORY:  Negative for cough, wheezing, or SOB   GASTROINTESTINAL:  Negative for nausea, vomiting, diarrhea, constipation, or abdominal pain  GENITOURINARY:  Negative frequency, urgency or dysuria  NEUROLOGIC:  No headache, confusion, dizziness, lightheadedness    Allergies    No Known Allergies    Intolerances        ANTIBIOTICS/RELEVANT:  antimicrobials  meropenem  IVPB 1000 milliGRAM(s) IV Intermittent every 8 hours  nafcillin  IVPB 2 Gram(s) IV Intermittent every 4 hours    immunologic:    OTHER:  albuterol/ipratropium for Nebulization 3 milliLiter(s) Nebulizer every 4 hours  artificial  tears Solution 1 Drop(s) Both EYES every 6 hours  ascorbic acid 500 milliGRAM(s) Oral daily  chlorhexidine 0.12% Liquid 15 milliLiter(s) Oral Mucosa every 12 hours  chlorhexidine 2% Cloths 1 Application(s) Topical daily  CRRT Treatment    <Continuous>  dextrose 40% Gel 15 Gram(s) Oral once PRN  dextrose 5%. 1000 milliLiter(s) IV Continuous <Continuous>  dextrose 50% Injectable 12.5 Gram(s) IV Push once  dextrose 50% Injectable 25 Gram(s) IV Push once  dextrose 50% Injectable 25 Gram(s) IV Push once  fentaNYL   Infusion. 0.5 MICROgram(s)/kG/Hr IV Continuous <Continuous>  glucagon  Injectable 1 milliGRAM(s) IntraMuscular once PRN  heparin  Injectable 5000 Unit(s) SubCutaneous every 8 hours  hydrocortisone sodium succinate Injectable 50 milliGRAM(s) IV Push every 6 hours  insulin lispro (HumaLOG) corrective regimen sliding scale   SubCutaneous every 6 hours  multivitamin/minerals/iron Oral Solution (CENTRUM) 15 milliLiter(s) Oral daily  norepinephrine Infusion 0.05 MICROgram(s)/kG/Min IV Continuous <Continuous>  pantoprazole  Injectable 40 milliGRAM(s) IV Push daily  Phoxillum Filtration BK 4 / 2.5 5000 milliLiter(s) CRRT <Continuous>  polyethylene glycol 3350 17 Gram(s) Oral two times a day  propofol Infusion 5 MICROgram(s)/kG/Min IV Continuous <Continuous>  senna 2 Tablet(s) Oral at bedtime  sodium chloride 0.9% lock flush 3 milliLiter(s) IV Push every 8 hours  vitamin E 400 International Unit(s) Oral daily  zinc sulfate 220 milliGRAM(s) Oral daily      Objective:  Vital Signs Last 24 Hrs  T(C): 37.4 (21 Nov 2019 11:00), Max: 37.4 (21 Nov 2019 11:00)  T(F): 99.4 (21 Nov 2019 11:00), Max: 99.4 (21 Nov 2019 11:00)  HR: 94 (21 Nov 2019 13:00) (84 - 105)  BP: 104/64 (21 Nov 2019 13:00) (83/59 - 110/66)  BP(mean): 76 (21 Nov 2019 13:00) (62 - 81)  RR: 27 (21 Nov 2019 13:00) (9 - 33)  SpO2: 99% (21 Nov 2019 13:00) (99% - 100%)    PHYSICAL EXAM:  Constitutional:Well-developed, well nourishe  Eyes:NANCY, EOMI  Ear/Nose/Throat: no oral lesion, no sinus tenderness on percussion	  Neck:no JVD, no lymphadenopathy, supple  Respiratory: CTA hellen  Cardiovascular: S1S2 RRR, no murmurs  Gastrointestinal:soft, (+) BS, no HSM  Extremities:no e/e/c        LABS:                        8.5    10.07 )-----------( 58       ( 21 Nov 2019 06:58 )             25.9     11-21    133<L>  |  102  |  17  ----------------------------<  153<H>  3.6   |  22  |  0.99    Ca    8.1<L>      21 Nov 2019 06:58  Phos  2.8     11-21  Mg     2.4     11-21    TPro  6.3  /  Alb  2.6<L>  /  TBili  3.6<H>  /  DBili  2.9<H>  /  AST  36  /  ALT  40  /  AlkPhos  84  11-21    PT/INR - ( 21 Nov 2019 06:58 )   PT: 13.0 sec;   INR: 1.15          PTT - ( 21 Nov 2019 06:58 )  PTT:33.3 sec        MICROBIOLOGY:        RADIOLOGY & ADDITIONAL STUDIES: Infectious diseases progress note:  ULI HOLLAND is a 39yMale patient    ENDOCARDITIS/SEPSIS    CAMERON (acute kidney injury)  Acute endocarditis due to other organism    Subjective:   Patient awake and alert, intubated. Patient admitted to nausea and some abdominal discomfort. Denied fevers, chills, night sweats, chest pain, SOB, diarrhea, constipation.     Allergies    No Known Allergies    Intolerances        ANTIBIOTICS/RELEVANT:  antimicrobials  meropenem  IVPB 1000 milliGRAM(s) IV Intermittent every 8 hours  nafcillin  IVPB 2 Gram(s) IV Intermittent every 4 hours    immunologic:    OTHER:  albuterol/ipratropium for Nebulization 3 milliLiter(s) Nebulizer every 4 hours  artificial  tears Solution 1 Drop(s) Both EYES every 6 hours  ascorbic acid 500 milliGRAM(s) Oral daily  chlorhexidine 0.12% Liquid 15 milliLiter(s) Oral Mucosa every 12 hours  chlorhexidine 2% Cloths 1 Application(s) Topical daily  CRRT Treatment    <Continuous>  dextrose 40% Gel 15 Gram(s) Oral once PRN  dextrose 5%. 1000 milliLiter(s) IV Continuous <Continuous>  dextrose 50% Injectable 12.5 Gram(s) IV Push once  dextrose 50% Injectable 25 Gram(s) IV Push once  dextrose 50% Injectable 25 Gram(s) IV Push once  fentaNYL   Infusion. 0.5 MICROgram(s)/kG/Hr IV Continuous <Continuous>  glucagon  Injectable 1 milliGRAM(s) IntraMuscular once PRN  heparin  Injectable 5000 Unit(s) SubCutaneous every 8 hours  hydrocortisone sodium succinate Injectable 50 milliGRAM(s) IV Push every 6 hours  insulin lispro (HumaLOG) corrective regimen sliding scale   SubCutaneous every 6 hours  multivitamin/minerals/iron Oral Solution (CENTRUM) 15 milliLiter(s) Oral daily  norepinephrine Infusion 0.05 MICROgram(s)/kG/Min IV Continuous <Continuous>  pantoprazole  Injectable 40 milliGRAM(s) IV Push daily  Phoxillum Filtration BK 4 / 2.5 5000 milliLiter(s) CRRT <Continuous>  polyethylene glycol 3350 17 Gram(s) Oral two times a day  propofol Infusion 5 MICROgram(s)/kG/Min IV Continuous <Continuous>  senna 2 Tablet(s) Oral at bedtime  sodium chloride 0.9% lock flush 3 milliLiter(s) IV Push every 8 hours  vitamin E 400 International Unit(s) Oral daily  zinc sulfate 220 milliGRAM(s) Oral daily      Objective:  Vital Signs Last 24 Hrs  T(C): 37.4 (21 Nov 2019 11:00), Max: 37.4 (21 Nov 2019 11:00)  T(F): 99.4 (21 Nov 2019 11:00), Max: 99.4 (21 Nov 2019 11:00)  HR: 94 (21 Nov 2019 13:00) (84 - 105)  BP: 104/64 (21 Nov 2019 13:00) (83/59 - 110/66)  BP(mean): 76 (21 Nov 2019 13:00) (62 - 81)  RR: 27 (21 Nov 2019 13:00) (9 - 33)  SpO2: 99% (21 Nov 2019 13:00) (99% - 100%)    PHYSICAL EXAM:  Constitutional: Intubated  HEENT: NCAT, positive scleral icterus, dry MM   Neck: supple  Respiratory: b/l rhonchi and wheezing throughout  Cardiovascular: S1, S2. RRR. systolic murmur appreciated on left 4th ICS  Gastrointestinal: Soft, NTND, improved from yesterday. BS+ x4 quadrants  Extremities: Extremities warm throughout. Tips of b/l toes cyanotic and cool to touch. No edema      LABS:                        8.5    10.07 )-----------( 58       ( 21 Nov 2019 06:58 )             25.9     11-21    133<L>  |  102  |  17  ----------------------------<  153<H>  3.6   |  22  |  0.99    Ca    8.1<L>      21 Nov 2019 06:58  Phos  2.8     11-21  Mg     2.4     11-21    TPro  6.3  /  Alb  2.6<L>  /  TBili  3.6<H>  /  DBili  2.9<H>  /  AST  36  /  ALT  40  /  AlkPhos  84  11-21    PT/INR - ( 21 Nov 2019 06:58 )   PT: 13.0 sec;   INR: 1.15          PTT - ( 21 Nov 2019 06:58 )  PTT:33.3 sec        MICROBIOLOGY:        RADIOLOGY & ADDITIONAL STUDIES:

## 2019-11-21 NOTE — PROGRESS NOTE ADULT - ASSESSMENT
40 y/o M smoker w/ PMHx of IVDU presented to [facility] w/ productive cough with hemoptysis found to have septic emboli w/ vegetation of the TV transferred to [facility] for surgical evaluation.  Course complicated by sepsis, DIC, acute respiratory failure w/ empyema s/p intubation and thoracostomy tube, CAMERON requiring cvvhd, ileus on NGT and rectal decompression, cardiac arrest, and pneumothorax. GI consulted for evaluation of incidental finding of rectal wall thickening on imaging.    #Rectal lesion:  CT w/ irregular masslike mural thickening of the posterior right lateral wall of the rectum.  Would plan for nonurgent flex sig when patient is stable  -Will plan for flex sig when patient is stable     #Abd distension  Patient with new abdominal distension with xray notable for dilated bowels.  CT Patient is on zinc and fentanyl with rectal tube.  -Continue miralax  -Appreciate surgery input    #Hyperbilirubinemia:  Patient with marked elevated bilirubin initially thought to be 2/2 ischemic hepatopathy but persistent elevation on repeat.  Imaging without CBD dilation suggestive of cholestasis from sepsis vs DILI in the setting of recent ischemic hepatopathy.  Now downtrending  -Daily LFT and INR    Please notify GI team when patient is clinically stable for flexible sigmoidoscopy

## 2019-11-22 NOTE — PROGRESS NOTE ADULT - ASSESSMENT
40 y/o M smoker w/ PMHx of IVDU presented to [facility] w/ productive cough with hemoptysis found to have septic emboli w/ vegetation of the tricuspid valve transferred to [facility] for surgical evaluation.  Course complicated by sepsis, DIC, acute respiratory failure w/ empyema s/p reintubation and thoracostomy tube, CAMERON requiring cvvhd. Surgery reconsulted for abdominal distention with Xray demonstrating colonic and small bowel distention. Patient underwent CT abdomen pelvis on 11/18 which demonstrated colonic distention likely secondary to pseudoobstruction and decompressed small bowel. CT chest demonstrating hydropneumothorax on the right lung and pneumothorax of the left lung.   - tolerating tube feeds   - Continue rectal tube for colonic decompression  - AXR from yesterday WNL   - plan for GI flexible sigmoidoscopy when more stable   - surgery following 38 y/o M smoker w/ PMHx of IVDU presented to [facility] w/ productive cough with hemoptysis found to have septic emboli w/ vegetation of the tricuspid valve transferred to [facility] for surgical evaluation.  Course complicated by sepsis, DIC, acute respiratory failure w/ empyema s/p reintubation and thoracostomy tube, CAMERON requiring cvvhd. Surgery reconsulted for abdominal distention with Xray demonstrating colonic and small bowel distention. Patient underwent CT abdomen pelvis on 11/18 which demonstrated colonic distention likely secondary to pseudoobstruction and decompressed small bowel. CT chest demonstrating hydropneumothorax on the right lung and pneumothorax of the left lung.   - tolerating tube feeds   - Continue rectal tube for colonic decompression, pseudoobstruction seems resolved   - AXR from yesterday WNL   - plan for GI flexible sigmoidoscopy when more stable   - can re consult surgery with any acute issues or questions

## 2019-11-22 NOTE — PROGRESS NOTE ADULT - ASSESSMENT
38 y/o M w/ PMH of IVDU and active smoker who came from [facility] on 11/8/19 in 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 After being transferred to  CTICU for surgical evaluation, pt was deemed to not be a surgical candidate and transferred to MICU for medical mgmt. ID, Nephrology, heme/onc, surgery following, vascular.    Neuro  Intubated, sedated on Propofol, Fentanyl gtt. Prior CT head negative for any intracranial embolic events.      Cardiovascular     #Hypotension  Most likely 2/2 septic shock from infective endocarditis and RV failure 2/2 tricuspid regurgitation (ELIAN shows EF of 40-50%). Echo with EF 45%. ELIAN with EF 40-45%, 3.8 x1cm vegetation on TR valve, with severe TR, trivial pericardial effusion. Echo with bubble revealed no PFO. Now off Vasopressin, wean off Levophed as tolerated.   - Maintain MAP>65.   - C/W medical mgmt of infective endocarditis as below.  - repeat echo 11/18 with stable vegetation  - Likely became hypotensive and bradycardic overnight (11/18-11/19) in setting of mucous plug vs arrythmia vs most likely septic shock   - repeat echo 11/20 with large stable vegetation, severe TR, markedly dilated IVC with increased R atrial pressure- EF 60%       Respiratory    #Acute hypoxic respiratory failure   Most likely 2/2 alveolar hemorrhage in the setting of septic embolic causing numerous cavitary lesions on CT chest. CXR with scattered infiltrates and pleural effusions. Sputum culture negative. CT revealed "moderate bilateral pleural effusions and dense bibasilar consolidation with underlying septic emboli/pulmonary abscesses."   - Re-intubated on 11/19 for acute hypoxic respiratory failure with increased work of breathing   - CT chest with new b/l loculated pneumothoraces with increase in size of cavitary lesions with worsening lobar PNA   - now with 2 right chest tubes and 1 left chest tube-->repeat cxr  with decrease in size of b/l pneumothoraces   - cxr 11/21 with small right pneumothorax   - Hold off on trach for now, will reconsider for tomorrow or Monday depending on clinical status and need for further tx   - Start Duonebs q4  - Continue to treat IE as below    #Bilateral pulmonary effusion  Most likely empyema in setting of septic shock 2/2 infective endocarditis. Bilateral CT in place, confirmed by CXR, continues to drain serosanguinous fluid. Fluid analysis of both CT pleural fluid confirms complex exudate with high cellularity, low pH and low glucose. pleural fluid cultures with staph aureus. Chest tubes in place as above (2 R chest tubes, 1 L chest tube)   - Monitor output of bilateral chest tubes  - R pleural fluid cultures with staph aureus, L pleural fluid cultures with NGTD   - CT chest and cxr as above. Chest tube set to suction.      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 Not a surgical candidate for a tricuspid valve replacement at this time as per CT surgery. Lactate 2.0 today. Initial blood cultures positive for staph epidermidis, likely a contaminant. Initial sputum culture positive for staph aureus  Repeat blood cultures with NGTD. Sputum cx NGTD.  - ID following, C/W Nafcillin 2g q4 (Sensitive to Oxacillin as per OSH records), f/u recs   - Continue Meropenem (gram neg coverage) in setting of septic shock on night of 11/18 (spiked fever to 101F tachycardic, leukocytosis)  - DC Vancomycin as MRSA PCR negative   - repeat Bcxs with NGTD   - Bronch performed 11/19 with copious purulent sputum-  BAL cxs with numerous wbcs, rare gram positive rods/ rare gram negative rods, AFB negative. F/u cytopathology and cultures  - Bronch findings with possible herpetic tracheobronchitis, awaiting results as above. Will likely repeat bronch tomorrow with possible trach for tomorrow or Monday   - Per CT surgery pt is too high risk for any surgery and would likely not survive procedure.   - Cardiology consulted to optimize management of his right heart dysfunction, f/u recs    - Monitor CBC     Renal    #Acute renal failure most likely 2/2 ATN from hypoperfusion, Minimal urine output, on CVVHD, oliguric with 0-5cc/hr. Vancomycin level 37 on arrival so may be component of drug induced nephropathy. Bun/Cr continues to improve while on CVVHD. Pt is clinically fluid overloaded but now improved and hemodynamically stable. No renal infarcts as per CT abdomen/pelvis. Optimized for Sx as per nephrology.  - F/U nephrology recs.  - C/W CVVHD as tolerated, monitor for clotting  - Continue to correct fluid overload with HD  - Monitor renal function with BUN/Cr  - Monitor I/Os  - DC raya, blader scan, with straight cath if needed     #Electrolytes abnormalities   Improved. On CVVHD. Hyperkalemia resolved. No EKG changes.  -Requires q6 BMP, Mg, and Phos while on CVVHD  -Monitor serum lytes       GI    OG on arrival with 600cc of bilious fluid, abdomen still distended but had several BMs, some loose. CT abdomen revealed no evidence of bowel ischemia or SBO. Sump was DCed and replaced with NG tube. CT abdomen/pelvis revealed "irregular masslike mural thickening of the posterior left lateral wall of the rectum." Surgery consulted, unlikely perirectal abscess. Recommend GI evaluation for possible scope.  - NG tube in place  - C/W tube feeds, Jevity 1.5  - F/u GI consult, f/u recs, when stable will go for flex sig   - Abdominal xray with air-filled loops of small/large bowel with concern for distal obstruction vs ileus. Surgery consulted. Rectal tube and sump placed. CT abd/pelvis with abd/pelvic, anasarca, colonic ileus. Rectal tube in place for colonic decompression, continue to monitor. Abdomen still distended but significantly improved- f/u repeat abd xray   -C. diff negative     #Congestive Hepatopathy   Transaminitis, coag derangements, hyperbilirubinemia most likely 2/2 hepatic congestion vs hemolysis, due to severe TR in setting of infective endocarditis. Transaminases and Alk phos improving. Hepatitis panel negative.  - Monitor CMP, direct bili   - Avoid Tylenol    Heme    #Hemolysis  Intravascular hemolysis with inital haptoglobin <10, LDH decreasing at 365. D-dimer elevated. Fibrinogen stable 396. Today still clinically jaundiced, with stable bilirubinemia: T.bili 9.9., D.bili 8.5. h/h stable at 8.1/25.5. Platelets decreasing from 54 to 49.. Fact VII/Factor VIII elevated. Unlikely 2/2 CVVHD. Likely 2/2 to sepsis and/or DIC.   - s/p 2u pRBCs (11/19-11/20). Hgb stable at 8.5   - Avoid anticoagulation  - Heme consulted, follow recs  - Monitor direct and total bili, LDH, fibrinogen, platelets   - Transfuse platelets if <10K or bleeding and <50K  - Transfuse pRBCs for hgb <7     Vascular  Vascular surgery consulted in setting of gangrenous distal toes and fingers b/l. Likely 2/2 to pressors. Will follow up recs.     Lines: right IJ TLC and Ernesto (11/12), Axillary A-line (11/12), Left IJ (11/12), right peripheral (11/12)    Endocrinology  -Continue Hydrocortisone 50mg q6- stress dose steroids    F: None   E: replete K <4, Mg <2  N: Jevity 1.5   GI Ppx: Protonix   DVT Ppx: Heparin   Code status: FULL   Dispo: MICU 38 y/o M w/ PMH of IVDU and active smoker who came from [facility] on 11/8/19 in 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 After being transferred to  CTICU for surgical evaluation, pt was deemed to not be a surgical candidate and transferred to MICU for medical mgmt. ID, Nephrology, heme/onc, surgery following, vascular.    Neuro  Intubated, sedated on Propofol, Fentanyl gtt. Prior CT head negative for any intracranial embolic events. Awake and alert, follows commands.       Cardiovascular     #Hypotension  Most likely 2/2 septic shock from infective endocarditis and RV failure 2/2 tricuspid regurgitation (ELIAN shows EF of 40-50%). Echo with EF 45%. ELIAN with EF 40-45%, 3.8 x1cm vegetation on TR valve, with severe TR, trivial pericardial effusion. Echo with bubble revealed no PFO. Now off Vasopressin, wean off Levophed as tolerated.   - Maintain MAP>65.   - C/W medical mgmt of infective endocarditis as below.  - repeat echo 11/18 with stable vegetation  - Likely became hypotensive and bradycardic overnight (11/18-11/19) in setting of mucous plug vs arrythmia vs most likely septic shock   - repeat echo 11/20 with large stable vegetation, severe TR, markedly dilated IVC with increased R atrial pressure- EF 60%       Respiratory    #Acute hypoxic respiratory failure   Most likely 2/2 alveolar hemorrhage in the setting of septic embolic causing numerous cavitary lesions on CT chest. CXR with scattered infiltrates and pleural effusions. Sputum culture negative. CT revealed "moderate bilateral pleural effusions and dense bibasilar consolidation with underlying septic emboli/pulmonary abscesses."   - Re-intubated on 11/19 for acute hypoxic respiratory failure with increased work of breathing   - CT chest with new b/l loculated pneumothoraces with increase in size of cavitary lesions with worsening lobar PNA   - now with 2 right chest tubes and 1 left chest tube-->repeat cxr  with decrease in size of b/l pneumothoraces   - cxr 11/21 with small right pneumothorax, still present but stable on 11/22    - Hold off on trach for now, will reconsider next week depending on clinical status and need for further tx   - Start Duonebs q4  - Repeat bronch 11/22 with thick secretions (R>L) but slightly improved compared to first bronch   - Continue to treat IE as below    #Bilateral pulmonary effusion  Most likely empyema in setting of septic shock 2/2 infective endocarditis. Bilateral CT in place, confirmed by CXR, continues to drain serosanguinous fluid. Fluid analysis of both CT pleural fluid confirms complex exudate with high cellularity, low pH and low glucose. pleural fluid cultures with staph aureus. Chest tubes in place as above (2 R chest tubes, 1 L chest tube)   - Monitor output of bilateral chest tubes  - R pleural fluid cultures with staph aureus, L pleural fluid cultures with NGTD   - CT chest and cxr as above. Chest tube set to suction.      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 Not a surgical candidate for a tricuspid valve replacement at this time as per CT surgery. Lactate 2.0 today. Initial blood cultures positive for staph epidermidis, likely a contaminant. Initial sputum culture positive for staph aureus  Repeat blood cultures with NGTD. Sputum cx NGTD.  - ID following, C/W Nafcillin 2g q4 (Sensitive to Oxacillin as per OSH records), f/u recs   - DC Meropenem per ID   - DC Vancomycin as MRSA PCR negative   - repeat Bcxs with NGTD   - Bronch performed 11/19 with copious purulent sputum-  BAL cxs with numerous wbcs, rare gram positive rods/ rare gram negative rods, AFB negative. Cytopathology with no pneumocystis organisms   - Bronch findings with possible herpetic tracheobronchitis, awaiting results   - Per CT surgery pt is too high risk for any surgery and would likely not survive procedure.   - Cardiology consulted to optimize management of his right heart dysfunction, f/u recs    - Repeat bronch 11/22 with thick secretions (R>L) but slightly improved compared to first bronch       Renal    #Acute renal failure most likely 2/2 ATN from hypoperfusion, Minimal urine output, on CVVHD, oliguric with 0-5cc/hr. Vancomycin level 37 on arrival so may be component of drug induced nephropathy. Bun/Cr continues to improve while on CVVHD. Pt is clinically fluid overloaded but now improved and hemodynamically stable. No renal infarcts as per CT abdomen/pelvis. Optimized for Sx as per nephrology.  - F/U nephrology recs.  - C/W CVVHD as tolerated, monitor for clotting  - Continue to correct fluid overload with HD  - Monitor renal function with BUN/Cr  - Monitor I/Os  - DC raya, blader scan, with straight cath if needed     #Electrolytes abnormalities   Improved. On CVVHD. Hyperkalemia resolved. No EKG changes.  -Requires q6 BMP, Mg, and Phos while on CVVHD  -Monitor serum lytes       GI    OG on arrival with 600cc of bilious fluid, abdomen still distended but had several BMs, some loose. CT abdomen revealed no evidence of bowel ischemia or SBO. Sump was DCed and replaced with NG tube. CT abdomen/pelvis revealed "irregular masslike mural thickening of the posterior left lateral wall of the rectum." Surgery consulted, unlikely perirectal abscess. Recommend GI evaluation for possible scope.  - NG tube in place  - C/W tube feeds, Jevity 1.5  - F/u GI consult, f/u recs, when stable will go for flex sig   - Abdominal xray with air-filled loops of small/large bowel with concern for distal obstruction vs ileus. Surgery consulted. Rectal tube and sump placed. CT abd/pelvis with abd/pelvic, anasarca, colonic ileus. Rectal tube in place for colonic decompression, continue to monitor. Abdomen still distended but significantly improved, repeat abd xray without obstruction   -C. diff negative     #Congestive Hepatopathy   Transaminitis, coag derangements, hyperbilirubinemia most likely 2/2 hepatic congestion vs hemolysis, due to severe TR in setting of infective endocarditis. Transaminases and Alk phos improving. Hepatitis panel negative.  - Monitor CMP, direct bili   - Avoid Tylenol    Heme    #Hemolysis  Intravascular hemolysis with inital haptoglobin <10, LDH decreasing at 365. D-dimer elevated. Fibrinogen stable 396. Today still clinically jaundiced, with stable bilirubinemia: T.bili 9.9., D.bili 8.5. h/h stable at 8.1/25.5. Platelets decreasing from 54 to 49.. Fact VII/Factor VIII elevated. Unlikely 2/2 CVVHD. Likely 2/2 to sepsis and/or DIC.   - s/p 2u pRBCs (11/19-11/20). Hgb stable at 7.2  - Heme consulted, follow recs  - Monitor direct and total bili, LDH, fibrinogen, platelets   - Transfuse platelets if <10K or bleeding and <50K  - Transfuse pRBCs for hgb <7     Vascular  Vascular surgery consulted in setting of gangrenous distal toes and fingers b/l. Likely 2/2 to pressors. Will follow up recs.     Lines: right IJ TLC and Ernesto (11/12), Axillary A-line (11/12), Left IJ (11/12), right peripheral (11/12)    Endocrinology  -Continue Hydrocortisone 50mg q6- stress dose steroids    F: None   E: replete K <4, Mg <2  N: Jevity 1.5   GI Ppx: Protonix   DVT Ppx: Heparin   Code status: FULL   Dispo: MICU

## 2019-11-22 NOTE — PROGRESS NOTE ADULT - ASSESSMENT
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  Multiple septic emboli to lungs, course c/b empyema requiring B chest tubes, as well as multi-organ failure (shock liver, CAMERON requiring CVVHD).   Necrotic digits likely related to pressors.  Febrile this AM and ongoing marked leukocytosis although now downtrending.  Per CTS, he is not a surgical candidate.  Patient found to have newly dilated loops of bowel and worsening abdominal distention associated with diarrhea, initial concern per primary team for C.diff infection but C.diff toxin negative, surgery following. Course further complicated by PEA arrest on 11/18 atropine given and CPR initiated, ROSC achieved and patient was reintubated. Per pulmonary/ICU fellow, clots and mucus suctioned. Likely 2/2 to mucus plugging. Also found to have b/l pneumothoraces and increased extent of lobar pneumonia in b/l lower lobes of lungs on CT chest. R S/p chest tube placement and bronch on 11/19.     Suggest:  - Continue nafcillin 2 g IV q4hrs (drug of choice for high inoculum MSSA infection)  - d/c Meropenem, Bronch 11/19 with staph aureus and moderate yeast on final growth. Yeast likely colonizer  - Bronch cytopath from bronchial wash had initial concern for possible PCP. At this time, we recommended for a BAL, repeat HIV screen, VL, CD4 subsets, Beta-d-glucan, and initiate IV Bactrim 375mg q8hrs while on CVVHD after patient had his BAL for empiric treatment. Addendum from this wash later described no concerns for PCP  -Given no longer concerning PCP from bronchial wash addendum, please d/c IV Bactrim 375mg q6hrs   - MRSA PCR negative, would defer tx with IV Vanc  -sputum cx 11/18 with MSSA, moderate yeast-like cells  -BCxs 11/18 NGTD, continue to monitor  -BCxs 11/19 NGTD, continue to monitor    Plan discussed with Dr. Simental

## 2019-11-22 NOTE — PROGRESS NOTE ADULT - SUBJECTIVE AND OBJECTIVE BOX
INTERVAL EVENTS: Patient awake and alert. Denies any complaints.     PAST MEDICAL & SURGICAL HISTORY:  Endocarditis  IVDU (intravenous drug user)  IVDA (intravenous drug abuse) complicating pregnancy  Smoker  No significant past surgical history      MEDICATIONS  (STANDING):  albuterol/ipratropium for Nebulization 3 milliLiter(s) Nebulizer every 4 hours  artificial  tears Solution 1 Drop(s) Both EYES every 6 hours  ascorbic acid 500 milliGRAM(s) Oral daily  chlorhexidine 0.12% Liquid 15 milliLiter(s) Oral Mucosa every 12 hours  chlorhexidine 2% Cloths 1 Application(s) Topical daily  CRRT Treatment    <Continuous>  dextrose 5%. 1000 milliLiter(s) (50 mL/Hr) IV Continuous <Continuous>  dextrose 50% Injectable 12.5 Gram(s) IV Push once  dextrose 50% Injectable 25 Gram(s) IV Push once  dextrose 50% Injectable 25 Gram(s) IV Push once  fentaNYL   Infusion. 0.5 MICROgram(s)/kG/Hr (3.68 mL/Hr) IV Continuous <Continuous>  heparin  Injectable 5000 Unit(s) SubCutaneous every 8 hours  hydrocortisone sodium succinate Injectable 50 milliGRAM(s) IV Push every 6 hours  insulin lispro (HumaLOG) corrective regimen sliding scale   SubCutaneous every 6 hours  meropenem  IVPB 1000 milliGRAM(s) IV Intermittent every 8 hours  multivitamin/minerals/iron Oral Solution (CENTRUM) 15 milliLiter(s) Oral daily  nafcillin  IVPB 2 Gram(s) IV Intermittent every 4 hours  norepinephrine Infusion 0.05 MICROgram(s)/kG/Min (3.45 mL/Hr) IV Continuous <Continuous>  pantoprazole  Injectable 40 milliGRAM(s) IV Push daily  Phoxillum Filtration BK 4 / 2.5 5000 milliLiter(s) (2500 mL/Hr) CRRT <Continuous>  propofol Infusion 5 MICROgram(s)/kG/Min (2.208 mL/Hr) IV Continuous <Continuous>  sodium chloride 0.9% lock flush 3 milliLiter(s) IV Push every 8 hours  vitamin E 400 International Unit(s) Oral daily  zinc sulfate 220 milliGRAM(s) Oral daily    MEDICATIONS  (PRN):  dextrose 40% Gel 15 Gram(s) Oral once PRN Blood Glucose LESS THAN 70 milliGRAM(s)/deciliter  glucagon  Injectable 1 milliGRAM(s) IntraMuscular once PRN Glucose LESS THAN 70 milligrams/deciliter      Vital Signs Last 24 Hrs  T(C): 37.1 (22 Nov 2019 09:22), Max: 37.5 (21 Nov 2019 18:39)  T(F): 98.8 (22 Nov 2019 09:22), Max: 99.5 (21 Nov 2019 18:39)  HR: 112 (22 Nov 2019 11:53) (86 - 112)  BP: 102/60 (22 Nov 2019 11:00) (98/55 - 132/111)  BP(mean): 69 (22 Nov 2019 11:00) (65 - 126)  RR: 25 (22 Nov 2019 11:53) (10 - 59)  SpO2: 100% (22 Nov 2019 11:53) (95% - 100%)     PHYSICAL EXAM:  GEN: Intubated. Awake, alert.   HEENT: NCAT. Left IJ TLC. Right HD cath.   RESP: Rhonchorus BS B/L with other adventitious BS. B/L Chest tubes.   CV: Tachy. normal s1/s2. Difficult to assess other mrg.   ABD: Soft, Distended and tympanic to percussion.   EXT: Warm. No edema. Left 1,3,4 toes with necrosis. Right toes with necrosis. DPPT 2+ B/L.   NEURO: Intubated and sedated.         LABS:                        7.2    7.16  )-----------( 45       ( 22 Nov 2019 07:16 )             21.9     11-22    134<L>  |  101  |  17  ----------------------------<  133<H>  4.0   |  22  |  0.97    Ca    8.3<L>      22 Nov 2019 07:16  Phos  3.5     11-22  Mg     2.5     11-22    TPro  6.7  /  Alb  2.8<L>  /  TBili  2.4<H>  /  DBili  1.7<H>  /  AST  33  /  ALT  40  /  AlkPhos  81  11-22        PT/INR - ( 22 Nov 2019 07:16 )   PT: 11.5 sec;   INR: 1.02          PTT - ( 22 Nov 2019 07:16 )  PTT:30.8 sec    I&O's Summary    21 Nov 2019 07:01  -  22 Nov 2019 07:00  --------------------------------------------------------  IN: 3224.9 mL / OUT: 3712 mL / NET: -487.1 mL    22 Nov 2019 07:01  -  22 Nov 2019 12:37  --------------------------------------------------------  IN: 685.1 mL / OUT: 999 mL / NET: -313.9 mL      BNP  RADIOLOGY & ADDITIONAL STUDIES:    TELEMETRY: Sinus tachycardia    Echo: < from: Echocardiogram (11.20.19 @ 12:51) >  . Dilated right ventricle.   2. Moderately dilated right atrium.   3. Severe tricuspid regurgitation.   4. Small pericardial effusion without evidence of cardiac tamponade.   5. A large vegetation attached to the Posterior Leaflet of the Tricuspid   Valve.

## 2019-11-22 NOTE — PROGRESS NOTE ADULT - SUBJECTIVE AND OBJECTIVE BOX
**Incomplete note***    Patient discussed on morning rounds with Dr. Rossi    Operation / Date: b/l PTX s/p pigtail placement (not placed by CT surgery)    SUBJECTIVE ASSESSMENT:  39y Male seen and examined bedside who is interactive during exam. Brother is at bedside who is interacting with patient via notepad/pen. In no acute distress, good eye contact.         Vital Signs Last 24 Hrs  T(C): 36.8 (22 Nov 2019 06:03), Max: 37.5 (21 Nov 2019 18:39)  T(F): 98.3 (22 Nov 2019 06:03), Max: 99.5 (21 Nov 2019 18:39)  HR: 101 (22 Nov 2019 08:52) (86 - 110)  BP: 132/111 (22 Nov 2019 08:00) (89/52 - 132/111)  BP(mean): 126 (22 Nov 2019 08:00) (62 - 126)  RR: 15 (22 Nov 2019 08:30) (9 - 59)  SpO2: 100% (22 Nov 2019 08:52) (95% - 100%)  I&O's Detail    21 Nov 2019 07:01  -  22 Nov 2019 07:00  --------------------------------------------------------  IN:    Enteral Tube Flush: 110 mL    fentaNYL Infusion.: 332.7 mL    norepinephrine Infusion: 254 mL    ns in tub fed  [id number]: 1032 mL    Oral Fluid: 90 mL    propofol Infusion: 456.2 mL    Solution: 200 mL    Solution: 150 mL    Solution: 600 mL  Total IN: 3224.9 mL    OUT:    Chest Tube: 40 mL    Chest Tube: 50 mL    Other: 2922 mL    Rectal Tube: 700 mL  Total OUT: 3712 mL    Total NET: -487.1 mL      22 Nov 2019 07:01  -  22 Nov 2019 09:23  --------------------------------------------------------  IN:    fentaNYL Infusion.: 14.7 mL    norepinephrine Infusion: 5 mL    ns in tub fed  [id number]: 43 mL    propofol Infusion: 19.9 mL  Total IN: 82.6 mL    OUT:    Chest Tube: 50 mL    Chest Tube: 30 mL    Other: 185 mL    Rectal Tube: 500 mL  Total OUT: 765 mL    Total NET: -682.4 mL          CHEST TUBE:  Yes/No. AIR LEAKS: Yes/No. Suction / H2O SEAL.   FREDIS DRAIN:  Yes/No.  EPICARDIAL WIRES: Yes/No.  TIE DOWNS: Yes/No.  MAN: Yes/No.    PHYSICAL EXAM:    General:     Neurological:    Cardiovascular:    Respiratory:    Gastrointestinal:    Extremities:    Vascular:    Incision Sites:    LABS:                        7.2    7.16  )-----------( 45       ( 22 Nov 2019 07:16 )             21.9       COUMADIN:  Yes/No. REASON: .    PT/INR - ( 22 Nov 2019 07:16 )   PT: 11.5 sec;   INR: 1.02          PTT - ( 22 Nov 2019 07:16 )  PTT:30.8 sec    11-22    134<L>  |  101  |  17  ----------------------------<  133<H>  4.0   |  22  |  0.97    Ca    8.3<L>      22 Nov 2019 07:16  Phos  3.5     11-22  Mg     2.5     11-22    TPro  6.7  /  Alb  2.8<L>  /  TBili  2.4<H>  /  DBili  1.7<H>  /  AST  33  /  ALT  40  /  AlkPhos  81  11-22          MEDICATIONS  (STANDING):  albuterol/ipratropium for Nebulization 3 milliLiter(s) Nebulizer every 4 hours  artificial  tears Solution 1 Drop(s) Both EYES every 6 hours  ascorbic acid 500 milliGRAM(s) Oral daily  chlorhexidine 0.12% Liquid 15 milliLiter(s) Oral Mucosa every 12 hours  chlorhexidine 2% Cloths 1 Application(s) Topical daily  CRRT Treatment    <Continuous>  dextrose 5%. 1000 milliLiter(s) (50 mL/Hr) IV Continuous <Continuous>  dextrose 50% Injectable 12.5 Gram(s) IV Push once  dextrose 50% Injectable 25 Gram(s) IV Push once  dextrose 50% Injectable 25 Gram(s) IV Push once  fentaNYL   Infusion. 0.5 MICROgram(s)/kG/Hr (3.68 mL/Hr) IV Continuous <Continuous>  heparin  Injectable 5000 Unit(s) SubCutaneous every 8 hours  hydrocortisone sodium succinate Injectable 50 milliGRAM(s) IV Push every 6 hours  insulin lispro (HumaLOG) corrective regimen sliding scale   SubCutaneous every 6 hours  meropenem  IVPB 1000 milliGRAM(s) IV Intermittent every 8 hours  multivitamin/minerals/iron Oral Solution (CENTRUM) 15 milliLiter(s) Oral daily  nafcillin  IVPB 2 Gram(s) IV Intermittent every 4 hours  norepinephrine Infusion 0.05 MICROgram(s)/kG/Min (3.45 mL/Hr) IV Continuous <Continuous>  pantoprazole  Injectable 40 milliGRAM(s) IV Push daily  Phoxillum Filtration BK 4 / 2.5 5000 milliLiter(s) (2500 mL/Hr) CRRT <Continuous>  polyethylene glycol 3350 17 Gram(s) Oral two times a day  propofol Infusion 5 MICROgram(s)/kG/Min (2.208 mL/Hr) IV Continuous <Continuous>  senna 2 Tablet(s) Oral at bedtime  sodium chloride 0.9% lock flush 3 milliLiter(s) IV Push every 8 hours  vitamin E 400 International Unit(s) Oral daily  zinc sulfate 220 milliGRAM(s) Oral daily    MEDICATIONS  (PRN):  dextrose 40% Gel 15 Gram(s) Oral once PRN Blood Glucose LESS THAN 70 milliGRAM(s)/deciliter  glucagon  Injectable 1 milliGRAM(s) IntraMuscular once PRN Glucose LESS THAN 70 milligrams/deciliter        RADIOLOGY & ADDITIONAL TESTS: Patient discussed on morning rounds with Dr. Rossi    Operation / Date: b/l PTX s/p pigtail placement (not placed by CT surgery)    SUBJECTIVE ASSESSMENT:  39y Male seen and examined bedside who is interactive during exam. Brother is at bedside who is interacting with patient via notepad/pen. In no acute distress, good eye contact.         Vital Signs Last 24 Hrs  T(C): 36.8 (22 Nov 2019 06:03), Max: 37.5 (21 Nov 2019 18:39)  T(F): 98.3 (22 Nov 2019 06:03), Max: 99.5 (21 Nov 2019 18:39)  HR: 101 (22 Nov 2019 08:52) (86 - 110)  BP: 132/111 (22 Nov 2019 08:00) (89/52 - 132/111)  BP(mean): 126 (22 Nov 2019 08:00) (62 - 126)  RR: 15 (22 Nov 2019 08:30) (9 - 59)  SpO2: 100% (22 Nov 2019 08:52) (95% - 100%)  I&O's Detail    21 Nov 2019 07:01  -  22 Nov 2019 07:00  --------------------------------------------------------  IN:    Enteral Tube Flush: 110 mL    fentaNYL Infusion.: 332.7 mL    norepinephrine Infusion: 254 mL    ns in tub fed  [id number]: 1032 mL    Oral Fluid: 90 mL    propofol Infusion: 456.2 mL    Solution: 200 mL    Solution: 150 mL    Solution: 600 mL  Total IN: 3224.9 mL    OUT:    Chest Tube: 40 mL    Chest Tube: 50 mL    Other: 2922 mL    Rectal Tube: 700 mL  Total OUT: 3712 mL    Total NET: -487.1 mL      22 Nov 2019 07:01  -  22 Nov 2019 09:23  --------------------------------------------------------  IN:    fentaNYL Infusion.: 14.7 mL    norepinephrine Infusion: 5 mL    ns in tub fed  [id number]: 43 mL    propofol Infusion: 19.9 mL  Total IN: 82.6 mL    OUT:    Chest Tube: 50 mL    Chest Tube: 30 mL    Other: 185 mL    Rectal Tube: 500 mL  Total OUT: 765 mL    Total NET: -682.4 mL          CHEST TUBE:  Yes x3     - 2 right pigtails in place, no kinks in tube. 1 right pigtail with continuous 1/5 air leak on suction.      - 1 left pigtail in place, intermittent 1/5 air leak, on suction.   FREDIS DRAIN:  No.  EPICARDIAL WIRES: No.  TIE DOWNS: Yes  MAN: Yes    PHYSICAL EXAM:    General: Patient lying comfortably in bed, no acute distress, interactive.      Neurological: Alert, good eye contact, good strength bilaterally.      Cardiovascular: S1S2, RRR, +Murmur best heard at apex.      Respiratory: Coarse breath sounds heard bilaterally, on mech vent     Gastrointestinal: Abdomen soft, non tender, non distended     Extremities: Warm and well perfused. Trace b/l edema, toes with black discoloration at the peripheries, appear to be improving.     Vascular: Peripheral pulses palpable bilaterally    LABS:                        7.2    7.16  )-----------( 45       ( 22 Nov 2019 07:16 )             21.9       COUMADIN:  No.   PT/INR - ( 22 Nov 2019 07:16 )   PT: 11.5 sec;   INR: 1.02          PTT - ( 22 Nov 2019 07:16 )  PTT:30.8 sec    11-22    134<L>  |  101  |  17  ----------------------------<  133<H>  4.0   |  22  |  0.97    Ca    8.3<L>      22 Nov 2019 07:16  Phos  3.5     11-22  Mg     2.5     11-22    TPro  6.7  /  Alb  2.8<L>  /  TBili  2.4<H>  /  DBili  1.7<H>  /  AST  33  /  ALT  40  /  AlkPhos  81  11-22          MEDICATIONS  (STANDING):  albuterol/ipratropium for Nebulization 3 milliLiter(s) Nebulizer every 4 hours  artificial  tears Solution 1 Drop(s) Both EYES every 6 hours  ascorbic acid 500 milliGRAM(s) Oral daily  chlorhexidine 0.12% Liquid 15 milliLiter(s) Oral Mucosa every 12 hours  chlorhexidine 2% Cloths 1 Application(s) Topical daily  CRRT Treatment    <Continuous>  dextrose 5%. 1000 milliLiter(s) (50 mL/Hr) IV Continuous <Continuous>  dextrose 50% Injectable 12.5 Gram(s) IV Push once  dextrose 50% Injectable 25 Gram(s) IV Push once  dextrose 50% Injectable 25 Gram(s) IV Push once  fentaNYL   Infusion. 0.5 MICROgram(s)/kG/Hr (3.68 mL/Hr) IV Continuous <Continuous>  heparin  Injectable 5000 Unit(s) SubCutaneous every 8 hours  hydrocortisone sodium succinate Injectable 50 milliGRAM(s) IV Push every 6 hours  insulin lispro (HumaLOG) corrective regimen sliding scale   SubCutaneous every 6 hours  meropenem  IVPB 1000 milliGRAM(s) IV Intermittent every 8 hours  multivitamin/minerals/iron Oral Solution (CENTRUM) 15 milliLiter(s) Oral daily  nafcillin  IVPB 2 Gram(s) IV Intermittent every 4 hours  norepinephrine Infusion 0.05 MICROgram(s)/kG/Min (3.45 mL/Hr) IV Continuous <Continuous>  pantoprazole  Injectable 40 milliGRAM(s) IV Push daily  Phoxillum Filtration BK 4 / 2.5 5000 milliLiter(s) (2500 mL/Hr) CRRT <Continuous>  polyethylene glycol 3350 17 Gram(s) Oral two times a day  propofol Infusion 5 MICROgram(s)/kG/Min (2.208 mL/Hr) IV Continuous <Continuous>  senna 2 Tablet(s) Oral at bedtime  sodium chloride 0.9% lock flush 3 milliLiter(s) IV Push every 8 hours  vitamin E 400 International Unit(s) Oral daily  zinc sulfate 220 milliGRAM(s) Oral daily    MEDICATIONS  (PRN):  dextrose 40% Gel 15 Gram(s) Oral once PRN Blood Glucose LESS THAN 70 milliGRAM(s)/deciliter  glucagon  Injectable 1 milliGRAM(s) IntraMuscular once PRN Glucose LESS THAN 70 milligrams/deciliter        RADIOLOGY & ADDITIONAL TESTS:  CT CHEST: < from: CT Chest w/ IV Cont (11.18.19 @ 18:42) >  1.  New bilateral moderate hydropneumothorax. Bilateral chest tubes in   place.  2.  Direct comparison with prior CT (11/12/2019) is somewhat limited due   to suboptimal expansion of lungs secondary to pneumothorax; Multiple   cavitary lesions in both lungs, demonstrating interval increase in size   and extent of associated peripheral consolidation. The differential   diagnosis includes septic embolism, given patient's history. However   follow-up with chest CT to ensure resolution is recommended to exclude   underlying malignancy.  3.  Increased extent of lobar pneumonia in both lower lobes.    < end of copied text >    < from: CT Abdomen and Pelvis w/ Oral Cont and w/ IV Cont (11.18.19 @ 18:41) >  CT ABDOMEN AND PELVIS  1.  Colonic ileus, progressed compared to prior CT. No transition point.   Rectal tube in appropriate position. Previously described possible focal   abnormality in the left posterior aspect of the rectum is not well seen   on current study, and may be artifactual.  2.  Heterogeneous enhancement of the spleen may be due to bolus timing.  3.  Moderate abdominopelvic ascites.  4.  Anasarca.    < end of copied text >      CXR:     < from: Xray Chest 1 View- PORTABLE-Routine (11.22.19 @ 04:53) >    Findings/  impression: Stable positioning of support devices. Stable lung pathology.   Heart size within normal limits.    < end of copied text > Patient discussed on morning rounds with Dr. Rossi    Operation / Date: b/l PTX s/p pigtail placement (not placed by CT surgery)    SUBJECTIVE ASSESSMENT:  39y Male seen and examined bedside who is interactive during exam. Brother is at bedside who is interacting with patient via notepad/pen. In no acute distress, good eye contact.     11/22/19 11:00AM: please see addendum below in Plan.     Vital Signs Last 24 Hrs  T(C): 36.8 (22 Nov 2019 06:03), Max: 37.5 (21 Nov 2019 18:39)  T(F): 98.3 (22 Nov 2019 06:03), Max: 99.5 (21 Nov 2019 18:39)  HR: 101 (22 Nov 2019 08:52) (86 - 110)  BP: 132/111 (22 Nov 2019 08:00) (89/52 - 132/111)  BP(mean): 126 (22 Nov 2019 08:00) (62 - 126)  RR: 15 (22 Nov 2019 08:30) (9 - 59)  SpO2: 100% (22 Nov 2019 08:52) (95% - 100%)  I&O's Detail    21 Nov 2019 07:01  -  22 Nov 2019 07:00  --------------------------------------------------------  IN:    Enteral Tube Flush: 110 mL    fentaNYL Infusion.: 332.7 mL    norepinephrine Infusion: 254 mL    ns in tub fed  [id number]: 1032 mL    Oral Fluid: 90 mL    propofol Infusion: 456.2 mL    Solution: 200 mL    Solution: 150 mL    Solution: 600 mL  Total IN: 3224.9 mL    OUT:    Chest Tube: 40 mL    Chest Tube: 50 mL    Other: 2922 mL    Rectal Tube: 700 mL  Total OUT: 3712 mL    Total NET: -487.1 mL      22 Nov 2019 07:01  -  22 Nov 2019 09:23  --------------------------------------------------------  IN:    fentaNYL Infusion.: 14.7 mL    norepinephrine Infusion: 5 mL    ns in tub fed  [id number]: 43 mL    propofol Infusion: 19.9 mL  Total IN: 82.6 mL    OUT:    Chest Tube: 50 mL    Chest Tube: 30 mL    Other: 185 mL    Rectal Tube: 500 mL  Total OUT: 765 mL    Total NET: -682.4 mL          CHEST TUBE:  Yes x3     - 2 right pigtails in place, no kinks in tube. 1 right pigtail with continuous 1/5 air leak on suction.      - 1 left pigtail in place, intermittent 1/5 air leak, on suction.   FREDIS DRAIN:  No.  EPICARDIAL WIRES: No.  TIE DOWNS: Yes  MAN: Yes    PHYSICAL EXAM:    General: Patient lying comfortably in bed, no acute distress, interactive.      Neurological: Alert, good eye contact, good strength bilaterally.      Cardiovascular: S1S2, RRR, +Murmur best heard at apex.      Respiratory: Coarse breath sounds heard bilaterally, on mech vent     Gastrointestinal: Abdomen soft, non tender, non distended     Extremities: Warm and well perfused. Trace b/l edema, toes with black discoloration at the peripheries, appear to be improving.     Vascular: Peripheral pulses palpable bilaterally    LABS:                        7.2    7.16  )-----------( 45       ( 22 Nov 2019 07:16 )             21.9       COUMADIN:  No.   PT/INR - ( 22 Nov 2019 07:16 )   PT: 11.5 sec;   INR: 1.02          PTT - ( 22 Nov 2019 07:16 )  PTT:30.8 sec    11-22    134<L>  |  101  |  17  ----------------------------<  133<H>  4.0   |  22  |  0.97    Ca    8.3<L>      22 Nov 2019 07:16  Phos  3.5     11-22  Mg     2.5     11-22    TPro  6.7  /  Alb  2.8<L>  /  TBili  2.4<H>  /  DBili  1.7<H>  /  AST  33  /  ALT  40  /  AlkPhos  81  11-22          MEDICATIONS  (STANDING):  albuterol/ipratropium for Nebulization 3 milliLiter(s) Nebulizer every 4 hours  artificial  tears Solution 1 Drop(s) Both EYES every 6 hours  ascorbic acid 500 milliGRAM(s) Oral daily  chlorhexidine 0.12% Liquid 15 milliLiter(s) Oral Mucosa every 12 hours  chlorhexidine 2% Cloths 1 Application(s) Topical daily  CRRT Treatment    <Continuous>  dextrose 5%. 1000 milliLiter(s) (50 mL/Hr) IV Continuous <Continuous>  dextrose 50% Injectable 12.5 Gram(s) IV Push once  dextrose 50% Injectable 25 Gram(s) IV Push once  dextrose 50% Injectable 25 Gram(s) IV Push once  fentaNYL   Infusion. 0.5 MICROgram(s)/kG/Hr (3.68 mL/Hr) IV Continuous <Continuous>  heparin  Injectable 5000 Unit(s) SubCutaneous every 8 hours  hydrocortisone sodium succinate Injectable 50 milliGRAM(s) IV Push every 6 hours  insulin lispro (HumaLOG) corrective regimen sliding scale   SubCutaneous every 6 hours  meropenem  IVPB 1000 milliGRAM(s) IV Intermittent every 8 hours  multivitamin/minerals/iron Oral Solution (CENTRUM) 15 milliLiter(s) Oral daily  nafcillin  IVPB 2 Gram(s) IV Intermittent every 4 hours  norepinephrine Infusion 0.05 MICROgram(s)/kG/Min (3.45 mL/Hr) IV Continuous <Continuous>  pantoprazole  Injectable 40 milliGRAM(s) IV Push daily  Phoxillum Filtration BK 4 / 2.5 5000 milliLiter(s) (2500 mL/Hr) CRRT <Continuous>  polyethylene glycol 3350 17 Gram(s) Oral two times a day  propofol Infusion 5 MICROgram(s)/kG/Min (2.208 mL/Hr) IV Continuous <Continuous>  senna 2 Tablet(s) Oral at bedtime  sodium chloride 0.9% lock flush 3 milliLiter(s) IV Push every 8 hours  vitamin E 400 International Unit(s) Oral daily  zinc sulfate 220 milliGRAM(s) Oral daily    MEDICATIONS  (PRN):  dextrose 40% Gel 15 Gram(s) Oral once PRN Blood Glucose LESS THAN 70 milliGRAM(s)/deciliter  glucagon  Injectable 1 milliGRAM(s) IntraMuscular once PRN Glucose LESS THAN 70 milligrams/deciliter        RADIOLOGY & ADDITIONAL TESTS:  CT CHEST: < from: CT Chest w/ IV Cont (11.18.19 @ 18:42) >  1.  New bilateral moderate hydropneumothorax. Bilateral chest tubes in   place.  2.  Direct comparison with prior CT (11/12/2019) is somewhat limited due   to suboptimal expansion of lungs secondary to pneumothorax; Multiple   cavitary lesions in both lungs, demonstrating interval increase in size   and extent of associated peripheral consolidation. The differential   diagnosis includes septic embolism, given patient's history. However   follow-up with chest CT to ensure resolution is recommended to exclude   underlying malignancy.  3.  Increased extent of lobar pneumonia in both lower lobes.    < end of copied text >    < from: CT Abdomen and Pelvis w/ Oral Cont and w/ IV Cont (11.18.19 @ 18:41) >  CT ABDOMEN AND PELVIS  1.  Colonic ileus, progressed compared to prior CT. No transition point.   Rectal tube in appropriate position. Previously described possible focal   abnormality in the left posterior aspect of the rectum is not well seen   on current study, and may be artifactual.  2.  Heterogeneous enhancement of the spleen may be due to bolus timing.  3.  Moderate abdominopelvic ascites.  4.  Anasarca.    < end of copied text >      CXR:     < from: Xray Chest 1 View- PORTABLE-Routine (11.22.19 @ 04:53) >    Findings/  impression: Stable positioning of support devices. Stable lung pathology.   Heart size within normal limits.    < end of copied text >

## 2019-11-22 NOTE — PROGRESS NOTE ADULT - SUBJECTIVE AND OBJECTIVE BOX
OVERNIGHT EVENTS:    SUBJECTIVE / INTERVAL HPI: Patient seen and examined at bedside.     VITAL SIGNS:  Vital Signs Last 24 Hrs  T(C): 36.4 (22 Nov 2019 01:02), Max: 37.5 (21 Nov 2019 18:39)  T(F): 97.6 (22 Nov 2019 01:02), Max: 99.5 (21 Nov 2019 18:39)  HR: 100 (22 Nov 2019 06:00) (86 - 110)  BP: 99/53 (21 Nov 2019 19:00) (89/52 - 111/61)  BP(mean): 67 (21 Nov 2019 19:00) (62 - 83)  RR: 20 (22 Nov 2019 06:00) (9 - 59)  SpO2: 100% (22 Nov 2019 06:00) (95% - 100%)    PHYSICAL EXAM:    General: WDWN  HEENT: NC/AT; PERRL, anicteric sclera; MMM  Neck: supple  Cardiovascular: +S1/S2; RRR  Respiratory: CTA B/L; no W/R/R  Gastrointestinal: soft, NT/ND; +BSx4  Extremities: WWP; no edema, clubbing or cyanosis  Vascular: 2+ radial, DP/PT pulses B/L  Neurological: AAOx3; no focal deficits    MEDICATIONS:  MEDICATIONS  (STANDING):  albuterol/ipratropium for Nebulization 3 milliLiter(s) Nebulizer every 4 hours  artificial  tears Solution 1 Drop(s) Both EYES every 6 hours  ascorbic acid 500 milliGRAM(s) Oral daily  chlorhexidine 0.12% Liquid 15 milliLiter(s) Oral Mucosa every 12 hours  chlorhexidine 2% Cloths 1 Application(s) Topical daily  CRRT Treatment    <Continuous>  dextrose 5%. 1000 milliLiter(s) (50 mL/Hr) IV Continuous <Continuous>  dextrose 50% Injectable 12.5 Gram(s) IV Push once  dextrose 50% Injectable 25 Gram(s) IV Push once  dextrose 50% Injectable 25 Gram(s) IV Push once  fentaNYL   Infusion. 0.5 MICROgram(s)/kG/Hr (3.68 mL/Hr) IV Continuous <Continuous>  heparin  Injectable 5000 Unit(s) SubCutaneous every 8 hours  hydrocortisone sodium succinate Injectable 50 milliGRAM(s) IV Push every 6 hours  insulin lispro (HumaLOG) corrective regimen sliding scale   SubCutaneous every 6 hours  meropenem  IVPB 1000 milliGRAM(s) IV Intermittent every 8 hours  multivitamin/minerals/iron Oral Solution (CENTRUM) 15 milliLiter(s) Oral daily  nafcillin  IVPB 2 Gram(s) IV Intermittent every 4 hours  norepinephrine Infusion 0.05 MICROgram(s)/kG/Min (3.45 mL/Hr) IV Continuous <Continuous>  pantoprazole  Injectable 40 milliGRAM(s) IV Push daily  Phoxillum Filtration BK 4 / 2.5 5000 milliLiter(s) (2500 mL/Hr) CRRT <Continuous>  polyethylene glycol 3350 17 Gram(s) Oral two times a day  propofol Infusion 5 MICROgram(s)/kG/Min (2.208 mL/Hr) IV Continuous <Continuous>  senna 2 Tablet(s) Oral at bedtime  sodium chloride 0.9% lock flush 3 milliLiter(s) IV Push every 8 hours  vitamin E 400 International Unit(s) Oral daily  zinc sulfate 220 milliGRAM(s) Oral daily    MEDICATIONS  (PRN):  dextrose 40% Gel 15 Gram(s) Oral once PRN Blood Glucose LESS THAN 70 milliGRAM(s)/deciliter  glucagon  Injectable 1 milliGRAM(s) IntraMuscular once PRN Glucose LESS THAN 70 milligrams/deciliter      ALLERGIES:  Allergies    No Known Allergies    Intolerances        LABS:                        8.5    10.07 )-----------( 58       ( 21 Nov 2019 06:58 )             25.9     11-22    135  |  103  |  18  ----------------------------<  142<H>  4.1   |  21<L>  |  0.97    Ca    8.2<L>      22 Nov 2019 02:03  Phos  3.6     11-22  Mg     2.3     11-22    TPro  6.3  /  Alb  2.6<L>  /  TBili  3.6<H>  /  DBili  2.9<H>  /  AST  36  /  ALT  40  /  AlkPhos  84  11-21    PT/INR - ( 21 Nov 2019 06:58 )   PT: 13.0 sec;   INR: 1.15          PTT - ( 21 Nov 2019 06:58 )  PTT:33.3 sec    CAPILLARY BLOOD GLUCOSE      POCT Blood Glucose.: 140 mg/dL (22 Nov 2019 06:18)      RADIOLOGY & ADDITIONAL TESTS: Reviewed. OVERNIGHT EVENTS: 8PM BMP with K+ 3.4, repleted. 2PM BMP wnl.     SUBJECTIVE / INTERVAL HPI: Patient seen and examined at bedside. Patient resting in bed, intubated on sedation but awake and alert. Follows commands. Denies pain but says he is thirsty. He was given small squirts of water.     VITAL SIGNS:  Vital Signs Last 24 Hrs  T(C): 36.4 (22 Nov 2019 01:02), Max: 37.5 (21 Nov 2019 18:39)  T(F): 97.6 (22 Nov 2019 01:02), Max: 99.5 (21 Nov 2019 18:39)  HR: 100 (22 Nov 2019 06:00) (86 - 110)  BP: 99/53 (21 Nov 2019 19:00) (89/52 - 111/61)  BP(mean): 67 (21 Nov 2019 19:00) (62 - 83)  RR: 20 (22 Nov 2019 06:00) (9 - 59)  SpO2: 100% (22 Nov 2019 06:00) (95% - 100%)    PHYSICAL EXAM:    General: WDWN, resting in bed, jaundice but improving   HEENT: NC/AT; PERRL, anicteric sclera; MMM  Neck: supple  Cardiovascular: +S1/S2; systolic murmur heard loudest at the LSB   Respiratory: intubated, diffuse rhonchi and crackles b/l   Gastrointestinal: NGT, soft, NT, mildly distended; +BSx4  Extremities: WWP; edema in LEs/UEs, no clubbing or cyanosis  Skin: ischemic toes/fingers  Vascular: 2+ radial, DP/PT pulses B/L  Neurological: on sedation but awake and alert, follows commands.  strength intact       MEDICATIONS:  MEDICATIONS  (STANDING):  albuterol/ipratropium for Nebulization 3 milliLiter(s) Nebulizer every 4 hours  artificial  tears Solution 1 Drop(s) Both EYES every 6 hours  ascorbic acid 500 milliGRAM(s) Oral daily  chlorhexidine 0.12% Liquid 15 milliLiter(s) Oral Mucosa every 12 hours  chlorhexidine 2% Cloths 1 Application(s) Topical daily  CRRT Treatment    <Continuous>  dextrose 5%. 1000 milliLiter(s) (50 mL/Hr) IV Continuous <Continuous>  dextrose 50% Injectable 12.5 Gram(s) IV Push once  dextrose 50% Injectable 25 Gram(s) IV Push once  dextrose 50% Injectable 25 Gram(s) IV Push once  fentaNYL   Infusion. 0.5 MICROgram(s)/kG/Hr (3.68 mL/Hr) IV Continuous <Continuous>  heparin  Injectable 5000 Unit(s) SubCutaneous every 8 hours  hydrocortisone sodium succinate Injectable 50 milliGRAM(s) IV Push every 6 hours  insulin lispro (HumaLOG) corrective regimen sliding scale   SubCutaneous every 6 hours  meropenem  IVPB 1000 milliGRAM(s) IV Intermittent every 8 hours  multivitamin/minerals/iron Oral Solution (CENTRUM) 15 milliLiter(s) Oral daily  nafcillin  IVPB 2 Gram(s) IV Intermittent every 4 hours  norepinephrine Infusion 0.05 MICROgram(s)/kG/Min (3.45 mL/Hr) IV Continuous <Continuous>  pantoprazole  Injectable 40 milliGRAM(s) IV Push daily  Phoxillum Filtration BK 4 / 2.5 5000 milliLiter(s) (2500 mL/Hr) CRRT <Continuous>  polyethylene glycol 3350 17 Gram(s) Oral two times a day  propofol Infusion 5 MICROgram(s)/kG/Min (2.208 mL/Hr) IV Continuous <Continuous>  senna 2 Tablet(s) Oral at bedtime  sodium chloride 0.9% lock flush 3 milliLiter(s) IV Push every 8 hours  vitamin E 400 International Unit(s) Oral daily  zinc sulfate 220 milliGRAM(s) Oral daily    MEDICATIONS  (PRN):  dextrose 40% Gel 15 Gram(s) Oral once PRN Blood Glucose LESS THAN 70 milliGRAM(s)/deciliter  glucagon  Injectable 1 milliGRAM(s) IntraMuscular once PRN Glucose LESS THAN 70 milligrams/deciliter      ALLERGIES:  Allergies    No Known Allergies    Intolerances        LABS:                        8.5    10.07 )-----------( 58       ( 21 Nov 2019 06:58 )             25.9     11-22    135  |  103  |  18  ----------------------------<  142<H>  4.1   |  21<L>  |  0.97    Ca    8.2<L>      22 Nov 2019 02:03  Phos  3.6     11-22  Mg     2.3     11-22    TPro  6.3  /  Alb  2.6<L>  /  TBili  3.6<H>  /  DBili  2.9<H>  /  AST  36  /  ALT  40  /  AlkPhos  84  11-21    PT/INR - ( 21 Nov 2019 06:58 )   PT: 13.0 sec;   INR: 1.15          PTT - ( 21 Nov 2019 06:58 )  PTT:33.3 sec    CAPILLARY BLOOD GLUCOSE      POCT Blood Glucose.: 140 mg/dL (22 Nov 2019 06:18)      RADIOLOGY & ADDITIONAL TESTS: Reviewed.

## 2019-11-22 NOTE — PROGRESS NOTE ADULT - ASSESSMENT
38 yo male with PMH of IVDU was transferred from [facility] for IE with tricupsid valve vegetations, Nephrology consulted for anuric cameron and electrolytes disturbances.  s/p R chest tube on 11/19 for pneumothorax  s/p brochoscopy on 11/19, pending cultures  not a surgical candidate at this time with CT surgery for endocarditis  continue medical management       # CAMERON likely due to ATN in shock  - continue on CVVHD (initiated on 11/11) via RIJ HD cath with net even fluid balance, net negative 1.0 L/48 hr  - bladder scan +400 ml this morning (oliguria), continue Q8-12hr bladder scan and intermittent cath prn  - having all as above, consider to hold CVVHD and monitor urine output with BUN/Cr for sings of renal recovery  - labs reviewed, K 4.0, Phos 3.5, on phoxillum 4K, will continue  - monitor BMP Q6hr while on CVVHD for K/Phos and Mg and supplement as needed  - on norepinephrine 0.05 mcg/kg/min, hydrocortisone 50 IVP Q6hr, keep map >65 mmHg  - Hb 7.2 g/dl, Plt 45K, transfuse as indicated  - treat infection w renally adjusted ABx    Discussed with attending Dr Pinzon.

## 2019-11-22 NOTE — PROGRESS NOTE ADULT - ATTENDING COMMENTS
oliguric ATN  seen and evaluated while on CVVHD  tolerating the procedure well- adjusting prescription as needed, as above-  of note has now started making urine- remains oliguric- but this may continue to improve

## 2019-11-22 NOTE — PROGRESS NOTE ADULT - SUBJECTIVE AND OBJECTIVE BOX
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      ROS:  CONSTITUTIONAL:  Negative fever or chills, feels well, good appetite  EYES:  Negative  blurry vision or double vision  CARDIOVASCULAR:  Negative for chest pain or palpitations  RESPIRATORY:  Negative for cough, wheezing, or SOB   GASTROINTESTINAL:  Negative for nausea, vomiting, diarrhea, constipation, or abdominal pain  GENITOURINARY:  Negative frequency, urgency or dysuria  NEUROLOGIC:  No headache, confusion, dizziness, lightheadedness    Allergies    No Known Allergies    Intolerances        ANTIBIOTICS/RELEVANT:  antimicrobials  nafcillin  IVPB 2 Gram(s) IV Intermittent every 4 hours  trimethoprim / sulfamethoxazole IVPB 375 milliGRAM(s) IV Intermittent every 8 hours    immunologic:    OTHER:  albuterol/ipratropium for Nebulization 3 milliLiter(s) Nebulizer every 4 hours  artificial  tears Solution 1 Drop(s) Both EYES every 6 hours  ascorbic acid 500 milliGRAM(s) Oral daily  chlorhexidine 0.12% Liquid 15 milliLiter(s) Oral Mucosa every 12 hours  chlorhexidine 2% Cloths 1 Application(s) Topical daily  CRRT Treatment    <Continuous>  dextrose 40% Gel 15 Gram(s) Oral once PRN  dextrose 5%. 1000 milliLiter(s) IV Continuous <Continuous>  dextrose 50% Injectable 12.5 Gram(s) IV Push once  dextrose 50% Injectable 25 Gram(s) IV Push once  dextrose 50% Injectable 25 Gram(s) IV Push once  fentaNYL   Infusion. 0.5 MICROgram(s)/kG/Hr IV Continuous <Continuous>  glucagon  Injectable 1 milliGRAM(s) IntraMuscular once PRN  heparin  Injectable 5000 Unit(s) SubCutaneous every 8 hours  hydrocortisone sodium succinate Injectable 50 milliGRAM(s) IV Push every 6 hours  insulin lispro (HumaLOG) corrective regimen sliding scale   SubCutaneous every 6 hours  multivitamin/minerals/iron Oral Solution (CENTRUM) 15 milliLiter(s) Oral daily  norepinephrine Infusion 0.05 MICROgram(s)/kG/Min IV Continuous <Continuous>  pantoprazole  Injectable 40 milliGRAM(s) IV Push daily  Phoxillum Filtration BK 4 / 2.5 5000 milliLiter(s) CRRT <Continuous>  propofol Infusion 5 MICROgram(s)/kG/Min IV Continuous <Continuous>  sodium chloride 0.9% lock flush 3 milliLiter(s) IV Push every 8 hours  vitamin E 400 International Unit(s) Oral daily  zinc sulfate 220 milliGRAM(s) Oral daily      Objective:  Vital Signs Last 24 Hrs  T(C): 37.1 (22 Nov 2019 09:22), Max: 37.5 (21 Nov 2019 18:39)  T(F): 98.8 (22 Nov 2019 09:22), Max: 99.5 (21 Nov 2019 18:39)  HR: 104 (22 Nov 2019 13:10) (86 - 112)  BP: 93/50 (22 Nov 2019 13:00) (93/50 - 132/111)  BP(mean): 62 (22 Nov 2019 13:00) (62 - 126)  RR: 15 (22 Nov 2019 13:10) (9 - 59)  SpO2: 98% (22 Nov 2019 13:10) (95% - 100%)    PHYSICAL EXAM:  Constitutional:Well-developed, well nourishe  Eyes:NANCY, EOMI  Ear/Nose/Throat: no oral lesion, no sinus tenderness on percussion	  Neck:no JVD, no lymphadenopathy, supple  Respiratory: CTA hellen  Cardiovascular: S1S2 RRR, no murmurs  Gastrointestinal:soft, (+) BS, no HSM  Extremities:no e/e/c        LABS:                        7.2    7.16  )-----------( 45       ( 22 Nov 2019 07:16 )             21.9     11-22    135  |  104  |  16  ----------------------------<  140<H>  4.0   |  21<L>  |  0.94    Ca    8.3<L>      22 Nov 2019 12:59  Phos  3.5     11-22  Mg     2.4     11-22    TPro  6.7  /  Alb  2.8<L>  /  TBili  2.4<H>  /  DBili  1.7<H>  /  AST  33  /  ALT  40  /  AlkPhos  81  11-22    PT/INR - ( 22 Nov 2019 07:16 )   PT: 11.5 sec;   INR: 1.02          PTT - ( 22 Nov 2019 07:16 )  PTT:30.8 sec        MICROBIOLOGY:        RADIOLOGY & ADDITIONAL STUDIES: Infectious diseases progress note:  ULI HOLLAND is a 39yMale patient    ENDOCARDITIS/SEPSIS    CAMERON (acute kidney injury)  Acute endocarditis due to other organism    Subjective:     Patient reports nausea. Denied fevers, chills, night sweats, chest pain, SOB, n/v/d/c, abdomainl pain, back pain.     Allergies    No Known Allergies    Intolerances        ANTIBIOTICS/RELEVANT:  antimicrobials  nafcillin  IVPB 2 Gram(s) IV Intermittent every 4 hours  trimethoprim / sulfamethoxazole IVPB 375 milliGRAM(s) IV Intermittent every 8 hours    immunologic:    OTHER:  albuterol/ipratropium for Nebulization 3 milliLiter(s) Nebulizer every 4 hours  artificial  tears Solution 1 Drop(s) Both EYES every 6 hours  ascorbic acid 500 milliGRAM(s) Oral daily  chlorhexidine 0.12% Liquid 15 milliLiter(s) Oral Mucosa every 12 hours  chlorhexidine 2% Cloths 1 Application(s) Topical daily  CRRT Treatment    <Continuous>  dextrose 40% Gel 15 Gram(s) Oral once PRN  dextrose 5%. 1000 milliLiter(s) IV Continuous <Continuous>  dextrose 50% Injectable 12.5 Gram(s) IV Push once  dextrose 50% Injectable 25 Gram(s) IV Push once  dextrose 50% Injectable 25 Gram(s) IV Push once  fentaNYL   Infusion. 0.5 MICROgram(s)/kG/Hr IV Continuous <Continuous>  glucagon  Injectable 1 milliGRAM(s) IntraMuscular once PRN  heparin  Injectable 5000 Unit(s) SubCutaneous every 8 hours  hydrocortisone sodium succinate Injectable 50 milliGRAM(s) IV Push every 6 hours  insulin lispro (HumaLOG) corrective regimen sliding scale   SubCutaneous every 6 hours  multivitamin/minerals/iron Oral Solution (CENTRUM) 15 milliLiter(s) Oral daily  norepinephrine Infusion 0.05 MICROgram(s)/kG/Min IV Continuous <Continuous>  pantoprazole  Injectable 40 milliGRAM(s) IV Push daily  Phoxillum Filtration BK 4 / 2.5 5000 milliLiter(s) CRRT <Continuous>  propofol Infusion 5 MICROgram(s)/kG/Min IV Continuous <Continuous>  sodium chloride 0.9% lock flush 3 milliLiter(s) IV Push every 8 hours  vitamin E 400 International Unit(s) Oral daily  zinc sulfate 220 milliGRAM(s) Oral daily      Objective:  Vital Signs Last 24 Hrs  T(C): 37.1 (22 Nov 2019 09:22), Max: 37.5 (21 Nov 2019 18:39)  T(F): 98.8 (22 Nov 2019 09:22), Max: 99.5 (21 Nov 2019 18:39)  HR: 104 (22 Nov 2019 13:10) (86 - 112)  BP: 93/50 (22 Nov 2019 13:00) (93/50 - 132/111)  BP(mean): 62 (22 Nov 2019 13:00) (62 - 126)  RR: 15 (22 Nov 2019 13:10) (9 - 59)  SpO2: 98% (22 Nov 2019 13:10) (95% - 100%)    PHYSICAL EXAM:  Constitutional: Intubated  HEENT: NCAT, positive scleral icterus although improved from yesterday, MMM   Neck: supple  Respiratory: b/l rhonchi and wheezing throughout   Cardiovascular: S1, S2. RRR. systolic murmur appreciated on left 4th ICS  Gastrointestinal: Soft, NTND, improved from yesterday. BS+ x4 quadrants  Extremities: Extremities warm throughout. Tips of b/l toes cyanotic and cool to touch. No edema    LABS:                        7.2    7.16  )-----------( 45       ( 22 Nov 2019 07:16 )             21.9     11-22    135  |  104  |  16  ----------------------------<  140<H>  4.0   |  21<L>  |  0.94    Ca    8.3<L>      22 Nov 2019 12:59  Phos  3.5     11-22  Mg     2.4     11-22    TPro  6.7  /  Alb  2.8<L>  /  TBili  2.4<H>  /  DBili  1.7<H>  /  AST  33  /  ALT  40  /  AlkPhos  81  11-22    PT/INR - ( 22 Nov 2019 07:16 )   PT: 11.5 sec;   INR: 1.02          PTT - ( 22 Nov 2019 07:16 )  PTT:30.8 sec        MICROBIOLOGY:        RADIOLOGY & ADDITIONAL STUDIES:

## 2019-11-22 NOTE — PROGRESS NOTE ADULT - ASSESSMENT
39y Male w PMH of IVDU and active smoker who went to [facility] on 11/8/19 complaining of productive cough with hemoptysis, progressive SOB, pleuritic chest pain, nausea, non-bloody emesis, abd pain, chillsx3 days. 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, CTICU, under the care of Dr. Daniel Ruelas for possible surgical evaluation. Following admission, patient noted to be in acute hypoxic respiratory failure s/p intubation/sedation, acute kidney injury requiring CVVHD, congestive hepatopathy, and mutli-system organ failure 2/2 hypoperfusion. Patient deemed not a surgical candidate, per Dr. Ruelas, and patient transferred to MICU for medical management with IV nafcillin, ID following. Patient with complicated course and now s/p cardiac arrest likely from sepsis/hypoxic respiratory failure.    Tricuspid valve Endocarditis: Patient with vegetations seen on ELIAN and TTE as above with severe TR. TR is functional from malcoaptation of leaflets from vegetation. Patient with mildly elevated PASP, which likely is not contributing much to TR. Normal RV function. Not a surgical candidate. Patient also with septic emboli to lungs, further causing empyema. Blood cultures negative. Vegetation still present, no further vegetations seen.   -Continue on Nafcillin and Meropenem per ID recs.  -Patient will likely need to be continued on abx therapy indefinitely in setting of no surgical intervention.  -Patient will have long and complicated course without definite source control.   -If patient becomes more stable, reconsult CT Surgery for tricuspid valve repair/replacement.   -Continue with HD support with pressors.   -Would not recommend ELIAN at this time. Will not .     CHF: EF 45% on initial echo. With global hypokinesis. Likely in setting of sepsis.   -Patient unable to tolerate GDMT.  -When patient no longer septic and HD stable, can discuss starting meds to assist with HF.   -Continue medical managment for IE.     To be discussed on rounds.

## 2019-11-22 NOTE — CHART NOTE - NSCHARTNOTEFT_GEN_A_CORE
MTB PRE-BRONCHOSCOPY RISK ASSESSMENT  ------------------------------------------------------------    Procedure Date: 11/22/19    Provider Name: Becky Denise    Reason for Bronchoscopy: r/o PCP, viral pneumonitis    Location of Procedure (check one):   [  ] Endoscopy  [  ] Emergency Department  [ x ] Intensive Care Unit  [  ] Operating Room  [  ] Other: _________    RISK ASSESSMENT  I. Patient symptoms (check all that apply): >/ 3 of these = SIGNIFICANT RISK for TB  [  ] Coughing > 2 to 3 weeks                 [  ] Unexplained fever >/ 2 weeks   [  ] Unusual weakness or fatigue  [  ] Unexplained weight loss > 10lbs.  [  ] Hemoptysis                                   [  ] Unusual or night sweating  [x  ] NON-APPLICABLE    II. TB history (Check all that apply): >/ 1 of these = SIGNIFICANT RISK for TB  [  ] Sputum smear/culture (+) for acid fast bacilli (AFB)                           [  ] Abnormal CXR or CT suggestive of TB; date(s) & description __________  [  ] Positive TB skin or blood test; date: __________                               [  ] On medication for latent TB or disease; list medication(s) ____________  [  ] TB diagnosed in the past; year treated: _______                                [  ] Inadequately treated TB  [  ] Current close contact of a person known or suspected to have TB  [ x ] NON-APPLICABLE    III. Additional Risk factors for TB (Check all that apply): Consider these in relation to symptoms and history  [  ] Person has conditions placing them at higher risk for TB disease (i.e. immunosuppressive therapy)  [  ] Person has lived in a country for 3 months or more where TB is common  [  ] Person lives in a high risk environment for TB (i.e. long term care, health care worker, incarcerated, homeless)  [ x ] Person injects illicit drugs  [  ] Person is HIV positive or at high risk for HIV    ****************************************************************  BASED ON THE TB RISK ASSESSMENT ABOVE, THE PATIENT'S RISK FOR TB IS:                       [  x] LOW RISK FOR TB            [  ] SIGNIFICANT RISK FOR TB  ****************************************************************    IV. Based on the Determined Risk for TB, the following Action(s) are Recommended:  [ x ] Low risk for TB infection --> Proceed with the diagnostic procedure    [  ] Significant risk for TB infection:  1. Perform the procedure in a negative pressure room, with appropriate personal protective equipment (PPE) for healthcare personnel (i.e. N95 respirator)    2. If it is not feasible to move the patient or defer the procedure:    a. Use a single-bedded room in a low traffic area to perform the bronchoscopy procedure    b. Place a portable high-efficiency particulate air (HEPA) filter in the space prior to starting the procedure and keep the door closed. Refer to Infection Control policy titled "Tuberculosis Control Strategy Plan" for additional information.    c. All healthcare personnel in the procedure room shall wear and N95 disposible respirator.    3. Documentation of the tuberculosis risk assessment is to be included in the patient's medical record.

## 2019-11-22 NOTE — PROGRESS NOTE ADULT - SUBJECTIVE AND OBJECTIVE BOX
patient seen and examined at bedside   abdominal exam benign  no acute events overnight   afebrile   AXR performed yesterday w/o evidence of obstruction   rectal tube functioning     ICU Vital Signs Last 24 Hrs  T(C): 36.4 (22 Nov 2019 01:02), Max: 37.5 (21 Nov 2019 18:39)  T(F): 97.6 (22 Nov 2019 01:02), Max: 99.5 (21 Nov 2019 18:39)  HR: 100 (22 Nov 2019 06:00) (86 - 110)  BP: 99/53 (21 Nov 2019 19:00) (89/52 - 111/61)  BP(mean): 67 (21 Nov 2019 19:00) (62 - 83)  ABP: 110/66 (22 Nov 2019 06:00) (84/50 - 120/74)  ABP(mean): 80 (22 Nov 2019 06:00) (62 - 90)  RR: 20 (22 Nov 2019 06:00) (9 - 59)  SpO2: 100% (22 Nov 2019 06:00) (95% - 100%)    I&O's Summary    20 Nov 2019 07:01  -  21 Nov 2019 07:00  --------------------------------------------------------  IN: 2921.3 mL / OUT: 3378 mL / NET: -456.7 mL    21 Nov 2019 07:01  -  22 Nov 2019 06:34  --------------------------------------------------------  IN: 3040.3 mL / OUT: 3541 mL / NET: -500.7 mL      Respiratory: Intubated  CV: tachycardic  Abdominal: Soft, non distended, non tender  rectal tube in place  Dobbhoff in place                                8.5    10.07 )-----------( 58       ( 21 Nov 2019 06:58 )             25.9     11-22    135  |  103  |  18  ----------------------------<  142<H>  4.1   |  21<L>  |  0.97    Ca    8.2<L>      22 Nov 2019 02:03  Phos  3.6     11-22  Mg     2.3     11-22    TPro  6.3  /  Alb  2.6<L>  /  TBili  3.6<H>  /  DBili  2.9<H>  /  AST  36  /  ALT  40  /  AlkPhos  84  11-21

## 2019-11-22 NOTE — PROGRESS NOTE ADULT - ASSESSMENT
39y Male w PMHx of IVDU and active smoker who went to [facility] on 11/8/19 complaining of productive cough with hemoptysis, progressive SOB, pleuritic chest pain, nausea, non-bloody emesis, abd pain, chillsx3 days. 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, CTICU, under the care of Dr. Daniel Ruelas for possible surgical evaluation. Following admission, patient noted to be in acute hypoxic respiratory failure s/p intubation/sedation, acute kidney injury requiring CVVHD, congestive hepatopathy, and mutli-system organ failure 2/2 hypoperfusion. Patient deemed not a surgical candidate, per Dr. Ruelas, and patient transferred to MICU for medical management with IV nafcillin, ID following. Pt with diffuse septic emboli in bilateral lungs, pigtails in place with new b/l pneumothorax. Abd yesterday with increased distention and distended bowel on AXR. Gen surg consulted and is now s/p decompression with rectal tube abd NG tube. CT chest/abd/pelvis done 11/18/19 concerning for b/l trapped lung and hemothorax for which thoracic surgery was consulted. Patient was extubated however, overnight he became bradycardic and arrested, CPR initiated with ROSC. After CPR chest tubes noted to have increased sanguinous drainage, new airleaks. Thoracic team following for CT management. Pt still with improved mental status (following verbal commands). Gtts: Propofol/Fentanyl/Levo 0.1mcg    Plan:   - consider additional placement of left apical pigtail by IR, if left PTX expands on serial CXRs  - continue daily serial CXRs - CXR today stable  - continue Chest tubes to suction at all times and while on positive pressure   - consider CT chest to better evaluate PTXs and lung disease, if pt becomes more stable   - continue medical management as per primary team   - appreciate ID recs   - +endocarditis- not a surgical candidate at this time with CT surgery- continue medical management 39y Male w PMHx of IVDU and active smoker who went to [facility] on 11/8/19 complaining of productive cough with hemoptysis, progressive SOB, pleuritic chest pain, nausea, non-bloody emesis, abd pain, chillsx3 days. 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, CTICU, under the care of Dr. Daniel Ruelas for possible surgical evaluation. Following admission, patient noted to be in acute hypoxic respiratory failure s/p intubation/sedation, acute kidney injury requiring CVVHD, congestive hepatopathy, and mutli-system organ failure 2/2 hypoperfusion. Patient deemed not a surgical candidate, per Dr. Ruelas, and patient transferred to MICU for medical management with IV nafcillin, ID following. Pt with diffuse septic emboli in bilateral lungs, pigtails in place with new b/l pneumothorax. Abd yesterday with increased distention and distended bowel on AXR. Gen surg consulted and is now s/p decompression with rectal tube abd NG tube. CT chest/abd/pelvis done 11/18/19 concerning for b/l trapped lung and hemothorax for which thoracic surgery was consulted. Patient was extubated however, overnight he became bradycardic and arrested, CPR initiated with ROSC. After CPR chest tubes noted to have increased sanguinous drainage, new airleaks. Thoracic team following for CT management. Pt still with improved mental status (following verbal commands). Gtts: Propofol/Fentanyl/Levo 0.1mcg    Plan:   - continue daily serial CXRs - CXR today stable (however difficult to see anterior ptx on CXRs)  - continue Chest tubes to suction at all times and while on positive pressure   - consider CT chest to better evaluate PTXs and lung disease, if pt becomes more stable   - appreciate ID recs   - +endocarditis- not a surgical candidate at this time with CT surgery- continue medical management     Addendum 11/22/19 11:00AM: Case discussed with Fellow Dr. Tracey.   - Recommend ultrasound chest for possible anterior pigtail placement as noted anterior ptx on CT Chest from 11/18/19   - Would benefit from IR pigtail, however patient likely not stable to go to IR at this time, continue care per primary team.

## 2019-11-22 NOTE — PROGRESS NOTE ADULT - ATTENDING COMMENTS
Patient seen and examined with house-staff during bedside rounds.  Resident note read, including vitals, physical findings, laboratory data, and radiological reports.   Revisions included below.  Direct personal management at bed side and extensive interpretation of the data.  Plan was outlined and discussed in details with the housestaff.  Decision making of high complexity  Action taken for acute disease activity to reflect the level of care provided:  - medication reconciliation  - review laboratory data  I discussed this with the family. The patient was clinically improving. Mental status is stable. Patient is being weaned off pressors. Cytology came initially suspicious for pneumocystis but the final result came back negative. Discussed with infectious disease and we'll proceed with bronchoscopy with bronchial lavage. There is improvement in the findings on bronchoscopy were decreasing secretions especially in the right side and in the airway inflammation. Continue antibiotic. Chest tubes alive in their late. Continue dialysis with fluid removal. Continue pressure support ventilation trials. Patient was tolerating tube feeds.

## 2019-11-22 NOTE — PROGRESS NOTE ADULT - SUBJECTIVE AND OBJECTIVE BOX
continue on CVVHD with net even fluid balance (access pressure -85, venous 130, effluent 158)  all tubes output is net negative 1.0 L/48 hr, bladder scan +400 ml this morning  intubated, off sedation, alert, communicating by writing  on norepinephrine 0.05 mcg/kg/min, hydrocortisone 50 IVP Q6hr  not a surgical candidate at this time for endocarditis, continue medical management   labs reviewed, K 4.0, Phos 3.5, Hb 7.2, Plt 45              Meds:  albuterol/ipratropium for Nebulization 3 milliLiter(s) Nebulizer every 4 hours  artificial  tears Solution 1 Drop(s) Both EYES every 6 hours  ascorbic acid 500 milliGRAM(s) Oral daily  chlorhexidine 0.12% Liquid 15 milliLiter(s) Oral Mucosa every 12 hours  chlorhexidine 2% Cloths 1 Application(s) Topical daily  CRRT Treatment    <Continuous>  dextrose 40% Gel 15 Gram(s) Oral once PRN  dextrose 5%. 1000 milliLiter(s) IV Continuous <Continuous>  dextrose 50% Injectable 12.5 Gram(s) IV Push once  dextrose 50% Injectable 25 Gram(s) IV Push once  dextrose 50% Injectable 25 Gram(s) IV Push once  fentaNYL   Infusion. 0.5 MICROgram(s)/kG/Hr IV Continuous <Continuous>  glucagon  Injectable 1 milliGRAM(s) IntraMuscular once PRN  heparin  Injectable 5000 Unit(s) SubCutaneous every 8 hours  hydrocortisone sodium succinate Injectable 50 milliGRAM(s) IV Push every 6 hours  insulin lispro (HumaLOG) corrective regimen sliding scale   SubCutaneous every 6 hours  meropenem  IVPB 1000 milliGRAM(s) IV Intermittent every 8 hours  multivitamin/minerals/iron Oral Solution (CENTRUM) 15 milliLiter(s) Oral daily  nafcillin  IVPB 2 Gram(s) IV Intermittent every 4 hours  norepinephrine Infusion 0.05 MICROgram(s)/kG/Min IV Continuous <Continuous>  pantoprazole  Injectable 40 milliGRAM(s) IV Push daily  Phoxillum Filtration BK 4 / 2.5 5000 milliLiter(s) CRRT <Continuous>  propofol Infusion 5 MICROgram(s)/kG/Min IV Continuous <Continuous>  sodium chloride 0.9% lock flush 3 milliLiter(s) IV Push every 8 hours  vitamin E 400 International Unit(s) Oral daily  zinc sulfate 220 milliGRAM(s) Oral daily      T(C): 37.1 (11-22-19 @ 09:22), Max: 37.5 (11-21-19 @ 18:39)  HR: 110 (11-22-19 @ 12:00) (86 - 112)  BP: 106/60 (11-22-19 @ 12:00) (98/55 - 132/111)  RR: 9 (11-22-19 @ 12:00) (9 - 59)  SpO2: 100% (11-22-19 @ 12:00) (95% - 100%)    Input/Output      11-21-19 @ 07:01  -  11-22-19 @ 07:00  --------------------------------------------------------  IN: 3224.9 mL / OUT: 3712 mL / NET: -487.1 mL    11-22-19 @ 07:01  -  11-22-19 @ 12:55  --------------------------------------------------------  IN: 708 mL / OUT: 1288 mL / NET: -580 mL      PHYSICAL EXAM  General: intubated, alert, NAD  Neck: no JVD  Respiratory: equal breath sounds bilaterally, bilateral chest tubes  HEART: normal S1S2  ABDOMEN: Soft, distended; rectal tube in place, +BS  EXTREMITIES: upper tight and sacral edema present/ necrotic U/L digits   NEUROLOGY: alert  ACCESS: [facility], site dry and clean        LABS:                               7.2    7.16  )-----------( 45       ( 22 Nov 2019 07:16 )             21.9       11-22    134<L>  |  101  |  17  ----------------------------<  133<H>  4.0   |  22  |  0.97    Ca    8.3<L>      22 Nov 2019 07:16  Phos  3.5     11-22  Mg     2.5     11-22    TPro  6.7  /  Alb  2.8<L>  /  TBili  2.4<H>  /  DBili  1.7<H>  /  AST  33  /  ALT  40  /  AlkPhos  81  11-22      PT/INR - ( 22 Nov 2019 07:16 )   PT: 11.5 sec;   INR: 1.02          PTT - ( 22 Nov 2019 07:16 )  PTT:30.8 sec          RADIOLOGY:    < from: Xray Chest 1 View- PORTABLE-Routine (11.22.19 @ 04:53) >    EXAM:  XR CHEST PORTABLE ROUTINE 1V                          PROCEDURE DATE:  11/22/2019          INTERPRETATION:  Clinical history/reason for exam: Pneumonia.    Single frontal view.    Comparison: November 21, 2019.    Findings/  impression: Stable positioning of support devices. Stable lung pathology.   Heart size within normal limits.        < end of copied text >

## 2019-11-23 NOTE — PHYSICAL THERAPY INITIAL EVALUATION ADULT - DIAGNOSIS, PT EVAL
6D: Impaired Aerobic Capacity/Endurance Associated with Cardiovascular Pump Dysfunction or Failure; 6E: Impaired Ventilation and Respiration/Gas Exchange Associated with Ventilatory Pump Dysfunction or Failure

## 2019-11-23 NOTE — PROGRESS NOTE ADULT - SUBJECTIVE AND OBJECTIVE BOX
CC: ENDOCARDITIS/SEPSIS      INTERVAL HISTORY:  intubated  awake      ROS: No chest pain, no sob, no abd pain. No n/v/d    PAST MEDICAL & SURGICAL HISTORY:  Endocarditis  IVDU (intravenous drug user)  IVDA (intravenous drug abuse) complicating pregnancy  Smoker  No significant past surgical history      PHYSICAL EXAM:  T(C): 37 (11-23-19 @ 06:40), Max: 37.3 (11-22-19 @ 17:00)  HR: 112 (11-23-19 @ 08:00)  BP: 110/64 (11-23-19 @ 08:00) (86/72 - 128/75)  RR: 17 (11-23-19 @ 08:00)  SpO2: 100% (11-23-19 @ 08:00)  Wt(kg): --  I&O's Summary    22 Nov 2019 07:01  -  23 Nov 2019 07:00  --------------------------------------------------------  IN: 3430.1 mL / OUT: 4076 mL / NET: -645.9 mL    23 Nov 2019 07:01  -  23 Nov 2019 08:56  --------------------------------------------------------  IN: 184.5 mL / OUT: 477 mL / NET: -292.5 mL      Weight   General: NAD.  HEENT: moist mucous membranes, no pallor/cyanosis.  Neck: no JVD visible.  Cardiac: S1, S2. RRR. No murmurs   Respratory:rhonchi; no access muscle use.   Abdomen: soft. nontender. nondistended  Skin: no rashes.  Extremities: +3 thigh and scrotal edema  Access:       DATA:                        7.0<LL>  7.78  )-----------( 43<L>    ( 23 Nov 2019 06:25 )             21.9<L>    Ferritin, Serum: 718 ng/mL <H> (11-13 @ 14:31)      132<L>  |  101    |  15     ----------------------------<  99     Ca:8.2<L> (23 Nov 2019 06:25)  3.9     |  22     |  0.96       eGFR if Non : 101  eGFR if : 117    TPro  6.7    /  Alb  2.9<L>  /  TBili  2.1<H>  /  DBili  1.5<H>  /  AST  31     /  ALT  37     /  AlkPhos  75     23 Nov 2019 06:25  Lactate Dehydrogenase, Serum: 337 U/L <H> (11-23 @ 06:25)  Lactate Dehydrogenase, Serum: 316 U/L <H> (11-22 @ 07:16)      Vitamin D, 25-Hydroxy: <5.0 ng/mL <L> [30.0 - 80.0] (11-12 @ 01:14)                MEDICATIONS  (STANDING):  albuterol/ipratropium for Nebulization 3 milliLiter(s) Nebulizer every 4 hours  artificial  tears Solution 1 Drop(s) Both EYES every 6 hours  ascorbic acid 500 milliGRAM(s) Oral daily  chlorhexidine 0.12% Liquid 15 milliLiter(s) Oral Mucosa every 12 hours  chlorhexidine 2% Cloths 1 Application(s) Topical daily  CRRT Treatment    <Continuous>  dextrose 5%. 1000 milliLiter(s) (50 mL/Hr) IV Continuous <Continuous>  dextrose 50% Injectable 12.5 Gram(s) IV Push once  dextrose 50% Injectable 25 Gram(s) IV Push once  dextrose 50% Injectable 25 Gram(s) IV Push once  fentaNYL   Infusion. 0.5 MICROgram(s)/kG/Hr (3.68 mL/Hr) IV Continuous <Continuous>  heparin  Injectable 5000 Unit(s) SubCutaneous every 8 hours  hydrocortisone sodium succinate Injectable 50 milliGRAM(s) IV Push every 6 hours  insulin lispro (HumaLOG) corrective regimen sliding scale   SubCutaneous every 6 hours  multivitamin/minerals/iron Oral Solution (CENTRUM) 15 milliLiter(s) Oral daily  nafcillin  IVPB 2 Gram(s) IV Intermittent every 4 hours  norepinephrine Infusion 0.05 MICROgram(s)/kG/Min (3.45 mL/Hr) IV Continuous <Continuous>  pantoprazole  Injectable 40 milliGRAM(s) IV Push daily  Phoxillum Filtration BK 4 / 2.5 5000 milliLiter(s) (2500 mL/Hr) CRRT <Continuous>  propofol Infusion 5 MICROgram(s)/kG/Min (2.208 mL/Hr) IV Continuous <Continuous>  sodium chloride 0.9% lock flush 3 milliLiter(s) IV Push every 8 hours  vitamin E 400 International Unit(s) Oral daily  zinc sulfate 220 milliGRAM(s) Oral daily    MEDICATIONS  (PRN):  dextrose 40% Gel 15 Gram(s) Oral once PRN Blood Glucose LESS THAN 70 milliGRAM(s)/deciliter  glucagon  Injectable 1 milliGRAM(s) IntraMuscular once PRN Glucose LESS THAN 70 milligrams/deciliter

## 2019-11-23 NOTE — PROGRESS NOTE ADULT - PROBLEM SELECTOR PLAN 1
tolerating CVVHD well  rate set for negative balance of 25cc/hr - patient can probably tolerate more aggressive UF since currently only on IV Levophed which is slowly being tapered down  aim for negative balance given continue scrotal and sacral edema  titrate pressors to off as tolerated

## 2019-11-23 NOTE — PROGRESS NOTE ADULT - ATTENDING COMMENTS
continues on CVVHD and keeping even. Awake and alert and on [id number] Tv low at times with increase in RR. continues to have air leak in right sided chest tubes, no pnemothorax on CXR. continue pulm toilet, secretions less. decrease Ps as tolerated. continue abx and enteral feeds.

## 2019-11-23 NOTE — PROGRESS NOTE ADULT - ASSESSMENT
38 y/o M w/ PMH of IVDU and active smoker who came from [facility] on 11/8/19 in 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 After being transferred to  CTICU for surgical evaluation, pt was deemed to not be a surgical candidate and transferred to MICU for medical mgmt. ID, Nephrology, heme/onc, surgery following, vascular.    Neuro  Intubated, sedated on Propofol, Fentanyl gtt. Prior CT head negative for any intracranial embolic events. Awake and alert, follows commands.       Cardiovascular     #Hypotension  Most likely 2/2 septic shock from infective endocarditis and RV failure 2/2 tricuspid regurgitation (ELIAN shows EF of 40-50%). Echo with EF 45%. ELIAN with EF 40-45%, 3.8 x1cm vegetation on TR valve, with severe TR, trivial pericardial effusion. Echo with bubble revealed no PFO. Now off Vasopressin, wean off Levophed as tolerated.   - Maintain MAP>65.   - C/W medical mgmt of infective endocarditis as below.  - repeat echo 11/18 with stable vegetation  - Likely became hypotensive and bradycardic overnight (11/18-11/19) in setting of mucous plug vs arrythmia vs most likely septic shock   - repeat echo 11/20 with large stable vegetation, severe TR, markedly dilated IVC with increased R atrial pressure- EF 60%       Respiratory    #Acute hypoxic respiratory failure   Most likely 2/2 alveolar hemorrhage in the setting of septic embolic causing numerous cavitary lesions on CT chest. CXR with scattered infiltrates and pleural effusions. Sputum culture negative. CT revealed "moderate bilateral pleural effusions and dense bibasilar consolidation with underlying septic emboli/pulmonary abscesses."   - Re-intubated on 11/19 for acute hypoxic respiratory failure with increased work of breathing   - CT chest with new b/l loculated pneumothoraces with increase in size of cavitary lesions with worsening lobar PNA   - now with 2 right chest tubes and 1 left chest tube-->repeat cxr  with decrease in size of b/l pneumothoraces   - cxr 11/21 with small right pneumothorax, still present but stable on 11/22    - Hold off on trach for now, will reconsider next week depending on clinical status and need for further tx   - Start Duonebs q4  - Repeat bronch 11/22 with thick secretions (R>L) but slightly improved compared to first bronch   - Continue to treat IE as below    #Bilateral pulmonary effusion  Most likely empyema in setting of septic shock 2/2 infective endocarditis. Bilateral CT in place, confirmed by CXR, continues to drain serosanguinous fluid. Fluid analysis of both CT pleural fluid confirms complex exudate with high cellularity, low pH and low glucose. pleural fluid cultures with staph aureus. Chest tubes in place as above (2 R chest tubes, 1 L chest tube)   - Monitor output of bilateral chest tubes  - R pleural fluid cultures with staph aureus, L pleural fluid cultures with NGTD   - CT chest and cxr as above. Chest tube set to suction.      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 Not a surgical candidate for a tricuspid valve replacement at this time as per CT surgery. Lactate 2.0 today. Initial blood cultures positive for staph epidermidis, likely a contaminant. Initial sputum culture positive for staph aureus  Repeat blood cultures with NGTD. Sputum cx NGTD.  - ID following, C/W Nafcillin 2g q4 (Sensitive to Oxacillin as per OSH records), f/u recs   - DC Meropenem per ID   - DC Vancomycin as MRSA PCR negative   - repeat Bcxs with NGTD   - Bronch performed 11/19 with copious purulent sputum-  BAL cxs with numerous wbcs, rare gram positive rods/ rare gram negative rods, AFB negative. Cytopathology with no pneumocystis organisms   - Bronch findings with possible herpetic tracheobronchitis, awaiting results   - Per CT surgery pt is too high risk for any surgery and would likely not survive procedure.   - Cardiology consulted to optimize management of his right heart dysfunction, f/u recs    - Repeat bronch 11/22 with thick secretions (R>L) but slightly improved compared to first bronch       Renal    #Acute renal failure most likely 2/2 ATN from hypoperfusion, Minimal urine output, on CVVHD, oliguric with 0-5cc/hr. Vancomycin level 37 on arrival so may be component of drug induced nephropathy. Bun/Cr continues to improve while on CVVHD. Pt is clinically fluid overloaded but now improved and hemodynamically stable. No renal infarcts as per CT abdomen/pelvis. Optimized for Sx as per nephrology.  - F/U nephrology recs.  - C/W CVVHD as tolerated, monitor for clotting  - Continue to correct fluid overload with HD  - Monitor renal function with BUN/Cr  - Monitor I/Os  - DC raya, blader scan, with straight cath if needed     #Electrolytes abnormalities   Improved. On CVVHD. Hyperkalemia resolved. No EKG changes.  -Requires q6 BMP, Mg, and Phos while on CVVHD  -Monitor serum lytes       GI    OG on arrival with 600cc of bilious fluid, abdomen still distended but had several BMs, some loose. CT abdomen revealed no evidence of bowel ischemia or SBO. Sump was DCed and replaced with NG tube. CT abdomen/pelvis revealed "irregular masslike mural thickening of the posterior left lateral wall of the rectum." Surgery consulted, unlikely perirectal abscess. Recommend GI evaluation for possible scope.  - NG tube in place  - C/W tube feeds, Jevity 1.5  - F/u GI consult, f/u recs, when stable will go for flex sig   - Abdominal xray with air-filled loops of small/large bowel with concern for distal obstruction vs ileus. Surgery consulted. Rectal tube and sump placed. CT abd/pelvis with abd/pelvic, anasarca, colonic ileus. Rectal tube in place for colonic decompression, continue to monitor. Abdomen still distended but significantly improved, repeat abd xray without obstruction   -C. diff negative     #Congestive Hepatopathy   Transaminitis, coag derangements, hyperbilirubinemia most likely 2/2 hepatic congestion vs hemolysis, due to severe TR in setting of infective endocarditis. Transaminases and Alk phos improving. Hepatitis panel negative.  - Monitor CMP, direct bili   - Avoid Tylenol    Heme    #Hemolysis  Intravascular hemolysis with inital haptoglobin <10, LDH decreasing at 365. D-dimer elevated. Fibrinogen stable 396. Today still clinically jaundiced, with stable bilirubinemia: T.bili 9.9., D.bili 8.5. h/h stable at 8.1/25.5. Platelets decreasing from 54 to 49.. Fact VII/Factor VIII elevated. Unlikely 2/2 CVVHD. Likely 2/2 to sepsis and/or DIC.   - s/p 2u pRBCs (11/19-11/20). Hgb stable at 7.2  - Heme consulted, follow recs  - Monitor direct and total bili, LDH, fibrinogen, platelets   - Transfuse platelets if <10K or bleeding and <50K  - Transfuse pRBCs for hgb <7     Vascular  Vascular surgery consulted in setting of gangrenous distal toes and fingers b/l. Likely 2/2 to pressors. Will follow up recs.     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 After being transferred to  CTICU for surgical evaluation, pt was deemed to not be a surgical candidate and transferred to MICU for medical mgmt. ID, Nephrology, heme/onc, surgery following, vascular.    Neuro  Intubated, sedated on Propofol, Fentanyl gtt. Prior CT head negative for any intracranial embolic events. Awake and alert, follows commands.       Cardiovascular     #Hypotension  Most likely 2/2 septic shock from infective endocarditis and RV failure 2/2 tricuspid regurgitation (ELIAN shows EF of 40-50%). Echo with EF 45%. ELIAN with EF 40-45%, 3.8 x1cm vegetation on TR valve, with severe TR, trivial pericardial effusion. Echo with bubble revealed no PFO. Now off Vasopressin, wean off Levophed as tolerated.   - Maintain MAP>65.   - C/W medical mgmt of infective endocarditis as below.  - repeat echo 11/18 with stable vegetation  - Likely became hypotensive and bradycardic overnight (11/18-11/19) in setting of mucous plug vs arrythmia vs most likely septic shock   - repeat echo 11/20 with large stable vegetation, severe TR, markedly dilated IVC with increased R atrial pressure- EF 60%       Respiratory    #Acute hypoxic respiratory failure   Most likely 2/2 alveolar hemorrhage in the setting of septic embolic causing numerous cavitary lesions on CT chest. CXR with scattered infiltrates and pleural effusions. Sputum culture negative. CT revealed "moderate bilateral pleural effusions and dense bibasilar consolidation with underlying septic emboli/pulmonary abscesses."   - Re-intubated on 11/19 for acute hypoxic respiratory failure with increased work of breathing   - CT chest with new b/l loculated pneumothoraces with increase in size of cavitary lesions with worsening lobar PNA   - now with 2 right chest tubes and 1 left chest tube-->repeat cxr  with decrease in size of b/l pneumothoraces   - cxr 11/21 with small right pneumothorax, still present but stable on 11/22    - Hold off on trach for now, will reconsider next week depending on clinical status and need for further tx   - Start Duonebs q4  - Repeat bronch 11/22 with thick secretions (R>L) but slightly improved compared to first bronch   - Continue to treat IE as below  -attempted CPAP trial in AM, will continue to try tomorrow     #Bilateral pulmonary effusion  Most likely empyema in setting of septic shock 2/2 infective endocarditis. Bilateral CT in place, confirmed by CXR, continues to drain serosanguinous fluid. Fluid analysis of both CT pleural fluid confirms complex exudate with high cellularity, low pH and low glucose. pleural fluid cultures with staph aureus. Chest tubes in place as above (2 R chest tubes, 1 L chest tube)   - Monitor output of bilateral chest tubes  - R pleural fluid cultures with staph aureus, L pleural fluid cultures with NGTD   - CT chest and cxr as above. Chest tube set to suction.      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 Not a surgical candidate for a tricuspid valve replacement at this time as per CT surgery. Lactate 2.0 today. Initial blood cultures positive for staph epidermidis, likely a contaminant. Initial sputum culture positive for staph aureus  Repeat blood cultures with NGTD. Sputum cx NGTD.  - ID following, C/W Nafcillin 2g q4 (Sensitive to Oxacillin as per OSH records), f/u recs   - DC Meropenem per ID   - DC Vancomycin as MRSA PCR negative   - repeat Bcxs with NGTD   - Bronch performed 11/19 with copious purulent sputum-  BAL cxs with numerous wbcs, rare gram positive rods/ rare gram negative rods, AFB negative. Cytopathology with no pneumocystis organisms   - Bronch findings with possible herpetic tracheobronchitis, awaiting results   - Per CT surgery pt is too high risk for any surgery and would likely not survive procedure.   - Cardiology consulted to optimize management of his right heart dysfunction, f/u recs    - Repeat bronch 11/22 with thick secretions (R>L) but slightly improved compared to first bronch. F/u BAL cxs      Renal    #Acute renal failure most likely 2/2 ATN from hypoperfusion, Minimal urine output, on CVVHD, oliguric with 0-5cc/hr. Vancomycin level 37 on arrival so may be component of drug induced nephropathy. Bun/Cr continues to improve while on CVVHD. Pt is clinically fluid overloaded but now improved and hemodynamically stable. No renal infarcts as per CT abdomen/pelvis. Optimized for Sx as per nephrology.  - F/U nephrology recs.  - C/W CVVHD as tolerated, monitor for clotting  - Continue to correct fluid overload with HD  - Monitor renal function with BUN/Cr  - Monitor I/Os  - blader scan, with straight cath if needed     #Electrolytes abnormalities   Improved. On CVVHD. Hyperkalemia resolved. No EKG changes.  -Requires q6 BMP, Mg, and Phos while on CVVHD  -Monitor serum lytes       GI    OG on arrival with 600cc of bilious fluid, abdomen still distended but had several BMs, some loose. CT abdomen revealed no evidence of bowel ischemia or SBO. Sump was DCed and replaced with NG tube. CT abdomen/pelvis revealed "irregular masslike mural thickening of the posterior left lateral wall of the rectum." Surgery consulted, unlikely perirectal abscess. Recommend GI evaluation for possible scope.  - NG tube in place  - C/W tube feeds, Jevity 1.5  - F/u GI consult, f/u recs, when stable will go for flex sig   - Abdominal xray with air-filled loops of small/large bowel with concern for distal obstruction vs ileus. Surgery consulted. Rectal tube and sump placed. CT abd/pelvis with abd/pelvic, anasarca, colonic ileus. Rectal tube in place for colonic decompression, continue to monitor. Abdomen still distended but significantly improved, repeat abd xray without obstruction   -C. diff negative     #Congestive Hepatopathy   Transaminitis, coag derangements, hyperbilirubinemia most likely 2/2 hepatic congestion vs hemolysis, due to severe TR in setting of infective endocarditis. Transaminases and Alk phos improving. Hepatitis panel negative.  - Monitor CMP, direct bili   - Avoid Tylenol    Heme    #Hemolysis  Intravascular hemolysis with inital haptoglobin <10, LDH decreasing at 365. D-dimer elevated. Fibrinogen stable 396. Today still clinically jaundiced, with stable bilirubinemia: T.bili 9.9., D.bili 8.5. h/h stable at 8.1/25.5. Platelets decreasing from 54 to 49.. Fact VII/Factor VIII elevated. Unlikely 2/2 CVVHD. Likely 2/2 to sepsis and/or DIC.   - s/p 2u pRBCs (11/19-11/20). Hgb stable at 7.2  - Heme consulted, follow recs  - Monitor direct and total bili, LDH, fibrinogen, platelets   - Transfuse platelets if <10K or bleeding and <50K  - Transfuse pRBCs for hgb <7     Vascular  Vascular surgery consulted in setting of gangrenous distal toes and fingers b/l. Likely 2/2 to pressors. Will follow up recs.     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 After being transferred to  CTICU for surgical evaluation, pt was deemed to not be a surgical candidate and transferred to MICU for medical mgmt. ID, Nephrology, heme/onc, surgery following, vascular.    Neuro  Intubated, sedated on Propofol, Fentanyl gtt. Prior CT head negative for any intracranial embolic events. Awake and alert, follows commands.       Cardiovascular     #Hypotension  Most likely 2/2 septic shock from infective endocarditis and RV failure 2/2 tricuspid regurgitation (ELIAN shows EF of 40-50%). Echo with EF 45%. ELIAN with EF 40-45%, 3.8 x1cm vegetation on TR valve, with severe TR, trivial pericardial effusion. Echo with bubble revealed no PFO. Now off Vasopressin, wean off Levophed as tolerated.   - Maintain MAP>65.   - C/W medical mgmt of infective endocarditis as below.  - repeat echo 11/18 with stable vegetation  - Likely became hypotensive and bradycardic overnight (11/18-11/19) in setting of mucous plug vs arrythmia vs most likely septic shock   - repeat echo 11/20 with large stable vegetation, severe TR, markedly dilated IVC with increased R atrial pressure- EF 60%   -Midodrine 5mg TID started       Respiratory    #Acute hypoxic respiratory failure   Most likely 2/2 alveolar hemorrhage in the setting of septic embolic causing numerous cavitary lesions on CT chest. CXR with scattered infiltrates and pleural effusions. Sputum culture negative. CT revealed "moderate bilateral pleural effusions and dense bibasilar consolidation with underlying septic emboli/pulmonary abscesses."   - Re-intubated on 11/19 for acute hypoxic respiratory failure with increased work of breathing   - CT chest with new b/l loculated pneumothoraces with increase in size of cavitary lesions with worsening lobar PNA   - now with 2 right chest tubes and 1 left chest tube-->repeat cxr  with decrease in size of b/l pneumothoraces   - cxr 11/21 with small right pneumothorax, still present but stable on 11/22    - Hold off on trach for now, will reconsider next week depending on clinical status and need for further tx   - Start Duonebs q4  - Repeat bronch 11/22 with thick secretions (R>L) but slightly improved compared to first bronch   - Continue to treat IE as below  -attempted CPAP trial in AM, will continue to try tomorrow     #Bilateral pulmonary effusion  Most likely empyema in setting of septic shock 2/2 infective endocarditis. Bilateral CT in place, confirmed by CXR, continues to drain serosanguinous fluid. Fluid analysis of both CT pleural fluid confirms complex exudate with high cellularity, low pH and low glucose. pleural fluid cultures with staph aureus. Chest tubes in place as above (2 R chest tubes, 1 L chest tube)   - Monitor output of bilateral chest tubes  - R pleural fluid cultures with staph aureus, L pleural fluid cultures with NGTD   - CT chest and cxr as above. Chest tube set to suction.      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 Not a surgical candidate for a tricuspid valve replacement at this time as per CT surgery. Lactate 2.0 today. Initial blood cultures positive for staph epidermidis, likely a contaminant. Initial sputum culture positive for staph aureus  Repeat blood cultures with NGTD. Sputum cx NGTD.  - ID following, C/W Nafcillin 2g q4 (Sensitive to Oxacillin as per OSH records), f/u recs   - DC Meropenem per ID   - DC Vancomycin as MRSA PCR negative   - repeat Bcxs with NGTD   - Bronch performed 11/19 with copious purulent sputum-  BAL cxs with numerous wbcs, rare gram positive rods/ rare gram negative rods, AFB negative. Cytopathology with no pneumocystis organisms   - Bronch findings with possible herpetic tracheobronchitis, awaiting results   - Per CT surgery pt is too high risk for any surgery and would likely not survive procedure.   - Cardiology consulted to optimize management of his right heart dysfunction, f/u recs    - Repeat bronch 11/22 with thick secretions (R>L) but slightly improved compared to first bronch. F/u BAL cxs      Renal    #Acute renal failure most likely 2/2 ATN from hypoperfusion, Minimal urine output, on CVVHD, oliguric with 0-5cc/hr. Vancomycin level 37 on arrival so may be component of drug induced nephropathy. Bun/Cr continues to improve while on CVVHD. Pt is clinically fluid overloaded but now improved and hemodynamically stable. No renal infarcts as per CT abdomen/pelvis. Optimized for Sx as per nephrology.  - F/U nephrology recs.  - C/W CVVHD as tolerated, monitor for clotting  - Continue to correct fluid overload with HD  - Monitor renal function with BUN/Cr  - Monitor I/Os  - blader scan, with straight cath if needed     #Electrolytes abnormalities   Improved. On CVVHD. Hyperkalemia resolved. No EKG changes.  -Requires q6 BMP, Mg, and Phos while on CVVHD  -Monitor serum lytes       GI    OG on arrival with 600cc of bilious fluid, abdomen still distended but had several BMs, some loose. CT abdomen revealed no evidence of bowel ischemia or SBO. Sump was DCed and replaced with NG tube. CT abdomen/pelvis revealed "irregular masslike mural thickening of the posterior left lateral wall of the rectum." Surgery consulted, unlikely perirectal abscess. Recommend GI evaluation for possible scope.  - NG tube in place  - C/W tube feeds, Jevity 1.5  - F/u GI consult, f/u recs, when stable will go for flex sig   - Abdominal xray with air-filled loops of small/large bowel with concern for distal obstruction vs ileus. Surgery consulted. Rectal tube and sump placed. CT abd/pelvis with abd/pelvic, anasarca, colonic ileus. Rectal tube in place for colonic decompression, continue to monitor. Abdomen still distended but significantly improved, repeat abd xray without obstruction   -C. diff negative     #Congestive Hepatopathy   Transaminitis, coag derangements, hyperbilirubinemia most likely 2/2 hepatic congestion vs hemolysis, due to severe TR in setting of infective endocarditis. Transaminases and Alk phos improving. Hepatitis panel negative.  - Monitor CMP, direct bili   - Avoid Tylenol    Heme    #Hemolysis  Intravascular hemolysis with inital haptoglobin <10, LDH decreasing at 365. D-dimer elevated. Fibrinogen stable 396. Today still clinically jaundiced, with stable bilirubinemia: T.bili 9.9., D.bili 8.5. h/h stable at 8.1/25.5. Platelets decreasing from 54 to 49.. Fact VII/Factor VIII elevated. Unlikely 2/2 CVVHD. Likely 2/2 to sepsis and/or DIC.   - s/p 2u pRBCs (11/19-11/20). Hgb stable at 7.2  - Heme consulted, follow recs  - Monitor direct and total bili, LDH, fibrinogen, platelets   - Transfuse platelets if <10K or bleeding and <50K  - Transfuse pRBCs for hgb <7     Vascular  Vascular surgery consulted in setting of gangrenous distal toes and fingers b/l. Likely 2/2 to pressors. Will follow up recs.     Lines: right IJ TLC and Ernesto (11/12), Axillary A-line (11/12), Left IJ (11/12), right peripheral (11/12)    Endocrinology  -Continue Hydrocortisone 50mg q6- stress dose steroids    F: None   E: replete K <4, Mg <2  N: Jevity 1.5   GI Ppx: Protonix   DVT Ppx: Heparin   Code status: FULL   Dispo: MICU

## 2019-11-23 NOTE — PHYSICAL THERAPY INITIAL EVALUATION ADULT - MANUAL MUSCLE TESTING RESULTS, REHAB EVAL
grossly b/l UE and LE assessed at 3/5 grossly b/l UE assessed at 3/5; b/l LE grossly assessed at 4/5

## 2019-11-23 NOTE — PROGRESS NOTE ADULT - ASSESSMENT
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

## 2019-11-23 NOTE — PROGRESS NOTE ADULT - ASSESSMENT
39y Male w PMHx of IVDU and active smoker who went to [facility] on 11/8/19 complaining of productive cough with hemoptysis, progressive SOB, pleuritic chest pain, nausea, non-bloody emesis, abd pain, chillsx3 days. 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 Daniel Ruelas for possible surgical evaluation. Following admission, patient noted to be in acute hypoxic respiratory failure s/p intubation/sedation, acute kidney injury requiring CVVHD, congestive hepatopathy, and mutli-system organ failure 2/2 hypoperfusion. Patient deemed not a surgical candidate, per Dr. Ruelas, and patient transferred to MICU for medical management with IV nafcillin, ID following. Pt with diffuse septic emboli in bilateral lungs, pigtails in place with new b/l pneumothorax. Abd yesterday with increased distention and distended bowel on AXR. Gen surg consulted and is now s/p decompression with rectal tube abd NG tube. CT chest/abd/pelvis done 11/18/19 concerning for b/l trapped lung and hemothorax for which thoracic surgery was consulted. Patient was extubated however, overnight he became bradycardic and arrested, CPR initiated with ROSC. After CPR chest tubes noted to have increased sanguinous drainage, new airleaks. Thoracic team following for CT management. Anterior right pigtail placed for loculated pneumothorax.     Plan:   - continue daily serial CXRs - CXR today stable   - continue Chest tubes to suction at all times and while on positive pressure (Right Ant cont AL, Right Post intermittent 1/5 AL, Left pigtail no AL)   - consider CT chest to better evaluate PTXs and lung disease, if pt becomes more stable   - appreciate ID recs   - +endocarditis- not a surgical candidate at this time with CT surgery- continue medical management

## 2019-11-23 NOTE — PROGRESS NOTE ADULT - SUBJECTIVE AND OBJECTIVE BOX
Patient was seen and examined at bedside.Still intubated. Still on pressors.       Vital Signs Last 24 Hrs  T(C): 36.2 (23 Nov 2019 19:00), Max: 38.4 (23 Nov 2019 16:00)  T(F): 97.2 (23 Nov 2019 19:00), Max: 101.2 (23 Nov 2019 16:00)  HR: 96 (23 Nov 2019 20:00) (92 - 132)  BP: 107/63 (23 Nov 2019 20:00) (101/65 - 143/83)  BP(mean): 77 (23 Nov 2019 20:00) (69 - 108)  RR: 16 (23 Nov 2019 20:00) (12 - 30)  SpO2: 99% (23 Nov 2019 20:00) (96% - 100%)    Physical Exam:  Pulmonary: In tubated on AC/VC  Cardiovascular: on pressor support  Abdominal: soft, NT/ND  EXT: with gangrenous lesions (dry gangrene) in both feet and LLE distally 93rd finger). No inflamation, no visible infection  Pulses: doppler exam unchanged    Lines/drains/tubes:    I&O's Summary    22 Nov 2019 07:01  -  23 Nov 2019 07:00  --------------------------------------------------------  IN: 3430.1 mL / OUT: 4076 mL / NET: -645.9 mL    23 Nov 2019 07:01  -  23 Nov 2019 20:36  --------------------------------------------------------  IN: 1964.5 mL / OUT: 2617 mL / NET: -652.5 mL        LABS:                        7.2    10.43 )-----------( 59       ( 23 Nov 2019 16:50 )             22.4     11-23    133<L>  |  103  |  19  ----------------------------<  142<H>  3.9   |  22  |  1.05    Ca    8.1<L>      23 Nov 2019 16:50  Phos  3.5     11-23  Mg     2.3     11-23    TPro  6.7  /  Alb  2.9<L>  /  TBili  2.1<H>  /  DBili  1.5<H>  /  AST  31  /  ALT  37  /  AlkPhos  75  11-23    PT/INR - ( 23 Nov 2019 06:25 )   PT: 12.0 sec;   INR: 1.06          PTT - ( 23 Nov 2019 06:25 )  PTT:32.3 sec    CAPILLARY BLOOD GLUCOSE      POCT Blood Glucose.: 147 mg/dL (23 Nov 2019 17:57)  POCT Blood Glucose.: 115 mg/dL (23 Nov 2019 11:44)  POCT Blood Glucose.: 101 mg/dL (23 Nov 2019 05:54)  POCT Blood Glucose.: 114 mg/dL (22 Nov 2019 23:18)    LIVER FUNCTIONS - ( 23 Nov 2019 06:25 )  Alb: 2.9 g/dL / Pro: 6.7 g/dL / ALK PHOS: 75 U/L / ALT: 37 U/L / AST: 31 U/L / GGT: x             RADIOLOGY & ADDITIONAL STUDIES:

## 2019-11-23 NOTE — PROGRESS NOTE ADULT - SUBJECTIVE AND OBJECTIVE BOX
OVERNIGHT EVENTS:    SUBJECTIVE / INTERVAL HPI: Patient seen and examined at bedside.     VITAL SIGNS:  Vital Signs Last 24 Hrs  T(C): 36.1 (23 Nov 2019 01:22), Max: 37.3 (22 Nov 2019 17:00)  T(F): 97 (23 Nov 2019 01:22), Max: 99.2 (22 Nov 2019 17:00)  HR: 98 (23 Nov 2019 03:00) (92 - 116)  BP: 103/57 (23 Nov 2019 03:00) (86/72 - 132/111)  BP(mean): 72 (23 Nov 2019 03:00) (62 - 126)  RR: 13 (23 Nov 2019 03:00) (9 - 29)  SpO2: 100% (23 Nov 2019 03:00) (97% - 100%)    PHYSICAL EXAM:    General: WDWN  HEENT: NC/AT; PERRL, anicteric sclera; MMM  Neck: supple  Cardiovascular: +S1/S2; RRR  Respiratory: CTA B/L; no W/R/R  Gastrointestinal: soft, NT/ND; +BSx4  Extremities: WWP; no edema, clubbing or cyanosis  Vascular: 2+ radial, DP/PT pulses B/L  Neurological: AAOx3; no focal deficits    MEDICATIONS:  MEDICATIONS  (STANDING):  albuterol/ipratropium for Nebulization 3 milliLiter(s) Nebulizer every 4 hours  artificial  tears Solution 1 Drop(s) Both EYES every 6 hours  ascorbic acid 500 milliGRAM(s) Oral daily  chlorhexidine 0.12% Liquid 15 milliLiter(s) Oral Mucosa every 12 hours  chlorhexidine 2% Cloths 1 Application(s) Topical daily  dextrose 5%. 1000 milliLiter(s) (50 mL/Hr) IV Continuous <Continuous>  dextrose 50% Injectable 12.5 Gram(s) IV Push once  dextrose 50% Injectable 25 Gram(s) IV Push once  dextrose 50% Injectable 25 Gram(s) IV Push once  fentaNYL   Infusion. 0.5 MICROgram(s)/kG/Hr (3.68 mL/Hr) IV Continuous <Continuous>  heparin  Injectable 5000 Unit(s) SubCutaneous every 8 hours  hydrocortisone sodium succinate Injectable 50 milliGRAM(s) IV Push every 6 hours  insulin lispro (HumaLOG) corrective regimen sliding scale   SubCutaneous every 6 hours  multivitamin/minerals/iron Oral Solution (CENTRUM) 15 milliLiter(s) Oral daily  nafcillin  IVPB 2 Gram(s) IV Intermittent every 4 hours  norepinephrine Infusion 0.05 MICROgram(s)/kG/Min (3.45 mL/Hr) IV Continuous <Continuous>  pantoprazole  Injectable 40 milliGRAM(s) IV Push daily  propofol Infusion 5 MICROgram(s)/kG/Min (2.208 mL/Hr) IV Continuous <Continuous>  sodium chloride 0.9% lock flush 3 milliLiter(s) IV Push every 8 hours  vitamin E 400 International Unit(s) Oral daily  zinc sulfate 220 milliGRAM(s) Oral daily    MEDICATIONS  (PRN):  dextrose 40% Gel 15 Gram(s) Oral once PRN Blood Glucose LESS THAN 70 milliGRAM(s)/deciliter  glucagon  Injectable 1 milliGRAM(s) IntraMuscular once PRN Glucose LESS THAN 70 milligrams/deciliter      ALLERGIES:  Allergies    No Known Allergies    Intolerances        LABS:                        7.2    7.16  )-----------( 45       ( 22 Nov 2019 07:16 )             21.9     11-23    134<L>  |  102  |  16  ----------------------------<  136<H>  4.0   |  22  |  0.94    Ca    8.1<L>      23 Nov 2019 01:41  Phos  3.8     11-23  Mg     2.5     11-23    TPro  6.7  /  Alb  2.8<L>  /  TBili  2.4<H>  /  DBili  1.7<H>  /  AST  33  /  ALT  40  /  AlkPhos  81  11-22    PT/INR - ( 22 Nov 2019 07:16 )   PT: 11.5 sec;   INR: 1.02          PTT - ( 22 Nov 2019 07:16 )  PTT:30.8 sec    CAPILLARY BLOOD GLUCOSE      POCT Blood Glucose.: 114 mg/dL (22 Nov 2019 23:18)      RADIOLOGY & ADDITIONAL TESTS: Reviewed. OVERNIGHT EVENTS: BMP monitored, K+ repleted.     SUBJECTIVE / INTERVAL HPI: Patient seen and examined at bedside. Patient seen and examined at bedside. Patient resting in bed, intubated on sedation but awake and alert. Follows commands. Denies pain but continues to be thirst, requesting water.     VITAL SIGNS:  Vital Signs Last 24 Hrs  T(C): 36.1 (23 Nov 2019 01:22), Max: 37.3 (22 Nov 2019 17:00)  T(F): 97 (23 Nov 2019 01:22), Max: 99.2 (22 Nov 2019 17:00)  HR: 98 (23 Nov 2019 03:00) (92 - 116)  BP: 103/57 (23 Nov 2019 03:00) (86/72 - 132/111)  BP(mean): 72 (23 Nov 2019 03:00) (62 - 126)  RR: 13 (23 Nov 2019 03:00) (9 - 29)  SpO2: 100% (23 Nov 2019 03:00) (97% - 100%)    PHYSICAL EXAM:    General: WDWN, resting in bed, jaundice but improving   HEENT: NC/AT; PERRL, anicteric sclera; MMM  Neck: supple  Cardiovascular: +S1/S2; systolic murmur heard loudest at the LSB   Respiratory: intubated, diffuse rhonchi and crackles b/l   Gastrointestinal: NGT, soft, NT, distended; +BSx4  Extremities: WWP; edema in LEs/UEs, no clubbing or cyanosis  Skin: ischemic toes/fingers  Vascular: 2+ radial, DP/PT pulses B/L  Neurological: on sedation but awake and alert, follows commands.  strength intact       MEDICATIONS:  MEDICATIONS  (STANDING):  albuterol/ipratropium for Nebulization 3 milliLiter(s) Nebulizer every 4 hours  artificial  tears Solution 1 Drop(s) Both EYES every 6 hours  ascorbic acid 500 milliGRAM(s) Oral daily  chlorhexidine 0.12% Liquid 15 milliLiter(s) Oral Mucosa every 12 hours  chlorhexidine 2% Cloths 1 Application(s) Topical daily  dextrose 5%. 1000 milliLiter(s) (50 mL/Hr) IV Continuous <Continuous>  dextrose 50% Injectable 12.5 Gram(s) IV Push once  dextrose 50% Injectable 25 Gram(s) IV Push once  dextrose 50% Injectable 25 Gram(s) IV Push once  fentaNYL   Infusion. 0.5 MICROgram(s)/kG/Hr (3.68 mL/Hr) IV Continuous <Continuous>  heparin  Injectable 5000 Unit(s) SubCutaneous every 8 hours  hydrocortisone sodium succinate Injectable 50 milliGRAM(s) IV Push every 6 hours  insulin lispro (HumaLOG) corrective regimen sliding scale   SubCutaneous every 6 hours  multivitamin/minerals/iron Oral Solution (CENTRUM) 15 milliLiter(s) Oral daily  nafcillin  IVPB 2 Gram(s) IV Intermittent every 4 hours  norepinephrine Infusion 0.05 MICROgram(s)/kG/Min (3.45 mL/Hr) IV Continuous <Continuous>  pantoprazole  Injectable 40 milliGRAM(s) IV Push daily  propofol Infusion 5 MICROgram(s)/kG/Min (2.208 mL/Hr) IV Continuous <Continuous>  sodium chloride 0.9% lock flush 3 milliLiter(s) IV Push every 8 hours  vitamin E 400 International Unit(s) Oral daily  zinc sulfate 220 milliGRAM(s) Oral daily    MEDICATIONS  (PRN):  dextrose 40% Gel 15 Gram(s) Oral once PRN Blood Glucose LESS THAN 70 milliGRAM(s)/deciliter  glucagon  Injectable 1 milliGRAM(s) IntraMuscular once PRN Glucose LESS THAN 70 milligrams/deciliter      ALLERGIES:  Allergies    No Known Allergies    Intolerances        LABS:                        7.2    7.16  )-----------( 45       ( 22 Nov 2019 07:16 )             21.9     11-23    134<L>  |  102  |  16  ----------------------------<  136<H>  4.0   |  22  |  0.94    Ca    8.1<L>      23 Nov 2019 01:41  Phos  3.8     11-23  Mg     2.5     11-23    TPro  6.7  /  Alb  2.8<L>  /  TBili  2.4<H>  /  DBili  1.7<H>  /  AST  33  /  ALT  40  /  AlkPhos  81  11-22    PT/INR - ( 22 Nov 2019 07:16 )   PT: 11.5 sec;   INR: 1.02          PTT - ( 22 Nov 2019 07:16 )  PTT:30.8 sec    CAPILLARY BLOOD GLUCOSE      POCT Blood Glucose.: 114 mg/dL (22 Nov 2019 23:18)      RADIOLOGY & ADDITIONAL TESTS: Reviewed.

## 2019-11-23 NOTE — PROGRESS NOTE ADULT - SUBJECTIVE AND OBJECTIVE BOX
39y Male w PMHx of IVDU and active smoker who went to [facility] on 11/8/19 complaining of productive cough with hemoptysis, progressive SOB, pleuritic chest pain, nausea, non-bloody emesis, abd pain, chillsx3 days. 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, CTICU, under the care of Dr. Daniel Ruelas for possible surgical evaluation. Following admission, patient noted to be in acute hypoxic respiratory failure s/p intubation/sedation, acute kidney injury requiring CVVHD, congestive hepatopathy, and mutli-system organ failure 2/2 hypoperfusion. Patient deemed not a surgical candidate, per Dr. Ruelas, and patient transferred to MICU for medical management with IV nafcillin, ID following. Pt with diffuse septic emboli in bilateral lungs, pigtails in place with new b/l pneumothorax. Abd yesterday with increased distention and distended bowel on AXR. Gen surg consulted and is now s/p decompression with rectal tube abd NG tube. CT chest/abd/pelvis done 11/18/19 concerning for b/l trapped lung and hemothorax for which thoracic surgery was consulted. Patient was extubated however, overnight he became bradycardic and arrested, CPR initiated with ROSC. After CPR chest tubes noted to have increased sanguinous drainage, new airleaks. Thoracic team following for CT management. Pt still with improved mental status (following verbal commands). Gtts: Propofol/Fentanyl/Levo 0.1mcg    Plan:   - continue daily serial CXRs - CXR today stable (however difficult to see anterior ptx on CXRs)  - continue Chest tubes to suction at all times and while on positive pressure   - consider CT chest to better evaluate PTXs and lung disease, if pt becomes more stable   - appreciate ID recs   - +endocarditis- not a surgical candidate at this time with CT surgery- continue medical management     Addendum 11/22/19 11:00AM: Case discussed with Fellow Dr. Tracey.   - Recommend ultrasound chest for possible anterior pigtail placement as noted anterior ptx on CT Chest from 11/18/19   - Would benefit from IR pigtail, however patient likely not stable to go to IR at this time, continue care per primary team. Patient discussed on morning rounds with Dr. Tracey    Operation / Date: c/s for pigtail placement    SUBJECTIVE ASSESSMENT:  39y Male intubaed.         Vital Signs Last 24 Hrs  T(C): 36.2 (23 Nov 2019 19:00), Max: 38.4 (23 Nov 2019 16:00)  T(F): 97.2 (23 Nov 2019 19:00), Max: 101.2 (23 Nov 2019 16:00)  HR: 100 (23 Nov 2019 19:00) (92 - 132)  BP: 101/79 (23 Nov 2019 19:00) (101/65 - 143/83)  BP(mean): 91 (23 Nov 2019 19:00) (69 - 108)  RR: 18 (23 Nov 2019 19:00) (12 - 30)  SpO2: 100% (23 Nov 2019 19:00) (96% - 100%)  I&O's Detail    22 Nov 2019 07:01  -  23 Nov 2019 07:00  --------------------------------------------------------  IN:    Enteral Tube Flush: 120 mL    fentaNYL Infusion.: 44.1 mL    fentaNYL Infusion.: 305 mL    norepinephrine Infusion: 211 mL    ns in tub fed  [id number]: 1032 mL    Oral Fluid: 90 mL    propofol Infusion: 418.3 mL    propofol Infusion: 59.7 mL    Solution: 100 mL    Solution: 500 mL    Solution: 500 mL    Solution: 50 mL  Total IN: 3430.1 mL    OUT:    Chest Tube: 60 mL    Chest Tube: 50 mL    Intermittent Catheterization - Urethral: 5 mL    Other: 3261 mL    Rectal Tube: 700 mL  Total OUT: 4076 mL    Total NET: -645.9 mL      23 Nov 2019 07:01  -  23 Nov 2019 19:43  --------------------------------------------------------  IN:    Enteral Tube Flush: 320 mL    fentaNYL Infusion.: 265 mL    norepinephrine Infusion: 162 mL    ns in tub fed  [id number]: 539 mL    propofol Infusion: 242.5 mL    Solution: 36 mL    Solution: 400 mL  Total IN: 1964.5 mL    OUT:    Chest Tube: 80 mL    Chest Tube: 120 mL    Chest Tube: 40 mL    Intermittent Catheterization - Urethral: 10 mL    Other: 1839 mL    Rectal Tube: 300 mL  Total OUT: 2389 mL    Total NET: -424.5 mL        PHYSICAL EXAM:    General: NAD    Neurological: unresponsive    Cardiovascular: RRR    Respiratory: good breath sound    Gastrointestinal: soft, +BS    Extremities: no edema        LABS:                        7.2    10.43 )-----------( 59       ( 23 Nov 2019 16:50 )             22.4       COUMADIN:  Yes/No. REASON: .    PT/INR - ( 23 Nov 2019 06:25 )   PT: 12.0 sec;   INR: 1.06          PTT - ( 23 Nov 2019 06:25 )  PTT:32.3 sec    11-23    133<L>  |  103  |  19  ----------------------------<  142<H>  3.9   |  22  |  1.05    Ca    8.1<L>      23 Nov 2019 16:50  Phos  3.5     11-23  Mg     2.3     11-23    TPro  6.7  /  Alb  2.9<L>  /  TBili  2.1<H>  /  DBili  1.5<H>  /  AST  31  /  ALT  37  /  AlkPhos  75  11-23          MEDICATIONS  (STANDING):  albuterol/ipratropium for Nebulization 3 milliLiter(s) Nebulizer every 4 hours  artificial  tears Solution 1 Drop(s) Both EYES every 6 hours  ascorbic acid 500 milliGRAM(s) Oral daily  chlorhexidine 0.12% Liquid 15 milliLiter(s) Oral Mucosa every 12 hours  chlorhexidine 2% Cloths 1 Application(s) Topical daily  CRRT Treatment    <Continuous>  dextrose 5%. 1000 milliLiter(s) (50 mL/Hr) IV Continuous <Continuous>  dextrose 50% Injectable 12.5 Gram(s) IV Push once  dextrose 50% Injectable 25 Gram(s) IV Push once  dextrose 50% Injectable 25 Gram(s) IV Push once  fentaNYL   Infusion. 0.5 MICROgram(s)/kG/Hr (3.68 mL/Hr) IV Continuous <Continuous>  heparin  Injectable 5000 Unit(s) SubCutaneous every 8 hours  hydrocortisone sodium succinate Injectable 50 milliGRAM(s) IV Push every 8 hours  insulin lispro (HumaLOG) corrective regimen sliding scale   SubCutaneous every 6 hours  midodrine 5 milliGRAM(s) Oral every 8 hours  multivitamin/minerals/iron Oral Solution (CENTRUM) 15 milliLiter(s) Oral daily  nafcillin  IVPB 2 Gram(s) IV Intermittent every 4 hours  norepinephrine Infusion 0.05 MICROgram(s)/kG/Min (3.45 mL/Hr) IV Continuous <Continuous>  pantoprazole  Injectable 40 milliGRAM(s) IV Push daily  Phoxillum Filtration BK 4 / 2.5 5000 milliLiter(s) (2500 mL/Hr) CRRT <Continuous>  propofol Infusion 5 MICROgram(s)/kG/Min (2.208 mL/Hr) IV Continuous <Continuous>  sodium chloride 0.9% lock flush 3 milliLiter(s) IV Push every 8 hours  vitamin E 400 International Unit(s) Oral daily  zinc sulfate 220 milliGRAM(s) Oral daily    MEDICATIONS  (PRN):  dextrose 40% Gel 15 Gram(s) Oral once PRN Blood Glucose LESS THAN 70 milliGRAM(s)/deciliter  glucagon  Injectable 1 milliGRAM(s) IntraMuscular once PRN Glucose LESS THAN 70 milligrams/deciliter        RADIOLOGY & ADDITIONAL TESTS:

## 2019-11-23 NOTE — PROGRESS NOTE ADULT - ASSESSMENT
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  Course further complicated by PEA arrest on 11/18.  He now is s/p several bronchs to control airway secretions - no other bacterial pathogens isolated beside MSSA.  Per Dr. Worley, appearance of airways suggestive of additional viral infection.  Suggest:  - Continue to f/u bronch cultures, including viral IHC  - Continue nafcillin 2 g IV q4h  Will continue to follow with you - Team 1.

## 2019-11-23 NOTE — PHYSICAL THERAPY INITIAL EVALUATION ADULT - ADDITIONAL COMMENTS
Pt will be going to his mothers, no known stairs at this time, denies prior use of DME, denies hx of falls.

## 2019-11-23 NOTE — PHYSICAL THERAPY INITIAL EVALUATION ADULT - GENERAL OBSERVATIONS, REHAB EVAL
39 year old male who came to [facility] ED c/o productive cough with hemoptysis, progressive SOB, pleuritic chest pain, nausea, non-bloody emesis, abd pain, chillsx3 days. Found to have endocarditis and transferred to [facility] under Dr. Ruelas for possible intervention.

## 2019-11-23 NOTE — PROGRESS NOTE ADULT - SUBJECTIVE AND OBJECTIVE BOX
INTERVAL HPI/OVERNIGHT EVENTS:    CONSTITUTIONAL:  No fever, chills, night sweats  EYES:  No photophobia or visual changes  CARDIOVASCULAR:  No chest pain  RESPIRATORY:  No cough, wheezing, or SOB   GASTROINTESTINAL:  No nausea, vomiting, diarrhea, constipation, or abdominal pain  GENITOURINARY:  No frequency, urgency, dysuria or hematuria  NEUROLOGIC:  No headache, lightheadedness      ANTIBIOTICS/RELEVANT:    Nafcillin 2 g IV q4h (11/10-present)    Meropenem 11/19-11/22  Vancomycin 11/19-11/20      Vital Signs Last 24 Hrs  T(C): 37.3 (23 Nov 2019 09:53), Max: 37.3 (22 Nov 2019 17:00)  T(F): 99.2 (23 Nov 2019 09:53), Max: 99.2 (22 Nov 2019 17:00)  HR: 114 (23 Nov 2019 10:00) (92 - 116)  BP: 107/66 (23 Nov 2019 10:00) (86/72 - 128/75)  BP(mean): 80 (23 Nov 2019 10:00) (62 - 98)  RR: 24 (23 Nov 2019 10:00) (9 - 29)  SpO2: 100% (23 Nov 2019 10:00) (96% - 100%)    PHYSICAL EXAM:  Constitutional:  Well-developed, well nourished  Eyes:  Sclerae anicterica, conjunctivae clear, PERRL  Ear/Nose/Throat:  No nasal exudate or sinus tenderness;  No buccal mucosal lesions, no pharyngeal erythema or exudate	  Neck:  Supple, no adenopathy  Respiratory:  Clear bilaterally  Cardiovascular:  RRR, S1S2, no murmur appreciated  Gastrointestinal:  Symmetric, normoactive BS, soft, NT, no masses, guarding or rebound.  No HSM  Extremities:  No edema      LABS:                        7.0    7.78  )-----------( 43       ( 23 Nov 2019 06:25 )             21.9         11-23    132<L>  |  101  |  15  ----------------------------<  99  3.9   |  22  |  0.96    Ca    8.2<L>      23 Nov 2019 06:25  Phos  3.6     11-23  Mg     2.5     11-23    TPro  6.7  /  Alb  2.9<L>  /  TBili  2.1<H>  /  DBili  1.5<H>  /  AST  31  /  ALT  37  /  AlkPhos  75  11-23          MICROBIOLOGY:    Blood cultures:  11/11 X 2 -MSSA (1/4 bottles);  Staph epi (1/4 bottles);  11/12 X 2 - NG;  11/18 X 1 - NGTD;  11/19 X 2 - NGTD    Sputum culture:  11/11 - mod MSSA;  11/18 - few MSSA, rare yeast  Body fluid - pleural fluid 11/11: rare MSSA;  AFB smear neg  Body fluid - pleural fluid 11/12:  rare MSSA;  fungal culture in progress, AFB semar neg    Bronch cultures    HIV 1/2 combo 11/12 - NR;  11/22 - NR        RADIOLOGY & ADDITIONAL STUDIES:  < from: Xray Chest 1 View- PORTABLE-Routine (11.23.19 @ 05:44) >  INTERPRETATION:  Clinical History: Empyema    Portable examination of the chest demonstrates no interval change lung   pathology in comparison to prior examination of the chest 11/22/2019. No   interval change position remaining support devices.    Impression: No interval change lung pathology    < end of copied text > INTERVAL HPI/OVERNIGHT EVENTS:  Transiently off pressors;    ROS:  Limited - pt intubated.  No CP.  Had SOB, now better with increased sedation.  No abd pain, pain in hands or feet      ANTIBIOTICS/RELEVANT:    Nafcillin 2 g IV q4h (11/10-present)    Meropenem 11/19-11/22  Vancomycin 11/19-11/20      Vital Signs Last 24 Hrs  T(C): 37.3 (23 Nov 2019 09:53), Max: 37.3 (22 Nov 2019 17:00)  T(F): 99.2 (23 Nov 2019 09:53), Max: 99.2 (22 Nov 2019 17:00)  HR: 114 (23 Nov 2019 10:00) (92 - 116)  BP: 107/66 (23 Nov 2019 10:00) (86/72 - 128/75)  BP(mean): 80 (23 Nov 2019 10:00) (62 - 98)  RR: 24 (23 Nov 2019 10:00) (9 - 29)  SpO2: 100% (23 Nov 2019 10:00) (96% - 100%)    PHYSICAL EXAM:  Constitutional:  ill-appearing  Eyes:  Sclerae icteric, conjunctivae clear, PERRL  Ear/Nose/Throat:  No nasal exudate or sinus tenderness;  Feeding tube L nares  Neck:  Bilateral IJs, currently off CVVHD (equipment maintenance) Supple, no adenopathy  Respiratory:  Coarse BS.  R ant chest tube  Cardiovascular:  Tachy RR, S1S2, breath sounds obscure HS  Gastrointestinal:  Symmetric, normoactive BS, soft, NT, no masses, guarding or rebound.  No HSM  Extremities:  No edema.  Necrotic toes/fingers      LABS:                        7.0    7.78  )-----------( 43       ( 23 Nov 2019 06:25 )             21.9         11-23    132<L>  |  101  |  15  ----------------------------<  99  3.9   |  22  |  0.96    Ca    8.2<L>      23 Nov 2019 06:25  Phos  3.6     11-23  Mg     2.5     11-23    TPro  6.7  /  Alb  2.9<L>  /  TBili  2.1<H>  /  DBili  1.5<H>  /  AST  31  /  ALT  37  /  AlkPhos  75  11-23          MICROBIOLOGY:    Blood cultures:  11/11 X 2 -MSSA (1/4 bottles);  Staph epi (1/4 bottles);  11/12 X 2 - NG;  11/18 X 1 - NGTD;  11/19 X 2 - NGTD    Sputum culture:  11/11 - mod MSSA;  11/18 - few MSSA, rare yeast  Body fluid - pleural fluid 11/11: rare MSSA;  AFB smear neg  Body fluid - pleural fluid 11/12:  rare MSSA;  fungal culture in progress, AFB semar neg    Bronch cultures    HIV 1/2 combo 11/12 - NR;  11/22 - NR        RADIOLOGY & ADDITIONAL STUDIES:  < from: Xray Chest 1 View- PORTABLE-Routine (11.23.19 @ 05:44) >  INTERPRETATION:  Clinical History: Empyema    Portable examination of the chest demonstrates no interval change lung   pathology in comparison to prior examination of the chest 11/22/2019. No   interval change position remaining support devices.    Impression: No interval change lung pathology    < end of copied text >

## 2019-11-23 NOTE — PHYSICAL THERAPY INITIAL EVALUATION ADULT - LIVES WITH, PROFILE
Pt had roommate prior to admission and is unsure what his living situation is at this time. Will plan to go to his mother's.

## 2019-11-24 NOTE — PROGRESS NOTE ADULT - ASSESSMENT
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  Course further complicated by PEA arrest on 11/18.  He now is s/p several bronchs to control airway secretions - no other bacterial pathogens isolated beside MSSA.  Per Dr. Worley, appearance of airways suggestive of additional viral infection.  He now is again febrile, reason unclear.  No other obvious source.  Sputum culture from yesterday shows NRF.  Abd is nontender.    Suggest:  - F/U blood cultures from 11/23 - pending  - Please send UA  - Continue to f/u bronch cultures, including viral IHC  - Continue nafcillin 2 g IV q4h  Recommendations discussed with primary team.  Will continue to follow with you - Team 1.

## 2019-11-24 NOTE — PROGRESS NOTE ADULT - ASSESSMENT
40 y/o M smoker w/ PMHx of IVDU presented to [facility] w/ productive cough with hemoptysis found to have septic emboli w/ vegetation of the TV transferred to [facility] for surgical evaluation.  Course complicated by sepsis, DIC, acute respiratory failure w/ empyema s/p intubation and thoracostomy tube, CAMERON requiring cvvhd, ileus on NGT and rectal decompression, cardiac arrest, and pneumothorax. GI consulted for evaluation of incidental finding of rectal wall thickening on imaging.  Pending bronch tomorrow and will likely re-assess following intervention.    #Rectal lesion:  CT w/ irregular masslike mural thickening of the posterior right lateral wall of the rectum.  Would plan for nonurgent flex sig when patient is stable  -Will plan for flex sig when patient is stable     #Abd distension  Patient with new abdominal distension with xray notable for dilated bowels.  CT Patient is on zinc and fentanyl with rectal tube.  -Would resume miralax    #Hyperbilirubinemia:  Patient with marked elevated bilirubin initially thought to be 2/2 ischemic hepatopathy now downtrending. Imaging without CBD dilation suggestive of cholestasis from sepsis vs DILI in the setting of recent ischemic hepatopathy.   -Daily LFT and INR    Please notify GI team when patient is clinically stable for flexible sigmoidoscopy

## 2019-11-24 NOTE — PROGRESS NOTE ADULT - ATTENDING COMMENTS
Pt with increased HR and decreased BP. Fluid bolus 250 ml given and if improvement will keep even on CVVHD. Will likely need trach as failed CPAP trial .Remains with air leak Right Ant CT and no pnx on CXR. Repeat BC done for temp this AM.

## 2019-11-24 NOTE — PROGRESS NOTE ADULT - SUBJECTIVE AND OBJECTIVE BOX
Pt seen and examined at bedside.  Patient able to answer by writing or shaking his head.  Denies any pain or discomfort.  Nurse reports decrease in rectal tube output overnight.      Allergies    No Known Allergies    Intolerances      MEDICATIONS:  MEDICATIONS  (STANDING):  albuterol/ipratropium for Nebulization 3 milliLiter(s) Nebulizer every 4 hours  artificial  tears Solution 1 Drop(s) Both EYES every 6 hours  ascorbic acid 500 milliGRAM(s) Oral daily  chlorhexidine 0.12% Liquid 15 milliLiter(s) Oral Mucosa every 12 hours  chlorhexidine 2% Cloths 1 Application(s) Topical daily  CRRT Treatment    <Continuous>  dextrose 5%. 1000 milliLiter(s) (50 mL/Hr) IV Continuous <Continuous>  dextrose 50% Injectable 12.5 Gram(s) IV Push once  dextrose 50% Injectable 25 Gram(s) IV Push once  dextrose 50% Injectable 25 Gram(s) IV Push once  fentaNYL   Infusion. 0.5 MICROgram(s)/kG/Hr (3.68 mL/Hr) IV Continuous <Continuous>  heparin  Injectable 5000 Unit(s) SubCutaneous every 8 hours  hydrocortisone sodium succinate Injectable 50 milliGRAM(s) IV Push every 8 hours  insulin lispro (HumaLOG) corrective regimen sliding scale   SubCutaneous every 6 hours  midodrine 5 milliGRAM(s) Oral every 8 hours  multivitamin/minerals/iron Oral Solution (CENTRUM) 15 milliLiter(s) Oral daily  nafcillin  IVPB 2 Gram(s) IV Intermittent every 4 hours  norepinephrine Infusion 0.05 MICROgram(s)/kG/Min (3.45 mL/Hr) IV Continuous <Continuous>  pantoprazole  Injectable 40 milliGRAM(s) IV Push daily  Phoxillum Filtration BK 4 / 2.5 5000 milliLiter(s) (2500 mL/Hr) CRRT <Continuous>  propofol Infusion 5 MICROgram(s)/kG/Min (2.208 mL/Hr) IV Continuous <Continuous>  sodium chloride 0.9% lock flush 3 milliLiter(s) IV Push every 8 hours  vitamin E 400 International Unit(s) Oral daily  zinc sulfate 220 milliGRAM(s) Oral daily    MEDICATIONS  (PRN):  dextrose 40% Gel 15 Gram(s) Oral once PRN Blood Glucose LESS THAN 70 milliGRAM(s)/deciliter  glucagon  Injectable 1 milliGRAM(s) IntraMuscular once PRN Glucose LESS THAN 70 milligrams/deciliter    Vital Signs Last 24 Hrs  T(C): 37.9 (24 Nov 2019 09:16), Max: 38.4 (23 Nov 2019 16:00)  T(F): 100.3 (24 Nov 2019 09:16), Max: 101.2 (23 Nov 2019 16:00)  HR: 136 (24 Nov 2019 10:00) (92 - 136)  BP: 113/73 (24 Nov 2019 10:00) (84/60 - 143/83)  BP(mean): 85 (24 Nov 2019 10:00) (66 - 108)  RR: 16 (24 Nov 2019 10:00) (10 - 30)  SpO2: 97% (24 Nov 2019 10:00) (95% - 100%)    11-23 @ 07:01  -  11-24 @ 07:00  --------------------------------------------------------  IN: 3285.5 mL / OUT: 4173 mL / NET: -887.5 mL    11-24 @ 07:01  -  11-24 @ 11:12  --------------------------------------------------------  IN: 671.1 mL / OUT: 558 mL / NET: 113.1 mL      PHYSICAL EXAM:    General: Young male comfortable in bed, intubated  HEENT: MMM, conjunctiva and sclera clear  Gastrointestinal: Soft, nontender distended, No rebound or guarding  Skin: Warm and dry.     LABS:                        7.5    9.25  )-----------( 54       ( 24 Nov 2019 07:02 )             22.9     11-24    132<L>  |  101  |  15  ----------------------------<  117<H>  4.0   |  21<L>  |  0.90    Ca    8.0<L>      24 Nov 2019 07:02  Phos  4.0     11-24  Mg     2.4     11-24    TPro  7.0  /  Alb  2.7<L>  /  TBili  1.8<H>  /  DBili  1.2<H>  /  AST  32  /  ALT  36  /  AlkPhos  88  11-24    PT/INR - ( 24 Nov 2019 07:02 )   PT: 12.8 sec;   INR: 1.13          PTT - ( 24 Nov 2019 07:02 )  PTT:33.7 sec    Culture - Sputum (collected 23 Nov 2019 16:18)  Source: .Sputum Sputum  Gram Stain (24 Nov 2019 09:12):    Rare epithelial cells    Few White blood cells    Rare Yeast    Rare Gram positive cocci in pairs  Preliminary Report (24 Nov 2019 09:13):    Normal Respiratory Desi present to date    Culture - Acid Fast - Bronchial w/Smear (collected 23 Nov 2019 06:52)  Source: .Bronchial Right middle lobe BAL    Culture - Acid Fast - Bronchial w/Smear (collected 23 Nov 2019 06:52)  Source: .Bronchial bronchial wash    Culture - Bronchial (collected 22 Nov 2019 19:00)  Source: Lavage Right middle lobe BAL  Gram Stain (22 Nov 2019 21:21):    Rare epithelial cells    Few White blood cells    No organisms seen  Preliminary Report (23 Nov 2019 10:06):    Culture in progress    Culture - Bronchial (collected 22 Nov 2019 18:55)  Source: Bronch Wash bronchial wash  Gram Stain (22 Nov 2019 21:18):    Rare epithelial cells    Few White blood cells    Few Gram positive cocci in pairs and clusters  Preliminary Report (23 Nov 2019 09:52):    Normal Respiratory Desi present to date

## 2019-11-24 NOTE — PROGRESS NOTE ADULT - ASSESSMENT
38 y/o M w/ PMH of IVDU and active smoker who came from [facility] on 11/8/19 in 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 After being transferred to  CTICU for surgical evaluation, pt was deemed to not be a surgical candidate and transferred to MICU for medical mgmt. ID, Nephrology, heme/onc, surgery following, vascular.    Neuro  Intubated, sedated on Propofol, Fentanyl gtt. Prior CT head negative for any intracranial embolic events. Awake and alert, follows commands.     Cardiovascular     #Hypotension  Most likely 2/2 septic shock from infective endocarditis and RV failure 2/2 tricuspid regurgitation (ELIAN shows EF of 40-50%). Echo with EF 45%. ELIAN with EF 40-45%, 3.8 x1cm vegetation on TR valve, with severe TR, trivial pericardial effusion. Echo with bubble revealed no PFO. Wean off Levophed as tolerated.   - Maintain MAP>65.   - C/W medical mgmt of infective endocarditis as below.  - repeat echo 11/18 with stable vegetation  - Likely became hypotensive and bradycardic overnight (11/18-11/19) in setting of mucous plug vs arrythmia vs most likely septic shock   - repeat echo 11/20 with large stable vegetation, severe TR, markedly dilated IVC with increased R atrial pressure- EF 60%   - c/w Midodrine 5mg TID started       Respiratory    #Acute hypoxic respiratory failure   Most likely 2/2 alveolar hemorrhage in the setting of septic embolic causing numerous cavitary lesions on CT chest. CXR with scattered infiltrates and pleural effusions. Sputum culture negative. CT revealed "moderate bilateral pleural effusions and dense bibasilar consolidation with underlying septic emboli/pulmonary abscesses."   - Re-intubated on 11/19 for acute hypoxic respiratory failure with increased work of breathing   - CT chest with new b/l loculated pneumothoraces with increase in size of cavitary lesions with worsening lobar PNA   - now with 2 right chest tubes and 1 left chest tube-->repeat cxr  with decrease in size of b/l pneumothoraces   - cxr 11/21 with small right pneumothorax, still present but stable on 11/22    - Hold off on trach for now, will reconsider next week depending on clinical status and need for further tx   - Start Duonebs q4  - Repeat bronch 11/22 with thick secretions (R>L) but slightly improved compared to first bronch   - Continue to treat IE as below  - attempted CPAP trial in AM, pt failed x2. Will continue to try tomorrow     #Bilateral pulmonary effusion  Most likely empyema in setting of septic shock 2/2 infective endocarditis. Bilateral CT in place, confirmed by CXR, continues to drain serosanguinous fluid. Fluid analysis of both CT pleural fluid confirms complex exudate with high cellularity, low pH and low glucose. pleural fluid cultures with staph aureus. Chest tubes in place as above (2 R chest tubes, 1 L chest tube)   - Monitor output of bilateral chest tubes  - R pleural fluid cultures with staph aureus, L pleural fluid cultures with NGTD   - CT chest and cxr as above. Chest tube set to suction.      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 Not a surgical candidate for a tricuspid valve replacement at this time as per CT surgery. Lactate 2.0 today. Initial blood cultures positive for staph epidermidis, likely a contaminant. Initial sputum culture positive for staph aureus  Repeat blood cultures with NGTD. Sputum cx NGTD.  - ID following, C/W Nafcillin 2g q4 (Sensitive to Oxacillin as per OSH records), f/u recs   - DC Meropenem per ID   - DC Vancomycin as MRSA PCR negative   - repeat Bcxs with NGTD   - Bronch performed 11/19 with copious purulent sputum-  BAL cxs with numerous wbcs, rare gram positive rods/ rare gram negative rods, AFB negative. Cytopathology with no pneumocystis organisms   - Bronch findings with possible herpetic tracheobronchitis, awaiting results   - Per CT surgery pt is too high risk for any surgery and would likely not survive procedure.   - Cardiology consulted to optimize management of his right heart dysfunction, f/u recs    - Repeat bronch 11/22 with thick secretions (R>L) but slightly improved compared to first bronch. F/u BAL cxs      Renal    #Acute renal failure most likely 2/2 ATN from hypoperfusion, Minimal urine output, on CVVHD, oliguric with 0-5cc/hr. Vancomycin level 37 on arrival so may be component of drug induced nephropathy. Bun/Cr continues to improve while on CVVHD. Pt is clinically fluid overloaded but now improved and hemodynamically stable. No renal infarcts as per CT abdomen/pelvis. Optimized for Sx as per nephrology.  - F/U nephrology recs.  - C/W CVVHD as tolerated, monitor for clotting  - Continue to correct fluid overload with HD  - Monitor renal function with BUN/Cr  - Monitor I/Os  - blader scan, with straight cath if needed   - Pt kept net even today on CVVHD as pt was tachycardiac and hypotensive. Concerned for volume depletion. Pt was also given 250cc bolus of NS w/ response in positive response in BP.    #Electrolytes abnormalities   Improved. On CVVHD. Hyperkalemia resolved. No EKG changes.  -Requires q12 BMP, Mg, and Phos while on CVVHD  -Monitor serum lytes       GI    OG on arrival with 600cc of bilious fluid, abdomen still distended but had several BMs, some loose. CT abdomen revealed no evidence of bowel ischemia or SBO. Sump was DCed and replaced with NG tube. CT abdomen/pelvis revealed "irregular masslike mural thickening of the posterior left lateral wall of the rectum." Surgery consulted, unlikely perirectal abscess. Recommend GI evaluation for possible scope.  - NG tube in place  - C/W tube feeds, Jevity 1.5  - F/u GI consult, f/u recs, when stable will go for flex sig   - Abdominal xray with air-filled loops of small/large bowel with concern for distal obstruction vs ileus. Surgery consulted. Rectal tube and sump placed. CT abd/pelvis with abd/pelvic, anasarca, colonic ileus. Rectal tube in place for colonic decompression, continue to monitor. Abdomen still distended but significantly improved, repeat abd xray without obstruction   -C. diff negative     #Congestive Hepatopathy   Transaminitis, coag derangements, hyperbilirubinemia most likely 2/2 hepatic congestion vs hemolysis, due to severe TR in setting of infective endocarditis. Transaminases and Alk phos improving. Hepatitis panel negative.  - Monitor CMP, direct bili   - Avoid Tylenol    Heme    #Hemolysis  Intravascular hemolysis with inital haptoglobin <10, LDH decreasing at 365. D-dimer elevated. Fibrinogen stable 396. Today still clinically jaundiced, with stable bilirubinemia: T.bili 9.9., D.bili 8.5. h/h stable at 8.1/25.5. Platelets decreasing from 54 to 49.. Fact VII/Factor VIII elevated. Unlikely 2/2 CVVHD. Likely 2/2 to sepsis and/or DIC.   - s/p 2u pRBCs (11/19-11/20). Hgb stable at 7.2  - Heme consulted, follow recs  - Monitor direct and total bili, LDH, fibrinogen, platelets   - Transfuse platelets if <10K or bleeding and <50K  - Transfuse pRBCs for hgb <7     Vascular  Vascular surgery consulted in setting of gangrenous distal toes and fingers b/l. Likely 2/2 to pressors. Will follow up recs.     Lines: right IJ TLC and Ernesto (11/12), Axillary A-line (11/12), Left IJ (11/12), right peripheral (11/12)    Endocrinology  -Continue Hydrocortisone 50mg q8- stress dose steroids    F: None   E: replete K <4, Mg <2  N: Jevity 1.5   GI Ppx: Protonix   DVT Ppx: Heparin   Code status: FULL   Dispo: MICU

## 2019-11-24 NOTE — PROGRESS NOTE ADULT - SUBJECTIVE AND OBJECTIVE BOX
Patient discussed on morning rounds with Dr. Tracey      SUBJECTIVE ASSESSMENT:  Patient sedated on vent but opens eyes to command.     Vital Signs Last 24 Hrs  T(C): 38.1 (24 Nov 2019 11:00), Max: 38.4 (23 Nov 2019 16:00)  T(F): 100.5 (24 Nov 2019 11:00), Max: 101.2 (23 Nov 2019 16:00)  HR: 125 (24 Nov 2019 13:05) (92 - 136)  BP: 99/52 (24 Nov 2019 13:00) (84/60 - 143/83)  BP(mean): 64 (24 Nov 2019 13:00) (64 - 108)  RR: 16 (24 Nov 2019 13:00) (10 - 30)  SpO2: 100% (24 Nov 2019 13:05) (95% - 100%)  I&O's Detail    23 Nov 2019 07:01  -  24 Nov 2019 07:00  --------------------------------------------------------  IN:    Enteral Tube Flush: 350 mL    fentaNYL Infusion.: 489.5 mL    norepinephrine Infusion: 300 mL    ns in tub fed  [id number]: 1012 mL    propofol Infusion: 498 mL    Solution: 600 mL    Solution: 36 mL  Total IN: 3285.5 mL    OUT:    Chest Tube: 80 mL    Chest Tube: 120 mL    Chest Tube: 40 mL    Intermittent Catheterization - Urethral: 10 mL    Other: 3623 mL    Rectal Tube: 300 mL  Total OUT: 4173 mL    Total NET: -887.5 mL      24 Nov 2019 07:01  -  24 Nov 2019 13:37  --------------------------------------------------------  IN:    Enteral Tube Flush: 150 mL    fentaNYL Infusion.: 207.1 mL    norepinephrine Infusion: 67 mL    ns in tub fed  [id number]: 258 mL    propofol Infusion: 132 mL    Sodium Chloride 0.9% IV Bolus: 250 mL    Solution: 29 mL    Solution: 200 mL  Total IN: 1293.1 mL    OUT:    Chest Tube: 20 mL    Chest Tube: 40 mL    Chest Tube: 20 mL    Other: 1072 mL  Total OUT: 1152 mL    Total NET: 141.1 mL          CHEST TUBE:  Yes. AIR LEAKS: Yes, right anterior pigtail with intermittent 1/5 airleak.  right posterior pigtail with no airleak appreciated. left pigtail with no airleak appreciated on suction @ -20 mmHg     PHYSICAL EXAM:    General: Sedated on vent, appears comfortabel    Neurological: opens eyes to commands    Cardiovascular: S1S2 RRR No M/G/R    Respiratory: CTA b/l No W/R/R    Gastrointestinal: soft, NT/ND    Extremities: no edema    Vascular: warm and well perfused.    Incision Sites: n/a    LABS:                        7.5    9.25  )-----------( 54       ( 24 Nov 2019 07:02 )             22.9       PT/INR - ( 24 Nov 2019 07:02 )   PT: 12.8 sec;   INR: 1.13          PTT - ( 24 Nov 2019 07:02 )  PTT:33.7 sec    11-24    132<L>  |  101  |  15  ----------------------------<  117<H>  4.0   |  21<L>  |  0.90    Ca    8.0<L>      24 Nov 2019 07:02  Phos  4.0     11-24  Mg     2.4     11-24    TPro  7.0  /  Alb  2.7<L>  /  TBili  1.8<H>  /  DBili  1.2<H>  /  AST  32  /  ALT  36  /  AlkPhos  88  11-24      MEDICATIONS  (STANDING):  albuterol/ipratropium for Nebulization 3 milliLiter(s) Nebulizer every 4 hours  artificial  tears Solution 1 Drop(s) Both EYES every 6 hours  ascorbic acid 500 milliGRAM(s) Oral daily  chlorhexidine 0.12% Liquid 15 milliLiter(s) Oral Mucosa every 12 hours  chlorhexidine 2% Cloths 1 Application(s) Topical daily  CRRT Treatment    <Continuous>  dextrose 5%. 1000 milliLiter(s) (50 mL/Hr) IV Continuous <Continuous>  dextrose 50% Injectable 12.5 Gram(s) IV Push once  dextrose 50% Injectable 25 Gram(s) IV Push once  dextrose 50% Injectable 25 Gram(s) IV Push once  fentaNYL   Infusion. 0.5 MICROgram(s)/kG/Hr (3.68 mL/Hr) IV Continuous <Continuous>  heparin  Injectable 5000 Unit(s) SubCutaneous every 8 hours  hydrocortisone sodium succinate Injectable 50 milliGRAM(s) IV Push every 8 hours  insulin lispro (HumaLOG) corrective regimen sliding scale   SubCutaneous every 6 hours  midodrine 5 milliGRAM(s) Oral every 8 hours  multivitamin/minerals/iron Oral Solution (CENTRUM) 15 milliLiter(s) Oral daily  nafcillin  IVPB 2 Gram(s) IV Intermittent every 4 hours  norepinephrine Infusion 0.05 MICROgram(s)/kG/Min (3.45 mL/Hr) IV Continuous <Continuous>  pantoprazole  Injectable 40 milliGRAM(s) IV Push daily  Phoxillum Filtration BK 4 / 2.5 5000 milliLiter(s) (2500 mL/Hr) CRRT <Continuous>  propofol Infusion 5 MICROgram(s)/kG/Min (2.208 mL/Hr) IV Continuous <Continuous>  sodium chloride 0.9% lock flush 3 milliLiter(s) IV Push every 8 hours  vitamin E 400 International Unit(s) Oral daily  zinc sulfate 220 milliGRAM(s) Oral daily    MEDICATIONS  (PRN):  acetaminophen    Suspension .. 650 milliGRAM(s) Oral every 6 hours PRN Temp greater or equal to 38C (100.4F)  dextrose 40% Gel 15 Gram(s) Oral once PRN Blood Glucose LESS THAN 70 milliGRAM(s)/deciliter  glucagon  Injectable 1 milliGRAM(s) IntraMuscular once PRN Glucose LESS THAN 70 milligrams/deciliter        RADIOLOGY & ADDITIONAL TESTS:

## 2019-11-24 NOTE — PROGRESS NOTE ADULT - SUBJECTIVE AND OBJECTIVE BOX
CC: ENDOCARDITIS/SEPSIS      INTERVAL HISTORY: intubated  on CVVHD      ROS: No chest pain, no sob, no abd pain. No n/v/d    PAST MEDICAL & SURGICAL HISTORY:  Endocarditis  IVDU (intravenous drug user)  IVDA (intravenous drug abuse) complicating pregnancy  Smoker  No significant past surgical history      PHYSICAL EXAM:  T(C): 37.9 (11-24-19 @ 09:16), Max: 38.4 (11-23-19 @ 16:00)  HR: 136 (11-24-19 @ 10:00)  BP: 113/73 (11-24-19 @ 10:00) (84/60 - 143/83)  RR: 16 (11-24-19 @ 10:00)  SpO2: 97% (11-24-19 @ 10:00)  Wt(kg): --  I&O's Summary    23 Nov 2019 07:01  -  24 Nov 2019 07:00  --------------------------------------------------------  IN: 3285.5 mL / OUT: 4173 mL / NET: -887.5 mL    24 Nov 2019 07:01  -  24 Nov 2019 10:16  --------------------------------------------------------  IN: 551.1 mL / OUT: 558 mL / NET: -6.9 mL      Weight   General:  NAD.  HEENT: moist mucous membranes, no pallor/cyanosis.  Neck: no JVD visible.  Cardiac: S1, S2. RRR. No murmurs   Respratory: CTA b/l, no access muscle use.   Abdomen: soft. nontender. nondistended  Skin: no rashes.  Extremities: no LE edema b/l  Access:       DATA:                        7.5<L>  9.25  )-----------( 54<L>    ( 24 Nov 2019 07:02 )             22.9<L>    Ferritin, Serum: 718 ng/mL <H> (11-13 @ 14:31)      132<L>  |  101    |  15     ----------------------------<  117<H>  Ca:8.0<L> (24 Nov 2019 07:02)  4.0     |  21<L>  |  0.90       eGFR if Non : 109  eGFR if : 126    TPro  7.0    /  Alb  2.7<L>  /  TBili  1.8<H>  /  DBili  1.2<H>  /  AST  32     /  ALT  36     /  AlkPhos  88     24 Nov 2019 07:02  Lactate Dehydrogenase, Serum: 339 U/L <H> (11-24 @ 07:02)  Lactate Dehydrogenase, Serum: 337 U/L <H> (11-23 @ 06:25)      Vitamin D, 25-Hydroxy: <5.0 ng/mL <L> [30.0 - 80.0] (11-12 @ 01:14)                MEDICATIONS  (STANDING):  albuterol/ipratropium for Nebulization 3 milliLiter(s) Nebulizer every 4 hours  artificial  tears Solution 1 Drop(s) Both EYES every 6 hours  ascorbic acid 500 milliGRAM(s) Oral daily  chlorhexidine 0.12% Liquid 15 milliLiter(s) Oral Mucosa every 12 hours  chlorhexidine 2% Cloths 1 Application(s) Topical daily  CRRT Treatment    <Continuous>  dextrose 5%. 1000 milliLiter(s) (50 mL/Hr) IV Continuous <Continuous>  dextrose 50% Injectable 12.5 Gram(s) IV Push once  dextrose 50% Injectable 25 Gram(s) IV Push once  dextrose 50% Injectable 25 Gram(s) IV Push once  fentaNYL   Infusion. 0.5 MICROgram(s)/kG/Hr (3.68 mL/Hr) IV Continuous <Continuous>  heparin  Injectable 5000 Unit(s) SubCutaneous every 8 hours  hydrocortisone sodium succinate Injectable 50 milliGRAM(s) IV Push every 8 hours  insulin lispro (HumaLOG) corrective regimen sliding scale   SubCutaneous every 6 hours  midodrine 5 milliGRAM(s) Oral every 8 hours  multivitamin/minerals/iron Oral Solution (CENTRUM) 15 milliLiter(s) Oral daily  nafcillin  IVPB 2 Gram(s) IV Intermittent every 4 hours  norepinephrine Infusion 0.05 MICROgram(s)/kG/Min (3.45 mL/Hr) IV Continuous <Continuous>  pantoprazole  Injectable 40 milliGRAM(s) IV Push daily  Phoxillum Filtration BK 4 / 2.5 5000 milliLiter(s) (2500 mL/Hr) CRRT <Continuous>  propofol Infusion 5 MICROgram(s)/kG/Min (2.208 mL/Hr) IV Continuous <Continuous>  sodium chloride 0.9% lock flush 3 milliLiter(s) IV Push every 8 hours  vitamin E 400 International Unit(s) Oral daily  zinc sulfate 220 milliGRAM(s) Oral daily    MEDICATIONS  (PRN):  dextrose 40% Gel 15 Gram(s) Oral once PRN Blood Glucose LESS THAN 70 milliGRAM(s)/deciliter  glucagon  Injectable 1 milliGRAM(s) IntraMuscular once PRN Glucose LESS THAN 70 milligrams/deciliter

## 2019-11-24 NOTE — PROGRESS NOTE ADULT - PROBLEM SELECTOR PLAN 1
continue CVVHD  still on UF rate of -25cc/hr but can handle higher UF rate to resolve scrotal /sacral edema  -800cc overall 24 hour fluid balance  still requiring IV Levophed- titrate as tolerated

## 2019-11-24 NOTE — PROGRESS NOTE ADULT - ASSESSMENT
39y Male w PMHx of IVDU and active smoker who went to [facility] on 11/8/19 complaining of productive cough with hemoptysis, progressive SOB, pleuritic chest pain, nausea, non-bloody emesis, abd pain, chillsx3 days. 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 Daniel Ruelas for possible surgical evaluation. Following admission, patient noted to be in acute hypoxic respiratory failure s/p intubation/sedation, acute kidney injury requiring CVVHD, congestive hepatopathy, and mutli-system organ failure 2/2 hypoperfusion. Patient deemed not a surgical candidate, per Dr. Ruelas, and patient transferred to MICU for medical management with IV nafcillin, ID following. Pt with diffuse septic emboli in bilateral lungs, pigtails in place with new b/l pneumothorax. Abd yesterday with increased distention and distended bowel on AXR. Gen surg consulted and is now s/p decompression with rectal tube abd NG tube. CT chest/abd/pelvis done 11/18/19 concerning for b/l trapped lung and hemothorax for which thoracic surgery was consulted. Patient was extubated however, overnight he became bradycardic and arrested, CPR initiated with ROSC. After CPR chest tubes noted to have increased sanguinous drainage, new airleaks. Thoracic team following for CT management. Anterior right pigtail placed for loculated pneumothorax.     Plan:   - continue daily serial CXRs - CXR today stable   - continue Chest tubes to suction at all times and while on positive pressure (airleaks seem to be improving)   - consider CT chest to better evaluate PTXs and lung disease, if pt becomes more stable   - appreciate ID recs   - +endocarditis- not a surgical candidate at this time with CT surgery- continue medical management

## 2019-11-24 NOTE — PROGRESS NOTE ADULT - SUBJECTIVE AND OBJECTIVE BOX
INTERVAL HPI/OVERNIGHT EVENTS:  Recurrence of fever    CONSTITUTIONAL:  No fever, chills, night sweats  EYES:  No photophobia or visual changes  CARDIOVASCULAR:  No chest pain  RESPIRATORY:  No cough, wheezing, or SOB   GASTROINTESTINAL:  No nausea, vomiting, diarrhea, constipation, or abdominal pain  GENITOURINARY:  No frequency, urgency, dysuria or hematuria  NEUROLOGIC:  No headache, lightheadedness      ANTIBIOTICS/RELEVANT:  Nafcillin 2 g IV q4h (11/10-present)    Meropenem 11/19-11/22  Vancomycin 11/19-11/20        Vital Signs Last 24 Hrs  T(C): 37.9 (24 Nov 2019 09:16), Max: 38.4 (23 Nov 2019 16:00)  T(F): 100.3 (24 Nov 2019 09:16), Max: 101.2 (23 Nov 2019 16:00)  HR: 136 (24 Nov 2019 10:00) (92 - 136)  BP: 113/73 (24 Nov 2019 10:00) (84/60 - 143/83)  BP(mean): 85 (24 Nov 2019 10:00) (66 - 108)  RR: 16 (24 Nov 2019 10:00) (10 - 30)  SpO2: 97% (24 Nov 2019 10:00) (95% - 100%)    PHYSICAL EXAM:  Constitutional:  Well-developed, well nourished  Eyes:  Sclerae anicterica, conjunctivae clear, PERRL  Ear/Nose/Throat:  No nasal exudate or sinus tenderness;  No buccal mucosal lesions, no pharyngeal erythema or exudate	  Neck:  Supple, no adenopathy  Respiratory:  Clear bilaterally  Cardiovascular:  RRR, S1S2, no murmur appreciated  Gastrointestinal:  Symmetric, normoactive BS, soft, NT, no masses, guarding or rebound.  No HSM  Extremities:  No edema      LABS:                        7.5    9.25  )-----------( 54       ( 24 Nov 2019 07:02 )             22.9         11-24    132<L>  |  101  |  15  ----------------------------<  117<H>  4.0   |  21<L>  |  0.90    Ca    8.0<L>      24 Nov 2019 07:02  Phos  4.0     11-24  Mg     2.4     11-24    TPro  7.0  /  Alb  2.7<L>  /  TBili  1.8<H>  /  DBili  1.2<H>  /  AST  32  /  ALT  36  /  AlkPhos  88  11-24          MICROBIOLOGY:  Blood cultures:  11/11 X 2 -MSSA (1/4 bottles);  Staph epi (1/4 bottles);  11/12 X 2 - NG;  11/18 X 1 - NGTD;  11/19 X 2 - NGTD;  11/23 X 2 - specimen received    Sputum culture:  11/11 - mod MSSA;  11/18 - few MSSA, rare yeast;  11/23 - NRF  Body fluid - pleural fluid 11/11: rare MSSA;  AFB smear neg  Body fluid - pleural fluid 11/12:  rare MSSA;  fungal culture in progress, AFB semar neg    Bronch cultures    HIV 1/2 combo 11/12 - NR;  11/22 - NR        RADIOLOGY & ADDITIONAL STUDIES: INTERVAL HPI/OVERNIGHT EVENTS:  Recurrence of fever last night - Tm 101.2.  Continues on NE    ROS:  Unobtainable - sedated on vent.      ANTIBIOTICS/RELEVANT:  Nafcillin 2 g IV q4h (11/10-present)    Meropenem 11/19-11/22  Vancomycin 11/19-11/20        Vital Signs Last 24 Hrs  T(C): 37.9 (24 Nov 2019 09:16), Max: 38.4 (23 Nov 2019 16:00)  T(F): 100.3 (24 Nov 2019 09:16), Max: 101.2 (23 Nov 2019 16:00)  HR: 136 (24 Nov 2019 10:00) (92 - 136)  BP: 113/73 (24 Nov 2019 10:00) (84/60 - 143/83)  BP(mean): 85 (24 Nov 2019 10:00) (66 - 108)  RR: 16 (24 Nov 2019 10:00) (10 - 30)  SpO2: 97% (24 Nov 2019 10:00) (95% - 100%)    PHYSICAL EXAM:  Constitutional:  Acutely ill appearing, jaundiced  Eyes:  Sclerae icteric, conjunctivae clear, PERRL  Ear/Nose/Throat:  NGT L nares, ETT in place	  Neck:  Supple B TLC IJs, CVVHD in progress via R  Respiratory:  Coarse BS anteriorly, R ant and L lat chest tubes  Cardiovascular:  Tachy RR, S1S2, +S3, murmur LSB  Gastrointestinal:  Symmetric, normoactive BS, soft, NT, no masses, guarding or rebound.  No HSM  Extremities:  Necrotic toes       LABS:                        7.5    9.25  )-----------( 54       ( 24 Nov 2019 07:02 )             22.9         11-24    132<L>  |  101  |  15  ----------------------------<  117<H>  4.0   |  21<L>  |  0.90    Ca    8.0<L>      24 Nov 2019 07:02  Phos  4.0     11-24  Mg     2.4     11-24    TPro  7.0  /  Alb  2.7<L>  /  TBili  1.8<H>  /  DBili  1.2<H>  /  AST  32  /  ALT  36  /  AlkPhos  88  11-24          MICROBIOLOGY:  Blood cultures:  11/11 X 2 -MSSA (1/4 bottles);  Staph epi (1/4 bottles);  11/12 X 2 - NG;  11/18 X 1 - NGTD;  11/19 X 2 - NGTD;  11/23 X 2 - specimen received    Sputum culture:  11/11 - mod MSSA;  11/18 - few MSSA, rare yeast;  11/23 - NRF  Body fluid - pleural fluid 11/11: rare MSSA;  AFB smear neg  Body fluid - pleural fluid 11/12:  rare MSSA;  fungal culture in progress, AFB semar neg    Bronch cultures    HIV 1/2 combo 11/12 - NR;  11/22 - NR        RADIOLOGY & ADDITIONAL STUDIES:

## 2019-11-24 NOTE — PROGRESS NOTE ADULT - ASSESSMENT
40 y/o M w/ PMH of IVDU and active smoker who came from [facility] on 11/8/19 in 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

## 2019-11-24 NOTE — PROGRESS NOTE ADULT - SUBJECTIVE AND OBJECTIVE BOX
OVERNIGHT EVENTS:    SUBJECTIVE / INTERVAL HPI: Patient seen and examined at bedside.     VITAL SIGNS:  Vital Signs Last 24 Hrs  T(C): 38.1 (24 Nov 2019 11:00), Max: 38.4 (23 Nov 2019 16:00)  T(F): 100.5 (24 Nov 2019 11:00), Max: 101.2 (23 Nov 2019 16:00)  HR: 126 (24 Nov 2019 12:00) (92 - 136)  BP: 108/59 (24 Nov 2019 12:00) (84/60 - 143/83)  BP(mean): 67 (24 Nov 2019 12:00) (66 - 108)  RR: 16 (24 Nov 2019 12:00) (10 - 30)  SpO2: 100% (24 Nov 2019 12:00) (95% - 100%)    PHYSICAL EXAM:    General: WDWN  HEENT: NC/AT; PERRL, anicteric sclera; MMM  Neck: supple  Cardiovascular: +S1/S2; RRR  Respiratory: CTA B/L; no W/R/R  Gastrointestinal: soft, NT/ND; +BSx4  Extremities: WWP; no edema, clubbing or cyanosis  Vascular: 2+ radial, DP/PT pulses B/L  Neurological: AAOx3; no focal deficits    MEDICATIONS:  MEDICATIONS  (STANDING):  albuterol/ipratropium for Nebulization 3 milliLiter(s) Nebulizer every 4 hours  artificial  tears Solution 1 Drop(s) Both EYES every 6 hours  ascorbic acid 500 milliGRAM(s) Oral daily  chlorhexidine 0.12% Liquid 15 milliLiter(s) Oral Mucosa every 12 hours  chlorhexidine 2% Cloths 1 Application(s) Topical daily  CRRT Treatment    <Continuous>  dextrose 5%. 1000 milliLiter(s) (50 mL/Hr) IV Continuous <Continuous>  dextrose 50% Injectable 12.5 Gram(s) IV Push once  dextrose 50% Injectable 25 Gram(s) IV Push once  dextrose 50% Injectable 25 Gram(s) IV Push once  fentaNYL   Infusion. 0.5 MICROgram(s)/kG/Hr (3.68 mL/Hr) IV Continuous <Continuous>  heparin  Injectable 5000 Unit(s) SubCutaneous every 8 hours  hydrocortisone sodium succinate Injectable 50 milliGRAM(s) IV Push every 8 hours  insulin lispro (HumaLOG) corrective regimen sliding scale   SubCutaneous every 6 hours  midodrine 5 milliGRAM(s) Oral every 8 hours  multivitamin/minerals/iron Oral Solution (CENTRUM) 15 milliLiter(s) Oral daily  nafcillin  IVPB 2 Gram(s) IV Intermittent every 4 hours  norepinephrine Infusion 0.05 MICROgram(s)/kG/Min (3.45 mL/Hr) IV Continuous <Continuous>  pantoprazole  Injectable 40 milliGRAM(s) IV Push daily  Phoxillum Filtration BK 4 / 2.5 5000 milliLiter(s) (2500 mL/Hr) CRRT <Continuous>  propofol Infusion 5 MICROgram(s)/kG/Min (2.208 mL/Hr) IV Continuous <Continuous>  sodium chloride 0.9% lock flush 3 milliLiter(s) IV Push every 8 hours  vitamin E 400 International Unit(s) Oral daily  zinc sulfate 220 milliGRAM(s) Oral daily    MEDICATIONS  (PRN):  acetaminophen    Suspension .. 650 milliGRAM(s) Oral every 6 hours PRN Temp greater or equal to 38C (100.4F)  dextrose 40% Gel 15 Gram(s) Oral once PRN Blood Glucose LESS THAN 70 milliGRAM(s)/deciliter  glucagon  Injectable 1 milliGRAM(s) IntraMuscular once PRN Glucose LESS THAN 70 milligrams/deciliter      ALLERGIES:  Allergies    No Known Allergies    Intolerances        LABS:                        7.5    9.25  )-----------( 54       ( 24 Nov 2019 07:02 )             22.9     11-24    132<L>  |  101  |  15  ----------------------------<  117<H>  4.0   |  21<L>  |  0.90    Ca    8.0<L>      24 Nov 2019 07:02  Phos  4.0     11-24  Mg     2.4     11-24    TPro  7.0  /  Alb  2.7<L>  /  TBili  1.8<H>  /  DBili  1.2<H>  /  AST  32  /  ALT  36  /  AlkPhos  88  11-24    PT/INR - ( 24 Nov 2019 07:02 )   PT: 12.8 sec;   INR: 1.13          PTT - ( 24 Nov 2019 07:02 )  PTT:33.7 sec    CAPILLARY BLOOD GLUCOSE      POCT Blood Glucose.: 120 mg/dL (24 Nov 2019 11:25)      RADIOLOGY & ADDITIONAL TESTS: Reviewed. OVERNIGHT EVENTS: NIELS. Pt's 10 PM electrolytes wnl.    SUBJECTIVE / INTERVAL HPI: Patient seen and examined at bedside. Pt intubated. Able to answer questions and follow commands. Pt denies any pain. Does complain of some nausea.     VITAL SIGNS:  Vital Signs Last 24 Hrs  T(C): 38.1 (24 Nov 2019 11:00), Max: 38.4 (23 Nov 2019 16:00)  T(F): 100.5 (24 Nov 2019 11:00), Max: 101.2 (23 Nov 2019 16:00)  HR: 126 (24 Nov 2019 12:00) (92 - 136)  BP: 108/59 (24 Nov 2019 12:00) (84/60 - 143/83)  BP(mean): 67 (24 Nov 2019 12:00) (66 - 108)  RR: 16 (24 Nov 2019 12:00) (10 - 30)  SpO2: 100% (24 Nov 2019 12:00) (95% - 100%)    PHYSICAL EXAM:    General: resting in bed, jaundice, NAD  HEENT: NC/AT; PERRL, anicteric sclera; MMM  Neck: supple  Cardiovascular: +S1/S2; systolic murmur heard loudest at the LSB   Respiratory: intubated, diffuse rhonchi b/l   Gastrointestinal: NGT, soft, NT, distended; +BSx4  Extremities: WWP; edema in LEs/UEs, no clubbing or cyanosis  Skin: acrocyanosis of toes/fingers b/l  Vascular: 2+ radial, DP/PT pulses B/L  Neurological: on sedation but awake and alert, follows commands.  strength intact     MEDICATIONS:  MEDICATIONS  (STANDING):  albuterol/ipratropium for Nebulization 3 milliLiter(s) Nebulizer every 4 hours  artificial  tears Solution 1 Drop(s) Both EYES every 6 hours  ascorbic acid 500 milliGRAM(s) Oral daily  chlorhexidine 0.12% Liquid 15 milliLiter(s) Oral Mucosa every 12 hours  chlorhexidine 2% Cloths 1 Application(s) Topical daily  CRRT Treatment    <Continuous>  dextrose 5%. 1000 milliLiter(s) (50 mL/Hr) IV Continuous <Continuous>  dextrose 50% Injectable 12.5 Gram(s) IV Push once  dextrose 50% Injectable 25 Gram(s) IV Push once  dextrose 50% Injectable 25 Gram(s) IV Push once  fentaNYL   Infusion. 0.5 MICROgram(s)/kG/Hr (3.68 mL/Hr) IV Continuous <Continuous>  heparin  Injectable 5000 Unit(s) SubCutaneous every 8 hours  hydrocortisone sodium succinate Injectable 50 milliGRAM(s) IV Push every 8 hours  insulin lispro (HumaLOG) corrective regimen sliding scale   SubCutaneous every 6 hours  midodrine 5 milliGRAM(s) Oral every 8 hours  multivitamin/minerals/iron Oral Solution (CENTRUM) 15 milliLiter(s) Oral daily  nafcillin  IVPB 2 Gram(s) IV Intermittent every 4 hours  norepinephrine Infusion 0.05 MICROgram(s)/kG/Min (3.45 mL/Hr) IV Continuous <Continuous>  pantoprazole  Injectable 40 milliGRAM(s) IV Push daily  Phoxillum Filtration BK 4 / 2.5 5000 milliLiter(s) (2500 mL/Hr) CRRT <Continuous>  propofol Infusion 5 MICROgram(s)/kG/Min (2.208 mL/Hr) IV Continuous <Continuous>  sodium chloride 0.9% lock flush 3 milliLiter(s) IV Push every 8 hours  vitamin E 400 International Unit(s) Oral daily  zinc sulfate 220 milliGRAM(s) Oral daily    MEDICATIONS  (PRN):  acetaminophen    Suspension .. 650 milliGRAM(s) Oral every 6 hours PRN Temp greater or equal to 38C (100.4F)  dextrose 40% Gel 15 Gram(s) Oral once PRN Blood Glucose LESS THAN 70 milliGRAM(s)/deciliter  glucagon  Injectable 1 milliGRAM(s) IntraMuscular once PRN Glucose LESS THAN 70 milligrams/deciliter      ALLERGIES:  Allergies    No Known Allergies    Intolerances        LABS:                        7.5    9.25  )-----------( 54       ( 24 Nov 2019 07:02 )             22.9     11-24    132<L>  |  101  |  15  ----------------------------<  117<H>  4.0   |  21<L>  |  0.90    Ca    8.0<L>      24 Nov 2019 07:02  Phos  4.0     11-24  Mg     2.4     11-24    TPro  7.0  /  Alb  2.7<L>  /  TBili  1.8<H>  /  DBili  1.2<H>  /  AST  32  /  ALT  36  /  AlkPhos  88  11-24    PT/INR - ( 24 Nov 2019 07:02 )   PT: 12.8 sec;   INR: 1.13          PTT - ( 24 Nov 2019 07:02 )  PTT:33.7 sec    CAPILLARY BLOOD GLUCOSE      POCT Blood Glucose.: 120 mg/dL (24 Nov 2019 11:25)      RADIOLOGY & ADDITIONAL TESTS: Reviewed.

## 2019-11-25 NOTE — CHART NOTE - NSCHARTNOTEFT_GEN_A_CORE
Admitting Diagnosis:   Patient is a 39y old  Male who presents with a chief complaint of Endocarditis h/o IVDU (25 Nov 2019 12:13)      PAST MEDICAL & SURGICAL HISTORY:  Endocarditis  IVDU (intravenous drug user)  Smoker  No significant past surgical history      Current Nutrition Order:  NPO MN    PO Intake: Good (%) [   ]  Fair (50-75%) [   ] Poor (<25%) [   ]- N/A NPO     GI Issues: No complaints of N/V though abdomen remains distended  Rectal tube and sump placed for decompression (likely ileus 2/2 immobility, sedatives)  AXR showing no obstruction  Still stooling and passing gas    Pain: Pt denied pain     Skin Integrity: Heber 12  IAD  R. toes necrosis  B/L ankle and heel DTIs  L. scapula DTI    Labs:   11-25    130<L>  |  98  |  16  ----------------------------<  106<H>  4.0   |  23  |  0.90    Ca    8.3<L>      25 Nov 2019 04:55  Phos  4.0     11-25  Mg     2.3     11-25    TPro  6.8  /  Alb  2.5<L>  /  TBili  2.0<H>  /  DBili  1.5<H>  /  AST  37  /  ALT  36  /  AlkPhos  86  11-25    CAPILLARY BLOOD GLUCOSE      POCT Blood Glucose.: 98 mg/dL (25 Nov 2019 11:29)  POCT Blood Glucose.: 105 mg/dL (25 Nov 2019 05:40)  POCT Blood Glucose.: 120 mg/dL (24 Nov 2019 23:31)  POCT Blood Glucose.: 144 mg/dL (24 Nov 2019 17:27)      Medications:  MEDICATIONS  (STANDING):  albuterol/ipratropium for Nebulization 3 milliLiter(s) Nebulizer every 4 hours  artificial  tears Solution 1 Drop(s) Both EYES every 6 hours  ascorbic acid 500 milliGRAM(s) Oral daily  chlorhexidine 0.12% Liquid 15 milliLiter(s) Oral Mucosa every 12 hours  chlorhexidine 2% Cloths 1 Application(s) Topical daily  CRRT Treatment    <Continuous>  dextrose 5%. 1000 milliLiter(s) (50 mL/Hr) IV Continuous <Continuous>  dextrose 50% Injectable 12.5 Gram(s) IV Push once  dextrose 50% Injectable 25 Gram(s) IV Push once  dextrose 50% Injectable 25 Gram(s) IV Push once  fentaNYL   Infusion. 0.5 MICROgram(s)/kG/Hr (3.68 mL/Hr) IV Continuous <Continuous>  heparin  Injectable 5000 Unit(s) SubCutaneous every 8 hours  hydrocortisone sodium succinate Injectable 50 milliGRAM(s) IV Push every 8 hours  insulin lispro (HumaLOG) corrective regimen sliding scale   SubCutaneous every 6 hours  midodrine 5 milliGRAM(s) Oral every 8 hours  multivitamin/minerals/iron Oral Solution (CENTRUM) 15 milliLiter(s) Oral daily  nafcillin  IVPB 2 Gram(s) IV Intermittent every 4 hours  norepinephrine Infusion 0.05 MICROgram(s)/kG/Min (3.45 mL/Hr) IV Continuous <Continuous>  pantoprazole  Injectable 40 milliGRAM(s) IV Push daily  propofol Infusion 5 MICROgram(s)/kG/Min (2.208 mL/Hr) IV Continuous <Continuous>  PureFlow Dialysate RFP-401 (K 4 / Ca 3) 5000 milliLiter(s) (2500 mL/Hr) CRRT <Continuous>  sodium chloride 0.9% lock flush 3 milliLiter(s) IV Push every 8 hours  vitamin E 400 International Unit(s) Oral daily  zinc sulfate 220 milliGRAM(s) Oral daily    MEDICATIONS  (PRN):  acetaminophen    Suspension .. 650 milliGRAM(s) Oral every 6 hours PRN Temp greater or equal to 38C (100.4F)  dextrose 40% Gel 15 Gram(s) Oral once PRN Blood Glucose LESS THAN 70 milliGRAM(s)/deciliter  glucagon  Injectable 1 milliGRAM(s) IntraMuscular once PRN Glucose LESS THAN 70 milligrams/deciliter      Weight: 65.3kg ([facility], 11/20)  77.4kg (11/13)  73.6kg (11/10)  Daily       Weight Change:   8.3kg weight loss from admission, etiology unclear  Suspect actual weight loss vs fluid shifts as pt was noted to be anasarcic, is on CVVHD and has notably severe muscle wasting in upper extremities.     Nutrition Focused Physical Exam: Completed [ X-11/15, 11/20 re-evaluated ]  Not Pertinent [   ]  Muscle Wasting- Temporal [X   ]  Clavicle/Pectoral [  X ]  Shoulder/Deltoid [   ]  Scapula [   ]  Interosseous [   ]  Quadriceps [   ]  Gastrocnemius [   ]  Severe PCM suspected.     Estimated energy needs: IBW (67.3kg) used to estimate needs 2/2 vent.   Needs increased 2/2 vent, sepsis, CVVHD, suspected malnutrition. Fluids per team 2/2 CVVHD  Calories: 25-30 kcal/kg = 1200-0472 kcal/day  Protein: 1.6-1.8 g/kg = 108-121g protein/day    Subjective:   38 yo/male with PMHx IVDA, presented to OSH w/cough, hemoptysis, and N/V. Found to be septic and hypotensive. CT chest +pna, pulmonary nodules w/septic emboli, and ELIAN +vegetation. Pt ultimately intubated and started on pressors. Transferred to [facility] CTICU where he was deemed not to be a surgical candidate. Course c/b renal failure and B/L pleural effusions, s/p R. CT placement. Started on CVVH. ELIAN w/bubble negative for PFO. CTA negative for mesenteric ischemia and rectal exam negative for perirectal abscess, per surgery note. Underwent CT A/P 11/18 which demonstrated colonic distension likely 2/2 pseudoobstruction and decompressed small bowel. Per surgery recs, pt to continue w/ rectal tube for colonic decompression and plan for flex sig once stable. CT chest showing hydropneumothorax on R. lung and pneumothorax of L. lung; chest tubes remain to suction. S/p bronch 11/19 w/ copious purulent sputum; repeat bronch 11/22 still w/thick secretions though improved. Remains too high risk for CT surgery intervention at this time. S/p repeat echo on 11/20 showing large stable vegetation, severe TR. Remains on CVVHD w/plan to transition to IHD. Pt seen in room and discussed w/MICU team. Pt remains intubated on VC/AC mode, though awake and alert, despite being sedated on propofol @ 13.2mL/hr (348.5kcal/day from lipids) and fentanyl. MAP 85- remains in levophed for BP support. B/L chest tubes remain to suction; +airleak, CT surgery following. NPO at this time for repeat bronch and possible trach this afternoon, in addition to c/f ileus. RT for decompression, sump placed w/return of bilious fluids. AXR w/o obstruction. Pt denied N/V or pain at this time of visit. Na 130 (L),  (H). Will continue to follow per RD protocol.     Previous Nutrition Diagnosis:  Increased protein-calorie needs RT increased demand for protein-calorie intake AEB vent, CVVHD, hypermetabolic state, suspected malnutrition   Active [  x ]  Resolved [   ]    If resolved, new PES:      Goal: Pt will meet % of EER via tolerated route     Recommendations:  1. As medically feasible, recommend resuming Jevity 1.5 Christian via NGT. At current rate of propofol, recommend continuing Jevity 1.5 Christian @ 38mL/hr x 24hrs plus ProStat TID (300 kcal, 45g protein) via NGT. Provides: 912mL TV, 2017kcal (incld. propofol), 103g pro, 693mL free H2O, 91% RDI, 1.53g/kg IBW protein. Consider addition of promotility agent once ileus resolves.   2. Monitor for s/s intolerance; maintain aspiration precautions at all times   3. Cont. w/micronutrient supplementation   4. Daily wts   5. Pain and additional bowel regimens per team discretion   *d/w team    Education: Discussed importance of nutrition w/pt and visitor at bedside    Risk Level: High [ X  ] Moderate [   ] Low [   ]

## 2019-11-25 NOTE — PROGRESS NOTE ADULT - ASSESSMENT
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  Course further complicated by PEA arrest on 11/18.  He now is s/p several bronchs to control airway secretions - no other bacterial pathogens isolated beside MSSA. Most recent bronchial wash on 11/22. Per Dr. Worley, appearance of airways suggestive of additional viral infection.  He was again febrile on 11/24, reason unclear.  No other obvious source.  Sputum culture shows NRF.  Abd is nontender. 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 GMS stain with microorganisms consistent with Candida.  Fungitell beta D glucan negative.   Suggest:  - F/U blood cultures from 11/23, NGTD  - Continue to f/u bronch cultures, including viral IHC  - Continue nafcillin 2 g IV q4h  - start IV acyclovir 600mg q24hrs    Plan discussed with Dr. Simental

## 2019-11-25 NOTE — PROGRESS NOTE ADULT - SUBJECTIVE AND OBJECTIVE BOX
OVERNIGHT EVENTS:    SUBJECTIVE / INTERVAL HPI: Patient seen and examined at bedside.     VITAL SIGNS:  Vital Signs Last 24 Hrs  T(C): 36.3 (25 Nov 2019 01:04), Max: 38.2 (24 Nov 2019 14:00)  T(F): 97.3 (25 Nov 2019 01:04), Max: 100.7 (24 Nov 2019 14:00)  HR: 114 (25 Nov 2019 06:00) (114 - 136)  BP: 112/66 (25 Nov 2019 06:00) (92/49 - 118/62)  BP(mean): 91 (25 Nov 2019 06:00) (62 - 91)  RR: 10 (25 Nov 2019 06:00) (10 - 27)  SpO2: 99% (25 Nov 2019 06:00) (97% - 100%)    PHYSICAL EXAM:    General: WDWN  HEENT: NC/AT; PERRL, anicteric sclera; MMM  Neck: supple  Cardiovascular: +S1/S2; RRR  Respiratory: CTA B/L; no W/R/R  Gastrointestinal: soft, NT/ND; +BSx4  Extremities: WWP; no edema, clubbing or cyanosis  Vascular: 2+ radial, DP/PT pulses B/L  Neurological: AAOx3; no focal deficits    MEDICATIONS:  MEDICATIONS  (STANDING):  albuterol/ipratropium for Nebulization 3 milliLiter(s) Nebulizer every 4 hours  artificial  tears Solution 1 Drop(s) Both EYES every 6 hours  ascorbic acid 500 milliGRAM(s) Oral daily  chlorhexidine 0.12% Liquid 15 milliLiter(s) Oral Mucosa every 12 hours  chlorhexidine 2% Cloths 1 Application(s) Topical daily  CRRT Treatment    <Continuous>  dextrose 5%. 1000 milliLiter(s) (50 mL/Hr) IV Continuous <Continuous>  dextrose 50% Injectable 12.5 Gram(s) IV Push once  dextrose 50% Injectable 25 Gram(s) IV Push once  dextrose 50% Injectable 25 Gram(s) IV Push once  fentaNYL   Infusion. 0.5 MICROgram(s)/kG/Hr (3.68 mL/Hr) IV Continuous <Continuous>  heparin  Injectable 5000 Unit(s) SubCutaneous every 8 hours  hydrocortisone sodium succinate Injectable 50 milliGRAM(s) IV Push every 8 hours  insulin lispro (HumaLOG) corrective regimen sliding scale   SubCutaneous every 6 hours  midodrine 5 milliGRAM(s) Oral every 8 hours  multivitamin/minerals/iron Oral Solution (CENTRUM) 15 milliLiter(s) Oral daily  nafcillin  IVPB 2 Gram(s) IV Intermittent every 4 hours  norepinephrine Infusion 0.05 MICROgram(s)/kG/Min (3.45 mL/Hr) IV Continuous <Continuous>  pantoprazole  Injectable 40 milliGRAM(s) IV Push daily  propofol Infusion 5 MICROgram(s)/kG/Min (2.208 mL/Hr) IV Continuous <Continuous>  PureFlow Dialysate RFP-401 (K 4 / Ca 3) 5000 milliLiter(s) (2500 mL/Hr) CRRT <Continuous>  sodium chloride 0.9% lock flush 3 milliLiter(s) IV Push every 8 hours  vitamin E 400 International Unit(s) Oral daily  zinc sulfate 220 milliGRAM(s) Oral daily    MEDICATIONS  (PRN):  acetaminophen    Suspension .. 650 milliGRAM(s) Oral every 6 hours PRN Temp greater or equal to 38C (100.4F)  dextrose 40% Gel 15 Gram(s) Oral once PRN Blood Glucose LESS THAN 70 milliGRAM(s)/deciliter  glucagon  Injectable 1 milliGRAM(s) IntraMuscular once PRN Glucose LESS THAN 70 milligrams/deciliter      ALLERGIES:  Allergies    No Known Allergies    Intolerances        LABS:                        8.4    8.73  )-----------( 70       ( 25 Nov 2019 04:55 )             25.6     11-25    130<L>  |  98  |  16  ----------------------------<  106<H>  4.0   |  23  |  0.90    Ca    8.3<L>      25 Nov 2019 04:55  Phos  4.0     11-25  Mg     2.3     11-25    TPro  6.8  /  Alb  2.5<L>  /  TBili  2.0<H>  /  DBili  1.5<H>  /  AST  37  /  ALT  36  /  AlkPhos  86  11-25    PT/INR - ( 25 Nov 2019 04:55 )   PT: 14.1 sec;   INR: 1.24          PTT - ( 25 Nov 2019 04:55 )  PTT:36.1 sec    CAPILLARY BLOOD GLUCOSE      POCT Blood Glucose.: 105 mg/dL (25 Nov 2019 05:40)      RADIOLOGY & ADDITIONAL TESTS: Reviewed. OVERNIGHT EVENTS: Post-transfusion Hgb 8.3.     SUBJECTIVE / INTERVAL HPI: Patient seen and examined at bedside. Patient resting in bed, intubated on sedation. Unable to obtain full ROS.     VITAL SIGNS:  Vital Signs Last 24 Hrs  T(C): 36.3 (25 Nov 2019 01:04), Max: 38.2 (24 Nov 2019 14:00)  T(F): 97.3 (25 Nov 2019 01:04), Max: 100.7 (24 Nov 2019 14:00)  HR: 114 (25 Nov 2019 06:00) (114 - 136)  BP: 112/66 (25 Nov 2019 06:00) (92/49 - 118/62)  BP(mean): 91 (25 Nov 2019 06:00) (62 - 91)  RR: 10 (25 Nov 2019 06:00) (10 - 27)  SpO2: 99% (25 Nov 2019 06:00) (97% - 100%)    PHYSICAL EXAM:    General: WDWN, resting in bed, sedated, mildly jaundice    HEENT: NC/AT; PERRL, anicteric sclera; MMM  Neck: supple  Cardiovascular: +S1/S2; tachycardic, regular rhythm   Respiratory: intubated, tachypneic, diffuse rhonchi with decreased breath sounds b/l   Gastrointestinal: NGT, soft, NT, distended; hypoactive bowel sounds   Extremities: WWP; trace edema in LE/UEs; no clubbing or cyanosis  Vascular: 2+ radial, DP/PT pulses B/L  Neurological: sedated, unresponsive to painful/verbal stimuli     MEDICATIONS:  MEDICATIONS  (STANDING):  albuterol/ipratropium for Nebulization 3 milliLiter(s) Nebulizer every 4 hours  artificial  tears Solution 1 Drop(s) Both EYES every 6 hours  ascorbic acid 500 milliGRAM(s) Oral daily  chlorhexidine 0.12% Liquid 15 milliLiter(s) Oral Mucosa every 12 hours  chlorhexidine 2% Cloths 1 Application(s) Topical daily  CRRT Treatment    <Continuous>  dextrose 5%. 1000 milliLiter(s) (50 mL/Hr) IV Continuous <Continuous>  dextrose 50% Injectable 12.5 Gram(s) IV Push once  dextrose 50% Injectable 25 Gram(s) IV Push once  dextrose 50% Injectable 25 Gram(s) IV Push once  fentaNYL   Infusion. 0.5 MICROgram(s)/kG/Hr (3.68 mL/Hr) IV Continuous <Continuous>  heparin  Injectable 5000 Unit(s) SubCutaneous every 8 hours  hydrocortisone sodium succinate Injectable 50 milliGRAM(s) IV Push every 8 hours  insulin lispro (HumaLOG) corrective regimen sliding scale   SubCutaneous every 6 hours  midodrine 5 milliGRAM(s) Oral every 8 hours  multivitamin/minerals/iron Oral Solution (CENTRUM) 15 milliLiter(s) Oral daily  nafcillin  IVPB 2 Gram(s) IV Intermittent every 4 hours  norepinephrine Infusion 0.05 MICROgram(s)/kG/Min (3.45 mL/Hr) IV Continuous <Continuous>  pantoprazole  Injectable 40 milliGRAM(s) IV Push daily  propofol Infusion 5 MICROgram(s)/kG/Min (2.208 mL/Hr) IV Continuous <Continuous>  PureFlow Dialysate RFP-401 (K 4 / Ca 3) 5000 milliLiter(s) (2500 mL/Hr) CRRT <Continuous>  sodium chloride 0.9% lock flush 3 milliLiter(s) IV Push every 8 hours  vitamin E 400 International Unit(s) Oral daily  zinc sulfate 220 milliGRAM(s) Oral daily    MEDICATIONS  (PRN):  acetaminophen    Suspension .. 650 milliGRAM(s) Oral every 6 hours PRN Temp greater or equal to 38C (100.4F)  dextrose 40% Gel 15 Gram(s) Oral once PRN Blood Glucose LESS THAN 70 milliGRAM(s)/deciliter  glucagon  Injectable 1 milliGRAM(s) IntraMuscular once PRN Glucose LESS THAN 70 milligrams/deciliter      ALLERGIES:  Allergies    No Known Allergies    Intolerances        LABS:                        8.4    8.73  )-----------( 70       ( 25 Nov 2019 04:55 )             25.6     11-25    130<L>  |  98  |  16  ----------------------------<  106<H>  4.0   |  23  |  0.90    Ca    8.3<L>      25 Nov 2019 04:55  Phos  4.0     11-25  Mg     2.3     11-25    TPro  6.8  /  Alb  2.5<L>  /  TBili  2.0<H>  /  DBili  1.5<H>  /  AST  37  /  ALT  36  /  AlkPhos  86  11-25    PT/INR - ( 25 Nov 2019 04:55 )   PT: 14.1 sec;   INR: 1.24          PTT - ( 25 Nov 2019 04:55 )  PTT:36.1 sec    CAPILLARY BLOOD GLUCOSE      POCT Blood Glucose.: 105 mg/dL (25 Nov 2019 05:40)      RADIOLOGY & ADDITIONAL TESTS: Reviewed.

## 2019-11-25 NOTE — PROGRESS NOTE ADULT - ATTENDING COMMENTS
seen and evaluated while on CVVHD  tolerating the procedure well.  Electrolytes, B/Cr okay-   remains oligo-anuric and dialysis dependent.  Will consider transitioning to intermittent acute HD as he gets extubated and tapered off pressors

## 2019-11-25 NOTE — PROGRESS NOTE ADULT - ASSESSMENT
39y Male w PMH of IVDU and active smoker who went to [facility] on 11/8/19 complaining of productive cough with hemoptysis, progressive SOB, pleuritic chest pain, nausea, non-bloody emesis, abd pain, chillsx3 days. At OSH, patient found to be in septic shock 2/2 tricuspid valve endocarditis seen on echocardiogram, and patient was transferred to [facility], CTICU, under the care of Dr. Daniel Ruelas for possible surgical evaluation. Following admission, patient noted to be in acute hypoxic respiratory failure s/p intubation/sedation, acute kidney injury requiring CVVHD, congestive hepatopathy, and mutli-system organ failure 2/2 hypoperfusion. Patient deemed not a surgical candidate, per Dr. Ruelas, and patient transferred to MICU for medical management with IV nafcillin, ID following. Patient with complicated course and now s/p cardiac arrest likely from sepsis/hypoxic respiratory failure.    Tricuspid valve Endocarditis: Patient with vegetations seen on ELIAN and TTE as above with severe TR. TR is functional from malcoaptation of leaflets from vegetation. Patient with mildly elevated PASP, which likely is not contributing much to TR. Normal RV function. Not a surgical candidate. Patient also with septic emboli to lungs, further causing empyema. Blood cultures negative. Vegetation still present, no further vegetations seen.   -Continue on Nafcillin per ID recs.  -Patient will likely need to be continued on abx therapy indefinitely in setting of no surgical intervention.  -Patient will have long and complicated course without definite source control.   -If patient becomes more stable, reconsult CT Surgery for tricuspid valve repair/replacement.   -Continue with HD support with pressors.   -Would not recommend ELIAN at this time. Will not .     CHF: EF 45% on initial echo. With global hypokinesis. Likely in setting of sepsis.   -Patient unable to tolerate GDMT.  -When patient no longer septic and HD stable, can discuss starting meds to assist with HF.   -Continue medical managment for IE.     To be discussed on rounds.

## 2019-11-25 NOTE — PROGRESS NOTE ADULT - SUBJECTIVE AND OBJECTIVE BOX
Patient discussed on morning rounds with Dr. Tracey     SUBJECTIVE ASSESSMENT:    On vent, but awake, alert, and following commands    Vital Signs Last 24 Hrs  T(C): 37.5 (25 Nov 2019 05:00), Max: 38.2 (24 Nov 2019 14:00)  T(F): 99.5 (25 Nov 2019 05:00), Max: 100.7 (24 Nov 2019 14:00)  HR: 124 (25 Nov 2019 08:30) (114 - 136)  BP: 102/56 (25 Nov 2019 08:00) (92/49 - 118/62)  BP(mean): 73 (25 Nov 2019 08:00) (62 - 91)  RR: 10 (25 Nov 2019 08:00) (10 - 27)  SpO2: 98% (25 Nov 2019 08:30) (97% - 100%)  I&O's Detail    24 Nov 2019 07:01  -  25 Nov 2019 07:00  --------------------------------------------------------  IN:    Enteral Tube Flush: 330 mL    fentaNYL Infusion.: 873.1 mL    norepinephrine Infusion: 291 mL    ns in tub fed  [id number]: 731 mL    Packed Red Blood Cells: 300 mL    propofol Infusion: 528 mL    Sodium Chloride 0.9% IV Bolus: 250 mL    Solution: 276 mL    Solution: 400 mL  Total IN: 3979.1 mL    OUT:    Chest Tube: 40 mL    Chest Tube: 40 mL    Chest Tube: 120 mL    Other: 3635 mL  Total OUT: 3835 mL    Total NET: 144.1 mL      25 Nov 2019 07:01  -  25 Nov 2019 09:27  --------------------------------------------------------  IN:    fentaNYL Infusion.: 37 mL    norepinephrine Infusion: 9 mL    propofol Infusion: 13 mL    Solution: 3 mL  Total IN: 62 mL    OUT:  Total OUT: 0 mL    Total NET: 62 mL    CHEST TUBE:  Yes. AIR LEAKS: Yes, right anterior pigtail with intermittent 1/5 airleak.  right posterior pigtail with no airleak appreciated. left pigtail with 1/5 intermittent airleak on suction @ -20 mmHg with PPV    PHYSICAL EXAM:    General: Sedated on vent, appears comfortabel    Neurological: opens eyes to commands    Cardiovascular: S1S2 RRR No M/G/R    Respiratory: CTA b/l No W/R/R    Gastrointestinal: soft, NT/ND    Extremities: no edema    Vascular: warm and well perfused.    Incision Sites: n/a    LABS:                        8.4    8.73  )-----------( 70       ( 25 Nov 2019 04:55 )             25.6       COUMADIN:  No.     PT/INR - ( 25 Nov 2019 04:55 )   PT: 14.1 sec;   INR: 1.24          PTT - ( 25 Nov 2019 04:55 )  PTT:36.1 sec    11-25    130<L>  |  98  |  16  ----------------------------<  106<H>  4.0   |  23  |  0.90    Ca    8.3<L>      25 Nov 2019 04:55  Phos  4.0     11-25  Mg     2.3     11-25    TPro  6.8  /  Alb  2.5<L>  /  TBili  2.0<H>  /  DBili  1.5<H>  /  AST  37  /  ALT  36  /  AlkPhos  86  11-25    MEDICATIONS  (STANDING):  albuterol/ipratropium for Nebulization 3 milliLiter(s) Nebulizer every 4 hours  artificial  tears Solution 1 Drop(s) Both EYES every 6 hours  ascorbic acid 500 milliGRAM(s) Oral daily  chlorhexidine 0.12% Liquid 15 milliLiter(s) Oral Mucosa every 12 hours  chlorhexidine 2% Cloths 1 Application(s) Topical daily  CRRT Treatment    <Continuous>  dextrose 5%. 1000 milliLiter(s) (50 mL/Hr) IV Continuous <Continuous>  dextrose 50% Injectable 12.5 Gram(s) IV Push once  dextrose 50% Injectable 25 Gram(s) IV Push once  dextrose 50% Injectable 25 Gram(s) IV Push once  fentaNYL   Infusion. 0.5 MICROgram(s)/kG/Hr (3.68 mL/Hr) IV Continuous <Continuous>  heparin  Injectable 5000 Unit(s) SubCutaneous every 8 hours  hydrocortisone sodium succinate Injectable 50 milliGRAM(s) IV Push every 8 hours  insulin lispro (HumaLOG) corrective regimen sliding scale   SubCutaneous every 6 hours  midodrine 5 milliGRAM(s) Oral every 8 hours  multivitamin/minerals/iron Oral Solution (CENTRUM) 15 milliLiter(s) Oral daily  nafcillin  IVPB 2 Gram(s) IV Intermittent every 4 hours  norepinephrine Infusion 0.05 MICROgram(s)/kG/Min (3.45 mL/Hr) IV Continuous <Continuous>  pantoprazole  Injectable 40 milliGRAM(s) IV Push daily  propofol Infusion 5 MICROgram(s)/kG/Min (2.208 mL/Hr) IV Continuous <Continuous>  PureFlow Dialysate RFP-401 (K 4 / Ca 3) 5000 milliLiter(s) (2500 mL/Hr) CRRT <Continuous>  sodium chloride 0.9% lock flush 3 milliLiter(s) IV Push every 8 hours  vitamin E 400 International Unit(s) Oral daily  zinc sulfate 220 milliGRAM(s) Oral daily    MEDICATIONS  (PRN):  acetaminophen    Suspension .. 650 milliGRAM(s) Oral every 6 hours PRN Temp greater or equal to 38C (100.4F)  dextrose 40% Gel 15 Gram(s) Oral once PRN Blood Glucose LESS THAN 70 milliGRAM(s)/deciliter  glucagon  Injectable 1 milliGRAM(s) IntraMuscular once PRN Glucose LESS THAN 70 milligrams/deciliter      RADIOLOGY & ADDITIONAL TESTS:  CXR: no PTX/effusions/subq air

## 2019-11-25 NOTE — PROGRESS NOTE ADULT - ATTENDING COMMENTS
Patient seen and examined with house-staff during bedside rounds.  Resident note read, including vitals, physical findings, laboratory data, and radiological reports.   Revisions included below.  Direct personal management at bed side and extensive interpretation of the data.  Plan was outlined and discussed in details with the housestaff.  Decision making of high complexity  Action taken for acute disease activity to reflect the level of care provided:  - medication reconciliation  - review laboratory data  - continue on MV  - he failed PSV due to tachypnea and tachycardia  - for trach  - continue HD and change to intermittent  - continue CT he has air leak in the three tubes  - restart tube feed  - on antibiotic  - repeat cultures negative  Hb and platelet are stable

## 2019-11-25 NOTE — PROGRESS NOTE ADULT - SUBJECTIVE AND OBJECTIVE BOX
no acute events overnight  on CVVHD with net even fluid balance (access pressure -93, venous 124, effluent 145), cartridge is due to replacement today  intubated, off sedation, alert  remains on pressors          Meds:    acetaminophen    Suspension .. 650 milliGRAM(s) Oral every 6 hours PRN  albuterol/ipratropium for Nebulization 3 milliLiter(s) Nebulizer every 4 hours  artificial  tears Solution 1 Drop(s) Both EYES every 6 hours  ascorbic acid 500 milliGRAM(s) Oral daily  chlorhexidine 0.12% Liquid 15 milliLiter(s) Oral Mucosa every 12 hours  chlorhexidine 2% Cloths 1 Application(s) Topical daily  CRRT Treatment    <Continuous>  dextrose 40% Gel 15 Gram(s) Oral once PRN  dextrose 5%. 1000 milliLiter(s) IV Continuous <Continuous>  dextrose 50% Injectable 12.5 Gram(s) IV Push once  dextrose 50% Injectable 25 Gram(s) IV Push once  dextrose 50% Injectable 25 Gram(s) IV Push once  fentaNYL   Infusion. 0.5 MICROgram(s)/kG/Hr IV Continuous <Continuous>  glucagon  Injectable 1 milliGRAM(s) IntraMuscular once PRN  heparin  Injectable 5000 Unit(s) SubCutaneous every 8 hours  hydrocortisone sodium succinate Injectable 50 milliGRAM(s) IV Push every 8 hours  insulin lispro (HumaLOG) corrective regimen sliding scale   SubCutaneous every 6 hours  midodrine 5 milliGRAM(s) Oral every 8 hours  multivitamin/minerals/iron Oral Solution (CENTRUM) 15 milliLiter(s) Oral daily  nafcillin  IVPB 2 Gram(s) IV Intermittent every 4 hours  norepinephrine Infusion 0.05 MICROgram(s)/kG/Min IV Continuous <Continuous>  pantoprazole  Injectable 40 milliGRAM(s) IV Push daily  propofol Infusion 5 MICROgram(s)/kG/Min IV Continuous <Continuous>  PureFlow Dialysate RFP-401 (K 4 / Ca 3) 5000 milliLiter(s) CRRT <Continuous>  sodium chloride 0.9% lock flush 3 milliLiter(s) IV Push every 8 hours  vitamin E 400 International Unit(s) Oral daily  zinc sulfate 220 milliGRAM(s) Oral daily      T(C): 36.7 (11-25-19 @ 10:00), Max: 38.2 (11-24-19 @ 14:00)  HR: 134 (11-25-19 @ 10:00) (114 - 134)  BP: 124/82 (11-25-19 @ 10:00) (92/49 - 124/82)  RR: 29 (11-25-19 @ 10:00) (10 - 30)  SpO2: 95% (11-25-19 @ 10:00) (95% - 100%)    Input/Output      11-24-19 @ 07:01  -  11-25-19 @ 07:00  --------------------------------------------------------  IN: 3979.1 mL / OUT: 3835 mL / NET: 144.1 mL    11-25-19 @ 07:01  -  11-25-19 @ 10:50  --------------------------------------------------------  IN: 239 mL / OUT: 0 mL / NET: 239 mL        PHYSICAL EXAM  General: intubated, alert, NAD  Neck: no JVD  Respiratory: equal breath sounds bilaterally, bilateral chest tubes  HEART: normal S1S2  ABDOMEN: Soft, distended, tympanic, not tender, rectal tube in place, +BS  EXTREMITIES: upper tight and sacral edema present/ necrotic U/L digits   NEUROLOGY: alert  ACCESS: R IJ [facility], site dry and clean        LABS:                                      8.4    8.73  )-----------( 70       ( 25 Nov 2019 04:55 )             25.6       11-25    130<L>  |  98  |  16  ----------------------------<  106<H>  4.0   |  23  |  0.90    Ca    8.3<L>      25 Nov 2019 04:55  Phos  4.0     11-25  Mg     2.3     11-25    TPro  6.8  /  Alb  2.5<L>  /  TBili  2.0<H>  /  DBili  1.5<H>  /  AST  37  /  ALT  36  /  AlkPhos  86  11-25      PT/INR - ( 25 Nov 2019 04:55 )   PT: 14.1 sec;   INR: 1.24          PTT - ( 25 Nov 2019 04:55 )  PTT:36.1 sec          RADIOLOGY:    < from: Xray Chest 1 View- PORTABLE-Routine (11.25.19 @ 07:21) >    EXAM:  XR CHEST PORTABLE ROUTINE 1V                          PROCEDURE DATE:  11/25/2019          INTERPRETATION:  Clinical history/reason for exam: Tube placement.    Single frontal view.    Impression: November 23, 2019.    Findings/impression: Stable positioning of support devices. Heart size   within normal limits. Stable lung pathology.      < end of copied text >

## 2019-11-25 NOTE — PROGRESS NOTE ADULT - ASSESSMENT
39y Male w PMHx of IVDU and active smoker who went to [facility] on 11/8/19 complaining of productive cough with hemoptysis, progressive SOB, pleuritic chest pain, nausea, non-bloody emesis, abd pain, chillsx3 days. 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 Daniel Ruelas for possible surgical evaluation. Following admission, patient noted to be in acute hypoxic respiratory failure s/p intubation/sedation, acute kidney injury requiring CVVHD, congestive hepatopathy, and mutli-system organ failure 2/2 hypoperfusion. Patient deemed not a surgical candidate, per Dr. Ruelas, and patient transferred to MICU for medical management with IV nafcillin, ID following. Pt with diffuse septic emboli in bilateral lungs, pigtails in place with new b/l pneumothorax. Abd yesterday with increased distention and distended bowel on AXR. Gen surg consulted and is now s/p decompression with rectal tube abd NG tube. CT chest/abd/pelvis done 11/18/19 concerning for b/l trapped lung and hemothorax for which thoracic surgery was consulted. Patient was extubated however, overnight he became bradycardic and arrested, CPR initiated with ROSC. After CPR chest tubes noted to have increased sanguinous drainage, new airleaks. Thoracic team following for CT management. Anterior right pigtail placed for loculated pneumothorax.     Plan:   - continue daily serial CXRs - CXR today stable   - continue Chest tubes to suction at all times and while on positive pressure (airleaks seem to be improving)   - appreciate ID recs   - +endocarditis- not a surgical candidate at this time with CT surgery- continue medical management

## 2019-11-25 NOTE — PROGRESS NOTE ADULT - SUBJECTIVE AND OBJECTIVE BOX
INCOMPLETE NOTE    infectious diseases progress note:  ULI HOLLAND is a 39yMale patient    ENDOCARDITIS/SEPSIS    Endocarditis  CAMERON (acute kidney injury)  Acute endocarditis due to other organism      ROS:  CONSTITUTIONAL:  Negative fever or chills, feels well, good appetite  EYES:  Negative  blurry vision or double vision  CARDIOVASCULAR:  Negative for chest pain or palpitations  RESPIRATORY:  Negative for cough, wheezing, or SOB   GASTROINTESTINAL:  Negative for nausea, vomiting, diarrhea, constipation, or abdominal pain  GENITOURINARY:  Negative frequency, urgency or dysuria  NEUROLOGIC:  No headache, confusion, dizziness, lightheadedness    Allergies    No Known Allergies    Intolerances        ANTIBIOTICS/RELEVANT:  antimicrobials  nafcillin  IVPB 2 Gram(s) IV Intermittent every 4 hours    immunologic:    OTHER:  acetaminophen    Suspension .. 650 milliGRAM(s) Oral every 6 hours PRN  albuterol/ipratropium for Nebulization 3 milliLiter(s) Nebulizer every 4 hours  artificial  tears Solution 1 Drop(s) Both EYES every 6 hours  ascorbic acid 500 milliGRAM(s) Oral daily  chlorhexidine 0.12% Liquid 15 milliLiter(s) Oral Mucosa every 12 hours  chlorhexidine 2% Cloths 1 Application(s) Topical daily  CRRT Treatment    <Continuous>  dextrose 40% Gel 15 Gram(s) Oral once PRN  dextrose 5%. 1000 milliLiter(s) IV Continuous <Continuous>  dextrose 50% Injectable 12.5 Gram(s) IV Push once  dextrose 50% Injectable 25 Gram(s) IV Push once  dextrose 50% Injectable 25 Gram(s) IV Push once  fentaNYL   Infusion. 0.5 MICROgram(s)/kG/Hr IV Continuous <Continuous>  glucagon  Injectable 1 milliGRAM(s) IntraMuscular once PRN  heparin  Injectable 5000 Unit(s) SubCutaneous every 8 hours  hydrocortisone sodium succinate Injectable 50 milliGRAM(s) IV Push every 8 hours  insulin lispro (HumaLOG) corrective regimen sliding scale   SubCutaneous every 6 hours  midodrine 5 milliGRAM(s) Oral every 8 hours  multivitamin/minerals/iron Oral Solution (CENTRUM) 15 milliLiter(s) Oral daily  norepinephrine Infusion 0.05 MICROgram(s)/kG/Min IV Continuous <Continuous>  pantoprazole  Injectable 40 milliGRAM(s) IV Push daily  propofol Infusion 5 MICROgram(s)/kG/Min IV Continuous <Continuous>  PureFlow Dialysate RFP-401 (K 4 / Ca 3) 5000 milliLiter(s) CRRT <Continuous>  sodium chloride 0.9% lock flush 3 milliLiter(s) IV Push every 8 hours  vitamin E 400 International Unit(s) Oral daily  zinc sulfate 220 milliGRAM(s) Oral daily      Objective:  Vital Signs Last 24 Hrs  T(C): 36.7 (25 Nov 2019 10:00), Max: 38.2 (24 Nov 2019 14:00)  T(F): 98.1 (25 Nov 2019 10:00), Max: 100.7 (24 Nov 2019 14:00)  HR: 134 (25 Nov 2019 10:00) (114 - 134)  BP: 124/82 (25 Nov 2019 10:00) (92/49 - 124/82)  BP(mean): 95 (25 Nov 2019 10:00) (62 - 95)  RR: 29 (25 Nov 2019 10:00) (10 - 30)  SpO2: 95% (25 Nov 2019 10:00) (95% - 100%)    PHYSICAL EXAM:  Constitutional:Well-developed, well nourishe  Eyes:NANCY, EOMI  Ear/Nose/Throat: no oral lesion, no sinus tenderness on percussion	  Neck:no JVD, no lymphadenopathy, supple  Respiratory: CTA hellen  Cardiovascular: S1S2 RRR, no murmurs  Gastrointestinal:soft, (+) BS, no HSM  Extremities:no e/e/c        LABS:                        8.4    8.73  )-----------( 70       ( 25 Nov 2019 04:55 )             25.6     11-25    130<L>  |  98  |  16  ----------------------------<  106<H>  4.0   |  23  |  0.90    Ca    8.3<L>      25 Nov 2019 04:55  Phos  4.0     11-25  Mg     2.3     11-25    TPro  6.8  /  Alb  2.5<L>  /  TBili  2.0<H>  /  DBili  1.5<H>  /  AST  37  /  ALT  36  /  AlkPhos  86  11-25    PT/INR - ( 25 Nov 2019 04:55 )   PT: 14.1 sec;   INR: 1.24          PTT - ( 25 Nov 2019 04:55 )  PTT:36.1 sec        MICROBIOLOGY:        RADIOLOGY & ADDITIONAL STUDIES: Infectious diseases progress note:  ULI HOLLAND is a 39yMale patient    ENDOCARDITIS/SEPSIS    Endocarditis  CAMERON (acute kidney injury)  Acute endocarditis due to other organism    Subjective:    Patient intubated, able to complete ROS based on shaking head yes and no. Patient reports fevers, chills, night sweats, chest pain, vomiting, abdominal pain, back pain. Patient reports some nausea and irritation from ET tube as well as some SOB on CPAP trial.     Allergies    No Known Allergies    Intolerances        ANTIBIOTICS/RELEVANT:  antimicrobials  nafcillin  IVPB 2 Gram(s) IV Intermittent every 4 hours    immunologic:    OTHER:  acetaminophen    Suspension .. 650 milliGRAM(s) Oral every 6 hours PRN  albuterol/ipratropium for Nebulization 3 milliLiter(s) Nebulizer every 4 hours  artificial  tears Solution 1 Drop(s) Both EYES every 6 hours  ascorbic acid 500 milliGRAM(s) Oral daily  chlorhexidine 0.12% Liquid 15 milliLiter(s) Oral Mucosa every 12 hours  chlorhexidine 2% Cloths 1 Application(s) Topical daily  CRRT Treatment    <Continuous>  dextrose 40% Gel 15 Gram(s) Oral once PRN  dextrose 5%. 1000 milliLiter(s) IV Continuous <Continuous>  dextrose 50% Injectable 12.5 Gram(s) IV Push once  dextrose 50% Injectable 25 Gram(s) IV Push once  dextrose 50% Injectable 25 Gram(s) IV Push once  fentaNYL   Infusion. 0.5 MICROgram(s)/kG/Hr IV Continuous <Continuous>  glucagon  Injectable 1 milliGRAM(s) IntraMuscular once PRN  heparin  Injectable 5000 Unit(s) SubCutaneous every 8 hours  hydrocortisone sodium succinate Injectable 50 milliGRAM(s) IV Push every 8 hours  insulin lispro (HumaLOG) corrective regimen sliding scale   SubCutaneous every 6 hours  midodrine 5 milliGRAM(s) Oral every 8 hours  multivitamin/minerals/iron Oral Solution (CENTRUM) 15 milliLiter(s) Oral daily  norepinephrine Infusion 0.05 MICROgram(s)/kG/Min IV Continuous <Continuous>  pantoprazole  Injectable 40 milliGRAM(s) IV Push daily  propofol Infusion 5 MICROgram(s)/kG/Min IV Continuous <Continuous>  PureFlow Dialysate RFP-401 (K 4 / Ca 3) 5000 milliLiter(s) CRRT <Continuous>  sodium chloride 0.9% lock flush 3 milliLiter(s) IV Push every 8 hours  vitamin E 400 International Unit(s) Oral daily  zinc sulfate 220 milliGRAM(s) Oral daily      Objective:  Vital Signs Last 24 Hrs  T(C): 36.7 (25 Nov 2019 10:00), Max: 38.2 (24 Nov 2019 14:00)  T(F): 98.1 (25 Nov 2019 10:00), Max: 100.7 (24 Nov 2019 14:00)  HR: 134 (25 Nov 2019 10:00) (114 - 134)  BP: 124/82 (25 Nov 2019 10:00) (92/49 - 124/82)  BP(mean): 95 (25 Nov 2019 10:00) (62 - 95)  RR: 29 (25 Nov 2019 10:00) (10 - 30)  SpO2: 95% (25 Nov 2019 10:00) (95% - 100%)    PHYSICAL EXAM:  Constitutional: Intubated  HEENT: NCAT, positive scleral icterus although improved from yesterday, MMM   Neck: supple  Respiratory: b/l rhonchi and wheezing throughout   Cardiovascular: S1, S2. RRR. systolic murmur appreciated on left 4th ICS  Gastrointestinal: Soft, NTND, improved from yesterday. BS+ x4 quadrants  Extremities: Extremities warm throughout. Tips of b/l toes cyanotic and cool to touch. No edema    LABS:                        8.4    8.73  )-----------( 70       ( 25 Nov 2019 04:55 )             25.6     11-25    130<L>  |  98  |  16  ----------------------------<  106<H>  4.0   |  23  |  0.90    Ca    8.3<L>      25 Nov 2019 04:55  Phos  4.0     11-25  Mg     2.3     11-25    TPro  6.8  /  Alb  2.5<L>  /  TBili  2.0<H>  /  DBili  1.5<H>  /  AST  37  /  ALT  36  /  AlkPhos  86  11-25    PT/INR - ( 25 Nov 2019 04:55 )   PT: 14.1 sec;   INR: 1.24          PTT - ( 25 Nov 2019 04:55 )  PTT:36.1 sec        MICROBIOLOGY:        RADIOLOGY & ADDITIONAL STUDIES:

## 2019-11-25 NOTE — PROGRESS NOTE ADULT - SUBJECTIVE AND OBJECTIVE BOX
24 hr events    Subjective: Patient seen and examined bedside. Resting comfortable in bed in NAD. Family present for exam. On vent, but alert and able to follow commands.      Vital Signs Last 24 Hrs  T(C): 37.9 (25 Nov 2019 13:42), Max: 37.9 (24 Nov 2019 19:00)  T(F): 100.2 (25 Nov 2019 13:42), Max: 100.3 (24 Nov 2019 19:00)  HR: 132 (25 Nov 2019 15:00) (114 - 140)  BP: 106/62 (25 Nov 2019 15:00) (92/49 - 124/82)  BP(mean): 79 (25 Nov 2019 15:00) (62 - 95)  RR: 23 (25 Nov 2019 15:00) (10 - 33)  SpO2: 94% (25 Nov 2019 15:00) (94% - 100%)    I&O's Summary    24 Nov 2019 07:01  -  25 Nov 2019 07:00  --------------------------------------------------------  IN: 3979.1 mL / OUT: 4075 mL / NET: -95.9 mL    25 Nov 2019 07:01  -  25 Nov 2019 15:36  --------------------------------------------------------  IN: 499 mL / OUT: 516 mL / NET: -17 mL        Physical Exam:  Pulmonary: In tubated on AC/VC  Cardiovascular: on pressor support  Abdominal: soft, NT/ND  EXT: with gangrenous lesions (dry gangrene) in both feet and LLE distally 93rd finger). No inflamation, no visible infection  Pulses: doppler exam unchanged    Lines/drains/tubes:    LABS:                        8.4    8.73  )-----------( 70       ( 25 Nov 2019 04:55 )             25.6     11-25    130<L>  |  98  |  16  ----------------------------<  106<H>  4.0   |  23  |  0.90    Ca    8.3<L>      25 Nov 2019 04:55  Phos  4.0     11-25  Mg     2.3     11-25    TPro  6.8  /  Alb  2.5<L>  /  TBili  2.0<H>  /  DBili  1.5<H>  /  AST  37  /  ALT  36  /  AlkPhos  86  11-25    PT/INR - ( 25 Nov 2019 04:55 )   PT: 14.1 sec;   INR: 1.24          PTT - ( 25 Nov 2019 04:55 )  PTT:36.1 sec    LIVER FUNCTIONS - ( 25 Nov 2019 04:55 )  Alb: 2.5 g/dL / Pro: 6.8 g/dL / ALK PHOS: 86 U/L / ALT: 36 U/L / AST: 37 U/L / GGT: x           CAPILLARY BLOOD GLUCOSE      POCT Blood Glucose.: 98 mg/dL (25 Nov 2019 11:29)  POCT Blood Glucose.: 105 mg/dL (25 Nov 2019 05:40)  POCT Blood Glucose.: 120 mg/dL (24 Nov 2019 23:31)  POCT Blood Glucose.: 144 mg/dL (24 Nov 2019 17:27)      RADIOLOGY & ADDITIONAL TESTS:

## 2019-11-25 NOTE — DISCHARGE NOTE PROVIDER - HOSPITAL COURSE
38 y/o M w/ PMH of IVDU and active smoker who came from [facility] on 11/8/19 in 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 After being transferred to  CTICU for surgical evaluation, pt was deemed to not be a surgical candidate and transferred to MICU for medical mgmt. ID, Nephrology, heme/onc, surgery, vascular, GI following. Initially bacteremic and in septic shock 2/2 to MSSA. He was started on Nafcillin. Most recent echo (11/20) with large stable vegetation (3.8cm x 1cm), severe TR, markedly dilated IVC with increased R atrial pressure, EF 60% (increased from previous EF of 40-45%). He has been sedated on Propofol, Fentanyl and placed on Midodrine to help with pressure support while on sedation. Has also required Levophed. Patient has remained in acute hypoxic respiratory failure 2/2 septic pulmonary emboli, b/l pleural effusions/consolidations which was further complicated by b/l loculated pneumothoraces. 2 right chest tubes placed (lateral, anterior) and one left chest tube placed (lateral). Pleural fluid culture with exudate growing MSSA. 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 Patient extubated x1 but due to increased work of breathing was re-intubated. Trach will be performed on 11/26. has been receiving CVVHD in setting of acute renal failure most likely 2/2 ATN from hypoperfusion (makes no urine), electrolyte abnormalities continue to improve. NGT in place for feeds. Has had abdominal distension on multiple occasions with concern for SBO, howeevr imaging suggestive of ileus and anasarca. Rectal tube/sump placed for GI decompression 38 y/o M w/ PMH of IVDU and active smoker who came from [facility] on 11/8/19 in 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 After being transferred to  CTICU for surgical evaluation, pt was deemed to not be a surgical candidate and transferred to MICU for medical management. ID, Nephrology, heme/onc, surgery, vascular, cardiology, GI following. Initially bacteremic and in septic shock 2/2 to MSSA. He was started on Nafcillin. Most recent echo (11/20) with large stable vegetation (3.8cm x 1cm), severe TR, markedly dilated IVC with increased R atrial pressure, EF 60% (increased from previous EF of 40-45% earlier in admission). He has been sedated on Propofol, Fentanyl and placed on Midodrine to help with pressure support while on sedation. Has also required Levophed. Patient has remained in acute hypoxic respiratory failure 2/2 septic pulmonary emboli, b/l pleural effusions/consolidations, empyema which was further complicated by b/l loculated pneumothoraces. 2 right chest tubes placed (lateral, anterior) and one left chest tube placed (lateral). Pleural fluid culture with exudate growing MSSA. 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 Patient extubated x1 but due to increased work of breathing was re-intubated. Trach performed on 11/26. Has been receiving CVVHD in setting of acute renal failure most likely 2/2 ATN from hypoperfusion (makes no urine), electrolyte abnormalities continue to improve. NGT in place for feeds. Has had abdominal distension on multiple occasions with concern for SBO, however imaging suggestive of ileus and anasarca. Rectal tube/sump placed for GI decompression. CT abdomen/pelvis also with thickening of the left lateral wall of the rectum, when stable will go for a flex sigmoidoscopy  with GI. Now on trickle feeds and will resume full feeds if tolerating. His transaminitis, coagulopathy and hemolysis (2/2 to sepsis vs DIC) have been improving throughout his hospital stay. He has received units of plasma, platelets, pRBCs during this time. He also developed ischemic toes/fingers in the setting of being on multiple pressors when initially transferred to [facility] and will likely need surgery in the future for this.

## 2019-11-25 NOTE — PROGRESS NOTE ADULT - ASSESSMENT
40 yo M w/ PMH of IVDU and active smoker who came from [facility] on 11/8/19 in 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      # CAMERON likely due to ATN in shock  - on CVVHD (initiated on 11/11) via RIJ HD cath, increase UF to net negative 25 cc/hr and titrate UF goal hourly as tolerated to improve/resolve dependant scrotal/sacral edema  - electrolytes noted, monitor BMP Q6hr while on CVVHD for K/Phos and Mg and supplement as needed, keep K >4, Phos >3, Mg >2  - keep map >65 mmHg  - continue bladder scan  - monitor H/H/Plt, transfuse as indicated  - treat infection w renally adjusted ABx    Discussed with attending Dr Pinzon.

## 2019-11-25 NOTE — PROGRESS NOTE ADULT - SUBJECTIVE AND OBJECTIVE BOX
INTERVAL EVENTS: Patient intubated. Patient feels SOB and uncomfortable. Denies CP, palpitations.     PAST MEDICAL & SURGICAL HISTORY:  Endocarditis  IVDU (intravenous drug user)  IVDA (intravenous drug abuse) complicating pregnancy  Smoker  No significant past surgical history      MEDICATIONS  (STANDING):  albuterol/ipratropium for Nebulization 3 milliLiter(s) Nebulizer every 4 hours  artificial  tears Solution 1 Drop(s) Both EYES every 6 hours  ascorbic acid 500 milliGRAM(s) Oral daily  chlorhexidine 0.12% Liquid 15 milliLiter(s) Oral Mucosa every 12 hours  chlorhexidine 2% Cloths 1 Application(s) Topical daily  CRRT Treatment    <Continuous>  dextrose 5%. 1000 milliLiter(s) (50 mL/Hr) IV Continuous <Continuous>  dextrose 50% Injectable 12.5 Gram(s) IV Push once  dextrose 50% Injectable 25 Gram(s) IV Push once  dextrose 50% Injectable 25 Gram(s) IV Push once  fentaNYL   Infusion. 0.5 MICROgram(s)/kG/Hr (3.68 mL/Hr) IV Continuous <Continuous>  heparin  Injectable 5000 Unit(s) SubCutaneous every 8 hours  hydrocortisone sodium succinate Injectable 50 milliGRAM(s) IV Push every 8 hours  insulin lispro (HumaLOG) corrective regimen sliding scale   SubCutaneous every 6 hours  midodrine 5 milliGRAM(s) Oral every 8 hours  multivitamin/minerals/iron Oral Solution (CENTRUM) 15 milliLiter(s) Oral daily  nafcillin  IVPB 2 Gram(s) IV Intermittent every 4 hours  norepinephrine Infusion 0.05 MICROgram(s)/kG/Min (3.45 mL/Hr) IV Continuous <Continuous>  pantoprazole  Injectable 40 milliGRAM(s) IV Push daily  propofol Infusion 5 MICROgram(s)/kG/Min (2.208 mL/Hr) IV Continuous <Continuous>  PureFlow Dialysate RFP-401 (K 4 / Ca 3) 5000 milliLiter(s) (2500 mL/Hr) CRRT <Continuous>  sodium chloride 0.9% lock flush 3 milliLiter(s) IV Push every 8 hours  vitamin E 400 International Unit(s) Oral daily  zinc sulfate 220 milliGRAM(s) Oral daily    MEDICATIONS  (PRN):  acetaminophen    Suspension .. 650 milliGRAM(s) Oral every 6 hours PRN Temp greater or equal to 38C (100.4F)  dextrose 40% Gel 15 Gram(s) Oral once PRN Blood Glucose LESS THAN 70 milliGRAM(s)/deciliter  glucagon  Injectable 1 milliGRAM(s) IntraMuscular once PRN Glucose LESS THAN 70 milligrams/deciliter      Vital Signs Last 24 Hrs  T(C): 36.7 (25 Nov 2019 10:00), Max: 38.2 (24 Nov 2019 14:00)  T(F): 98.1 (25 Nov 2019 10:00), Max: 100.7 (24 Nov 2019 14:00)  HR: 134 (25 Nov 2019 10:00) (114 - 134)  BP: 124/82 (25 Nov 2019 10:00) (92/49 - 124/82)  BP(mean): 95 (25 Nov 2019 10:00) (62 - 95)  RR: 29 (25 Nov 2019 10:00) (10 - 30)  SpO2: 95% (25 Nov 2019 10:00) (95% - 100%)     PHYSICAL EXAM:  GEN: Intubated. Awake, alert. Uncomfortable.   HEENT: NCAT. Left IJ TLC. Right HD cath.   RESP: Rhonchorus BS B/L with other adventitious BS. B/L Chest tubes.   CV: Tachy. normal s1/s2. Difficult to assess other mrg.   ABD: Soft, Distended and tympanic to percussion.   EXT: Warm. No edema. Left 1,3,4 toes with necrosis. Right toes with necrosis. DPPT 2+ B/L.   NEURO: Intubated and sedated.     LABS:                        8.4    8.73  )-----------( 70       ( 25 Nov 2019 04:55 )             25.6     11-25    130<L>  |  98  |  16  ----------------------------<  106<H>  4.0   |  23  |  0.90    Ca    8.3<L>      25 Nov 2019 04:55  Phos  4.0     11-25  Mg     2.3     11-25    TPro  6.8  /  Alb  2.5<L>  /  TBili  2.0<H>  /  DBili  1.5<H>  /  AST  37  /  ALT  36  /  AlkPhos  86  11-25        PT/INR - ( 25 Nov 2019 04:55 )   PT: 14.1 sec;   INR: 1.24          PTT - ( 25 Nov 2019 04:55 )  PTT:36.1 sec    I&O's Summary    24 Nov 2019 07:01  -  25 Nov 2019 07:00  --------------------------------------------------------  IN: 3979.1 mL / OUT: 3835 mL / NET: 144.1 mL    25 Nov 2019 07:01  -  25 Nov 2019 11:50  --------------------------------------------------------  IN: 239 mL / OUT: 0 mL / NET: 239 mL      BNP  RADIOLOGY & ADDITIONAL STUDIES:    TELEMETRY: Sinus Tachycardia

## 2019-11-25 NOTE — PROGRESS NOTE ADULT - ASSESSMENT
38 y/o M w/ PMH of IVDU and active smoker who came from [facility] on 11/8/19 in 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 After being transferred to  CTICU for surgical evaluation, pt was deemed to not be a surgical candidate and transferred to MICU for medical mgmt. ID, Nephrology, heme/onc, surgery following, vascular.    Neuro  Intubated, sedated on Propofol, Fentanyl gtt. Prior CT head negative for any intracranial embolic events. Awake and alert, follows commands.     Cardiovascular     #Hypotension  Most likely 2/2 septic shock from infective endocarditis and RV failure 2/2 tricuspid regurgitation (ELIAN shows EF of 40-50%). Echo with EF 45%. ELIAN with EF 40-45%, 3.8 x1cm vegetation on TR valve, with severe TR, trivial pericardial effusion. Echo with bubble revealed no PFO. Wean off Levophed as tolerated.   - Maintain MAP>65.   - C/W medical mgmt of infective endocarditis as below.  - repeat echo 11/18 with stable vegetation  - Likely became hypotensive and bradycardic overnight (11/18-11/19) in setting of mucous plug vs arrythmia vs most likely septic shock   - repeat echo 11/20 with large stable vegetation, severe TR, markedly dilated IVC with increased R atrial pressure- EF 60%   - c/w Midodrine 5mg TID started       Respiratory    #Acute hypoxic respiratory failure   Most likely 2/2 alveolar hemorrhage in the setting of septic embolic causing numerous cavitary lesions on CT chest. CXR with scattered infiltrates and pleural effusions. Sputum culture negative. CT revealed "moderate bilateral pleural effusions and dense bibasilar consolidation with underlying septic emboli/pulmonary abscesses."   - Re-intubated on 11/19 for acute hypoxic respiratory failure with increased work of breathing   - CT chest with new b/l loculated pneumothoraces with increase in size of cavitary lesions with worsening lobar PNA   - now with 2 right chest tubes and 1 left chest tube-->repeat cxr  with decrease in size of b/l pneumothoraces   - cxr 11/21 with small right pneumothorax, still present but stable on 11/22    - Hold off on trach for now, will reconsider next week depending on clinical status and need for further tx   - Start Duonebs q4  - Repeat bronch 11/22 with thick secretions (R>L) but slightly improved compared to first bronch   - Continue to treat IE as below  - attempted CPAP trial in AM, pt failed x2. Will continue to try tomorrow     #Bilateral pulmonary effusion  Most likely empyema in setting of septic shock 2/2 infective endocarditis. Bilateral CT in place, confirmed by CXR, continues to drain serosanguinous fluid. Fluid analysis of both CT pleural fluid confirms complex exudate with high cellularity, low pH and low glucose. pleural fluid cultures with staph aureus. Chest tubes in place as above (2 R chest tubes, 1 L chest tube)   - Monitor output of bilateral chest tubes  - R pleural fluid cultures with staph aureus, L pleural fluid cultures with NGTD   - CT chest and cxr as above. Chest tube set to suction.      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 Not a surgical candidate for a tricuspid valve replacement at this time as per CT surgery. Lactate 2.0 today. Initial blood cultures positive for staph epidermidis, likely a contaminant. Initial sputum culture positive for staph aureus  Repeat blood cultures with NGTD. Sputum cx NGTD.  - ID following, C/W Nafcillin 2g q4 (Sensitive to Oxacillin as per OSH records), f/u recs   - DC Meropenem per ID   - DC Vancomycin as MRSA PCR negative   - repeat Bcxs with NGTD   - Bronch performed 11/19 with copious purulent sputum-  BAL cxs with numerous wbcs, rare gram positive rods/ rare gram negative rods, AFB negative. Cytopathology with no pneumocystis organisms   - Bronch findings with possible herpetic tracheobronchitis, awaiting results   - Per CT surgery pt is too high risk for any surgery and would likely not survive procedure.   - Cardiology consulted to optimize management of his right heart dysfunction, f/u recs    - Repeat bronch 11/22 with thick secretions (R>L) but slightly improved compared to first bronch. F/u BAL cxs      Renal    #Acute renal failure most likely 2/2 ATN from hypoperfusion, Minimal urine output, on CVVHD, oliguric with 0-5cc/hr. Vancomycin level 37 on arrival so may be component of drug induced nephropathy. Bun/Cr continues to improve while on CVVHD. Pt is clinically fluid overloaded but now improved and hemodynamically stable. No renal infarcts as per CT abdomen/pelvis. Optimized for Sx as per nephrology.  - F/U nephrology recs.  - C/W CVVHD as tolerated, monitor for clotting  - Continue to correct fluid overload with HD  - Monitor renal function with BUN/Cr  - Monitor I/Os  - blader scan, with straight cath if needed   - Pt kept net even today on CVVHD as pt was tachycardiac and hypotensive. Concerned for volume depletion. Pt was also given 250cc bolus of NS w/ response in positive response in BP.    #Electrolytes abnormalities   Improved. On CVVHD. Hyperkalemia resolved. No EKG changes.  -Requires q12 BMP, Mg, and Phos while on CVVHD  -Monitor serum lytes       GI    OG on arrival with 600cc of bilious fluid, abdomen still distended but had several BMs, some loose. CT abdomen revealed no evidence of bowel ischemia or SBO. Sump was DCed and replaced with NG tube. CT abdomen/pelvis revealed "irregular masslike mural thickening of the posterior left lateral wall of the rectum." Surgery consulted, unlikely perirectal abscess. Recommend GI evaluation for possible scope.  - NG tube in place  - C/W tube feeds, Jevity 1.5  - F/u GI consult, f/u recs, when stable will go for flex sig   - Abdominal xray with air-filled loops of small/large bowel with concern for distal obstruction vs ileus. Surgery consulted. Rectal tube and sump placed. CT abd/pelvis with abd/pelvic, anasarca, colonic ileus. Rectal tube in place for colonic decompression, continue to monitor. Abdomen still distended but significantly improved, repeat abd xray without obstruction   -C. diff negative     #Congestive Hepatopathy   Transaminitis, coag derangements, hyperbilirubinemia most likely 2/2 hepatic congestion vs hemolysis, due to severe TR in setting of infective endocarditis. Transaminases and Alk phos improving. Hepatitis panel negative.  - Monitor CMP, direct bili   - Avoid Tylenol    Heme    #Hemolysis  Intravascular hemolysis with inital haptoglobin <10, LDH decreasing at 365. D-dimer elevated. Fibrinogen stable 396. Today still clinically jaundiced, with stable bilirubinemia: T.bili 9.9., D.bili 8.5. h/h stable at 8.1/25.5. Platelets decreasing from 54 to 49.. Fact VII/Factor VIII elevated. Unlikely 2/2 CVVHD. Likely 2/2 to sepsis and/or DIC.   - s/p 2u pRBCs (11/19-11/20). Hgb stable at 7.2  - Heme consulted, follow recs  - Monitor direct and total bili, LDH, fibrinogen, platelets   - Transfuse platelets if <10K or bleeding and <50K  - Transfuse pRBCs for hgb <7     Vascular  Vascular surgery consulted in setting of gangrenous distal toes and fingers b/l. Likely 2/2 to pressors. Will follow up recs.     Lines: right IJ TLC and Ernesto (11/12), Axillary A-line (11/12), Left IJ (11/12), right peripheral (11/12)    Endocrinology  -Continue Hydrocortisone 50mg q8- stress dose steroids    F: None   E: replete K <4, Mg <2  N: Jevity 1.5   GI Ppx: Protonix   DVT Ppx: Heparin   Code status: FULL   Dispo: MICU 38 y/o M w/ PMH of IVDU and active smoker who came from [facility] on 11/8/19 in 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 After being transferred to  CTICU for surgical evaluation, pt was deemed to not be a surgical candidate and transferred to MICU for medical mgmt. ID, Nephrology, heme/onc, surgery following, vascular.    Neuro  Intubated, sedated on Propofol, Fentanyl gtt. Prior CT head negative for any intracranial embolic events. -Down titrate Propofol      Cardiovascular   #Hypotension  Most likely 2/2 septic shock from infective endocarditis and RV failure 2/2 tricuspid regurgitation (ELIAN shows EF of 40-50%). Echo with EF 45%. ELIAN with EF 40-45%, 3.8 x1cm vegetation on TR valve, with severe TR, trivial pericardial effusion. Echo with bubble revealed no PFO. Wean off Levophed as tolerated.   - Maintain MAP>65.   - C/W medical mgmt of infective endocarditis as below.  - repeat echo 11/18 with stable vegetation  - Likely became hypotensive and bradycardic overnight (11/18-11/19) in setting of mucous plug vs arrythmia vs most likely septic shock   - repeat echo 11/20 with large stable vegetation, severe TR, markedly dilated IVC with increased R atrial pressure- EF 60%   - c/w Midodrine 5mg TID       Respiratory  #Acute hypoxic respiratory failure   Most likely 2/2 alveolar hemorrhage in the setting of septic embolic causing numerous cavitary lesions on CT chest. CXR with scattered infiltrates and pleural effusions. Sputum culture negative. CT revealed "moderate bilateral pleural effusions and dense bibasilar consolidation with underlying septic emboli/pulmonary abscesses."   - Re-intubated on 11/19 for acute hypoxic respiratory failure with increased work of breathing   - CT chest with new b/l loculated pneumothoraces with increase in size of cavitary lesions with worsening lobar PNA   - now with 2 right chest tubes and 1 left chest tube-->repeat cxr with decrease in size of b/l pneumothoraces   - cxr 11/21 with small right pneumothorax, still present but stable on 11/22    - NPO for possible trach today    - C/w Duonebs q4  - Repeat bronch 11/22 with thick secretions (R>L) but slightly improved compared to first bronch   - Continue to treat IE as below  - Attempt CPAP trial in AM     #Bilateral pulmonary effusion  Most likely empyema in setting of septic shock 2/2 infective endocarditis. Bilateral CT in place, confirmed by CXR, continues to drain serosanguinous fluid. Fluid analysis of both CT pleural fluid confirms complex exudate with high cellularity, low pH and low glucose. pleural fluid cultures with staph aureus. Chest tubes in place as above (2 R chest tubes, 1 L chest tube)   - Monitor output of bilateral chest tubes  - R pleural fluid cultures with staph aureus, L pleural fluid cultures with NGTD   - CT chest and cxr as above. Chest tubes set to suction.      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 Not a surgical candidate for a tricuspid valve replacement at this time as per CT surgery. Lactate 2.0 today. Initial blood cultures positive for staph epidermidis, likely a contaminant. Initial sputum culture positive for staph aureus  Repeat blood cultures with NGTD. Sputum cx NGTD.  - ID following, C/W Nafcillin 2g q4 (Sensitive to Oxacillin as per OSH records), f/u recs   - DC Meropenem per ID   - DC Vancomycin as MRSA PCR negative   - repeat Bcxs with NGTD   - Bronch performed 11/19 with copious purulent sputum-  BAL cxs with numerous wbcs, rare gram positive rods/ rare gram negative rods, AFB negative. Cytopathology with no pneumocystis organisms   - Per CT surgery pt is too high risk for any surgery and would likely not survive procedure.   - Cardiology consulted to optimize management of his right heart dysfunction, f/u recs    - Repeat bronch 11/22 with thick secretions (R>L) but slightly improved compared to first bronch. F/u BAL cxs      Renal  #Acute renal failure most likely 2/2 ATN from hypoperfusion, Minimal urine output, on CVVHD, oliguric with 0-5cc/hr. Vancomycin level 37 on arrival so may be component of drug induced nephropathy. Bun/Cr continues to improve while on CVVHD. Pt is clinically fluid overloaded but now improved and hemodynamically stable. No renal infarcts as per CT abdomen/pelvis.  - F/U nephrology recs.  - C/W CVVHD as tolerated, monitor for clotting  - Continue to correct fluid overload with HD  - Monitor renal function with BUN/Cr  - Monitor I/Os  - blader scan, with straight cath if needed   - Will keep net even on CVVHD for now     #Electrolytes abnormalities   Improved. On CVVHD. Hyperkalemia resolved. No EKG changes.  -Requires q12 BMP, Mg, and Phos while on CVVHD  -Monitor serum lytes       GI  OG on arrival with 600cc of bilious fluid, abdomen still distended but had several BMs, some loose. CT abdomen revealed no evidence of bowel ischemia or SBO. Sump was DCed and replaced with NG tube. CT abdomen/pelvis revealed "irregular masslike mural thickening of the posterior left lateral wall of the rectum." Surgery consulted, unlikely perirectal abscess. Recommend GI evaluation for possible scope.  - NG tube in place  - C/W tube feeds, Jevity 1.5  - F/u GI consult, f/u recs, when stable will go for flex sig   - Abdominal xray with air-filled loops of small/large bowel with concern for distal obstruction vs ileus. Surgery consulted. Rectal tube and sump placed. CT abd/pelvis with abd/pelvic, anasarca, colonic ileus. Rectal tube in place for colonic decompression, continue to monitor. Abdomen still distended but significantly improved, repeat abd xray without obstruction   -C. diff negative     #Congestive Hepatopathy   Transaminitis, coag derangements, hyperbilirubinemia most likely 2/2 hepatic congestion vs hemolysis, due to severe TR in setting of infective endocarditis. Transaminases and Alk phos improving. Hepatitis panel negative.  - Monitor CMP, direct bili   - Avoid Tylenol      Heme  #Hemolysis  Intravascular hemolysis with initial haptoglobin <10, LDH decreasing at 365. D-dimer elevated. Fibrinogen stable 396. Today still clinically jaundiced, with stable bilirubinemia: T.bili 9.9., D.bili 8.5. h/h stable at 8.1/25.5. Platelets decreasing from 54 to 49.. Fact VII/Factor VIII elevated. Unlikely 2/2 CVVHD. Likely 2/2 to sepsis and/or DIC.   - s/p 2u pRBCs (11/19-11/20)  - Received 1u pRBCs 11/24, Hgb stable at 8.4   - Heme consulted, follow recs  - Monitor direct and total bili, LDH, fibrinogen, platelets   - Transfuse platelets if <10K or bleeding and <50K  - Transfuse pRBCs for hgb <7       Vascular  Vascular surgery consulted in setting of gangrenous distal toes and fingers b/l. Likely 2/2 to pressors. Follow up recs.       Endocrinology  -Continue Hydrocortisone 50mg q8- stress dose steroids      Lines: right IJ TLC and Ernesto (11/12), Axillary A-line (11/12), Left IJ (11/12), right peripheral (11/12)    F: None   E: replete K <4, Mg <2  N: NPO for possible trach   GI Ppx: Protonix   DVT Ppx: Heparin   Code status: FULL   Dispo: MICU

## 2019-11-26 NOTE — PROGRESS NOTE ADULT - SUBJECTIVE AND OBJECTIVE BOX
no acute events overnight  s/p trach placement this morning  on sedation, requiring pressure support  cw CVVHD with net even fluid balance             Meds:  acetaminophen    Suspension .. 650 milliGRAM(s) Oral every 6 hours PRN  acyclovir IVPB 600 milliGRAM(s) IV Intermittent every 24 hours  albuterol/ipratropium for Nebulization 3 milliLiter(s) Nebulizer every 4 hours  artificial  tears Solution 1 Drop(s) Both EYES every 6 hours  ascorbic acid 500 milliGRAM(s) Oral daily  chlorhexidine 0.12% Liquid 15 milliLiter(s) Oral Mucosa every 12 hours  chlorhexidine 2% Cloths 1 Application(s) Topical daily  CRRT Treatment    <Continuous>  dextrose 40% Gel 15 Gram(s) Oral once PRN  dextrose 5%. 1000 milliLiter(s) IV Continuous <Continuous>  dextrose 50% Injectable 12.5 Gram(s) IV Push once  dextrose 50% Injectable 25 Gram(s) IV Push once  dextrose 50% Injectable 25 Gram(s) IV Push once  fentaNYL   Infusion. 0.5 MICROgram(s)/kG/Hr IV Continuous <Continuous>  glucagon  Injectable 1 milliGRAM(s) IntraMuscular once PRN  heparin  Injectable 5000 Unit(s) SubCutaneous every 8 hours  hydrocortisone sodium succinate Injectable 50 milliGRAM(s) IV Push every 8 hours  insulin lispro (HumaLOG) corrective regimen sliding scale   SubCutaneous every 6 hours  midodrine 5 milliGRAM(s) Oral every 8 hours  multivitamin/minerals/iron Oral Solution (CENTRUM) 15 milliLiter(s) Oral daily  nafcillin  IVPB 2 Gram(s) IV Intermittent every 4 hours  norepinephrine Infusion 0.05 MICROgram(s)/kG/Min IV Continuous <Continuous>  pantoprazole  Injectable 40 milliGRAM(s) IV Push daily  Phoxillum Filtration BK 4 / 2.5 5000 milliLiter(s) CRRT <Continuous>  polyethylene glycol 3350 17 Gram(s) Oral two times a day  propofol Infusion 5 MICROgram(s)/kG/Min IV Continuous <Continuous>  sodium chloride 0.9% lock flush 3 milliLiter(s) IV Push every 8 hours  vitamin E 400 International Unit(s) Oral daily  zinc sulfate 220 milliGRAM(s) Oral daily      VITALS:  T(C): 35.1 (11-26-19 @ 13:00), Max: 38.3 (11-25-19 @ 18:00)  HR: 98 (11-26-19 @ 15:00) (88 - 134)  BP: 113/72 (11-26-19 @ 09:00) (85/48 - 131/70)  RR: 11 (11-26-19 @ 15:00) (11 - 27)  SpO2: 100% (11-26-19 @ 15:00) (96% - 100%)    Input/Output      11-25-19 @ 07:01  -  11-26-19 @ 07:00  --------------------------------------------------------  IN: 2799.1 mL / OUT: 3056 mL / NET: -256.9 mL    11-26-19 @ 07:01  -  11-26-19 @ 15:43  --------------------------------------------------------  IN: 777.2 mL / OUT: 680 mL / NET: 97.2 mL            PHYSICAL EXAM  General: intubated, sedated, NAD  Neck: no JVD  Respiratory: equal breath sounds bilaterally, bilateral chest tubes  HEART: normal S1S2  ABDOMEN: Soft, distended, tympanic, not tender, rectal tube in place, +BS  : [place] in place with uop >0.4 L/24 hr  EXTREMITIES: upper tight and sacral edema present/ necrotic U/L digits   NEUROLOGY: sedated  ACCESS: R IJ THC, site dry and clean        LABS:                            7.7    8.62  )-----------( 108      ( 26 Nov 2019 04:38 )             24.4       11-26    133<L>  |  98  |  15  ----------------------------<  101<H>  4.2   |  24  |  0.96    Ca    8.6      26 Nov 2019 04:38  Phos  2.9     11-26  Mg     1.8     11-26    TPro  6.6  /  Alb  2.4<L>  /  TBili  1.9<H>  /  DBili  1.4<H>  /  AST  45<H>  /  ALT  36  /  AlkPhos  85  11-26      PT/INR - ( 26 Nov 2019 04:38 )   PT: 13.1 sec;   INR: 1.15          PTT - ( 26 Nov 2019 04:38 )  PTT:36.4 sec          RADIOLOGY:    < from: Xray Chest 1 View- PORTABLE-Urgent (11.25.19 @ 13:22) >    EXAM:  XR CHEST PORTABLE URGENT 1V                          PROCEDURE DATE:  11/25/2019          INTERPRETATION:  Clinical history/reason for exam: Tube placement.    Single frontal view.    Comparison: November 25, 2019 time 7:18 AM.    Findings/  impression: Support devices in place. Stable lung pathology including   left pneumothorax. Stable heart size.      < end of copied text >

## 2019-11-26 NOTE — PROGRESS NOTE ADULT - SUBJECTIVE AND OBJECTIVE BOX
Infectious Diseases Progress Note:    SUBJECTIVE: Patient seen and examined at bedside. Sedated.  Unable to obtain ROS. s/p tracheostomy    ENDOCARDITIS/SEPSIS    Endocarditis  CAMERON (acute kidney injury)  Acute endocarditis due to other organism      Allergies    No Known Allergies    Intolerances        ANTIBIOTICS/RELEVANT:  antimicrobials  acyclovir IVPB 600 milliGRAM(s) IV Intermittent every 24 hours  nafcillin  IVPB 2 Gram(s) IV Intermittent every 4 hours    immunologic:      OTHER:  acetaminophen    Suspension .. 650 milliGRAM(s) Oral every 6 hours PRN  albuterol/ipratropium for Nebulization 3 milliLiter(s) Nebulizer every 4 hours  artificial  tears Solution 1 Drop(s) Both EYES every 6 hours  ascorbic acid 500 milliGRAM(s) Oral daily  chlorhexidine 0.12% Liquid 15 milliLiter(s) Oral Mucosa every 12 hours  chlorhexidine 2% Cloths 1 Application(s) Topical daily  CRRT Treatment    <Continuous>  dextrose 40% Gel 15 Gram(s) Oral once PRN  dextrose 5%. 1000 milliLiter(s) IV Continuous <Continuous>  dextrose 50% Injectable 12.5 Gram(s) IV Push once  dextrose 50% Injectable 25 Gram(s) IV Push once  dextrose 50% Injectable 25 Gram(s) IV Push once  fentaNYL   Infusion. 0.5 MICROgram(s)/kG/Hr IV Continuous <Continuous>  glucagon  Injectable 1 milliGRAM(s) IntraMuscular once PRN  heparin  Injectable 5000 Unit(s) SubCutaneous every 8 hours  hydrocortisone sodium succinate Injectable 50 milliGRAM(s) IV Push every 8 hours  insulin lispro (HumaLOG) corrective regimen sliding scale   SubCutaneous every 6 hours  midodrine 5 milliGRAM(s) Oral every 8 hours  multivitamin/minerals/iron Oral Solution (CENTRUM) 15 milliLiter(s) Oral daily  norepinephrine Infusion 0.05 MICROgram(s)/kG/Min IV Continuous <Continuous>  pantoprazole  Injectable 40 milliGRAM(s) IV Push daily  Phoxillum Filtration BK 4 / 2.5 5000 milliLiter(s) CRRT <Continuous>  polyethylene glycol 3350 17 Gram(s) Oral two times a day  propofol Infusion 5 MICROgram(s)/kG/Min IV Continuous <Continuous>  sodium chloride 0.9% lock flush 3 milliLiter(s) IV Push every 8 hours  vitamin E 400 International Unit(s) Oral daily  zinc sulfate 220 milliGRAM(s) Oral daily      Objective:  Vital Signs Last 24 Hrs  T(C): 35.1 (26 Nov 2019 13:00), Max: 38.3 (25 Nov 2019 18:00)  T(F): 95.1 (26 Nov 2019 13:00), Max: 100.9 (25 Nov 2019 18:00)  HR: 98 (26 Nov 2019 17:00) (88 - 134)  BP: 113/72 (26 Nov 2019 09:00) (85/48 - 131/70)  BP(mean): 82 (26 Nov 2019 09:00) (61 - 86)  RR: 12 (26 Nov 2019 17:00) (11 - 27)  SpO2: 100% (26 Nov 2019 17:00) (96% - 100%)    PHYSICAL EXAM:  Constitutional: Tracheostomy and sedated  Eyes: +scleral icterus  Neck:no JVD, no lymphadenopathy, supple  Respiratory: b/l rhonchi throughout. + R sided chest tube x3 with serosanguinous drainage, L chest tube x1  Cardiovascular: S1S2, RRR, +systolic murmur  Gastrointestinal: soft, NTND, (+) BS, no HSM  Extremities: cyanotic and ischemic changes of b/l fingers and toes    LABS:                        7.7    8.62  )-----------( 108      ( 26 Nov 2019 04:38 )             24.4     11-26    133<L>  |  98  |  15  ----------------------------<  101<H>  4.2   |  24  |  0.96    Ca    8.6      26 Nov 2019 04:38  Phos  2.9     11-26  Mg     1.8     11-26    TPro  6.6  /  Alb  2.4<L>  /  TBili  1.9<H>  /  DBili  1.4<H>  /  AST  45<H>  /  ALT  36  /  AlkPhos  85  11-26    PT/INR - ( 26 Nov 2019 04:38 )   PT: 13.1 sec;   INR: 1.15          PTT - ( 26 Nov 2019 04:38 )  PTT:36.4 sec      MICROBIOLOGY:  Culture - Blood (11.24.19 @ 05:27)    Specimen Source: .Blood Blood    Culture Results:   No growth at 2 days.          RADIOLOGY & ADDITIONAL STUDIES:

## 2019-11-26 NOTE — PROGRESS NOTE ADULT - ATTENDING COMMENTS
Patient seen and examined with house-staff during bedside rounds.  Resident note read, including vitals, physical findings, laboratory data, and radiological reports.   Revisions included below.  Direct personal management at bed side and extensive interpretation of the data.  Plan was outlined and discussed in details with the housestaff.  Decision making of high complexity  Action taken for acute disease activity to reflect the level of care provided:  - medication reconciliation  - review laboratory data  s/p trach  decrease sedation  continue current antibiotics  continue antiviral  Had BM and decrease in NGT drainage  start feed tube  on continue HD  on pressors

## 2019-11-26 NOTE — BRIEF OPERATIVE NOTE - OPERATION/FINDINGS
Sharp Eugenia. Airways were inspected bilaterally to the subsegmental level. Patient had copious purulent secretions bilaterally which were all therapeutically suctioned. Saline flushes were instilled to dislodge the secretions from the airway walls as well, and were subsequently aspirated as well. There were no other abnormalities including obstruction, edema, or bleeding.
The patient was placed in the supine position. The anterior neck was prepped and draped in usual sterile fashion. 1% lidocaine was administered approximately 2 fingerbreadths above the sternal notch for local anesthesia. A 1.5-cm vertical incision was then performed 2 fingerbreadths above the sternal notch. Using a curved Nereida, blunt dissection was performed down to the level of the pretracheal fascia. At this point, the bronchoscope was introduced through the endotracheal tube and the trachea was properly visualized. The endotracheal tube was then gradually withdrawn within the trachea under direct bronchoscopic visualization. Proper midline position was confirmed by bouncing the needle from the tracheostomy tray over the trachea with bronchoscopic examination. The needle was advanced into the trachea and proper positioning was confirmed with direct visualization. The needle was then removed leaving a white outer cannula in position. The wire from the tracheostomy tray was then advanced through the white outer cannula. The cannula was then removed. The small, blue dilator was then advanced over the wire into the trachea. Once proper dilatation was achieved, the dilator was removed. The large, tapered dilator was then advanced over the wire into the trachea. The dilator was removed leaving the wire and white inner cannula in position. A number 8 percutaneous Shiley tracheostomy tube was then advanced over the wire and white inner cannula into the trachea. Proper positioning was confirmed with bronchoscopic visualization. The tracheostomy tube was then sutured in place with four nylon sutures. It was further secured with a tracheostomy tie.
sharp karissa. Airways were examined to the subsegmental level bilaterally. Thick purulent mucus was seen bilaterally in the lower lobes, which were therapeutically aspirated. A BAL was performed on the right middle lobe, with excellent return of instilled saline. No other abnormalities were seen in the airways including bleeding, obstruction, swelling, or any other trauma.
Indication: Percutaneous Tracheostomy placement    History: Hx of IVDU, endocarditis with septic emboli s/p PTX and chronic hypoxic resipratory failure failing to wean from ventilator    Preop medication: Nimbex, Propofol, versed, fentanyl    Findings:  Bronchoscope inserted through ETT. Airway evaluation revealed Sharp Eugenia. Copious secretions within the left mainstem as well as mucus plugging of superior segment of RLL and RML. Therapeutic aspiration performed.     Under bronchoscopic visualization, percutaneous tracheostomy performed. Finder needle, followed by dilator seen entering trachea without puncturing posterior wall. Once tracheostomy placed, bronchoscope passed through tracheostomy and confirmed proper placement, minimal bleeding seen.

## 2019-11-26 NOTE — PROGRESS NOTE ADULT - ASSESSMENT
40 y/o M w/ PMH of IVDU and active smoker who came from [facility] on 11/8/19 in 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 After being transferred to  CTICU for surgical evaluation, pt was deemed to not be a surgical candidate and transferred to MICU for medical mgmt. ID, Nephrology, heme/onc, surgery following, vascular.    Neuro  Intubated, sedated on Propofol, Fentanyl gtt. Prior CT head negative for any intracranial embolic events. -Down titrate Propofol      Cardiovascular   #Hypotension  Most likely 2/2 septic shock from infective endocarditis and RV failure 2/2 tricuspid regurgitation (ELIAN shows EF of 40-50%). Echo with EF 45%. ELIAN with EF 40-45%, 3.8 x1cm vegetation on TR valve, with severe TR, trivial pericardial effusion. Echo with bubble revealed no PFO. Wean off Levophed as tolerated.   - Maintain MAP>65.   - C/W medical mgmt of infective endocarditis as below.  - repeat echo 11/18 with stable vegetation  - Likely became hypotensive and bradycardic overnight (11/18-11/19) in setting of mucous plug vs arrythmia vs most likely septic shock   - repeat echo 11/20 with large stable vegetation, severe TR, markedly dilated IVC with increased R atrial pressure- EF 60%   - c/w Midodrine 5mg TID       Respiratory  #Acute hypoxic respiratory failure   Most likely 2/2 alveolar hemorrhage in the setting of septic embolic causing numerous cavitary lesions on CT chest. CXR with scattered infiltrates and pleural effusions. Sputum culture negative. CT revealed "moderate bilateral pleural effusions and dense bibasilar consolidation with underlying septic emboli/pulmonary abscesses."   - Re-intubated on 11/19 for acute hypoxic respiratory failure with increased work of breathing   - CT chest with new b/l loculated pneumothoraces with increase in size of cavitary lesions with worsening lobar PNA   - now with 2 right chest tubes and 1 left chest tube-->repeat cxr with decrease in size of b/l pneumothoraces   - cxr 11/21 with small right pneumothorax, still present but stable on 11/22    - NPO for possible trach today    - C/w Duonebs q4  - Repeat bronch 11/22 with thick secretions (R>L) but slightly improved compared to first bronch   - Continue to treat IE as below  - Attempt CPAP trial in AM     #Bilateral pulmonary effusion  Most likely empyema in setting of septic shock 2/2 infective endocarditis. Bilateral CT in place, confirmed by CXR, continues to drain serosanguinous fluid. Fluid analysis of both CT pleural fluid confirms complex exudate with high cellularity, low pH and low glucose. pleural fluid cultures with staph aureus. Chest tubes in place as above (2 R chest tubes, 1 L chest tube)   - Monitor output of bilateral chest tubes  - R pleural fluid cultures with staph aureus, L pleural fluid cultures with NGTD   - CT chest and cxr as above. Chest tubes set to suction.      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 Not a surgical candidate for a tricuspid valve replacement at this time as per CT surgery. Lactate 2.0 today. Initial blood cultures positive for staph epidermidis, likely a contaminant. Initial sputum culture positive for staph aureus  Repeat blood cultures with NGTD. Sputum cx NGTD.  - ID following, C/W Nafcillin 2g q4 (Sensitive to Oxacillin as per OSH records), f/u recs   - DC Meropenem per ID   - DC Vancomycin as MRSA PCR negative   - repeat Bcxs with NGTD   - Bronch performed 11/19 with copious purulent sputum-  BAL cxs with numerous wbcs, rare gram positive rods/ rare gram negative rods, AFB negative. Cytopathology with no pneumocystis organisms   - Per CT surgery pt is too high risk for any surgery and would likely not survive procedure.   - Cardiology consulted to optimize management of his right heart dysfunction, f/u recs    - Repeat bronch 11/22 with thick secretions (R>L) but slightly improved compared to first bronch. F/u BAL cxs      Renal  #Acute renal failure most likely 2/2 ATN from hypoperfusion, Minimal urine output, on CVVHD, oliguric with 0-5cc/hr. Vancomycin level 37 on arrival so may be component of drug induced nephropathy. Bun/Cr continues to improve while on CVVHD. Pt is clinically fluid overloaded but now improved and hemodynamically stable. No renal infarcts as per CT abdomen/pelvis.  - F/U nephrology recs.  - C/W CVVHD as tolerated, monitor for clotting  - Continue to correct fluid overload with HD  - Monitor renal function with BUN/Cr  - Monitor I/Os  - blader scan, with straight cath if needed   - Will keep net even on CVVHD for now     #Electrolytes abnormalities   Improved. On CVVHD. Hyperkalemia resolved. No EKG changes.  -Requires q12 BMP, Mg, and Phos while on CVVHD  -Monitor serum lytes       GI  OG on arrival with 600cc of bilious fluid, abdomen still distended but had several BMs, some loose. CT abdomen revealed no evidence of bowel ischemia or SBO. Sump was DCed and replaced with NG tube. CT abdomen/pelvis revealed "irregular masslike mural thickening of the posterior left lateral wall of the rectum." Surgery consulted, unlikely perirectal abscess. Recommend GI evaluation for possible scope.  - NG tube in place  - C/W tube feeds, Jevity 1.5  - F/u GI consult, f/u recs, when stable will go for flex sig   - Abdominal xray with air-filled loops of small/large bowel with concern for distal obstruction vs ileus. Surgery consulted. Rectal tube and sump placed. CT abd/pelvis with abd/pelvic, anasarca, colonic ileus. Rectal tube in place for colonic decompression, continue to monitor. Abdomen still distended but significantly improved, repeat abd xray without obstruction   -C. diff negative     #Congestive Hepatopathy   Transaminitis, coag derangements, hyperbilirubinemia most likely 2/2 hepatic congestion vs hemolysis, due to severe TR in setting of infective endocarditis. Transaminases and Alk phos improving. Hepatitis panel negative.  - Monitor CMP, direct bili   - Avoid Tylenol      Heme  #Hemolysis  Intravascular hemolysis with initial haptoglobin <10, LDH decreasing at 365. D-dimer elevated. Fibrinogen stable 396. Today still clinically jaundiced, with stable bilirubinemia: T.bili 9.9., D.bili 8.5. h/h stable at 8.1/25.5. Platelets decreasing from 54 to 49.. Fact VII/Factor VIII elevated. Unlikely 2/2 CVVHD. Likely 2/2 to sepsis and/or DIC.   - s/p 2u pRBCs (11/19-11/20)  - Received 1u pRBCs 11/24, Hgb stable at 8.4   - Heme consulted, follow recs  - Monitor direct and total bili, LDH, fibrinogen, platelets   - Transfuse platelets if <10K or bleeding and <50K  - Transfuse pRBCs for hgb <7       Vascular  Vascular surgery consulted in setting of gangrenous distal toes and fingers b/l. Likely 2/2 to pressors. Follow up recs.       Endocrinology  -Continue Hydrocortisone 50mg q8- stress dose steroids      Lines: right IJ TLC and Ernesto (11/12), Axillary A-line (11/12), Left IJ (11/12), right peripheral (11/12)    F: None   E: replete K <4, Mg <2  N: NPO for possible trach   GI Ppx: Protonix   DVT Ppx: Heparin   Code status: FULL   Dispo: MICU 40 y/o M w/ PMH of IVDU and active smoker who came from [facility] on 11/8/19 in 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 After being transferred to  CTICU for surgical evaluation, pt was deemed to not be a surgical candidate and transferred to MICU for medical mgmt. ID, Nephrology, heme/onc, surgery following, vascular.    Neuro  Intubated, sedated on Propofol, Fentanyl gtt, Versed gtt. Prior CT head negative for any intracranial embolic events.   -Down titrate Propofol  -DC Versed gtt started for trach      Cardiovascular   #Hypotension  Most likely 2/2 septic shock from infective endocarditis and RV failure 2/2 tricuspid regurgitation (ELIAN shows EF of 40-50%). Echo with EF 45%. ELIAN with EF 40-45%, 3.8 x1cm vegetation on TR valve, with severe TR, trivial pericardial effusion. Echo with bubble revealed no PFO. Wean off Levophed as tolerated.   - Maintain MAP>65.   - C/W medical mgmt of infective endocarditis as below.  - repeat echo 11/18 with stable vegetation  - Likely became hypotensive and bradycardic overnight (11/18-11/19) in setting of mucous plug vs arrythmia vs most likely septic shock   - repeat echo 11/20 with large stable vegetation, severe TR, markedly dilated IVC with increased R atrial pressure- EF 60%   - c/w Midodrine 5mg TID       Respiratory  #Acute hypoxic respiratory failure   Most likely 2/2 alveolar hemorrhage in the setting of septic embolic causing numerous cavitary lesions on CT chest. CXR with scattered infiltrates and pleural effusions. Sputum culture negative. CT revealed "moderate bilateral pleural effusions and dense bibasilar consolidation with underlying septic emboli/pulmonary abscesses."   - Re-intubated on 11/19 for acute hypoxic respiratory failure with increased work of breathing   - CT chest with new b/l loculated pneumothoraces with increase in size of cavitary lesions with worsening lobar PNA   - 2 right chest tubes and 1 left chest tube-->repeat cxr with decrease in size of b/l pneumothoraces   - cxr 11/21 with small right pneumothorax, still present but stable on 11/22    - C/w Duonebs q4  - Repeat bronch 11/22 with thick secretions (R>L) but slightly improved compared to first bronch   - 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 Bilateral CT in place, confirmed by CXR, continues to drain serosanguinous fluid. Fluid analysis of both CT pleural fluid confirms complex exudate with high cellularity, low pH and low glucose. pleural fluid cultures with staph aureus. Chest tubes in place as above (2 R chest tubes, 1 L chest tube)   - Monitor output of bilateral chest tubes  - R pleural fluid cultures with staph aureus, L pleural fluid cultures with NGTD   - CT chest and cxr as above. Chest tubes set to suction.      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 Not a surgical candidate for a tricuspid valve replacement at this time as per CT surgery. Initial blood cultures positive for staph epidermidis, likely a contaminant. Initial sputum culture positive for staph aureus  Repeat blood cultures with NGTD. Sputum cx NGTD.  - ID following, C/W Nafcillin 2g q4 (Sensitive to Oxacillin as per OSH records), f/u recs   - repeat Bcxs with NGTD   - Bronch performed 11/19 with copious purulent sputum-  BAL cxs with numerous wbcs, rare gram positive rods/ rare gram negative rods, AFB negative. Cytopathology with no pneumocystis organisms but positive for HSV, started on Acyclovir (11/25-)   - Per CT surgery pt is too high risk for any surgery and would likely not survive procedure.   - Cardiology consulted to optimize management of his right heart dysfunction, f/u recs    - Repeat bronch 11/22 with thick secretions (R>L) but slightly improved compared to first bronch. BAL cxs with mod yeast, staph aureus, few wbcs       Renal  #Acute renal failure most likely 2/2 ATN from hypoperfusion, Minimal urine output, on CVVHD, oliguric with 0-5cc/hr. Vancomycin level 37 on arrival so may be component of drug induced nephropathy. Bun/Cr continues to improve while on CVVHD. Pt is clinically fluid overloaded but now improved and hemodynamically stable. No renal infarcts as per CT abdomen/pelvis.  - F/U nephrology recs.  - C/W CVVHD as tolerated, monitor for clotting  - Continue to correct fluid overload with HD  - Monitor I/Os  - blader scan, with straight cath if needed   - Will keep net even on CVVHD for now     #Electrolytes abnormalities   Improved. On CVVHD. Hyperkalemia resolved. No EKG changes.  -Requires q12 BMP, Mg, and Phos while on CVVHD  -Monitor serum lytes       GI  OG on arrival with 600cc of bilious fluid, abdomen still distended but had several BMs, some loose. CT abdomen revealed no evidence of bowel ischemia or SBO. Sump was DCed and replaced with NG tube. CT abdomen/pelvis revealed "irregular masslike mural thickening of the posterior left lateral wall of the rectum." Surgery consulted, unlikely perirectal abscess. Recommend GI evaluation for possible scope.  - F/u GI consult, f/u recs, when stable will go for flex sig   - Abdominal xray with air-filled loops of small/large bowel with concern for distal obstruction vs ileus. Surgery consulted. Rectal tube and sump placed. CT abd/pelvis with abd/pelvic, anasarca, colonic ileus. Rectal tube in place for colonic decompression, continue to monitor. Abdomen still distended but significantly improved, repeat abd xray without obstruction   -C. diff negative   - NG tube in place, draining 500cc from stomach   - Start trickle feeds, if tolerating advance to full feeds tomorrow  -Passing gas and had large BM in morning. Continue to monitor, abdominal distension likely ileus 2/2 to large dose of Fentanyl and being bed bound     #Congestive Hepatopathy   Transaminitis, coag derangements, hyperbilirubinemia most likely 2/2 hepatic congestion vs hemolysis, due to severe TR in setting of infective endocarditis. Transaminases and Alk phos improving. Hepatitis panel negative.  - Monitor CMP, direct bili   - Avoid Tylenol      Heme  #Hemolysis  Intravascular hemolysis with initial haptoglobin <10, LDH decreasing at 365. D-dimer elevated. Fibrinogen stable 396. Today still clinically jaundiced, with stable bilirubinemia: T.bili 9.9., D.bili 8.5. h/h stable at 8.1/25.5. Platelets decreasing from 54 to 49.. Fact VII/Factor VIII elevated. Unlikely 2/2 CVVHD. Likely 2/2 to sepsis and/or DIC.   - s/p 2u pRBCs (11/19-11/20)  - Received 1u pRBCs 11/24, Hgb stable at 8.4   - Received 1u plts 11/26 in preparation for trach placement 11/26   - Heme consulted, follow recs  - Monitor direct and total bili, LDH, fibrinogen, platelets   - Transfuse platelets if <10K or bleeding and <50K  - Transfuse pRBCs for hgb <7       Vascular  Vascular surgery consulted in setting of gangrenous distal toes and fingers b/l. Likely 2/2 to pressors. Follow up recs.       Endocrinology  -Continue Hydrocortisone 50mg q8- stress dose steroids      Lines: right IJ TLC and Ernesto (11/12), Axillary A-line (11/12), Left IJ (11/12), right peripheral (11/12)    F: None   E: replete K <4, Mg <2  N: Trickle feeds   GI Ppx: Protonix   DVT Ppx: Heparin   Code status: FULL   Dispo: MICU

## 2019-11-26 NOTE — PROGRESS NOTE ADULT - ATTENDING COMMENTS
I have reviewed the medical record, including laboratory and radiographic studies, examined the patient and discussed the plan with Dr. Patel, the ID Resident.  Agree with above. Will continue to follow with you – ID Team 1.

## 2019-11-26 NOTE — PROGRESS NOTE ADULT - SUBJECTIVE AND OBJECTIVE BOX
OVERNIGHT EVENTS:    SUBJECTIVE / INTERVAL HPI: Patient seen and examined at bedside.     VITAL SIGNS:  Vital Signs Last 24 Hrs  T(C): 36.6 (26 Nov 2019 06:04), Max: 38.3 (25 Nov 2019 18:00)  T(F): 97.8 (26 Nov 2019 06:04), Max: 100.9 (25 Nov 2019 18:00)  HR: 108 (26 Nov 2019 06:08) (108 - 140)  BP: 105/65 (26 Nov 2019 06:00) (85/48 - 131/70)  BP(mean): 74 (26 Nov 2019 06:00) (61 - 95)  RR: 14 (26 Nov 2019 06:08) (10 - 33)  SpO2: 100% (26 Nov 2019 06:08) (94% - 100%)    PHYSICAL EXAM:    General: WDWN  HEENT: NC/AT; PERRL, anicteric sclera; MMM  Neck: supple  Cardiovascular: +S1/S2; RRR  Respiratory: CTA B/L; no W/R/R  Gastrointestinal: soft, NT/ND; +BSx4  Extremities: WWP; no edema, clubbing or cyanosis  Vascular: 2+ radial, DP/PT pulses B/L  Neurological: AAOx3; no focal deficits    MEDICATIONS:  MEDICATIONS  (STANDING):  acyclovir IVPB 600 milliGRAM(s) IV Intermittent every 24 hours  albuterol/ipratropium for Nebulization 3 milliLiter(s) Nebulizer every 4 hours  artificial  tears Solution 1 Drop(s) Both EYES every 6 hours  ascorbic acid 500 milliGRAM(s) Oral daily  chlorhexidine 0.12% Liquid 15 milliLiter(s) Oral Mucosa every 12 hours  chlorhexidine 2% Cloths 1 Application(s) Topical daily  dextrose 5%. 1000 milliLiter(s) (50 mL/Hr) IV Continuous <Continuous>  dextrose 50% Injectable 12.5 Gram(s) IV Push once  dextrose 50% Injectable 25 Gram(s) IV Push once  dextrose 50% Injectable 25 Gram(s) IV Push once  fentaNYL   Infusion. 0.5 MICROgram(s)/kG/Hr (3.68 mL/Hr) IV Continuous <Continuous>  heparin  Injectable 5000 Unit(s) SubCutaneous every 8 hours  hydrocortisone sodium succinate Injectable 50 milliGRAM(s) IV Push every 8 hours  insulin lispro (HumaLOG) corrective regimen sliding scale   SubCutaneous every 6 hours  midazolam Infusion 0.014 mG/kG/Hr (1 mL/Hr) IV Continuous <Continuous>  midodrine 5 milliGRAM(s) Oral every 8 hours  multivitamin/minerals/iron Oral Solution (CENTRUM) 15 milliLiter(s) Oral daily  nafcillin  IVPB 2 Gram(s) IV Intermittent every 4 hours  norepinephrine Infusion 0.05 MICROgram(s)/kG/Min (3.45 mL/Hr) IV Continuous <Continuous>  pantoprazole  Injectable 40 milliGRAM(s) IV Push daily  polyethylene glycol 3350 17 Gram(s) Oral two times a day  propofol Infusion 5 MICROgram(s)/kG/Min (2.208 mL/Hr) IV Continuous <Continuous>  sodium chloride 0.9% lock flush 3 milliLiter(s) IV Push every 8 hours  vitamin E 400 International Unit(s) Oral daily  zinc sulfate 220 milliGRAM(s) Oral daily    MEDICATIONS  (PRN):  acetaminophen    Suspension .. 650 milliGRAM(s) Oral every 6 hours PRN Temp greater or equal to 38C (100.4F)  cisatracurium Injectable 20 milliGRAM(s) IV Push once PRN paralytic for trach  dextrose 40% Gel 15 Gram(s) Oral once PRN Blood Glucose LESS THAN 70 milliGRAM(s)/deciliter  fentaNYL    Injectable 100 MICROGram(s) IV Push once PRN sedation for trach  glucagon  Injectable 1 milliGRAM(s) IntraMuscular once PRN Glucose LESS THAN 70 milligrams/deciliter  midazolam Injectable 4 milliGRAM(s) IV Push once PRN sedation for trach      ALLERGIES:  Allergies    No Known Allergies    Intolerances        LABS:                        7.7    8.62  )-----------( 108      ( 26 Nov 2019 04:38 )             24.4     11-26    133<L>  |  98  |  15  ----------------------------<  101<H>  4.2   |  24  |  0.96    Ca    8.6      26 Nov 2019 04:38  Phos  2.9     11-26  Mg     1.8     11-26    TPro  6.6  /  Alb  2.4<L>  /  TBili  1.9<H>  /  DBili  1.4<H>  /  AST  45<H>  /  ALT  36  /  AlkPhos  85  11-26    PT/INR - ( 25 Nov 2019 04:55 )   PT: 14.1 sec;   INR: 1.24          PTT - ( 25 Nov 2019 04:55 )  PTT:36.1 sec    CAPILLARY BLOOD GLUCOSE      POCT Blood Glucose.: 95 mg/dL (26 Nov 2019 05:32)      RADIOLOGY & ADDITIONAL TESTS: Reviewed. OVERNIGHT EVENTS: Feeds and Heparin subq held. 1u plts given in preparation of tracheostomy. 4mg Versed given and started on Versed gtt.     SUBJECTIVE / INTERVAL HPI: Patient seen and examined at bedside. Patient resting in bed, intubated on sedation. Unable to obtain full ROS.     On AM rounds, trach in place.     VITAL SIGNS:  Vital Signs Last 24 Hrs  T(C): 36.6 (26 Nov 2019 06:04), Max: 38.3 (25 Nov 2019 18:00)  T(F): 97.8 (26 Nov 2019 06:04), Max: 100.9 (25 Nov 2019 18:00)  HR: 108 (26 Nov 2019 06:08) (108 - 140)  BP: 105/65 (26 Nov 2019 06:00) (85/48 - 131/70)  BP(mean): 74 (26 Nov 2019 06:00) (61 - 95)  RR: 14 (26 Nov 2019 06:08) (10 - 33)  SpO2: 100% (26 Nov 2019 06:08) (94% - 100%)    PHYSICAL EXAM:    General: WDWN, resting in bed, sedated   HEENT: NC/AT; PERRL, anicteric sclera; MMM  Neck: supple  Cardiovascular: +S1/S2; tachycardic, regular rhythm, systolic murmur heard at LSB   Respiratory: intubated; diffuse rhonchi/crackles with decreased breath sounds b/l   Gastrointestinal: NGT; soft, NT, distended but improved since yesterday; hypoactive bowel sounds   Extremities: WWP; trace edema of LEs/UEs; no clubbing or cyanosis  Derm: ischemic toes/fingers  Vascular: 2+ radial, DP/PT pulses B/L  Neurological: sedated, unresponsive to painful/verbal stimuli     MEDICATIONS:  MEDICATIONS  (STANDING):  acyclovir IVPB 600 milliGRAM(s) IV Intermittent every 24 hours  albuterol/ipratropium for Nebulization 3 milliLiter(s) Nebulizer every 4 hours  artificial  tears Solution 1 Drop(s) Both EYES every 6 hours  ascorbic acid 500 milliGRAM(s) Oral daily  chlorhexidine 0.12% Liquid 15 milliLiter(s) Oral Mucosa every 12 hours  chlorhexidine 2% Cloths 1 Application(s) Topical daily  dextrose 5%. 1000 milliLiter(s) (50 mL/Hr) IV Continuous <Continuous>  dextrose 50% Injectable 12.5 Gram(s) IV Push once  dextrose 50% Injectable 25 Gram(s) IV Push once  dextrose 50% Injectable 25 Gram(s) IV Push once  fentaNYL   Infusion. 0.5 MICROgram(s)/kG/Hr (3.68 mL/Hr) IV Continuous <Continuous>  heparin  Injectable 5000 Unit(s) SubCutaneous every 8 hours  hydrocortisone sodium succinate Injectable 50 milliGRAM(s) IV Push every 8 hours  insulin lispro (HumaLOG) corrective regimen sliding scale   SubCutaneous every 6 hours  midazolam Infusion 0.014 mG/kG/Hr (1 mL/Hr) IV Continuous <Continuous>  midodrine 5 milliGRAM(s) Oral every 8 hours  multivitamin/minerals/iron Oral Solution (CENTRUM) 15 milliLiter(s) Oral daily  nafcillin  IVPB 2 Gram(s) IV Intermittent every 4 hours  norepinephrine Infusion 0.05 MICROgram(s)/kG/Min (3.45 mL/Hr) IV Continuous <Continuous>  pantoprazole  Injectable 40 milliGRAM(s) IV Push daily  polyethylene glycol 3350 17 Gram(s) Oral two times a day  propofol Infusion 5 MICROgram(s)/kG/Min (2.208 mL/Hr) IV Continuous <Continuous>  sodium chloride 0.9% lock flush 3 milliLiter(s) IV Push every 8 hours  vitamin E 400 International Unit(s) Oral daily  zinc sulfate 220 milliGRAM(s) Oral daily    MEDICATIONS  (PRN):  acetaminophen    Suspension .. 650 milliGRAM(s) Oral every 6 hours PRN Temp greater or equal to 38C (100.4F)  cisatracurium Injectable 20 milliGRAM(s) IV Push once PRN paralytic for trach  dextrose 40% Gel 15 Gram(s) Oral once PRN Blood Glucose LESS THAN 70 milliGRAM(s)/deciliter  fentaNYL    Injectable 100 MICROGram(s) IV Push once PRN sedation for trach  glucagon  Injectable 1 milliGRAM(s) IntraMuscular once PRN Glucose LESS THAN 70 milligrams/deciliter  midazolam Injectable 4 milliGRAM(s) IV Push once PRN sedation for trach      ALLERGIES:  Allergies    No Known Allergies    Intolerances        LABS:                        7.7    8.62  )-----------( 108      ( 26 Nov 2019 04:38 )             24.4     11-26    133<L>  |  98  |  15  ----------------------------<  101<H>  4.2   |  24  |  0.96    Ca    8.6      26 Nov 2019 04:38  Phos  2.9     11-26  Mg     1.8     11-26    TPro  6.6  /  Alb  2.4<L>  /  TBili  1.9<H>  /  DBili  1.4<H>  /  AST  45<H>  /  ALT  36  /  AlkPhos  85  11-26    PT/INR - ( 25 Nov 2019 04:55 )   PT: 14.1 sec;   INR: 1.24          PTT - ( 25 Nov 2019 04:55 )  PTT:36.1 sec    CAPILLARY BLOOD GLUCOSE      POCT Blood Glucose.: 95 mg/dL (26 Nov 2019 05:32)      RADIOLOGY & ADDITIONAL TESTS: Reviewed.

## 2019-11-26 NOTE — PROGRESS NOTE ADULT - ASSESSMENT
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  Course further complicated by PEA arrest on 11/18.  He now is s/p several bronchs to control airway secretions - no other bacterial pathogens isolated beside MSSA. Most recent bronchial wash on 11/22. Per Dr. Worley, appearance of airways suggestive of additional viral infection.  He was again febrile on 11/24, reason unclear.  No other obvious source.  Sputum culture shows NRF. 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 GMS stain with microorganisms consistent with Candida.  Fungitell beta D glucan negative. Patient now s/p tracheostomy    Recommendations:  - F/U blood cultures from 11/24, NGTD for 2 days currently  - Continue to f/u bronch cultures, including viral IHC  - Continue nafcillin 2 g IV q4h  - c/w IV acyclovir 600mg q24hrs    Plan discussed with Dr. Simental

## 2019-11-26 NOTE — CHART NOTE - NSCHARTNOTEFT_GEN_A_CORE
MTB PRE-BRONCHOSCOPY RISK ASSESSMENT  ------------------------------------------------------------    Procedure Date:     Provider Name:     Reason for Bronchoscopy:    Location of Procedure (check one):   [  ] Endoscopy  [  ] Emergency Department  [  ] Intensive Care Unit  [  ] Operating Room  [  ] Other: _________    RISK ASSESSMENT  I. Patient symptoms (check all that apply): >/ 3 of these = SIGNIFICANT RISK for TB  [  ] Coughing > 2 to 3 weeks                 [  ] Unexplained fever >/ 2 weeks   [  ] Unusual weakness or fatigue  [  ] Unexplained weight loss > 10lbs.  [  ] Hemoptysis                                   [  ] Unusual or night sweating  [ X ] NON-APPLICABLE    II. TB history (Check all that apply): >/ 1 of these = SIGNIFICANT RISK for TB  [  ] Sputum smear/culture (+) for acid fast bacilli (AFB)                           [  ] Abnormal CXR or CT suggestive of TB; date(s) & description __________  [  ] Positive TB skin or blood test; date: __________                               [  ] On medication for latent TB or disease; list medication(s) ____________  [  ] TB diagnosed in the past; year treated: _______                                [  ] Inadequately treated TB  [  ] Current close contact of a person known or suspected to have TB  [ X ] NON-APPLICABLE    III. Additional Risk factors for TB (Check all that apply): Consider these in relation to symptoms and history  [  ] Person has conditions placing them at higher risk for TB disease (i.e. immunosuppressive therapy)  [  ] Person has lived in a country for 3 months or more where TB is common  [  ] Person lives in a high risk environment for TB (i.e. long term care, health care worker, incarcerated, homeless)  [X  ] Person injects illicit drugs  [  ] Person is HIV positive or at high risk for HIV    ****************************************************************  BASED ON THE TB RISK ASSESSMENT ABOVE, THE PATIENT'S RISK FOR TB IS:                       [  X] LOW RISK FOR TB            [  ] SIGNIFICANT RISK FOR TB  ****************************************************************    IV. Based on the Determined Risk for TB, the following Action(s) are Recommended:  [X  ] Low risk for TB infection --> Proceed with the diagnostic procedure    [  ] Significant risk for TB infection:  1. Perform the procedure in a negative pressure room, with appropriate personal protective equipment (PPE) for healthcare personnel (i.e. N95 respirator)    2. If it is not feasible to move the patient or defer the procedure:    a. Use a single-bedded room in a low traffic area to perform the bronchoscopy procedure    b. Place a portable high-efficiency particulate air (HEPA) filter in the space prior to starting the procedure and keep the door closed. Refer to Infection Control policy titled "Tuberculosis Control Strategy Plan" for additional information.    c. All healthcare personnel in the procedure room shall wear and N95 disposible respirator.    3. Documentation of the tuberculosis risk assessment is to be included in the patient's medical record.

## 2019-11-26 NOTE — PROGRESS NOTE ADULT - SUBJECTIVE AND OBJECTIVE BOX
24 hr events    Subjective: Patient seen and examined bedside. s/p tracheostomy. sedated      Vital Signs Last 24 Hrs  T(C): 36.1 (26 Nov 2019 10:00), Max: 38.3 (25 Nov 2019 18:00)  T(F): 97 (26 Nov 2019 10:00), Max: 100.9 (25 Nov 2019 18:00)  HR: 98 (26 Nov 2019 10:00) (98 - 140)  BP: 113/72 (26 Nov 2019 09:00) (85/48 - 131/70)  BP(mean): 82 (26 Nov 2019 09:00) (61 - 86)  RR: 11 (26 Nov 2019 10:00) (11 - 33)  SpO2: 100% (26 Nov 2019 10:00) (94% - 100%)    I&O's Summary    25 Nov 2019 07:01  -  26 Nov 2019 07:00  --------------------------------------------------------  IN: 2799.1 mL / OUT: 3056 mL / NET: -256.9 mL    26 Nov 2019 07:01  -  26 Nov 2019 10:25  --------------------------------------------------------  IN: 423.4 mL / OUT: 192 mL / NET: 231.4 mL        Physical Exam:  General: NAD, resting comfortably  Pulmonary: normal resp effort  Cardiovascular: NSR  Abdominal: soft, NT/ND  Extremities: WWP, normal strength, no clubbing/cyanosis/edema  Neuro: A/O x 3, no focal deficits, sensation normal    Lines/drains/tubes:    LABS:                        7.7    8.62  )-----------( 108      ( 26 Nov 2019 04:38 )             24.4     11-26    133<L>  |  98  |  15  ----------------------------<  101<H>  4.2   |  24  |  0.96    Ca    8.6      26 Nov 2019 04:38  Phos  2.9     11-26  Mg     1.8     11-26    TPro  6.6  /  Alb  2.4<L>  /  TBili  1.9<H>  /  DBili  1.4<H>  /  AST  45<H>  /  ALT  36  /  AlkPhos  85  11-26    PT/INR - ( 26 Nov 2019 04:38 )   PT: 13.1 sec;   INR: 1.15          PTT - ( 26 Nov 2019 04:38 )  PTT:36.4 sec    LIVER FUNCTIONS - ( 26 Nov 2019 04:38 )  Alb: 2.4 g/dL / Pro: 6.6 g/dL / ALK PHOS: 85 U/L / ALT: 36 U/L / AST: 45 U/L / GGT: x           CAPILLARY BLOOD GLUCOSE      POCT Blood Glucose.: 95 mg/dL (26 Nov 2019 05:32)  POCT Blood Glucose.: 92 mg/dL (25 Nov 2019 23:35)  POCT Blood Glucose.: 111 mg/dL (25 Nov 2019 17:15)  POCT Blood Glucose.: 98 mg/dL (25 Nov 2019 11:29)      RADIOLOGY & ADDITIONAL TESTS:

## 2019-11-26 NOTE — BRIEF OPERATIVE NOTE - NSICDXBRIEFPROCEDURE_GEN_ALL_CORE_FT
PROCEDURES:  Bronchoscopy, flexible, adult 19-Nov-2019 18:14:46  Becky Denise
PROCEDURES:  Bronchoscopy, flexible, adult 19-Nov-2019 18:14:46  Becky Denise
PROCEDURES:  Percutaneous tracheostomy at bedside 26-Nov-2019 18:24:26  Becky Denise
PROCEDURES:  Bronchoscopy, adult 26-Nov-2019 08:17:36  Denise Wilson

## 2019-11-26 NOTE — PROGRESS NOTE ADULT - SUBJECTIVE AND OBJECTIVE BOX
GASTROENTEROLOGY PROGRESS NOTE  Patient seen and examined at bedside. Remains intubated, plan for tracheostomy today.    PERTINENT REVIEW OF SYSTEMS:  Unable to obtain    Allergies    No Known Allergies    Intolerances      MEDICATIONS:  MEDICATIONS  (STANDING):  acyclovir IVPB 600 milliGRAM(s) IV Intermittent every 24 hours  albuterol/ipratropium for Nebulization 3 milliLiter(s) Nebulizer every 4 hours  artificial  tears Solution 1 Drop(s) Both EYES every 6 hours  ascorbic acid 500 milliGRAM(s) Oral daily  chlorhexidine 0.12% Liquid 15 milliLiter(s) Oral Mucosa every 12 hours  chlorhexidine 2% Cloths 1 Application(s) Topical daily  CRRT Treatment    <Continuous>  dextrose 5%. 1000 milliLiter(s) (50 mL/Hr) IV Continuous <Continuous>  dextrose 50% Injectable 12.5 Gram(s) IV Push once  dextrose 50% Injectable 25 Gram(s) IV Push once  dextrose 50% Injectable 25 Gram(s) IV Push once  fentaNYL   Infusion. 0.5 MICROgram(s)/kG/Hr (3.68 mL/Hr) IV Continuous <Continuous>  heparin  Injectable 5000 Unit(s) SubCutaneous every 8 hours  hydrocortisone sodium succinate Injectable 50 milliGRAM(s) IV Push every 8 hours  insulin lispro (HumaLOG) corrective regimen sliding scale   SubCutaneous every 6 hours  magnesium sulfate  IVPB 1 Gram(s) IV Intermittent once  midazolam Infusion 0.014 mG/kG/Hr (1 mL/Hr) IV Continuous <Continuous>  midodrine 5 milliGRAM(s) Oral every 8 hours  multivitamin/minerals/iron Oral Solution (CENTRUM) 15 milliLiter(s) Oral daily  nafcillin  IVPB 2 Gram(s) IV Intermittent every 4 hours  norepinephrine Infusion 0.05 MICROgram(s)/kG/Min (3.45 mL/Hr) IV Continuous <Continuous>  pantoprazole  Injectable 40 milliGRAM(s) IV Push daily  Phoxillum Filtration BK 4 / 2.5 5000 milliLiter(s) (2500 mL/Hr) CRRT <Continuous>  polyethylene glycol 3350 17 Gram(s) Oral two times a day  propofol Infusion 5 MICROgram(s)/kG/Min (2.208 mL/Hr) IV Continuous <Continuous>  sodium chloride 0.9% lock flush 3 milliLiter(s) IV Push every 8 hours  vitamin E 400 International Unit(s) Oral daily  zinc sulfate 220 milliGRAM(s) Oral daily    MEDICATIONS  (PRN):  acetaminophen    Suspension .. 650 milliGRAM(s) Oral every 6 hours PRN Temp greater or equal to 38C (100.4F)  dextrose 40% Gel 15 Gram(s) Oral once PRN Blood Glucose LESS THAN 70 milliGRAM(s)/deciliter  fentaNYL    Injectable 100 MICROGram(s) IV Push once PRN sedation  glucagon  Injectable 1 milliGRAM(s) IntraMuscular once PRN Glucose LESS THAN 70 milligrams/deciliter  midazolam Injectable 4 milliGRAM(s) IV Push once PRN sedation    Vital Signs Last 24 Hrs  T(C): 36.6 (26 Nov 2019 06:04), Max: 38.3 (25 Nov 2019 18:00)  T(F): 97.8 (26 Nov 2019 06:04), Max: 100.9 (25 Nov 2019 18:00)  HR: 102 (26 Nov 2019 08:45) (102 - 140)  BP: 104/54 (26 Nov 2019 08:00) (85/48 - 131/70)  BP(mean): 74 (26 Nov 2019 08:00) (61 - 95)  RR: 12 (26 Nov 2019 08:45) (11 - 33)  SpO2: 100% (26 Nov 2019 08:45) (94% - 100%)    11-25 @ 07:01 - 11-26 @ 07:00  --------------------------------------------------------  IN: 2799.1 mL / OUT: 3056 mL / NET: -256.9 mL    11-26 @ 07:01 - 11-26 @ 09:51  --------------------------------------------------------  IN: 201.6 mL / OUT: 134 mL / NET: 67.6 mL      PHYSICAL EXAM:    General: Intubated, sedated  HEENT: ET tube in place  Gastrointestinal: Distended, tympanic  Skin: Warm and dry. No obvious rash    LABS:                        7.7    8.62  )-----------( 108      ( 26 Nov 2019 04:38 )             24.4     11-26    133<L>  |  98  |  15  ----------------------------<  101<H>  4.2   |  24  |  0.96    Ca    8.6      26 Nov 2019 04:38  Phos  2.9     11-26  Mg     1.8     11-26    TPro  6.6  /  Alb  2.4<L>  /  TBili  1.9<H>  /  DBili  1.4<H>  /  AST  45<H>  /  ALT  36  /  AlkPhos  85  11-26    PT/INR - ( 26 Nov 2019 04:38 )   PT: 13.1 sec;   INR: 1.15          PTT - ( 26 Nov 2019 04:38 )  PTT:36.4 sec                  Culture - Blood (collected 24 Nov 2019 05:27)  Source: .Blood Blood  Preliminary Report (26 Nov 2019 06:00):    No growth at 2 days.    Culture - Blood (collected 24 Nov 2019 05:27)  Source: .Blood Blood  Preliminary Report (26 Nov 2019 06:00):    No growth at 2 days.    Culture - Sputum (collected 23 Nov 2019 16:18)  Source: .Sputum Sputum  Gram Stain (24 Nov 2019 09:12):    Rare epithelial cells    Few White blood cells    Rare Yeast    Rare Gram positive cocci in pairs  Final Report (25 Nov 2019 10:13):    Few Yeast      RADIOLOGY & ADDITIONAL STUDIES:  Reviewed

## 2019-11-26 NOTE — PROGRESS NOTE ADULT - ASSESSMENT
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      # CAMERON likely due to ATN in shock  - s/p trach this morning, sedated, on pressors  - cw CVVHD (initiated on 11/11) via RIJ HD cath, increase UF to net negative 25 cc/hr as tolerated   - monitor BMP Q6hr while on CVVHD for K/Phos and Mg and supplement as needed, keep K >4, Phos >3, Mg >2  - keep map >65 mmHg  - maintain [place], monitor uop  - monitor H/H/Plt, transfuse as indicated  - treat infection w renally adjusted ABx    Discussed with attending Dr Pinzon.

## 2019-11-26 NOTE — PROGRESS NOTE ADULT - ATTENDING COMMENTS
seen and evaluated while on CVVHD  tolerating the procedure well  continue therapy as prescribed above.  considering changing to intermittent HD over next 24-72 hours

## 2019-11-26 NOTE — PROGRESS NOTE ADULT - ASSESSMENT
38 y/o M smoker w/ PMHx of IVDU presented to [facility] w/ productive cough with hemoptysis found to have septic emboli w/ vegetation of the TV transferred to [facility] for surgical evaluation.  Course complicated by sepsis, DIC, acute respiratory failure w/ empyema s/p intubation and thoracostomy tube, CAMERON requiring cvvhd, ileus on NGT and rectal decompression, cardiac arrest, and pneumothorax. GI consulted for evaluation of incidental finding of rectal wall thickening on imaging.      #Abd distension  Patient with new abdominal distension with xray notable for dilated bowels.  CT Patient is on zinc and fentanyl with rectal tube.  -Wean opioids as able  -Aggressive repletion of electrolytes  -NG sump to low-intermittent suction  -Rectal tube  -Increase MiraLax to BID  -Start prokinetic if no contraindications    #Hyperbilirubinemia:  Patient with marked elevated bilirubin initially thought to be 2/2 ischemic hepatopathy now downtrending. Imaging without CBD dilation suggestive of cholestasis from sepsis vs DILI in the setting of recent ischemic hepatopathy. Continues to downtrend.  -Daily LFT and INR    #Rectal lesion:  CT w/ irregular masslike mural thickening of the posterior right lateral wall of the rectum.  Would plan for nonurgent flex sig when patient is stable  -Will plan for flex sig in the future  -Please notify GI team when patient is clinically stable for flexible sigmoidoscopy    Recommendations discussed with primary team  Case discussed with attending physician

## 2019-11-27 NOTE — PROGRESS NOTE ADULT - ASSESSMENT
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  Course further complicated by PEA arrest on 11/18.  He now is s/p several bronchs to control airway secretions - no other bacterial pathogens isolated beside MSSA. Most recent bronchial wash on 11/22. Per Dr. Worley, appearance of airways suggestive of additional viral infection.  He was again febrile on 11/24, reason unclear.  No other obvious source.  Sputum culture shows NRF. 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 GMS stain with microorganisms consistent with Candida.  Fungitell beta D glucan negative. Patient now s/p tracheostomy    Recommendations:  - F/U blood cultures from 11/24, NGTD  - Continue to f/u bronch cultures, including viral IHC  - Continue nafcillin 2 g IV q4h  - Redosed IV acyclovir to 5mg/kg q24hrs based on now intermittent dialysis from CVV[facility]  - Discussed with primary team  - ID team 1 will follow    Plan discussed with Dr. 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  Course further complicated by PEA arrest on 11/18.  He now is s/p several bronchs to control airway secretions - no other bacterial pathogens isolated beside MSSA. Most recent bronchial wash on 11/22. Per Dr. Worley, appearance of airways suggestive of additional viral infection.  He was again febrile on 11/24, reason unclear.  No other obvious source.  Sputum culture shows NRF. 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 GMS stain with microorganisms consistent with Candida.  Fungitell beta D glucan negative. Patient now s/p tracheostomy.  Was febrile to 101.4 rectally overnight    Recommendations:  - F/U blood cultures from 11/24, NGTD, repeat cultures 11/27 NGTD  - Continue to f/u bronch cultures, including viral IHC  - Continue nafcillin 2 g IV q4h  - Redosed IV acyclovir to 5mg/kg q24hrs based on now intermittent dialysis from [facility]  - Discussed with primary team  - ID team 1 will follow    Plan discussed with Dr. Simental

## 2019-11-27 NOTE — PROCEDURAL SAFETY CHECKLIST WITH OR WITHOUT SEDATION - [id number]_GEN_ALL_CORE
R Pigtail Insertion
ELIAN
L chest tube insertion
L IJ triple lumen
trach
Central Venous Catheter placement
left chest tube placement
Bronchoscopy

## 2019-11-27 NOTE — PROGRESS NOTE ADULT - SUBJECTIVE AND OBJECTIVE BOX
CVVHD access clotted overnight  s/p trach on 11/26, FiO2 30%  off sedation, alert and responsive   requiring less pressure support  net positive ~0.4 L/48 hr  no signs of renal recovery              Meds:  acetaminophen    Suspension .. 650 milliGRAM(s) Oral every 6 hours PRN  acyclovir IVPB 600 milliGRAM(s) IV Intermittent every 24 hours  albuterol/ipratropium for Nebulization 3 milliLiter(s) Nebulizer every 4 hours  artificial  tears Solution 1 Drop(s) Both EYES every 6 hours  ascorbic acid 500 milliGRAM(s) Oral daily  chlorhexidine 0.12% Liquid 15 milliLiter(s) Oral Mucosa every 12 hours  chlorhexidine 2% Cloths 1 Application(s) Topical daily  dextrose 40% Gel 15 Gram(s) Oral once PRN  dextrose 5%. 1000 milliLiter(s) IV Continuous <Continuous>  dextrose 50% Injectable 12.5 Gram(s) IV Push once  dextrose 50% Injectable 25 Gram(s) IV Push once  dextrose 50% Injectable 25 Gram(s) IV Push once  epoetin maikol Injectable 98670 Unit(s) IV Push once  fentaNYL   Infusion. 0.5 MICROgram(s)/kG/Hr IV Continuous <Continuous>  glucagon  Injectable 1 milliGRAM(s) IntraMuscular once PRN  heparin  Injectable 5000 Unit(s) SubCutaneous every 8 hours  hydrocortisone sodium succinate Injectable 50 milliGRAM(s) IV Push every 8 hours  insulin lispro (HumaLOG) corrective regimen sliding scale   SubCutaneous every 6 hours  midodrine 5 milliGRAM(s) Oral every 8 hours  multivitamin/minerals/iron Oral Solution (CENTRUM) 15 milliLiter(s) Oral daily  nafcillin  IVPB 2 Gram(s) IV Intermittent every 4 hours  norepinephrine Infusion 0.05 MICROgram(s)/kG/Min IV Continuous <Continuous>  pantoprazole  Injectable 40 milliGRAM(s) IV Push daily  polyethylene glycol 3350 17 Gram(s) Oral two times a day  propofol Infusion 5 MICROgram(s)/kG/Min IV Continuous <Continuous>  sodium chloride 0.9% lock flush 3 milliLiter(s) IV Push every 8 hours  vitamin E 400 International Unit(s) Oral daily  zinc sulfate 220 milliGRAM(s) Oral daily      T(C): 37.5 (11-27-19 @ 09:49), Max: 38.6 (11-26-19 @ 22:52)  HR: 122 (11-27-19 @ 09:13) (88 - 130)  BP: 105/50 (11-27-19 @ 03:00) (105/50 - 105/50)  RR: 21 (11-27-19 @ 09:13) (10 - 30)  SpO2: 98% (11-27-19 @ 09:13) (88% - 100%)    Input/Output      11-26-19 @ 07:01  -  11-27-19 @ 07:00  --------------------------------------------------------  IN: 2500.5 mL / OUT: 1634 mL / NET: 866.5 mL    11-27-19 @ 07:01  -  11-27-19 @ 10:33  --------------------------------------------------------  IN: 52.4 mL / OUT: 0 mL / NET: 52.4 mL            PHYSICAL EXAM  General: alert, NAD  Neck: trach in place, no JVD  Respiratory: equal breath sounds bilaterally, bilateral chest tubes  HEART: normal S1S2  ABDOMEN: Soft, distended, tympanic, not tender, rectal tube in place, +BS  EXTREMITIES: upper tight and sacral edema present/ necrotic U/L digits   NEUROLOGY: alert, no focal deficit  ACCESS: [facility], site dry and clean        LABS:                                7.8    8.02  )-----------( 120      ( 27 Nov 2019 04:20 )             23.6       11-27    129<L>  |  96  |  21  ----------------------------<  103<H>  4.6   |  22  |  1.42<H>    Ca    8.2<L>      27 Nov 2019 04:20  Phos  3.9     11-27  Mg     2.2     11-27    TPro  6.4  /  Alb  2.2<L>  /  TBili  1.8<H>  /  DBili  1.2<H>  /  AST  53<H>  /  ALT  37  /  AlkPhos  94  11-27      PT/INR - ( 27 Nov 2019 04:20 )   PT: 14.1 sec;   INR: 1.24          PTT - ( 27 Nov 2019 04:20 )  PTT:37.2 sec                      RADIOLOGY:    < from: Xray Chest 1 View- PORTABLE-Routine (11.27.19 @ 07:27) >    EXAM:  XR CHEST PORTABLE ROUTINE 1V                          PROCEDURE DATE:  11/27/2019          INTERPRETATION:  Portable Chest X-Ray dated 11/27/2019 7:27 AM    Indication: b/l effusions, empyema    Prior studies: 11/26/2019 at 8:36 AM    An AP portable view of the chest reveals a questionable tiny right apical   pneumothorax. Bibasilar infiltrates. No significant pleural effusion is   seen. Bilateral chest tubes and other support devices unchanged.    IMPRESSION:  Questionable tiny right apical pneumothorax.    < end of copied text > CVVHD access clotted overnight  s/p trach on 11/26, FiO2 30%  s/p hemotransfusion on 11/26  off sedation, alert and responsive   requiring less pressure support  net positive ~0.4 L/48 hr  no signs of renal recovery              Meds:  acetaminophen    Suspension .. 650 milliGRAM(s) Oral every 6 hours PRN  acyclovir IVPB 600 milliGRAM(s) IV Intermittent every 24 hours  albuterol/ipratropium for Nebulization 3 milliLiter(s) Nebulizer every 4 hours  artificial  tears Solution 1 Drop(s) Both EYES every 6 hours  ascorbic acid 500 milliGRAM(s) Oral daily  chlorhexidine 0.12% Liquid 15 milliLiter(s) Oral Mucosa every 12 hours  chlorhexidine 2% Cloths 1 Application(s) Topical daily  dextrose 40% Gel 15 Gram(s) Oral once PRN  dextrose 5%. 1000 milliLiter(s) IV Continuous <Continuous>  dextrose 50% Injectable 12.5 Gram(s) IV Push once  dextrose 50% Injectable 25 Gram(s) IV Push once  dextrose 50% Injectable 25 Gram(s) IV Push once  epoetin maikol Injectable 47087 Unit(s) IV Push once  fentaNYL   Infusion. 0.5 MICROgram(s)/kG/Hr IV Continuous <Continuous>  glucagon  Injectable 1 milliGRAM(s) IntraMuscular once PRN  heparin  Injectable 5000 Unit(s) SubCutaneous every 8 hours  hydrocortisone sodium succinate Injectable 50 milliGRAM(s) IV Push every 8 hours  insulin lispro (HumaLOG) corrective regimen sliding scale   SubCutaneous every 6 hours  midodrine 5 milliGRAM(s) Oral every 8 hours  multivitamin/minerals/iron Oral Solution (CENTRUM) 15 milliLiter(s) Oral daily  nafcillin  IVPB 2 Gram(s) IV Intermittent every 4 hours  norepinephrine Infusion 0.05 MICROgram(s)/kG/Min IV Continuous <Continuous>  pantoprazole  Injectable 40 milliGRAM(s) IV Push daily  polyethylene glycol 3350 17 Gram(s) Oral two times a day  propofol Infusion 5 MICROgram(s)/kG/Min IV Continuous <Continuous>  sodium chloride 0.9% lock flush 3 milliLiter(s) IV Push every 8 hours  vitamin E 400 International Unit(s) Oral daily  zinc sulfate 220 milliGRAM(s) Oral daily      T(C): 37.5 (11-27-19 @ 09:49), Max: 38.6 (11-26-19 @ 22:52)  HR: 122 (11-27-19 @ 09:13) (88 - 130)  BP: 105/50 (11-27-19 @ 03:00) (105/50 - 105/50)  RR: 21 (11-27-19 @ 09:13) (10 - 30)  SpO2: 98% (11-27-19 @ 09:13) (88% - 100%)    Input/Output      11-26-19 @ 07:01  -  11-27-19 @ 07:00  --------------------------------------------------------  IN: 2500.5 mL / OUT: 1634 mL / NET: 866.5 mL    11-27-19 @ 07:01  -  11-27-19 @ 10:33  --------------------------------------------------------  IN: 52.4 mL / OUT: 0 mL / NET: 52.4 mL            PHYSICAL EXAM  General: alert, NAD  Neck: trach in place, no JVD  Respiratory: equal breath sounds bilaterally, bilateral chest tubes  HEART: normal S1S2  ABDOMEN: Soft, distended, tympanic, not tender, rectal tube in place, +BS  EXTREMITIES: upper tight and sacral edema present/ necrotic U/L digits   NEUROLOGY: alert, no focal deficit  ACCESS: [facility], site dry and clean        LABS:                                7.8    8.02  )-----------( 120      ( 27 Nov 2019 04:20 )             23.6       11-27    129<L>  |  96  |  21  ----------------------------<  103<H>  4.6   |  22  |  1.42<H>    Ca    8.2<L>      27 Nov 2019 04:20  Phos  3.9     11-27  Mg     2.2     11-27    TPro  6.4  /  Alb  2.2<L>  /  TBili  1.8<H>  /  DBili  1.2<H>  /  AST  53<H>  /  ALT  37  /  AlkPhos  94  11-27      PT/INR - ( 27 Nov 2019 04:20 )   PT: 14.1 sec;   INR: 1.24          PTT - ( 27 Nov 2019 04:20 )  PTT:37.2 sec                      RADIOLOGY:    < from: Xray Chest 1 View- PORTABLE-Routine (11.27.19 @ 07:27) >    EXAM:  XR CHEST PORTABLE ROUTINE 1V                          PROCEDURE DATE:  11/27/2019          INTERPRETATION:  Portable Chest X-Ray dated 11/27/2019 7:27 AM    Indication: b/l effusions, empyema    Prior studies: 11/26/2019 at 8:36 AM    An AP portable view of the chest reveals a questionable tiny right apical   pneumothorax. Bibasilar infiltrates. No significant pleural effusion is   seen. Bilateral chest tubes and other support devices unchanged.    IMPRESSION:  Questionable tiny right apical pneumothorax.    < end of copied text >

## 2019-11-27 NOTE — PROGRESS NOTE ADULT - ASSESSMENT
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  s/p trach on 11/26      # CAMERON likely due to ATN in shock  - no signs of renal recovery  - CVVHD (initiated on 11/11) via RIJ HD cath, stopped overnight due to access clotting  - currently requiring less pressure support, will switch to IHD today  - keep map >65 mmHg  - plan for permcath placement  - monitor H/H/Plt, transfuse as indicated  - treat infection w renally adjusted ABx    Discussed with attending Dr Pinzon. 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  s/p trach on 11/26      # CAMERON likely due to ATN in shock  - no signs of renal recovery  - CVVHD (initiated on 11/11) via RIJ HD cath, stopped overnight due to access clotting  - currently requiring less pressure support, will switch to IHD today  - keep map >65 mmHg  - plan for permcath placement  - monitor H/H/Plt, transfuse as indicated, procrit w HD  - treat infection w renally adjusted ABx    Discussed with attending Dr Pinzon.

## 2019-11-27 NOTE — PROGRESS NOTE ADULT - SUBJECTIVE AND OBJECTIVE BOX
OVERNIGHT EVENTS:     SUBJECTIVE / INTERVAL HPI: Patient seen and examined at bedside. Patient resting in bed, intubated on sedation. Unable to obtain full ROS.     On AM rounds, trach in place.     VITAL SIGNS:  Vital Signs Last 24 Hrs  T(C): 36.6 (26 Nov 2019 06:04), Max: 38.3 (25 Nov 2019 18:00)  T(F): 97.8 (26 Nov 2019 06:04), Max: 100.9 (25 Nov 2019 18:00)  HR: 108 (26 Nov 2019 06:08) (108 - 140)  BP: 105/65 (26 Nov 2019 06:00) (85/48 - 131/70)  BP(mean): 74 (26 Nov 2019 06:00) (61 - 95)  RR: 14 (26 Nov 2019 06:08) (10 - 33)  SpO2: 100% (26 Nov 2019 06:08) (94% - 100%)    PHYSICAL EXAM:    General: WDWN, resting in bed, sedated   HEENT: NC/AT; PERRL, anicteric sclera; MMM  Neck: supple  Cardiovascular: +S1/S2; tachycardic, regular rhythm, systolic murmur heard at LSB   Respiratory: intubated; diffuse rhonchi/crackles with decreased breath sounds b/l   Gastrointestinal: NGT; soft, NT, distended but improved since yesterday; hypoactive bowel sounds   Extremities: WWP; trace edema of LEs/UEs; no clubbing or cyanosis  Derm: ischemic toes/fingers  Vascular: 2+ radial, DP/PT pulses B/L  Neurological: sedated, unresponsive to painful/verbal stimuli     MEDICATIONS:  MEDICATIONS  (STANDING):  acyclovir IVPB 600 milliGRAM(s) IV Intermittent every 24 hours  albuterol/ipratropium for Nebulization 3 milliLiter(s) Nebulizer every 4 hours  artificial  tears Solution 1 Drop(s) Both EYES every 6 hours  ascorbic acid 500 milliGRAM(s) Oral daily  chlorhexidine 0.12% Liquid 15 milliLiter(s) Oral Mucosa every 12 hours  chlorhexidine 2% Cloths 1 Application(s) Topical daily  dextrose 5%. 1000 milliLiter(s) (50 mL/Hr) IV Continuous <Continuous>  dextrose 50% Injectable 12.5 Gram(s) IV Push once  dextrose 50% Injectable 25 Gram(s) IV Push once  dextrose 50% Injectable 25 Gram(s) IV Push once  fentaNYL   Infusion. 0.5 MICROgram(s)/kG/Hr (3.68 mL/Hr) IV Continuous <Continuous>  heparin  Injectable 5000 Unit(s) SubCutaneous every 8 hours  hydrocortisone sodium succinate Injectable 50 milliGRAM(s) IV Push every 8 hours  insulin lispro (HumaLOG) corrective regimen sliding scale   SubCutaneous every 6 hours  midazolam Infusion 0.014 mG/kG/Hr (1 mL/Hr) IV Continuous <Continuous>  midodrine 5 milliGRAM(s) Oral every 8 hours  multivitamin/minerals/iron Oral Solution (CENTRUM) 15 milliLiter(s) Oral daily  nafcillin  IVPB 2 Gram(s) IV Intermittent every 4 hours  norepinephrine Infusion 0.05 MICROgram(s)/kG/Min (3.45 mL/Hr) IV Continuous <Continuous>  pantoprazole  Injectable 40 milliGRAM(s) IV Push daily  polyethylene glycol 3350 17 Gram(s) Oral two times a day  propofol Infusion 5 MICROgram(s)/kG/Min (2.208 mL/Hr) IV Continuous <Continuous>  sodium chloride 0.9% lock flush 3 milliLiter(s) IV Push every 8 hours  vitamin E 400 International Unit(s) Oral daily  zinc sulfate 220 milliGRAM(s) Oral daily    MEDICATIONS  (PRN):  acetaminophen    Suspension .. 650 milliGRAM(s) Oral every 6 hours PRN Temp greater or equal to 38C (100.4F)  cisatracurium Injectable 20 milliGRAM(s) IV Push once PRN paralytic for trach  dextrose 40% Gel 15 Gram(s) Oral once PRN Blood Glucose LESS THAN 70 milliGRAM(s)/deciliter  fentaNYL    Injectable 100 MICROGram(s) IV Push once PRN sedation for trach  glucagon  Injectable 1 milliGRAM(s) IntraMuscular once PRN Glucose LESS THAN 70 milligrams/deciliter  midazolam Injectable 4 milliGRAM(s) IV Push once PRN sedation for trach      ALLERGIES:  Allergies    No Known Allergies    Intolerances        LABS:                        7.7    8.62  )-----------( 108      ( 26 Nov 2019 04:38 )             24.4     11-26    133<L>  |  98  |  15  ----------------------------<  101<H>  4.2   |  24  |  0.96    Ca    8.6      26 Nov 2019 04:38  Phos  2.9     11-26  Mg     1.8     11-26    TPro  6.6  /  Alb  2.4<L>  /  TBili  1.9<H>  /  DBili  1.4<H>  /  AST  45<H>  /  ALT  36  /  AlkPhos  85  11-26    PT/INR - ( 25 Nov 2019 04:55 )   PT: 14.1 sec;   INR: 1.24          PTT - ( 25 Nov 2019 04:55 )  PTT:36.1 sec    CAPILLARY BLOOD GLUCOSE      POCT Blood Glucose.: 95 mg/dL (26 Nov 2019 05:32)      RADIOLOGY & ADDITIONAL TESTS: Reviewed. OVERNIGHT EVENTS:     SUBJECTIVE / INTERVAL HPI: Patient seen and examined at bedside. Patient resting in bed, intubated on sedation. Unable to obtain full ROS.       PHYSICAL EXAM:    General: WDWN, resting in bed, sedated   HEENT: NC/AT; PERRL, anicteric sclera; MMM  Neck: supple  Cardiovascular: +S1/S2; tachycardic, regular rhythm, systolic murmur heard at LSB   Respiratory: intubated; diffuse rhonchi/crackles with decreased breath sounds b/l   Gastrointestinal: NGT; NT, tense and distended with hyperactive bowel sounds   Extremities: WWP; trace edema of LEs/UEs; no clubbing or cyanosis  Derm: ischemic toes/fingers  Vascular: 2+ radial, DP/PT pulses B/L  Neurological: sedated, unresponsive to painful/verbal stimuli       VITAL SIGNS:  Vital Signs Last 24 Hrs  T(C): 38 (27 Nov 2019 14:36), Max: 38.6 (26 Nov 2019 22:52)  T(F): 100.4 (27 Nov 2019 14:36), Max: 101.4 (26 Nov 2019 22:52)  HR: 124 (27 Nov 2019 16:00) (98 - 130)  BP: 105/50 (27 Nov 2019 03:00) (105/50 - 105/50)  BP(mean): 73 (27 Nov 2019 03:00) (73 - 73)  RR: 22 (27 Nov 2019 16:00) (10 - 30)  SpO2: 93% (27 Nov 2019 16:00) (88% - 100%)      MEDICATIONS:  MEDICATIONS  (STANDING):  acyclovir IVPB 370 milliGRAM(s) IV Intermittent every 24 hours  albuterol/ipratropium for Nebulization 3 milliLiter(s) Nebulizer every 4 hours  artificial  tears Solution 1 Drop(s) Both EYES every 6 hours  chlorhexidine 0.12% Liquid 15 milliLiter(s) Oral Mucosa every 12 hours  chlorhexidine 2% Cloths 1 Application(s) Topical daily  dextrose 5%. 1000 milliLiter(s) (50 mL/Hr) IV Continuous <Continuous>  dextrose 50% Injectable 12.5 Gram(s) IV Push once  dextrose 50% Injectable 25 Gram(s) IV Push once  dextrose 50% Injectable 25 Gram(s) IV Push once  epoetin maikol Injectable 51481 Unit(s) IV Push once  fentaNYL   Infusion. 0.5 MICROgram(s)/kG/Hr (3.68 mL/Hr) IV Continuous <Continuous>  heparin  Injectable 5000 Unit(s) SubCutaneous every 8 hours  hydrocortisone sodium succinate Injectable 50 milliGRAM(s) IV Push every 8 hours  insulin lispro (HumaLOG) corrective regimen sliding scale   SubCutaneous every 6 hours  midodrine 10 milliGRAM(s) Oral every 8 hours  multivitamin/minerals/iron Oral Solution (CENTRUM) 15 milliLiter(s) Oral daily  nafcillin  IVPB 2 Gram(s) IV Intermittent every 4 hours  norepinephrine Infusion 0.05 MICROgram(s)/kG/Min (3.45 mL/Hr) IV Continuous <Continuous>  pantoprazole  Injectable 40 milliGRAM(s) IV Push daily  polyethylene glycol 3350 17 Gram(s) Oral two times a day  propofol Infusion 5 MICROgram(s)/kG/Min (2.208 mL/Hr) IV Continuous <Continuous>  sodium chloride 0.9% lock flush 3 milliLiter(s) IV Push every 8 hours  vitamin E 400 International Unit(s) Oral daily  zinc sulfate 220 milliGRAM(s) Oral daily    MEDICATIONS  (PRN):  acetaminophen    Suspension .. 650 milliGRAM(s) Oral every 6 hours PRN Temp greater or equal to 38C (100.4F)  dextrose 40% Gel 15 Gram(s) Oral once PRN Blood Glucose LESS THAN 70 milliGRAM(s)/deciliter  glucagon  Injectable 1 milliGRAM(s) IntraMuscular once PRN Glucose LESS THAN 70 milligrams/deciliter      ALLERGIES:  Allergies    No Known Allergies    Intolerances        LABS:                        7.8    8.02  )-----------( 120      ( 27 Nov 2019 04:20 )             23.6     11-27    129<L>  |  96  |  21  ----------------------------<  103<H>  4.6   |  22  |  1.42<H>    Ca    8.2<L>      27 Nov 2019 04:20  Phos  3.9     11-27  Mg     2.2     11-27    TPro  6.4  /  Alb  2.2<L>  /  TBili  1.8<H>  /  DBili  1.2<H>  /  AST  53<H>  /  ALT  37  /  AlkPhos  94  11-27    PT/INR - ( 27 Nov 2019 04:20 )   PT: 14.1 sec;   INR: 1.24          PTT - ( 27 Nov 2019 04:20 )  PTT:37.2 sec    CAPILLARY BLOOD GLUCOSE      POCT Blood Glucose.: 90 mg/dL (27 Nov 2019 11:50)      RADIOLOGY & ADDITIONAL TESTS: Reviewed. OVERNIGHT EVENTS: CVVHD clotted, however, R IJ still intact. Spiked fevers to 101.4, blood cultures drawn. 2 bowel movements yesterday per overnight nurse.     SUBJECTIVE / INTERVAL HPI: Patient seen and examined at bedside. Patient resting in bed, intubated on sedation. Unable to obtain full ROS.       PHYSICAL EXAM:    General: WDWN, resting in bed, sedated   HEENT: NC/AT; PERRL, anicteric sclera; MMM  Neck: supple  Cardiovascular: +S1/S2; tachycardic, regular rhythm, systolic murmur heard at LSB   Respiratory: intubated; diffuse rhonchi/crackles with decreased breath sounds b/l   Gastrointestinal: NGT; NT, tense and distended with hyperactive bowel sounds   Extremities: WWP; trace edema of LEs/UEs; no clubbing or cyanosis  Derm: ischemic toes/fingers  Vascular: 2+ radial, DP/PT pulses B/L  Neurological: sedated, unresponsive to painful/verbal stimuli       VITAL SIGNS:  Vital Signs Last 24 Hrs  T(C): 38 (27 Nov 2019 14:36), Max: 38.6 (26 Nov 2019 22:52)  T(F): 100.4 (27 Nov 2019 14:36), Max: 101.4 (26 Nov 2019 22:52)  HR: 124 (27 Nov 2019 16:00) (98 - 130)  BP: 105/50 (27 Nov 2019 03:00) (105/50 - 105/50)  BP(mean): 73 (27 Nov 2019 03:00) (73 - 73)  RR: 22 (27 Nov 2019 16:00) (10 - 30)  SpO2: 93% (27 Nov 2019 16:00) (88% - 100%)      MEDICATIONS:  MEDICATIONS  (STANDING):  acyclovir IVPB 370 milliGRAM(s) IV Intermittent every 24 hours  albuterol/ipratropium for Nebulization 3 milliLiter(s) Nebulizer every 4 hours  artificial  tears Solution 1 Drop(s) Both EYES every 6 hours  chlorhexidine 0.12% Liquid 15 milliLiter(s) Oral Mucosa every 12 hours  chlorhexidine 2% Cloths 1 Application(s) Topical daily  dextrose 5%. 1000 milliLiter(s) (50 mL/Hr) IV Continuous <Continuous>  dextrose 50% Injectable 12.5 Gram(s) IV Push once  dextrose 50% Injectable 25 Gram(s) IV Push once  dextrose 50% Injectable 25 Gram(s) IV Push once  epoetin maikol Injectable 84783 Unit(s) IV Push once  fentaNYL   Infusion. 0.5 MICROgram(s)/kG/Hr (3.68 mL/Hr) IV Continuous <Continuous>  heparin  Injectable 5000 Unit(s) SubCutaneous every 8 hours  hydrocortisone sodium succinate Injectable 50 milliGRAM(s) IV Push every 8 hours  insulin lispro (HumaLOG) corrective regimen sliding scale   SubCutaneous every 6 hours  midodrine 10 milliGRAM(s) Oral every 8 hours  multivitamin/minerals/iron Oral Solution (CENTRUM) 15 milliLiter(s) Oral daily  nafcillin  IVPB 2 Gram(s) IV Intermittent every 4 hours  norepinephrine Infusion 0.05 MICROgram(s)/kG/Min (3.45 mL/Hr) IV Continuous <Continuous>  pantoprazole  Injectable 40 milliGRAM(s) IV Push daily  polyethylene glycol 3350 17 Gram(s) Oral two times a day  propofol Infusion 5 MICROgram(s)/kG/Min (2.208 mL/Hr) IV Continuous <Continuous>  sodium chloride 0.9% lock flush 3 milliLiter(s) IV Push every 8 hours  vitamin E 400 International Unit(s) Oral daily  zinc sulfate 220 milliGRAM(s) Oral daily    MEDICATIONS  (PRN):  acetaminophen    Suspension .. 650 milliGRAM(s) Oral every 6 hours PRN Temp greater or equal to 38C (100.4F)  dextrose 40% Gel 15 Gram(s) Oral once PRN Blood Glucose LESS THAN 70 milliGRAM(s)/deciliter  glucagon  Injectable 1 milliGRAM(s) IntraMuscular once PRN Glucose LESS THAN 70 milligrams/deciliter      ALLERGIES:  Allergies    No Known Allergies    Intolerances        LABS:                        7.8    8.02  )-----------( 120      ( 27 Nov 2019 04:20 )             23.6     11-27    129<L>  |  96  |  21  ----------------------------<  103<H>  4.6   |  22  |  1.42<H>    Ca    8.2<L>      27 Nov 2019 04:20  Phos  3.9     11-27  Mg     2.2     11-27    TPro  6.4  /  Alb  2.2<L>  /  TBili  1.8<H>  /  DBili  1.2<H>  /  AST  53<H>  /  ALT  37  /  AlkPhos  94  11-27    PT/INR - ( 27 Nov 2019 04:20 )   PT: 14.1 sec;   INR: 1.24          PTT - ( 27 Nov 2019 04:20 )  PTT:37.2 sec    CAPILLARY BLOOD GLUCOSE      POCT Blood Glucose.: 90 mg/dL (27 Nov 2019 11:50)      RADIOLOGY & ADDITIONAL TESTS: Reviewed.

## 2019-11-27 NOTE — PROCEDURE NOTE - NSPOSTPRCRAD_GEN_A_CORE
post-procedure radiography performed central line located in the superior vena cava/post-procedure radiography performed/no pneumothorax no pneumothorax/Confirmed with Manfred VEGA/central line located in the superior vena cava/post-procedure radiography performed

## 2019-11-27 NOTE — PROGRESS NOTE ADULT - SUBJECTIVE AND OBJECTIVE BOX
Infectious Diseases Progress Note:    SUBJECTIVE: Patient seen and examined at bedside. Sedated on vent through trach.  Per visitors at bedside was more interactive in  AM.  Unable to obtain ROS    ENDOCARDITIS/SEPSIS    Endocarditis  CAMERON (acute kidney injury)  Acute endocarditis due to other organism      Allergies    No Known Allergies    Intolerances        ANTIBIOTICS/RELEVANT:  antimicrobials  acyclovir IVPB 370 milliGRAM(s) IV Intermittent every 24 hours  nafcillin  IVPB 2 Gram(s) IV Intermittent every 4 hours    immunologic:  epoetin maikol Injectable 02733 Unit(s) IV Push once      OTHER:  acetaminophen    Suspension .. 650 milliGRAM(s) Oral every 6 hours PRN  albuterol/ipratropium for Nebulization 3 milliLiter(s) Nebulizer every 4 hours  artificial  tears Solution 1 Drop(s) Both EYES every 6 hours  chlorhexidine 0.12% Liquid 15 milliLiter(s) Oral Mucosa every 12 hours  chlorhexidine 2% Cloths 1 Application(s) Topical daily  dextrose 40% Gel 15 Gram(s) Oral once PRN  dextrose 5%. 1000 milliLiter(s) IV Continuous <Continuous>  dextrose 50% Injectable 12.5 Gram(s) IV Push once  dextrose 50% Injectable 25 Gram(s) IV Push once  dextrose 50% Injectable 25 Gram(s) IV Push once  fentaNYL   Infusion. 0.5 MICROgram(s)/kG/Hr IV Continuous <Continuous>  glucagon  Injectable 1 milliGRAM(s) IntraMuscular once PRN  heparin  Injectable 5000 Unit(s) SubCutaneous every 8 hours  hydrocortisone sodium succinate Injectable 50 milliGRAM(s) IV Push every 8 hours  insulin lispro (HumaLOG) corrective regimen sliding scale   SubCutaneous every 6 hours  midodrine 10 milliGRAM(s) Oral every 8 hours  multivitamin/minerals/iron Oral Solution (CENTRUM) 15 milliLiter(s) Oral daily  norepinephrine Infusion 0.05 MICROgram(s)/kG/Min IV Continuous <Continuous>  pantoprazole  Injectable 40 milliGRAM(s) IV Push daily  polyethylene glycol 3350 17 Gram(s) Oral two times a day  propofol Infusion 5 MICROgram(s)/kG/Min IV Continuous <Continuous>  sodium chloride 0.9% lock flush 3 milliLiter(s) IV Push every 8 hours  vitamin E 400 International Unit(s) Oral daily  zinc sulfate 220 milliGRAM(s) Oral daily      Objective:  Vital Signs Last 24 Hrs  T(C): 38.6 (27 Nov 2019 17:00), Max: 38.6 (26 Nov 2019 22:52)  T(F): 101.4 (27 Nov 2019 17:00), Max: 101.4 (26 Nov 2019 22:52)  HR: 118 (27 Nov 2019 18:00) (112 - 130)  BP: 105/50 (27 Nov 2019 03:00) (105/50 - 105/50)  BP(mean): 73 (27 Nov 2019 03:00) (73 - 73)  RR: 14 (27 Nov 2019 18:00) (10 - 30)  SpO2: 100% (27 Nov 2019 18:00) (88% - 100%)    PHYSICAL EXAM:  Constitutional: Tracheostomy on vent and sedated  Eyes:  pinpoint pupils, scleral icterus  Neck:no JVD, no lymphadenopathy, supple  Respiratory: b/l rhonchi throughout. + R sided chest tube x2 with serosanguinous drainage, persistent leak in, L chest tube x1  Cardiovascular: S1S2, RRR, systolic murmur present throughout  Gastrointestinal: soft, NTND, (+) BS, no HSM  Extremities: cyanotic and ischemic changes of b/l fingers and toes      LABS:                        7.8    8.02  )-----------( 120      ( 27 Nov 2019 04:20 )             23.6     11-27    129<L>  |  96  |  21  ----------------------------<  103<H>  4.6   |  22  |  1.42<H>    Ca    8.2<L>      27 Nov 2019 04:20  Phos  3.9     11-27  Mg     2.2     11-27    TPro  6.4  /  Alb  2.2<L>  /  TBili  1.8<H>  /  DBili  1.2<H>  /  AST  53<H>  /  ALT  37  /  AlkPhos  94  11-27    PT/INR - ( 27 Nov 2019 04:20 )   PT: 14.1 sec;   INR: 1.24          PTT - ( 27 Nov 2019 04:20 )  PTT:37.2 sec      MICROBIOLOGY:    Culture - Blood (collected 11-27-19 @ 05:48)  Source: .Blood Blood  Preliminary Report (11-27-19 @ 18:00):    No growth at 12 hours    Culture - Blood (collected 11-27-19 @ 05:48)  Source: .Blood Blood  Preliminary Report (11-27-19 @ 18:00):    No growth at 12 hours      Culture Results:   No growth at 12 hours (11-27 @ 05:48)  Culture Results:   No growth at 12 hours (11-27 @ 05:48)          RADIOLOGY & ADDITIONAL STUDIES:

## 2019-11-27 NOTE — PROGRESS NOTE ADULT - SUBJECTIVE AND OBJECTIVE BOX
Patient was seen and evaluated on dialysis.   HR: 118 (11-27-19 @ 18:00)  BP: 105/50 (11-27-19 @ 03:00)    11-27    129<L>  |  96  |  21  ----------------------------<  103<H>  4.6   |  22  |  1.42<H>    Ca    8.2<L>      27 Nov 2019 04:20  Phos  3.9     11-27  Mg     2.2     11-27    TPro  6.4  /  Alb  2.2<L>  /  TBili  1.8<H>  /  DBili  1.2<H>  /  AST  53<H>  /  ALT  37  /  AlkPhos  94  11-27    Continue dialysis:   Dialyzer:   180    QB: 400  K bath: 3  Goal UF:     2  L   over    210           min  Patient is tolerating the procedure well.

## 2019-11-27 NOTE — PROGRESS NOTE ADULT - ATTENDING COMMENTS
Patient seen and examined with house-staff during bedside rounds.  Resident note read, including vitals, physical findings, laboratory data, and radiological reports.   Revisions included below.  Direct personal management at bed side and extensive interpretation of the data.  Plan was outlined and discussed in details with the housestaff.  Decision making of high complexity  Action taken for acute disease activity to reflect the level of care provided:  - medication reconciliation  - review laboratory data  no wean  spiking fever  change TLC   and send cultures including fungal  on intermittent HD  fluid statuis stable

## 2019-11-27 NOTE — PROGRESS NOTE ADULT - ASSESSMENT
40 y/o M smoker w/ PMHx of IVDU presented to [facility] w/ productive cough with hemoptysis found to have septic emboli w/ vegetation of the TV transferred to [facility] for surgical evaluation.  Course complicated by sepsis, DIC, acute respiratory failure w/ empyema s/p intubation and thoracostomy tube, CAMERON requiring cvvhd, ileus on NGT and rectal decompression, cardiac arrest, and pneumothorax. GI consulted for evaluation of incidental finding of rectal wall thickening on imaging.      #Abd distension  Patient with new abdominal distension with xray notable for dilated bowels.  CT Patient is on zinc and fentanyl with rectal tube.  -Wean opioids as able  -Aggressive repletion of electrolytes  -NG sump to low-intermittent suction  -Rectal tube  -c/w Miralax BID  -Given persistent distention, would start prokinetic agent such as Reglan or consider Methylnaltrexone to counteract opioids    #Hyperbilirubinemia:  Patient with marked elevated bilirubin initially thought to be 2/2 ischemic hepatopathy now downtrending. Imaging without CBD dilation suggestive of cholestasis from sepsis vs DILI in the setting of recent ischemic hepatopathy. Continues to downtrend.  -Daily LFT and INR    #Rectal lesion:  CT w/ irregular masslike mural thickening of the posterior right lateral wall of the rectum.  Would plan for nonurgent flex sig when patient is stable  -Will plan for flex sig in the future  -Please notify GI team when patient is clinically stable for flexible sigmoidoscopy    Recommendations discussed with primary team  Case discussed with attending physician

## 2019-11-27 NOTE — PROGRESS NOTE ADULT - ATTENDING COMMENTS
I have reviewed the medical record, including laboratory and radiographic studies, examined the patient and discussed the plan with Dr. Harris, the ID Resident.  Agree with above. Will continue to follow with you – ID Team 1.  Dr. Louis will assume care tomorrow.

## 2019-11-27 NOTE — PROGRESS NOTE ADULT - ATTENDING COMMENTS
oliguric ATN  CVVHD sytem clotted overnight, so will transition to intermittent acute HD-  HD treatment today

## 2019-11-27 NOTE — PROGRESS NOTE ADULT - SUBJECTIVE AND OBJECTIVE BOX
GASTROENTEROLOGY PROGRESS NOTE  Patient seen and examined at bedside. Patient s/p tracheostomy, sedated. Nursing reports two bowel movements yesterday.    PERTINENT REVIEW OF SYSTEMS:  Unable to obtain    Allergies    No Known Allergies    Intolerances      MEDICATIONS:  MEDICATIONS  (STANDING):  acyclovir IVPB 600 milliGRAM(s) IV Intermittent every 24 hours  albuterol/ipratropium for Nebulization 3 milliLiter(s) Nebulizer every 4 hours  artificial  tears Solution 1 Drop(s) Both EYES every 6 hours  ascorbic acid 500 milliGRAM(s) Oral daily  chlorhexidine 0.12% Liquid 15 milliLiter(s) Oral Mucosa every 12 hours  chlorhexidine 2% Cloths 1 Application(s) Topical daily  dextrose 5%. 1000 milliLiter(s) (50 mL/Hr) IV Continuous <Continuous>  dextrose 50% Injectable 12.5 Gram(s) IV Push once  dextrose 50% Injectable 25 Gram(s) IV Push once  dextrose 50% Injectable 25 Gram(s) IV Push once  epoetin maikol Injectable 35552 Unit(s) IV Push once  fentaNYL   Infusion. 0.5 MICROgram(s)/kG/Hr (3.68 mL/Hr) IV Continuous <Continuous>  heparin  Injectable 5000 Unit(s) SubCutaneous every 8 hours  hydrocortisone sodium succinate Injectable 50 milliGRAM(s) IV Push every 8 hours  insulin lispro (HumaLOG) corrective regimen sliding scale   SubCutaneous every 6 hours  midodrine 5 milliGRAM(s) Oral every 8 hours  multivitamin/minerals/iron Oral Solution (CENTRUM) 15 milliLiter(s) Oral daily  nafcillin  IVPB 2 Gram(s) IV Intermittent every 4 hours  norepinephrine Infusion 0.05 MICROgram(s)/kG/Min (3.45 mL/Hr) IV Continuous <Continuous>  pantoprazole  Injectable 40 milliGRAM(s) IV Push daily  polyethylene glycol 3350 17 Gram(s) Oral two times a day  propofol Infusion 5 MICROgram(s)/kG/Min (2.208 mL/Hr) IV Continuous <Continuous>  sodium chloride 0.9% lock flush 3 milliLiter(s) IV Push every 8 hours  vitamin E 400 International Unit(s) Oral daily  zinc sulfate 220 milliGRAM(s) Oral daily    MEDICATIONS  (PRN):  acetaminophen    Suspension .. 650 milliGRAM(s) Oral every 6 hours PRN Temp greater or equal to 38C (100.4F)  dextrose 40% Gel 15 Gram(s) Oral once PRN Blood Glucose LESS THAN 70 milliGRAM(s)/deciliter  glucagon  Injectable 1 milliGRAM(s) IntraMuscular once PRN Glucose LESS THAN 70 milligrams/deciliter    Vital Signs Last 24 Hrs  T(C): 37.4 (27 Nov 2019 05:02), Max: 38.6 (26 Nov 2019 22:52)  T(F): 99.4 (27 Nov 2019 05:02), Max: 101.4 (26 Nov 2019 22:52)  HR: 122 (27 Nov 2019 09:13) (88 - 130)  BP: 105/50 (27 Nov 2019 03:00) (105/50 - 105/50)  BP(mean): 73 (27 Nov 2019 03:00) (73 - 73)  RR: 21 (27 Nov 2019 09:13) (10 - 21)  SpO2: 98% (27 Nov 2019 09:13) (97% - 100%)    11-26 @ 07:01  -  11-27 @ 07:00  --------------------------------------------------------  IN: 2500.5 mL / OUT: 1634 mL / NET: 866.5 mL    11-27 @ 07:01  -  11-27 @ 09:25  --------------------------------------------------------  IN: 52.4 mL / OUT: 0 mL / NET: 52.4 mL      PHYSICAL EXAM:    General: Sedated  HEENT: Tracheostomy site appears clear  Gastrointestinal: Distended, tympanic    LABS:                        7.8    8.02  )-----------( 120      ( 27 Nov 2019 04:20 )             23.6     11-27    129<L>  |  96  |  21  ----------------------------<  103<H>  4.6   |  22  |  1.42<H>    Ca    8.2<L>      27 Nov 2019 04:20  Phos  3.9     11-27  Mg     2.2     11-27    TPro  6.4  /  Alb  2.2<L>  /  TBili  1.8<H>  /  DBili  1.2<H>  /  AST  53<H>  /  ALT  37  /  AlkPhos  94  11-27    PT/INR - ( 27 Nov 2019 04:20 )   PT: 14.1 sec;   INR: 1.24          PTT - ( 27 Nov 2019 04:20 )  PTT:37.2 sec                  RADIOLOGY & ADDITIONAL STUDIES:  Reviewed

## 2019-11-27 NOTE — CHART NOTE - NSCHARTNOTEFT_GEN_A_CORE
Admitting Diagnosis:   Patient is a 39y old  Male who presents with a chief complaint of Endocarditis h/o IVDU (27 Nov 2019 09:30)      PAST MEDICAL & SURGICAL HISTORY:  Endocarditis  IVDU (intravenous drug user)  Smoker  No significant past surgical history      Current Nutrition Order:  NPO    PO Intake: Good (%) [   ]  Fair (50-75%) [   ] Poor (<25%) [   ]- N/A NPO    GI Issues:   NGT to LIWS- >1L output x 24hrs  2 Large BMs on 11/26  No complaints of nausea or abdominal pain     Pain: No pain reported     Skin Integrity: Heber 8  IAD  R. toes necrosis  B/L ankle and heel DTIs  L. scapula DTI    Labs:   11-27    129<L>  |  96  |  21  ----------------------------<  103<H>  4.6   |  22  |  1.42<H>    Ca    8.2<L>      27 Nov 2019 04:20  Phos  3.9     11-27  Mg     2.2     11-27    TPro  6.4  /  Alb  2.2<L>  /  TBili  1.8<H>  /  DBili  1.2<H>  /  AST  53<H>  /  ALT  37  /  AlkPhos  94  11-27    CAPILLARY BLOOD GLUCOSE      POCT Blood Glucose.: 90 mg/dL (27 Nov 2019 11:50)  POCT Blood Glucose.: 119 mg/dL (27 Nov 2019 05:51)  POCT Blood Glucose.: 91 mg/dL (26 Nov 2019 23:24)  POCT Blood Glucose.: 93 mg/dL (26 Nov 2019 18:34)      Medications:  MEDICATIONS  (STANDING):  acyclovir IVPB 600 milliGRAM(s) IV Intermittent every 24 hours  albuterol/ipratropium for Nebulization 3 milliLiter(s) Nebulizer every 4 hours  artificial  tears Solution 1 Drop(s) Both EYES every 6 hours  ascorbic acid 500 milliGRAM(s) Oral daily  chlorhexidine 0.12% Liquid 15 milliLiter(s) Oral Mucosa every 12 hours  chlorhexidine 2% Cloths 1 Application(s) Topical daily  dextrose 5%. 1000 milliLiter(s) (50 mL/Hr) IV Continuous <Continuous>  dextrose 50% Injectable 12.5 Gram(s) IV Push once  dextrose 50% Injectable 25 Gram(s) IV Push once  dextrose 50% Injectable 25 Gram(s) IV Push once  epoetin maikol Injectable 90869 Unit(s) IV Push once  fentaNYL   Infusion. 0.5 MICROgram(s)/kG/Hr (3.68 mL/Hr) IV Continuous <Continuous>  heparin  Injectable 5000 Unit(s) SubCutaneous every 8 hours  hydrocortisone sodium succinate Injectable 50 milliGRAM(s) IV Push every 8 hours  insulin lispro (HumaLOG) corrective regimen sliding scale   SubCutaneous every 6 hours  midodrine 10 milliGRAM(s) Oral every 8 hours  multivitamin/minerals/iron Oral Solution (CENTRUM) 15 milliLiter(s) Oral daily  nafcillin  IVPB 2 Gram(s) IV Intermittent every 4 hours  norepinephrine Infusion 0.05 MICROgram(s)/kG/Min (3.45 mL/Hr) IV Continuous <Continuous>  pantoprazole  Injectable 40 milliGRAM(s) IV Push daily  polyethylene glycol 3350 17 Gram(s) Oral two times a day  propofol Infusion 5 MICROgram(s)/kG/Min (2.208 mL/Hr) IV Continuous <Continuous>  sodium chloride 0.9% lock flush 3 milliLiter(s) IV Push every 8 hours  vitamin E 400 International Unit(s) Oral daily  zinc sulfate 220 milliGRAM(s) Oral daily    MEDICATIONS  (PRN):  acetaminophen    Suspension .. 650 milliGRAM(s) Oral every 6 hours PRN Temp greater or equal to 38C (100.4F)  dextrose 40% Gel 15 Gram(s) Oral once PRN Blood Glucose LESS THAN 70 milliGRAM(s)/deciliter  glucagon  Injectable 1 milliGRAM(s) IntraMuscular once PRN Glucose LESS THAN 70 milligrams/deciliter      Weight: 65.3kg ([facility], 11/20)  77.4kg (11/13)  73.6kg (11/10)  Daily       Weight Change:   8.3kg weight loss from admission, etiology unclear  Suspect actual weight loss vs fluid shifts as pt was noted to be anasarcic, is on CVVHD and has notably severe muscle wasting in upper extremities.     Nutrition Focused Physical Exam: Completed [ X-11/15, 11/20 re-evaluated ]  Not Pertinent [   ]  Muscle Wasting- Temporal [X   ]  Clavicle/Pectoral [  X ]  Shoulder/Deltoid [   ]  Scapula [   ]  Interosseous [   ]  Quadriceps [   ]  Gastrocnemius [   ]  Severe PCM suspected.     Estimated energy needs: IBW (67.3kg) used to estimate needs 2/2 vent.   Needs increased 2/2 vent, sepsis, CVVHD, suspected malnutrition. Fluids per team 2/2 CVVHD  Calories: 25-30 kcal/kg = 8834-2801 kcal/day  Protein: 1.6-1.8 g/kg = 108-121g protein/day    Subjective:   40 yo/male with PMHx IVDA, presented to OSH w/cough, hemoptysis, and N/V. Found to be septic and hypotensive. CT chest +pna, pulmonary nodules w/septic emboli, and ELIAN +vegetation. Pt ultimately intubated and started on pressors. Transferred to [facility] CTICU where he was deemed not to be a surgical candidate. Course c/b renal failure and B/L pleural effusions, s/p R. CT placement. Started on CVVH. ELIAN w/bubble negative for PFO. CTA negative for mesenteric ischemia and rectal exam negative for perirectal abscess, per surgery note. Underwent CT A/P 11/18 which demonstrated colonic distension likely 2/2 pseudoobstruction and decompressed small bowel. Per surgery recs, pt to continue w/ rectal tube for colonic decompression and plan for flex sig once stable. CT chest showing hydropneumothorax on R. lung and pneumothorax of L. lung; chest tubes remain to suction. S/p bronch 11/19 w/ copious purulent sputum; repeat bronch 11/22 still w/thick secretions though improved. Remains too high risk for CT surgery intervention at this time. S/p repeat echo on 11/20 showing large stable vegetation, severe TR. Remains on CVVHD w/plan to transition to IHD. S/p trach on 11/26. Pt seen in room and discussed w/MICU team. Pt remains trached to vent on VC/AC mode, though awake and alert, despite being sedated on propofol @ 13.2mL/hr (348.5kcal/day from lipids) and fentanyl. MAP 96- remains in levophed for BP support. B/L chest tubes remain to suction. NPO at this time 2/2 high residuals when TF resumed. Sump to LIWS. AXR w/o obstruction. Pt denied N/V or pain at this time of visit. T. and D. bilirubin trending down. Na 129 (L). Will continue to follow per RD protocol.     Previous Nutrition Diagnosis:  Increased protein-calorie needs RT increased demand for protein-calorie intake AEB vent, hypermetabolic state, suspected malnutrition   Active [  x ]  Resolved [   ]    If resolved, new PES:      Goal: Pt will meet % of EER via tolerated route     Recommendations:  1. As medically feasible, recommend resuming TF with ADDITION OF PROMOTILITY AGENT if not contraindicated. Recommend starting trophic feeds of Vital 1.5 Christian @ 20mL/hr via NGT. As tolerated, please increase feeds to goal of 37mL/hr x 24hrs plus ProStat TID (300 kcal, 45g protein). Provides: 888mL TV, 1981kcal, 105g pro, 678mL free H2O, 89% RDI, 1.56g/kg IBW protein. *discussed w/MD   2. Monitor for s/s intolerance; maintain aspiration precautions at all times   3. Cont. w/micronutrient supplementation   4. Daily wts   5. Pain and additional bowel regimens per team discretion   6. If unable to tolerate EN, consider short-term TPN as pt severely malnourished and hypermetabolic.     Education: Discussed continued importance of nutrition     Risk Level: High [ X  ] Moderate [   ] Low [   ]

## 2019-11-27 NOTE — PROCEDURE NOTE - NSICDXPROCEDURE_GEN_ALL_CORE_FT
PROCEDURES:  Chest tube insertion 19-Nov-2019 18:14:00  Becky Denise
PROCEDURES:  Insertion of central venous access device with ultrasound guidance 27-Nov-2019 21:37:21  Jorje Geiger
PROCEDURES:  Chest tube placement 12-Nov-2019 18:14:22  Becky Denise

## 2019-11-27 NOTE — PROGRESS NOTE ADULT - ASSESSMENT
38 y/o M w/ PMH of IVDU and active smoker who came from [facility] on 11/8/19 in 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 After being transferred to  CTICU for surgical evaluation, pt was deemed to not be a surgical candidate and transferred to MICU for medical mgmt. ID, Nephrology, heme/onc, surgery following, vascular.    Neuro  Intubated, sedated on Propofol, Fentanyl gtt, Versed gtt. Prior CT head negative for any intracranial embolic events.   -Down titrate Propofol  -DC Versed gtt started for trach      Cardiovascular   #Hypotension  Most likely 2/2 septic shock from infective endocarditis and RV failure 2/2 tricuspid regurgitation (ELIAN shows EF of 40-50%). Echo with EF 45%. ELIAN with EF 40-45%, 3.8 x1cm vegetation on TR valve, with severe TR, trivial pericardial effusion. Echo with bubble revealed no PFO. Wean off Levophed as tolerated.   - Maintain MAP>65.   - C/W medical mgmt of infective endocarditis as below.  - repeat echo 11/18 with stable vegetation  - Likely became hypotensive and bradycardic overnight (11/18-11/19) in setting of mucous plug vs arrythmia vs most likely septic shock   - repeat echo 11/20 with large stable vegetation, severe TR, markedly dilated IVC with increased R atrial pressure- EF 60%   - c/w Midodrine 5mg TID       Respiratory  #Acute hypoxic respiratory failure   Most likely 2/2 alveolar hemorrhage in the setting of septic embolic causing numerous cavitary lesions on CT chest. CXR with scattered infiltrates and pleural effusions. Sputum culture negative. CT revealed "moderate bilateral pleural effusions and dense bibasilar consolidation with underlying septic emboli/pulmonary abscesses."   - Re-intubated on 11/19 for acute hypoxic respiratory failure with increased work of breathing   - CT chest with new b/l loculated pneumothoraces with increase in size of cavitary lesions with worsening lobar PNA   - 2 right chest tubes and 1 left chest tube-->repeat cxr with decrease in size of b/l pneumothoraces   - cxr 11/21 with small right pneumothorax, still present but stable on 11/22    - C/w Duonebs q4  - Repeat bronch 11/22 with thick secretions (R>L) but slightly improved compared to first bronch   - 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 Bilateral CT in place, confirmed by CXR, continues to drain serosanguinous fluid. Fluid analysis of both CT pleural fluid confirms complex exudate with high cellularity, low pH and low glucose. pleural fluid cultures with staph aureus. Chest tubes in place as above (2 R chest tubes, 1 L chest tube)   - Monitor output of bilateral chest tubes  - R pleural fluid cultures with staph aureus, L pleural fluid cultures with NGTD   - CT chest and cxr as above. Chest tubes set to suction.      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 Not a surgical candidate for a tricuspid valve replacement at this time as per CT surgery. Initial blood cultures positive for staph epidermidis, likely a contaminant. Initial sputum culture positive for staph aureus  Repeat blood cultures with NGTD. Sputum cx NGTD.  - ID following, C/W Nafcillin 2g q4 (Sensitive to Oxacillin as per OSH records), f/u recs   - repeat Bcxs with NGTD   - Bronch performed 11/19 with copious purulent sputum-  BAL cxs with numerous wbcs, rare gram positive rods/ rare gram negative rods, AFB negative. Cytopathology with no pneumocystis organisms but positive for HSV, started on Acyclovir (11/25-)   - Per CT surgery pt is too high risk for any surgery and would likely not survive procedure.   - Cardiology consulted to optimize management of his right heart dysfunction, f/u recs    - Repeat bronch 11/22 with thick secretions (R>L) but slightly improved compared to first bronch. BAL cxs with mod yeast, staph aureus, few wbcs       Renal  #Acute renal failure most likely 2/2 ATN from hypoperfusion, Minimal urine output, on CVVHD, oliguric with 0-5cc/hr. Vancomycin level 37 on arrival so may be component of drug induced nephropathy. Bun/Cr continues to improve while on CVVHD. Pt is clinically fluid overloaded but now improved and hemodynamically stable. No renal infarcts as per CT abdomen/pelvis.  - F/U nephrology recs.  - C/W CVVHD as tolerated, monitor for clotting  - Continue to correct fluid overload with HD  - Monitor I/Os  - blader scan, with straight cath if needed   - Will keep net even on CVVHD for now     #Electrolytes abnormalities   Improved. On CVVHD. Hyperkalemia resolved. No EKG changes.  -Requires q12 BMP, Mg, and Phos while on CVVHD  -Monitor serum lytes       GI  OG on arrival with 600cc of bilious fluid, abdomen still distended but had several BMs, some loose. CT abdomen revealed no evidence of bowel ischemia or SBO. Sump was DCed and replaced with NG tube. CT abdomen/pelvis revealed "irregular masslike mural thickening of the posterior left lateral wall of the rectum." Surgery consulted, unlikely perirectal abscess. Recommend GI evaluation for possible scope.  - F/u GI consult, f/u recs, when stable will go for flex sig   - Abdominal xray with air-filled loops of small/large bowel with concern for distal obstruction vs ileus. Surgery consulted. Rectal tube and sump placed. CT abd/pelvis with abd/pelvic, anasarca, colonic ileus. Rectal tube in place for colonic decompression, continue to monitor. Abdomen still distended but significantly improved, repeat abd xray without obstruction   -C. diff negative   - NG tube in place, draining 500cc from stomach   - Start trickle feeds, if tolerating advance to full feeds tomorrow  -Passing gas and had large BM in morning. Continue to monitor, abdominal distension likely ileus 2/2 to large dose of Fentanyl and being bed bound     #Congestive Hepatopathy   Transaminitis, coag derangements, hyperbilirubinemia most likely 2/2 hepatic congestion vs hemolysis, due to severe TR in setting of infective endocarditis. Transaminases and Alk phos improving. Hepatitis panel negative.  - Monitor CMP, direct bili   - Avoid Tylenol      Heme  #Hemolysis  Intravascular hemolysis with initial haptoglobin <10, LDH decreasing at 365. D-dimer elevated. Fibrinogen stable 396. Today still clinically jaundiced, with stable bilirubinemia: T.bili 9.9., D.bili 8.5. h/h stable at 8.1/25.5. Platelets decreasing from 54 to 49.. Fact VII/Factor VIII elevated. Unlikely 2/2 CVVHD. Likely 2/2 to sepsis and/or DIC.   - s/p 2u pRBCs (11/19-11/20)  - Received 1u pRBCs 11/24, Hgb stable at 8.4   - Received 1u plts 11/26 in preparation for trach placement 11/26   - Heme consulted, follow recs  - Monitor direct and total bili, LDH, fibrinogen, platelets   - Transfuse platelets if <10K or bleeding and <50K  - Transfuse pRBCs for hgb <7       Vascular  Vascular surgery consulted in setting of gangrenous distal toes and fingers b/l. Likely 2/2 to pressors. Follow up recs.       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 After being transferred to  CTICU for surgical evaluation, pt was deemed to not be a surgical candidate and transferred to MICU for medical mgmt. ID, Nephrology, heme/onc, surgery following, vascular.    Neuro  Intubated, sedated on Propofol, Fentanyl gtt. Prior CT head negative for any intracranial embolic events.   -Down titrate Propofol  -wean off fentanyl       Cardiovascular   #Hypotension  Most likely 2/2 septic shock from infective endocarditis and RV failure 2/2 tricuspid regurgitation (ELIAN shows EF of 40-50%). Echo with EF 45%. ELIAN with EF 40-45%, 3.8 x1cm vegetation on TR valve, with severe TR, trivial pericardial effusion. Echo with bubble revealed no PFO. Wean off Levophed as tolerated.   - Maintain MAP>65.   - C/W medical mgmt of infective endocarditis as below.  - repeat echo 11/20 with large stable vegetation, severe TR, markedly dilated IVC with increased R atrial pressure- EF 60%   - Midodrine increased to 10mg TID       Respiratory  #Acute hypoxic respiratory failure   Most likely 2/2 alveolar hemorrhage in the setting of septic embolic causing numerous cavitary lesions on CT chest. CXR with scattered infiltrates and pleural effusions. Sputum culture negative. 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 Bilateral CT in place, confirmed by CXR, continues to drain serosanguinous fluid. Fluid analysis of both CT pleural fluid confirms complex exudate with high cellularity, low pH and low glucose. pleural fluid cultures with staph aureus. Chest tubes in place as above (2 R chest tubes, 1 L chest tube)   - Monitor output of bilateral chest tubes  - R pleural fluid cultures with staph aureus, L pleural fluid cultures with NGTD   - CT chest and cxr as above. Chest tubes set to suction.      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 Not a surgical candidate for a tricuspid valve replacement at this time as per CT surgery. Initial blood cultures positive for staph epidermidis, likely a contaminant. Initial sputum culture positive for staph aureus  Repeat blood cultures with NGTD. Sputum cx NGTD.  - ID following, C/W Nafcillin 2g q4 (Sensitive to Oxacillin as per OSH records), f/u recs   - repeat Bcxs with NGTD   - Bronch performed 11/19 with copious purulent sputum-  BAL cxs with numerous wbcs, rare gram positive rods/ rare gram negative rods, AFB negative. Cytopathology with no pneumocystis organisms but positive for HSV, started on Acyclovir (11/25-)   - Per CT surgery pt is too high risk for any surgery and would likely not survive procedure.   - Cardiology consulted to optimize management of his right heart dysfunction, f/u recs    - Repeat bronch 11/22 with thick secretions (R>L) but slightly improved compared to first bronch. BAL cxs with mod yeast, staph aureus, few wbcs       Renal  #Acute renal failure most likely 2/2 ATN from hypoperfusion, Minimal urine output, on CVVHD, oliguric with 0-5cc/hr. Vancomycin level 37 on arrival so may be component of drug induced nephropathy. Bun/Cr continues to improve while on CVVHD. Pt is clinically fluid overloaded but now improved and hemodynamically stable. No renal infarcts as per CT abdomen/pelvis.  - F/U nephrology recs.  - C/W CVVHD as tolerated, monitor for clotting  - Continue to correct fluid overload with HD  - Monitor I/Os  - blader scan, with straight cath if needed   - Will keep net even on CVVHD for now     #Electrolytes abnormalities   Improved. On CVVHD. Hyperkalemia resolved. No EKG changes.  -Requires q12 BMP, Mg, and Phos while on CVVHD  -Monitor serum lytes       GI  OG on arrival with 600cc of bilious fluid, abdomen still distended but had several BMs, some loose. CT abdomen revealed no evidence of bowel ischemia or SBO. Sump was DCed and replaced with NG tube. CT abdomen/pelvis revealed "irregular masslike mural thickening of the posterior left lateral wall of the rectum." Surgery consulted, unlikely perirectal abscess. Recommend GI evaluation for possible scope.  - F/u GI consult, f/u recs, when stable will go for flex sig   - Abdominal xray with air-filled loops of small/large bowel with concern for distal obstruction vs ileus. Surgery consulted. Rectal tube and sump placed. CT abd/pelvis with abd/pelvic, anasarca, colonic ileus. Rectal tube in place for colonic decompression, continue to monitor. Abdomen still distended but significantly improved, repeat abd xray without obstruction   -C. diff negative   - NG tube in place, draining 500cc from stomach   - Start trickle feeds, if tolerating advance to full feeds tomorrow  -Passing gas and had large BM in morning. Continue to monitor, abdominal distension likely ileus 2/2 to large dose of Fentanyl and being bed bound     #Congestive Hepatopathy   Transaminitis, coag derangements, hyperbilirubinemia most likely 2/2 hepatic congestion vs hemolysis, due to severe TR in setting of infective endocarditis. Transaminases and Alk phos improving. Hepatitis panel negative.  - Monitor CMP, direct bili   - Avoid Tylenol      Heme  #Hemolysis  Intravascular hemolysis with initial haptoglobin <10, LDH decreasing at 365. D-dimer elevated. Fibrinogen stable 396. Today still clinically jaundiced, with stable bilirubinemia: T.bili 9.9., D.bili 8.5. h/h stable at 8.1/25.5. Platelets decreasing from 54 to 49.. Fact VII/Factor VIII elevated. Unlikely 2/2 CVVHD. Likely 2/2 to sepsis and/or DIC.   - s/p 2u pRBCs (11/19-11/20)  - Received 1u pRBCs 11/24, Hgb stable at 8.4   - Received 1u plts 11/26 in preparation for trach placement 11/26   - Heme consulted, follow recs  - Monitor direct and total bili, LDH, fibrinogen, platelets   - Transfuse platelets if <10K or bleeding and <50K  - Transfuse pRBCs for hgb <7       Vascular  Vascular surgery consulted in setting of gangrenous distal toes and fingers b/l. Likely 2/2 to pressors. Follow up recs.       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 After being transferred to  CTICU for surgical evaluation, pt was deemed to not be a surgical candidate and transferred to MICU for medical mgmt. ID, Nephrology, heme/onc, surgery following, vascular.    Neuro  Intubated, sedated on Propofol, Fentanyl gtt. Prior CT head negative for any intracranial embolic events.   -Down titrate Propofol  -wean off fentanyl; currently more alert and interactive      Cardiovascular   #Hypotension  Most likely 2/2 septic shock from infective endocarditis and RV failure 2/2 tricuspid regurgitation (ELIAN shows EF of 40-50%). Echo with EF 45%. ELIAN with EF 40-45%, 3.8 x1cm vegetation on TR valve, with severe TR, trivial pericardial effusion. Echo with bubble revealed no PFO. Wean off Levophed as tolerated.   - Maintain MAP>65.   - C/W medical mgmt of infective endocarditis as below.  - repeat echo 11/20 with large stable vegetation, severe TR, markedly dilated IVC with increased R atrial pressure- EF 60%   - Midodrine increased to 10mg TID       Respiratory  #Acute hypoxic respiratory failure   Most likely 2/2 alveolar hemorrhage in the setting of septic embolic causing numerous cavitary lesions on CT chest. CXR with scattered infiltrates and pleural effusions. Sputum culture negative. 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 Bilateral CT in place, confirmed by CXR, continues to drain serosanguinous fluid. Fluid analysis of both CT pleural fluid confirms complex exudate with high cellularity, low pH and low glucose. pleural fluid cultures with staph aureus. Chest tubes in place as above (2 R chest tubes, 1 L chest tube)   - Monitor output of bilateral chest tubes  - R pleural fluid cultures with staph aureus, L pleural fluid cultures with NGTD   - CT chest and cxr as above. Chest tubes set to suction.      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 Not a surgical candidate for a tricuspid valve replacement at this time as per CT surgery. Initial blood cultures positive for staph epidermidis, likely a contaminant. Initial sputum culture positive for staph aureus  Repeat blood cultures with NGTD. Sputum cx NGTD.  - ID following, C/W Nafcillin 2g q4 (Sensitive to Oxacillin as per OSH records), f/u recs   - repeat Bcxs with NGTD   - Bronch performed 11/19 with copious purulent sputum-  BAL cxs with numerous wbcs, rare gram positive rods/ rare gram negative rods, AFB negative. Cytopathology with no pneumocystis organisms but positive for HSV, started on Acyclovir (11/25-)   - Per CT surgery pt is too high risk for any surgery and would likely not survive procedure.   - Cardiology consulted to optimize management of his right heart dysfunction, f/u recs    - Repeat bronch 11/22 with thick secretions (R>L) but slightly improved compared to first bronch. BAL cxs with mod yeast, staph aureus, few wbcs       Renal  #Acute renal failure most likely 2/2 ATN from hypoperfusion, Minimal urine output, on CVVHD, oliguric with 0-5cc/hr. Vancomycin level 37 on arrival so may be component of drug induced nephropathy. Bun/Cr continues to improve while on CVVHD. Pt is clinically fluid overloaded but now improved and hemodynamically stable. No renal infarcts as per CT abdomen/pelvis.  - F/U nephrology recs.  - CVVHD clotted; plan for intermittent HD per nephrology recs  - Continue to correct fluid overload with HD  - Monitor I/Os  - blader scan, with straight cath if needed   - Will keep net even on HD for now     #Electrolytes abnormalities   Improved. On CVVHD. Hyperkalemia resolved. No EKG changes.  -Monitor serum lytes       GI  OG on arrival with 600cc of bilious fluid, abdomen still distended but had several BMs, some loose. CT abdomen revealed no evidence of bowel ischemia or SBO. Sump was DCed and replaced with NG tube. CT abdomen/pelvis revealed "irregular masslike mural thickening of the posterior left lateral wall of the rectum." Surgery consulted, unlikely perirectal abscess. Recommend GI evaluation for possible scope.  - F/u GI consult, f/u recs, when stable will go for flex sig; possible PEG   - Abdominal xray with air-filled loops of small/large bowel with concern for distal obstruction vs ileus. Surgery consulted. Rectal tube and sump placed. CT abd/pelvis with abd/pelvic, anasarca, colonic ileus. Rectal tube in place for colonic decompression, continue to monitor. Abdomen still distended but significantly improved, repeat abd xray without obstruction   -C. diff negative   - NG tube in place, draining 700cc from stomach 11/27   - feeds stopped this AM due to continued distension; resume as tolerated    #Congestive Hepatopathy   Transaminitis, coag derangements, hyperbilirubinemia most likely 2/2 hepatic congestion vs hemolysis, due to severe TR in setting of infective endocarditis. Transaminases and Alk phos improving. Hepatitis panel negative.  - Monitor CMP, direct bili       Heme  #Hemolysis  Intravascular hemolysis with initial haptoglobin <10, LDH decreasing at 365. D-dimer elevated. Fibrinogen stable 396. Today still clinically jaundiced, with stable bilirubinemia: T.bili 9.9., D.bili 8.5. h/h stable at 8.1/25.5. Platelets decreasing from 54 to 49.. Fact VII/Factor VIII elevated. Unlikely 2/2 CVVHD. Likely 2/2 to sepsis and/or DIC.   - Received 1u plts 11/26 in preparation for trach placement 11/26; plt currently stable and >100 without any evidence of oozing/bleeding   - Heme consulted, follow recs  - Monitor direct and total bili, LDH, fibrinogen, platelets   - Transfuse platelets if <10K or bleeding and <50K  - Transfuse pRBCs for hgb <7       Vascular  Vascular surgery consulted in setting of gangrenous distal toes and fingers b/l. Likely 2/2 to pressors. Follow up recs.       Endocrinology  -Continue Hydrocortisone 50mg q8- stress dose steroids      Lines: right IJ TLC and Ernesto (11/12), Axillary A-line (11/12), Left IJ (11/12), right peripheral (11/12)  - Plan to replace R IJ for L subclavian; will draw blood and fungal cultures during exchange     F: None   E: replete K <4, Mg <2  N: Trickle feeds   GI Ppx: Protonix   DVT Ppx: Heparin   Code status: FULL   Dispo: MICU

## 2019-11-28 NOTE — PROGRESS NOTE ADULT - ASSESSMENT
40 y/o M smoker w/ PMHx of IVDU presented to [facility] w/ productive cough with hemoptysis found to have septic emboli w/ vegetation of the TV transferred to [facility] for surgical evaluation.  Course complicated by sepsis, DIC, acute respiratory failure w/ empyema s/p intubation and thoracostomy tube, CAMERON requiring cvvhd, ileus on NGT and rectal decompression, cardiac arrest, and pneumothorax. GI consulted for evaluation of incidental finding of rectal wall thickening on imaging.      #Abd distension  Patient with new abdominal distension with xray notable for dilated bowels.  CT Patient is on zinc and fentanyl with rectal tube.  -Wean opioids as able  -Aggressive repletion of electrolytes  -NG sump to low-intermittent suction  -c/w Miralax BID  -Given persistent distention, would start prokinetic agent such as Reglan or consider Methylnaltrexone to counteract opioids    #Hyperbilirubinemia:  Patient with marked elevated bilirubin initially thought to be 2/2 ischemic hepatopathy now downtrending. Imaging without CBD dilation suggestive of cholestasis from sepsis vs DILI in the setting of recent ischemic hepatopathy. Continues to downtrend.  -Daily LFT and INR    Recommendations discussed with primary team  Case discussed with attending physician

## 2019-11-28 NOTE — PROGRESS NOTE ADULT - SUBJECTIVE AND OBJECTIVE BOX
INTERVAL HPI/OVERNIGHT EVENTS:    Patient seen and examined at bedside.  No events.  Still febrile    ROS:    unable to obtain     ANTIBIOTICS/RELEVANT:    MEDICATIONS  (STANDING):  acyclovir IVPB 370 milliGRAM(s) IV Intermittent every 24 hours  albuterol/ipratropium for Nebulization 3 milliLiter(s) Nebulizer every 4 hours  artificial  tears Solution 1 Drop(s) Both EYES every 6 hours  chlorhexidine 0.12% Liquid 15 milliLiter(s) Oral Mucosa every 12 hours  chlorhexidine 2% Cloths 1 Application(s) Topical daily  dextrose 5%. 1000 milliLiter(s) (50 mL/Hr) IV Continuous <Continuous>  dextrose 5%. 1000 milliLiter(s) (50 mL/Hr) IV Continuous <Continuous>  dextrose 50% Injectable 12.5 Gram(s) IV Push once  dextrose 50% Injectable 25 Gram(s) IV Push once  dextrose 50% Injectable 25 Gram(s) IV Push once  fentaNYL   Infusion. 0.5 MICROgram(s)/kG/Hr (3.68 mL/Hr) IV Continuous <Continuous>  heparin  Injectable 5000 Unit(s) SubCutaneous every 8 hours  hydrocortisone sodium succinate Injectable 50 milliGRAM(s) IV Push every 8 hours  insulin lispro (HumaLOG) corrective regimen sliding scale   SubCutaneous every 6 hours  midodrine 10 milliGRAM(s) Oral every 8 hours  multivitamin/minerals/iron Oral Solution (CENTRUM) 15 milliLiter(s) Oral daily  nafcillin  IVPB 2 Gram(s) IV Intermittent every 4 hours  norepinephrine Infusion 0.05 MICROgram(s)/kG/Min (3.45 mL/Hr) IV Continuous <Continuous>  pantoprazole  Injectable 40 milliGRAM(s) IV Push daily  polyethylene glycol 3350 17 Gram(s) Oral two times a day  propofol Infusion 5 MICROgram(s)/kG/Min (2.208 mL/Hr) IV Continuous <Continuous>  sodium chloride 0.9% lock flush 3 milliLiter(s) IV Push every 8 hours  vitamin E 400 International Unit(s) Oral daily  zinc sulfate 220 milliGRAM(s) Oral daily    MEDICATIONS  (PRN):  acetaminophen    Suspension .. 650 milliGRAM(s) Oral every 6 hours PRN Temp greater or equal to 38C (100.4F)  dextrose 40% Gel 15 Gram(s) Oral once PRN Blood Glucose LESS THAN 70 milliGRAM(s)/deciliter  glucagon  Injectable 1 milliGRAM(s) IntraMuscular once PRN Glucose LESS THAN 70 milligrams/deciliter        Vital Signs Last 24 Hrs  T(C): 38.2 (28 Nov 2019 06:17), Max: 38.6 (27 Nov 2019 17:00)  T(F): 100.7 (28 Nov 2019 06:17), Max: 101.4 (27 Nov 2019 17:00)  HR: 126 (28 Nov 2019 12:00) (114 - 130)  BP: --  BP(mean): --  RR: 21 (28 Nov 2019 12:00) (8 - 32)  SpO2: 98% (28 Nov 2019 12:00) (93% - 100%)    PHYSICAL EXAM:  Constitutional:  non-toxic, no distress  Eyes:  no icterus   Ear/Nose/Throat: + trach	  Neck:  supple  Respiratory: CTA hellen  Cardiovascular: + systolic murmur  Gastrointestinal:soft, (+) BS, no HSM  Extremities:  + necrotic toes  Vascular: DP Pulse:	right normal; left normal      LABS:                        8.2    9.38  )-----------( 158      ( 28 Nov 2019 05:27 )             25.6     11-28    133<L>  |  94<L>  |  19  ----------------------------<  88  3.4<L>   |  26  |  1.52<H>    Ca    8.1<L>      28 Nov 2019 05:27  Phos  3.4     11-28  Mg     2.1     11-28    TPro  7.0  /  Alb  2.6<L>  /  TBili  1.9<H>  /  DBili  x   /  AST  52<H>  /  ALT  40  /  AlkPhos  100  11-28    PT/INR - ( 27 Nov 2019 04:20 )   PT: 14.1 sec;   INR: 1.24          PTT - ( 28 Nov 2019 05:27 )  PTT:35.9 sec      MICROBIOLOGY:    Culture - Blood (11.27.19 @ 05:48)    Specimen Source: .Blood Blood    Culture Results:   No growth at 1 day.        RADIOLOGY & ADDITIONAL STUDIES:

## 2019-11-28 NOTE — PROGRESS NOTE ADULT - ASSESSMENT
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 After being transferred to  CTICU for surgical evaluation, pt was deemed to not be a surgical candidate and transferred to MICU for medical mgmt. ID, Nephrology, heme/onc, surgery following, vascular.    Neuro  Intubated, sedated on Propofol, Fentanyl gtt. Prior CT head negative for any intracranial embolic events.   -Down titrate Propofol  -wean off fentanyl; currently more alert and interactive      Cardiovascular   #Hypotension  Most likely 2/2 septic shock from infective endocarditis and RV failure 2/2 tricuspid regurgitation (ELIAN shows EF of 40-50%). Echo with EF 45%. ELIAN with EF 40-45%, 3.8 x1cm vegetation on TR valve, with severe TR, trivial pericardial effusion. Echo with bubble revealed no PFO. Wean off Levophed as tolerated.   - Maintain MAP>65.   - C/W medical mgmt of infective endocarditis as below.  - repeat echo 11/20 with large stable vegetation, severe TR, markedly dilated IVC with increased R atrial pressure- EF 60%   - Midodrine increased to 10mg TID       Respiratory  #Acute hypoxic respiratory failure   Most likely 2/2 alveolar hemorrhage in the setting of septic embolic causing numerous cavitary lesions on CT chest. CXR with scattered infiltrates and pleural effusions. Sputum culture negative. 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 Bilateral CT in place, confirmed by CXR, continues to drain serosanguinous fluid. Fluid analysis of both CT pleural fluid confirms complex exudate with high cellularity, low pH and low glucose. pleural fluid cultures with staph aureus. Chest tubes in place as above (2 R chest tubes, 1 L chest tube)   - Monitor output of bilateral chest tubes  - R pleural fluid cultures with staph aureus, L pleural fluid cultures with NGTD   - CT chest and cxr as above. Chest tubes set to suction.      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 Not a surgical candidate for a tricuspid valve replacement at this time as per CT surgery. Initial blood cultures positive for staph epidermidis, likely a contaminant. Initial sputum culture positive for staph aureus  Repeat blood cultures with NGTD. Sputum cx NGTD.  - ID following, C/W Nafcillin 2g q4 (Sensitive to Oxacillin as per OSH records), f/u recs   - repeat Bcxs with NGTD   - Bronch performed 11/19 with copious purulent sputum-  BAL cxs with numerous wbcs, rare gram positive rods/ rare gram negative rods, AFB negative. Cytopathology with no pneumocystis organisms but positive for HSV, started on Acyclovir (11/25-)   - Per CT surgery pt is too high risk for any surgery and would likely not survive procedure.   - Cardiology consulted to optimize management of his right heart dysfunction, f/u recs    - Repeat bronch 11/22 with thick secretions (R>L) but slightly improved compared to first bronch. BAL cxs with mod yeast, staph aureus, few wbcs       Renal  #Acute renal failure most likely 2/2 ATN from hypoperfusion, Minimal urine output, on CVVHD, oliguric with 0-5cc/hr. Vancomycin level 37 on arrival so may be component of drug induced nephropathy. Bun/Cr continues to improve while on CVVHD. Pt is clinically fluid overloaded but now improved and hemodynamically stable. No renal infarcts as per CT abdomen/pelvis.  - F/U nephrology recs.  - CVVHD clotted; plan for intermittent HD per nephrology recs  - Continue to correct fluid overload with HD  - Monitor I/Os  - blader scan, with straight cath if needed   - Will keep net even on HD for now     #Electrolytes abnormalities   Improved. On CVVHD. Hyperkalemia resolved. No EKG changes.  -Monitor serum lytes       GI  OG on arrival with 600cc of bilious fluid, abdomen still distended but had several BMs, some loose. CT abdomen revealed no evidence of bowel ischemia or SBO. Sump was DCed and replaced with NG tube. CT abdomen/pelvis revealed "irregular masslike mural thickening of the posterior left lateral wall of the rectum." Surgery consulted, unlikely perirectal abscess. Recommend GI evaluation for possible scope.  - F/u GI consult, f/u recs, when stable will go for flex sig; possible PEG   - Abdominal xray with air-filled loops of small/large bowel with concern for distal obstruction vs ileus. Surgery consulted. Rectal tube and sump placed. CT abd/pelvis with abd/pelvic, anasarca, colonic ileus. Rectal tube in place for colonic decompression, continue to monitor. Abdomen still distended but significantly improved, repeat abd xray without obstruction   -C. diff negative   - NG tube in place, draining 700cc from stomach 11/27   - feeds stopped this AM due to continued distension; resume as tolerated    #Congestive Hepatopathy   Transaminitis, coag derangements, hyperbilirubinemia most likely 2/2 hepatic congestion vs hemolysis, due to severe TR in setting of infective endocarditis. Transaminases and Alk phos improving. Hepatitis panel negative.  - Monitor CMP, direct bili       Heme  #Hemolysis  Intravascular hemolysis with initial haptoglobin <10, LDH decreasing at 365. D-dimer elevated. Fibrinogen stable 396. Today still clinically jaundiced, with stable bilirubinemia: T.bili 9.9., D.bili 8.5. h/h stable at 8.1/25.5. Platelets decreasing from 54 to 49.. Fact VII/Factor VIII elevated. Unlikely 2/2 CVVHD. Likely 2/2 to sepsis and/or DIC.   - Received 1u plts 11/26 in preparation for trach placement 11/26; plt currently stable and >100 without any evidence of oozing/bleeding   - Heme consulted, follow recs  - Monitor direct and total bili, LDH, fibrinogen, platelets   - Transfuse platelets if <10K or bleeding and <50K  - Transfuse pRBCs for hgb <7       Vascular  Vascular surgery consulted in setting of gangrenous distal toes and fingers b/l. Likely 2/2 to pressors. Follow up recs.       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 After being transferred to  CTICU for surgical evaluation, pt was deemed to not be a surgical candidate and transferred to MICU for medical mgmt. ID, Nephrology, heme/onc, surgery, vascular following.    Neuro  Intubated, sedated on Propofol, Fentanyl gtt. Prior CT head negative for any intracranial embolic events.   -wean off both propofol and fentanyl  -currently alert and interactive, no need for sedation       Cardiovascular   #Hypotension  Most likely 2/2 septic shock from infective endocarditis and RV failure 2/2 tricuspid regurgitation (ELIAN shows EF of 40-50%). Echo with EF 45%. ELIAN with EF 40-45%, 3.8 x1cm vegetation on TR valve, with severe TR, trivial pericardial effusion. Echo with bubble revealed no PFO. Wean off Levophed as tolerated.   - Maintain MAP>65.   - C/W medical mgmt of infective endocarditis as below.  - c/w Midodrine 10mg TID   - c/w levofed drip, downtitrate as tolerated       Respiratory  #Acute hypoxic respiratory failure   Most likely 2/2 alveolar hemorrhage in the setting of septic embolic causing numerous cavitary lesions on CT chest. CXR with scattered infiltrates and pleural effusions. Sputum culture negative. 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 Bilateral CT in place, confirmed by CXR, continues to drain serosanguinous fluid. Fluid analysis of both CT pleural fluid confirms complex exudate with high cellularity, low pH and low glucose. pleural fluid cultures with staph aureus. Chest tubes in place as above (2 R chest tubes, 1 L chest tube)   - Monitor output of bilateral chest tubes  - R pleural fluid cultures with staph aureus, L pleural fluid cultures with NGTD   - CT chest and cxr as above. Chest tubes set to suction.      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 Not a surgical candidate for a tricuspid valve replacement at this time as per CT surgery. Initial blood cultures positive for staph epidermidis, likely a contaminant. Initial sputum culture positive for staph aureus  Repeat blood cultures with NGTD. Sputum cx NGTD.  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   - Cardiology consulted to optimize management of his right heart dysfunction, f/u recs        Renal  #Acute renal failure most likely 2/2 ATN from hypoperfusion, Minimal urine output, on CVVHD, oliguric with 0-5cc/hr. Vancomycin level 37 on arrival so may be component of drug induced nephropathy. Bun/Cr continues to improve while on CVVHD. Pt is clinically fluid overloaded but now improved and hemodynamically stable. No renal infarcts as per CT abdomen/pelvis.  - F/U nephrology recs.  -plan for intermittent HD per nephrology recs and permacath planning and placement  - Continue to correct fluid overload with HD  - Monitor I/Os  - currently anuric with no signs of renal recovery  - next HD 11/29 vs 11/30  - continue to monitor serum electrolytes      GI  OG on arrival with 600cc of bilious fluid, abdomen still distended but had several BMs, some loose. CT abdomen revealed no evidence of bowel ischemia or SBO. Sump was DCed and replaced with NG tube. CT abdomen/pelvis revealed "irregular masslike mural thickening of the posterior left lateral wall of the rectum." Surgery consulted, unlikely perirectal abscess. Recommend GI evaluation for possible scope.  - F/u GI consult, f/u recs, when stable will go for flex sig; possible PEG   - Abdominal xray with air-filled loops of small/large bowel with concern for distal obstruction vs ileus. Surgery consulted. Rectal tube and sump placed. CT abd/pelvis with abd/pelvic, anasarca, colonic ileus. Rectal tube in place for colonic decompression, continue to monitor. Abdomen still distended but significantly improved, repeat abd xray without obstruction   -C. diff negative   - NG tube in place, sump to low-intermittent suction   -c/w Miralax BID  - wean off fentanyl/opiates as above  -Given persistent distention, would start prokinetic agent such as Reglan or consider Methylnaltrexone to counteract opioids per GI recs    #Congestive Hepatopathy   Transaminitis, coag derangements, hyperbilirubinemia most likely 2/2 hepatic congestion vs hemolysis, due to severe TR in setting of infective endocarditis. Transaminases and Alk phos improving. Hepatitis panel negative.  - Monitor CMP, direct bili       Heme  #Hemolysis  Intravascular hemolysis with initial haptoglobin <10, LDH decreasing at 365. D-dimer elevated. Fibrinogen stable 396. Today still clinically jaundiced, with stable bilirubinemia: T.bili 9.9., D.bili 8.5. h/h stable at 8.1/25.5. Platelets decreasing from 54 to 49.. Fact VII/Factor VIII elevated. Unlikely 2/2 CVVHD. Likely 2/2 to sepsis and/or DIC.   - Received 1u plts 11/26 in preparation for trach placement 11/26; plt currently stable and >100 without any evidence of oozing/bleeding   - Heme consulted, follow recs  - Monitor direct and total bili, LDH, fibrinogen, platelets   - Transfuse platelets if <10K or bleeding and <50K  - Transfuse pRBCs for hgb <7       Vascular  Vascular surgery consulted in setting of gangrenous distal toes and fingers b/l. Likely 2/2 to pressors.  - per vascular, may need amputation in the future once ischemic tissue fully demarcates, and once patient is medically optimized      Endocrinology  -Continue Hydrocortisone 50mg q8- stress dose steroids  - taper steroids tomorrow 11/29    Lines: right IJ TLC and Ernesto (11/12), Axillary A-line (11/12), L subclavian (11/27-), right peripheral (11/12)    F: None   E: replete K <4, Mg <2  N: Trickle feeds   GI Ppx: Protonix   DVT Ppx: Heparin   Code status: FULL   Dispo: MICU

## 2019-11-28 NOTE — PROGRESS NOTE ADULT - ASSESSMENT
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  s/p trach on 11/26      # CAMERON likely due to ATN in shock  - no signs of renal recovery  - CVVHD switched to IHD on 11/27, well tolerated UF 3.0 L  - volume status and electrolytes noted, acceptable  - no urgent indication for HD at present  - will anticipate HD on 11/29  - keep map >65 mmHg  - plan for permcath placement  - monitor H/H/Plt, transfuse as indicated, procrit w HD  - treat infection w renally adjusted ABx    Discussed with attending Dr Santos.

## 2019-11-28 NOTE — PROGRESS NOTE ADULT - SUBJECTIVE AND OBJECTIVE BOX
no acute events overnight  CVVHD switched to IHD on 11/27, well tolerated UF 3.0 L  on pressors, tapering down  sinus tachycardia            Meds:  acetaminophen    Suspension .. 650 milliGRAM(s) Oral every 6 hours PRN  acyclovir IVPB 370 milliGRAM(s) IV Intermittent every 24 hours  albuterol/ipratropium for Nebulization 3 milliLiter(s) Nebulizer every 4 hours  artificial  tears Solution 1 Drop(s) Both EYES every 6 hours  chlorhexidine 0.12% Liquid 15 milliLiter(s) Oral Mucosa every 12 hours  chlorhexidine 2% Cloths 1 Application(s) Topical daily  dextrose 40% Gel 15 Gram(s) Oral once PRN  dextrose 5%. 1000 milliLiter(s) IV Continuous <Continuous>  dextrose 5%. 1000 milliLiter(s) IV Continuous <Continuous>  dextrose 50% Injectable 12.5 Gram(s) IV Push once  dextrose 50% Injectable 25 Gram(s) IV Push once  dextrose 50% Injectable 25 Gram(s) IV Push once  fentaNYL   Infusion. 0.5 MICROgram(s)/kG/Hr IV Continuous <Continuous>  glucagon  Injectable 1 milliGRAM(s) IntraMuscular once PRN  heparin  Injectable 5000 Unit(s) SubCutaneous every 8 hours  hydrocortisone sodium succinate Injectable 50 milliGRAM(s) IV Push every 8 hours  insulin lispro (HumaLOG) corrective regimen sliding scale   SubCutaneous every 6 hours  midodrine 10 milliGRAM(s) Oral every 8 hours  multivitamin/minerals/iron Oral Solution (CENTRUM) 15 milliLiter(s) Oral daily  nafcillin  IVPB 2 Gram(s) IV Intermittent every 4 hours  norepinephrine Infusion 0.05 MICROgram(s)/kG/Min IV Continuous <Continuous>  pantoprazole  Injectable 40 milliGRAM(s) IV Push daily  polyethylene glycol 3350 17 Gram(s) Oral two times a day  propofol Infusion 5 MICROgram(s)/kG/Min IV Continuous <Continuous>  sodium chloride 0.9% lock flush 3 milliLiter(s) IV Push every 8 hours  vitamin E 400 International Unit(s) Oral daily  zinc sulfate 220 milliGRAM(s) Oral daily      T(C): 38.2 (11-28-19 @ 06:17), Max: 38.6 (11-27-19 @ 17:00)  HR: 129 (11-28-19 @ 12:57) (114 - 130)  BP: 124/74  RR: 21 (11-28-19 @ 12:00) (8 - 32)  SpO2: 99% (11-28-19 @ 12:57) (93% - 100%)    Input/Output      11-27-19 @ 07:01  -  11-28-19 @ 07:00  --------------------------------------------------------  IN: 3082.2 mL / OUT: 4525 mL / NET: -1442.8 mL    11-28-19 @ 07:01  -  11-28-19 @ 13:26  --------------------------------------------------------  IN: 454 mL / OUT: 0 mL / NET: 454 mL      PHYSICAL EXAM  General: alert, NAD  Neck: trach in place, no JVD  Respiratory: equal breath sounds bilaterally, bilateral chest tubes  HEART: normal S1S2  ABDOMEN: Soft, not tender, +BS  EXTREMITIES: upper tight and sacral edema present/ necrotic U/L digits   NEUROLOGY: alert, no focal deficit  ACCESS: R KRIST[facility], site dry and clean        LABS:                                             8.2    9.38  )-----------( 158      ( 28 Nov 2019 05:27 )             25.6       11-28    133<L>  |  94<L>  |  19  ----------------------------<  88  3.4<L>   |  26  |  1.52<H>    Ca    8.1<L>      28 Nov 2019 05:27  Phos  3.4     11-28  Mg     2.1     11-28    TPro  7.0  /  Alb  2.6<L>  /  TBili  1.9<H>  /  DBili  x   /  AST  52<H>  /  ALT  40  /  AlkPhos  100  11-28      PT/INR - ( 27 Nov 2019 04:20 )   PT: 14.1 sec;   INR: 1.24          PTT - ( 28 Nov 2019 05:27 )  PTT:35.9 sec                    RADIOLOGY:      < from: Xray Chest 1 View- PORTABLE-Urgent (11.27.19 @ 22:51) >    EXAM:  XR CHEST PORTABLE URGENT 1V                          PROCEDURE DATE:  11/27/2019          INTERPRETATION:  Clinical history: Central line placement    Portable examination of the chest demonstrates the heart to be within   normal limits in transverse diameter. Left subclavian line noted with tip   overlying junction superior vena cava and right atrium. No interval   change position remaining support devices in comparison to prior   examination of the chest 11/27/2019. Bilateral infiltrates. Bilateral   chest tubes. Small basilar pneumothoraces cannot be excluded    Impression: Bilateral infiltrates. Small bilateral pneumothoraces cannot   be excluded. Bilateral chest tubes      < end of copied text >

## 2019-11-28 NOTE — PROGRESS NOTE ADULT - SUBJECTIVE AND OBJECTIVE BOX
GASTROENTEROLOGY PROGRESS NOTE  Patient seen and examined at bedside. Patient awake, alert, able to communicate via writing and nodding appropriately to questioning.    PERTINENT REVIEW OF SYSTEMS:  CONSTITUTIONAL: No weakness, fevers or chills  HEENT: No visual changes; No vertigo or throat pain   GASTROINTESTINAL: No abdominal or epigastric pain. Patient has the urge to have a BM  NEUROLOGICAL: No numbness or weakness  SKIN: No itching, burning, rashes, or lesions     Allergies    No Known Allergies    Intolerances      MEDICATIONS:  MEDICATIONS  (STANDING):  acyclovir IVPB 370 milliGRAM(s) IV Intermittent every 24 hours  albuterol/ipratropium for Nebulization 3 milliLiter(s) Nebulizer every 4 hours  artificial  tears Solution 1 Drop(s) Both EYES every 6 hours  chlorhexidine 0.12% Liquid 15 milliLiter(s) Oral Mucosa every 12 hours  chlorhexidine 2% Cloths 1 Application(s) Topical daily  dextrose 5%. 1000 milliLiter(s) (50 mL/Hr) IV Continuous <Continuous>  dextrose 5%. 1000 milliLiter(s) (50 mL/Hr) IV Continuous <Continuous>  dextrose 50% Injectable 12.5 Gram(s) IV Push once  dextrose 50% Injectable 25 Gram(s) IV Push once  dextrose 50% Injectable 25 Gram(s) IV Push once  fentaNYL   Infusion. 0.5 MICROgram(s)/kG/Hr (3.68 mL/Hr) IV Continuous <Continuous>  heparin  Injectable 5000 Unit(s) SubCutaneous every 8 hours  hydrocortisone sodium succinate Injectable 50 milliGRAM(s) IV Push every 8 hours  insulin lispro (HumaLOG) corrective regimen sliding scale   SubCutaneous every 6 hours  midodrine 10 milliGRAM(s) Oral every 8 hours  multivitamin/minerals/iron Oral Solution (CENTRUM) 15 milliLiter(s) Oral daily  nafcillin  IVPB 2 Gram(s) IV Intermittent every 4 hours  norepinephrine Infusion 0.05 MICROgram(s)/kG/Min (3.45 mL/Hr) IV Continuous <Continuous>  pantoprazole  Injectable 40 milliGRAM(s) IV Push daily  polyethylene glycol 3350 17 Gram(s) Oral two times a day  propofol Infusion 5 MICROgram(s)/kG/Min (2.208 mL/Hr) IV Continuous <Continuous>  sodium chloride 0.9% lock flush 3 milliLiter(s) IV Push every 8 hours  vitamin E 400 International Unit(s) Oral daily  zinc sulfate 220 milliGRAM(s) Oral daily    MEDICATIONS  (PRN):  acetaminophen    Suspension .. 650 milliGRAM(s) Oral every 6 hours PRN Temp greater or equal to 38C (100.4F)  dextrose 40% Gel 15 Gram(s) Oral once PRN Blood Glucose LESS THAN 70 milliGRAM(s)/deciliter  glucagon  Injectable 1 milliGRAM(s) IntraMuscular once PRN Glucose LESS THAN 70 milligrams/deciliter    Vital Signs Last 24 Hrs  T(C): 38.2 (28 Nov 2019 06:17), Max: 38.6 (27 Nov 2019 17:00)  T(F): 100.7 (28 Nov 2019 06:17), Max: 101.4 (27 Nov 2019 17:00)  HR: 127 (28 Nov 2019 09:31) (114 - 130)  BP: --  BP(mean): --  RR: 24 (28 Nov 2019 09:31) (8 - 30)  SpO2: 100% (28 Nov 2019 09:31) (93% - 100%)    11-27 @ 07:01 - 11-28 @ 07:00  --------------------------------------------------------  IN: 3082.2 mL / OUT: 4525 mL / NET: -1442.8 mL    11-28 @ 07:01  -  11-28 @ 09:48  --------------------------------------------------------  IN: 76.4 mL / OUT: 0 mL / NET: 76.4 mL      PHYSICAL EXAM:    General: s/p tracheostomy, NGT in place  HEENT: Tracheostomy site healing, NG sump in place  Gastrointestinal: Markedly distended and tympanic  Skin: Warm and dry. No obvious rash    LABS:                        8.2    9.38  )-----------( 158      ( 28 Nov 2019 05:27 )             25.6     11-28    133<L>  |  94<L>  |  19  ----------------------------<  88  3.4<L>   |  26  |  1.52<H>    Ca    8.1<L>      28 Nov 2019 05:27  Phos  3.4     11-28  Mg     2.1     11-28    TPro  7.0  /  Alb  2.6<L>  /  TBili  1.9<H>  /  DBili  x   /  AST  52<H>  /  ALT  40  /  AlkPhos  100  11-28    PT/INR - ( 27 Nov 2019 04:20 )   PT: 14.1 sec;   INR: 1.24          PTT - ( 28 Nov 2019 05:27 )  PTT:35.9 sec                  Culture - Blood (collected 27 Nov 2019 05:48)  Source: .Blood Blood  Preliminary Report (28 Nov 2019 06:00):    No growth at 1 day.    Culture - Blood (collected 27 Nov 2019 05:48)  Source: .Blood Blood  Preliminary Report (28 Nov 2019 06:00):    No growth at 1 day.      RADIOLOGY & ADDITIONAL STUDIES:  Reviewed

## 2019-11-28 NOTE — PROGRESS NOTE ADULT - SUBJECTIVE AND OBJECTIVE BOX
OVERNIGHT EVENTS: CVVHD clotted, however, R IJ still intact. Spiked fevers to 101.4, blood cultures drawn. 2 bowel movements yesterday per overnight nurse.     SUBJECTIVE / INTERVAL HPI: Patient seen and examined at bedside. Patient resting in bed, intubated on sedation. Unable to obtain full ROS.       PHYSICAL EXAM:    General: WDWN, resting in bed, sedated   HEENT: NC/AT; PERRL, anicteric sclera; MMM  Neck: supple  Cardiovascular: +S1/S2; tachycardic, regular rhythm, systolic murmur heard at LSB   Respiratory: intubated; diffuse rhonchi/crackles with decreased breath sounds b/l   Gastrointestinal: NGT; NT, tense and distended with hyperactive bowel sounds   Extremities: WWP; trace edema of LEs/UEs; no clubbing or cyanosis  Derm: ischemic toes/fingers  Vascular: 2+ radial, DP/PT pulses B/L  Neurological: sedated, unresponsive to painful/verbal stimuli       VITAL SIGNS:  Vital Signs Last 24 Hrs  T(C): 38 (27 Nov 2019 14:36), Max: 38.6 (26 Nov 2019 22:52)  T(F): 100.4 (27 Nov 2019 14:36), Max: 101.4 (26 Nov 2019 22:52)  HR: 124 (27 Nov 2019 16:00) (98 - 130)  BP: 105/50 (27 Nov 2019 03:00) (105/50 - 105/50)  BP(mean): 73 (27 Nov 2019 03:00) (73 - 73)  RR: 22 (27 Nov 2019 16:00) (10 - 30)  SpO2: 93% (27 Nov 2019 16:00) (88% - 100%)      MEDICATIONS:  MEDICATIONS  (STANDING):  acyclovir IVPB 370 milliGRAM(s) IV Intermittent every 24 hours  albuterol/ipratropium for Nebulization 3 milliLiter(s) Nebulizer every 4 hours  artificial  tears Solution 1 Drop(s) Both EYES every 6 hours  chlorhexidine 0.12% Liquid 15 milliLiter(s) Oral Mucosa every 12 hours  chlorhexidine 2% Cloths 1 Application(s) Topical daily  dextrose 5%. 1000 milliLiter(s) (50 mL/Hr) IV Continuous <Continuous>  dextrose 50% Injectable 12.5 Gram(s) IV Push once  dextrose 50% Injectable 25 Gram(s) IV Push once  dextrose 50% Injectable 25 Gram(s) IV Push once  epoetin maikol Injectable 65230 Unit(s) IV Push once  fentaNYL   Infusion. 0.5 MICROgram(s)/kG/Hr (3.68 mL/Hr) IV Continuous <Continuous>  heparin  Injectable 5000 Unit(s) SubCutaneous every 8 hours  hydrocortisone sodium succinate Injectable 50 milliGRAM(s) IV Push every 8 hours  insulin lispro (HumaLOG) corrective regimen sliding scale   SubCutaneous every 6 hours  midodrine 10 milliGRAM(s) Oral every 8 hours  multivitamin/minerals/iron Oral Solution (CENTRUM) 15 milliLiter(s) Oral daily  nafcillin  IVPB 2 Gram(s) IV Intermittent every 4 hours  norepinephrine Infusion 0.05 MICROgram(s)/kG/Min (3.45 mL/Hr) IV Continuous <Continuous>  pantoprazole  Injectable 40 milliGRAM(s) IV Push daily  polyethylene glycol 3350 17 Gram(s) Oral two times a day  propofol Infusion 5 MICROgram(s)/kG/Min (2.208 mL/Hr) IV Continuous <Continuous>  sodium chloride 0.9% lock flush 3 milliLiter(s) IV Push every 8 hours  vitamin E 400 International Unit(s) Oral daily  zinc sulfate 220 milliGRAM(s) Oral daily    MEDICATIONS  (PRN):  acetaminophen    Suspension .. 650 milliGRAM(s) Oral every 6 hours PRN Temp greater or equal to 38C (100.4F)  dextrose 40% Gel 15 Gram(s) Oral once PRN Blood Glucose LESS THAN 70 milliGRAM(s)/deciliter  glucagon  Injectable 1 milliGRAM(s) IntraMuscular once PRN Glucose LESS THAN 70 milligrams/deciliter      ALLERGIES:  Allergies    No Known Allergies    Intolerances        LABS:                        7.8    8.02  )-----------( 120      ( 27 Nov 2019 04:20 )             23.6     11-27    129<L>  |  96  |  21  ----------------------------<  103<H>  4.6   |  22  |  1.42<H>    Ca    8.2<L>      27 Nov 2019 04:20  Phos  3.9     11-27  Mg     2.2     11-27    TPro  6.4  /  Alb  2.2<L>  /  TBili  1.8<H>  /  DBili  1.2<H>  /  AST  53<H>  /  ALT  37  /  AlkPhos  94  11-27    PT/INR - ( 27 Nov 2019 04:20 )   PT: 14.1 sec;   INR: 1.24          PTT - ( 27 Nov 2019 04:20 )  PTT:37.2 sec    CAPILLARY BLOOD GLUCOSE      POCT Blood Glucose.: 90 mg/dL (27 Nov 2019 11:50)      RADIOLOGY & ADDITIONAL TESTS: Reviewed. OVERNIGHT EVENTS: RIJ replaced with L Subclavian line last night. Per overnight nurse, patient had one large bowel movement, consisting mainly of loose, semiformed stool.      SUBJECTIVE / INTERVAL HPI: Patient seen and examined at bedside. Patient resting in bed, awake, alert and oriented. Fully participatory with exam this morning. Eyes open spontaneously, moves all extremities, able to communicate via pen and paper on clipboard at bedside. Continues to have distended abdomen, but without any pain or tenderness. Denies any nausea, shortness of breath, chest pain.        PHYSICAL EXAM:    General: WDWN, resting in bed  HEENT: NC/AT; PERRL, anicteric sclera; MMM  Neck: supple, trach in place  Cardiovascular: +S1/S2; tachycardic, regular rhythm, systolic murmur heard at LSB   Respiratory: intubated via trach; diffuse rhonchi/crackles with decreased breath sounds b/l   Gastrointestinal: NGT; NT, tense and distended with hypoactive bowel sounds   Extremities: WWP; trace edema of LEs/UEs; no clubbing or cyanosis  Derm: ischemia of all 10 distal phalanges of lower extremities; ischemia of distal phalange of R middle finger; dependent edema of lower extreme to the level of the knee b/l   Vascular: 2+ radial, DP/PT pulses B/L  Neurological: awake, alert and oriented x3      VITAL SIGNS:  Vital Signs Last 24 Hrs  T(C): 38.3 (28 Nov 2019 15:00), Max: 38.6 (27 Nov 2019 17:00)  T(F): 100.9 (28 Nov 2019 15:00), Max: 101.4 (27 Nov 2019 17:00)  HR: 134 (28 Nov 2019 15:00) (114 - 134)  BP: --  BP(mean): --  RR: 21 (28 Nov 2019 15:00) (8 - 32)  SpO2: 98% (28 Nov 2019 15:00) (96% - 100%)      MEDICATIONS:  MEDICATIONS  (STANDING):  acyclovir IVPB 370 milliGRAM(s) IV Intermittent every 24 hours  albuterol/ipratropium for Nebulization 3 milliLiter(s) Nebulizer every 4 hours  artificial  tears Solution 1 Drop(s) Both EYES every 6 hours  chlorhexidine 0.12% Liquid 15 milliLiter(s) Oral Mucosa every 12 hours  chlorhexidine 2% Cloths 1 Application(s) Topical daily  dextrose 5%. 1000 milliLiter(s) (50 mL/Hr) IV Continuous <Continuous>  dextrose 5%. 1000 milliLiter(s) (50 mL/Hr) IV Continuous <Continuous>  dextrose 50% Injectable 12.5 Gram(s) IV Push once  dextrose 50% Injectable 25 Gram(s) IV Push once  dextrose 50% Injectable 25 Gram(s) IV Push once  heparin  Injectable 5000 Unit(s) SubCutaneous every 8 hours  hydrocortisone sodium succinate Injectable 50 milliGRAM(s) IV Push every 8 hours  insulin lispro (HumaLOG) corrective regimen sliding scale   SubCutaneous every 6 hours  midodrine 10 milliGRAM(s) Oral every 8 hours  multivitamin/minerals/iron Oral Solution (CENTRUM) 15 milliLiter(s) Oral daily  nafcillin  IVPB 2 Gram(s) IV Intermittent every 4 hours  norepinephrine Infusion 0.05 MICROgram(s)/kG/Min (3.45 mL/Hr) IV Continuous <Continuous>  pantoprazole  Injectable 40 milliGRAM(s) IV Push daily  polyethylene glycol 3350 17 Gram(s) Oral two times a day  propofol Infusion 5 MICROgram(s)/kG/Min (2.208 mL/Hr) IV Continuous <Continuous>  sodium chloride 0.9% lock flush 3 milliLiter(s) IV Push every 8 hours  vitamin E 400 International Unit(s) Oral daily  zinc sulfate 220 milliGRAM(s) Oral daily    MEDICATIONS  (PRN):  acetaminophen    Suspension .. 650 milliGRAM(s) Oral every 6 hours PRN Temp greater or equal to 38C (100.4F)  dextrose 40% Gel 15 Gram(s) Oral once PRN Blood Glucose LESS THAN 70 milliGRAM(s)/deciliter  glucagon  Injectable 1 milliGRAM(s) IntraMuscular once PRN Glucose LESS THAN 70 milligrams/deciliter      ALLERGIES:  Allergies    No Known Allergies    Intolerances        LABS:                        8.2    9.38  )-----------( 158      ( 28 Nov 2019 05:27 )             25.6     11-28    133<L>  |  94<L>  |  19  ----------------------------<  88  3.4<L>   |  26  |  1.52<H>    Ca    8.1<L>      28 Nov 2019 05:27  Phos  3.4     11-28  Mg     2.1     11-28    TPro  7.0  /  Alb  2.6<L>  /  TBili  1.9<H>  /  DBili  x   /  AST  52<H>  /  ALT  40  /  AlkPhos  100  11-28    PT/INR - ( 27 Nov 2019 04:20 )   PT: 14.1 sec;   INR: 1.24          PTT - ( 28 Nov 2019 05:27 )  PTT:35.9 sec    CAPILLARY BLOOD GLUCOSE      POCT Blood Glucose.: 120 mg/dL (28 Nov 2019 11:20)      RADIOLOGY & ADDITIONAL TESTS: Reviewed.

## 2019-11-28 NOTE — PROGRESS NOTE ADULT - ASSESSMENT
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  He continues to be febrile.  This is likely due to disseminated nature of infection    Recommend:  1.  Continue Nafcillin 2 grams IV q4hrs  2.  Continue acyclovir  3.  Follow up repeat blood cultures      ID team 1 will follow

## 2019-11-29 NOTE — PROGRESS NOTE ADULT - SUBJECTIVE AND OBJECTIVE BOX
agitated overnight, currently sedated  on Levophed  last HD completed on 11/27  net positive ~1.0 L/ 48 hr  no signs of renal recovery  suspected bowel obstruction  trach w FiO2 30%, sat 100%, stable          Meds:  acetaminophen    Suspension .. 650 milliGRAM(s) Oral every 6 hours PRN  acyclovir IVPB 370 milliGRAM(s) IV Intermittent every 24 hours  albumin human 25% IVPB 50 milliLiter(s) IV Intermittent every 1 hour  albuterol/ipratropium for Nebulization 3 milliLiter(s) Nebulizer every 4 hours  artificial  tears Solution 1 Drop(s) Both EYES every 6 hours  chlorhexidine 0.12% Liquid 15 milliLiter(s) Oral Mucosa every 12 hours  chlorhexidine 2% Cloths 1 Application(s) Topical daily  dexMEDEtomidine Infusion 0.3 MICROgram(s)/kG/Hr IV Continuous <Continuous>  dextrose 40% Gel 15 Gram(s) Oral once PRN  dextrose 5%. 1000 milliLiter(s) IV Continuous <Continuous>  dextrose 5%. 1000 milliLiter(s) IV Continuous <Continuous>  dextrose 50% Injectable 12.5 Gram(s) IV Push once  dextrose 50% Injectable 25 Gram(s) IV Push once  dextrose 50% Injectable 25 Gram(s) IV Push once  epoetin maikol Injectable 21960 Unit(s) IV Push once  fentaNYL   Infusion. 0.5 MICROgram(s)/kG/Hr IV Continuous <Continuous>  glucagon  Injectable 1 milliGRAM(s) IntraMuscular once PRN  heparin  Injectable 5000 Unit(s) SubCutaneous every 8 hours  hydrocortisone sodium succinate Injectable 50 milliGRAM(s) IV Push every 8 hours  insulin lispro (HumaLOG) corrective regimen sliding scale   SubCutaneous every 6 hours  midodrine 10 milliGRAM(s) Oral every 8 hours  multivitamin/minerals/iron Oral Solution (CENTRUM) 15 milliLiter(s) Oral daily  nafcillin  IVPB 2 Gram(s) IV Intermittent every 4 hours  norepinephrine Infusion 0.05 MICROgram(s)/kG/Min IV Continuous <Continuous>  pantoprazole  Injectable 40 milliGRAM(s) IV Push daily  polyethylene glycol 3350 17 Gram(s) Oral two times a day  propofol Infusion 5 MICROgram(s)/kG/Min IV Continuous <Continuous>  sodium chloride 0.9% lock flush 3 milliLiter(s) IV Push every 8 hours  vitamin E 400 International Unit(s) Oral daily  zinc sulfate 220 milliGRAM(s) Oral daily      T(C): 37.8 (11-29-19 @ 10:26), Max: 38.3 (11-28-19 @ 15:00)  HR: 116 (11-29-19 @ 10:00) (107 - 138)  BP: 91/55 (11-29-19 @ 10:00) (91/55 - 121/80)  RR: 11 (11-29-19 @ 10:00) (11 - 34)  SpO2: 100% (11-29-19 @ 10:00) (84% - 100%)    Input/Output      11-28-19 @ 07:01  -  11-29-19 @ 07:00  --------------------------------------------------------  IN: 2478 mL / OUT: 250 mL / NET: 2228 mL    11-29-19 @ 07:01  -  11-29-19 @ 10:41  --------------------------------------------------------  IN: 90.7 mL / OUT: 0 mL / NET: 90.7 mL      PHYSICAL EXAM  General: sedated, NAD  Neck: trach in place, no JVD  Respiratory: decreased breath sounds bilaterally, bilateral chest tubes  HEART: normal S1S2, sinus tachycardia  ABDOMEN: distended, diminished BS  EXTREMITIES: peripheral edema, necrotic U/L digits   NEUROLOGY: sedated   ACCESS: R IJ THC, site dry and clean        LABS:                        8.0    9.84  )-----------( 163      ( 29 Nov 2019 04:39 )             24.4       11-29    129<L>  |  89<L>  |  31<H>  ----------------------------<  102<H>  3.4<L>   |  22  |  2.42<H>    Ca    7.8<L>      29 Nov 2019 04:39  Phos  3.4     11-28  Mg     2.1     11-28    TPro  6.7  /  Alb  2.6<L>  /  TBili  1.8<H>  /  DBili  x   /  AST  40  /  ALT  36  /  AlkPhos  97  11-29      PT/INR - ( 29 Nov 2019 04:39 )   PT: 13.2 sec;   INR: 1.16          PTT - ( 29 Nov 2019 04:39 )  PTT:34.4 sec                                                          RADIOLOGY:    reviewed

## 2019-11-29 NOTE — PROGRESS NOTE ADULT - ASSESSMENT
IMPRESSION:  MSSA tricuspid valve endocarditis secondary to IVDA.  Fevers appears to be improved today.  WBC is normal and cultures have cleared    Recommend:  1.  Continue nafcillin 2 grams IV q4hrs  2.  Continue Acyclovir  3.  Follow up pending cultures    ID team 1 will follow

## 2019-11-29 NOTE — PROGRESS NOTE ADULT - ASSESSMENT
39y Male w PMH of IVDU and active smoker who went to [facility] on 11/8/19 complaining of productive cough with hemoptysis, progressive SOB, pleuritic chest pain, nausea, non-bloody emesis, abd pain, chillsx3 days. At OSH, patient found to be in septic shock 2/2 tricuspid valve endocarditis seen on echocardiogram, and patient was transferred to [facility], CTICU, under the care of Dr. Daniel Ruelas for possible surgical evaluation. Following admission, patient noted to be in acute hypoxic respiratory failure s/p intubation/sedation, acute kidney injury requiring CVVHD, congestive hepatopathy, and mutli-system organ failure 2/2 hypoperfusion. Patient deemed not a surgical candidate, per Dr. Ruelas, and patient transferred to MICU for medical management with IV nafcillin, ID following. Patient with complicated course and now s/p cardiac arrest likely from sepsis/hypoxic respiratory failure.    Tricuspid valve Endocarditis: Patient with vegetations seen on ELIAN and TTE as above with severe TR. TR is functional from malcoaptation of leaflets from vegetation. Patient with mildly elevated PASP, which likely is not contributing much to TR. Normal RV function. Not a surgical candidate. Patient also with septic emboli to lungs, further causing empyema. Blood cultures negative. Vegetation still present, no further vegetations seen.   -Continue on Nafcillin per ID recs.  -Patient will likely need to be continued on abx therapy indefinitely in setting of no surgical intervention.  -Patient will have long and complicated course without definite source control.   -If patient becomes more stable, reconsult CT Surgery for tricuspid valve repair/replacement.   -Continue with HD support with pressors.   -Would not recommend ELIAN at this time. Will not .     CHF: EF 45% on initial echo. With global hypokinesis. Likely in setting of sepsis.   -Patient unable to tolerate GDMT.  -When patient no longer septic and HD stable, can discuss starting meds to assist with HF.   -Continue medical managment for IE.     To be discussed on rounds.

## 2019-11-29 NOTE — PROGRESS NOTE ADULT - SUBJECTIVE AND OBJECTIVE BOX
INTERVAL HPI/OVERNIGHT EVENTS:    Patient seen and examined at bedside.  Events noted.  Had some abdominal distention this AM.      CONSTITUTIONAL:  Negative fever or chills, feels well, good appetite  EYES:  Negative  blurry vision or double vision  CARDIOVASCULAR:  Negative for chest pain or palpitations  RESPIRATORY:  Negative for cough, wheezing, or SOB   GASTROINTESTINAL:  Negative for nausea, vomiting, diarrhea, constipation, or abdominal pain  GENITOURINARY:  Negative frequency, urgency or dysuria  NEUROLOGIC:  No headache, confusion, dizziness, lightheadedness      ANTIBIOTICS/RELEVANT:    MEDICATIONS  (STANDING):  acyclovir IVPB 370 milliGRAM(s) IV Intermittent every 24 hours  albumin human 25% IVPB 50 milliLiter(s) IV Intermittent every 1 hour  albuterol/ipratropium for Nebulization 3 milliLiter(s) Nebulizer every 4 hours  artificial  tears Solution 1 Drop(s) Both EYES every 6 hours  barium sulfate 2.1% Suspension 450 milliLiter(s) Oral once  chlorhexidine 0.12% Liquid 15 milliLiter(s) Oral Mucosa every 12 hours  chlorhexidine 2% Cloths 1 Application(s) Topical daily  dexMEDEtomidine Infusion 0.3 MICROgram(s)/kG/Hr (5.52 mL/Hr) IV Continuous <Continuous>  dextrose 5%. 1000 milliLiter(s) (50 mL/Hr) IV Continuous <Continuous>  dextrose 5%. 1000 milliLiter(s) (50 mL/Hr) IV Continuous <Continuous>  dextrose 50% Injectable 12.5 Gram(s) IV Push once  dextrose 50% Injectable 25 Gram(s) IV Push once  dextrose 50% Injectable 25 Gram(s) IV Push once  epoetin maikol Injectable 55637 Unit(s) IV Push once  fentaNYL   Infusion. 0.5 MICROgram(s)/kG/Hr (3.68 mL/Hr) IV Continuous <Continuous>  heparin  Injectable 5000 Unit(s) SubCutaneous every 8 hours  hydrocortisone sodium succinate Injectable 50 milliGRAM(s) IV Push every 8 hours  insulin lispro (HumaLOG) corrective regimen sliding scale   SubCutaneous every 6 hours  midodrine 10 milliGRAM(s) Oral every 8 hours  multivitamin/minerals/iron Oral Solution (CENTRUM) 15 milliLiter(s) Oral daily  nafcillin  IVPB 2 Gram(s) IV Intermittent every 4 hours  norepinephrine Infusion 0.05 MICROgram(s)/kG/Min (3.45 mL/Hr) IV Continuous <Continuous>  pantoprazole  Injectable 40 milliGRAM(s) IV Push daily  polyethylene glycol 3350 17 Gram(s) Oral two times a day  propofol Infusion 5 MICROgram(s)/kG/Min (2.208 mL/Hr) IV Continuous <Continuous>  sodium chloride 0.9% lock flush 3 milliLiter(s) IV Push every 8 hours  vitamin E 400 International Unit(s) Oral daily  zinc sulfate 220 milliGRAM(s) Oral daily    MEDICATIONS  (PRN):  acetaminophen    Suspension .. 650 milliGRAM(s) Oral every 6 hours PRN Temp greater or equal to 38C (100.4F)  dextrose 40% Gel 15 Gram(s) Oral once PRN Blood Glucose LESS THAN 70 milliGRAM(s)/deciliter  glucagon  Injectable 1 milliGRAM(s) IntraMuscular once PRN Glucose LESS THAN 70 milligrams/deciliter        Vital Signs Last 24 Hrs  T(C): 37.8 (29 Nov 2019 10:26), Max: 38.3 (28 Nov 2019 15:00)  T(F): 100.1 (29 Nov 2019 10:26), Max: 100.9 (28 Nov 2019 15:00)  HR: 116 (29 Nov 2019 12:00) (107 - 138)  BP: 91/55 (29 Nov 2019 10:00) (91/55 - 121/80)  BP(mean): 65 (29 Nov 2019 10:00) (65 - 94)  RR: 9 (29 Nov 2019 12:00) (9 - 34)  SpO2: 95% (29 Nov 2019 12:00) (84% - 100%)    PHYSICAL EXAM:  Constitutional:  non-toxic, no distress  Eyes: no icterus   Ear/Nose/Throat: + trach  Neck:  + trach  Respiratory: decreased breath sounds bilaterally  Cardiovascular: + systolic murmur  Gastrointestinal:soft, (+) BS, no HSM  Extremities: + necrotic fingers and toes  Vascular: DP Pulse:	right normal; left normal      LABS:                        8.0    9.84  )-----------( 163      ( 29 Nov 2019 04:39 )             24.4     11-29    129<L>  |  89<L>  |  31<H>  ----------------------------<  102<H>  3.4<L>   |  22  |  2.42<H>    Ca    7.8<L>      29 Nov 2019 04:39  Phos  3.4     11-28  Mg     2.1     11-28    TPro  6.7  /  Alb  2.6<L>  /  TBili  1.8<H>  /  DBili  x   /  AST  40  /  ALT  36  /  AlkPhos  97  11-29    PT/INR - ( 29 Nov 2019 04:39 )   PT: 13.2 sec;   INR: 1.16          PTT - ( 29 Nov 2019 04:39 )  PTT:34.4 sec      MICROBIOLOGY:    Culture - Blood (11.27.19 @ 05:48)    Specimen Source: .Blood Blood    Culture Results:   No growth at 2 days.    Culture - Blood (11.27.19 @ 05:48)    Specimen Source: .Blood Blood    Culture Results:   No growth at 2 days.        RADIOLOGY & ADDITIONAL STUDIES:    < from: Xray Chest 1 View- PORTABLE-Urgent (11.27.19 @ 22:51) >  Impression: Bilateral infiltrates. Small bilateral pneumothoraces cannot   be excluded. Bilateral chest tubes    < end of copied text >

## 2019-11-29 NOTE — PROGRESS NOTE ADULT - SUBJECTIVE AND OBJECTIVE BOX
INCOMPLETE  OVERNIGHT EVENTS:     SUBJECTIVE / INTERVAL HPI: Patient seen and examined at bedside. Patient resting in bed, awake, alert and oriented. Fully participatory with exam this morning. Eyes open spontaneously, moves all extremities, able to communicate via pen and paper on clipboard at bedside. Continues to have distended abdomen, but without any pain or tenderness. Denies any nausea, shortness of breath, chest pain.        PHYSICAL EXAM:    General: WDWN, resting in bed  HEENT: NC/AT; PERRL, anicteric sclera; MMM  Neck: supple, trach in place  Cardiovascular: +S1/S2; tachycardic, regular rhythm, systolic murmur heard at LSB   Respiratory: intubated via trach; diffuse rhonchi/crackles with decreased breath sounds b/l   Gastrointestinal: NGT; NT, tense and distended with hypoactive bowel sounds   Extremities: WWP; trace edema of LEs/UEs; no clubbing or cyanosis  Derm: ischemia of all 10 distal phalanges of lower extremities; ischemia of distal phalange of R middle finger; dependent edema of lower extreme to the level of the knee b/l   Vascular: 2+ radial, DP/PT pulses B/L  Neurological: awake, alert and oriented x3      VITAL SIGNS:  Vital Signs Last 24 Hrs  T(C): 37 (29 Nov 2019 05:14), Max: 38.3 (28 Nov 2019 15:00)  T(F): 98.6 (29 Nov 2019 05:14), Max: 100.9 (28 Nov 2019 15:00)  HR: 138 (29 Nov 2019 06:00) (107 - 138)  BP: 110/77 (29 Nov 2019 05:00) (95/59 - 110/77)  BP(mean): 91 (29 Nov 2019 05:00) (71 - 91)  RR: 30 (29 Nov 2019 06:00) (11 - 34)  SpO2: 97% (29 Nov 2019 06:00) (84% - 100%)      MEDICATIONS:  MEDICATIONS  (STANDING):  acyclovir IVPB 370 milliGRAM(s) IV Intermittent every 24 hours  albuterol/ipratropium for Nebulization 3 milliLiter(s) Nebulizer every 4 hours  artificial  tears Solution 1 Drop(s) Both EYES every 6 hours  chlorhexidine 0.12% Liquid 15 milliLiter(s) Oral Mucosa every 12 hours  chlorhexidine 2% Cloths 1 Application(s) Topical daily  dexMEDEtomidine Infusion 0.3 MICROgram(s)/kG/Hr (5.52 mL/Hr) IV Continuous <Continuous>  dextrose 5%. 1000 milliLiter(s) (50 mL/Hr) IV Continuous <Continuous>  dextrose 5%. 1000 milliLiter(s) (50 mL/Hr) IV Continuous <Continuous>  dextrose 50% Injectable 12.5 Gram(s) IV Push once  dextrose 50% Injectable 25 Gram(s) IV Push once  dextrose 50% Injectable 25 Gram(s) IV Push once  fentaNYL   Infusion. 0.5 MICROgram(s)/kG/Hr (3.68 mL/Hr) IV Continuous <Continuous>  heparin  Injectable 5000 Unit(s) SubCutaneous every 8 hours  hydrocortisone sodium succinate Injectable 50 milliGRAM(s) IV Push every 8 hours  insulin lispro (HumaLOG) corrective regimen sliding scale   SubCutaneous every 6 hours  midodrine 10 milliGRAM(s) Oral every 8 hours  multivitamin/minerals/iron Oral Solution (CENTRUM) 15 milliLiter(s) Oral daily  nafcillin  IVPB 2 Gram(s) IV Intermittent every 4 hours  norepinephrine Infusion 0.05 MICROgram(s)/kG/Min (3.45 mL/Hr) IV Continuous <Continuous>  pantoprazole  Injectable 40 milliGRAM(s) IV Push daily  polyethylene glycol 3350 17 Gram(s) Oral two times a day  propofol Infusion 5 MICROgram(s)/kG/Min (2.208 mL/Hr) IV Continuous <Continuous>  sodium chloride 0.9% lock flush 3 milliLiter(s) IV Push every 8 hours  vitamin E 400 International Unit(s) Oral daily  zinc sulfate 220 milliGRAM(s) Oral daily    MEDICATIONS  (PRN):  acetaminophen    Suspension .. 650 milliGRAM(s) Oral every 6 hours PRN Temp greater or equal to 38C (100.4F)  dextrose 40% Gel 15 Gram(s) Oral once PRN Blood Glucose LESS THAN 70 milliGRAM(s)/deciliter  glucagon  Injectable 1 milliGRAM(s) IntraMuscular once PRN Glucose LESS THAN 70 milligrams/deciliter      ALLERGIES:  Allergies    No Known Allergies    Intolerances        LABS:                        8.0    9.84  )-----------( 163      ( 29 Nov 2019 04:39 )             24.4     11-29    129<L>  |  89<L>  |  31<H>  ----------------------------<  102<H>  3.4<L>   |  22  |  2.42<H>    Ca    7.8<L>      29 Nov 2019 04:39  Phos  3.4     11-28  Mg     2.1     11-28    TPro  6.7  /  Alb  2.6<L>  /  TBili  1.8<H>  /  DBili  x   /  AST  40  /  ALT  36  /  AlkPhos  97  11-29    PT/INR - ( 29 Nov 2019 04:39 )   PT: 13.2 sec;   INR: 1.16          PTT - ( 29 Nov 2019 04:39 )  PTT:34.4 sec    CAPILLARY BLOOD GLUCOSE      POCT Blood Glucose.: 101 mg/dL (29 Nov 2019 04:59)      RADIOLOGY & ADDITIONAL TESTS: Reviewed. OVERNIGHT EVENTS: Patient agitated o/n and hit night intern. Was given seroquel x1 with resolution of agitation. Early in the morning, patient was without restraints and pulled sump out,      SUBJECTIVE / INTERVAL HPI: Patient seen and examined at bedside. Patient resting in bed, awake, alert and oriented however more sedated compared to prior day. Overnight events as above. Sump replaced during morning rounds. Chest tube flushed, no evidence of leak.       PHYSICAL EXAM:    General: WDWN, resting in bed  HEENT: NC/AT; PERRL, anicteric sclera; MMM  Neck: supple, trach in place  Cardiovascular: +S1/S2; tachycardic, regular rhythm, systolic murmur heard at LSB   Respiratory: intubated via trach; diffuse rhonchi/crackles with decreased breath sounds b/l   Gastrointestinal: NGT; NT, tense and distended with hypoactive bowel sounds   Extremities: WWP; trace edema of LEs/UEs; no clubbing or cyanosis  Derm: ischemia of all 10 distal phalanges of lower extremities; ischemia of distal phalange of R middle finger; dependent edema of lower extreme to the level of the knee b/l   Vascular: 2+ radial, DP/PT pulses B/L  Neurological: awake, alert and oriented x3      VITAL SIGNS:  Vital Signs Last 24 Hrs  T(C): 37 (29 Nov 2019 05:14), Max: 38.3 (28 Nov 2019 15:00)  T(F): 98.6 (29 Nov 2019 05:14), Max: 100.9 (28 Nov 2019 15:00)  HR: 138 (29 Nov 2019 06:00) (107 - 138)  BP: 110/77 (29 Nov 2019 05:00) (95/59 - 110/77)  BP(mean): 91 (29 Nov 2019 05:00) (71 - 91)  RR: 30 (29 Nov 2019 06:00) (11 - 34)  SpO2: 97% (29 Nov 2019 06:00) (84% - 100%)      MEDICATIONS:  MEDICATIONS  (STANDING):  acyclovir IVPB 370 milliGRAM(s) IV Intermittent every 24 hours  albuterol/ipratropium for Nebulization 3 milliLiter(s) Nebulizer every 4 hours  artificial  tears Solution 1 Drop(s) Both EYES every 6 hours  chlorhexidine 0.12% Liquid 15 milliLiter(s) Oral Mucosa every 12 hours  chlorhexidine 2% Cloths 1 Application(s) Topical daily  dexMEDEtomidine Infusion 0.3 MICROgram(s)/kG/Hr (5.52 mL/Hr) IV Continuous <Continuous>  dextrose 5%. 1000 milliLiter(s) (50 mL/Hr) IV Continuous <Continuous>  dextrose 5%. 1000 milliLiter(s) (50 mL/Hr) IV Continuous <Continuous>  dextrose 50% Injectable 12.5 Gram(s) IV Push once  dextrose 50% Injectable 25 Gram(s) IV Push once  dextrose 50% Injectable 25 Gram(s) IV Push once  fentaNYL   Infusion. 0.5 MICROgram(s)/kG/Hr (3.68 mL/Hr) IV Continuous <Continuous>  heparin  Injectable 5000 Unit(s) SubCutaneous every 8 hours  hydrocortisone sodium succinate Injectable 50 milliGRAM(s) IV Push every 8 hours  insulin lispro (HumaLOG) corrective regimen sliding scale   SubCutaneous every 6 hours  midodrine 10 milliGRAM(s) Oral every 8 hours  multivitamin/minerals/iron Oral Solution (CENTRUM) 15 milliLiter(s) Oral daily  nafcillin  IVPB 2 Gram(s) IV Intermittent every 4 hours  norepinephrine Infusion 0.05 MICROgram(s)/kG/Min (3.45 mL/Hr) IV Continuous <Continuous>  pantoprazole  Injectable 40 milliGRAM(s) IV Push daily  polyethylene glycol 3350 17 Gram(s) Oral two times a day  propofol Infusion 5 MICROgram(s)/kG/Min (2.208 mL/Hr) IV Continuous <Continuous>  sodium chloride 0.9% lock flush 3 milliLiter(s) IV Push every 8 hours  vitamin E 400 International Unit(s) Oral daily  zinc sulfate 220 milliGRAM(s) Oral daily    MEDICATIONS  (PRN):  acetaminophen    Suspension .. 650 milliGRAM(s) Oral every 6 hours PRN Temp greater or equal to 38C (100.4F)  dextrose 40% Gel 15 Gram(s) Oral once PRN Blood Glucose LESS THAN 70 milliGRAM(s)/deciliter  glucagon  Injectable 1 milliGRAM(s) IntraMuscular once PRN Glucose LESS THAN 70 milligrams/deciliter      ALLERGIES:  Allergies    No Known Allergies    Intolerances        LABS:                        8.0    9.84  )-----------( 163      ( 29 Nov 2019 04:39 )             24.4     11-29    129<L>  |  89<L>  |  31<H>  ----------------------------<  102<H>  3.4<L>   |  22  |  2.42<H>    Ca    7.8<L>      29 Nov 2019 04:39  Phos  3.4     11-28  Mg     2.1     11-28    TPro  6.7  /  Alb  2.6<L>  /  TBili  1.8<H>  /  DBili  x   /  AST  40  /  ALT  36  /  AlkPhos  97  11-29    PT/INR - ( 29 Nov 2019 04:39 )   PT: 13.2 sec;   INR: 1.16          PTT - ( 29 Nov 2019 04:39 )  PTT:34.4 sec    CAPILLARY BLOOD GLUCOSE      POCT Blood Glucose.: 101 mg/dL (29 Nov 2019 04:59)      RADIOLOGY & ADDITIONAL TESTS: Reviewed.

## 2019-11-29 NOTE — PROGRESS NOTE ADULT - SUBJECTIVE AND OBJECTIVE BOX
INTERVAL EVENTS: Patient trached and sedated. Opens eyes to voice.     PAST MEDICAL & SURGICAL HISTORY:  Endocarditis  IVDU (intravenous drug user)  IVDA (intravenous drug abuse) complicating pregnancy  Smoker  No significant past surgical history      MEDICATIONS  (STANDING):  acyclovir IVPB 370 milliGRAM(s) IV Intermittent every 24 hours  albumin human 25% IVPB 50 milliLiter(s) IV Intermittent every 1 hour  albuterol/ipratropium for Nebulization 3 milliLiter(s) Nebulizer every 4 hours  artificial  tears Solution 1 Drop(s) Both EYES every 6 hours  chlorhexidine 0.12% Liquid 15 milliLiter(s) Oral Mucosa every 12 hours  chlorhexidine 2% Cloths 1 Application(s) Topical daily  dexMEDEtomidine Infusion 0.3 MICROgram(s)/kG/Hr (5.52 mL/Hr) IV Continuous <Continuous>  dextrose 5%. 1000 milliLiter(s) (50 mL/Hr) IV Continuous <Continuous>  dextrose 5%. 1000 milliLiter(s) (50 mL/Hr) IV Continuous <Continuous>  dextrose 50% Injectable 12.5 Gram(s) IV Push once  dextrose 50% Injectable 25 Gram(s) IV Push once  dextrose 50% Injectable 25 Gram(s) IV Push once  epoetin maikol Injectable 40326 Unit(s) IV Push once  fentaNYL   Infusion. 0.5 MICROgram(s)/kG/Hr (3.68 mL/Hr) IV Continuous <Continuous>  heparin  Injectable 5000 Unit(s) SubCutaneous every 8 hours  hydrocortisone sodium succinate Injectable 50 milliGRAM(s) IV Push every 8 hours  insulin lispro (HumaLOG) corrective regimen sliding scale   SubCutaneous every 6 hours  midodrine 10 milliGRAM(s) Oral every 8 hours  multivitamin/minerals/iron Oral Solution (CENTRUM) 15 milliLiter(s) Oral daily  nafcillin  IVPB 2 Gram(s) IV Intermittent every 4 hours  norepinephrine Infusion 0.05 MICROgram(s)/kG/Min (3.45 mL/Hr) IV Continuous <Continuous>  pantoprazole  Injectable 40 milliGRAM(s) IV Push daily  polyethylene glycol 3350 17 Gram(s) Oral two times a day  propofol Infusion 5 MICROgram(s)/kG/Min (2.208 mL/Hr) IV Continuous <Continuous>  sodium chloride 0.9% lock flush 3 milliLiter(s) IV Push every 8 hours  vitamin E 400 International Unit(s) Oral daily  zinc sulfate 220 milliGRAM(s) Oral daily    MEDICATIONS  (PRN):  acetaminophen    Suspension .. 650 milliGRAM(s) Oral every 6 hours PRN Temp greater or equal to 38C (100.4F)  dextrose 40% Gel 15 Gram(s) Oral once PRN Blood Glucose LESS THAN 70 milliGRAM(s)/deciliter  glucagon  Injectable 1 milliGRAM(s) IntraMuscular once PRN Glucose LESS THAN 70 milligrams/deciliter      Vital Signs Last 24 Hrs  T(C): 37.6 (29 Nov 2019 07:00), Max: 38.3 (28 Nov 2019 15:00)  T(F): 99.7 (29 Nov 2019 07:00), Max: 100.9 (28 Nov 2019 15:00)  HR: 116 (29 Nov 2019 10:00) (107 - 138)  BP: 91/55 (29 Nov 2019 10:00) (91/55 - 121/80)  BP(mean): 65 (29 Nov 2019 10:00) (65 - 94)  RR: 11 (29 Nov 2019 10:00) (11 - 34)  SpO2: 100% (29 Nov 2019 10:00) (84% - 100%)     PHYSICAL EXAM:  GEN: Sedated. Trach in place.   HEENT: NCAT, PERRL, EOMI. Mucosa moist. No JVD. Right HD cath. LEft subclavian TLC.   RESP: B/L rhonchi. B/L Chest tubes in place.   CV: Tachycardic Normal S1/S2. No m/r/g.  ABD: Soft. NT/ND. BS+  EXT: Warm. No edema, clubbing, or cyanosis.   NEURO: AAOx3. No focal deficits.     LABS:                        8.0    9.84  )-----------( 163      ( 29 Nov 2019 04:39 )             24.4     11-29    129<L>  |  89<L>  |  31<H>  ----------------------------<  102<H>  3.4<L>   |  22  |  2.42<H>    Ca    7.8<L>      29 Nov 2019 04:39  Phos  3.4     11-28  Mg     2.1     11-28    TPro  6.7  /  Alb  2.6<L>  /  TBili  1.8<H>  /  DBili  x   /  AST  40  /  ALT  36  /  AlkPhos  97  11-29        PT/INR - ( 29 Nov 2019 04:39 )   PT: 13.2 sec;   INR: 1.16          PTT - ( 29 Nov 2019 04:39 )  PTT:34.4 sec    I&O's Summary    28 Nov 2019 07:01  -  29 Nov 2019 07:00  --------------------------------------------------------  IN: 2478 mL / OUT: 250 mL / NET: 2228 mL    29 Nov 2019 07:01  -  29 Nov 2019 10:04  --------------------------------------------------------  IN: 90.7 mL / OUT: 0 mL / NET: 90.7 mL      BNP  RADIOLOGY & ADDITIONAL STUDIES:    TELEMETRY: Sinus tachycardia.

## 2019-11-29 NOTE — PROGRESS NOTE ADULT - ATTENDING COMMENTS
Patient seen and examined with house-staff during bedside rounds.  Resident note read, including vitals, physical findings, laboratory data, and radiological reports.   Revisions included below.  Direct personal management at bed side and extensive interpretation of the data.  Plan was outlined and discussed in details with the housestaff.  Decision making of high complexity  Action taken for acute disease activity to reflect the level of care provided:  - medication reconciliation  - review laboratory data  - no wean from MV  - blood culture negative  - line was changed  - off pressors  - on midodrine  - NGT and possible rectal decompression  - CT scan abdomen to rule out obstruction  - 7 days of acyclovie  - continue HD Patient seen and examined with house-staff during bedside rounds.  Resident note read, including vitals, physical findings, laboratory data, and radiological reports.   Revisions included below.  Direct personal management at bed side and extensive interpretation of the data.  Plan was outlined and discussed in details with the housestaff.  Decision making of high complexity  Action taken for acute disease activity to reflect the level of care provided:  - medication reconciliation  - review laboratory data  - no wean from MV  - blood culture negative  - line was changed  - off pressors  - on midodrine  - NGT and possible rectal decompression  - CT scan abdomen to rule out obstruction  - 7 days of acyclovir  NGT inserted  Xray NGT in place  flushed the left CT and no air leak and no driange  - continue HD

## 2019-11-29 NOTE — PROGRESS NOTE ADULT - ASSESSMENT
40 y/o M smoker w/ PMHx of IVDU presented to [facility] w/ productive cough with hemoptysis found to have septic emboli w/ vegetation of the TV transferred to [facility] for surgical evaluation.  Course complicated by sepsis, DIC, acute respiratory failure w/ empyema s/p intubation and thoracostomy tube, CAMERON requiring cvvhd, ileus on NGT and rectal decompression, cardiac arrest, and pneumothorax. GI consulted for evaluation of incidental finding of rectal wall thickening on imaging.      #Abd distension  Patient with new abdominal distension with xray notable for dilated bowels.  CT Patient is on zinc and fentanyl with rectal tube.  -Wean opioids as able  -Aggressive repletion of electrolytes  -NG sump to low-intermittent suction  -Obtain CT with PO contrast to r/o partial obstruction  -If no obstruction, would start prokinetic agent such as Reglan or consider Methylnaltrexone to counteract opioids or consider Neostigmine    #Hyperbilirubinemia:  Patient with marked elevated bilirubin initially thought to be 2/2 ischemic hepatopathy now downtrending. Imaging without CBD dilation suggestive of cholestasis from sepsis vs DILI in the setting of recent ischemic hepatopathy. Continues to downtrend.  -Daily LFT and INR    Recommendations discussed with primary team  Case discussed with attending physician

## 2019-11-29 NOTE — PROGRESS NOTE ADULT - SUBJECTIVE AND OBJECTIVE BOX
GASTROENTEROLOGY PROGRESS NOTE  Patient seen and examined at bedside. Patient awake, alert, new sump placed. Mild abdominal pain and distention, still passing stool.    PERTINENT REVIEW OF SYSTEMS:  CONSTITUTIONAL: No weakness, fevers or chills  HEENT: No visual changes; No vertigo or throat pain   GASTROINTESTINAL: As above  NEUROLOGICAL: No numbness or weakness  SKIN: No itching, burning, rashes, or lesions     Allergies    No Known Allergies    Intolerances      MEDICATIONS:  MEDICATIONS  (STANDING):  acyclovir IVPB 370 milliGRAM(s) IV Intermittent every 24 hours  albumin human 25% IVPB 50 milliLiter(s) IV Intermittent every 1 hour  albuterol/ipratropium for Nebulization 3 milliLiter(s) Nebulizer every 4 hours  artificial  tears Solution 1 Drop(s) Both EYES every 6 hours  barium sulfate 0.1% Suspension 30 milliLiter(s) Oral once  chlorhexidine 0.12% Liquid 15 milliLiter(s) Oral Mucosa every 12 hours  chlorhexidine 2% Cloths 1 Application(s) Topical daily  dexMEDEtomidine Infusion 0.3 MICROgram(s)/kG/Hr (5.52 mL/Hr) IV Continuous <Continuous>  dextrose 5%. 1000 milliLiter(s) (50 mL/Hr) IV Continuous <Continuous>  dextrose 5%. 1000 milliLiter(s) (50 mL/Hr) IV Continuous <Continuous>  dextrose 50% Injectable 12.5 Gram(s) IV Push once  dextrose 50% Injectable 25 Gram(s) IV Push once  dextrose 50% Injectable 25 Gram(s) IV Push once  epoetin maikol Injectable 58254 Unit(s) IV Push once  fentaNYL   Infusion. 0.5 MICROgram(s)/kG/Hr (3.68 mL/Hr) IV Continuous <Continuous>  heparin  Injectable 5000 Unit(s) SubCutaneous every 8 hours  hydrocortisone sodium succinate Injectable 50 milliGRAM(s) IV Push every 8 hours  insulin lispro (HumaLOG) corrective regimen sliding scale   SubCutaneous every 6 hours  midodrine 10 milliGRAM(s) Oral every 8 hours  multivitamin/minerals/iron Oral Solution (CENTRUM) 15 milliLiter(s) Oral daily  nafcillin  IVPB 2 Gram(s) IV Intermittent every 4 hours  norepinephrine Infusion 0.05 MICROgram(s)/kG/Min (3.45 mL/Hr) IV Continuous <Continuous>  pantoprazole  Injectable 40 milliGRAM(s) IV Push daily  polyethylene glycol 3350 17 Gram(s) Oral two times a day  propofol Infusion 5 MICROgram(s)/kG/Min (2.208 mL/Hr) IV Continuous <Continuous>  sodium chloride 0.9% lock flush 3 milliLiter(s) IV Push every 8 hours  vitamin E 400 International Unit(s) Oral daily  zinc sulfate 220 milliGRAM(s) Oral daily    MEDICATIONS  (PRN):  acetaminophen    Suspension .. 650 milliGRAM(s) Oral every 6 hours PRN Temp greater or equal to 38C (100.4F)  dextrose 40% Gel 15 Gram(s) Oral once PRN Blood Glucose LESS THAN 70 milliGRAM(s)/deciliter  glucagon  Injectable 1 milliGRAM(s) IntraMuscular once PRN Glucose LESS THAN 70 milligrams/deciliter    Vital Signs Last 24 Hrs  T(C): 37.8 (29 Nov 2019 10:26), Max: 38.3 (28 Nov 2019 15:00)  T(F): 100.1 (29 Nov 2019 10:26), Max: 100.9 (28 Nov 2019 15:00)  HR: 116 (29 Nov 2019 12:00) (107 - 138)  BP: 91/55 (29 Nov 2019 10:00) (91/55 - 121/80)  BP(mean): 65 (29 Nov 2019 10:00) (65 - 94)  RR: 9 (29 Nov 2019 12:00) (9 - 34)  SpO2: 95% (29 Nov 2019 12:00) (84% - 100%)    11-28 @ 07:01 - 11-29 @ 07:00  --------------------------------------------------------  IN: 2478 mL / OUT: 250 mL / NET: 2228 mL    11-29 @ 07:01 - 11-29 @ 12:24  --------------------------------------------------------  IN: 199.7 mL / OUT: 0 mL / NET: 199.7 mL      PHYSICAL EXAM:    General: Awake, alert  HEENT: Trach  Gastrointestinal: Tensely distended, tympanic  Skin: Warm and dry. No obvious rash    LABS:                        8.0    9.84  )-----------( 163      ( 29 Nov 2019 04:39 )             24.4     11-29    129<L>  |  89<L>  |  31<H>  ----------------------------<  102<H>  3.4<L>   |  22  |  2.42<H>    Ca    7.8<L>      29 Nov 2019 04:39  Phos  3.4     11-28  Mg     2.1     11-28    TPro  6.7  /  Alb  2.6<L>  /  TBili  1.8<H>  /  DBili  x   /  AST  40  /  ALT  36  /  AlkPhos  97  11-29    PT/INR - ( 29 Nov 2019 04:39 )   PT: 13.2 sec;   INR: 1.16          PTT - ( 29 Nov 2019 04:39 )  PTT:34.4 sec                  Culture - Blood (collected 27 Nov 2019 05:48)  Source: .Blood Blood  Preliminary Report (29 Nov 2019 06:00):    No growth at 2 days.    Culture - Blood (collected 27 Nov 2019 05:48)  Source: .Blood Blood  Preliminary Report (29 Nov 2019 06:00):    No growth at 2 days.      RADIOLOGY & ADDITIONAL STUDIES:  Reviewed

## 2019-11-29 NOTE — PROGRESS NOTE ADULT - ASSESSMENT
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 s/p trach on 11/26.      # CAMERON likely due to ATN in shock  - last HD on 11/27, well tolerated  - volume status and electrolytes noted, acceptable  - defer HD today, will anticipate HD on 11/30  - keep map >65 mmHg  - plan for permcath placement  - monitor H/H/Plt, transfuse as indicated, procrit w HD  - treat infection w renally adjusted ABx    Discussed with attending Dr Bowers.

## 2019-11-29 NOTE — PROGRESS NOTE ADULT - ASSESSMENT
39y Male w PMHx of IVDU and active smoker who went to [facility] on 11/8/19 complaining of productive cough with hemoptysis, progressive SOB, pleuritic chest pain, nausea, non-bloody emesis, abd pain, chillsx3 days. 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, CTICU, under the care of Dr. Daniel Ruelas for possible surgical evaluation. Following admission, patient noted to be in acute hypoxic respiratory failure s/p intubation/sedation, acute kidney injury requiring CVVHD, congestive hepatopathy, and mutli-system organ failure 2/2 hypoperfusion. Patient deemed not a surgical candidate, per Dr. Ruelas, and patient transferred to MICU for medical management with IV nafcillin, ID following. Pt with diffuse septic emboli in bilateral lungs, pigtails in place with new b/l pneumothorax. Abd yesterday with increased distention and distended bowel on AXR. Gen surg consulted and is now s/p decompression with rectal tube abd NG tube. CT chest/abd/pelvis done 11/18/19 concerning for b/l trapped lung and hemothorax for which thoracic surgery was consulted. Patient was extubated however, overnight he became bradycardic and arrested, CPR initiated with ROSC. After CPR chest tubes noted to have increased sanguinous drainage, new airleaks. Thoracic team following for CT management. Anterior right pigtail placed for loculated pneumothorax.     Plan:   - continue daily serial CXRs - CXR today stable   - continue Chest tubes to suction at all times and while on positive pressure (airleaks seem to be improving)   - appreciate ID recs   - +endocarditis- not a surgical candidate at this time with CT surgery- continue medical management 39y Male w PMHx of IVDU and active smoker who went to [facility] on 11/8/19 complaining of productive cough with hemoptysis, progressive SOB, pleuritic chest pain, nausea, non-bloody emesis, abd pain, chillsx3 days. 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 Daniel Ruelas for possible surgical evaluation. Following admission, patient noted to be in acute hypoxic respiratory failure s/p intubation/sedation, acute kidney injury requiring CVVHD, congestive hepatopathy, and mutli-system organ failure 2/2 hypoperfusion. Patient deemed not a surgical candidate, per Dr. Ruelas, and patient transferred to MICU for medical management with IV nafcillin, ID following. Pt with diffuse septic emboli in bilateral lungs, pigtails in place with new b/l pneumothorax. Abd yesterday with increased distention and distended bowel on AXR. Gen surg consulted and is now s/p decompression with rectal tube abd NG tube. CT chest/abd/pelvis done 11/18/19 concerning for b/l trapped lung and hemothorax for which thoracic surgery was consulted. Patient was extubated however, overnight he became bradycardic and arrested, CPR initiated with ROSC. After CPR chest tubes noted to have increased sanguinous drainage, new airleaks.  Anterior right pigtail placed for loculated pneumothorax.     Plan:   - no role for surgical intervention, continue with chest tube management  - will defer chest tube management to primary team/pulmonology  - will sign off for now, please reconsult as needed

## 2019-11-29 NOTE — PROGRESS NOTE ADULT - ASSESSMENT
40 y/o M w/ PMH of IVDU and active smoker who came from [facility] on 11/8/19 in 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 After being transferred to  CTICU for surgical evaluation, pt was deemed to not be a surgical candidate and transferred to MICU for medical mgmt. ID, Nephrology, heme/onc, surgery, vascular following.    Neuro  Intubated, sedated on Propofol, Fentanyl gtt. Prior CT head negative for any intracranial embolic events.   -wean off both propofol and fentanyl  -currently alert and interactive, no need for sedation       Cardiovascular   #Hypotension  Most likely 2/2 septic shock from infective endocarditis and RV failure 2/2 tricuspid regurgitation (ELIAN shows EF of 40-50%). Echo with EF 45%. ELIAN with EF 40-45%, 3.8 x1cm vegetation on TR valve, with severe TR, trivial pericardial effusion. Echo with bubble revealed no PFO. Wean off Levophed as tolerated.   - Maintain MAP>65.   - C/W medical mgmt of infective endocarditis as below.  - c/w Midodrine 10mg TID   - c/w levofed drip, downtitrate as tolerated       Respiratory  #Acute hypoxic respiratory failure   Most likely 2/2 alveolar hemorrhage in the setting of septic embolic causing numerous cavitary lesions on CT chest. CXR with scattered infiltrates and pleural effusions. Sputum culture negative. 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 Bilateral CT in place, confirmed by CXR, continues to drain serosanguinous fluid. Fluid analysis of both CT pleural fluid confirms complex exudate with high cellularity, low pH and low glucose. pleural fluid cultures with staph aureus. Chest tubes in place as above (2 R chest tubes, 1 L chest tube)   - Monitor output of bilateral chest tubes  - R pleural fluid cultures with staph aureus, L pleural fluid cultures with NGTD   - CT chest and cxr as above. Chest tubes set to suction.      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 Not a surgical candidate for a tricuspid valve replacement at this time as per CT surgery. Initial blood cultures positive for staph epidermidis, likely a contaminant. Initial sputum culture positive for staph aureus  Repeat blood cultures with NGTD. Sputum cx NGTD.  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   - Cardiology consulted to optimize management of his right heart dysfunction, f/u recs        Renal  #Acute renal failure most likely 2/2 ATN from hypoperfusion, Minimal urine output, on CVVHD, oliguric with 0-5cc/hr. Vancomycin level 37 on arrival so may be component of drug induced nephropathy. Bun/Cr continues to improve while on CVVHD. Pt is clinically fluid overloaded but now improved and hemodynamically stable. No renal infarcts as per CT abdomen/pelvis.  - F/U nephrology recs.  -plan for intermittent HD per nephrology recs and permacath planning and placement  - Continue to correct fluid overload with HD  - Monitor I/Os  - currently anuric with no signs of renal recovery  - next HD 11/29 vs 11/30  - continue to monitor serum electrolytes      GI  OG on arrival with 600cc of bilious fluid, abdomen still distended but had several BMs, some loose. CT abdomen revealed no evidence of bowel ischemia or SBO. Sump was DCed and replaced with NG tube. CT abdomen/pelvis revealed "irregular masslike mural thickening of the posterior left lateral wall of the rectum." Surgery consulted, unlikely perirectal abscess. Recommend GI evaluation for possible scope.  - F/u GI consult, f/u recs, when stable will go for flex sig; possible PEG   - Abdominal xray with air-filled loops of small/large bowel with concern for distal obstruction vs ileus. Surgery consulted. Rectal tube and sump placed. CT abd/pelvis with abd/pelvic, anasarca, colonic ileus. Rectal tube in place for colonic decompression, continue to monitor. Abdomen still distended but significantly improved, repeat abd xray without obstruction   -C. diff negative   - NG tube in place, sump to low-intermittent suction   -c/w Miralax BID  - wean off fentanyl/opiates as above  -Given persistent distention, would start prokinetic agent such as Reglan or consider Methylnaltrexone to counteract opioids per GI recs    #Congestive Hepatopathy   Transaminitis, coag derangements, hyperbilirubinemia most likely 2/2 hepatic congestion vs hemolysis, due to severe TR in setting of infective endocarditis. Transaminases and Alk phos improving. Hepatitis panel negative.  - Monitor CMP, direct bili       Heme  #Hemolysis  Intravascular hemolysis with initial haptoglobin <10, LDH decreasing at 365. D-dimer elevated. Fibrinogen stable 396. Today still clinically jaundiced, with stable bilirubinemia: T.bili 9.9., D.bili 8.5. h/h stable at 8.1/25.5. Platelets decreasing from 54 to 49.. Fact VII/Factor VIII elevated. Unlikely 2/2 CVVHD. Likely 2/2 to sepsis and/or DIC.   - Received 1u plts 11/26 in preparation for trach placement 11/26; plt currently stable and >100 without any evidence of oozing/bleeding   - Heme consulted, follow recs  - Monitor direct and total bili, LDH, fibrinogen, platelets   - Transfuse platelets if <10K or bleeding and <50K  - Transfuse pRBCs for hgb <7       Vascular  Vascular surgery consulted in setting of gangrenous distal toes and fingers b/l. Likely 2/2 to pressors.  - per vascular, may need amputation in the future once ischemic tissue fully demarcates, and once patient is medically optimized      Endocrinology  -Continue Hydrocortisone 50mg q8- stress dose steroids  - taper steroids tomorrow 11/29    Lines: right IJ TLC and Ernesto (11/12), Axillary A-line (11/12), L subclavian (11/27-), right peripheral (11/12)    F: None   E: replete K <4, Mg <2  N: Trickle feeds   GI Ppx: Protonix   DVT Ppx: Heparin   Code status: FULL   Dispo: MICU 38 y/o M w/ PMH of IVDU and active smoker who came from [facility] on 11/8/19 in 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 After being transferred to  CTICU for surgical evaluation, pt was deemed to not be a surgical candidate and transferred to MICU for medical mgmt. ID, Nephrology, heme/onc, surgery, vascular following.    Neuro  Intubated, sedated on Propofol, Fentanyl gtt. Prior CT head negative for any intracranial embolic events.   -wean off both propofol and fentanyl  -currently alert and interactive      Cardiovascular   #Hypotension  Most likely 2/2 septic shock from infective endocarditis and RV failure 2/2 tricuspid regurgitation (ELIAN shows EF of 40-50%). Echo with EF 45%. ELIAN with EF 40-45%, 3.8 x1cm vegetation on TR valve, with severe TR, trivial pericardial effusion. Echo with bubble revealed no PFO. Wean off Levophed as tolerated.   - Maintain MAP>65.   - C/W medical mgmt of infective endocarditis as below.  - c/w Midodrine 10mg TID   - c/w levofed drip, downtitrate as tolerated       Respiratory  #Acute hypoxic respiratory failure   Most likely 2/2 alveolar hemorrhage in the setting of septic embolic causing numerous cavitary lesions on CT chest. CXR with scattered infiltrates and pleural effusions. Sputum culture negative. 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 Bilateral CT in place, confirmed by CXR, continues to drain serosanguinous fluid. Fluid analysis of both CT pleural fluid confirms complex exudate with high cellularity, low pH and low glucose. pleural fluid cultures with staph aureus. Chest tubes in place as above (2 R chest tubes, 1 L chest tube)   - Monitor output of bilateral chest tubes  - R pleural fluid cultures with staph aureus, L pleural fluid cultures with NGTD   - CT chest and cxr as above. Chest tubes set to suction. 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 Not a surgical candidate for a tricuspid valve replacement at this time as per CT surgery. Initial blood cultures positive for staph epidermidis, likely a contaminant. Initial sputum culture positive for staph aureus  Repeat blood cultures with NGTD. Sputum cx NGTD.  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   - Cardiology consulted to optimize management of his right heart dysfunction, f/u recs        Renal  #Acute renal failure most likely 2/2 ATN from hypoperfusion, Minimal urine output, on CVVHD, oliguric with 0-5cc/hr. Vancomycin level 37 on arrival so may be component of drug induced nephropathy. Bun/Cr continues to improve while on CVVHD. Pt is clinically fluid overloaded but now improved and hemodynamically stable. No renal infarcts as per CT abdomen/pelvis.  - F/U nephrology recs.  -plan for intermittent HD per nephrology recs and permacath planning and placement  - Continue to correct fluid overload with HD  - Monitor I/Os  - currently anuric with no signs of renal recovery  - next HD 11/29 vs 11/30  - continue to monitor serum electrolytes      GI  OG on arrival with 600cc of bilious fluid, abdomen still distended but had several BMs, some loose. CT abdomen revealed no evidence of bowel ischemia or SBO. Sump was DCed and replaced with NG tube. CT abdomen/pelvis revealed "irregular masslike mural thickening of the posterior left lateral wall of the rectum." Surgery consulted, unlikely perirectal abscess. Recommend GI evaluation for possible scope.  - F/u GI consult, f/u recs, when stable will go for flex sig; possible PEG   - Abdominal xray with air-filled loops of small/large bowel with concern for distal obstruction vs ileus. Surgery consulted. Rectal tube and sump placed. CT abd/pelvis with abd/pelvic, anasarca, colonic ileus. Rectal tube in place for colonic decompression, continue to monitor. Abdomen still distended but significantly improved, repeat abd xray without obstruction   -C. diff negative   - NG tube in place, sump to low-intermittent suction, sump replaced AM 11/29   -c/w Miralax BID  - wean off fentanyl/opiates as above  -s/p CT abdomen with oral contrast - per radiology, no evidence of obstruction, likely ileus 2/2 opiates  - s/p neostigmine in the PM 11/29; if unresolved, GI recommending starting prokinetic agent as no evidence of obstruction     #Congestive Hepatopathy   Transaminitis, coag derangements, hyperbilirubinemia most likely 2/2 hepatic congestion vs hemolysis, due to severe TR in setting of infective endocarditis. Transaminases and Alk phos improving. Hepatitis panel negative.  - Monitor CMP, direct bili       Heme  #Hemolysis  Intravascular hemolysis with initial haptoglobin <10, LDH decreasing at 365. D-dimer elevated. Fibrinogen stable 396. Today still clinically jaundiced, with stable bilirubinemia: T.bili 9.9., D.bili 8.5. h/h stable at 8.1/25.5. Platelets decreasing from 54 to 49.. Fact VII/Factor VIII elevated. Unlikely 2/2 CVVHD. Likely 2/2 to sepsis and/or DIC.   - Received 1u plts 11/26 in preparation for trach placement 11/26; plt currently stable and >100 without any evidence of oozing/bleeding   - Heme consulted, follow recs  - Monitor direct and total bili, LDH, fibrinogen, platelets   - Transfuse platelets if <10K or bleeding and <50K  - Transfuse pRBCs for hgb <7       Vascular  Vascular surgery consulted in setting of gangrenous distal toes and fingers b/l. Likely 2/2 to pressors.  - per vascular, may need amputation in the future once ischemic tissue fully demarcates, and once patient is medically optimized      Endocrinology  -Continue Hydrocortisone 50mg q8- stress dose steroids  - taper steroids tomorrow 11/29    Lines: right IJ TLC and Ernesto (11/12), Axillary A-line (11/12), L subclavian (11/27-), right peripheral (11/12)    F: None   E: replete K <4, Mg <2  N: Trickle feeds   GI Ppx: Protonix   DVT Ppx: Heparin   Code status: FULL   Dispo: MICU

## 2019-11-29 NOTE — PROGRESS NOTE ADULT - SUBJECTIVE AND OBJECTIVE BOX
Patient discussed on morning rounds with Dr. Tracey     SUBJECTIVE ASSESSMENT:    on vent but awake, alert, and following commands    Vital Signs Last 24 Hrs  T(C): 37.8 (29 Nov 2019 10:26), Max: 38.3 (28 Nov 2019 15:00)  T(F): 100.1 (29 Nov 2019 10:26), Max: 100.9 (28 Nov 2019 15:00)  HR: 116 (29 Nov 2019 12:00) (107 - 138)  BP: 91/55 (29 Nov 2019 10:00) (91/55 - 121/80)  BP(mean): 65 (29 Nov 2019 10:00) (65 - 94)  RR: 9 (29 Nov 2019 12:00) (9 - 34)  SpO2: 95% (29 Nov 2019 12:00) (84% - 100%)  I&O's Detail    28 Nov 2019 07:01  -  29 Nov 2019 07:00  --------------------------------------------------------  IN:    dexmedetomidine Infusion: 25.8 mL    dextrose 5%.: 1100 mL    Enteral Tube Flush: 340 mL    fentaNYL Infusion.: 176 mL    fentaNYL Infusion.: 325.9 mL    norepinephrine Infusion: 106 mL    propofol Infusion: 104.3 mL    Solution: 300 mL  Total IN: 2478 mL    OUT:    Nasoenteral Tube: 250 mL  Total OUT: 250 mL    Total NET: 2228 mL      29 Nov 2019 07:01  -  29 Nov 2019 13:27  --------------------------------------------------------  IN:    dextrose 5%.: 50 mL    fentaNYL Infusion.: 116.7 mL    propofol Infusion: 33 mL  Total IN: 199.7 mL    OUT:  Total OUT: 0 mL    Total NET: 199.7 mL    CHEST TUBE:  Yes. AIR LEAKS: Yes, right anterior pigtail with intermittent 1/5 airleak.  right posterior pigtail with no airleak appreciated. left pigtail with 1/5 intermittent airleak on suction @ -20 mmHg with PPV    PHYSICAL EXAM:    General: Sedated on vent, appears comfortabel    Neurological: opens eyes to commands    Cardiovascular: S1S2 RRR No M/G/R    Respiratory: CTA b/l No W/R/R    Gastrointestinal: soft, NT/ND    Extremities: no edema    Vascular: warm and well perfused.    Incision Sites: n/a    LABS:                        8.0    9.84  )-----------( 163      ( 29 Nov 2019 04:39 )             24.4       COUMADIN:  Yes/No. REASON: .    PT/INR - ( 29 Nov 2019 04:39 )   PT: 13.2 sec;   INR: 1.16          PTT - ( 29 Nov 2019 04:39 )  PTT:34.4 sec    11-29    129<L>  |  89<L>  |  31<H>  ----------------------------<  102<H>  3.4<L>   |  22  |  2.42<H>    Ca    7.8<L>      29 Nov 2019 04:39  Phos  3.4     11-28  Mg     2.1     11-28    TPro  6.7  /  Alb  2.6<L>  /  TBili  1.8<H>  /  DBili  x   /  AST  40  /  ALT  36  /  AlkPhos  97  11-29          MEDICATIONS  (STANDING):  acyclovir IVPB 370 milliGRAM(s) IV Intermittent every 24 hours  albumin human 25% IVPB 50 milliLiter(s) IV Intermittent every 1 hour  albuterol/ipratropium for Nebulization 3 milliLiter(s) Nebulizer every 4 hours  artificial  tears Solution 1 Drop(s) Both EYES every 6 hours  barium sulfate 2.1% Suspension 450 milliLiter(s) Oral once  chlorhexidine 0.12% Liquid 15 milliLiter(s) Oral Mucosa every 12 hours  chlorhexidine 2% Cloths 1 Application(s) Topical daily  dexMEDEtomidine Infusion 0.3 MICROgram(s)/kG/Hr (5.52 mL/Hr) IV Continuous <Continuous>  dextrose 5%. 1000 milliLiter(s) (50 mL/Hr) IV Continuous <Continuous>  dextrose 5%. 1000 milliLiter(s) (50 mL/Hr) IV Continuous <Continuous>  dextrose 50% Injectable 12.5 Gram(s) IV Push once  dextrose 50% Injectable 25 Gram(s) IV Push once  dextrose 50% Injectable 25 Gram(s) IV Push once  epoetin maikol Injectable 44075 Unit(s) IV Push once  fentaNYL   Infusion. 0.5 MICROgram(s)/kG/Hr (3.68 mL/Hr) IV Continuous <Continuous>  heparin  Injectable 5000 Unit(s) SubCutaneous every 8 hours  hydrocortisone sodium succinate Injectable 50 milliGRAM(s) IV Push every 8 hours  insulin lispro (HumaLOG) corrective regimen sliding scale   SubCutaneous every 6 hours  midodrine 10 milliGRAM(s) Oral every 8 hours  multivitamin/minerals/iron Oral Solution (CENTRUM) 15 milliLiter(s) Oral daily  nafcillin  IVPB 2 Gram(s) IV Intermittent every 4 hours  norepinephrine Infusion 0.05 MICROgram(s)/kG/Min (3.45 mL/Hr) IV Continuous <Continuous>  pantoprazole  Injectable 40 milliGRAM(s) IV Push daily  polyethylene glycol 3350 17 Gram(s) Oral two times a day  propofol Infusion 5 MICROgram(s)/kG/Min (2.208 mL/Hr) IV Continuous <Continuous>  sodium chloride 0.9% lock flush 3 milliLiter(s) IV Push every 8 hours  vitamin E 400 International Unit(s) Oral daily  zinc sulfate 220 milliGRAM(s) Oral daily    MEDICATIONS  (PRN):  acetaminophen    Suspension .. 650 milliGRAM(s) Oral every 6 hours PRN Temp greater or equal to 38C (100.4F)  dextrose 40% Gel 15 Gram(s) Oral once PRN Blood Glucose LESS THAN 70 milliGRAM(s)/deciliter  glucagon  Injectable 1 milliGRAM(s) IntraMuscular once PRN Glucose LESS THAN 70 milligrams/deciliter        RADIOLOGY & ADDITIONAL TESTS:  CXR: no effusions/PTX Patient discussed on morning rounds with Dr. Tracey     SUBJECTIVE ASSESSMENT:    on vent but awake, alert, and following commands    Vital Signs Last 24 Hrs  T(C): 37.8 (29 Nov 2019 10:26), Max: 38.3 (28 Nov 2019 15:00)  T(F): 100.1 (29 Nov 2019 10:26), Max: 100.9 (28 Nov 2019 15:00)  HR: 116 (29 Nov 2019 12:00) (107 - 138)  BP: 91/55 (29 Nov 2019 10:00) (91/55 - 121/80)  BP(mean): 65 (29 Nov 2019 10:00) (65 - 94)  RR: 9 (29 Nov 2019 12:00) (9 - 34)  SpO2: 95% (29 Nov 2019 12:00) (84% - 100%)  I&O's Detail    28 Nov 2019 07:01  -  29 Nov 2019 07:00  --------------------------------------------------------  IN:    dexmedetomidine Infusion: 25.8 mL    dextrose 5%.: 1100 mL    Enteral Tube Flush: 340 mL    fentaNYL Infusion.: 176 mL    fentaNYL Infusion.: 325.9 mL    norepinephrine Infusion: 106 mL    propofol Infusion: 104.3 mL    Solution: 300 mL  Total IN: 2478 mL    OUT:    Nasoenteral Tube: 250 mL  Total OUT: 250 mL    Total NET: 2228 mL      29 Nov 2019 07:01  -  29 Nov 2019 13:27  --------------------------------------------------------  IN:    dextrose 5%.: 50 mL    fentaNYL Infusion.: 116.7 mL    propofol Infusion: 33 mL  Total IN: 199.7 mL    OUT:  Total OUT: 0 mL    Total NET: 199.7 mL    CHEST TUBE:  Yes. AIR LEAKS: Yes, right anterior pigtail with intermittent 1/5 airleak.  right posterior pigtail with no airleak appreciated. left pigtail with no appreciable airleak on suction @ -20 mmHg with PPV    PHYSICAL EXAM:    General: Sedated on vent, appears comfortabel    Neurological: opens eyes to commands    Cardiovascular: S1S2 RRR No M/G/R    Respiratory: CTA b/l No W/R/R    Gastrointestinal: soft, NT/ND    Extremities: no edema    Vascular: warm and well perfused.    Incision Sites: n/a    LABS:                        8.0    9.84  )-----------( 163      ( 29 Nov 2019 04:39 )             24.4       COUMADIN: No. REASON: .    PT/INR - ( 29 Nov 2019 04:39 )   PT: 13.2 sec;   INR: 1.16          PTT - ( 29 Nov 2019 04:39 )  PTT:34.4 sec    11-29    129<L>  |  89<L>  |  31<H>  ----------------------------<  102<H>  3.4<L>   |  22  |  2.42<H>    Ca    7.8<L>      29 Nov 2019 04:39  Phos  3.4     11-28  Mg     2.1     11-28    TPro  6.7  /  Alb  2.6<L>  /  TBili  1.8<H>  /  DBili  x   /  AST  40  /  ALT  36  /  AlkPhos  97  11-29          MEDICATIONS  (STANDING):  acyclovir IVPB 370 milliGRAM(s) IV Intermittent every 24 hours  albumin human 25% IVPB 50 milliLiter(s) IV Intermittent every 1 hour  albuterol/ipratropium for Nebulization 3 milliLiter(s) Nebulizer every 4 hours  artificial  tears Solution 1 Drop(s) Both EYES every 6 hours  barium sulfate 2.1% Suspension 450 milliLiter(s) Oral once  chlorhexidine 0.12% Liquid 15 milliLiter(s) Oral Mucosa every 12 hours  chlorhexidine 2% Cloths 1 Application(s) Topical daily  dexMEDEtomidine Infusion 0.3 MICROgram(s)/kG/Hr (5.52 mL/Hr) IV Continuous <Continuous>  dextrose 5%. 1000 milliLiter(s) (50 mL/Hr) IV Continuous <Continuous>  dextrose 5%. 1000 milliLiter(s) (50 mL/Hr) IV Continuous <Continuous>  dextrose 50% Injectable 12.5 Gram(s) IV Push once  dextrose 50% Injectable 25 Gram(s) IV Push once  dextrose 50% Injectable 25 Gram(s) IV Push once  epoetin maikol Injectable 99443 Unit(s) IV Push once  fentaNYL   Infusion. 0.5 MICROgram(s)/kG/Hr (3.68 mL/Hr) IV Continuous <Continuous>  heparin  Injectable 5000 Unit(s) SubCutaneous every 8 hours  hydrocortisone sodium succinate Injectable 50 milliGRAM(s) IV Push every 8 hours  insulin lispro (HumaLOG) corrective regimen sliding scale   SubCutaneous every 6 hours  midodrine 10 milliGRAM(s) Oral every 8 hours  multivitamin/minerals/iron Oral Solution (CENTRUM) 15 milliLiter(s) Oral daily  nafcillin  IVPB 2 Gram(s) IV Intermittent every 4 hours  norepinephrine Infusion 0.05 MICROgram(s)/kG/Min (3.45 mL/Hr) IV Continuous <Continuous>  pantoprazole  Injectable 40 milliGRAM(s) IV Push daily  polyethylene glycol 3350 17 Gram(s) Oral two times a day  propofol Infusion 5 MICROgram(s)/kG/Min (2.208 mL/Hr) IV Continuous <Continuous>  sodium chloride 0.9% lock flush 3 milliLiter(s) IV Push every 8 hours  vitamin E 400 International Unit(s) Oral daily  zinc sulfate 220 milliGRAM(s) Oral daily    MEDICATIONS  (PRN):  acetaminophen    Suspension .. 650 milliGRAM(s) Oral every 6 hours PRN Temp greater or equal to 38C (100.4F)  dextrose 40% Gel 15 Gram(s) Oral once PRN Blood Glucose LESS THAN 70 milliGRAM(s)/deciliter  glucagon  Injectable 1 milliGRAM(s) IntraMuscular once PRN Glucose LESS THAN 70 milligrams/deciliter        RADIOLOGY & ADDITIONAL TESTS:  CXR: no effusions/PTX

## 2019-11-30 NOTE — PROGRESS NOTE ADULT - ASSESSMENT
40 yo M w/ PMH of IVDU and active smoker who came from [facility] on 11/8/19 in 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  Hospital course complicated by acute hypoxic respiratory failure, acute kidney injury, congestive hepatopathy, multi-system organ failure 2/2 hypoperfusion.   nephrology on board 2nd to [place]      # [place] likely due to ATN in shock  was initially on CVVHD then switched to IHD  last HD completed on 11/29--UF  however, volume status -- anasarca, will proceed with HD today with UF goal of 3.5-4 L  next HD anticipated on 12/2  continue monitoring urine output , bladder scan   renally dose abx    anemia  closely monitor h.h  recommend transfusion prn hb< 7    renal bone disease  calcium phos noted-- recommend phoslo 667 mg po  TID as long as pt is on tube feed

## 2019-11-30 NOTE — PROGRESS NOTE ADULT - ATTENDING COMMENTS
HD today for clearance and UF as tolerated HD today for clearance and UF as tolerated  high K bath  consider PRBC with HD  unclear cause of hyponatremia - limit free water . HD should bring up today

## 2019-11-30 NOTE — PROGRESS NOTE ADULT - SUBJECTIVE AND OBJECTIVE BOX
GASTROENTEROLOGY PROGRESS NOTE  Patient seen and examined at bedside. Patient awake and alert with abdominal distention. Received Neostigmine yesterday with meager effect - increased NG sump output and bowel movements, but abdomen with minimal decrease in distention.    PERTINENT REVIEW OF SYSTEMS:  CONSTITUTIONAL: No weakness, fevers or chills  HEENT: No visual changes; No vertigo or throat pain   GASTROINTESTINAL: Distended  NEUROLOGICAL: No numbness or weakness  SKIN: No itching, burning, rashes, or lesions     Allergies    No Known Allergies    Intolerances      MEDICATIONS:  MEDICATIONS  (STANDING):  acyclovir IVPB 370 milliGRAM(s) IV Intermittent every 24 hours  albuterol/ipratropium for Nebulization 3 milliLiter(s) Nebulizer every 4 hours  artificial  tears Solution 1 Drop(s) Both EYES every 6 hours  chlorhexidine 0.12% Liquid 15 milliLiter(s) Oral Mucosa every 12 hours  chlorhexidine 2% Cloths 1 Application(s) Topical daily  dextrose 5%. 1000 milliLiter(s) (50 mL/Hr) IV Continuous <Continuous>  dextrose 5%. 1000 milliLiter(s) (50 mL/Hr) IV Continuous <Continuous>  dextrose 50% Injectable 12.5 Gram(s) IV Push once  dextrose 50% Injectable 25 Gram(s) IV Push once  dextrose 50% Injectable 25 Gram(s) IV Push once  epoetin maikol Injectable 04412 Unit(s) IV Push once  fentaNYL   Infusion. 0.5 MICROgram(s)/kG/Hr (3.68 mL/Hr) IV Continuous <Continuous>  heparin  Injectable 5000 Unit(s) SubCutaneous every 8 hours  hydrocortisone sodium succinate Injectable 50 milliGRAM(s) IV Push every 8 hours  insulin lispro (HumaLOG) corrective regimen sliding scale   SubCutaneous every 6 hours  midodrine 10 milliGRAM(s) Oral every 8 hours  multivitamin/minerals/iron Oral Solution (CENTRUM) 15 milliLiter(s) Oral daily  nafcillin  IVPB 2 Gram(s) IV Intermittent every 4 hours  norepinephrine Infusion 0.05 MICROgram(s)/kG/Min (3.45 mL/Hr) IV Continuous <Continuous>  pantoprazole  Injectable 40 milliGRAM(s) IV Push daily  polyethylene glycol 3350 17 Gram(s) Oral two times a day  propofol Infusion 5 MICROgram(s)/kG/Min (2.208 mL/Hr) IV Continuous <Continuous>  QUEtiapine 25 milliGRAM(s) Oral at bedtime  sodium chloride 0.9% lock flush 3 milliLiter(s) IV Push every 8 hours  vitamin E 400 International Unit(s) Oral daily  zinc sulfate 220 milliGRAM(s) Oral daily    MEDICATIONS  (PRN):  acetaminophen    Suspension .. 650 milliGRAM(s) Oral every 6 hours PRN Temp greater or equal to 38C (100.4F)  dextrose 40% Gel 15 Gram(s) Oral once PRN Blood Glucose LESS THAN 70 milliGRAM(s)/deciliter  glucagon  Injectable 1 milliGRAM(s) IntraMuscular once PRN Glucose LESS THAN 70 milligrams/deciliter    Vital Signs Last 24 Hrs  T(C): 36 (30 Nov 2019 06:29), Max: 37.8 (29 Nov 2019 10:26)  T(F): 96.8 (30 Nov 2019 06:29), Max: 100.1 (29 Nov 2019 10:26)  HR: 96 (30 Nov 2019 09:11) (92 - 131)  BP: --  BP(mean): --  RR: 13 (30 Nov 2019 09:11) (7 - 23)  SpO2: 100% (30 Nov 2019 09:11) (95% - 100%)    11-29 @ 07:01 - 11-30 @ 07:00  --------------------------------------------------------  IN: 1962.6 mL / OUT: 815 mL / NET: 1147.6 mL    11-30 @ 07:01 - 11-30 @ 10:12  --------------------------------------------------------  IN: 50 mL / OUT: 0 mL / NET: 50 mL      PHYSICAL EXAM:    General: Minimally sedated, s/p tracheostomy  HEENT: NG sump and tracheostomy in place  Gastrointestinal: Distended, tympanic    LABS:                        7.1    7.50  )-----------( 190      ( 30 Nov 2019 05:35 )             21.5     11-30    123<L>  |  85<L>  |  35<H>  ----------------------------<  97  3.0<L>   |  21<L>  |  3.12<H>    Ca    7.9<L>      30 Nov 2019 05:35  Phos  5.8     11-30  Mg     2.0     11-30    TPro  6.5  /  Alb  2.3<L>  /  TBili  1.5<H>  /  DBili  x   /  AST  27  /  ALT  28  /  AlkPhos  82  11-30    PT/INR - ( 29 Nov 2019 04:39 )   PT: 13.2 sec;   INR: 1.16          PTT - ( 29 Nov 2019 04:39 )  PTT:34.4 sec                  RADIOLOGY & ADDITIONAL STUDIES:  Reviewed

## 2019-11-30 NOTE — PROGRESS NOTE ADULT - ASSESSMENT
38 y/o M smoker w/ PMHx of IVDU presented to [facility] w/ productive cough with hemoptysis found to have septic emboli w/ vegetation of the TV transferred to [facility] for surgical evaluation.  Course complicated by sepsis, DIC, acute respiratory failure w/ empyema s/p intubation and thoracostomy tube, CAMERON requiring cvvhd, ileus on NGT and rectal decompression, cardiac arrest, and pneumothorax. GI consulted for evaluation of incidental finding of rectal wall thickening on imaging.      1. Abdominal distension - Appears to be due to ileus. Patient with abdominal distension with xray notable for dilated bowels, CT scan without evidence of transition point or obstruction. Neostigmine given overnight with continued BMs and flatus as well as increased sump output, but minimal effect in distention.  -Wean opioids as able  -Aggressive repletion of electrolytes  -NG sump to low-intermittent suction, though as distention predominantly colonic will be of minimal help  -Continue prokinetic agent such as Reglan or consider Methylnaltrexone to counteract opioids    2. Hyperbilirubinemia - Patient with marked elevated bilirubin initially thought to be 2/2 ischemic hepatopathy now downtrending. Imaging without CBD dilation suggestive of cholestasis from sepsis vs DILI in the setting of recent ischemic hepatopathy. Continues to downtrend.  -Daily LFT and INR    Recommendations discussed with primary team  Case discussed with attending physician

## 2019-11-30 NOTE — PROGRESS NOTE ADULT - SUBJECTIVE AND OBJECTIVE BOX
Patient is a 39y Male seen and evaluated at bedside.   pt seen and examined  awake  connected to vent via trach       acetaminophen    Suspension .. 650 every 6 hours PRN  acyclovir IVPB 370 every 24 hours  albuterol/ipratropium for Nebulization 3 every 4 hours  artificial  tears Solution 1 every 6 hours  chlorhexidine 0.12% Liquid 15 every 12 hours  chlorhexidine 2% Cloths 1 daily  dextrose 40% Gel 15 once PRN  dextrose 5%. 1000 <Continuous>  dextrose 5%. 1000 <Continuous>  dextrose 50% Injectable 12.5 once  dextrose 50% Injectable 25 once  dextrose 50% Injectable 25 once  epoetin maikol Injectable 41159 once  fentaNYL   Infusion. 0.5 <Continuous>  glucagon  Injectable 1 once PRN  heparin  Injectable 5000 every 8 hours  hydrocortisone sodium succinate Injectable 50 every 8 hours  insulin lispro (HumaLOG) corrective regimen sliding scale  every 6 hours  midodrine 10 every 8 hours  multivitamin/minerals/iron Oral Solution (CENTRUM) 15 daily  nafcillin  IVPB 2 every 4 hours  norepinephrine Infusion 0.05 <Continuous>  pantoprazole  Injectable 40 daily  polyethylene glycol 3350 17 two times a day  propofol Infusion 5 <Continuous>  QUEtiapine 50 every 12 hours  sodium chloride 0.9% lock flush 3 every 8 hours  vitamin E 400 daily  zinc sulfate 220 daily      Allergies    No Known Allergies    Intolerances        T(C): , Max: 37.6 (11-29-19 @ 16:13)  T(F): , Max: 99.6 (11-29-19 @ 16:13)  HR: 100 (11-30-19 @ 14:00)  BP: --  BP(mean): --  RR: 13 (11-30-19 @ 14:00)  SpO2: 100% (11-30-19 @ 14:00)  Wt(kg): --    11-29 @ 07:01  -  11-30 @ 07:00  --------------------------------------------------------  IN: 1962.6 mL / OUT: 815 mL / NET: 1147.6 mL    11-30 @ 07:01  -  11-30 @ 15:00  --------------------------------------------------------  IN: 546 mL / OUT: 0 mL / NET: 546 mL          Review of Systems:  NA      PHYSICAL EXAM:  GENERAL: NAD, connected to vent via trach   HEAD:  Atraumatic, Normocephalic,   EYES: Bilateral conjunctiva and sclera normal   Oral cavity: Oral mucosa dry and pink  NECK: trach   CHEST/LUNG: limited exam, + rales, R IJ HD cath   HEART: Regular rate and rhythm. JEFFREY II/VI at LPSB, No gallop, no rub   ABDOMEN: Soft, Nontender, BS+nt, No flank tenderness.   EXTREMITIES: grossly anasarca, + restraints, ++++ scrotal edema   Neurology: Awake, follows commands            LABS:                        7.1    7.50  )-----------( 190      ( 30 Nov 2019 05:35 )             21.5     11-30    123<L>  |  85<L>  |  35<H>  ----------------------------<  97  3.0<L>   |  21<L>  |  3.12<H>    Ca    7.9<L>      30 Nov 2019 05:35  Phos  5.8     11-30  Mg     2.0     11-30    TPro  6.5  /  Alb  2.3<L>  /  TBili  1.5<H>  /  DBili  x   /  AST  27  /  ALT  28  /  AlkPhos  82  11-30      PT/INR - ( 29 Nov 2019 04:39 )   PT: 13.2 sec;   INR: 1.16          PTT - ( 29 Nov 2019 04:39 )  PTT:34.4 sec          RADIOLOGY & ADDITIONAL STUDIES: Patient is a 39y Male seen and evaluated at bedside.   pt seen and examined  awake  connected to vent via trach       acetaminophen    Suspension .. 650 every 6 hours PRN  acyclovir IVPB 370 every 24 hours  albuterol/ipratropium for Nebulization 3 every 4 hours  artificial  tears Solution 1 every 6 hours  chlorhexidine 0.12% Liquid 15 every 12 hours  chlorhexidine 2% Cloths 1 daily  dextrose 40% Gel 15 once PRN  dextrose 5%. 1000 <Continuous>  dextrose 5%. 1000 <Continuous>  dextrose 50% Injectable 12.5 once  dextrose 50% Injectable 25 once  dextrose 50% Injectable 25 once  epoetin maikol Injectable 78220 once  fentaNYL   Infusion. 0.5 <Continuous>  glucagon  Injectable 1 once PRN  heparin  Injectable 5000 every 8 hours  hydrocortisone sodium succinate Injectable 50 every 8 hours  insulin lispro (HumaLOG) corrective regimen sliding scale  every 6 hours  midodrine 10 every 8 hours  multivitamin/minerals/iron Oral Solution (CENTRUM) 15 daily  nafcillin  IVPB 2 every 4 hours  norepinephrine Infusion 0.05 <Continuous>  pantoprazole  Injectable 40 daily  polyethylene glycol 3350 17 two times a day  propofol Infusion 5 <Continuous>  QUEtiapine 50 every 12 hours  sodium chloride 0.9% lock flush 3 every 8 hours  vitamin E 400 daily  zinc sulfate 220 daily      Allergies    No Known Allergies    Intolerances        T(C): , Max: 37.6 (11-29-19 @ 16:13)  T(F): , Max: 99.6 (11-29-19 @ 16:13)  HR: 100 (11-30-19 @ 14:00)  BP: --128/86  BP(mean): --  RR: 13 (11-30-19 @ 14:00)  SpO2: 100% (11-30-19 @ 14:00)  Wt(kg): --    11-29 @ 07:01  -  11-30 @ 07:00  --------------------------------------------------------  IN: 1962.6 mL / OUT: 815 mL / NET: 1147.6 mL    11-30 @ 07:01  -  11-30 @ 15:00  --------------------------------------------------------  IN: 546 mL / OUT: 0 mL / NET: 546 mL          Review of Systems:  NA      PHYSICAL EXAM:  GENERAL: NAD, connected to vent via trach   HEAD:  Atraumatic, Normocephalic,   EYES: Bilateral conjunctiva and sclera normal   Oral cavity: Oral mucosa dry and pink  NECK: trach   CHEST/LUNG: limited exam, + rales, R IJ HD cath   HEART: Regular rate and rhythm. JEFFREY II/VI at LPSB, No gallop, no rub   ABDOMEN: Soft, Nontender, BS+nt, No flank tenderness.   EXTREMITIES: grossly anasarca, + restraints, ++++ scrotal edema   Neurology: Awake, follows commands            LABS:                        7.1    7.50  )-----------( 190      ( 30 Nov 2019 05:35 )             21.5     11-30    123<L>  |  85<L>  |  35<H>  ----------------------------<  97  3.0<L>   |  21<L>  |  3.12<H>    Ca    7.9<L>      30 Nov 2019 05:35  Phos  5.8     11-30  Mg     2.0     11-30    TPro  6.5  /  Alb  2.3<L>  /  TBili  1.5<H>  /  DBili  x   /  AST  27  /  ALT  28  /  AlkPhos  82  11-30      PT/INR - ( 29 Nov 2019 04:39 )   PT: 13.2 sec;   INR: 1.16          PTT - ( 29 Nov 2019 04:39 )  PTT:34.4 sec          RADIOLOGY & ADDITIONAL STUDIES:

## 2019-11-30 NOTE — PROGRESS NOTE ADULT - SUBJECTIVE AND OBJECTIVE BOX
Vascular Surgery Consult - Progress Note    Subjective:  Patient does not speak but shakes heads head when asked if he is in pain.     Vital Signs Last 24 Hrs  T(C): 36.9 (30 Nov 2019 10:05), Max: 37.6 (29 Nov 2019 16:13)  T(F): 98.4 (30 Nov 2019 10:05), Max: 99.6 (29 Nov 2019 16:13)  HR: 104 (30 Nov 2019 10:00) (92 - 131)  BP: --  BP(mean): --  RR: 16 (30 Nov 2019 10:00) (7 - 23)  SpO2: 100% (30 Nov 2019 10:00) (95% - 100%)    I&O's Summary  29 Nov 2019 07:01  -  30 Nov 2019 07:00  --------------------------------------------------------  IN: 1962.6 mL / OUT: 815 mL / NET: 1147.6 mL    30 Nov 2019 07:01  -  30 Nov 2019 11:12  --------------------------------------------------------  IN: 304 mL / OUT: 0 mL / NET: 304 mL    Physical Exam:  General: NAD  Pulmonary: trach collar in place, on vent  Cardiovascular: NSR  Extremities: WWP; LLE with 1st-5th toes gangrenous, dry, no signs of infection; RLE 4th the gangrenous dry, no signs of infectinon; 2+ DP pulses b/l    LABS:                    7.1    7.50  )-----------( 190      ( 30 Nov 2019 05:35 )             21.5     11-30  123<L>  |  85<L>  |  35<H>  ----------------------------<  97  3.0<L>   |  21<L>  |  3.12<H>    Ca    7.9<L>      30 Nov 2019 05:35  Phos  5.8     11-30  Mg     2.0     11-30    TPro  6.5  /  Alb  2.3<L>  /  TBili  1.5<H>  /  DBili  x   /  AST  27  /  ALT  28  /  AlkPhos  82  11-30    PT/INR - ( 29 Nov 2019 04:39 )   PT: 13.2 sec;   INR: 1.16     PTT - ( 29 Nov 2019 04:39 )  PTT:34.4 sec    LIVER FUNCTIONS - ( 30 Nov 2019 05:35 )  Alb: 2.3 g/dL / Pro: 6.5 g/dL / ALK PHOS: 82 U/L / ALT: 28 U/L / AST: 27 U/L / GGT: x           CAPILLARY BLOOD GLUCOSE  POCT Blood Glucose.: 101 mg/dL (30 Nov 2019 06:21)  POCT Blood Glucose.: 112 mg/dL (29 Nov 2019 23:11)  POCT Blood Glucose.: 96 mg/dL (29 Nov 2019 17:56)  POCT Blood Glucose.: 94 mg/dL (29 Nov 2019 11:27)

## 2019-11-30 NOTE — PROGRESS NOTE ADULT - SUBJECTIVE AND OBJECTIVE BOX
OVERNIGHT EVENTS: NIELS. Had 3 loose BMs    SUBJECTIVE / INTERVAL HPI: Patient seen and examined at bedside. Patient sedated. Unable to obtain ROS.    PHYSICAL EXAM:    General: WDWN, sedated  HEENT: NC/AT; PERRL, anicteric sclera; MMM  Neck: supple, trach in place  Cardiovascular: +S1/S2; regular rate and rhythm, systolic murmur at LSB   Respiratory: intubated via trach; diffuse rhonchi/crackles with decreased breath sounds b/l   Gastrointestinal: NGT; NT, tense and distended with hypoactive bowel sounds   Extremities: WWP; trace edema of LEs/UEs; no clubbing or cyanosis  Derm: ischemia of all 10 distal phalanges of lower extremities; ischemia of distal phalange of R middle finger; dependent edema of lower extreme to the level of the knee b/l   Vascular: 2+ radial, DP/PT pulses B/L  Neurological: sedated, when off sedation per nursing staff AAOx3, moves all extremities, follows commands      VITAL SIGNS:  ICU Vital Signs Last 24 Hrs  T(C): 36.9 (30 Nov 2019 17:00), Max: 36.9 (30 Nov 2019 01:04)  T(F): 98.4 (30 Nov 2019 17:00), Max: 98.4 (30 Nov 2019 01:04)  HR: 111 (30 Nov 2019 17:00) (92 - 131)  BP: --  BP(mean): --  ABP: 117/71 (30 Nov 2019 17:00) (84/54 - 128/86)  ABP(mean): 64 (30 Nov 2019 16:00) (62 - 100)  RR: 22 (30 Nov 2019 17:00) (7 - 23)  SpO2: 98% (30 Nov 2019 17:00) (97% - 100%)      29 Nov 2019 07:01  -  30 Nov 2019 07:00  --------------------------------------------------------  IN:    dextrose 5%.: 1000 mL    Enteral Tube Flush: 60 mL    fentaNYL Infusion.: 539.7 mL    norepinephrine Infusion: 31.5 mL    ns in tub fed  [id number]: 60 mL    propofol Infusion: 271.4 mL  Total IN: 1962.6 mL    OUT:    Chest Tube: 10 mL    Chest Tube: 5 mL    Nasoenteral Tube: 800 mL  Total OUT: 815 mL    Total NET: 1147.6 mL      30 Nov 2019 07:01  -  30 Nov 2019 17:30  --------------------------------------------------------  IN:    dextrose 5%.: 350 mL    fentaNYL Infusion.: 132 mL    norepinephrine Infusion: 3 mL    propofol Infusion: 61 mL  Total IN: 546 mL    OUT:  Total OUT: 0 mL    Total NET: 546 mL    MEDICATIONS:  MEDICATIONS  (STANDING):  acyclovir IVPB 370 milliGRAM(s) IV Intermittent every 24 hours  albuterol/ipratropium for Nebulization 3 milliLiter(s) Nebulizer every 4 hours  artificial  tears Solution 1 Drop(s) Both EYES every 6 hours  chlorhexidine 0.12% Liquid 15 milliLiter(s) Oral Mucosa every 12 hours  chlorhexidine 2% Cloths 1 Application(s) Topical daily  dextrose 10% + sodium chloride 0.9%. 1000 milliLiter(s) (25 mL/Hr) IV Continuous <Continuous>  dextrose 5%. 1000 milliLiter(s) (50 mL/Hr) IV Continuous <Continuous>  dextrose 50% Injectable 12.5 Gram(s) IV Push once  dextrose 50% Injectable 25 Gram(s) IV Push once  dextrose 50% Injectable 25 Gram(s) IV Push once  epoetin maikol Injectable 49631 Unit(s) IV Push once  fentaNYL   Infusion. 0.5 MICROgram(s)/kG/Hr (3.68 mL/Hr) IV Continuous <Continuous>  heparin  Injectable 5000 Unit(s) SubCutaneous every 8 hours  hydrocortisone sodium succinate Injectable 50 milliGRAM(s) IV Push every 8 hours  insulin lispro (HumaLOG) corrective regimen sliding scale   SubCutaneous every 6 hours  midodrine 10 milliGRAM(s) Oral every 8 hours  multivitamin/minerals/iron Oral Solution (CENTRUM) 15 milliLiter(s) Oral daily  nafcillin  IVPB 2 Gram(s) IV Intermittent every 4 hours  pantoprazole  Injectable 40 milliGRAM(s) IV Push daily  polyethylene glycol 3350 17 Gram(s) Oral two times a day  propofol Infusion 5 MICROgram(s)/kG/Min (2.208 mL/Hr) IV Continuous <Continuous>  QUEtiapine 50 milliGRAM(s) Oral every 12 hours  sodium chloride 0.9% lock flush 3 milliLiter(s) IV Push every 8 hours  vitamin E 400 International Unit(s) Oral daily  zinc sulfate 220 milliGRAM(s) Oral daily    MEDICATIONS  (PRN):  acetaminophen    Suspension .. 650 milliGRAM(s) Oral every 6 hours PRN Temp greater or equal to 38C (100.4F)  dextrose 40% Gel 15 Gram(s) Oral once PRN Blood Glucose LESS THAN 70 milliGRAM(s)/deciliter  glucagon  Injectable 1 milliGRAM(s) IntraMuscular once PRN Glucose LESS THAN 70 milligrams/deciliter    ALLERGIES:  No Known Allergies    Intolerances    .  LABS:                         7.1    7.50  )-----------( 190      ( 30 Nov 2019 05:35 )             21.5     11-30    123<L>  |  85<L>  |  35<H>  ----------------------------<  97  3.0<L>   |  21<L>  |  3.12<H>    Ca    7.9<L>      30 Nov 2019 05:35  Phos  5.8     11-30  Mg     2.0     11-30    TPro  6.5  /  Alb  2.3<L>  /  TBili  1.5<H>  /  DBili  x   /  AST  27  /  ALT  28  /  AlkPhos  82  11-30    PT/INR - ( 29 Nov 2019 04:39 )   PT: 13.2 sec;   INR: 1.16          PTT - ( 29 Nov 2019 04:39 )  PTT:34.4 sec    RADIOLOGY, EKG & ADDITIONAL TESTS: Reviewed.

## 2019-11-30 NOTE — PROGRESS NOTE ADULT - SUBJECTIVE AND OBJECTIVE BOX
INTERVAL HPI/OVERNIGHT EVENTS:    Patient seen and examined at bedside.  No events.  Not on pressors    ROS:    unable to obtain         ANTIBIOTICS/RELEVANT:    MEDICATIONS  (STANDING):  acyclovir IVPB 370 milliGRAM(s) IV Intermittent every 24 hours  albuterol/ipratropium for Nebulization 3 milliLiter(s) Nebulizer every 4 hours  artificial  tears Solution 1 Drop(s) Both EYES every 6 hours  chlorhexidine 0.12% Liquid 15 milliLiter(s) Oral Mucosa every 12 hours  chlorhexidine 2% Cloths 1 Application(s) Topical daily  dextrose 5%. 1000 milliLiter(s) (50 mL/Hr) IV Continuous <Continuous>  dextrose 5%. 1000 milliLiter(s) (50 mL/Hr) IV Continuous <Continuous>  dextrose 50% Injectable 12.5 Gram(s) IV Push once  dextrose 50% Injectable 25 Gram(s) IV Push once  dextrose 50% Injectable 25 Gram(s) IV Push once  epoetin maikol Injectable 38183 Unit(s) IV Push once  fentaNYL   Infusion. 0.5 MICROgram(s)/kG/Hr (3.68 mL/Hr) IV Continuous <Continuous>  heparin  Injectable 5000 Unit(s) SubCutaneous every 8 hours  hydrocortisone sodium succinate Injectable 50 milliGRAM(s) IV Push every 8 hours  insulin lispro (HumaLOG) corrective regimen sliding scale   SubCutaneous every 6 hours  midodrine 10 milliGRAM(s) Oral every 8 hours  multivitamin/minerals/iron Oral Solution (CENTRUM) 15 milliLiter(s) Oral daily  nafcillin  IVPB 2 Gram(s) IV Intermittent every 4 hours  norepinephrine Infusion 0.05 MICROgram(s)/kG/Min (3.45 mL/Hr) IV Continuous <Continuous>  pantoprazole  Injectable 40 milliGRAM(s) IV Push daily  polyethylene glycol 3350 17 Gram(s) Oral two times a day  propofol Infusion 5 MICROgram(s)/kG/Min (2.208 mL/Hr) IV Continuous <Continuous>  QUEtiapine 25 milliGRAM(s) Oral at bedtime  sodium chloride 0.9% lock flush 3 milliLiter(s) IV Push every 8 hours  vitamin E 400 International Unit(s) Oral daily  zinc sulfate 220 milliGRAM(s) Oral daily    MEDICATIONS  (PRN):  acetaminophen    Suspension .. 650 milliGRAM(s) Oral every 6 hours PRN Temp greater or equal to 38C (100.4F)  dextrose 40% Gel 15 Gram(s) Oral once PRN Blood Glucose LESS THAN 70 milliGRAM(s)/deciliter  glucagon  Injectable 1 milliGRAM(s) IntraMuscular once PRN Glucose LESS THAN 70 milligrams/deciliter        Vital Signs Last 24 Hrs  T(C): 36.9 (30 Nov 2019 10:05), Max: 37.6 (29 Nov 2019 16:13)  T(F): 98.4 (30 Nov 2019 10:05), Max: 99.6 (29 Nov 2019 16:13)  HR: 104 (30 Nov 2019 10:00) (92 - 131)  BP: --  BP(mean): --  RR: 16 (30 Nov 2019 10:00) (7 - 23)  SpO2: 100% (30 Nov 2019 10:00) (95% - 100%)    PHYSICAL EXAM:  Constitutional:  no distress  Eyes: no icterus   Ear/Nose/Throat: + trach  Neck:  + trach   Respiratory: bilateral chest tubes   Cardiovascular: S1S2 RRR, no murmurs  Gastrointestinal:soft, (+) BS, no HSM  Extremities:no edema, necrotic toes  Vascular: DP Pulse:	right normal; left normal      LABS:                        7.1    7.50  )-----------( 190      ( 30 Nov 2019 05:35 )             21.5     11-30    123<L>  |  85<L>  |  35<H>  ----------------------------<  97  3.0<L>   |  21<L>  |  3.12<H>    Ca    7.9<L>      30 Nov 2019 05:35  Phos  5.8     11-30  Mg     2.0     11-30    TPro  6.5  /  Alb  2.3<L>  /  TBili  1.5<H>  /  DBili  x   /  AST  27  /  ALT  28  /  AlkPhos  82  11-30    PT/INR - ( 29 Nov 2019 04:39 )   PT: 13.2 sec;   INR: 1.16          PTT - ( 29 Nov 2019 04:39 )  PTT:34.4 sec      MICROBIOLOGY:    Culture - Blood (11.27.19 @ 05:48)    Specimen Source: .Blood Blood    Culture Results:   No growth at 3 days.    Culture - Blood (11.27.19 @ 05:48)    Specimen Source: .Blood Blood    Culture Results:   No growth at 3 days.    Culture - Sputum . (11.23.19 @ 16:18)    Gram Stain:   Rare epithelial cells  Few White blood cells  Rare Yeast  Rare Gram positive cocci in pairs    Specimen Source: .Sputum Sputum    Culture Results:   Few Yeast        RADIOLOGY & ADDITIONAL STUDIES:

## 2019-11-30 NOTE — PROGRESS NOTE ADULT - ASSESSMENT
38 y/o M w/ PMH of IVDU and active smoker who came from [facility] on 11/8/19 in 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 After being transferred to  CTICU for surgical evaluation, pt was deemed to not be a surgical candidate and transferred to MICU for medical mgmt. ID, Nephrology, heme/onc, surgery, vascular following.    Neuro  Intubated, sedated on Propofol, Fentanyl gtt. Prior CT head negative for any intracranial embolic events.   -c/w sedation holiday daily to assess neurological status   -increase Seroquel to 50mg q12h    Cardiovascular   #Hypotension  Most likely 2/2 septic shock from infective endocarditis and RV failure 2/2 tricuspid regurgitation (ELIAN shows EF of 40-50%). Echo with EF 45%. ELIAN with EF 40-45%, 3.8 x1cm vegetation on TR valve, with severe TR, trivial pericardial effusion. Echo with bubble revealed no PFO. Wean off Levophed as tolerated.   - normotensive   - Maintain MAP>65.   - C/W medical mgmt of infective endocarditis as below.  - c/w Midodrine 10mg q8h  - c/w Levophed decrease as tolerated    Respiratory  #Acute hypoxic respiratory failure   Most likely 2/2 alveolar hemorrhage in the setting of septic embolic causing numerous cavitary lesions on CT chest. CXR with scattered infiltrates and pleural effusions. Sputum culture negative. 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 Bilateral CT in place, confirmed by CXR, continues to drain serosanguinous fluid. Fluid analysis of both CT pleural fluid confirms complex exudate with high cellularity, low pH and low glucose. pleural fluid cultures with staph aureus. Chest tubes in place as above (2 R chest tubes, 1 L chest tube)   - Monitor output of bilateral chest tubes  - R pleural fluid cultures with staph aureus, L pleural fluid cultures with NGTD   - CT chest and cxr as above. Chest tubes set to suction. Flushed chest tube, no evidence of leak     ID   #Septic shock 2/2 MSSA endocarditis  IE confirmed with echographic evidence of tricuspid vegetation (3.8cm x 1.1cm by ELIAN) and prior blood cultures positive for MSSA. Not a surgical candidate for a tricuspid valve replacement at this time as per CT surgery. Initial blood cultures positive for staph epidermidis, likely a contaminant. Initial sputum culture positive for staph aureus  Repeat blood cultures with NGTD. Sputum cx NGTD.  - ID following, C/W Nafcillin 2g q4 (Sensitive to Oxacillin as per OSH records), f/u recs   - repeat Bcxs with NGTD   - BAL positive for HSV, started on Acyclovir (11/25-) continue for 10d   - Per CT surgery pt is too high risk for any surgery and would likely not survive procedure.   - Cardiology consulted to optimize management of his right heart dysfunction, f/u recs        Renal  #Acute renal failure most likely 2/2 ATN from hypoperfusion, Minimal urine output, on CVVHD, oliguric with 0-5cc/hr. Vancomycin level 37 on arrival so may be component of drug induced nephropathy. Bun/Cr continues to improve while on CVVHD. Pt is clinically fluid overloaded but now improved and hemodynamically stable. No renal infarcts as per CT abdomen/pelvis.  - F/U nephrology recs.  -plan for intermittent HD per nephrology recs and permacath planning and placement  - Continue to correct fluid overload with HD  - Monitor I/Os  - currently anuric with no signs of renal recovery  - IHD today, next anticipated 12/2  - continue to monitor serum electrolytes    #Persistent hyponatremia  Pt most likely with hypervolemic hyponatremia  -Na today 123, will continue to monitor after dialysis      GI  OG on arrival with 600cc of bilious fluid, abdomen still distended but had several BMs, some loose. CT abdomen revealed no evidence of bowel ischemia or SBO. Sump was DCed and replaced with NG tube. CT abdomen/pelvis revealed "irregular masslike mural thickening of the posterior left lateral wall of the rectum." Surgery consulted, unlikely perirectal abscess. Recommend GI evaluation for possible scope.  - F/u GI consult, f/u recs, when stable will go for flex sig; possible PEG   - Abdominal xray with air-filled loops of small/large bowel with concern for distal obstruction vs ileus. Surgery consulted. Rectal tube and sump placed. CT abd/pelvis with abd/pelvic, anasarca, colonic ileus. Rectal tube in place for colonic decompression, continue to monitor. Abdomen still distended but significantly improved, repeat abd xray without obstruction   -C. diff negative   - NG tube in place, sump to low-intermittent suction, sump replaced AM 11/29   -c/w Miralax BID  -wean off fentanyl/opiates as above  -s/p CT abdomen with oral contrast - per radiology, no evidence of obstruction, likely ileus 2/2 opiates  - s/p neostigmine in the PM 11/29  - GI recommending starting prokinetic agent such as Reglan or Methylnaltrexone to counteract opioids    #Congestive Hepatopathy   Transaminitis, coag derangements, hyperbilirubinemia most likely 2/2 hepatic congestion vs hemolysis, due to severe TR in setting of infective endocarditis.  Hepatitis panel negative.  -LFTs wnl  -T nehemiah downtrending  -Monitor CMP    Heme  #Hemolysis  Intravascular hemolysis with initial haptoglobin <10, LDH decreasing at 365. D-dimer elevated. Fibrinogen stable 396. Today still clinically jaundiced, with stable bilirubinemia: T.bili 9.9., D.bili 8.5. h/h stable at 8.1/25.5. Platelets decreasing from 54 to 49.. Fact VII/Factor VIII elevated. Unlikely 2/2 CVVHD. Likely 2/2 to sepsis and/or DIC.   - Received 1u plts 11/26 in preparation for trach placement 11/26; plt currently stable and plts >100 without any evidence of oozing/bleeding   - Heme consulted, follow recs  - Monitor direct and total bili, LDH, fibrinogen, platelets   - Transfuse platelets if <10K or bleeding and <50K  - Transfuse pRBCs for hgb <7       Vascular  Vascular surgery consulted in setting of gangrenous distal toes and fingers b/l. Likely 2/2 to pressors.  - per vascular, may need amputation in the future once ischemic tissue fully demarcates, and once patient is medically optimized      Endocrinology  - taper steroids starting today  - previously on Hydrocortisone 50mg q8- stress dose steroids    Lines: right IJ TLC and Ernesto (11/12), Axillary A-line (11/12), L subclavian (11/27-), right peripheral (11/12)    F: None   E: replete K <4, Mg <2  N: Trickle feeds   GI Ppx: Protonix   DVT Ppx: Heparin   Code status: FULL   Dispo: MICU

## 2019-11-30 NOTE — PROGRESS NOTE ADULT - ASSESSMENT
39M with PMH of IVDU admitted for sepsis 2/2 infective endocarditis requiring multiple pressors, now off pressors, now with ischemic digits on RUE and RLE    Recommendations:  - Will continue to monitor, may need amputation in the future once ischemic tissue fully demarcates, and once patient is medically optimized  - Continue to wean pressors as tolerated  - Recommend Hand Surgery consult for ischemic fingers  - Call [phone] with questions 39M with PMH of IVDU admitted for sepsis 2/2 infective endocarditis requiring multiple pressors, now on and off pressors, with ischemic digits in all 4 extremities.    Recommendations:  - Will continue to monitor, may need amputation in the future once ischemic tissue fully demarcates, and once patient is medically optimized  - Continue to wean pressors as tolerated  - Recommend Hand Surgery consult for ischemic fingers  - Call [phone] with questions

## 2019-11-30 NOTE — PROGRESS NOTE ADULT - ASSESSMENT
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  Complicated by empyema and CAMERON.  Currently afebrile with normal WBC.  Blood cultures have cleared    Recommend:  1.  Continue Nafcillin 2 grams IV q4hrs  2.  Continue Acyclovir at current dose.  Recommend 10 days total of acyclovir    ID team 1 will follow

## 2019-12-01 NOTE — PROGRESS NOTE ADULT - ATTENDING COMMENTS
stable on vent, stil lanasarca  s/p aggressive UF yesterday -- cont UF with HD - likely again tomorrow   s/p hyponatremia -- now off d5W  follow julietates stable on vent, still anasarca  s/p aggressive UF yesterday -- cont UF with HD - likely again tomorrow   s/p hyponatremia -- now off d5W  follow lytes

## 2019-12-01 NOTE — PROGRESS NOTE ADULT - ASSESSMENT
IMPRESSION: Tricuspid valve endocarditis due to MSSA.  Patient with disseminated infection     Recommend:  1.  Continue Nafcillin 2 grams IV q4hrs  2.  If he spikes another fever > 101, he should have repeat blood cultures  3.  Continue Acyclovir x 10 days total    ID team 1 will follow

## 2019-12-01 NOTE — PROGRESS NOTE ADULT - ASSESSMENT
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 After being transferred to  CTICU for surgical evaluation, pt was deemed to not be a surgical candidate and transferred to MICU for medical mgmt. ID, Nephrology, heme/onc, surgery, vascular following.    Neuro  Intubated, sedated on Propofol, Fentanyl gtt. Prior CT head negative for any intracranial embolic events.   -c/w sedation holiday daily to assess neurological status   -increase Seroquel to 75 mg q12h  - continue to wean off propofol/fentanyl as tolerated     Cardiovascular   #Hypotension  Most likely 2/2 septic shock from infective endocarditis and RV failure 2/2 tricuspid regurgitation (ELIAN shows EF of 40-50%). Echo with EF 45%. ELIAN with EF 40-45%, 3.8 x1cm vegetation on TR valve, with severe TR, trivial pericardial effusion. Echo with bubble revealed no PFO. Wean off Levophed as tolerated.   - normotensive   - Maintain MAP>65.   - C/W medical mgmt of infective endocarditis as below.  - c/w Midodrine 10mg q8h  - c/w Levophed decrease as tolerated    Respiratory  #Acute hypoxic respiratory failure   Most likely 2/2 alveolar hemorrhage in the setting of septic embolic causing numerous cavitary lesions on CT chest. CXR with scattered infiltrates and pleural effusions. Sputum culture negative. 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 Bilateral CT in place, confirmed by CXR, continues to drain serosanguinous fluid. Fluid analysis of both CT pleural fluid confirms complex exudate with high cellularity, low pH and low glucose. pleural fluid cultures with staph aureus. Chest tubes in place as above (2 R chest tubes, 1 L chest tube)   - Monitor output of bilateral chest tubes  - R pleural fluid cultures with staph aureus, L pleural fluid cultures with NGTD   - CT chest and cxr as above. Chest tubes set to suction    ID   #Septic shock 2/2 MSSA endocarditis  IE confirmed with echographic evidence of tricuspid vegetation (3.8cm x 1.1cm by ELIAN) and prior blood cultures positive for MSSA. Not a surgical candidate for a tricuspid valve replacement at this time as per CT surgery. Initial blood cultures positive for staph epidermidis, likely a contaminant. Initial sputum culture positive for staph aureus  Repeat blood cultures with NGTD. Sputum cx NGTD.  - ID following, C/W Nafcillin 2g q4 (Sensitive to Oxacillin as per OSH records), f/u recs   - repeat Bcxs with NGTD   - BAL positive for HSV, started on Acyclovir (11/25-) continue for 10d   - Per CT surgery pt is too high risk for any surgery and would likely not survive procedure.   - Cardiology consulted to optimize management of his right heart dysfunction, f/u recs        Renal  #Acute renal failure most likely 2/2 ATN from hypoperfusion, Minimal urine output, on CVVHD, oliguric with 0-5cc/hr. Vancomycin level 37 on arrival so may be component of drug induced nephropathy. Bun/Cr continues to improve while on CVVHD. Pt is clinically fluid overloaded but now improved and hemodynamically stable. No renal infarcts as per CT abdomen/pelvis.  - F/U nephrology recs.  -plan for intermittent HD per nephrology recs and permacath planning and placement  - Continue to correct fluid overload with HD  - Monitor I/Os  - currently anuric with no signs of renal recovery  - IHD today, next anticipated 12/2  - continue to monitor serum electrolytes    #Persistent hyponatremia  Pt most likely with hypervolemic hyponatremia  -Na today 132, will continue to monitor after dialysis  - administration of IV abx switched to NS vs D5W; possible etiology for hyponatremia       GI  OG on arrival with 600cc of bilious fluid, abdomen still distended but had several BMs, some loose. CT abdomen revealed no evidence of bowel ischemia or SBO. Sump was DCed and replaced with NG tube. CT abdomen/pelvis revealed "irregular masslike mural thickening of the posterior left lateral wall of the rectum." Surgery consulted, unlikely perirectal abscess. Recommend GI evaluation for possible scope.  - F/u GI consult, f/u recs, when stable will go for flex sig; possible PEG   - Abdominal xray with air-filled loops of small/large bowel with concern for distal obstruction vs ileus. Surgery consulted. Rectal tube and sump placed. CT abd/pelvis with abd/pelvic, anasarca, colonic ileus. Rectal tube in place for colonic decompression, continue to monitor. Abdomen still distended but significantly improved, repeat abd xray without obstruction   -C. diff negative   - NG tube in place, sump to low-intermittent suction, sump replaced AM 11/29   -c/w Miralax BID  -wean off fentanyl/opiates as above  -s/p CT abdomen with oral contrast - per radiology, no evidence of obstruction, likely ileus 2/2 opiates  - s/p neostigmine in the PM 11/29  - GI recommending starting prokinetic agent such as Reglan or Methylnaltrexone to counteract opioids; currently having adequate bowel movements with daily improvement in abdominal distension and tension (both clinically and radiographically)     #Congestive Hepatopathy   Transaminitis, coag derangements, hyperbilirubinemia most likely 2/2 hepatic congestion vs hemolysis, due to severe TR in setting of infective endocarditis.  Hepatitis panel negative.  -LFTs wnl  -T nehemiah downtrending  -Monitor CMP    Heme  #Hemolysis  Intravascular hemolysis with initial haptoglobin <10, LDH decreasing at 365. D-dimer elevated. Fibrinogen stable 396. Today still clinically jaundiced, with stable bilirubinemia: T.bili 9.9., D.bili 8.5. h/h stable at 8.1/25.5. Platelets decreasing from 54 to 49.. Fact VII/Factor VIII elevated. Unlikely 2/2 CVVHD. Likely 2/2 to sepsis and/or DIC.   - Received 1u plts 11/26 in preparation for trach placement 11/26; plt currently stable and plts >100 without any evidence of oozing/bleeding   - Heme consulted, follow recs  - Monitor direct and total bili, LDH, fibrinogen, platelets   - Transfuse platelets if <10K or bleeding and <50K  - Transfuse pRBCs for hgb <7       Vascular  Vascular surgery consulted in setting of gangrenous distal toes and fingers b/l. Likely 2/2 to pressors.  - per vascular, may need amputation in the future once ischemic tissue fully demarcates, and once patient is medically optimized      Endocrinology  - taper steroids  - previously on Hydrocortisone 50mg q8- stress dose steroids    Lines: right IJ TLC and Ernesto (11/12), Axillary A-line (11/12), L subclavian (11/27-), right peripheral (11/12)    F: None   E: replete K <4, Mg <2  N: Trickle feeds   GI Ppx: Protonix   DVT Ppx: Heparin   Code status: FULL   Dispo: MICU

## 2019-12-01 NOTE — PROGRESS NOTE ADULT - SUBJECTIVE AND OBJECTIVE BOX
GASTROENTEROLOGY PROGRESS NOTE  Patient seen and examined at bedside. Patient alert and awake, not complaining of pain at baseline, but admits there is pain during examination.     PERTINENT REVIEW OF SYSTEMS:  CONSTITUTIONAL: No weakness, fevers or chills  HEENT: No visual changes; No vertigo or throat pain   GASTROINTESTINAL: As above  NEUROLOGICAL: No numbness or weakness  SKIN: No itching, burning, rashes, or lesions     Allergies    No Known Allergies    Intolerances      MEDICATIONS:  MEDICATIONS  (STANDING):  acyclovir IVPB 370 milliGRAM(s) IV Intermittent every 24 hours  albuterol/ipratropium for Nebulization 3 milliLiter(s) Nebulizer every 4 hours  artificial  tears Solution 1 Drop(s) Both EYES every 6 hours  chlorhexidine 0.12% Liquid 15 milliLiter(s) Oral Mucosa every 12 hours  chlorhexidine 2% Cloths 1 Application(s) Topical daily  dextrose 10% + sodium chloride 0.9%. 1000 milliLiter(s) (25 mL/Hr) IV Continuous <Continuous>  dextrose 5%. 1000 milliLiter(s) (50 mL/Hr) IV Continuous <Continuous>  dextrose 50% Injectable 12.5 Gram(s) IV Push once  dextrose 50% Injectable 25 Gram(s) IV Push once  dextrose 50% Injectable 25 Gram(s) IV Push once  fentaNYL   Infusion. 0.5 MICROgram(s)/kG/Hr (3.68 mL/Hr) IV Continuous <Continuous>  heparin  Injectable 5000 Unit(s) SubCutaneous every 8 hours  hydrocortisone sodium succinate Injectable 50 milliGRAM(s) IV Push every 8 hours  insulin lispro (HumaLOG) corrective regimen sliding scale   SubCutaneous every 6 hours  midodrine 10 milliGRAM(s) Oral every 8 hours  multivitamin/minerals/iron Oral Solution (CENTRUM) 15 milliLiter(s) Oral daily  nafcillin  IVPB 2 Gram(s) IV Intermittent every 4 hours  norepinephrine Infusion 0.05 MICROgram(s)/kG/Min (3.45 mL/Hr) IV Continuous <Continuous>  pantoprazole  Injectable 40 milliGRAM(s) IV Push daily  polyethylene glycol 3350 17 Gram(s) Oral two times a day  propofol Infusion 5 MICROgram(s)/kG/Min (2.208 mL/Hr) IV Continuous <Continuous>  QUEtiapine 50 milliGRAM(s) Oral every 12 hours  sodium chloride 0.9% lock flush 3 milliLiter(s) IV Push every 8 hours  vitamin E 400 International Unit(s) Oral daily  zinc sulfate 220 milliGRAM(s) Oral daily    MEDICATIONS  (PRN):  acetaminophen    Suspension .. 650 milliGRAM(s) Oral every 6 hours PRN Temp greater or equal to 38C (100.4F)  dextrose 40% Gel 15 Gram(s) Oral once PRN Blood Glucose LESS THAN 70 milliGRAM(s)/deciliter  glucagon  Injectable 1 milliGRAM(s) IntraMuscular once PRN Glucose LESS THAN 70 milligrams/deciliter    Vital Signs Last 24 Hrs  T(C): 37.9 (01 Dec 2019 06:19), Max: 37.9 (01 Dec 2019 06:19)  T(F): 100.2 (01 Dec 2019 06:19), Max: 100.2 (01 Dec 2019 06:19)  HR: 116 (01 Dec 2019 09:05) (98 - 130)  BP: 106/61 (30 Nov 2019 20:48) (106/61 - 106/61)  BP(mean): 75 (30 Nov 2019 20:48) (75 - 75)  RR: 14 (01 Dec 2019 09:00) (10 - 29)  SpO2: 100% (01 Dec 2019 09:05) (95% - 100%)    11-30 @ 07:01 - 12-01 @ 07:00  --------------------------------------------------------  IN: 2782.5 mL / OUT: 4612 mL / NET: -1829.5 mL    12-01 @ 07:01 - 12-01 @ 09:51  --------------------------------------------------------  IN: 67.1 mL / OUT: 25 mL / NET: 42.1 mL      PHYSICAL EXAM:    General: Alert, awake, NAD  HEENT: Sump, Tracheostomy  Gastrointestinal: Remains distended, tympanic  Skin: Peeling skin and areas of necrosis on the b/l UE    LABS:                        7.2    8.91  )-----------( 200      ( 01 Dec 2019 06:55 )             22.3     12-01    132<L>  |  94<L>  |  22  ----------------------------<  96  3.0<L>   |  24  |  2.20<H>    Ca    7.7<L>      01 Dec 2019 06:55  Phos  3.7     12-01  Mg     1.9     12-01    TPro  6.5  /  Alb  2.4<L>  /  TBili  1.7<H>  /  DBili  x   /  AST  26  /  ALT  26  /  AlkPhos  91  12-01                      RADIOLOGY & ADDITIONAL STUDIES:  Reviewed

## 2019-12-01 NOTE — PROGRESS NOTE ADULT - SUBJECTIVE AND OBJECTIVE BOX
INTERVAL HPI/OVERNIGHT EVENTS:    Patient was seen and examined at bedside.  No major events overnight    ROS:    unable to obtain       ANTIBIOTICS/RELEVANT:    MEDICATIONS  (STANDING):  acyclovir IVPB 370 milliGRAM(s) IV Intermittent every 24 hours  albuterol/ipratropium for Nebulization 3 milliLiter(s) Nebulizer every 4 hours  artificial  tears Solution 1 Drop(s) Both EYES every 6 hours  chlorhexidine 0.12% Liquid 15 milliLiter(s) Oral Mucosa every 12 hours  chlorhexidine 2% Cloths 1 Application(s) Topical daily  dextrose 10% + sodium chloride 0.9%. 1000 milliLiter(s) (25 mL/Hr) IV Continuous <Continuous>  dextrose 5%. 1000 milliLiter(s) (50 mL/Hr) IV Continuous <Continuous>  dextrose 50% Injectable 12.5 Gram(s) IV Push once  dextrose 50% Injectable 25 Gram(s) IV Push once  dextrose 50% Injectable 25 Gram(s) IV Push once  fentaNYL   Infusion. 0.5 MICROgram(s)/kG/Hr (3.68 mL/Hr) IV Continuous <Continuous>  heparin  Injectable 5000 Unit(s) SubCutaneous every 8 hours  hydrocortisone sodium succinate Injectable 50 milliGRAM(s) IV Push every 8 hours  insulin lispro (HumaLOG) corrective regimen sliding scale   SubCutaneous every 6 hours  midodrine 10 milliGRAM(s) Oral every 8 hours  multivitamin/minerals/iron Oral Solution (CENTRUM) 15 milliLiter(s) Oral daily  nafcillin  IVPB 2 Gram(s) IV Intermittent every 4 hours  norepinephrine Infusion 0.05 MICROgram(s)/kG/Min (3.45 mL/Hr) IV Continuous <Continuous>  pantoprazole  Injectable 40 milliGRAM(s) IV Push daily  polyethylene glycol 3350 17 Gram(s) Oral two times a day  propofol Infusion 5 MICROgram(s)/kG/Min (2.208 mL/Hr) IV Continuous <Continuous>  QUEtiapine 75 milliGRAM(s) Oral every 12 hours  sodium chloride 0.9% lock flush 3 milliLiter(s) IV Push every 8 hours  vitamin E 400 International Unit(s) Oral daily  zinc sulfate 220 milliGRAM(s) Oral daily    MEDICATIONS  (PRN):  acetaminophen    Suspension .. 650 milliGRAM(s) Oral every 6 hours PRN Temp greater or equal to 38C (100.4F)  dextrose 40% Gel 15 Gram(s) Oral once PRN Blood Glucose LESS THAN 70 milliGRAM(s)/deciliter  glucagon  Injectable 1 milliGRAM(s) IntraMuscular once PRN Glucose LESS THAN 70 milligrams/deciliter        Vital Signs Last 24 Hrs  T(C): 38.1 (01 Dec 2019 11:18), Max: 38.1 (01 Dec 2019 11:18)  T(F): 100.5 (01 Dec 2019 11:18), Max: 100.5 (01 Dec 2019 11:18)  HR: 116 (01 Dec 2019 09:05) (98 - 130)  BP: 106/61 (30 Nov 2019 20:48) (106/61 - 106/61)  BP(mean): 75 (30 Nov 2019 20:48) (75 - 75)  RR: 14 (01 Dec 2019 09:00) (10 - 29)  SpO2: 100% (01 Dec 2019 09:05) (95% - 100%)    PHYSICAL EXAM:  Constitutional:  non-toxic, no distress  Eyes: no icterus   Ear/Nose/Throat: no oral lesion,	  Neck: + trach  Respiratory:  bilateral chest tubes  Cardiovascular: + murmur   Gastrointestinal:soft, (+) BS, no HSM  Extremities:no edema; + necrotic tips  Vascular: DP Pulse:	right normal; left normal      LABS:                        7.2    8.91  )-----------( 200      ( 01 Dec 2019 06:55 )             22.3     12-01    132<L>  |  94<L>  |  22  ----------------------------<  96  3.0<L>   |  24  |  2.20<H>    Ca    7.7<L>      01 Dec 2019 06:55  Phos  3.7     12-01  Mg     1.9     12-01    TPro  6.5  /  Alb  2.4<L>  /  TBili  1.7<H>  /  DBili  x   /  AST  26  /  ALT  26  /  AlkPhos  91  12-01          MICROBIOLOGY:    Culture - Blood (11.27.19 @ 05:48)    Specimen Source: .Blood Blood    Culture Results:   No growth at 4 days.    Culture - Blood (11.27.19 @ 05:48)    Specimen Source: .Blood Blood    Culture Results:   No growth at 4 days.        RADIOLOGY & ADDITIONAL STUDIES:

## 2019-12-01 NOTE — PROGRESS NOTE ADULT - SUBJECTIVE AND OBJECTIVE BOX
Patient is a 39y Male seen and evaluated at bedside.   pt seen and examined  remains connected to vent via trach   remains on low dose pressor  HD completed on 11/30--> 4 L of UF     acetaminophen    Suspension .. 650 every 6 hours PRN  acyclovir IVPB 370 every 24 hours  albuterol/ipratropium for Nebulization 3 every 4 hours  artificial  tears Solution 1 every 6 hours  chlorhexidine 0.12% Liquid 15 every 12 hours  chlorhexidine 2% Cloths 1 daily  dextrose 10% + sodium chloride 0.9%. 1000 <Continuous>  dextrose 40% Gel 15 once PRN  dextrose 5%. 1000 <Continuous>  dextrose 50% Injectable 12.5 once  dextrose 50% Injectable 25 once  dextrose 50% Injectable 25 once  fentaNYL   Infusion. 0.5 <Continuous>  glucagon  Injectable 1 once PRN  heparin  Injectable 5000 every 8 hours  hydrocortisone sodium succinate Injectable 50 every 8 hours  insulin lispro (HumaLOG) corrective regimen sliding scale  every 6 hours  midodrine 10 every 8 hours  multivitamin/minerals/iron Oral Solution (CENTRUM) 15 daily  nafcillin  IVPB 2 every 4 hours  norepinephrine Infusion 0.05 <Continuous>  pantoprazole  Injectable 40 daily  polyethylene glycol 3350 17 two times a day  propofol Infusion 5 <Continuous>  QUEtiapine 75 every 12 hours  sodium chloride 0.9% lock flush 3 every 8 hours  vitamin E 400 daily  zinc sulfate 220 daily      Allergies    No Known Allergies    Intolerances        T(C): , Max: 38.1 (12-01-19 @ 11:18)  T(F): , Max: 100.5 (12-01-19 @ 11:18)  HR: 128 (12-01-19 @ 13:33)  BP: 106/61 (11-30-19 @ 20:48)  BP(mean): 75 (11-30-19 @ 20:48)  RR: 30 (12-01-19 @ 13:00)  SpO2: 99% (12-01-19 @ 13:33)  Wt(kg): --    11-30 @ 07:01  -  12-01 @ 07:00  --------------------------------------------------------  IN: 2782.5 mL / OUT: 4612 mL / NET: -1829.5 mL    12-01 @ 07:01  -  12-01 @ 13:45  --------------------------------------------------------  IN: 67.1 mL / OUT: 25 mL / NET: 42.1 mL          Review of Systems:  NA      PHYSICAL EXAM:  GENERAL: NAD, connected to vent via trach   HEAD:  Atraumatic, Normocephalic,   EYES: Bilateral conjunctiva and sclera normal   Oral cavity: Oral mucosa dry and pink  NECK: trach   CHEST/LUNG: limited exam, + rales, R IJ HD cath   HEART: Regular rate and rhythm. JEFFREY II/VI at LPSB, No gallop, no rub   ABDOMEN: distended   EXTREMITIES: grossly anasarca, + restraints, ++++ scrotal edema -- improving   Neurology: Awake, follows commands  Skin: necrotic  lesions on fingertips          LABS:                        7.2    8.91  )-----------( 200      ( 01 Dec 2019 06:55 )             22.3     12-01    132<L>  |  94<L>  |  22  ----------------------------<  96  3.0<L>   |  24  |  2.20<H>    Ca    7.7<L>      01 Dec 2019 06:55  Phos  3.7     12-01  Mg     1.9     12-01    TPro  6.5  /  Alb  2.4<L>  /  TBili  1.7<H>  /  DBili  x   /  AST  26  /  ALT  26  /  AlkPhos  91  12-01                RADIOLOGY & ADDITIONAL STUDIES:

## 2019-12-01 NOTE — PROGRESS NOTE ADULT - ASSESSMENT
38 y/o M smoker w/ PMHx of IVDU presented to [facility] w/ productive cough with hemoptysis found to have septic emboli w/ vegetation of the TV transferred to [facility] for surgical evaluation.  Course complicated by sepsis, DIC, acute respiratory failure w/ empyema s/p intubation and thoracostomy tube, CAMERON requiring cvvhd, ileus on NGT and rectal decompression, cardiac arrest, and pneumothorax. GI consulted for evaluation of incidental finding of rectal wall thickening on imaging.      1. Abdominal distension - Appears to be due to ileus. Patient with abdominal distension with xray notable for dilated bowels, CT scan without evidence of transition point or obstruction. Neostigmine given with continued BMs and flatus as well as increased sump output, but minimal effect in distention.  -Wean opioids  -Aggressive repletion of electrolytes  -NG sump to low-intermittent suction, though as distention predominantly colonic will be of minimal help  -Continue prokinetic agent such as Reglan or consider Methylnaltrexone to counteract opioids    2. Hyperbilirubinemia - Patient with marked elevated bilirubin initially thought to be 2/2 ischemic hepatopathy now downtrending. Imaging without CBD dilation suggestive of cholestasis from sepsis vs DILI in the setting of recent ischemic hepatopathy. Continues to downtrend.  -Daily LFT and INR    Recommendations discussed with primary team  Case discussed with attending physician

## 2019-12-01 NOTE — PROGRESS NOTE ADULT - SUBJECTIVE AND OBJECTIVE BOX
OVERNIGHT EVENTS: NIELS. Had 2 bowel movements. Na corrected to 132 from 123 overnight. AM labs showed that Na remained stable at 132.     SUBJECTIVE / INTERVAL HPI: Patient seen and examined at bedside. Patient sedated. Unable to obtain ROS.    PHYSICAL EXAM:    General: WDWN, sedated  HEENT: NC/AT; PERRL, anicteric sclera; MMM  Neck: supple, trach in place  Cardiovascular: +S1/S2; regular rate and rhythm, systolic murmur at LSB   Respiratory: intubated via trach; diffuse rhonchi/crackles with decreased breath sounds b/l   Gastrointestinal: NGT; NT, tense and distended with hypoactive bowel sounds, but improved compared to prior exams    Extremities: WWP; trace edema of LEs/UEs; no clubbing or cyanosis  Derm: ischemia of all 10 distal phalanges of lower extremities; ischemia of distal phalange of R middle finger; dependent edema of lower extreme to the level of the knee b/l   Vascular: 2+ radial, DP/PT pulses B/L  Neurological: sedated, when off sedation per nursing staff AAOx3, moves all extremities, follows commands    MEDICATIONS:  MEDICATIONS  (STANDING):  acyclovir IVPB 370 milliGRAM(s) IV Intermittent every 24 hours  albuterol/ipratropium for Nebulization 3 milliLiter(s) Nebulizer every 4 hours  artificial  tears Solution 1 Drop(s) Both EYES every 6 hours  chlorhexidine 0.12% Liquid 15 milliLiter(s) Oral Mucosa every 12 hours  chlorhexidine 2% Cloths 1 Application(s) Topical daily  dextrose 10% + sodium chloride 0.9%. 1000 milliLiter(s) (25 mL/Hr) IV Continuous <Continuous>  dextrose 5%. 1000 milliLiter(s) (50 mL/Hr) IV Continuous <Continuous>  dextrose 50% Injectable 12.5 Gram(s) IV Push once  dextrose 50% Injectable 25 Gram(s) IV Push once  dextrose 50% Injectable 25 Gram(s) IV Push once  fentaNYL   Infusion. 0.5 MICROgram(s)/kG/Hr (3.68 mL/Hr) IV Continuous <Continuous>  heparin  Injectable 5000 Unit(s) SubCutaneous every 8 hours  hydrocortisone sodium succinate Injectable 50 milliGRAM(s) IV Push every 8 hours  insulin lispro (HumaLOG) corrective regimen sliding scale   SubCutaneous every 6 hours  midodrine 10 milliGRAM(s) Oral every 8 hours  multivitamin/minerals/iron Oral Solution (CENTRUM) 15 milliLiter(s) Oral daily  nafcillin  IVPB 2 Gram(s) IV Intermittent every 4 hours  norepinephrine Infusion 0.05 MICROgram(s)/kG/Min (3.45 mL/Hr) IV Continuous <Continuous>  pantoprazole  Injectable 40 milliGRAM(s) IV Push daily  polyethylene glycol 3350 17 Gram(s) Oral two times a day  propofol Infusion 5 MICROgram(s)/kG/Min (2.208 mL/Hr) IV Continuous <Continuous>  QUEtiapine 75 milliGRAM(s) Oral every 12 hours  sodium chloride 0.9% lock flush 3 milliLiter(s) IV Push every 8 hours  vitamin E 400 International Unit(s) Oral daily  zinc sulfate 220 milliGRAM(s) Oral daily    MEDICATIONS  (PRN):  acetaminophen    Suspension .. 650 milliGRAM(s) Oral every 6 hours PRN Temp greater or equal to 38C (100.4F)  dextrose 40% Gel 15 Gram(s) Oral once PRN Blood Glucose LESS THAN 70 milliGRAM(s)/deciliter  glucagon  Injectable 1 milliGRAM(s) IntraMuscular once PRN Glucose LESS THAN 70 milligrams/deciliter      ALLERGIES:  Allergies    No Known Allergies    Intolerances        LABS:                        7.2    8.91  )-----------( 200      ( 01 Dec 2019 06:55 )             22.3     12-01    132<L>  |  94<L>  |  22  ----------------------------<  96  3.0<L>   |  24  |  2.20<H>    Ca    7.7<L>      01 Dec 2019 06:55  Phos  3.7     12-01  Mg     1.9     12-01    TPro  6.5  /  Alb  2.4<L>  /  TBili  1.7<H>  /  DBili  x   /  AST  26  /  ALT  26  /  AlkPhos  91  12-01        CAPILLARY BLOOD GLUCOSE      POCT Blood Glucose.: 103 mg/dL (01 Dec 2019 10:57)      RADIOLOGY & ADDITIONAL TESTS: Reviewed.

## 2019-12-01 NOTE — PROGRESS NOTE ADULT - ASSESSMENT
40 yo M w/ PMH of IVDU and active smoker who came from [facility] on 11/8/19 in 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  Hospital course complicated by acute hypoxic respiratory failure, acute kidney injury, congestive hepatopathy, multi-system organ failure 2/2 hypoperfusion.   nephrology on board 2nd to [place]      # [place] likely due to ATN in shock  was initially on CVVHD then switched to IHD  last HD completed on 11/30--> 4 L of UF   however, volume status -- anasarca,   will proceed with HD on 12/2  continue monitoring urine output     hyponatremia  pt was on D5W until yesterday  improving     anemia  closely monitor h.h  recommend transfusion prn hb< 7    renal bone disease  calcium phos noted--    hypokalemia  can give KCL 40 meq via OGT

## 2019-12-02 NOTE — PROGRESS NOTE ADULT - SUBJECTIVE AND OBJECTIVE BOX
Patient is a 39y Male seen and evaluated at bedside.   Remains on vent via trach.  Off pressors.   Last HD completed on 11/30--> 4 L of UF   Noted to be febrile.        Meds:    acetaminophen    Suspension .. 650 milliGRAM(s) Oral every 6 hours PRN  acyclovir IVPB 370 milliGRAM(s) IV Intermittent every 24 hours  albumin human 25% IVPB 50 milliLiter(s) IV Intermittent every 1 hour  albuterol/ipratropium for Nebulization 3 milliLiter(s) Nebulizer every 4 hours  artificial  tears Solution 1 Drop(s) Both EYES every 6 hours  bisacodyl Suppository 10 milliGRAM(s) Rectal every 24 hours  chlorhexidine 0.12% Liquid 15 milliLiter(s) Oral Mucosa every 12 hours  chlorhexidine 2% Cloths 1 Application(s) Topical daily  dexMEDEtomidine Infusion 0.2 MICROgram(s)/kG/Hr IV Continuous <Continuous>  dextrose 10% + sodium chloride 0.9%. 1000 milliLiter(s) IV Continuous <Continuous>  dextrose 40% Gel 15 Gram(s) Oral once PRN  dextrose 5%. 1000 milliLiter(s) IV Continuous <Continuous>  dextrose 50% Injectable 12.5 Gram(s) IV Push once  dextrose 50% Injectable 25 Gram(s) IV Push once  dextrose 50% Injectable 25 Gram(s) IV Push once  fentaNYL   Infusion. 0.5 MICROgram(s)/kG/Hr IV Continuous <Continuous>  glucagon  Injectable 1 milliGRAM(s) IntraMuscular once PRN  heparin  Injectable 5000 Unit(s) SubCutaneous every 8 hours  hydrocortisone sodium succinate Injectable 50 milliGRAM(s) IV Push every 12 hours  insulin lispro (HumaLOG) corrective regimen sliding scale   SubCutaneous every 6 hours  midodrine 10 milliGRAM(s) Oral every 8 hours  multivitamin/minerals/iron Oral Solution (CENTRUM) 15 milliLiter(s) Oral daily  nafcillin  IVPB 2 Gram(s) IV Intermittent every 4 hours  pantoprazole  Injectable 40 milliGRAM(s) IV Push daily  polyethylene glycol 3350 17 Gram(s) Oral two times a day  propofol Infusion 5 MICROgram(s)/kG/Min IV Continuous <Continuous>  QUEtiapine 75 milliGRAM(s) Oral every 12 hours  sodium chloride 0.9% lock flush 3 milliLiter(s) IV Push every 8 hours  vitamin E 400 International Unit(s) Oral daily  zinc sulfate 220 milliGRAM(s) Oral daily      T(C): 38.1 (12-02-19 @ 13:32), Max: 38.3 (12-01-19 @ 19:00)  HR: 108 (12-02-19 @ 14:00) (104 - 130)  BP: 120/70 (12-01-19 @ 20:14) (120/70 - 120/70)  RR: 20 (12-02-19 @ 14:00) (10 - 28)  SpO2: 100% (12-02-19 @ 14:00) (98% - 100%)    Input/Output      12-01-19 @ 07:01  -  12-02-19 @ 07:00  --------------------------------------------------------  IN: 2008.7 mL / OUT: 125 mL / NET: 1883.7 mL    12-02-19 @ 07:01  -  12-02-19 @ 14:40  --------------------------------------------------------  IN: 499.2 mL / OUT: 115 mL / NET: 384.2 mL              Review of Systems:  NA - sedated      PHYSICAL EXAM:  GENERAL: sedated,  connected to vent via trach   HEAD:  Atraumatic, Normocephalic,   EYES: Bilateral conjunctiva and sclera normal   Oral cavity: Oral mucosa dry and pink  NECK: trach   CHEST/LUNG: limited exam, + rales,   HEART: Regular rate and rhythm. JEFFREY II/VI at LPSB, No gallop, no rub   ABDOMEN: distended   EXTREMITIES: 1+ pitting edema  Neurology: sleeping  Skin: necrotic  lesions on fingertips    access: LENY ACEVEDO  cath             LABS:                                             7.3    7.97  )-----------( 237      ( 02 Dec 2019 05:46 )             21.7       12-02    131<L>  |  94<L>  |  31<H>  ----------------------------<  95  2.8<LL>   |  21<L>  |  2.94<H>    Ca    7.6<L>      02 Dec 2019 05:46  Phos  4.1     12-02  Mg     1.9     12-02    TPro  6.2  /  Alb  2.4<L>  /  TBili  1.7<H>  /  DBili  x   /  AST  25  /  ALT  22  /  AlkPhos  85  12-02                RADIOLOGY & ADDITIONAL STUDIES:

## 2019-12-02 NOTE — CHART NOTE - NSCHARTNOTEFT_GEN_A_CORE
Admitting Diagnosis:   Patient is a 39y old  Male who presents with a chief complaint of Endocarditis h/o IVDU (02 Dec 2019 11:10)      PAST MEDICAL & SURGICAL HISTORY:  Endocarditis  IVDU (intravenous drug user)  Smoker  No significant past surgical history      Current Nutrition Order:  NPO w/NGT to LIWS  NPO since 11/25    PO Intake: Good (%) [   ]  Fair (50-75%) [   ] Poor (<25%) [   ]- N/A NPO    GI Issues: Persistent abdominal distention despite sump and RT to suction  Starting on prokinetic agents     Pain: Unable to assess at this time 2/2 vent; sedated     Skin Integrity: Heber 8  IAD  R. toes necrosis  B/L ankle and heel DTIs  L. scapula DTI    Labs:   12-02    131<L>  |  94<L>  |  31<H>  ----------------------------<  95  2.8<LL>   |  21<L>  |  2.94<H>    Ca    7.6<L>      02 Dec 2019 05:46  Phos  4.1     12-02  Mg     1.9     12-02    TPro  6.2  /  Alb  2.4<L>  /  TBili  1.7<H>  /  DBili  x   /  AST  25  /  ALT  22  /  AlkPhos  85  12-02    CAPILLARY BLOOD GLUCOSE      POCT Blood Glucose.: 142 mg/dL (02 Dec 2019 11:08)  POCT Blood Glucose.: 99 mg/dL (02 Dec 2019 06:34)  POCT Blood Glucose.: 104 mg/dL (01 Dec 2019 23:17)  POCT Blood Glucose.: 94 mg/dL (01 Dec 2019 16:46)      Medications:  MEDICATIONS  (STANDING):  acyclovir IVPB 370 milliGRAM(s) IV Intermittent every 24 hours  albumin human 25% IVPB 50 milliLiter(s) IV Intermittent every 1 hour  albuterol/ipratropium for Nebulization 3 milliLiter(s) Nebulizer every 4 hours  artificial  tears Solution 1 Drop(s) Both EYES every 6 hours  bisacodyl Suppository 10 milliGRAM(s) Rectal every 24 hours  chlorhexidine 0.12% Liquid 15 milliLiter(s) Oral Mucosa every 12 hours  chlorhexidine 2% Cloths 1 Application(s) Topical daily  dexMEDEtomidine Infusion 0.2 MICROgram(s)/kG/Hr (3.68 mL/Hr) IV Continuous <Continuous>  dextrose 10% + sodium chloride 0.9%. 1000 milliLiter(s) (25 mL/Hr) IV Continuous <Continuous>  dextrose 5%. 1000 milliLiter(s) (50 mL/Hr) IV Continuous <Continuous>  dextrose 50% Injectable 12.5 Gram(s) IV Push once  dextrose 50% Injectable 25 Gram(s) IV Push once  dextrose 50% Injectable 25 Gram(s) IV Push once  fentaNYL   Infusion. 0.5 MICROgram(s)/kG/Hr (3.68 mL/Hr) IV Continuous <Continuous>  heparin  Injectable 5000 Unit(s) SubCutaneous every 8 hours  hydrocortisone sodium succinate Injectable 50 milliGRAM(s) IV Push every 12 hours  insulin lispro (HumaLOG) corrective regimen sliding scale   SubCutaneous every 6 hours  midodrine 10 milliGRAM(s) Oral every 8 hours  multivitamin/minerals/iron Oral Solution (CENTRUM) 15 milliLiter(s) Oral daily  nafcillin  IVPB 2 Gram(s) IV Intermittent every 4 hours  pantoprazole  Injectable 40 milliGRAM(s) IV Push daily  polyethylene glycol 3350 17 Gram(s) Oral two times a day  propofol Infusion 5 MICROgram(s)/kG/Min (2.208 mL/Hr) IV Continuous <Continuous>  QUEtiapine 75 milliGRAM(s) Oral every 12 hours  sodium chloride 0.9% lock flush 3 milliLiter(s) IV Push every 8 hours  vitamin E 400 International Unit(s) Oral daily  zinc sulfate 220 milliGRAM(s) Oral daily    MEDICATIONS  (PRN):  acetaminophen    Suspension .. 650 milliGRAM(s) Oral every 6 hours PRN Temp greater or equal to 38C (100.4F)  dextrose 40% Gel 15 Gram(s) Oral once PRN Blood Glucose LESS THAN 70 milliGRAM(s)/deciliter  glucagon  Injectable 1 milliGRAM(s) IntraMuscular once PRN Glucose LESS THAN 70 milligrams/deciliter      Weight: 66.3kg (11/30, dry)  65.3kg (bedscale, 11/20)  77.4kg (11/13)  73.6kg (11/10)  Daily       Weight Change:   8.3kg weight loss from admission, etiology unclear  Suspect actual weight loss vs fluid shifts as pt was noted to be anasarcic, is on CVVHD and has notably severe muscle wasting in upper extremities.     Nutrition Focused Physical Exam: Completed [ X-11/15, 11/20 re-evaluated ]  Not Pertinent [   ]  Muscle Wasting- Temporal [X   ]  Clavicle/Pectoral [  X ]  Shoulder/Deltoid [   ]  Scapula [   ]  Interosseous [   ]  Quadriceps [   ]  Gastrocnemius [   ]  Severe PCM suspected.     Estimated energy needs: ABW (66.3kg) used to estimate needs as pt is <IBW.   Needs increased 2/2 vent, sepsis, HD suspected malnutrition. Fluids per team 2/2 HD  Calories: 25-30 kcal/kg = 1899-7544 kcal/day  Protein: 1.6-1.8 g/kg = 106-119g protein/day    Subjective:   38 yo/male with PMHx IVDA, presented to OSH w/cough, hemoptysis, and N/V. Found to be septic and hypotensive. CT chest +pna, pulmonary nodules w/septic emboli, and ELIAN +vegetation. Pt ultimately intubated and started on pressors. Transferred to [facility] where he was deemed not to be a surgical candidate. Course c/b renal failure and B/L pleural effusions, s/p R. CT placement. Started on CVVH. ELIAN w/bubble negative for PFO. CTA negative for mesenteric ischemia and rectal exam negative for perirectal abscess, per surgery note. Underwent CT A/P 11/18 which demonstrated colonic distension likely 2/2 pseudoobstruction and decompressed small bowel. Per surgery recs, pt to continue w/ rectal tube for colonic decompression and plan for flex sig once stable. CT chest showing hydropneumothorax on R. lung and pneumothorax of L. lung; chest tubes remain to suction. S/p bronch 11/19 w/ copious purulent sputum; repeat bronch 11/22 still w/thick secretions though improved. Remains too high risk for CT surgery intervention at this time. S/p repeat echo on 11/20 showing large stable vegetation, severe TR. Transitioned from CVVHD to IHD. S/p trach on 11/26. Bronch cytology +HSV, started on acyclovir. Pt continues to be decompressed with NGT to LIWS and rectal tube to suction- AXR +distal small bowel dilation, abdomen remains distended. Ileus likely 2/2 opioids. Pt has been NPO since 11/25. Pt seen in room, trached to vent on VC/AC mode. Sedated on fentanyl and propofol @ 6.6mL/hr (174kcal/day from lipids). - not on pressors. D5NS gtt running. NPO. B/L chest tubes remain to suction. Recommend initiating TPN- recs below.     Previous Nutrition Diagnosis:  Increased protein-calorie needs RT increased demand for protein-calorie intake AEB vent, hypermetabolic state, suspected malnutrition   Active [  x ]  Resolved [   ]    If resolved, new PES:      Goal: Pt will meet % of EER via tolerated route     Recommendations:  1. Recommend starting TPN via central line: 290 g Dex, 110 g AA, 50g 20% Lipids to provide in total 1926 Kcal, 110 g protein, GIR 3.04mg/kg/min, 1.65 g/kg IBW protein  Recommend checking TG before starting TPN and then check TG weekly. Replete lytes prior to TPN initiation. Check Mg, Phos, K daily and POC BG Q6hrs. Trend daily weights. Fluids and lytes per MD discretion.   Start at 150g Dex on Day 1, 250g Dex on Day 2, and advance to goal of 290g Dex on Day 3  2. As medically feasible, resume trickle feeds of Vital 1.5 Christian via NGT.   3. Monitor lytes and replete prn. POC BG q6hrs   4. Daily wts   5. Pain and bowel regimens per team     Education: Discussed continued importance of nutrition     Risk Level: High [ X  ] Moderate [   ] Low [   ]

## 2019-12-02 NOTE — PROGRESS NOTE ADULT - SUBJECTIVE AND OBJECTIVE BOX
Patient was seen and evaluated on dialysis.   HR: 112 (12-02-19 @ 17:00)  BP: 120/70 (12-01-19 @ 20:14)    12-02    131<L>  |  96  |  36<H>  ----------------------------<  90  3.6   |  19<L>  |  3.02<H>    Ca    7.2<L>      02 Dec 2019 15:01  Phos  4.1     12-02  Mg     1.9     12-02    TPro  6.2  /  Alb  2.4<L>  /  TBili  1.7<H>  /  DBili  x   /  AST  25  /  ALT  22  /  AlkPhos  85  12-02    Continue dialysis:   Dialyzer:   180    QB: 400  K bath: 4  Goal UF:     2.5  L   over        180       min  Patient is tolerating the procedure well.

## 2019-12-02 NOTE — PROGRESS NOTE ADULT - ASSESSMENT
40 y/o M w/ PMH of IVDU and active smoker who came from [facility] on 11/8/19 in 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 After being transferred to  CTICU for surgical evaluation, pt was deemed to not be a surgical candidate and transferred to MICU for medical mgmt. ID, Nephrology, heme/onc, surgery, vascular following.    Neuro  Intubated, sedated on Propofol, Fentanyl gtt. Prior CT head negative for any intracranial embolic events.   -c/w sedation holiday daily to assess neurological status   -increase Seroquel to 75 mg q12h  - continue to wean off propofol/fentanyl as tolerated     Cardiovascular   #Hypotension  Most likely 2/2 septic shock from infective endocarditis and RV failure 2/2 tricuspid regurgitation (ELIAN shows EF of 40-50%). Echo with EF 45%. ELIAN with EF 40-45%, 3.8 x1cm vegetation on TR valve, with severe TR, trivial pericardial effusion. Echo with bubble revealed no PFO. Wean off Levophed as tolerated.   - normotensive   - Maintain MAP>65.   - C/W medical mgmt of infective endocarditis as below.  - c/w Midodrine 10mg q8h  - c/w Levophed decrease as tolerated    Respiratory  #Acute hypoxic respiratory failure   Most likely 2/2 alveolar hemorrhage in the setting of septic embolic causing numerous cavitary lesions on CT chest. CXR with scattered infiltrates and pleural effusions. Sputum culture negative. 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 Bilateral CT in place, confirmed by CXR, continues to drain serosanguinous fluid. Fluid analysis of both CT pleural fluid confirms complex exudate with high cellularity, low pH and low glucose. pleural fluid cultures with staph aureus. Chest tubes in place as above (2 R chest tubes, 1 L chest tube)   - Monitor output of bilateral chest tubes  - R pleural fluid cultures with staph aureus, L pleural fluid cultures with NGTD   - CT chest and cxr as above. Chest tubes set to suction    ID   #Septic shock 2/2 MSSA endocarditis  IE confirmed with echographic evidence of tricuspid vegetation (3.8cm x 1.1cm by ELIAN) and prior blood cultures positive for MSSA. Not a surgical candidate for a tricuspid valve replacement at this time as per CT surgery. Initial blood cultures positive for staph epidermidis, likely a contaminant. Initial sputum culture positive for staph aureus  Repeat blood cultures with NGTD. Sputum cx NGTD.  - ID following, C/W Nafcillin 2g q4 (Sensitive to Oxacillin as per OSH records), f/u recs   - repeat Bcxs with NGTD   - BAL positive for HSV, started on Acyclovir (11/25-) continue for 10d   - Per CT surgery pt is too high risk for any surgery and would likely not survive procedure.   - Cardiology consulted to optimize management of his right heart dysfunction, f/u recs        Renal  #Acute renal failure most likely 2/2 ATN from hypoperfusion, Minimal urine output, on CVVHD, oliguric with 0-5cc/hr. Vancomycin level 37 on arrival so may be component of drug induced nephropathy. Bun/Cr continues to improve while on CVVHD. Pt is clinically fluid overloaded but now improved and hemodynamically stable. No renal infarcts as per CT abdomen/pelvis.  - F/U nephrology recs.  -plan for intermittent HD per nephrology recs and permacath planning and placement  - Continue to correct fluid overload with HD  - Monitor I/Os  - currently anuric with no signs of renal recovery  - IHD today, next anticipated 12/2  - continue to monitor serum electrolytes    #Persistent hyponatremia  Pt most likely with hypervolemic hyponatremia  -Na today 132, will continue to monitor after dialysis  - administration of IV abx switched to NS vs D5W; possible etiology for hyponatremia       GI  OG on arrival with 600cc of bilious fluid, abdomen still distended but had several BMs, some loose. CT abdomen revealed no evidence of bowel ischemia or SBO. Sump was DCed and replaced with NG tube. CT abdomen/pelvis revealed "irregular masslike mural thickening of the posterior left lateral wall of the rectum." Surgery consulted, unlikely perirectal abscess. Recommend GI evaluation for possible scope.  - F/u GI consult, f/u recs, when stable will go for flex sig; possible PEG   - Abdominal xray with air-filled loops of small/large bowel with concern for distal obstruction vs ileus. Surgery consulted. Rectal tube and sump placed. CT abd/pelvis with abd/pelvic, anasarca, colonic ileus. Rectal tube in place for colonic decompression, continue to monitor. Abdomen still distended but significantly improved, repeat abd xray without obstruction   -C. diff negative   - NG tube in place, sump to low-intermittent suction, sump replaced AM 11/29   -c/w Miralax BID  -wean off fentanyl/opiates as above  -s/p CT abdomen with oral contrast - per radiology, no evidence of obstruction, likely ileus 2/2 opiates  - s/p neostigmine in the PM 11/29  - GI recommending starting prokinetic agent such as Reglan or Methylnaltrexone to counteract opioids; currently having adequate bowel movements with daily improvement in abdominal distension and tension (both clinically and radiographically)     #Congestive Hepatopathy   Transaminitis, coag derangements, hyperbilirubinemia most likely 2/2 hepatic congestion vs hemolysis, due to severe TR in setting of infective endocarditis.  Hepatitis panel negative.  -LFTs wnl  -T nehemiah downtrending  -Monitor CMP    Heme  #Hemolysis  Intravascular hemolysis with initial haptoglobin <10, LDH decreasing at 365. D-dimer elevated. Fibrinogen stable 396. Today still clinically jaundiced, with stable bilirubinemia: T.bili 9.9., D.bili 8.5. h/h stable at 8.1/25.5. Platelets decreasing from 54 to 49.. Fact VII/Factor VIII elevated. Unlikely 2/2 CVVHD. Likely 2/2 to sepsis and/or DIC.   - Received 1u plts 11/26 in preparation for trach placement 11/26; plt currently stable and plts >100 without any evidence of oozing/bleeding   - Heme consulted, follow recs  - Monitor direct and total bili, LDH, fibrinogen, platelets   - Transfuse platelets if <10K or bleeding and <50K  - Transfuse pRBCs for hgb <7       Vascular  Vascular surgery consulted in setting of gangrenous distal toes and fingers b/l. Likely 2/2 to pressors.  - per vascular, may need amputation in the future once ischemic tissue fully demarcates, and once patient is medically optimized      Endocrinology  - taper steroids  - previously on Hydrocortisone 50mg q8- stress dose steroids    Lines: right IJ TLC and Ernesto (11/12), Axillary A-line (11/12), L subclavian (11/27-), right peripheral (11/12)    F: None   E: replete K <4, Mg <2  N: Trickle feeds   GI Ppx: Protonix   DVT Ppx: Heparin   Code status: FULL   Dispo: MICU 38 y/o M w/ PMH of IVDU and active smoker who came from [facility] on 11/8/19 in 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 After being transferred to  CTICU for surgical evaluation, pt was deemed to not be a surgical candidate and transferred to MICU for medical mgmt. ID, Nephrology, heme/onc, surgery, vascular following.    Neuro  Intubated, sedated on Propofol, Fentanyl gtt. Prior CT head negative for any intracranial embolic events.   -c/w sedation holiday daily to assess neurological status   -c/w Seroquel to 75 mg q12h  - continue to wean off propofol; DC fentanyl today     Cardiovascular   #Hypotension  Most likely 2/2 septic shock from infective endocarditis and RV failure 2/2 tricuspid regurgitation (ELIAN shows EF of 40-50%). Echo with EF 45%. ELIAN with EF 40-45%, 3.8 x1cm vegetation on TR valve, with severe TR, trivial pericardial effusion. Echo with bubble revealed no PFO. Wean off Levophed as tolerated.   - normotensive   - Maintain MAP>65.   - C/W medical mgmt of infective endocarditis as below.  - c/w Midodrine 10mg q8h  - c/w Levophed decrease as tolerated    Respiratory  #Acute hypoxic respiratory failure   Most likely 2/2 alveolar hemorrhage in the setting of septic embolic causing numerous cavitary lesions on CT chest. CXR with scattered infiltrates and pleural effusions. Sputum culture negative. 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 Bilateral CT in place, confirmed by CXR, continues to drain serosanguinous fluid. Fluid analysis of both CT pleural fluid confirms complex exudate with high cellularity, low pH and low glucose. pleural fluid cultures with staph aureus. Chest tubes in place as above (2 R chest tubes, 1 L chest tube)   - Monitor output of bilateral chest tubes  - R pleural fluid cultures with staph aureus, L pleural fluid cultures with NGTD   - CT chest and cxr as above. Chest tubes set to suction    ID   #Septic shock 2/2 MSSA endocarditis  IE confirmed with echographic evidence of tricuspid vegetation (3.8cm x 1.1cm by ELIAN) and prior blood cultures positive for MSSA. Not a surgical candidate for a tricuspid valve replacement at this time as per CT surgery. Initial blood cultures positive for staph epidermidis, likely a contaminant. Initial sputum culture positive for staph aureus  Repeat blood cultures with NGTD. Sputum cx NGTD.  - ID following, C/W Nafcillin 2g q4 (Sensitive to Oxacillin as per OSH records), f/u recs   - repeat Bcxs with NGTD   - BAL positive for HSV, started on Acyclovir (11/25-) continue for 10d   - Per CT surgery pt is too high risk for any surgery and would likely not survive procedure.   - Cardiology consulted to optimize management of his right heart dysfunction, f/u recs        Renal  #Acute renal failure most likely 2/2 ATN from hypoperfusion, Minimal urine output, on CVVHD, oliguric with 0-5cc/hr. Vancomycin level 37 on arrival so may be component of drug induced nephropathy. Bun/Cr continues to improve while on CVVHD. Pt is clinically fluid overloaded but now improved and hemodynamically stable. No renal infarcts as per CT abdomen/pelvis.  - F/U nephrology recs.  -plan for intermittent HD per nephrology recs and permacath planning and placement  - Continue to correct fluid overload with HD  - Monitor I/Os  - currently anuric with no signs of renal recovery  - IHD planned 12/2  - continue to monitor serum electrolytes    #Persistent hyponatremia  Pt most likely with hypervolemic hyponatremia  -Na today 131, will continue to monitor after dialysis  - administration of IV abx switched to NS vs D5W; possible etiology for hyponatremia       GI  OG on arrival with 600cc of bilious fluid, abdomen still distended but had several BMs, some loose. CT abdomen revealed no evidence of bowel ischemia or SBO. Sump was DCed and replaced with NG tube. CT abdomen/pelvis revealed "irregular masslike mural thickening of the posterior left lateral wall of the rectum." Surgery consulted, unlikely perirectal abscess. Recommend GI evaluation for possible scope.  - F/u GI consult, f/u recs, when stable will go for flex sig; possible PEG   - Abdominal xray with air-filled loops of small/large bowel with concern for distal obstruction vs ileus. Surgery consulted. Rectal tube and sump placed. CT abd/pelvis with abd/pelvic, anasarca, colonic ileus. Rectal tube in place for colonic decompression, continue to monitor. Abdomen still distended but significantly improved, repeat abd xray without obstruction   -C. diff negative   - NG tube in place, sump to low-intermittent suction, sump replaced AM 11/29   -c/w Miralax BID  -wean off fentanyl/opiates as above  -s/p CT abdomen with oral contrast - per radiology, no evidence of obstruction, likely ileus 2/2 opiates  - s/p neostigmine in the PM 11/29  - GI recommending starting prokinetic agent such as Reglan or Methylnaltrexone to counteract opioids; currently having adequate bowel movements with daily improvement in abdominal distension and tension (both clinically and radiographically)     #Congestive Hepatopathy   Transaminitis, coag derangements, hyperbilirubinemia most likely 2/2 hepatic congestion vs hemolysis, due to severe TR in setting of infective endocarditis.  Hepatitis panel negative.  -LFTs wnl  -T nehemiah downtrending  -Monitor CMP    Heme  #Hemolysis  Intravascular hemolysis with initial haptoglobin <10, LDH decreasing at 365. D-dimer elevated. Fibrinogen stable 396. Today still clinically jaundiced, with stable bilirubinemia: T.bili 9.9., D.bili 8.5. h/h stable at 8.1/25.5. Platelets decreasing from 54 to 49.. Fact VII/Factor VIII elevated. Unlikely 2/2 CVVHD. Likely 2/2 to sepsis and/or DIC.   - Received 1u plts 11/26 in preparation for trach placement 11/26; plt currently stable and plts >100 without any evidence of oozing/bleeding   - Heme consulted, follow recs  - Monitor direct and total bili, LDH, fibrinogen, platelets   - Transfuse platelets if <10K or bleeding and <50K  - Transfuse pRBCs for hgb <7       Vascular  Vascular surgery consulted in setting of gangrenous distal toes and fingers b/l. Likely 2/2 to pressors.  - per vascular, may need amputation in the future once ischemic tissue fully demarcates, and once patient is medically optimized      Endocrinology  - taper steroids; hydrocortisone 50 BID   - previously on Hydrocortisone 50mg q8- stress dose steroids    Lines: right IJ TLC and Ernesto (11/12), Axillary A-line (11/12), L subclavian (11/27-), right peripheral (11/12)    F: None   E: replete K <4, Mg <2  N: Trickle feeds   GI Ppx: Protonix   DVT Ppx: Heparin   Code status: FULL   Dispo: MICU

## 2019-12-02 NOTE — PROGRESS NOTE ADULT - ASSESSMENT
40 y/o M smoker w/ PMHx of IVDU presented to [facility] w/ productive cough with hemoptysis found to have septic emboli w/ vegetation of the TV transferred to [facility] for surgical evaluation.  Course complicated by sepsis, DIC, acute respiratory failure w/ empyema s/p intubation and thoracostomy tube, CAMERON requiring cvvhd, ileus on NGT and rectal decompression, cardiac arrest, and pneumothorax.     1. Abdominal distension - Appears to be due to ileus. Patient with abdominal distension with xray notable for dilated bowels, CT scan without evidence of transition point or obstruction. Neostigmine given with continued BMs and flatus as well as increased sump output, but minimal effect in distention.  -Wean opioids  -Aggressive repletion of electrolytes  -NG sump to low-intermittent suction  -Hold TF  -Continue prokinetic agent such as Reglan or consider Methylnaltrexone to counteract opioids    2. Hyperbilirubinemia - Patient with marked elevated bilirubin initially thought to be 2/2 ischemic hepatopathy now downtrending. Imaging without CBD dilation suggestive of cholestasis from sepsis vs DILI in the setting of recent ischemic hepatopathy. Continues to downtrend.  -Daily LFT and INR 38 y/o M smoker w/ PMHx of IVDU presented to [facility] w/ productive cough with hemoptysis found to have septic emboli w/ vegetation of the TV transferred to [facility] for surgical evaluation.  Course complicated by sepsis, DIC, acute respiratory failure w/ empyema s/p intubation and thoracostomy tube, CAMERON requiring cvvhd, ileus on NGT and rectal decompression, cardiac arrest, and pneumothorax.     1. Abdominal distension - Appears to be due to ileus. Patient with abdominal distension with xray notable for dilated bowels, CT scan without evidence of transition point or obstruction. Neostigmine given with continued BMs and flatus as well as increased sump output, but minimal effect in distention.  Now with distal small bowel dilation.    -Wean opioids  -Serial abdominal exam and daily xray  -Aggressive repletion of electrolytes  -NG sump to low-intermittent suction  -Hold TF  -Consider Methylnaltrexone to counteract opioids    2. Hyperbilirubinemia - Patient with marked elevated bilirubin initially thought to be 2/2 ischemic hepatopathy now downtrending. Imaging without CBD dilation suggestive of cholestasis from sepsis vs DILI in the setting of recent ischemic hepatopathy. Continues to downtrend.  -Daily LFT and INR 38 y/o M smoker w/ PMHx of IVDU presented to [facility] w/ productive cough with hemoptysis found to have septic emboli w/ vegetation of the TV transferred to [facility] for surgical evaluation.  Course complicated by sepsis, DIC, acute respiratory failure w/ empyema s/p intubation and thoracostomy tube, CAMERON requiring cvvhd, ileus on NGT and rectal decompression, cardiac arrest, and pneumothorax.     1. Abdominal distension - Appears to be due to ileus. Patient with abdominal distension with xray notable for dilated bowels and prior CT scan without evidence of transition point or obstruction. Neostigmine given with continued BMs and flatus as well as increased sump output, but minimal effect in distention.  Now with distal small bowel dilation on imaging and review of xray with radiology notable for dilation of around 9.5cm at the cecum, mildly increased from prior  -Wean opioids  -Serial abdominal exam and daily xray  -Aggressive repletion of electrolytes  -NG sump to low-intermittent suction  -Hold TF  -Methylnaltrexone to counteract opioids    2. Hyperbilirubinemia - Patient with marked elevated bilirubin initially thought to be 2/2 ischemic hepatopathy now downtrending. Imaging without CBD dilation suggestive of cholestasis from sepsis vs DILI in the setting of recent ischemic hepatopathy. Continues to downtrend.  -Daily LFT and INR 40 y/o M smoker w/ PMHx of IVDU presented to [facility] w/ productive cough with hemoptysis found to have septic emboli w/ vegetation of the TV transferred to [facility] for surgical evaluation.  Course complicated by sepsis, DIC, acute respiratory failure w/ empyema s/p intubation and thoracostomy tube, CAMERON requiring cvvhd, ileus on NGT and rectal decompression, cardiac arrest, and pneumothorax.     1. Abdominal distension - Appears to be due to ileus. Patient with abdominal distension with xray notable for dilated bowels and prior CT scan without evidence of transition point or obstruction. Neostigmine given with continued BMs and flatus as well as increased sump output, but minimal effect in distention.  Now with distal small bowel dilation on imaging and review of xray with radiology notable for dilation of around 9.5cm at the cecum, mildly increased from prior  -Wean opioids  -Serial abdominal exam and daily xray  -Aggressive repletion of electrolytes  -NG sump to low-intermittent suction  -Hold TF  -Methylnaltrexone to counteract opioids    2. Hyperbilirubinemia - Patient with marked elevated bilirubin initially thought to be 2/2 ischemic hepatopathy now downtrending. Imaging without CBD dilation suggestive of cholestasis from sepsis vs DILI in the setting of recent ischemic hepatopathy. Continues to downtrend.  -Daily LFT and INR    Recommendations d/w primary team  Case d/w svc attending 40 y/o M smoker w/ PMHx of IVDU presented to [facility] w/ productive cough with hemoptysis found to have septic emboli w/ vegetation of the TV transferred to [facility] for surgical evaluation.  Course complicated by sepsis, DIC, acute respiratory failure w/ empyema s/p intubation and thoracostomy tube, CAMERON requiring cvvhd, ileus on NGT and rectal decompression, cardiac arrest, and pneumothorax.     1. Abdominal distension - Appears to be due to ileus. Patient with abdominal distension with xray notable for dilated bowels and prior CT scan without evidence of transition point or obstruction. Neostigmine given with continued BMs and flatus as well as increased sump output, but minimal effect in distention.  Now with distal small bowel dilation on imaging and review of xray with radiology notable for dilation of around 9.5cm at the cecum, mildly increased from prior  -Wean opioids  -Serial abdominal exam and daily xray  -Aggressive repletion of electrolytes  -NG sump to low-intermittent suction  -Hold TF  -Consider methylnaltrexone to counteract opioids, but would continue to monitor for obstruction and opioid withdrawal    2. Hyperbilirubinemia - Patient with marked elevated bilirubin initially thought to be 2/2 ischemic hepatopathy now downtrending. Imaging without CBD dilation suggestive of cholestasis from sepsis vs DILI in the setting of recent ischemic hepatopathy. Continues to downtrend.  -Daily LFT and INR    Recommendations d/w primary team  Case d/w svc attending

## 2019-12-02 NOTE — PROGRESS NOTE ADULT - ASSESSMENT
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 He has recurrent fevers which could be secondary to lack of source control vs necrotic extremities    Recommend:  1.  Continue Nafcillin 2 grams IV q4hrs  2.  Continue Acyclovir x 10 days total.  Today is 8/10  3.  Follow up repeat blood cultures    ID team 1 will continue to follow

## 2019-12-02 NOTE — PROGRESS NOTE ADULT - ATTENDING COMMENTS
seen and evaluated while on dialysis  tolerating the treatment well  continue full treatment as prescribed

## 2019-12-02 NOTE — PROGRESS NOTE ADULT - SUBJECTIVE AND OBJECTIVE BOX
INTERVAL EVENTS: Patient intubated and sedated. Unable to obtain ROS.     PAST MEDICAL & SURGICAL HISTORY:  Endocarditis  IVDU (intravenous drug user)  IVDA (intravenous drug abuse) complicating pregnancy  Smoker  No significant past surgical history      MEDICATIONS  (STANDING):  acyclovir IVPB 370 milliGRAM(s) IV Intermittent every 24 hours  albumin human 25% IVPB 50 milliLiter(s) IV Intermittent every 1 hour  albuterol/ipratropium for Nebulization 3 milliLiter(s) Nebulizer every 4 hours  artificial  tears Solution 1 Drop(s) Both EYES every 6 hours  chlorhexidine 0.12% Liquid 15 milliLiter(s) Oral Mucosa every 12 hours  chlorhexidine 2% Cloths 1 Application(s) Topical daily  dextrose 10% + sodium chloride 0.9%. 1000 milliLiter(s) (25 mL/Hr) IV Continuous <Continuous>  dextrose 5%. 1000 milliLiter(s) (50 mL/Hr) IV Continuous <Continuous>  dextrose 50% Injectable 12.5 Gram(s) IV Push once  dextrose 50% Injectable 25 Gram(s) IV Push once  dextrose 50% Injectable 25 Gram(s) IV Push once  fentaNYL   Infusion. 0.5 MICROgram(s)/kG/Hr (3.68 mL/Hr) IV Continuous <Continuous>  heparin  Injectable 5000 Unit(s) SubCutaneous every 8 hours  hydrocortisone sodium succinate Injectable 50 milliGRAM(s) IV Push every 12 hours  insulin lispro (HumaLOG) corrective regimen sliding scale   SubCutaneous every 6 hours  midodrine 10 milliGRAM(s) Oral every 8 hours  multivitamin/minerals/iron Oral Solution (CENTRUM) 15 milliLiter(s) Oral daily  nafcillin  IVPB 2 Gram(s) IV Intermittent every 4 hours  norepinephrine Infusion 0.05 MICROgram(s)/kG/Min (3.45 mL/Hr) IV Continuous <Continuous>  pantoprazole  Injectable 40 milliGRAM(s) IV Push daily  polyethylene glycol 3350 17 Gram(s) Oral two times a day  propofol Infusion 5 MICROgram(s)/kG/Min (2.208 mL/Hr) IV Continuous <Continuous>  QUEtiapine 75 milliGRAM(s) Oral every 12 hours  sodium chloride 0.9% lock flush 3 milliLiter(s) IV Push every 8 hours  vitamin E 400 International Unit(s) Oral daily  zinc sulfate 220 milliGRAM(s) Oral daily    MEDICATIONS  (PRN):  acetaminophen    Suspension .. 650 milliGRAM(s) Oral every 6 hours PRN Temp greater or equal to 38C (100.4F)  dextrose 40% Gel 15 Gram(s) Oral once PRN Blood Glucose LESS THAN 70 milliGRAM(s)/deciliter  glucagon  Injectable 1 milliGRAM(s) IntraMuscular once PRN Glucose LESS THAN 70 milligrams/deciliter      Vital Signs Last 24 Hrs  T(C): 38.1 (02 Dec 2019 06:28), Max: 38.3 (01 Dec 2019 19:00)  T(F): 100.5 (02 Dec 2019 06:28), Max: 101 (01 Dec 2019 19:00)  HR: 120 (02 Dec 2019 11:00) (104 - 130)  BP: 120/70 (01 Dec 2019 20:14) (120/70 - 120/70)  BP(mean): 88 (01 Dec 2019 20:14) (88 - 88)  RR: 19 (02 Dec 2019 11:00) (10 - 30)  SpO2: 100% (02 Dec 2019 11:00) (96% - 100%)     PHYSICAL EXAM:  GEN: Sedated. Trach in place.   HEENT: NCAT, PERRL, EOMI. Mucosa moist. No JVD. Right HD cath. LEft subclavian TLC.   RESP: B/L rhonchi. B/L Chest tubes in place.   CV: Tachycardic Normal S1/S2. Large click heard on S1.   ABD: Distended. Firm in upper regions.   EXT: Warm. No edema, clubbing, or cyanosis.   NEURO: AAOx3. No focal deficits.       LABS:                        7.3    7.97  )-----------( 237      ( 02 Dec 2019 05:46 )             21.7     12-02    131<L>  |  94<L>  |  31<H>  ----------------------------<  95  2.8<LL>   |  21<L>  |  2.94<H>    Ca    7.6<L>      02 Dec 2019 05:46  Phos  4.1     12-02  Mg     1.9     12-02    TPro  6.2  /  Alb  2.4<L>  /  TBili  1.7<H>  /  DBili  x   /  AST  25  /  ALT  22  /  AlkPhos  85  12-02            I&O's Summary    01 Dec 2019 07:01  -  02 Dec 2019 07:00  --------------------------------------------------------  IN: 2008.7 mL / OUT: 125 mL / NET: 1883.7 mL    02 Dec 2019 07:01  -  02 Dec 2019 11:10  --------------------------------------------------------  IN: 126.5 mL / OUT: 115 mL / NET: 11.5 mL      BNP  RADIOLOGY & ADDITIONAL STUDIES:    TELEMETRY: Sinus tachycardia

## 2019-12-02 NOTE — PROGRESS NOTE ADULT - ASSESSMENT
39y Male w PMH of IVDU and active smoker who went to [facility] on 11/8/19 complaining of productive cough with hemoptysis, progressive SOB, pleuritic chest pain, nausea, non-bloody emesis, abd pain, chillsx3 days. 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, CTICU, under the care of Dr. Daniel Ruelas for possible surgical evaluation. Following admission, patient noted to be in acute hypoxic respiratory failure s/p intubation/sedation, acute kidney injury requiring CVVHD, congestive hepatopathy, and mutli-system organ failure 2/2 hypoperfusion. Patient deemed not a surgical candidate, per Dr. Ruelas, and patient transferred to MICU for medical management with IV nafcillin, ID following. Patient with complicated course and now s/p cardiac arrest likely from sepsis/hypoxic respiratory failure.    Tricuspid valve Endocarditis: Patient with vegetations seen on ELIAN and TTE as above with severe TR. TR is functional from malcoaptation of leaflets from vegetation. Patient with mildly elevated PASP, which likely is not contributing much to TR. Normal RV function. Not a surgical candidate. Patient also with septic emboli to lungs, further causing empyema. Blood cultures negative. Vegetation still present, no further vegetations seen.   -Continue on Nafcillin per ID recs.  -Patient will likely need to be continued on abx therapy indefinitely in setting of no surgical intervention.  -Patient will have long and complicated course without definite source control.   -If patient becomes more stable, reconsult CT Surgery for tricuspid valve repair/replacement.   -Continue with HD support with pressors.   -Would not recommend ELIAN at this time. Will not .     CHF: EF 45% on initial echo. With global hypokinesis. Likely in setting of sepsis.   -Patient unable to tolerate GDMT.  -When patient no longer septic and HD stable, can discuss starting meds to assist with HF.   -Continue medical managment for IE.     To be discussed on rounds. 39y Male w PMH of IVDU and active smoker who went to [facility] on 11/8/19 complaining of productive cough with hemoptysis, progressive SOB, pleuritic chest pain, nausea, non-bloody emesis, abd pain, chillsx3 days. 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 Daniel Ruelas for possible surgical evaluation. Following admission, patient noted to be in acute hypoxic respiratory failure s/p intubation/sedation, acute kidney injury requiring CVVHD, congestive hepatopathy, and mutli-system organ failure 2/2 hypoperfusion. Patient deemed not a surgical candidate, per Dr. Ruelas, and patient transferred to MICU for medical management with IV nafcillin, ID following. Patient with complicated course and now s/p cardiac arrest likely from sepsis/hypoxic respiratory failure.    Tricuspid valve Endocarditis: Patient with vegetations seen on ELIAN and TTE as above with severe TR. TR is functional from malcoaptation of leaflets from vegetation. Patient with mildly elevated PASP, which likely is not contributing much to TR. Normal RV function. Not a surgical candidate. Patient also with septic emboli to lungs, further causing empyema. Blood cultures negative. Vegetation still present, no further vegetations seen.   -Continue on Nafcillin per ID recs.  -Patient will likely need to be continued on abx therapy indefinitely in setting of no surgical intervention.  -Patient will have long and complicated course without definite source control.   -If patient becomes more stable, reconsult CT Surgery for tricuspid valve repair/replacement.   -Continue with HD support with pressors.   -Would not recommend ELIAN at this time. Will not .     CHF: EF 45% on initial echo. With global hypokinesis. Likely in setting of sepsis.   -Patient unable to tolerate GDMT.  -When patient no longer septic and HD stable, can discuss starting meds to assist with HF.   -Continue medical managment for IE.     Discussed case with attending and patient seen at bedside. Continue management per MICU. Will sign off case. Please reconsult as needed.

## 2019-12-02 NOTE — PROGRESS NOTE ADULT - ATTENDING COMMENTS
oligoanuric ATN  no recovery- needs tunneled HD catheter  for repeat HD today- + fluid balance and some edema since stopping CVVHD-

## 2019-12-02 NOTE — PROGRESS NOTE ADULT - SUBJECTIVE AND OBJECTIVE BOX
Pt seen and examined at bedside.  Sedation being tapered overnight.  Nursing reports continue significant stool output.  Abd xray notable for distal small bowel dilation.  Patient is arousable but unable to obtain ros due to sedation    Allergies    No Known Allergies    Intolerances      MEDICATIONS:  MEDICATIONS  (STANDING):  acyclovir IVPB 370 milliGRAM(s) IV Intermittent every 24 hours  albumin human 25% IVPB 50 milliLiter(s) IV Intermittent every 1 hour  albuterol/ipratropium for Nebulization 3 milliLiter(s) Nebulizer every 4 hours  artificial  tears Solution 1 Drop(s) Both EYES every 6 hours  chlorhexidine 0.12% Liquid 15 milliLiter(s) Oral Mucosa every 12 hours  chlorhexidine 2% Cloths 1 Application(s) Topical daily  dextrose 10% + sodium chloride 0.9%. 1000 milliLiter(s) (25 mL/Hr) IV Continuous <Continuous>  dextrose 5%. 1000 milliLiter(s) (50 mL/Hr) IV Continuous <Continuous>  dextrose 50% Injectable 12.5 Gram(s) IV Push once  dextrose 50% Injectable 25 Gram(s) IV Push once  dextrose 50% Injectable 25 Gram(s) IV Push once  fentaNYL   Infusion. 0.5 MICROgram(s)/kG/Hr (3.68 mL/Hr) IV Continuous <Continuous>  heparin  Injectable 5000 Unit(s) SubCutaneous every 8 hours  hydrocortisone sodium succinate Injectable 50 milliGRAM(s) IV Push every 12 hours  insulin lispro (HumaLOG) corrective regimen sliding scale   SubCutaneous every 6 hours  midodrine 10 milliGRAM(s) Oral every 8 hours  multivitamin/minerals/iron Oral Solution (CENTRUM) 15 milliLiter(s) Oral daily  nafcillin  IVPB 2 Gram(s) IV Intermittent every 4 hours  norepinephrine Infusion 0.05 MICROgram(s)/kG/Min (3.45 mL/Hr) IV Continuous <Continuous>  pantoprazole  Injectable 40 milliGRAM(s) IV Push daily  polyethylene glycol 3350 17 Gram(s) Oral two times a day  propofol Infusion 5 MICROgram(s)/kG/Min (2.208 mL/Hr) IV Continuous <Continuous>  QUEtiapine 75 milliGRAM(s) Oral every 12 hours  sodium chloride 0.9% lock flush 3 milliLiter(s) IV Push every 8 hours  vitamin E 400 International Unit(s) Oral daily  zinc sulfate 220 milliGRAM(s) Oral daily    MEDICATIONS  (PRN):  acetaminophen    Suspension .. 650 milliGRAM(s) Oral every 6 hours PRN Temp greater or equal to 38C (100.4F)  dextrose 40% Gel 15 Gram(s) Oral once PRN Blood Glucose LESS THAN 70 milliGRAM(s)/deciliter  glucagon  Injectable 1 milliGRAM(s) IntraMuscular once PRN Glucose LESS THAN 70 milligrams/deciliter    Vital Signs Last 24 Hrs  T(C): 38.1 (02 Dec 2019 06:28), Max: 38.3 (01 Dec 2019 19:00)  T(F): 100.5 (02 Dec 2019 06:28), Max: 101 (01 Dec 2019 19:00)  HR: 108 (02 Dec 2019 09:00) (104 - 130)  BP: 120/70 (01 Dec 2019 20:14) (120/70 - 120/70)  BP(mean): 88 (01 Dec 2019 20:14) (88 - 88)  RR: 19 (02 Dec 2019 09:00) (10 - 30)  SpO2: 100% (02 Dec 2019 09:00) (91% - 100%)    12-01 @ 07:01  -  12-02 @ 07:00  --------------------------------------------------------  IN: 2008.7 mL / OUT: 125 mL / NET: 1883.7 mL    12-02 @ 07:01  -  12-02 @ 10:51  --------------------------------------------------------  IN: 126.5 mL / OUT: 115 mL / NET: 11.5 mL      PHYSICAL EXAM:    General: Frail young male, sedated  HEENT: MMM, conjunctiva and scleral icterus  Gastrointestinal: distended, nontender, no guarding  Skin: Warm and dry.     LABS:                        7.3    7.97  )-----------( 237      ( 02 Dec 2019 05:46 )             21.7     12-02    131<L>  |  94<L>  |  31<H>  ----------------------------<  95  2.8<LL>   |  21<L>  |  2.94<H>    Ca    7.6<L>      02 Dec 2019 05:46  Phos  4.1     12-02  Mg     1.9     12-02    TPro  6.2  /  Alb  2.4<L>  /  TBili  1.7<H>  /  DBili  x   /  AST  25  /  ALT  22  /  AlkPhos  85  12-02            Culture - Blood (collected 01 Dec 2019 18:29)  Source: .Blood Blood  Preliminary Report (02 Dec 2019 07:01):    No growth at 12 hours    Culture - Blood (collected 01 Dec 2019 18:29)  Source: .Blood Blood  Preliminary Report (02 Dec 2019 07:01):    No growth at 12 hours      RADIOLOGY & ADDITIONAL STUDIES: xray reviewed Pt seen and examined at bedside.  Sedation being tapered overnight.  Nursing reports continue significant stool output.  Abd xray notable for distal small bowel dilation.  Patient is arousable but unable to obtain ros due to sedation    Allergies    No Known Allergies    Intolerances      MEDICATIONS:  MEDICATIONS  (STANDING):  acyclovir IVPB 370 milliGRAM(s) IV Intermittent every 24 hours  albumin human 25% IVPB 50 milliLiter(s) IV Intermittent every 1 hour  albuterol/ipratropium for Nebulization 3 milliLiter(s) Nebulizer every 4 hours  artificial  tears Solution 1 Drop(s) Both EYES every 6 hours  chlorhexidine 0.12% Liquid 15 milliLiter(s) Oral Mucosa every 12 hours  chlorhexidine 2% Cloths 1 Application(s) Topical daily  dextrose 10% + sodium chloride 0.9%. 1000 milliLiter(s) (25 mL/Hr) IV Continuous <Continuous>  dextrose 5%. 1000 milliLiter(s) (50 mL/Hr) IV Continuous <Continuous>  dextrose 50% Injectable 12.5 Gram(s) IV Push once  dextrose 50% Injectable 25 Gram(s) IV Push once  dextrose 50% Injectable 25 Gram(s) IV Push once  fentaNYL   Infusion. 0.5 MICROgram(s)/kG/Hr (3.68 mL/Hr) IV Continuous <Continuous>  heparin  Injectable 5000 Unit(s) SubCutaneous every 8 hours  hydrocortisone sodium succinate Injectable 50 milliGRAM(s) IV Push every 12 hours  insulin lispro (HumaLOG) corrective regimen sliding scale   SubCutaneous every 6 hours  midodrine 10 milliGRAM(s) Oral every 8 hours  multivitamin/minerals/iron Oral Solution (CENTRUM) 15 milliLiter(s) Oral daily  nafcillin  IVPB 2 Gram(s) IV Intermittent every 4 hours  norepinephrine Infusion 0.05 MICROgram(s)/kG/Min (3.45 mL/Hr) IV Continuous <Continuous>  pantoprazole  Injectable 40 milliGRAM(s) IV Push daily  polyethylene glycol 3350 17 Gram(s) Oral two times a day  propofol Infusion 5 MICROgram(s)/kG/Min (2.208 mL/Hr) IV Continuous <Continuous>  QUEtiapine 75 milliGRAM(s) Oral every 12 hours  sodium chloride 0.9% lock flush 3 milliLiter(s) IV Push every 8 hours  vitamin E 400 International Unit(s) Oral daily  zinc sulfate 220 milliGRAM(s) Oral daily    MEDICATIONS  (PRN):  acetaminophen    Suspension .. 650 milliGRAM(s) Oral every 6 hours PRN Temp greater or equal to 38C (100.4F)  dextrose 40% Gel 15 Gram(s) Oral once PRN Blood Glucose LESS THAN 70 milliGRAM(s)/deciliter  glucagon  Injectable 1 milliGRAM(s) IntraMuscular once PRN Glucose LESS THAN 70 milligrams/deciliter    Vital Signs Last 24 Hrs  T(C): 38.1 (02 Dec 2019 06:28), Max: 38.3 (01 Dec 2019 19:00)  T(F): 100.5 (02 Dec 2019 06:28), Max: 101 (01 Dec 2019 19:00)  HR: 108 (02 Dec 2019 09:00) (104 - 130)  BP: 120/70 (01 Dec 2019 20:14) (120/70 - 120/70)  BP(mean): 88 (01 Dec 2019 20:14) (88 - 88)  RR: 19 (02 Dec 2019 09:00) (10 - 30)  SpO2: 100% (02 Dec 2019 09:00) (91% - 100%)    12-01 @ 07:01  -  12-02 @ 07:00  --------------------------------------------------------  IN: 2008.7 mL / OUT: 125 mL / NET: 1883.7 mL    12-02 @ 07:01  -  12-02 @ 10:51  --------------------------------------------------------  IN: 126.5 mL / OUT: 115 mL / NET: 11.5 mL      PHYSICAL EXAM:    General: Frail young male, sedated  HEENT: MMM, conjunctiva and sclera anicteric  Gastrointestinal: distended, nontender, no guarding  Skin: Warm and dry.     LABS:                        7.3    7.97  )-----------( 237      ( 02 Dec 2019 05:46 )             21.7     12-02    131<L>  |  94<L>  |  31<H>  ----------------------------<  95  2.8<LL>   |  21<L>  |  2.94<H>    Ca    7.6<L>      02 Dec 2019 05:46  Phos  4.1     12-02  Mg     1.9     12-02    TPro  6.2  /  Alb  2.4<L>  /  TBili  1.7<H>  /  DBili  x   /  AST  25  /  ALT  22  /  AlkPhos  85  12-02            Culture - Blood (collected 01 Dec 2019 18:29)  Source: .Blood Blood  Preliminary Report (02 Dec 2019 07:01):    No growth at 12 hours    Culture - Blood (collected 01 Dec 2019 18:29)  Source: .Blood Blood  Preliminary Report (02 Dec 2019 07:01):    No growth at 12 hours      RADIOLOGY & ADDITIONAL STUDIES: xray reviewed

## 2019-12-02 NOTE — PROGRESS NOTE ADULT - SUBJECTIVE AND OBJECTIVE BOX
INCOMPLETE  OVERNIGHT EVENTS: NIELS. Had 3 bowel movements.     SUBJECTIVE / INTERVAL HPI: Patient seen and examined at bedside. Patient sedated. Unable to obtain ROS.    PHYSICAL EXAM:    General: WDWN, sedated  HEENT: NC/AT; PERRL, anicteric sclera; MMM  Neck: supple, trach in place  Cardiovascular: +S1/S2; regular rate and rhythm, systolic murmur at LSB   Respiratory: intubated via trach; diffuse rhonchi/crackles with decreased breath sounds b/l   Gastrointestinal: NGT; NT, tense and distended with hypoactive bowel sounds, but improved compared to prior exams    Extremities: WWP; trace edema of LEs/UEs; no clubbing or cyanosis  Derm: ischemia of all 10 distal phalanges of lower extremities; ischemia of distal phalange of R middle finger; dependent edema of lower extreme to the level of the knee b/l   Vascular: 2+ radial, DP/PT pulses B/L  Neurological: sedated, when off sedation per nursing staff AAOx3, moves all extremities, follows commands    MEDICATIONS:  MEDICATIONS  (STANDING):  acyclovir IVPB 370 milliGRAM(s) IV Intermittent every 24 hours  albuterol/ipratropium for Nebulization 3 milliLiter(s) Nebulizer every 4 hours  artificial  tears Solution 1 Drop(s) Both EYES every 6 hours  chlorhexidine 0.12% Liquid 15 milliLiter(s) Oral Mucosa every 12 hours  chlorhexidine 2% Cloths 1 Application(s) Topical daily  dextrose 10% + sodium chloride 0.9%. 1000 milliLiter(s) (25 mL/Hr) IV Continuous <Continuous>  dextrose 5%. 1000 milliLiter(s) (50 mL/Hr) IV Continuous <Continuous>  dextrose 50% Injectable 12.5 Gram(s) IV Push once  dextrose 50% Injectable 25 Gram(s) IV Push once  dextrose 50% Injectable 25 Gram(s) IV Push once  fentaNYL   Infusion. 0.5 MICROgram(s)/kG/Hr (3.68 mL/Hr) IV Continuous <Continuous>  heparin  Injectable 5000 Unit(s) SubCutaneous every 8 hours  hydrocortisone sodium succinate Injectable 50 milliGRAM(s) IV Push every 8 hours  insulin lispro (HumaLOG) corrective regimen sliding scale   SubCutaneous every 6 hours  midodrine 10 milliGRAM(s) Oral every 8 hours  multivitamin/minerals/iron Oral Solution (CENTRUM) 15 milliLiter(s) Oral daily  nafcillin  IVPB 2 Gram(s) IV Intermittent every 4 hours  norepinephrine Infusion 0.05 MICROgram(s)/kG/Min (3.45 mL/Hr) IV Continuous <Continuous>  pantoprazole  Injectable 40 milliGRAM(s) IV Push daily  polyethylene glycol 3350 17 Gram(s) Oral two times a day  propofol Infusion 5 MICROgram(s)/kG/Min (2.208 mL/Hr) IV Continuous <Continuous>  QUEtiapine 75 milliGRAM(s) Oral every 12 hours  sodium chloride 0.9% lock flush 3 milliLiter(s) IV Push every 8 hours  vitamin E 400 International Unit(s) Oral daily  zinc sulfate 220 milliGRAM(s) Oral daily    MEDICATIONS  (PRN):  acetaminophen    Suspension .. 650 milliGRAM(s) Oral every 6 hours PRN Temp greater or equal to 38C (100.4F)  dextrose 40% Gel 15 Gram(s) Oral once PRN Blood Glucose LESS THAN 70 milliGRAM(s)/deciliter  glucagon  Injectable 1 milliGRAM(s) IntraMuscular once PRN Glucose LESS THAN 70 milligrams/deciliter      ALLERGIES:  Allergies    No Known Allergies    Intolerances        LABS:                        7.2    8.91  )-----------( 200      ( 01 Dec 2019 06:55 )             22.3     12-01    132<L>  |  94<L>  |  22  ----------------------------<  96  3.0<L>   |  24  |  2.20<H>    Ca    7.7<L>      01 Dec 2019 06:55  Phos  3.7     12-01  Mg     1.9     12-01    TPro  6.5  /  Alb  2.4<L>  /  TBili  1.7<H>  /  DBili  x   /  AST  26  /  ALT  26  /  AlkPhos  91  12-01        CAPILLARY BLOOD GLUCOSE      POCT Blood Glucose.: 103 mg/dL (01 Dec 2019 10:57)      RADIOLOGY & ADDITIONAL TESTS: Reviewed. OVERNIGHT EVENTS: NIELS. Had 3 bowel movements. Continuing to downtitrate on fentanyl.     SUBJECTIVE / INTERVAL HPI: Patient seen and examined at bedside. Patient sedated but awakes to verbal/physical stimuli. More placid on seroquel. No pain.     PHYSICAL EXAM:    General: WDWN, sedated  HEENT: NC/AT; PERRL, anicteric sclera; MMM  Neck: supple, trach in place  Cardiovascular: +S1/S2; regular rate and rhythm, systolic murmur at LSB   Respiratory: intubated via trach; diffuse rhonchi/crackles with decreased breath sounds b/l   Gastrointestinal: NGT; NT, tense and distended with hypoactive bowel sounds, but improved compared to prior exams    Extremities: WWP; trace edema of LEs/UEs; no clubbing or cyanosis  Derm: ischemia of all 10 distal phalanges of lower extremities; ischemia of distal phalange of R middle finger; dependent edema of lower extreme to the level of the knee b/l   Vascular: 2+ radial, DP/PT pulses B/L  Neurological: sedated, when off sedation per nursing staff AAOx3, moves all extremities, follows commands    ICU Vital Signs Last 24 Hrs  T(C): 38.1 (02 Dec 2019 06:28), Max: 38.3 (01 Dec 2019 19:00)  T(F): 100.5 (02 Dec 2019 06:28), Max: 101 (01 Dec 2019 19:00)  HR: 110 (02 Dec 2019 08:00) (108 - 130)  BP: 120/70 (01 Dec 2019 20:14) (120/70 - 120/70)  BP(mean): 88 (01 Dec 2019 20:14) (88 - 88)  ABP: 116/74 (02 Dec 2019 08:00) (90/58 - 132/80)  ABP(mean): 88 (02 Dec 2019 08:00) (70 - 98)  RR: 19 (02 Dec 2019 08:00) (10 - 30)  SpO2: 100% (02 Dec 2019 08:00) (91% - 100%)      MEDICATIONS:  MEDICATIONS  (STANDING):  acyclovir IVPB 370 milliGRAM(s) IV Intermittent every 24 hours  albuterol/ipratropium for Nebulization 3 milliLiter(s) Nebulizer every 4 hours  artificial  tears Solution 1 Drop(s) Both EYES every 6 hours  chlorhexidine 0.12% Liquid 15 milliLiter(s) Oral Mucosa every 12 hours  chlorhexidine 2% Cloths 1 Application(s) Topical daily  dextrose 10% + sodium chloride 0.9%. 1000 milliLiter(s) (25 mL/Hr) IV Continuous <Continuous>  dextrose 5%. 1000 milliLiter(s) (50 mL/Hr) IV Continuous <Continuous>  dextrose 50% Injectable 12.5 Gram(s) IV Push once  dextrose 50% Injectable 25 Gram(s) IV Push once  dextrose 50% Injectable 25 Gram(s) IV Push once  fentaNYL   Infusion. 0.5 MICROgram(s)/kG/Hr (3.68 mL/Hr) IV Continuous <Continuous>  heparin  Injectable 5000 Unit(s) SubCutaneous every 8 hours  hydrocortisone sodium succinate Injectable 50 milliGRAM(s) IV Push every 8 hours  insulin lispro (HumaLOG) corrective regimen sliding scale   SubCutaneous every 6 hours  midodrine 10 milliGRAM(s) Oral every 8 hours  multivitamin/minerals/iron Oral Solution (CENTRUM) 15 milliLiter(s) Oral daily  nafcillin  IVPB 2 Gram(s) IV Intermittent every 4 hours  norepinephrine Infusion 0.05 MICROgram(s)/kG/Min (3.45 mL/Hr) IV Continuous <Continuous>  pantoprazole  Injectable 40 milliGRAM(s) IV Push daily  polyethylene glycol 3350 17 Gram(s) Oral two times a day  propofol Infusion 5 MICROgram(s)/kG/Min (2.208 mL/Hr) IV Continuous <Continuous>  QUEtiapine 75 milliGRAM(s) Oral every 12 hours  sodium chloride 0.9% lock flush 3 milliLiter(s) IV Push every 8 hours  vitamin E 400 International Unit(s) Oral daily  zinc sulfate 220 milliGRAM(s) Oral daily    MEDICATIONS  (PRN):  acetaminophen    Suspension .. 650 milliGRAM(s) Oral every 6 hours PRN Temp greater or equal to 38C (100.4F)  dextrose 40% Gel 15 Gram(s) Oral once PRN Blood Glucose LESS THAN 70 milliGRAM(s)/deciliter  glucagon  Injectable 1 milliGRAM(s) IntraMuscular once PRN Glucose LESS THAN 70 milligrams/deciliter      ALLERGIES:  Allergies    No Known Allergies    Intolerances        LABS:                        7.2    8.91  )-----------( 200      ( 01 Dec 2019 06:55 )             22.3     12-01    132<L>  |  94<L>  |  22  ----------------------------<  96  3.0<L>   |  24  |  2.20<H>    Ca    7.7<L>      01 Dec 2019 06:55  Phos  3.7     12-01  Mg     1.9     12-01    TPro  6.5  /  Alb  2.4<L>  /  TBili  1.7<H>  /  DBili  x   /  AST  26  /  ALT  26  /  AlkPhos  91  12-01        CAPILLARY BLOOD GLUCOSE      POCT Blood Glucose.: 103 mg/dL (01 Dec 2019 10:57)      RADIOLOGY & ADDITIONAL TESTS: Reviewed.

## 2019-12-02 NOTE — PROGRESS NOTE ADULT - ASSESSMENT
40 yo M w/ PMH of IVDU and active smoker who came from [facility] on 11/8/19 in 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  Hospital course complicated by acute hypoxic respiratory failure, acute kidney injury, congestive hepatopathy, multi-system organ failure 2/2 hypoperfusion.       # Anuric CAMERON likely due to ATN in setting of shock  - was initially on CVVHD then switched to IHD  - last HD completed on 11/30--> 4 L of UF   - remains volume overloaded - net pos 1.8 L over the past 24 hrs  - planned for HD today   - strict I/O  - recommend to get Tunnel HD cath    # hyponatremia - 131  - likely due to poor free water clearance  - planned for HD today    # anemia  - Hb 7.3  - hemolysis likely 2/2 to sepsis and/or DIC as per primary team  - recommend transfusion prn hb< 7    # renal bone disease  - calcium phos noted- no need for binders    # hypokalemia  - K 2.8  - HD today with 4 k  - suggest KCl 40 meq via OGT

## 2019-12-02 NOTE — PROGRESS NOTE ADULT - SUBJECTIVE AND OBJECTIVE BOX
INTERVAL HPI/OVERNIGHT EVENTS:    Patient was seen at bedside.  Events noted.  Still with fevers    ROS:    unable to obtain         ANTIBIOTICS/RELEVANT:    MEDICATIONS  (STANDING):  acyclovir IVPB 370 milliGRAM(s) IV Intermittent every 24 hours  albumin human 25% IVPB 50 milliLiter(s) IV Intermittent every 1 hour  albuterol/ipratropium for Nebulization 3 milliLiter(s) Nebulizer every 4 hours  artificial  tears Solution 1 Drop(s) Both EYES every 6 hours  bisacodyl Suppository 10 milliGRAM(s) Rectal every 24 hours  chlorhexidine 0.12% Liquid 15 milliLiter(s) Oral Mucosa every 12 hours  chlorhexidine 2% Cloths 1 Application(s) Topical daily  dexMEDEtomidine Infusion 0.2 MICROgram(s)/kG/Hr (3.68 mL/Hr) IV Continuous <Continuous>  dextrose 10% + sodium chloride 0.9%. 1000 milliLiter(s) (25 mL/Hr) IV Continuous <Continuous>  dextrose 5%. 1000 milliLiter(s) (50 mL/Hr) IV Continuous <Continuous>  dextrose 50% Injectable 12.5 Gram(s) IV Push once  dextrose 50% Injectable 25 Gram(s) IV Push once  dextrose 50% Injectable 25 Gram(s) IV Push once  fentaNYL   Infusion. 0.5 MICROgram(s)/kG/Hr (3.68 mL/Hr) IV Continuous <Continuous>  heparin  Injectable 5000 Unit(s) SubCutaneous every 8 hours  hydrocortisone sodium succinate Injectable 50 milliGRAM(s) IV Push every 12 hours  insulin lispro (HumaLOG) corrective regimen sliding scale   SubCutaneous every 6 hours  midodrine 10 milliGRAM(s) Oral every 8 hours  multivitamin/minerals/iron Oral Solution (CENTRUM) 15 milliLiter(s) Oral daily  nafcillin  IVPB 2 Gram(s) IV Intermittent every 4 hours  pantoprazole  Injectable 40 milliGRAM(s) IV Push daily  polyethylene glycol 3350 17 Gram(s) Oral two times a day  propofol Infusion 5 MICROgram(s)/kG/Min (2.208 mL/Hr) IV Continuous <Continuous>  QUEtiapine 75 milliGRAM(s) Oral every 12 hours  sodium chloride 0.9% lock flush 3 milliLiter(s) IV Push every 8 hours  vitamin E 400 International Unit(s) Oral daily  zinc sulfate 220 milliGRAM(s) Oral daily    MEDICATIONS  (PRN):  acetaminophen    Suspension .. 650 milliGRAM(s) Oral every 6 hours PRN Temp greater or equal to 38C (100.4F)  dextrose 40% Gel 15 Gram(s) Oral once PRN Blood Glucose LESS THAN 70 milliGRAM(s)/deciliter  glucagon  Injectable 1 milliGRAM(s) IntraMuscular once PRN Glucose LESS THAN 70 milligrams/deciliter        Vital Signs Last 24 Hrs  T(C): 38.1 (02 Dec 2019 06:28), Max: 38.3 (01 Dec 2019 19:00)  T(F): 100.5 (02 Dec 2019 06:28), Max: 101 (01 Dec 2019 19:00)  HR: 106 (02 Dec 2019 13:00) (104 - 130)  BP: 120/70 (01 Dec 2019 20:14) (120/70 - 120/70)  BP(mean): 88 (01 Dec 2019 20:14) (88 - 88)  RR: 17 (02 Dec 2019 13:00) (10 - 28)  SpO2: 100% (02 Dec 2019 12:00) (98% - 100%)    PHYSICAL EXAM:  Constitutional:  chronically ill appearing   Eyes:  no icterus  Ear/Nose/Throat: + trach    Neck:  supple  Respiratory: clear to auscultation   Cardiovascular: + murmur  Gastrointestinal:soft, (+) BS, no HSM  Extremities: + necrosis on toes  Vascular: DP Pulse:	right normal; left normal      LABS:                        7.3    7.97  )-----------( 237      ( 02 Dec 2019 05:46 )             21.7     12-02    131<L>  |  94<L>  |  31<H>  ----------------------------<  95  2.8<LL>   |  21<L>  |  2.94<H>    Ca    7.6<L>      02 Dec 2019 05:46  Phos  4.1     12-02  Mg     1.9     12-02    TPro  6.2  /  Alb  2.4<L>  /  TBili  1.7<H>  /  DBili  x   /  AST  25  /  ALT  22  /  AlkPhos  85  12-02          MICROBIOLOGY:    Culture - Blood (12.01.19 @ 18:29)    Specimen Source: .Blood Blood    Culture Results:   No growth at 12 hours    Culture - Blood (12.01.19 @ 18:29)    Specimen Source: .Blood Blood    Culture Results:   No growth at 12 hours    Culture - Blood (11.27.19 @ 05:48)    Specimen Source: .Blood Blood    Culture Results:   No growth at 5 days.        RADIOLOGY & ADDITIONAL STUDIES:

## 2019-12-03 NOTE — PROGRESS NOTE ADULT - SUBJECTIVE AND OBJECTIVE BOX
Patient was seen and evaluated on dialysis.   HR: 128 (12-03-19 @ 18:00)  BP: 123/66 (12-03-19 @ 15:01)    12-03    132<L>  |  96  |  23  ----------------------------<  120<H>  3.4<L>   |  22  |  2.26<H>    Ca    7.9<L>      03 Dec 2019 05:15  Phos  3.3     12-03  Mg     1.8     12-03    TPro  5.8<L>  /  Alb  2.2<L>  /  TBili  1.3<H>  /  DBili  x   /  AST  26  /  ALT  21  /  AlkPhos  82  12-03    Continue dialysis:   Dialyzer:       optiflux 180  QB: reduced to 200 ml/min as pt is tachycardic  K bath: 3  Goal UF:1.5 L  Duration: 2.5 hrs    Continue full hemodialysis treatment as prescribed.

## 2019-12-03 NOTE — PROGRESS NOTE ADULT - SUBJECTIVE AND OBJECTIVE BOX
Patient was seen and evaluated on dialysis.   HR: 128 (12-03-19 @ 16:00)  BP: 123/66 (12-03-19 @ 15:01)    12-03    132<L>  |  96  |  23  ----------------------------<  120<H>  3.4<L>   |  22  |  2.26<H>    Ca    7.9<L>      03 Dec 2019 05:15  Phos  3.3     12-03  Mg     1.8     12-03    TPro  5.8<L>  /  Alb  2.2<L>  /  TBili  1.3<H>  /  DBili  x   /  AST  26  /  ALT  21  /  AlkPhos  82  12-03    Continue dialysis:   Dialyzer:   180    QB: 400  K bath: 4  Goal UF:    1.5   L   over      150         min  Patient is tolerating the procedure well.

## 2019-12-03 NOTE — OCCUPATIONAL THERAPY INITIAL EVALUATION ADULT - PERTINENT HX OF CURRENT PROBLEM, REHAB EVAL
38 y/o M w/ PMH of IVDU and active smoker who came from [facility] on 11/8/19 in 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 After being transferred to  CTICU for surgical evaluation, pt was deemed to not be a surgical candidate and transferred to MICU for medical mgmt.

## 2019-12-03 NOTE — PROGRESS NOTE ADULT - SUBJECTIVE AND OBJECTIVE BOX
OVERNIGHT EVENTS: Had rectal tube placed last night with interval resolution of abdominal distension. Febrile to 100.8 with tylenol given. AM Hgb at 6. T+S screen drawn and given 1 unit PRBC.     SUBJECTIVE / INTERVAL HPI: Patient seen and examined at bedside. Awake, alert and oriented. Responds to questions appropriately by nodding yes or no. Denies any pain. Follows commands. No n/v, shortness of breath.     PHYSICAL EXAM:    General: deconditioned, fatigued  HEENT: NC/AT; PERRL, mildly icteric sclera; MMM  Neck: supple, trach in place  Cardiovascular: +S1/S2; tachycardic but regular rhythm, systolic murmur at LSB   Respiratory: intubated via trach; diffuse rhonchi/crackles with decreased breath sounds b/l   Gastrointestinal: NGT; NT, non distended; +BS   Extremities: WWP; trace edema of LEs/UEs; no clubbing or cyanosis  Derm: ischemia of all 10 distal phalanges of lower extremities; ischemia of distal phalange of R middle finger; dependent edema of lower extreme to the level of the knee b/l; edema of b/l hands more prominent on the dorsal surface    Vascular: 2+ radial, DP/PT pulses B/L  Neurological: AAOx3, moves all extremities, follows commands    VITAL SIGNS:  Vital Signs Last 24 Hrs  T(C): 37.9 (03 Dec 2019 10:05), Max: 38.2 (03 Dec 2019 01:03)  T(F): 100.2 (03 Dec 2019 10:05), Max: 100.8 (03 Dec 2019 01:03)  HR: 112 (03 Dec 2019 13:00) (108 - 134)  BP: 90/43 (03 Dec 2019 05:00) (90/43 - 90/43)  BP(mean): 62 (03 Dec 2019 05:00) (62 - 62)  RR: 19 (03 Dec 2019 13:00) (12 - 25)  SpO2: 100% (03 Dec 2019 13:00) (95% - 100%)      MEDICATIONS:  MEDICATIONS  (STANDING):  acyclovir IVPB 370 milliGRAM(s) IV Intermittent every 24 hours  albumin human 25% IVPB 50 milliLiter(s) IV Intermittent every 1 hour  albumin human 25% IVPB 50 milliLiter(s) IV Intermittent every 1 hour  albuterol/ipratropium for Nebulization 3 milliLiter(s) Nebulizer every 4 hours  artificial  tears Solution 1 Drop(s) Both EYES every 6 hours  bisacodyl Suppository 10 milliGRAM(s) Rectal every 24 hours  chlorhexidine 0.12% Liquid 15 milliLiter(s) Oral Mucosa every 12 hours  chlorhexidine 2% Cloths 1 Application(s) Topical daily  dextrose 5%. 1000 milliLiter(s) (50 mL/Hr) IV Continuous <Continuous>  dextrose 50% Injectable 12.5 Gram(s) IV Push once  dextrose 50% Injectable 25 Gram(s) IV Push once  dextrose 50% Injectable 25 Gram(s) IV Push once  fentaNYL   Infusion. 0.5 MICROgram(s)/kG/Hr (3.68 mL/Hr) IV Continuous <Continuous>  gabapentin   Solution 100 milliGRAM(s) Enteral Tube <User Schedule>  heparin  Injectable 5000 Unit(s) SubCutaneous every 8 hours  hydrocortisone sodium succinate Injectable 25 milliGRAM(s) IV Push every 12 hours  insulin lispro (HumaLOG) corrective regimen sliding scale   SubCutaneous every 6 hours  midodrine 10 milliGRAM(s) Oral every 8 hours  multivitamin/minerals/iron Oral Solution (CENTRUM) 15 milliLiter(s) Enteral Tube daily  nafcillin  IVPB 2 Gram(s) IV Intermittent every 4 hours  pantoprazole  Injectable 40 milliGRAM(s) IV Push daily  polyethylene glycol 3350 17 Gram(s) Oral two times a day  potassium chloride  20 mEq/100 mL IVPB 20 milliEquivalent(s) IV Intermittent every 2 hours  propofol Infusion 5 MICROgram(s)/kG/Min (2.208 mL/Hr) IV Continuous <Continuous>  QUEtiapine 100 milliGRAM(s) Oral every 12 hours  sodium chloride 0.9% lock flush 3 milliLiter(s) IV Push every 8 hours  vitamin E 400 International Unit(s) Oral daily  zinc sulfate 220 milliGRAM(s) Oral daily    MEDICATIONS  (PRN):  acetaminophen    Suspension .. 650 milliGRAM(s) Oral every 6 hours PRN Temp greater or equal to 38C (100.4F)  dextrose 40% Gel 15 Gram(s) Oral once PRN Blood Glucose LESS THAN 70 milliGRAM(s)/deciliter  glucagon  Injectable 1 milliGRAM(s) IntraMuscular once PRN Glucose LESS THAN 70 milligrams/deciliter      ALLERGIES:  Allergies    No Known Allergies    Intolerances        LABS:                        6.0    4.82  )-----------( 170      ( 03 Dec 2019 05:15 )             17.9     12-03    132<L>  |  96  |  23  ----------------------------<  120<H>  3.4<L>   |  22  |  2.26<H>    Ca    7.9<L>      03 Dec 2019 05:15  Phos  3.3     12-03  Mg     1.8     12-03    TPro  5.8<L>  /  Alb  2.2<L>  /  TBili  1.3<H>  /  DBili  x   /  AST  26  /  ALT  21  /  AlkPhos  82  12-03        CAPILLARY BLOOD GLUCOSE      POCT Blood Glucose.: 103 mg/dL (03 Dec 2019 12:48)      RADIOLOGY & ADDITIONAL TESTS: Reviewed.

## 2019-12-03 NOTE — PROGRESS NOTE ADULT - ASSESSMENT
38 y/o M w/ PMH of IVDU and active smoker who came from [facility] on 11/8/19 in 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 After being transferred to  CTICU for surgical evaluation, pt was deemed to not be a surgical candidate and transferred to MICU for medical mgmt. ID, Nephrology, heme/onc, surgery, vascular following.    Neuro  Intubated, sedated on Propofol, Fentanyl gtt. Prior CT head negative for any intracranial embolic events.    -Increase Seroquel to 100 mg q12h  - continue to wean off propofol and fentanyl     Cardiovascular   #Hypotension  Most likely 2/2 septic shock from infective endocarditis and RV failure 2/2 tricuspid regurgitation (ELIAN shows EF of 40-50%). Echo with EF 45%. ELIAN with EF 40-45%, 3.8 x1cm vegetation on TR valve, with severe TR, trivial pericardial effusion. Echo with bubble revealed no PFO.   - normotensive   - Maintain MAP>65.   - C/W medical mgmt of infective endocarditis as below.  - c/w Midodrine 10mg q8h  - currently off levophed    Respiratory  #Acute hypoxic respiratory failure   Most likely 2/2 alveolar hemorrhage in the setting of septic embolic causing numerous cavitary lesions on CT chest. CXR with scattered infiltrates and pleural effusions. Sputum culture negative. 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 Bilateral CT in place, confirmed by CXR, continues to drain serosanguinous fluid. Fluid analysis of both CT pleural fluid confirms complex exudate with high cellularity, low pH and low glucose. pleural fluid cultures with staph aureus. Chest tubes in place as above (2 R chest tubes, 1 L chest tube)   - Monitor output of bilateral chest tubes  - R pleural fluid cultures with staph aureus, L pleural fluid cultures with NGTD   - CT chest and cxr as above. 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 Not a surgical candidate for a tricuspid valve replacement at this time as per CT surgery. Initial blood cultures positive for staph epidermidis, likely a contaminant. Initial sputum culture positive for staph aureus  Repeat blood cultures with NGTD. Sputum cx NGTD.  - ID following, C/W Nafcillin 2g q4 (Sensitive to Oxacillin as per OSH records), f/u recs   - repeat Bcxs with NGTD   - BAL positive for HSV, started on Acyclovir (11/25-) continue for 10d   - Per CT surgery pt is too high risk for any surgery and would likely not survive procedure.   - Cardiology consulted to optimize management of his right heart dysfunction, f/u recs        Renal  #Acute renal failure most likely 2/2 ATN from hypoperfusion, Minimal urine output, on CVVHD, oliguric with 0-5cc/hr. Vancomycin level 37 on arrival so may be component of drug induced nephropathy. Bun/Cr continues to improve while on CVVHD. Pt is clinically fluid overloaded but now improved and hemodynamically stable. No renal infarcts as per CT abdomen/pelvis.  - F/U nephrology recs.  -plan for intermittent HD per nephrology recs   - Continue to correct fluid overload with HD  - Monitor I/Os  - currently anuric with no signs of renal recovery  - continue to monitor serum electrolytes    #Persistent hyponatremia  Pt most likely with hypervolemic hyponatremia  -Na today 132, will continue to monitor  - administration of IV abx switched to NS vs D5W; possible etiology for hyponatremia       GI  OG on arrival with 600cc of bilious fluid, abdomen still distended but had several BMs, some loose. CT abdomen revealed no evidence of bowel ischemia or SBO. Sump was DCed and replaced with NG tube. CT abdomen/pelvis revealed "irregular masslike mural thickening of the posterior left lateral wall of the rectum." Surgery consulted, unlikely perirectal abscess. Recommend GI evaluation for possible scope.  - F/u GI consult, f/u recs, when stable will go for flex sig; possible PEG   - interval resolution of abdominal distension with placement of rectal tube  -C. diff negative   - Sump removed, NG tube in place; to resume feeds  -c/w Miralax BID  -wean off fentanyl/opiates as above    #Congestive Hepatopathy   Transaminitis, coag derangements, hyperbilirubinemia most likely 2/2 hepatic congestion vs hemolysis, due to severe TR in setting of infective endocarditis.  Hepatitis panel negative.  -LFTs wnl  -T nehemiah downtrending  -Monitor CMP    Heme  #Hemolysis  Intravascular hemolysis with initial haptoglobin <10, LDH decreasing at 365. D-dimer elevated. Fibrinogen stable 396. Today still clinically jaundiced, with stable bilirubinemia: T.bili 9.9., D.bili 8.5. h/h stable at 8.1/25.5. Platelets decreasing from 54 to 49.. Fact VII/Factor VIII elevated. Unlikely 2/2 CVVHD. Likely 2/2 to sepsis and/or DIC.   - Received 1u plts 11/26 in preparation for trach placement 11/26; plt currently stable and plts >100 without any evidence of oozing/bleeding   - Heme consulted, follow recs  - Monitor direct and total bili, LDH, fibrinogen, platelets   - Transfuse platelets if <10K or bleeding and <50K  - Transfuse pRBCs for hgb <7; s/p 1 unit PRBC 12/3       Vascular  Vascular surgery consulted in setting of gangrenous distal toes and fingers b/l. Likely 2/2 to pressors.  - per vascular, may need amputation in the future once ischemic tissue fully demarcates, and once patient is medically optimized      Endocrinology  - taper steroids; hydrocortisone 25 BID   - previously on Hydrocortisone 50mg q8- stress dose steroids    Lines: right IJ TLC and Ernesto (11/12), Axillary A-line (11/12), L subclavian (11/27-), right peripheral (11/12)    F: None   E: replete K <4, Mg <2  N: Trickle feeds   GI Ppx: Protonix   DVT Ppx: Heparin   Code status: FULL   Dispo: MICU assistive person

## 2019-12-03 NOTE — OCCUPATIONAL THERAPY INITIAL EVALUATION ADULT - MANUAL MUSCLE TESTING RESULTS, REHAB EVAL
bilateral shoulder limited by lines/catheters. Bilateral elbows 4/5, wrist 3/5, grasp 4/5. CN Testing: b/l frontalis intact; b/l buccinator intact; smile symmetrical; tongue protrusion at midline; b/l eyes open/close intact.

## 2019-12-03 NOTE — OCCUPATIONAL THERAPY INITIAL EVALUATION ADULT - ORIENTATION, REHAB EVAL
patient able to point to yes/no options when asked name, patient able to write . According to CARLI Nguyen, patient A/Ox4.

## 2019-12-03 NOTE — OCCUPATIONAL THERAPY INITIAL EVALUATION ADULT - GENERAL OBSERVATIONS, REHAB EVAL
Patient cleared for OT evaluation by CARLI Nguyen. Patient received semi-gonzales, NAD, + 2 right CT's and 1 left CT to wall suction, + NGT, + A-line, + SCD's, + z-flex boots, + tele, + TLC, b/l wrist restraints, + trach to vent FiO2 30%, +IJ dialysis cath, +rectal tube.

## 2019-12-03 NOTE — PROGRESS NOTE ADULT - SUBJECTIVE AND OBJECTIVE BOX
Patient was seen and evaluated on dialysis.   HR: 134 (12-03-19 @ 17:00)  BP: 123/66 (12-03-19 @ 15:01)  Wt(kg): --  12-03    132<L>  |  96  |  23  ----------------------------<  120<H>  3.4<L>   |  22  |  2.26<H>    Ca    7.9<L>      03 Dec 2019 05:15  Phos  3.3     12-03  Mg     1.8     12-03    TPro  5.8<L>  /  Alb  2.2<L>  /  TBili  1.3<H>  /  DBili  x   /  AST  26  /  ALT  21  /  AlkPhos  82  12-03    Continue dialysis:   Dialyzer:         QB: 400 ml/min  K bath: 4K bath  Goal UF: 1.5L  Duration: 2.5 H  received PRBC earlier in day for hgb 6  Patient is tolerating the procedure well.   Continue full hemodialysis treatment as prescribed.

## 2019-12-03 NOTE — PROGRESS NOTE ADULT - ASSESSMENT
40 y/o M smoker w/ PMHx of IVDU presented to [facility] w/ productive cough with hemoptysis found to have septic emboli w/ vegetation of the TV transferred to [facility] for surgical evaluation.  Course complicated by sepsis, DIC, acute respiratory failure w/ empyema s/p intubation and thoracostomy tube, CAMERON requiring cvvhd, ileus, cardiac arrest, and pneumothorax.     #bdominal distension   Appears to be due to ileus. Neostigmine given over the weekend with continue BMs, but minimal effect in distention.  Now with distal small bowel dilation on imaging w/ review of xray 12/2/19 with radiology notable for dilation of around 9.5cm at the cecum, mildly increased from prior  -Wean opioids  -Serial abdominal exam and daily xray  -Aggressive repletion of electrolytes  -NG sump to low-intermittent suction  -Hold TF  -Consider methylnaltrexone to counteract opioids, but would continue to monitor for obstruction and opioid withdrawal    #Hyperbilirubinemia   Patient with marked elevated bilirubin initially thought to be 2/2 ischemic hepatopathy now downtrending. Imaging without CBD dilation suggestive of cholestasis from sepsis vs DILI in the setting of recent ischemic hepatopathy. Continues to downtrend.  -Daily LFT and INR    #Rectal lesion  -nonurgent flex sig when patient is stable    Recommendations d/w primary team  Case d/w svc attending 38 y/o M smoker w/ PMHx of IVDU presented to [facility] w/ productive cough with hemoptysis found to have septic emboli w/ vegetation of the TV transferred to [facility] for surgical evaluation.  Course complicated by sepsis, DIC, acute respiratory failure w/ empyema s/p intubation and thoracostomy tube, CAMERON requiring cvvhd, ileus, cardiac arrest, and pneumothorax.     #Abdominal distension: abdomen soft and nondistended on examination today. Has been passing lot of gas. Reviewed abdominal xray which is notable for significant improvement in bowel distention (seems to be almost resolved)  Appears to be due to ileus. Neostigmine given over the weekend with continue BMs. Prior distal small bowel dilation on imaging w/ review of xray 12/2/19 with radiology notable for dilation of around 9.5cm at the cecum. Today seems to be significant improvement from our review and radiology read also notable for resolution of distention on the report.   -Wean opiates  -Serial abdominal exam and daily xray  -Aggressive repletion of electrolytes  -NG sump to low-intermittent suction  -Hold TF  -Consider methylnaltrexone to counteract opioids, but would continue to monitor for obstruction and opioid withdrawal    #Hyperbilirubinemia   Patient with marked elevated bilirubin initially thought to be 2/2 ischemic hepatopathy now downtrending. Imaging without CBD dilation suggestive of cholestasis from sepsis vs DILI in the setting of recent ischemic hepatopathy. Continues to downtrend.  -Daily LFT and INR    #Rectal lesion  -nonurgent flex sig when patient is stable    Recommendations d/w primary team  Case d/w svc attending

## 2019-12-03 NOTE — OCCUPATIONAL THERAPY INITIAL EVALUATION ADULT - PLANNED THERAPY INTERVENTIONS, OT EVAL
strengthening/balance training/fine motor coordination training/transfer training/IADL retraining/ADL retraining/bed mobility training/motor coordination training

## 2019-12-03 NOTE — PROGRESS NOTE ADULT - SUBJECTIVE AND OBJECTIVE BOX
Pt seen and examined at bedside.  Overnight, patient with fever.  Patient more awake and answers simple question with shaking of his head.  Denies abdominal pain or discomfort.      Allergies    No Known Allergies    Intolerances      MEDICATIONS:  MEDICATIONS  (STANDING):  acyclovir IVPB 370 milliGRAM(s) IV Intermittent every 24 hours  albumin human 25% IVPB 50 milliLiter(s) IV Intermittent every 1 hour  albuterol/ipratropium for Nebulization 3 milliLiter(s) Nebulizer every 4 hours  artificial  tears Solution 1 Drop(s) Both EYES every 6 hours  bisacodyl Suppository 10 milliGRAM(s) Rectal every 24 hours  chlorhexidine 0.12% Liquid 15 milliLiter(s) Oral Mucosa every 12 hours  chlorhexidine 2% Cloths 1 Application(s) Topical daily  dexMEDEtomidine Infusion 0.2 MICROgram(s)/kG/Hr (3.68 mL/Hr) IV Continuous <Continuous>  dextrose 10% + sodium chloride 0.9%. 1000 milliLiter(s) (50 mL/Hr) IV Continuous <Continuous>  dextrose 5%. 1000 milliLiter(s) (50 mL/Hr) IV Continuous <Continuous>  dextrose 50% Injectable 12.5 Gram(s) IV Push once  dextrose 50% Injectable 25 Gram(s) IV Push once  dextrose 50% Injectable 25 Gram(s) IV Push once  fentaNYL   Infusion. 0.5 MICROgram(s)/kG/Hr (3.68 mL/Hr) IV Continuous <Continuous>  heparin  Injectable 5000 Unit(s) SubCutaneous every 8 hours  hydrocortisone sodium succinate Injectable 50 milliGRAM(s) IV Push every 12 hours  insulin lispro (HumaLOG) corrective regimen sliding scale   SubCutaneous every 6 hours  midodrine 10 milliGRAM(s) Oral every 8 hours  multivitamin/minerals/iron Oral Solution (CENTRUM) 15 milliLiter(s) Oral daily  nafcillin  IVPB 2 Gram(s) IV Intermittent every 4 hours  pantoprazole  Injectable 40 milliGRAM(s) IV Push daily  polyethylene glycol 3350 17 Gram(s) Oral two times a day  propofol Infusion 5 MICROgram(s)/kG/Min (2.208 mL/Hr) IV Continuous <Continuous>  QUEtiapine 75 milliGRAM(s) Oral every 12 hours  sodium chloride 0.9% lock flush 3 milliLiter(s) IV Push every 8 hours  vitamin E 400 International Unit(s) Oral daily  zinc sulfate 220 milliGRAM(s) Oral daily    MEDICATIONS  (PRN):  acetaminophen    Suspension .. 650 milliGRAM(s) Oral every 6 hours PRN Temp greater or equal to 38C (100.4F)  dextrose 40% Gel 15 Gram(s) Oral once PRN Blood Glucose LESS THAN 70 milliGRAM(s)/deciliter  glucagon  Injectable 1 milliGRAM(s) IntraMuscular once PRN Glucose LESS THAN 70 milligrams/deciliter    Vital Signs Last 24 Hrs  T(C): 38.2 (03 Dec 2019 04:25), Max: 38.2 (03 Dec 2019 01:03)  T(F): 100.7 (03 Dec 2019 04:25), Max: 100.8 (03 Dec 2019 01:03)  HR: 118 (03 Dec 2019 08:00) (106 - 134)  BP: 90/43 (03 Dec 2019 05:00) (90/43 - 90/43)  BP(mean): 62 (03 Dec 2019 05:00) (62 - 62)  RR: 17 (03 Dec 2019 08:00) (12 - 23)  SpO2: 100% (03 Dec 2019 08:00) (95% - 110%)    12-02 @ 07:01  -  12-03 @ 07:00  --------------------------------------------------------  IN: 2382 mL / OUT: 2765 mL / NET: -383 mL    12-03 @ 07:01  -  12-03 @ 09:09  --------------------------------------------------------  IN: 17.6 mL / OUT: 0 mL / NET: 17.6 mL      PHYSICAL EXAM:    General: Frail young male appears comfortable in bed, in nad  HEENT: MMM, conjunctiva and sclera anicteric  Gastrointestinal: soft, distended, nontender, no guarding  Skin: Warm and dry.     LABS:                        6.0    4.82  )-----------( 170      ( 03 Dec 2019 05:15 )             17.9     12-03    132<L>  |  96  |  23  ----------------------------<  120<H>  3.4<L>   |  22  |  2.26<H>    Ca    7.9<L>      03 Dec 2019 05:15  Phos  3.3     12-03  Mg     1.8     12-03    TPro  5.8<L>  /  Alb  2.2<L>  /  TBili  1.3<H>  /  DBili  x   /  AST  26  /  ALT  21  /  AlkPhos  82  12-03                      Culture - Blood (collected 01 Dec 2019 18:29)  Source: .Blood Blood  Preliminary Report (02 Dec 2019 19:01):    No growth at 1 day.    Culture - Blood (collected 01 Dec 2019 18:29)  Source: .Blood Blood  Preliminary Report (02 Dec 2019 19:01):    No growth at 1 day. Pt seen and examined at bedside.  Abdomen soft and nondistended. Overnight, patient with fever.  Patient more awake and answers simple question with shaking of his head.  Denies abdominal pain or discomfort. Per nursing at bedside patient has been passing large amounts of gas.     Allergies    No Known Allergies    Intolerances      MEDICATIONS:  MEDICATIONS  (STANDING):  acyclovir IVPB 370 milliGRAM(s) IV Intermittent every 24 hours  albumin human 25% IVPB 50 milliLiter(s) IV Intermittent every 1 hour  albuterol/ipratropium for Nebulization 3 milliLiter(s) Nebulizer every 4 hours  artificial  tears Solution 1 Drop(s) Both EYES every 6 hours  bisacodyl Suppository 10 milliGRAM(s) Rectal every 24 hours  chlorhexidine 0.12% Liquid 15 milliLiter(s) Oral Mucosa every 12 hours  chlorhexidine 2% Cloths 1 Application(s) Topical daily  dexMEDEtomidine Infusion 0.2 MICROgram(s)/kG/Hr (3.68 mL/Hr) IV Continuous <Continuous>  dextrose 10% + sodium chloride 0.9%. 1000 milliLiter(s) (50 mL/Hr) IV Continuous <Continuous>  dextrose 5%. 1000 milliLiter(s) (50 mL/Hr) IV Continuous <Continuous>  dextrose 50% Injectable 12.5 Gram(s) IV Push once  dextrose 50% Injectable 25 Gram(s) IV Push once  dextrose 50% Injectable 25 Gram(s) IV Push once  fentaNYL   Infusion. 0.5 MICROgram(s)/kG/Hr (3.68 mL/Hr) IV Continuous <Continuous>  heparin  Injectable 5000 Unit(s) SubCutaneous every 8 hours  hydrocortisone sodium succinate Injectable 50 milliGRAM(s) IV Push every 12 hours  insulin lispro (HumaLOG) corrective regimen sliding scale   SubCutaneous every 6 hours  midodrine 10 milliGRAM(s) Oral every 8 hours  multivitamin/minerals/iron Oral Solution (CENTRUM) 15 milliLiter(s) Oral daily  nafcillin  IVPB 2 Gram(s) IV Intermittent every 4 hours  pantoprazole  Injectable 40 milliGRAM(s) IV Push daily  polyethylene glycol 3350 17 Gram(s) Oral two times a day  propofol Infusion 5 MICROgram(s)/kG/Min (2.208 mL/Hr) IV Continuous <Continuous>  QUEtiapine 75 milliGRAM(s) Oral every 12 hours  sodium chloride 0.9% lock flush 3 milliLiter(s) IV Push every 8 hours  vitamin E 400 International Unit(s) Oral daily  zinc sulfate 220 milliGRAM(s) Oral daily    MEDICATIONS  (PRN):  acetaminophen    Suspension .. 650 milliGRAM(s) Oral every 6 hours PRN Temp greater or equal to 38C (100.4F)  dextrose 40% Gel 15 Gram(s) Oral once PRN Blood Glucose LESS THAN 70 milliGRAM(s)/deciliter  glucagon  Injectable 1 milliGRAM(s) IntraMuscular once PRN Glucose LESS THAN 70 milligrams/deciliter    Vital Signs Last 24 Hrs  T(C): 38.2 (03 Dec 2019 04:25), Max: 38.2 (03 Dec 2019 01:03)  T(F): 100.7 (03 Dec 2019 04:25), Max: 100.8 (03 Dec 2019 01:03)  HR: 118 (03 Dec 2019 08:00) (106 - 134)  BP: 90/43 (03 Dec 2019 05:00) (90/43 - 90/43)  BP(mean): 62 (03 Dec 2019 05:00) (62 - 62)  RR: 17 (03 Dec 2019 08:00) (12 - 23)  SpO2: 100% (03 Dec 2019 08:00) (95% - 110%)    12-02 @ 07:01  -  12-03 @ 07:00  --------------------------------------------------------  IN: 2382 mL / OUT: 2765 mL / NET: -383 mL    12-03 @ 07:01  -  12-03 @ 09:09  --------------------------------------------------------  IN: 17.6 mL / OUT: 0 mL / NET: 17.6 mL      PHYSICAL EXAM:    General: Frail young male appears comfortable in bed, in nad  HEENT: MMM, conjunctiva and sclera anicteric  Gastrointestinal: soft, distended, nontender, no guarding  Skin: Warm and dry.     LABS:                        6.0    4.82  )-----------( 170      ( 03 Dec 2019 05:15 )             17.9     12-03    132<L>  |  96  |  23  ----------------------------<  120<H>  3.4<L>   |  22  |  2.26<H>    Ca    7.9<L>      03 Dec 2019 05:15  Phos  3.3     12-03  Mg     1.8     12-03    TPro  5.8<L>  /  Alb  2.2<L>  /  TBili  1.3<H>  /  DBili  x   /  AST  26  /  ALT  21  /  AlkPhos  82  12-03                      Culture - Blood (collected 01 Dec 2019 18:29)  Source: .Blood Blood  Preliminary Report (02 Dec 2019 19:01):    No growth at 1 day.    Culture - Blood (collected 01 Dec 2019 18:29)  Source: .Blood Blood  Preliminary Report (02 Dec 2019 19:01):    No growth at 1 day.

## 2019-12-03 NOTE — PROGRESS NOTE ADULT - ASSESSMENT
40 yo M w/ PMH of IVDU and active smoker who came from [facility] on 11/8/19 in 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  Hospital course complicated by acute hypoxic respiratory failure, acute kidney injury, congestive hepatopathy, multi-system organ failure 2/2 hypoperfusion.       # Anuric CAMERON likely due to ATN in setting of shock  - was initially on CVVHD then switched to IHD  - last HD completed on 12/2 - 2.5 L  - remains volume overloaded   - planned for HD today - Will likely skip tomorrow  - strict I/O  - recommend to get Tunnel HD cath    # hyponatremia - 132  - likely due to poor free water clearance  - planned for HD today    # anemia  - Hb 7.3> 6  - hemolysis likely 2/2 to sepsis and/or DIC as per primary team  - recommend transfusion prn hb< 7  - transfuse on HD    # renal bone disease  - calcium phos noted- no need for binders    # hypokalemia  - K 3.4  - HD today with 4 k  - suggest KCl 40 meq via OGT

## 2019-12-03 NOTE — PROGRESS NOTE ADULT - SUBJECTIVE AND OBJECTIVE BOX
Patient is a 39y Male seen and evaluated at bedside.   Remains on vent via trach. Off pressors.   Patient is more alert and awake today.  Last HD completed on 12/2 - with UF 2500.  Net neg 733 mls.        Meds:    acetaminophen    Suspension .. 650 milliGRAM(s) Oral every 6 hours PRN  acyclovir IVPB 370 milliGRAM(s) IV Intermittent every 24 hours  albumin human 25% IVPB 50 milliLiter(s) IV Intermittent every 1 hour  albumin human 25% IVPB 50 milliLiter(s) IV Intermittent every 1 hour  albuterol/ipratropium for Nebulization 3 milliLiter(s) Nebulizer every 4 hours  artificial  tears Solution 1 Drop(s) Both EYES every 6 hours  bisacodyl Suppository 10 milliGRAM(s) Rectal every 24 hours  chlorhexidine 0.12% Liquid 15 milliLiter(s) Oral Mucosa every 12 hours  chlorhexidine 2% Cloths 1 Application(s) Topical daily  dextrose 40% Gel 15 Gram(s) Oral once PRN  dextrose 5%. 1000 milliLiter(s) IV Continuous <Continuous>  dextrose 50% Injectable 12.5 Gram(s) IV Push once  dextrose 50% Injectable 25 Gram(s) IV Push once  dextrose 50% Injectable 25 Gram(s) IV Push once  fentaNYL   Infusion. 0.5 MICROgram(s)/kG/Hr IV Continuous <Continuous>  gabapentin   Solution 100 milliGRAM(s) Enteral Tube <User Schedule>  glucagon  Injectable 1 milliGRAM(s) IntraMuscular once PRN  heparin  Injectable 5000 Unit(s) SubCutaneous every 8 hours  hydrocortisone sodium succinate Injectable 25 milliGRAM(s) IV Push every 12 hours  insulin lispro (HumaLOG) corrective regimen sliding scale   SubCutaneous every 6 hours  midodrine 10 milliGRAM(s) Oral every 8 hours  multivitamin/minerals/iron Oral Solution (CENTRUM) 15 milliLiter(s) Enteral Tube daily  nafcillin  IVPB 2 Gram(s) IV Intermittent every 4 hours  pantoprazole  Injectable 40 milliGRAM(s) IV Push daily  polyethylene glycol 3350 17 Gram(s) Oral two times a day  potassium chloride  20 mEq/100 mL IVPB 20 milliEquivalent(s) IV Intermittent every 2 hours  propofol Infusion 5 MICROgram(s)/kG/Min IV Continuous <Continuous>  QUEtiapine 100 milliGRAM(s) Oral every 12 hours  sodium chloride 0.9% lock flush 3 milliLiter(s) IV Push every 8 hours  vitamin E 400 International Unit(s) Oral daily  zinc sulfate 220 milliGRAM(s) Oral daily      T(C): 37.9 (12-03-19 @ 10:05), Max: 38.2 (12-03-19 @ 01:03)  HR: 112 (12-03-19 @ 13:00) (108 - 134)  BP: 90/43 (12-03-19 @ 05:00) (90/43 - 90/43)  RR: 19 (12-03-19 @ 13:00) (12 - 25)  SpO2: 100% (12-03-19 @ 13:00) (95% - 100%)    Input/Output      12-02-19 @ 07:01  -  12-03-19 @ 07:00  --------------------------------------------------------  IN: 2532 mL / OUT: 3265 mL / NET: -733 mL    12-03-19 @ 07:01  -  12-03-19 @ 14:20  --------------------------------------------------------  IN: 900.8 mL / OUT: 105 mL / NET: 795.8 mL            ROS:     Gen:  fever  Cardiovascular: no chest pain  Respiratory: no cough, no sputum  Gastrointestinal: no nausea, no vomiting, no change in bowel habits  Neurologic: no headache  : no urinary symptoms        PHYSICAL EXAM:  GENERAL: sedated,  connected to vent via trach   HEAD:  Atraumatic, Normocephalic,   EYES: Bilateral conjunctiva and sclera normal   Oral cavity: Oral mucosa dry and pink  NECK: trach   CHEST/LUNG: limited exam, + rales,   HEART: Regular rate and rhythm. JEFFREY II/VI at LPSB, No gallop, no rub   ABDOMEN: distended   EXTREMITIES: 1+ pitting edema  Neurology: sleeping  Skin: necrotic  lesions on fingertips    access: R IJ HD cath             LABS:                                             6.0    4.82  )-----------( 170      ( 03 Dec 2019 05:15 )             17.9       12-03    132<L>  |  96  |  23  ----------------------------<  120<H>  3.4<L>   |  22  |  2.26<H>    Ca    7.9<L>      03 Dec 2019 05:15  Phos  3.3     12-03  Mg     1.8     12-03    TPro  5.8<L>  /  Alb  2.2<L>  /  TBili  1.3<H>  /  DBili  x   /  AST  26  /  ALT  21  /  AlkPhos  82  12-03                      RADIOLOGY & ADDITIONAL STUDIES:

## 2019-12-03 NOTE — PROGRESS NOTE ADULT - SUBJECTIVE AND OBJECTIVE BOX
Infectious Diseases Progress Note:    SUBJECTIVE: Patient seen and examined at bedside. Trach and sedated, minimally responsive unable to obtain ROS    ENDOCARDITIS/SEPSIS    Endocarditis  CAMERON (acute kidney injury)  Acute endocarditis due to other organism      Allergies    No Known Allergies    Intolerances        ANTIBIOTICS/RELEVANT:  antimicrobials  acyclovir IVPB 370 milliGRAM(s) IV Intermittent every 24 hours  nafcillin  IVPB 2 Gram(s) IV Intermittent every 4 hours    immunologic:      OTHER:  acetaminophen    Suspension .. 650 milliGRAM(s) Oral every 6 hours PRN  albumin human 25% IVPB 50 milliLiter(s) IV Intermittent every 1 hour  albuterol/ipratropium for Nebulization 3 milliLiter(s) Nebulizer every 4 hours  artificial  tears Solution 1 Drop(s) Both EYES every 6 hours  bisacodyl Suppository 10 milliGRAM(s) Rectal every 24 hours  chlorhexidine 0.12% Liquid 15 milliLiter(s) Oral Mucosa every 12 hours  chlorhexidine 2% Cloths 1 Application(s) Topical daily  dexMEDEtomidine Infusion 0.2 MICROgram(s)/kG/Hr IV Continuous <Continuous>  dextrose 40% Gel 15 Gram(s) Oral once PRN  dextrose 5%. 1000 milliLiter(s) IV Continuous <Continuous>  dextrose 50% Injectable 12.5 Gram(s) IV Push once  dextrose 50% Injectable 25 Gram(s) IV Push once  dextrose 50% Injectable 25 Gram(s) IV Push once  fentaNYL   Infusion. 0.5 MICROgram(s)/kG/Hr IV Continuous <Continuous>  gabapentin   Solution 100 milliGRAM(s) Oral <User Schedule>  glucagon  Injectable 1 milliGRAM(s) IntraMuscular once PRN  heparin  Injectable 5000 Unit(s) SubCutaneous every 8 hours  hydrocortisone sodium succinate Injectable 25 milliGRAM(s) IV Push every 12 hours  insulin lispro (HumaLOG) corrective regimen sliding scale   SubCutaneous every 6 hours  midodrine 10 milliGRAM(s) Oral every 8 hours  multivitamin/minerals/iron Oral Solution (CENTRUM) 15 milliLiter(s) Oral daily  pantoprazole  Injectable 40 milliGRAM(s) IV Push daily  polyethylene glycol 3350 17 Gram(s) Oral two times a day  potassium chloride  20 mEq/100 mL IVPB 20 milliEquivalent(s) IV Intermittent every 2 hours  propofol Infusion 5 MICROgram(s)/kG/Min IV Continuous <Continuous>  QUEtiapine 100 milliGRAM(s) Oral every 12 hours  sodium chloride 0.9% lock flush 3 milliLiter(s) IV Push every 8 hours  vitamin E 400 International Unit(s) Oral daily  zinc sulfate 220 milliGRAM(s) Oral daily      Objective:  Vital Signs Last 24 Hrs  T(C): 37.9 (03 Dec 2019 10:05), Max: 38.2 (03 Dec 2019 01:03)  T(F): 100.2 (03 Dec 2019 10:05), Max: 100.8 (03 Dec 2019 01:03)  HR: 118 (03 Dec 2019 10:00) (106 - 134)  BP: 90/43 (03 Dec 2019 05:00) (90/43 - 90/43)  BP(mean): 62 (03 Dec 2019 05:00) (62 - 62)  RR: 25 (03 Dec 2019 10:00) (12 - 25)  SpO2: 100% (03 Dec 2019 10:00) (95% - 110%)    PHYSICAL EXAM:  Constitutional: Ill appearing, + trach, minimally respionsive  Eyes: No scleral icterus  Ear/Nose/Throat: + trach, no oral lesion	  Neck: no JVD, supple  Respiratory: Diffuse rhonchi, 2 chest tubes on R, 1 on Left  Cardiovascular: S1S2, RRR, + systolic murmur  Gastrointestinal: soft, NTND, (+) BS, no HSM  Extremities: necrosis of fingers and toes b/l  Skin: no rashes    LABS:                        6.0    4.82  )-----------( 170      ( 03 Dec 2019 05:15 )             17.9     12-03    132<L>  |  96  |  23  ----------------------------<  120<H>  3.4<L>   |  22  |  2.26<H>    Ca    7.9<L>      03 Dec 2019 05:15  Phos  3.3     12-03  Mg     1.8     12-03    TPro  5.8<L>  /  Alb  2.2<L>  /  TBili  1.3<H>  /  DBili  x   /  AST  26  /  ALT  21  /  AlkPhos  82  12-03          MICROBIOLOGY:            RADIOLOGY & ADDITIONAL STUDIES:

## 2019-12-03 NOTE — PROGRESS NOTE ADULT - ASSESSMENT
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 Persistent fevers likely in the setting of necrotic fingers and toes vs inability to obtain source control    Recommend:  - Continue Nafcillin 2 grams IV q4hrs  - Continue Acyclovir x 10 days total.  EOT 12/4  - Follow up repeat blood cultures - NGTD  - ID team 1 will continue to follow    Discussed with Dr. Louis

## 2019-12-03 NOTE — OCCUPATIONAL THERAPY INITIAL EVALUATION ADULT - RANGE OF MOTION EXAMINATION, UPPER EXTREMITY
bilateral UE Active ROM was WFL  (within functional limits)/patient AROM limited by tubes/lines/catheters

## 2019-12-03 NOTE — PROGRESS NOTE ADULT - ATTENDING COMMENTS
tolerated HD adequately yesterday  CAMERON- no recovery- remains dialysis dependent  for logistical reasons, will flip pt to Tues, Thurs, Sat HD schedule  repeat HD today

## 2019-12-04 NOTE — PROGRESS NOTE ADULT - SUBJECTIVE AND OBJECTIVE BOX
Patient seen and examined at bedside with his friends visiting. Appeared awake and alert but tachypneic.     Patient has been intermittently spiking fevers, is currently afebrile with no acute complaints.     Overnight Events:     Vital Signs Last 24 Hrs  T(C): 38.3 (04 Dec 2019 17:40), Max: 38.8 (03 Dec 2019 21:00)  T(F): 101 (04 Dec 2019 17:40), Max: 101.8 (03 Dec 2019 21:00)  HR: 134 (04 Dec 2019 18:00) (110 - 142)  BP: --  BP(mean): --  RR: 32 (04 Dec 2019 18:00) (0 - 35)  SpO2: 100% (04 Dec 2019 18:00) (97% - 100%)    03 Dec 2019 07:01  -  04 Dec 2019 07:00  --------------------------------------------------------  IN:    dextrose 10% + sodium chloride 0.9%: 150 mL    Enteral Tube Flush: 175 mL    fentaNYL Infusion.: 260.4 mL    ns in tub fed vital15: 520 mL    Other: 400 mL    Packed Red Blood Cells: 620 mL    propofol Infusion: 68.2 mL    Solution: 500 mL    Solution: 500 mL  Total IN: 3193.6 mL    OUT:    Chest Tube: 90 mL    Chest Tube: 160 mL    Chest Tube: 40 mL    Other: 1900 mL    Rectal Tube: 2150 mL  Total OUT: 4340 mL    Total NET: -1146.4 mL      04 Dec 2019 07:01  -  04 Dec 2019 18:35  --------------------------------------------------------  IN:    Enteral Tube Flush: 425 mL    fentaNYL Infusion.: 62.8 mL    ns in tub fed vital15: 368 mL    Oral Fluid: 60 mL    propofol Infusion: 4.4 mL    Sodium Chloride 0.9% IV Bolus: 250 mL    Solution: 400 mL    Solution: 200 mL  Total IN: 1770.2 mL    OUT:    Rectal Tube: 200 mL  Total OUT: 200 mL    Total NET: 1570.2 mL        Physical Exam:  General: NAD  Pulmonary: trach collar in place, on vent  Cardiovascular: NSR  Extremities: WWP; LLE with 1st-5th toes gangrenous, dry, no signs of infection; RLE 4th the gangrenous dry, no signs of infectinon; 2+ DP pulses b/l                              8.3    4.83  )-----------( 144      ( 04 Dec 2019 06:39 )             25.4   04 Dec 2019 06:39    136    |  101    |  23     ----------------------------<  108    3.3     |  21     |  2.29     Ca    7.6        04 Dec 2019 06:39  Phos  3.6       04 Dec 2019 06:39  Mg     1.8       04 Dec 2019 06:39    TPro  6.0    /  Alb  2.1    /  TBili  1.2    /  DBili  x      /  AST  22     /  ALT  19     /  AlkPhos  83     04 Dec 2019 06:39      39M with PMH of IVDU admitted for sepsis 2/2 infective endocarditis requiring multiple pressors, now on and off pressors, with ischemic digits in all 4 extremities.      - Patient is still critically ill with frequent fevers, tachypnea and tachycardia - septic with renal failure.   - continue to optimize patient and vascular will continue to monitor, may need amputation in the future once ischemic tissue fully demarcates. Fingers and toes remain dry gangrene with no evidence of gross wet gangrene.   - Recommend Hand Surgery consult for ischemic fingers  - Call [phone] with questions

## 2019-12-04 NOTE — PROGRESS NOTE ADULT - ASSESSMENT
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 Persistent fevers likely in the setting of necrotic fingers and toes vs inability to obtain source control.  Patient now with sputum broth from 11/23 bronch positive for AFB although not on stain so more likely represents MAC than TB     Recommend:  - Continue Nafcillin 2 grams IV q4hrs  - Continue Acyclovir x 10 days total, EOT is today 12/4  - Follow up repeat blood cultures - NGTD  - Pending ID of AFB culture  - Recommend repeating CT chest to evaluate for progression of pulmonary disease as if MAC identified and progressing would consider treatment   - ID team 1 will continue to follow    Discussed with primary team and Dr. Louis

## 2019-12-04 NOTE — PROGRESS NOTE ADULT - ATTENDING COMMENTS
developed tachycardia-   reviewed with med team- okay to give trial of IVF bolus  no dialysis planned for today  can dc right IJ HD catheter if concern for sepsis

## 2019-12-04 NOTE — PROGRESS NOTE ADULT - ASSESSMENT
40 y/o M w/ PMH of IVDU and active smoker who came from [facility] on 11/8/19 in 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 After being transferred to  CTICU for surgical evaluation, pt was deemed to not be a surgical candidate and transferred to MICU for medical mgmt. ID, Nephrology, heme/onc, surgery, vascular following.    Neuro  Intubated, sedated on Propofol, Fentanyl gtt. Prior CT head negative for any intracranial embolic events.    -Increase Seroquel to 100 mg q12h  - continue to wean off propofol and fentanyl     Cardiovascular   #Hypotension  Most likely 2/2 septic shock from infective endocarditis and RV failure 2/2 tricuspid regurgitation (ELIAN shows EF of 40-50%). Echo with EF 45%. ELIAN with EF 40-45%, 3.8 x1cm vegetation on TR valve, with severe TR, trivial pericardial effusion. Echo with bubble revealed no PFO.   - normotensive   - Maintain MAP>65.   - C/W medical mgmt of infective endocarditis as below.  - c/w Midodrine 10mg q8h  - currently off levophed    Respiratory  #Acute hypoxic respiratory failure   Most likely 2/2 alveolar hemorrhage in the setting of septic embolic causing numerous cavitary lesions on CT chest. CXR with scattered infiltrates and pleural effusions. Sputum culture negative. 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 Bilateral CT in place, confirmed by CXR, continues to drain serosanguinous fluid. Fluid analysis of both CT pleural fluid confirms complex exudate with high cellularity, low pH and low glucose. pleural fluid cultures with staph aureus. Chest tubes in place as above (2 R chest tubes, 1 L chest tube)   - Monitor output of bilateral chest tubes  - R pleural fluid cultures with staph aureus, L pleural fluid cultures with NGTD   - CT chest and cxr as above. 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 Not a surgical candidate for a tricuspid valve replacement at this time as per CT surgery. Initial blood cultures positive for staph epidermidis, likely a contaminant. Initial sputum culture positive for staph aureus  Repeat blood cultures with NGTD. Sputum cx NGTD.  - ID following, C/W Nafcillin 2g q4 (Sensitive to Oxacillin as per OSH records), f/u recs   - repeat Bcxs with NGTD   - BAL positive for HSV, started on Acyclovir (11/25-) continue for 10d   - Per CT surgery pt is too high risk for any surgery and would likely not survive procedure.   - Cardiology consulted to optimize management of his right heart dysfunction, f/u recs        Renal  #Acute renal failure most likely 2/2 ATN from hypoperfusion, Minimal urine output, on CVVHD, oliguric with 0-5cc/hr. Vancomycin level 37 on arrival so may be component of drug induced nephropathy. Bun/Cr continues to improve while on CVVHD. Pt is clinically fluid overloaded but now improved and hemodynamically stable. No renal infarcts as per CT abdomen/pelvis.  - F/U nephrology recs.  -plan for intermittent HD per nephrology recs   - Continue to correct fluid overload with HD  - Monitor I/Os  - currently anuric with no signs of renal recovery  - continue to monitor serum electrolytes    #Persistent hyponatremia  Pt most likely with hypervolemic hyponatremia  -Na today 132, will continue to monitor  - administration of IV abx switched to NS vs D5W; possible etiology for hyponatremia       GI  OG on arrival with 600cc of bilious fluid, abdomen still distended but had several BMs, some loose. CT abdomen revealed no evidence of bowel ischemia or SBO. Sump was DCed and replaced with NG tube. CT abdomen/pelvis revealed "irregular masslike mural thickening of the posterior left lateral wall of the rectum." Surgery consulted, unlikely perirectal abscess. Recommend GI evaluation for possible scope.  - F/u GI consult, f/u recs, when stable will go for flex sig; possible PEG   - interval resolution of abdominal distension with placement of rectal tube  -C. diff negative   - Sump removed, NG tube in place; to resume feeds  -c/w Miralax BID  -wean off fentanyl/opiates as above    #Congestive Hepatopathy   Transaminitis, coag derangements, hyperbilirubinemia most likely 2/2 hepatic congestion vs hemolysis, due to severe TR in setting of infective endocarditis.  Hepatitis panel negative.  -LFTs wnl  -T nehemiah downtrending  -Monitor CMP    Heme  #Hemolysis  Intravascular hemolysis with initial haptoglobin <10, LDH decreasing at 365. D-dimer elevated. Fibrinogen stable 396. Today still clinically jaundiced, with stable bilirubinemia: T.bili 9.9., D.bili 8.5. h/h stable at 8.1/25.5. Platelets decreasing from 54 to 49.. Fact VII/Factor VIII elevated. Unlikely 2/2 CVVHD. Likely 2/2 to sepsis and/or DIC.   - Received 1u plts 11/26 in preparation for trach placement 11/26; plt currently stable and plts >100 without any evidence of oozing/bleeding   - Heme consulted, follow recs  - Monitor direct and total bili, LDH, fibrinogen, platelets   - Transfuse platelets if <10K or bleeding and <50K  - Transfuse pRBCs for hgb <7; s/p 1 unit PRBC 12/3       Vascular  Vascular surgery consulted in setting of gangrenous distal toes and fingers b/l. Likely 2/2 to pressors.  - per vascular, may need amputation in the future once ischemic tissue fully demarcates, and once patient is medically optimized      Endocrinology  - taper steroids; hydrocortisone 25 BID   - previously on Hydrocortisone 50mg q8- stress dose steroids    Lines: right IJ TLC and Ernesto (11/12), Axillary A-line (11/12), L subclavian (11/27-), right peripheral (11/12)    F: None   E: replete K <4, Mg <2  N: Trickle feeds   GI Ppx: Protonix   DVT Ppx: Heparin   Code status: FULL   Dispo: MICU 40 y/o M w/ PMH of IVDU and active smoker who came from [facility] on 11/8/19 in 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 After being transferred to  CTICU for surgical evaluation, pt was deemed to not be a surgical candidate and transferred to MICU for medical mgmt. ID, Nephrology, heme/onc, surgery, vascular following.    Neuro  Intubated, sedated on Propofol, Fentanyl gtt. Prior CT head negative for any intracranial embolic events.    - c/w Seroquel to 100 mg q12h  - currently off propofol; downtitratng fentanyl as tolerated  - s/p 2 mg ativan IVP; started ativan 1 mg q6h    Cardiovascular   #Hypotension  Most likely 2/2 septic shock from infective endocarditis and RV failure 2/2 tricuspid regurgitation (ELIAN shows EF of 40-50%). Echo with EF 45%. ELIAN with EF 40-45%, 3.8 x1cm vegetation on TR valve, with severe TR, trivial pericardial effusion. Echo with bubble revealed no PFO.   - normotensive   - Maintain MAP>65.   - C/W medical mgmt of infective endocarditis as below.  - c/w Midodrine 10mg q8h  - currently off levophed    Respiratory  #Acute hypoxic respiratory failure   Most likely 2/2 alveolar hemorrhage in the setting of septic embolic causing numerous cavitary lesions on CT chest. CXR with scattered infiltrates and pleural effusions. Sputum culture negative. 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 Bilateral CT in place, confirmed by CXR, continues to drain serosanguinous fluid. Fluid analysis of both CT pleural fluid confirms complex exudate with high cellularity, low pH and low glucose. pleural fluid cultures with staph aureus. Chest tubes in place as above (2 R chest tubes, 1 L chest tube)   - Monitor output of bilateral chest tubes  - R pleural fluid cultures with staph aureus, L pleural fluid cultures with NGTD   - CT chest and cxr as above. 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 Not a surgical candidate for a tricuspid valve replacement at this time as per CT surgery. Initial blood cultures positive for staph epidermidis, likely a contaminant. Initial sputum culture positive for staph aureus  Repeat blood cultures with NGTD. Sputum cx NGTD.  - ID following, C/W Nafcillin 2g q4 (Sensitive to Oxacillin as per OSH records), f/u recs   - repeat Bcxs with NGTD; sputum cx 12/3 with oral leann    - BAL positive for HSV, started on Acyclovir; last day today 12/4   - Per CT surgery pt is too high risk for any surgery and would likely not survive procedure.   - Cardiology consulted to optimize management of his right heart dysfunction, f/u recs      #Positive AFB   - Bronchial washings from 11/23 with positive AFB   - Pending speciation  - Contact precautions     Renal  #Acute renal failure most likely 2/2 ATN from hypoperfusion, Minimal urine output, on CVVHD, oliguric with 0-5cc/hr. Vancomycin level 37 on arrival so may be component of drug induced nephropathy. Bun/Cr continues to improve while on CVVHD. Pt is clinically fluid overloaded but now improved and hemodynamically stable. No renal infarcts as per CT abdomen/pelvis.  - F/U nephrology recs.  -plan for intermittent HD per nephrology recs; if not tolerating, may restart CVVHD per nephro   - Continue to correct fluid overload with HD  - Monitor I/Os  - currently anuric with no signs of renal recovery  - continue to monitor serum electrolytes    #Persistent hyponatremia  Pt most likely with hypervolemic hyponatremia  -Na today 136 - RESOLVED      GI  OG on arrival with 600cc of bilious fluid, abdomen still distended but had several BMs, some loose. CT abdomen revealed no evidence of bowel ischemia or SBO. Sump was DCed and replaced with NG tube. CT abdomen/pelvis revealed "irregular masslike mural thickening of the posterior left lateral wall of the rectum." Surgery consulted, unlikely perirectal abscess. Recommend GI evaluation for possible scope.  - F/u GI consult, f/u recs, when stable will go for flex sig  - interval resolution of abdominal distension with placement of rectal tube - given resolution, will readdress PEG planning  -C. diff negative   - Sump removed, NG tube in place; to resume feeds  -De escelate bowel regimen given copious amount of stools   -wean off fentanyl/opiates as above    #Congestive Hepatopathy   Transaminitis, coag derangements, hyperbilirubinemia most likely 2/2 hepatic congestion vs hemolysis, due to severe TR in setting of infective endocarditis.  Hepatitis panel negative.  -LFTs wnl  -T nehemiah wnl  -RESOLVED    Heme  #Hemolysis  Intravascular hemolysis with initial haptoglobin <10, LDH decreasing at 365. D-dimer elevated. Fibrinogen stable 396. Today still clinically jaundiced, with stable bilirubinemia: T.bili 9.9., D.bili 8.5. h/h stable at 8.1/25.5. Platelets decreasing from 54 to 49.. Fact VII/Factor VIII elevated. Unlikely 2/2 CVVHD. Likely 2/2 to sepsis and/or DIC.   - Currently resolved - at transfusion goal for Hgb, platelets stable, normal LFTs  - Transfuse platelets if <10K or bleeding and <50K  - Transfuse pRBCs for hgb <7; s/p 1 unit PRBC 12/3   - RESOLVED    Vascular  Vascular surgery consulted in setting of gangrenous distal toes and fingers b/l. Likely 2/2 to pressors.  - per vascular, may need amputation in the future once ischemic tissue fully demarcates, and once patient is medically optimized      Endocrinology  #Adrenal Insufficiency  - taper steroids; hydrocortisone 25 BID   - previously on Hydrocortisone 50mg q8- stress dose steroids    #Hypertriglyceridemia   - Patient with elevated triglycerides to 558 and cholesterol panel with low HDL (12) and LDL (28) with elevated Cholesterol/HDL ratio  - Will repeat triglycerides off propofol  - Pending above, will start statin vs fibrate therapy     Lines: right IJ TLC and Ernesto (11/12), Axillary A-line (11/12), L subclavian (11/27-), right peripheral (11/12)    F: None   E: replete K <4, Mg <2  N: Trickle feeds   GI Ppx: Protonix   DVT Ppx: Heparin   Code status: FULL   Dispo: MICU

## 2019-12-04 NOTE — PROGRESS NOTE ADULT - SUBJECTIVE AND OBJECTIVE BOX
Patient is a 39y Male seen and evaluated at bedside.   Patient remains on vent via trach, off pressors, alert and awake.  Last HD completed on 12/3 - with UF 1500. Blood flow was turned down to 200 due to tachycardia.    Patient was noted to be tachycardic prior to HD and even off HD today.  He remains febrile.      Meds:    acetaminophen    Suspension .. 650 milliGRAM(s) Oral every 6 hours PRN  acyclovir IVPB 370 milliGRAM(s) IV Intermittent every 24 hours  albuterol/ipratropium for Nebulization 3 milliLiter(s) Nebulizer every 4 hours  artificial  tears Solution 1 Drop(s) Both EYES every 6 hours  bisacodyl Suppository 10 milliGRAM(s) Rectal every 24 hours  chlorhexidine 0.12% Liquid 15 milliLiter(s) Oral Mucosa every 12 hours  chlorhexidine 2% Cloths 1 Application(s) Topical daily  dextrose 40% Gel 15 Gram(s) Oral once PRN  dextrose 5%. 1000 milliLiter(s) IV Continuous <Continuous>  dextrose 50% Injectable 12.5 Gram(s) IV Push once  dextrose 50% Injectable 25 Gram(s) IV Push once  dextrose 50% Injectable 25 Gram(s) IV Push once  fentaNYL   Infusion. 0.5 MICROgram(s)/kG/Hr IV Continuous <Continuous>  gabapentin   Solution 100 milliGRAM(s) Enteral Tube <User Schedule>  glucagon  Injectable 1 milliGRAM(s) IntraMuscular once PRN  heparin  Injectable 5000 Unit(s) SubCutaneous every 8 hours  hydrocortisone sodium succinate Injectable 25 milliGRAM(s) IV Push every 12 hours  insulin lispro (HumaLOG) corrective regimen sliding scale   SubCutaneous every 6 hours  LORazepam     Tablet 1 milliGRAM(s) Oral every 6 hours  midodrine 10 milliGRAM(s) Oral every 8 hours  multivitamin/minerals/iron Oral Solution (CENTRUM) 15 milliLiter(s) Enteral Tube daily  nafcillin  IVPB 2 Gram(s) IV Intermittent every 4 hours  pantoprazole   Suspension 40 milliGRAM(s) Oral daily  polyethylene glycol 3350 17 Gram(s) Oral two times a day  propofol Infusion 5 MICROgram(s)/kG/Min IV Continuous <Continuous>  QUEtiapine 100 milliGRAM(s) Oral every 12 hours  sodium chloride 0.9% lock flush 3 milliLiter(s) IV Push every 8 hours  vitamin E 400 International Unit(s) Oral daily  zinc sulfate 220 milliGRAM(s) Oral daily      T(C): 38.6 (12-04-19 @ 14:15), Max: 38.8 (12-03-19 @ 21:00)  HR: 122 (12-04-19 @ 15:00) (110 - 142)  BP: --  RR: 21 (12-04-19 @ 15:00) (0 - 35)  SpO2: 100% (12-04-19 @ 15:00) (97% - 100%)    Input/Output      12-03-19 @ 07:01  -  12-04-19 @ 07:00  --------------------------------------------------------  IN: 3193.6 mL / OUT: 4340 mL / NET: -1146.4 mL    12-04-19 @ 07:01  -  12-04-19 @ 15:30  --------------------------------------------------------  IN: 1770.2 mL / OUT: 200 mL / NET: 1570.2 mL            ROS:     Gen: no fever  Cardiovascular: no chest pain  Respiratory: no cough, no sputum  Gastrointestinal: no nausea, no vomiting, no change in bowel habits  Neurologic: no headache  : no urinary symptoms        PHYSICAL EXAM:  GENERAL: awake,  connected to vent via trach   HEAD:  Atraumatic, Normocephalic,   EYES: Bilateral conjunctiva and sclera normal   Oral cavity: Oral mucosa dry and pink  NECK: trach   CHEST/LUNG: occasional creps in bases  HEART: Regular rate and rhythm. JEFFREY II/VI at LPSB, No gallop, no rub   ABDOMEN: distended   EXTREMITIES: 1+ pitting edema  Neurology: sleeping  Skin: necrotic  lesions on fingertips    access: LENY ACEVEDO HD cath           LABS:                                     8.3    4.83  )-----------( 144      ( 04 Dec 2019 06:39 )             25.4       12-04    136  |  101  |  23  ----------------------------<  108<H>  3.3<L>   |  21<L>  |  2.29<H>    Ca    7.6<L>      04 Dec 2019 06:39  Phos  3.6     12-04  Mg     1.8     12-04    TPro  6.0  /  Alb  2.1<L>  /  TBili  1.2  /  DBili  x   /  AST  22  /  ALT  19  /  AlkPhos  83  12-04                          RADIOLOGY & ADDITIONAL STUDIES:

## 2019-12-04 NOTE — PROGRESS NOTE ADULT - ASSESSMENT
40 y/o M smoker w/ PMHx of IVDU presented to [facility] w/ productive cough with hemoptysis found to have septic emboli w/ vegetation of the TV transferred to [facility] for surgical evaluation.  Course complicated by sepsis, DIC, acute respiratory failure w/ empyema s/p intubation and thoracostomy tube, CAMERON requiring cvvhd, ileus, cardiac arrest, and pneumothorax.     #Abdominal distension:   Abdomen soft and nondistended following rectal tube placement.  12/3/19 with decrease in bowel dilation and appears to be mostly resolved.   Appears to be due to ileus.   -Avoid opiates as tolerated  -Serial abdominal exam  -Aggressive repletion of electrolytes  -Consider methylnaltrexone to counteract opioids, but would continue to monitor for obstruction and opioid withdrawal    #Hyperbilirubinemia - resolved    #Rectal lesion  -nonurgent flex sig when patient is stable    At discharge please schedule patient for follow-up at the [facility] on the fourth floor of [address] on Monday afternoon with Dr. Reynaga at [phone]    GI will sign off, please reconsult as needed    Case d/w svc attending

## 2019-12-04 NOTE — PROGRESS NOTE ADULT - ATTENDING COMMENTS
Looks more SOB this PM with no change in physical exam otherwise. Will reculture blood/sputum, check lactate.

## 2019-12-04 NOTE — PROGRESS NOTE ADULT - ASSESSMENT
38 yo M w/ PMH of IVDU and active smoker who came from [facility] on 11/8/19 in 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  Hospital course complicated by acute hypoxic respiratory failure, acute kidney injury, congestive hepatopathy, multi-system organ failure 2/2 hypoperfusion.       # Anuric CAMERON likely due to ATN in setting of shock  - was initially on CVVHD then switched to IHD  - last HD completed on 12/3 - 1.5 L - blood flow turned down due to tachycardia  - volume and electrolytes acceptable  - strict I/O  - recommend to get Tunnel HD cath    # anemia  - Hb 8.3  - hemolysis likely 2/2 to sepsis and/or DIC as per primary team  - recommend transfusion prn hb< 7      # renal bone disease  - calcium phos noted- no need for binders    # hypokalemia  - K 3.3  - suggest KCl 40 meq via OGT

## 2019-12-04 NOTE — PROGRESS NOTE ADULT - SUBJECTIVE AND OBJECTIVE BOX
Pt seen and examined at bedside.  Now off fentanyl.  No acute event overnight with low grade fever.  Unable to obtain ROS today.    Allergies    No Known Allergies    Intolerances      MEDICATIONS:  MEDICATIONS  (STANDING):  acyclovir IVPB 370 milliGRAM(s) IV Intermittent every 24 hours  albuterol/ipratropium for Nebulization 3 milliLiter(s) Nebulizer every 4 hours  artificial  tears Solution 1 Drop(s) Both EYES every 6 hours  bisacodyl Suppository 10 milliGRAM(s) Rectal every 24 hours  chlorhexidine 0.12% Liquid 15 milliLiter(s) Oral Mucosa every 12 hours  chlorhexidine 2% Cloths 1 Application(s) Topical daily  dextrose 5%. 1000 milliLiter(s) (50 mL/Hr) IV Continuous <Continuous>  dextrose 50% Injectable 12.5 Gram(s) IV Push once  dextrose 50% Injectable 25 Gram(s) IV Push once  dextrose 50% Injectable 25 Gram(s) IV Push once  fentaNYL   Infusion. 0.5 MICROgram(s)/kG/Hr (3.68 mL/Hr) IV Continuous <Continuous>  gabapentin   Solution 100 milliGRAM(s) Enteral Tube <User Schedule>  heparin  Injectable 5000 Unit(s) SubCutaneous every 8 hours  hydrocortisone sodium succinate Injectable 25 milliGRAM(s) IV Push every 12 hours  insulin lispro (HumaLOG) corrective regimen sliding scale   SubCutaneous every 6 hours  LORazepam     Tablet 1 milliGRAM(s) Oral every 6 hours  midodrine 10 milliGRAM(s) Oral every 8 hours  multivitamin/minerals/iron Oral Solution (CENTRUM) 15 milliLiter(s) Enteral Tube daily  nafcillin  IVPB 2 Gram(s) IV Intermittent every 4 hours  pantoprazole   Suspension 40 milliGRAM(s) Oral daily  polyethylene glycol 3350 17 Gram(s) Oral two times a day  propofol Infusion 5 MICROgram(s)/kG/Min (2.208 mL/Hr) IV Continuous <Continuous>  QUEtiapine 100 milliGRAM(s) Oral every 12 hours  sodium chloride 0.9% lock flush 3 milliLiter(s) IV Push every 8 hours  vitamin E 400 International Unit(s) Oral daily  zinc sulfate 220 milliGRAM(s) Oral daily    MEDICATIONS  (PRN):  acetaminophen    Suspension .. 650 milliGRAM(s) Oral every 6 hours PRN Temp greater or equal to 38C (100.4F)  dextrose 40% Gel 15 Gram(s) Oral once PRN Blood Glucose LESS THAN 70 milliGRAM(s)/deciliter  glucagon  Injectable 1 milliGRAM(s) IntraMuscular once PRN Glucose LESS THAN 70 milligrams/deciliter    Vital Signs Last 24 Hrs  T(C): 38.5 (04 Dec 2019 13:00), Max: 38.8 (03 Dec 2019 21:00)  T(F): 101.3 (04 Dec 2019 13:00), Max: 101.8 (03 Dec 2019 21:00)  HR: 134 (04 Dec 2019 13:00) (110 - 142)  BP: 123/66 (03 Dec 2019 15:01) (114/70 - 123/66)  BP(mean): 85 (03 Dec 2019 15:01) (85 - 90)  RR: 29 (04 Dec 2019 13:00) (0 - 35)  SpO2: 100% (04 Dec 2019 13:00) (97% - 100%)    12-03 @ 07:01  -  12-04 @ 07:00  --------------------------------------------------------  IN: 3193.6 mL / OUT: 4340 mL / NET: -1146.4 mL    12-04 @ 07:01  -  12-04 @ 14:02  --------------------------------------------------------  IN: 1709.4 mL / OUT: 200 mL / NET: 1509.4 mL      PHYSICAL EXAM:    General: Frail young male appears comfortable in bed, in nad,   HEENT: +trach, MMM, conjunctiva and sclera anicteric  Gastrointestinal: soft, nondistended, nontender, no guarding  Skin: Warm and dry.     LABS:                        8.3    4.83  )-----------( 144      ( 04 Dec 2019 06:39 )             25.4     12-04    136  |  101  |  23  ----------------------------<  108<H>  3.3<L>   |  21<L>  |  2.29<H>    Ca    7.6<L>      04 Dec 2019 06:39  Phos  3.6     12-04  Mg     1.8     12-04    TPro  6.0  /  Alb  2.1<L>  /  TBili  1.2  /  DBili  x   /  AST  22  /  ALT  19  /  AlkPhos  83  12-04    Culture - Sputum (collected 03 Dec 2019 17:23)  Source: .Sputum Sputum  Gram Stain (04 Dec 2019 08:58):    Few epithelial cells    Rare White blood cells    Moderate Yeast    Rare Gram Negative Rods    Sputum specimen rejected.  Microscopic examination indicates    oropharyngeal contamination.  Please repeat.  Final Report (04 Dec 2019 08:58):    Sputum specimen rejected.  Microscopic examination indicates    oropharyngeal contamination.  Please repeat.    Culture - Blood (collected 01 Dec 2019 18:29)  Source: .Blood Blood  Preliminary Report (03 Dec 2019 19:00):    No growth at 2 days.    Culture - Blood (collected 01 Dec 2019 18:29)  Source: .Blood Blood  Preliminary Report (03 Dec 2019 19:00):    No growth at 2 days.      RADIOLOGY & ADDITIONAL STUDIES: abdominal xray reviewed

## 2019-12-04 NOTE — PROGRESS NOTE ADULT - SUBJECTIVE AND OBJECTIVE BOX
INCOMPLETE  OVERNIGHT EVENTS: NIELS    SUBJECTIVE / INTERVAL HPI: Patient seen and examined at bedside. Awake, alert and oriented. Responds to questions appropriately by nodding yes or no. Denies any pain. Follows commands. No n/v, shortness of breath.     PHYSICAL EXAM:    General: deconditioned, fatigued  HEENT: NC/AT; PERRL, mildly icteric sclera; MMM  Neck: supple, trach in place  Cardiovascular: +S1/S2; tachycardic but regular rhythm, systolic murmur at LSB   Respiratory: intubated via trach; diffuse rhonchi/crackles with decreased breath sounds b/l   Gastrointestinal: NGT; NT, non distended; +BS   Extremities: WWP; trace edema of LEs/UEs; no clubbing or cyanosis  Derm: ischemia of all 10 distal phalanges of lower extremities; ischemia of distal phalange of R middle finger; dependent edema of lower extreme to the level of the knee b/l; edema of b/l hands more prominent on the dorsal surface    Vascular: 2+ radial, DP/PT pulses B/L  Neurological: AAOx3, moves all extremities, follows commands    VITAL SIGNS:  Vital Signs Last 24 Hrs  T(C): 37.9 (03 Dec 2019 10:05), Max: 38.2 (03 Dec 2019 01:03)  T(F): 100.2 (03 Dec 2019 10:05), Max: 100.8 (03 Dec 2019 01:03)  HR: 112 (03 Dec 2019 13:00) (108 - 134)  BP: 90/43 (03 Dec 2019 05:00) (90/43 - 90/43)  BP(mean): 62 (03 Dec 2019 05:00) (62 - 62)  RR: 19 (03 Dec 2019 13:00) (12 - 25)  SpO2: 100% (03 Dec 2019 13:00) (95% - 100%)      MEDICATIONS:  MEDICATIONS  (STANDING):  acyclovir IVPB 370 milliGRAM(s) IV Intermittent every 24 hours  albumin human 25% IVPB 50 milliLiter(s) IV Intermittent every 1 hour  albumin human 25% IVPB 50 milliLiter(s) IV Intermittent every 1 hour  albuterol/ipratropium for Nebulization 3 milliLiter(s) Nebulizer every 4 hours  artificial  tears Solution 1 Drop(s) Both EYES every 6 hours  bisacodyl Suppository 10 milliGRAM(s) Rectal every 24 hours  chlorhexidine 0.12% Liquid 15 milliLiter(s) Oral Mucosa every 12 hours  chlorhexidine 2% Cloths 1 Application(s) Topical daily  dextrose 5%. 1000 milliLiter(s) (50 mL/Hr) IV Continuous <Continuous>  dextrose 50% Injectable 12.5 Gram(s) IV Push once  dextrose 50% Injectable 25 Gram(s) IV Push once  dextrose 50% Injectable 25 Gram(s) IV Push once  fentaNYL   Infusion. 0.5 MICROgram(s)/kG/Hr (3.68 mL/Hr) IV Continuous <Continuous>  gabapentin   Solution 100 milliGRAM(s) Enteral Tube <User Schedule>  heparin  Injectable 5000 Unit(s) SubCutaneous every 8 hours  hydrocortisone sodium succinate Injectable 25 milliGRAM(s) IV Push every 12 hours  insulin lispro (HumaLOG) corrective regimen sliding scale   SubCutaneous every 6 hours  midodrine 10 milliGRAM(s) Oral every 8 hours  multivitamin/minerals/iron Oral Solution (CENTRUM) 15 milliLiter(s) Enteral Tube daily  nafcillin  IVPB 2 Gram(s) IV Intermittent every 4 hours  pantoprazole  Injectable 40 milliGRAM(s) IV Push daily  polyethylene glycol 3350 17 Gram(s) Oral two times a day  potassium chloride  20 mEq/100 mL IVPB 20 milliEquivalent(s) IV Intermittent every 2 hours  propofol Infusion 5 MICROgram(s)/kG/Min (2.208 mL/Hr) IV Continuous <Continuous>  QUEtiapine 100 milliGRAM(s) Oral every 12 hours  sodium chloride 0.9% lock flush 3 milliLiter(s) IV Push every 8 hours  vitamin E 400 International Unit(s) Oral daily  zinc sulfate 220 milliGRAM(s) Oral daily    MEDICATIONS  (PRN):  acetaminophen    Suspension .. 650 milliGRAM(s) Oral every 6 hours PRN Temp greater or equal to 38C (100.4F)  dextrose 40% Gel 15 Gram(s) Oral once PRN Blood Glucose LESS THAN 70 milliGRAM(s)/deciliter  glucagon  Injectable 1 milliGRAM(s) IntraMuscular once PRN Glucose LESS THAN 70 milligrams/deciliter      ALLERGIES:  Allergies    No Known Allergies    Intolerances        LABS:                        6.0    4.82  )-----------( 170      ( 03 Dec 2019 05:15 )             17.9     12-03    132<L>  |  96  |  23  ----------------------------<  120<H>  3.4<L>   |  22  |  2.26<H>    Ca    7.9<L>      03 Dec 2019 05:15  Phos  3.3     12-03  Mg     1.8     12-03    TPro  5.8<L>  /  Alb  2.2<L>  /  TBili  1.3<H>  /  DBili  x   /  AST  26  /  ALT  21  /  AlkPhos  82  12-03        CAPILLARY BLOOD GLUCOSE      POCT Blood Glucose.: 103 mg/dL (03 Dec 2019 12:48)      RADIOLOGY & ADDITIONAL TESTS: Reviewed. OVERNIGHT EVENTS: Febrile to 101.8. Pulled NG tube overnight. Replaced and position confirmed with CXR.     SUBJECTIVE / INTERVAL HPI: Patient seen and examined at bedside. Awake, alert and oriented but more sedated this AM. Responds to questions appropriately by nodding yes or no. Denies any pain. Follows commands. No n/v, shortness of breath.     PHYSICAL EXAM:    General: deconditioned, fatigued  HEENT: NC/AT; PERRL, mildly icteric sclera; MMM  Neck: supple, trach in place  Cardiovascular: +S1/S2; tachycardic but regular rhythm, systolic murmur at LSB   Respiratory: intubated via trach; diffuse rhonchi/crackles with decreased breath sounds b/l   Gastrointestinal: NGT; NT, non distended; +BS   Extremities: WWP; trace edema of LEs/UEs; no clubbing or cyanosis  Derm: ischemia of all 10 distal phalanges of lower extremities; ischemia of distal phalange of R middle finger; dependent edema of lower extreme to the level of the knee b/l; edema of b/l hands more prominent on the dorsal surface    Vascular: 2+ radial, DP/PT pulses B/L  Neurological: AAOx3, moves all extremities, follows commands    VITAL SIGNS:  Vital Signs Last 24 Hrs  T(C): 37.9 (03 Dec 2019 10:05), Max: 38.2 (03 Dec 2019 01:03)  T(F): 100.2 (03 Dec 2019 10:05), Max: 100.8 (03 Dec 2019 01:03)  HR: 112 (03 Dec 2019 13:00) (108 - 134)  BP: 90/43 (03 Dec 2019 05:00) (90/43 - 90/43)  BP(mean): 62 (03 Dec 2019 05:00) (62 - 62)  RR: 19 (03 Dec 2019 13:00) (12 - 25)  SpO2: 100% (03 Dec 2019 13:00) (95% - 100%)      VITAL SIGNS:  Vital Signs Last 24 Hrs  T(C): 38.6 (04 Dec 2019 14:15), Max: 38.8 (03 Dec 2019 21:00)  T(F): 101.5 (04 Dec 2019 14:15), Max: 101.8 (03 Dec 2019 21:00)  HR: 124 (04 Dec 2019 14:00) (110 - 142)  BP: 123/66 (03 Dec 2019 15:01) (123/66 - 123/66)  BP(mean): 85 (03 Dec 2019 15:01) (85 - 85)  RR: 23 (04 Dec 2019 14:00) (0 - 35)  SpO2: 99% (04 Dec 2019 14:00) (97% - 100%)      MEDICATIONS:  MEDICATIONS  (STANDING):  acyclovir IVPB 370 milliGRAM(s) IV Intermittent every 24 hours  albuterol/ipratropium for Nebulization 3 milliLiter(s) Nebulizer every 4 hours  artificial  tears Solution 1 Drop(s) Both EYES every 6 hours  bisacodyl Suppository 10 milliGRAM(s) Rectal every 24 hours  chlorhexidine 0.12% Liquid 15 milliLiter(s) Oral Mucosa every 12 hours  chlorhexidine 2% Cloths 1 Application(s) Topical daily  dextrose 5%. 1000 milliLiter(s) (50 mL/Hr) IV Continuous <Continuous>  dextrose 50% Injectable 12.5 Gram(s) IV Push once  dextrose 50% Injectable 25 Gram(s) IV Push once  dextrose 50% Injectable 25 Gram(s) IV Push once  fentaNYL   Infusion. 0.5 MICROgram(s)/kG/Hr (3.68 mL/Hr) IV Continuous <Continuous>  gabapentin   Solution 100 milliGRAM(s) Enteral Tube <User Schedule>  heparin  Injectable 5000 Unit(s) SubCutaneous every 8 hours  hydrocortisone sodium succinate Injectable 25 milliGRAM(s) IV Push every 12 hours  insulin lispro (HumaLOG) corrective regimen sliding scale   SubCutaneous every 6 hours  LORazepam     Tablet 1 milliGRAM(s) Oral every 6 hours  midodrine 10 milliGRAM(s) Oral every 8 hours  multivitamin/minerals/iron Oral Solution (CENTRUM) 15 milliLiter(s) Enteral Tube daily  nafcillin  IVPB 2 Gram(s) IV Intermittent every 4 hours  pantoprazole   Suspension 40 milliGRAM(s) Oral daily  polyethylene glycol 3350 17 Gram(s) Oral two times a day  propofol Infusion 5 MICROgram(s)/kG/Min (2.208 mL/Hr) IV Continuous <Continuous>  QUEtiapine 100 milliGRAM(s) Oral every 12 hours  sodium chloride 0.9% lock flush 3 milliLiter(s) IV Push every 8 hours  vitamin E 400 International Unit(s) Oral daily  zinc sulfate 220 milliGRAM(s) Oral daily    MEDICATIONS  (PRN):  acetaminophen    Suspension .. 650 milliGRAM(s) Oral every 6 hours PRN Temp greater or equal to 38C (100.4F)  dextrose 40% Gel 15 Gram(s) Oral once PRN Blood Glucose LESS THAN 70 milliGRAM(s)/deciliter  glucagon  Injectable 1 milliGRAM(s) IntraMuscular once PRN Glucose LESS THAN 70 milligrams/deciliter      ALLERGIES:  Allergies    No Known Allergies    Intolerances        LABS:                        8.3    4.83  )-----------( 144      ( 04 Dec 2019 06:39 )             25.4     12-04    136  |  101  |  23  ----------------------------<  108<H>  3.3<L>   |  21<L>  |  2.29<H>    Ca    7.6<L>      04 Dec 2019 06:39  Phos  3.6     12-04  Mg     1.8     12-04    TPro  6.0  /  Alb  2.1<L>  /  TBili  1.2  /  DBili  x   /  AST  22  /  ALT  19  /  AlkPhos  83  12-04        CAPILLARY BLOOD GLUCOSE      POCT Blood Glucose.: 128 mg/dL (04 Dec 2019 11:12)      RADIOLOGY & ADDITIONAL TESTS: Reviewed.

## 2019-12-04 NOTE — PROGRESS NOTE ADULT - SUBJECTIVE AND OBJECTIVE BOX
Infectious Diseases Progress Note:    SUBJECTIVE: Patient seen and examined at bedside. Patient off sedation this am, able to answer yes and no questions, tracks with eyes and follows commands.  Denies any discomfort or pain.  Denies chills, HA, nausea, vomiting, abdominal pain.    ENDOCARDITIS/SEPSIS    Endocarditis  CAMERON (acute kidney injury)  Acute endocarditis due to other organism      Allergies    No Known Allergies    Intolerances        ANTIBIOTICS/RELEVANT:  antimicrobials  acyclovir IVPB 370 milliGRAM(s) IV Intermittent every 24 hours  nafcillin  IVPB 2 Gram(s) IV Intermittent every 4 hours    immunologic:      OTHER:  acetaminophen    Suspension .. 650 milliGRAM(s) Oral every 6 hours PRN  albuterol/ipratropium for Nebulization 3 milliLiter(s) Nebulizer every 4 hours  artificial  tears Solution 1 Drop(s) Both EYES every 6 hours  bisacodyl Suppository 10 milliGRAM(s) Rectal every 24 hours  chlorhexidine 0.12% Liquid 15 milliLiter(s) Oral Mucosa every 12 hours  chlorhexidine 2% Cloths 1 Application(s) Topical daily  dextrose 40% Gel 15 Gram(s) Oral once PRN  dextrose 5%. 1000 milliLiter(s) IV Continuous <Continuous>  dextrose 50% Injectable 12.5 Gram(s) IV Push once  dextrose 50% Injectable 25 Gram(s) IV Push once  dextrose 50% Injectable 25 Gram(s) IV Push once  fentaNYL   Infusion. 0.5 MICROgram(s)/kG/Hr IV Continuous <Continuous>  gabapentin   Solution 100 milliGRAM(s) Enteral Tube <User Schedule>  glucagon  Injectable 1 milliGRAM(s) IntraMuscular once PRN  heparin  Injectable 5000 Unit(s) SubCutaneous every 8 hours  hydrocortisone sodium succinate Injectable 25 milliGRAM(s) IV Push every 12 hours  insulin lispro (HumaLOG) corrective regimen sliding scale   SubCutaneous every 6 hours  LORazepam     Tablet 1 milliGRAM(s) Oral every 6 hours  midodrine 10 milliGRAM(s) Oral every 8 hours  multivitamin/minerals/iron Oral Solution (CENTRUM) 15 milliLiter(s) Enteral Tube daily  pantoprazole   Suspension 40 milliGRAM(s) Oral daily  polyethylene glycol 3350 17 Gram(s) Oral two times a day  propofol Infusion 5 MICROgram(s)/kG/Min IV Continuous <Continuous>  QUEtiapine 100 milliGRAM(s) Oral every 12 hours  sodium chloride 0.9% Bolus 250 milliLiter(s) IV Bolus once  sodium chloride 0.9% lock flush 3 milliLiter(s) IV Push every 8 hours  vitamin E 400 International Unit(s) Oral daily  zinc sulfate 220 milliGRAM(s) Oral daily      Objective:  Vital Signs Last 24 Hrs  T(C): 38.1 (04 Dec 2019 10:06), Max: 38.8 (03 Dec 2019 21:00)  T(F): 100.6 (04 Dec 2019 10:06), Max: 101.8 (03 Dec 2019 21:00)  HR: 134 (04 Dec 2019 09:10) (110 - 142)  BP: 123/66 (03 Dec 2019 15:01) (114/70 - 123/66)  BP(mean): 85 (03 Dec 2019 15:01) (85 - 90)  RR: 23 (04 Dec 2019 08:00) (0 - 31)  SpO2: 100% (04 Dec 2019 09:10) (97% - 100%)    PHYSICAL EXAM:  Constitutional: Ill appearing, + trach, follows commands and tracks with eyes  Eyes: No scleral icterus, PERRL  Ear/Nose/Throat: + trach, no oral lesion	  Neck: no JVD, supple  Respiratory: Diffuse rhonchi although decreased from yesterday, 2 chest tubes on R, 1 on Left  Cardiovascular: S1S2, RRR, + systolic murmur  Gastrointestinal: soft, NTND, (+) BS, no HSM  Extremities: necrosis of fingers and toes b/l    LABS:                        8.3    4.83  )-----------( 144      ( 04 Dec 2019 06:39 )             25.4     12-04    136  |  101  |  23  ----------------------------<  108<H>  3.3<L>   |  21<L>  |  2.29<H>    Ca    7.6<L>      04 Dec 2019 06:39  Phos  3.6     12-04  Mg     1.8     12-04    TPro  6.0  /  Alb  2.1<L>  /  TBili  1.2  /  DBili  x   /  AST  22  /  ALT  19  /  AlkPhos  83  12-04          MICROBIOLOGY:    Culture - Sputum (collected 12-03-19 @ 17:23)  Source: .Sputum Sputum  Gram Stain (12-04-19 @ 08:58):    Few epithelial cells    Rare White blood cells    Moderate Yeast    Rare Gram Negative Rods    Sputum specimen rejected.  Microscopic examination indicates    oropharyngeal contamination.  Please repeat.  Final Report (12-04-19 @ 08:58):    Sputum specimen rejected.  Microscopic examination indicates    oropharyngeal contamination.  Please repeat.      Culture Results:   Sputum specimen rejected.  Microscopic examination indicates  oropharyngeal contamination.  Please repeat. (12-03 @ 17:23)          RADIOLOGY & ADDITIONAL STUDIES:

## 2019-12-05 NOTE — PROGRESS NOTE ADULT - ASSESSMENT
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  Hospital course complicated by acute hypoxic respiratory failure, acute kidney injury, congestive hepatopathy, multi-system organ failure 2/2 hypoperfusion.       # Anuric CAMERON likely due to ATN in setting of shock  - was initially on CVVHD then switched to IHD  - last HD completed on 12/3 - 1.5 L - blood flow turned down due to tachycardia  - volume and electrolytes acceptable  - strict I/O  - recommend to get Tunnel HD cath  - planned for HD today      # anemia  - Hb 8.5  - hemolysis likely 2/2 to sepsis and/or DIC as per primary team  - recommend transfusion prn hb< 7      # renal bone disease  - calcium phos noted- no need for binders    # hypokalemia  - K 3.3  - suggest KCl 40 meq via OGT 40 yo M w/ PMH of IVDU and active smoker who came from [facility] on 11/8/19 in 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  Hospital course complicated by acute hypoxic respiratory failure, acute kidney injury, congestive hepatopathy, multi-system organ failure 2/2 hypoperfusion.       # Anuric CAMERON likely due to ATN in setting of shock  - was initially on CVVHD then switched to IHD  - last HD completed on 12/3 - 1.5 L - blood flow turned down due to tachycardia  - volume overloaded  - net pos 1.1 L/24 hrs  - strict I/O  - planned for HD today - followed by removal of IJ line   - prescription 210 min - 1.5 L UF  - recommend to get Tunnel HD cath when feasible      # anemia  - Hb 8.5  - hemolysis likely 2/2 to sepsis and/or DIC as per primary team  - recommend transfusion prn hb< 7      # renal bone disease  - calcium phos noted- no need for binders    # hypokalemia  - K 3.3  - suggest KCl 40 meq via OGT

## 2019-12-05 NOTE — PROGRESS NOTE ADULT - SUBJECTIVE AND OBJECTIVE BOX
Patient was seen and evaluated on dialysis.   HR: 132 (12-05-19 @ 15:00)  BP: 107/69 (12-04-19 @ 23:20)  Wt(kg): --  12-05    136  |  101  |  39<H>  ----------------------------<  106<H>  3.3<L>   |  17<L>  |  2.92<H>    Ca    8.0<L>      05 Dec 2019 05:26  Phos  4.5     12-05  Mg     2.0     12-05    TPro  6.0  /  Alb  2.1<L>  /  TBili  1.2  /  DBili  x   /  AST  22  /  ALT  19  /  AlkPhos  83  12-04    Continue dialysis:   Dialyzer:   180    QB: 400  K bath: 4  Goal UF:     1.5  L   over          210     min  Patient is tolerating the procedure well.

## 2019-12-05 NOTE — PROGRESS NOTE ADULT - SUBJECTIVE AND OBJECTIVE BOX
Patient is a 39y Male seen and evaluated at bedside.   Patient remains on vent via trach, on norepi.  Last HD completed on 12/3 - with UF 1500. Blood flow was turned down to 200 due to tachycardia.  Patient was noted to be tachycardic, and febrile.        Meds:    acetaminophen    Suspension .. 650 milliGRAM(s) Oral every 6 hours PRN  albumin human 25% IVPB 50 milliLiter(s) IV Intermittent every 1 hour  albuterol/ipratropium for Nebulization 3 milliLiter(s) Nebulizer every 4 hours  artificial  tears Solution 1 Drop(s) Both EYES every 6 hours  bisacodyl Suppository 10 milliGRAM(s) Rectal every 24 hours  chlorhexidine 0.12% Liquid 15 milliLiter(s) Oral Mucosa every 12 hours  chlorhexidine 2% Cloths 1 Application(s) Topical daily  dextrose 40% Gel 15 Gram(s) Oral once PRN  dextrose 5%. 1000 milliLiter(s) IV Continuous <Continuous>  dextrose 50% Injectable 12.5 Gram(s) IV Push once  dextrose 50% Injectable 25 Gram(s) IV Push once  dextrose 50% Injectable 25 Gram(s) IV Push once  fentaNYL   Infusion. 0.5 MICROgram(s)/kG/Hr IV Continuous <Continuous>  gabapentin   Solution 100 milliGRAM(s) Enteral Tube <User Schedule>  glucagon  Injectable 1 milliGRAM(s) IntraMuscular once PRN  heparin  Injectable 5000 Unit(s) SubCutaneous every 8 hours  hydrocortisone sodium succinate Injectable 25 milliGRAM(s) IV Push every 12 hours  insulin lispro (HumaLOG) corrective regimen sliding scale   SubCutaneous every 6 hours  iohexol 300 mG (iodine)/mL Oral Solution 30 milliLiter(s) Oral once  LORazepam     Tablet 2 milliGRAM(s) Oral every 6 hours  midodrine 10 milliGRAM(s) Oral every 8 hours  multivitamin/minerals/iron Oral Solution (CENTRUM) 15 milliLiter(s) Enteral Tube daily  nafcillin  IVPB 2 Gram(s) IV Intermittent every 4 hours  norepinephrine Infusion 0.05 MICROgram(s)/kG/Min IV Continuous <Continuous>  pantoprazole   Suspension 40 milliGRAM(s) Oral daily  polyethylene glycol 3350 17 Gram(s) Oral at bedtime  potassium chloride   Powder 40 milliEquivalent(s) Oral every 4 hours  propofol Infusion 5 MICROgram(s)/kG/Min IV Continuous <Continuous>  QUEtiapine 100 milliGRAM(s) Oral every 12 hours  sodium chloride 0.9% lock flush 3 milliLiter(s) IV Push every 8 hours  vitamin E 400 International Unit(s) Oral daily  zinc sulfate 220 milliGRAM(s) Oral daily      T(C): 38 (12-05-19 @ 09:13), Max: 38.6 (12-04-19 @ 14:15)  HR: 132 (12-05-19 @ 09:00) (122 - 136)  BP: 107/69 (12-04-19 @ 23:20) (86/53 - 107/69)  RR: 28 (12-05-19 @ 09:00) (11 - 38)  SpO2: 100% (12-05-19 @ 09:00) (99% - 100%)    Input/Output      12-04-19 @ 07:01  -  12-05-19 @ 07:00  --------------------------------------------------------  IN: 2679.6 mL / OUT: 1525 mL / NET: 1154.6 mL    12-05-19 @ 07:01  -  12-05-19 @ 10:15  --------------------------------------------------------  IN: 14.8 mL / OUT: 0 mL / NET: 14.8 mL            ROS:     Gen: fever          PHYSICAL EXAM:  GENERAL: awake,  connected to vent via trach   HEAD:  Atraumatic, Normocephalic,   EYES: Bilateral conjunctiva and sclera normal   Oral cavity: Oral mucosa dry and pink  NECK: trach   CHEST/LUNG: occasional creps in bases  HEART: Regular rate and rhythm. JEFFREY II/VI at LPSB, No gallop, no rub   ABDOMEN: distended   EXTREMITIES: 1+ pitting edema  Neurology: drowsy but arousable  Skin: necrotic  lesions on fingertips    access: R  HD cath           LABS:                                       8.5    4.00  )-----------( 142      ( 05 Dec 2019 05:26 )             25.4       12-05    136  |  101  |  39<H>  ----------------------------<  106<H>  3.3<L>   |  17<L>  |  2.92<H>    Ca    8.0<L>      05 Dec 2019 05:26  Phos  4.5     12-05  Mg     2.0     12-05    TPro  6.0  /  Alb  2.1<L>  /  TBili  1.2  /  DBili  x   /  AST  22  /  ALT  19  /  AlkPhos  83  12-04                                          RADIOLOGY & ADDITIONAL STUDIES: Patient is a 39y Male seen and evaluated at bedside.   Patient remains on vent via trach, pressors stopped this am.  Last HD completed on 12/3 - with UF 1500. Blood flow was turned down to 200 due to tachycardia.  Patient was noted to be tachycardic, and febrile.        Meds:    acetaminophen    Suspension .. 650 milliGRAM(s) Oral every 6 hours PRN  albumin human 25% IVPB 50 milliLiter(s) IV Intermittent every 1 hour  albuterol/ipratropium for Nebulization 3 milliLiter(s) Nebulizer every 4 hours  artificial  tears Solution 1 Drop(s) Both EYES every 6 hours  bisacodyl Suppository 10 milliGRAM(s) Rectal every 24 hours  chlorhexidine 0.12% Liquid 15 milliLiter(s) Oral Mucosa every 12 hours  chlorhexidine 2% Cloths 1 Application(s) Topical daily  dextrose 40% Gel 15 Gram(s) Oral once PRN  dextrose 5%. 1000 milliLiter(s) IV Continuous <Continuous>  dextrose 50% Injectable 12.5 Gram(s) IV Push once  dextrose 50% Injectable 25 Gram(s) IV Push once  dextrose 50% Injectable 25 Gram(s) IV Push once  fentaNYL   Infusion. 0.5 MICROgram(s)/kG/Hr IV Continuous <Continuous>  gabapentin   Solution 100 milliGRAM(s) Enteral Tube <User Schedule>  glucagon  Injectable 1 milliGRAM(s) IntraMuscular once PRN  heparin  Injectable 5000 Unit(s) SubCutaneous every 8 hours  hydrocortisone sodium succinate Injectable 25 milliGRAM(s) IV Push every 12 hours  insulin lispro (HumaLOG) corrective regimen sliding scale   SubCutaneous every 6 hours  iohexol 300 mG (iodine)/mL Oral Solution 30 milliLiter(s) Oral once  LORazepam     Tablet 2 milliGRAM(s) Oral every 6 hours  midodrine 10 milliGRAM(s) Oral every 8 hours  multivitamin/minerals/iron Oral Solution (CENTRUM) 15 milliLiter(s) Enteral Tube daily  nafcillin  IVPB 2 Gram(s) IV Intermittent every 4 hours  norepinephrine Infusion 0.05 MICROgram(s)/kG/Min IV Continuous <Continuous>  pantoprazole   Suspension 40 milliGRAM(s) Oral daily  polyethylene glycol 3350 17 Gram(s) Oral at bedtime  potassium chloride   Powder 40 milliEquivalent(s) Oral every 4 hours  propofol Infusion 5 MICROgram(s)/kG/Min IV Continuous <Continuous>  QUEtiapine 100 milliGRAM(s) Oral every 12 hours  sodium chloride 0.9% lock flush 3 milliLiter(s) IV Push every 8 hours  vitamin E 400 International Unit(s) Oral daily  zinc sulfate 220 milliGRAM(s) Oral daily      T(C): 38 (12-05-19 @ 09:13), Max: 38.6 (12-04-19 @ 14:15)  HR: 132 (12-05-19 @ 09:00) (122 - 136)  BP: 107/69 (12-04-19 @ 23:20) (86/53 - 107/69)  RR: 28 (12-05-19 @ 09:00) (11 - 38)  SpO2: 100% (12-05-19 @ 09:00) (99% - 100%)    Input/Output      12-04-19 @ 07:01  -  12-05-19 @ 07:00  --------------------------------------------------------  IN: 2679.6 mL / OUT: 1525 mL / NET: 1154.6 mL    12-05-19 @ 07:01  -  12-05-19 @ 10:15  --------------------------------------------------------  IN: 14.8 mL / OUT: 0 mL / NET: 14.8 mL            ROS:     Gen: fever          PHYSICAL EXAM:  GENERAL: awake,  connected to vent via trach   HEAD:  Atraumatic, Normocephalic,   EYES: Bilateral conjunctiva and sclera normal   Oral cavity: Oral mucosa dry and pink  NECK: trach   CHEST/LUNG: bilateral creps and wheezing  HEART: Regular rate and rhythm. JEFFREY II/VI at LPSB, No gallop, no rub   ABDOMEN: distended   EXTREMITIES: 1+ pitting edema  Neurology: drowsy but arousable  Skin: necrotic  lesions on fingertips    access: R  HD cath           LABS:                                       8.5    4.00  )-----------( 142      ( 05 Dec 2019 05:26 )             25.4       12-05    136  |  101  |  39<H>  ----------------------------<  106<H>  3.3<L>   |  17<L>  |  2.92<H>    Ca    8.0<L>      05 Dec 2019 05:26  Phos  4.5     12-05  Mg     2.0     12-05    TPro  6.0  /  Alb  2.1<L>  /  TBili  1.2  /  DBili  x   /  AST  22  /  ALT  19  /  AlkPhos  83  12-04                                          RADIOLOGY & ADDITIONAL STUDIES:

## 2019-12-05 NOTE — CHART NOTE - NSCHARTNOTEFT_GEN_A_CORE
Admitting Diagnosis:   Patient is a 39y old  Male who presents with a chief complaint of Endocarditis h/o IVDU (05 Dec 2019 12:40)      PAST MEDICAL & SURGICAL HISTORY:  Endocarditis  IVDU (intravenous drug user)  Smoker  No significant past surgical history      Current Nutrition Order:  Vital 1.5 Christian @ 46mL/hr x 24hrs plus ProStat BID (200 kcal, 30g protein) via NGT. Provides: 1104mL TV, 185kcal, 104.5g pro, 843mL free H2O, 110% RDI, 1.57g/kg ABW protein.  Currently not running; pending CT chest/abd/pelvis     PO Intake: Good (%) [   ]  Fair (50-75%) [   ] Poor (<25%) [   ]- N/A NPO     GI Issues: Abdominal distention; gastric bubble on AXR  Rectal tube in place w/1150mL output x 24hrs   250mL TF residuals overnight     Pain: Unable to assess at this time 2/2 vent; sedated    Skin Integrity:  Heber 10  IAD  R. toes necrosis  B/L ankle and heel DTIs  L. scapula DTI    Labs:   12-05    136  |  101  |  39<H>  ----------------------------<  106<H>  3.3<L>   |  17<L>  |  2.92<H>    Ca    8.0<L>      05 Dec 2019 05:26  Phos  4.5     12-05  Mg     2.0     12-05    TPro  6.0  /  Alb  2.1<L>  /  TBili  1.2  /  DBili  x   /  AST  22  /  ALT  19  /  AlkPhos  83  12-04    CAPILLARY BLOOD GLUCOSE      POCT Blood Glucose.: 79 mg/dL (05 Dec 2019 11:42)  POCT Blood Glucose.: 105 mg/dL (04 Dec 2019 23:36)  POCT Blood Glucose.: 99 mg/dL (04 Dec 2019 17:38)      Medications:  MEDICATIONS  (STANDING):  albumin human 25% IVPB 50 milliLiter(s) IV Intermittent every 1 hour  albuterol/ipratropium for Nebulization 3 milliLiter(s) Nebulizer every 4 hours  artificial  tears Solution 1 Drop(s) Both EYES every 6 hours  bisacodyl Suppository 10 milliGRAM(s) Rectal every 24 hours  chlorhexidine 0.12% Liquid 15 milliLiter(s) Oral Mucosa every 12 hours  chlorhexidine 2% Cloths 1 Application(s) Topical daily  dextrose 5%. 1000 milliLiter(s) (50 mL/Hr) IV Continuous <Continuous>  dextrose 50% Injectable 12.5 Gram(s) IV Push once  dextrose 50% Injectable 25 Gram(s) IV Push once  dextrose 50% Injectable 25 Gram(s) IV Push once  fentaNYL   Infusion. 0.5 MICROgram(s)/kG/Hr (3.68 mL/Hr) IV Continuous <Continuous>  gabapentin   Solution 100 milliGRAM(s) Enteral Tube <User Schedule>  heparin  Injectable 5000 Unit(s) SubCutaneous every 8 hours  hydrocortisone sodium succinate Injectable 25 milliGRAM(s) IV Push every 12 hours  insulin lispro (HumaLOG) corrective regimen sliding scale   SubCutaneous every 6 hours  iohexol 300 mG (iodine)/mL Oral Solution 30 milliLiter(s) Oral once  LORazepam     Tablet 2 milliGRAM(s) Oral every 6 hours  midodrine 10 milliGRAM(s) Oral every 8 hours  multivitamin/minerals/iron Oral Solution (CENTRUM) 15 milliLiter(s) Enteral Tube daily  nafcillin  IVPB 2 Gram(s) IV Intermittent every 4 hours  norepinephrine Infusion 0.05 MICROgram(s)/kG/Min (6.9 mL/Hr) IV Continuous <Continuous>  pantoprazole   Suspension 40 milliGRAM(s) Oral daily  polyethylene glycol 3350 17 Gram(s) Oral at bedtime  propofol Infusion 5 MICROgram(s)/kG/Min (2.208 mL/Hr) IV Continuous <Continuous>  QUEtiapine 100 milliGRAM(s) Oral every 12 hours  sodium chloride 0.9% lock flush 3 milliLiter(s) IV Push every 8 hours  vitamin E 400 International Unit(s) Oral daily  zinc sulfate 220 milliGRAM(s) Oral daily    MEDICATIONS  (PRN):  acetaminophen    Suspension .. 650 milliGRAM(s) Oral every 6 hours PRN Temp greater or equal to 38C (100.4F)  dextrose 40% Gel 15 Gram(s) Oral once PRN Blood Glucose LESS THAN 70 milliGRAM(s)/deciliter  glucagon  Injectable 1 milliGRAM(s) IntraMuscular once PRN Glucose LESS THAN 70 milligrams/deciliter      Weight: 66.6kg (12/3)  66.3kg (11/30, dry)  65.3kg ([facility], 11/20)  77.4kg (11/13)  73.6kg (11/10)    Weight Change:   Suspect actual weight loss vs fluid shifts as pt was noted to be anasarcic, is on CVVHD and has notably severe muscle wasting in upper extremities.     Nutrition Focused Physical Exam: Completed [ X-11/15, 11/20 re-evaluated ]  Not Pertinent [   ]  Muscle Wasting- Temporal [X   ]  Clavicle/Pectoral [  X ]  Shoulder/Deltoid [   ]  Scapula [   ]  Interosseous [   ]  Quadriceps [   ]  Gastrocnemius [   ]  Severe PCM suspected.     Estimated energy needs: ABW (66.6kg) used to estimate needs as pt is <IBW.   Needs increased 2/2 vent, sepsis, HD suspected malnutrition. Fluids per team 2/2 HD  Calories: 25-30 kcal/kg = 8766-2189 kcal/day  Protein: 1.6-1.8 g/kg = 107-119g protein/day    Subjective:   38 yo/male with PMHx IVDA, presented to OSH w/cough, hemoptysis, and N/V. Found to be septic and hypotensive. CT chest +pna, pulmonary nodules w/septic emboli, and ELIAN +vegetation. Pt ultimately intubated and started on pressors. Transferred [facility] where he was deemed not to be a surgical candidate. Course c/b renal failure and B/L pleural effusions, s/p R. CT placement. Started on CVVH. ELIAN w/bubble negative for PFO. CTA negative for mesenteric ischemia and rectal exam negative for perirectal abscess, per surgery note. Underwent CT A/P 11/18 which demonstrated colonic distension likely 2/2 pseudoobstruction and decompressed small bowel. Per surgery recs, pt to continue w/ rectal tube for colonic decompression and plan for flex sig once stable. CT chest showing hydropneumothorax on R. lung and pneumothorax of L. lung; chest tubes remain to suction. S/p bronch 11/19 w/ copious purulent sputum; repeat bronch 11/22 still w/thick secretions though improved. Remains too high risk for CT surgery intervention at this time. S/p repeat echo on 11/20 showing large stable vegetation, severe TR. Transitioned from CVVHD to IHD. S/p trach on 11/26. Bronch cytology +HSV, started on acyclovir. Pt continues to be decompressed with NGT to LIWS and rectal tube to suction- AXR +distal small bowel dilation, abdomen remains distended. Ileus likely 2/2 opioids. After placement of rectal tube, significant bowel decompression seen on AXR and pt was re-started on EN via dobhoff.     Course c/b +AFB sample from bronch on 11/23- possibly MAC vs. TB. Late in day 12/4 pt became increasingly hypotensive and tachycardic. 250cc TF residuals pulled from NGT. Feeds held, abdomen noted to be distended again despite rectal tube. Pt discussed during MICU rounds this AM. He continues to be tachycardic and less interactive this AM. AXR showing gastric bubble. Plan for CT chest/abd/pelvis to identify new potential source of infection. Currently trached to vent on VC/AC mode, sedated only on fentanyl. MAP 70- not currently on levophed, but still ordered for midodrine. NPO. B/L chest tubes remain to suction. Rectal tube for decompression. Planned for HD today- may potentially need to resume CVVHD vs. continuing IHD if pressures drop. Nutrition plan pending results of CT scan.  (H, trending down), K 3.3 (L). Will continue to follow per RD protocol.     Previous Nutrition Diagnosis:  Increased protein-calorie needs RT increased demand for protein-calorie intake AEB vent, hypermetabolic state, suspected malnutrition   Active [  x ]  Resolved [   ]    If resolved, new PES:      Goal: Pt will meet % of EER via tolerated route     Recommendations:  1. If unable to resume TF w/in 24hrs, recommend starting TPN via central line: 290 g Dex, 110 g AA, 50g 20% Lipids to provide in total 1926 Kcal, 110 g protein, GIR 3.04mg/kg/min, 1.65 g/kg ABW protein  Recommend checking TG before starting TPN and then check TG weekly. Replete lytes prior to TPN initiation. Check Mg, Phos, K daily and POC BG Q6hrs. Trend daily weights. Fluids and lytes per MD discretion.   Start at 150g Dex on Day 1, 250g Dex on Day 2, and advance to goal of 290g Dex on Day 3  2. As medically feasible, resume trickle feeds of Vital 1.5 Christian via NGT. Consider addition of prokinetic agent if not contraindicated   3. Monitor lytes and replete prn. POC BG q6hrs   4. Daily wts   5. Pain and bowel regimens per team     Education: No visitors at bedside today     Risk Level: High [ X  ] Moderate [   ] Low [   ]

## 2019-12-05 NOTE — PROGRESS NOTE ADULT - SUBJECTIVE AND OBJECTIVE BOX
OVERNIGHT EVENTS: Overnight, patient noted to be uncomfortable with abdominal distension. Feeds were held as patient was having high residuals. Abdominal plain film showed gastric distension with extensive air. Given concern for superimposed infectious process and possible hypoperfusion, lactate was drawn which was wnl at 1.9.     SUBJECTIVE / INTERVAL HPI: Patient seen and examined at bedside. Awake, alert and oriented but more sedated and less responsive. Opens eyes but appears too fatigued to answer questions appropriately or respond to commands.     PHYSICAL EXAM:    General: deconditioned, fatigued  HEENT: NC/AT; PERRL, mildly icteric sclera; MMM  Neck: supple, trach in place  Cardiovascular: +S1/S2; tachycardic but regular rhythm, systolic murmur at LSB   Respiratory: intubated via trach; diffuse rhonchi/crackles with decreased breath sounds b/l; prominent inspiratory wheezing at RL lung border in the anterior field; chest tubes in place; increased work of breathing with use of abdominal muscles    Gastrointestinal: NGT; NT, + distension; +BS   Extremities: WWP; trace edema of LEs/UEs, LEs>UEs; no clubbing or cyanosis  Derm: ischemia of all 10 distal phalanges of lower extremities; ischemia of distal phalange of R middle finger; dependent edema of lower extreme to the level of the knee b/l; edema of b/l hands more prominent on the dorsal surface    Vascular: 2+ radial, DP/PT pulses B/L  Neurological: AAOx3, moves all extremities, follows commands intermittently       VITAL SIGNS:  Vital Signs Last 24 Hrs  T(C): 37.9 (05 Dec 2019 14:55), Max: 38.3 (04 Dec 2019 17:40)  T(F): 100.2 (05 Dec 2019 14:55), Max: 101 (04 Dec 2019 17:40)  HR: 136 (05 Dec 2019 14:30) (122 - 136)  BP: 107/69 (04 Dec 2019 23:20) (86/53 - 107/69)  BP(mean): 83 (04 Dec 2019 23:20) (59 - 83)  RR: 15 (05 Dec 2019 14:30) (11 - 38)  SpO2: 100% (05 Dec 2019 14:30) (100% - 100%)      MEDICATIONS:  MEDICATIONS  (STANDING):  albumin human 25% IVPB 50 milliLiter(s) IV Intermittent every 1 hour  albuterol/ipratropium for Nebulization 3 milliLiter(s) Nebulizer every 4 hours  artificial  tears Solution 1 Drop(s) Both EYES every 6 hours  bisacodyl Suppository 10 milliGRAM(s) Rectal every 24 hours  chlorhexidine 0.12% Liquid 15 milliLiter(s) Oral Mucosa every 12 hours  chlorhexidine 2% Cloths 1 Application(s) Topical daily  dextrose 5%. 1000 milliLiter(s) (50 mL/Hr) IV Continuous <Continuous>  dextrose 50% Injectable 12.5 Gram(s) IV Push once  dextrose 50% Injectable 25 Gram(s) IV Push once  dextrose 50% Injectable 25 Gram(s) IV Push once  fentaNYL   Infusion. 0.5 MICROgram(s)/kG/Hr (3.68 mL/Hr) IV Continuous <Continuous>  gabapentin   Solution 100 milliGRAM(s) Enteral Tube <User Schedule>  heparin  Injectable 5000 Unit(s) SubCutaneous every 8 hours  hydrocortisone sodium succinate Injectable 25 milliGRAM(s) IV Push every 12 hours  insulin lispro (HumaLOG) corrective regimen sliding scale   SubCutaneous every 6 hours  iohexol 300 mG (iodine)/mL Oral Solution 30 milliLiter(s) Oral once  LORazepam     Tablet 2 milliGRAM(s) Oral every 6 hours  midodrine 10 milliGRAM(s) Oral every 8 hours  multivitamin/minerals/iron Oral Solution (CENTRUM) 15 milliLiter(s) Enteral Tube daily  nafcillin  IVPB 2 Gram(s) IV Intermittent every 4 hours  norepinephrine Infusion 0.05 MICROgram(s)/kG/Min (6.9 mL/Hr) IV Continuous <Continuous>  pantoprazole   Suspension 40 milliGRAM(s) Oral daily  polyethylene glycol 3350 17 Gram(s) Oral at bedtime  propofol Infusion 5 MICROgram(s)/kG/Min (2.208 mL/Hr) IV Continuous <Continuous>  QUEtiapine 100 milliGRAM(s) Oral every 12 hours  sodium chloride 0.9% lock flush 3 milliLiter(s) IV Push every 8 hours  vitamin E 400 International Unit(s) Oral daily  zinc sulfate 220 milliGRAM(s) Oral daily    MEDICATIONS  (PRN):  acetaminophen    Suspension .. 650 milliGRAM(s) Oral every 6 hours PRN Temp greater or equal to 38C (100.4F)  dextrose 40% Gel 15 Gram(s) Oral once PRN Blood Glucose LESS THAN 70 milliGRAM(s)/deciliter  glucagon  Injectable 1 milliGRAM(s) IntraMuscular once PRN Glucose LESS THAN 70 milligrams/deciliter      ALLERGIES:  Allergies    No Known Allergies    Intolerances        LABS:                        8.5    4.00  )-----------( 142      ( 05 Dec 2019 05:26 )             25.4     12-05    136  |  101  |  39<H>  ----------------------------<  106<H>  3.3<L>   |  17<L>  |  2.92<H>    Ca    8.0<L>      05 Dec 2019 05:26  Phos  4.5     12-05  Mg     2.0     12-05    TPro  6.0  /  Alb  2.1<L>  /  TBili  1.2  /  DBili  x   /  AST  22  /  ALT  19  /  AlkPhos  83  12-04        CAPILLARY BLOOD GLUCOSE      POCT Blood Glucose.: 79 mg/dL (05 Dec 2019 11:42)      RADIOLOGY & ADDITIONAL TESTS: Reviewed.

## 2019-12-05 NOTE — PROGRESS NOTE ADULT - ASSESSMENT
40 y/o M w/ PMH of IVDU and active smoker who came from [facility] on 11/8/19 in 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 After being transferred to  CTICU for surgical evaluation, pt was deemed to not be a surgical candidate and transferred to MICU for medical mgmt. ID, Nephrology, heme/onc, surgery, vascular following.    Neuro  Intubated, sedated on Propofol, Fentanyl gtt. Prior CT head negative for any intracranial embolic events.    - c/w Seroquel to 100 mg q12h  - currently off propofol; downtitratng fentanyl as tolerated  - increased ativan to 2 mg q6h    Cardiovascular   #Hypotension  Most likely 2/2 septic shock from infective endocarditis and RV failure 2/2 tricuspid regurgitation (ELIAN shows EF of 40-50%). Echo with EF 45%. ELIAN with EF 40-45%, 3.8 x1cm vegetation on TR valve, with severe TR, trivial pericardial effusion. Echo with bubble revealed no PFO.   - normotensive   - Maintain MAP>65.   - C/W medical mgmt of infective endocarditis as below.  - c/w Midodrine 10mg q8h  - currently off levophed    Respiratory  #Acute hypoxic respiratory failure   Most likely 2/2 alveolar hemorrhage in the setting of septic embolic causing numerous cavitary lesions on CT chest. CXR with scattered infiltrates and pleural effusions. Sputum culture negative. 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 Bilateral CT in place, confirmed by CXR, continues to drain serosanguinous fluid. Fluid analysis of both CT pleural fluid confirms complex exudate with high cellularity, low pH and low glucose. pleural fluid cultures with staph aureus. Chest tubes in place as above (2 R chest tubes, 1 L chest tube)   - Monitor output of bilateral chest tubes  - R pleural fluid cultures with staph aureus, L pleural fluid cultures with NGTD   - CT chest and cxr as above. 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 Not a surgical candidate for a tricuspid valve replacement at this time as per CT surgery. Initial blood cultures positive for staph epidermidis, likely a contaminant. Initial sputum culture positive for staph aureus  Repeat blood cultures with NGTD. Sputum cx NGTD.  - ID following, C/W Nafcillin 2g q4 (Sensitive to Oxacillin as per OSH records), f/u recs   - repeat Bcxs with NGTD; sputum cx 12/3 with oral leann    - BAL positive for HSV, started on Acyclovir; last day today 12/4   - Per CT surgery pt is too high risk for any surgery and would likely not survive procedure.   - Cardiology consulted to optimize management of his right heart dysfunction, f/u recs      #Positive AFB   - Bronchial washings from 11/23 with positive AFB   - Pending speciation  - Contact precautions     #Fevers  - patient continuing to spike fevers with negative cultures thus far  - given clinically worsening status, concern for superimposed infectious process   - plan for CT chest/abdomen/pelvis with contrast today; f/u read    Renal  #Acute renal failure most likely 2/2 ATN from hypoperfusion, Minimal urine output, on CVVHD, oliguric with 0-5cc/hr. Vancomycin level 37 on arrival so may be component of drug induced nephropathy. Bun/Cr continues to improve while on CVVHD. Pt is clinically fluid overloaded but now improved and hemodynamically stable. No renal infarcts as per CT abdomen/pelvis.  - F/U nephrology recs.  -plan for intermittent HD per nephrology recs; if not tolerating, may restart CVVHD per nephro   - Continue to correct fluid overload with HD  - Monitor I/Os  - currently anuric with no signs of renal recovery  - continue to monitor serum electrolytes  - HD today 12/5 post CT scans as above      GI  OG on arrival with 600cc of bilious fluid, abdomen still distended but had several BMs, some loose. CT abdomen revealed no evidence of bowel ischemia or SBO. Sump was DCed and replaced with NG tube. CT abdomen/pelvis revealed "irregular masslike mural thickening of the posterior left lateral wall of the rectum." Surgery consulted, unlikely perirectal abscess. Recommend GI evaluation for possible scope.  - F/u GI consult, f/u recs, when stable will go for flex sig  - interval resolution of abdominal distension with placement of rectal tube - given resolution, will readdress PEG planning  -C. diff negative   - Sump removed, NG tube in place; currently holding feeds as having high residuals with abdominal distension  -De escelate bowel regimen given copious amount of stools   -wean off fentanyl/opiates as above    Heme  #Anemia  - patient with hemolysis earlier in hospital course likely 2/2 to sepsis and/or DIC  - likely element of anemia of chronic disease as well given current infectious state and renal dysfunction  - continue to monitor; maintain Hgb >7     Vascular  Vascular surgery consulted in setting of gangrenous distal toes and fingers b/l. Likely 2/2 to pressors.  - per vascular, may need amputation in the future once ischemic tissue fully demarcates, and once patient is medically optimized      Endocrinology  #Adrenal Insufficiency  - taper steroids; hydrocortisone 25 BID   - previously on Hydrocortisone 50mg q8- stress dose steroids    #Hypertriglyceridemia   - Patient with elevated triglycerides to 558 and cholesterol panel with low HDL (12) and LDL (28) with elevated Cholesterol/HDL ratio  - Will repeat triglycerides off propofol; repeat at 418  - Pending improvement in clinical status, will start statin vs fibrate therapy     Lines: right IJ TLC and Ernesto (11/12), Axillary A-line (11/12), L subclavian (11/27-), right peripheral (11/12)    F: None   E: replete K <4, Mg <2  N: Trickle feeds, currently held    GI Ppx: Protonix   DVT Ppx: Heparin   Code status: FULL   Dispo: MICU

## 2019-12-05 NOTE — PROGRESS NOTE ADULT - ATTENDING COMMENTS
Patient looks clinically worse re respiratory status. Although fever and WBC pattern have not changed last 2 days though WBC somewhat down compared to last week, will CT chest and abdomen to gauge lung structure and look for new infection.

## 2019-12-05 NOTE — PROGRESS NOTE ADULT - ATTENDING COMMENTS
oliguric ATN, no recovery.  hemodynamically less stable; remains tachycardic.  Will proceed with SLED for renal replacement therapy today- but initiate afte he goes for CT scan  reviewed with MICU team

## 2019-12-05 NOTE — PROGRESS NOTE ADULT - ASSESSMENT
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 Persistent fevers likely in the setting of necrotic fingers and toes vs inability to obtain source control.  Patient now with sputum broth from 11/23 bronch positive for AFB although not on stain so more likely represents MAC than TB     Recommend:  - Continue Nafcillin 2 grams IV q4hrs  - Completed 10 days of Acyclovir  - Follow up repeat blood cultures - NGTD  - Pending ID of AFB culture  - Recommend repeating CT chest to evaluate for progression of pulmonary disease as if MAC identified and progressing would consider treatment   - ID team 1 will continue to follow    Discussed with primary team and Dr. Louis

## 2019-12-06 NOTE — PROGRESS NOTE ADULT - ASSESSMENT
38 y/o M w/ PMH of IVDU and active smoker who came from [facility] on 11/8/19 in 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 After being transferred to  CTICU for surgical evaluation, pt was deemed to not be a surgical candidate and transferred to MICU for medical mgmt. ID, Nephrology, heme/onc, surgery, vascular following.    Neuro  Intubated, sedated on Propofol, Fentanyl gtt. Prior CT head negative for any intracranial embolic events.    - c/w Seroquel to 100 mg q12h  - currently off propofol; downtitratng fentanyl as tolerated  - increased ativan to 2 mg q6h    Cardiovascular   #Hypotension  Most likely 2/2 septic shock from infective endocarditis and RV failure 2/2 tricuspid regurgitation (ELIAN shows EF of 40-50%). Echo with EF 45%. ELIAN with EF 40-45%, 3.8 x1cm vegetation on TR valve, with severe TR, trivial pericardial effusion. Echo with bubble revealed no PFO.   - normotensive   - Maintain MAP>65.   - C/W medical mgmt of infective endocarditis as below.  - c/w Midodrine 10mg q8h  - currently off levophed    Respiratory  #Acute hypoxic respiratory failure   Most likely 2/2 alveolar hemorrhage in the setting of septic embolic causing numerous cavitary lesions on CT chest. CXR with scattered infiltrates and pleural effusions. Sputum culture negative. 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 Bilateral CT in place, confirmed by CXR, continues to drain serosanguinous fluid. Fluid analysis of both CT pleural fluid confirms complex exudate with high cellularity, low pH and low glucose. pleural fluid cultures with staph aureus. Chest tubes in place as above (2 R chest tubes, 1 L chest tube)   - Monitor output of bilateral chest tubes  - R pleural fluid cultures with staph aureus, L pleural fluid cultures with NGTD   - CT chest and cxr as above. 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 Not a surgical candidate for a tricuspid valve replacement at this time as per CT surgery. Initial blood cultures positive for staph epidermidis, likely a contaminant. Initial sputum culture positive for staph aureus  Repeat blood cultures with NGTD. Sputum cx NGTD.  - ID following, C/W Nafcillin 2g q4 (Sensitive to Oxacillin as per OSH records), f/u recs   - repeat Bcxs with NGTD; sputum cx 12/3 with oral leann    - BAL positive for HSV, started on Acyclovir; last day today 12/4   - Per CT surgery pt is too high risk for any surgery and would likely not survive procedure.   - Cardiology consulted to optimize management of his right heart dysfunction, f/u recs      #Positive AFB   - Bronchial washings from 11/23 with positive AFB   - Pending speciation  - Contact precautions     #Fevers  - patient continuing to spike fevers with negative cultures thus far  - given clinically worsening status, concern for superimposed infectious process   - plan for CT chest/abdomen/pelvis with contrast today; f/u read    Renal  #Acute renal failure most likely 2/2 ATN from hypoperfusion, Minimal urine output, on CVVHD, oliguric with 0-5cc/hr. Vancomycin level 37 on arrival so may be component of drug induced nephropathy. Bun/Cr continues to improve while on CVVHD. Pt is clinically fluid overloaded but now improved and hemodynamically stable. No renal infarcts as per CT abdomen/pelvis.  - F/U nephrology recs.  -plan for intermittent HD per nephrology recs; if not tolerating, may restart CVVHD per nephro   - Continue to correct fluid overload with HD  - Monitor I/Os  - currently anuric with no signs of renal recovery  - continue to monitor serum electrolytes  - HD today 12/5 post CT scans as above      GI  OG on arrival with 600cc of bilious fluid, abdomen still distended but had several BMs, some loose. CT abdomen revealed no evidence of bowel ischemia or SBO. Sump was DCed and replaced with NG tube. CT abdomen/pelvis revealed "irregular masslike mural thickening of the posterior left lateral wall of the rectum." Surgery consulted, unlikely perirectal abscess. Recommend GI evaluation for possible scope.  - F/u GI consult, f/u recs, when stable will go for flex sig  - interval resolution of abdominal distension with placement of rectal tube - given resolution, will readdress PEG planning  -C. diff negative   - Sump removed, NG tube in place; currently holding feeds as having high residuals with abdominal distension  -De escelate bowel regimen given copious amount of stools   -wean off fentanyl/opiates as above    Heme  #Anemia  - patient with hemolysis earlier in hospital course likely 2/2 to sepsis and/or DIC  - likely element of anemia of chronic disease as well given current infectious state and renal dysfunction  - continue to monitor; maintain Hgb >7     Vascular  Vascular surgery consulted in setting of gangrenous distal toes and fingers b/l. Likely 2/2 to pressors.  - per vascular, may need amputation in the future once ischemic tissue fully demarcates, and once patient is medically optimized      Endocrinology  #Adrenal Insufficiency  - taper steroids; hydrocortisone 25 BID   - previously on Hydrocortisone 50mg q8- stress dose steroids    #Hypertriglyceridemia   - Patient with elevated triglycerides to 558 and cholesterol panel with low HDL (12) and LDL (28) with elevated Cholesterol/HDL ratio  - Will repeat triglycerides off propofol; repeat at 418  - Pending improvement in clinical status, will start statin vs fibrate therapy     Lines: right IJ TLC and Ernesto (11/12), Axillary A-line (11/12), L subclavian (11/27-), right peripheral (11/12)    F: None   E: replete K <4, Mg <2  N: Trickle feeds, currently held    GI Ppx: Protonix   DVT Ppx: Heparin   Code status: FULL   Dispo: MICU 38 y/o M w/ PMH of IVDU and active smoker who came from [facility] on 11/8/19 in 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 After being transferred to  CTICU for surgical evaluation, pt was deemed to not be a surgical candidate and transferred to MICU for medical mgmt. ID, Nephrology, heme/onc, surgery, vascular following.    Neuro  Intubated, sedated on Fentanyl gtt. Prior CT head negative for any intracranial embolic events.    - c/w Seroquel to 100 mg q12h  - downtitratng fentanyl as tolerated  - c/w ativan to 2 mg q6h    Cardiovascular   #Hypotension  Most likely 2/2 septic shock from infective endocarditis and RV failure 2/2 tricuspid regurgitation (ELIAN shows EF of 40-50%). Echo with EF 45%. ELIAN with EF 40-45%, 3.8 x1cm vegetation on TR valve, with severe TR, trivial pericardial effusion. Echo with bubble revealed no PFO.   - normotensive   - Maintain MAP>65.   - C/W medical mgmt of infective endocarditis as below.  - c/w Midodrine 10mg q8h  - currently off levophed    Respiratory  #Acute hypoxic respiratory failure   Most likely 2/2 alveolar hemorrhage in the setting of septic embolic causing numerous cavitary lesions on CT chest. CXR with scattered infiltrates and pleural effusions. Sputum culture negative. 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 Bilateral CT in place, confirmed by CXR, continues to drain serosanguinous fluid. Fluid analysis of both CT pleural fluid confirms complex exudate with high cellularity, low pH and low glucose. pleural fluid cultures with staph aureus. Chest tubes in place as above (2 R chest tubes, 1 L chest tube)   - Monitor output of bilateral chest tubes  - R pleural fluid cultures with staph aureus, L pleural fluid cultures with NGTD   - CT chest and cxr as above. 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 Not a surgical candidate for a tricuspid valve replacement at this time as per CT surgery. Initial blood cultures positive for staph epidermidis, likely a contaminant. Initial sputum culture positive for staph aureus  Repeat blood cultures with NGTD. Sputum cx NGTD.  - ID following, C/W Nafcillin 2g q4 (Sensitive to Oxacillin as per OSH records), f/u recs   - repeat Bcxs with NGTD; sputum cx 12/3 with oral leann    - BAL positive for HSV, started on Acyclovir; last day today 12/4   - Per CT surgery pt is too high risk for any surgery and would likely not survive procedure.   - Cardiology consulted to optimize management of his right heart dysfunction, f/u recs      #Positive AFB   - Bronchial washings from 11/23 with positive AFB   - Pending speciation  - Contact precautions     #Fevers  - patient continuing to spike fevers with negative cultures thus far  - given clinically worsening status, concern for superimposed infectious process despite lack of leukocytosis or signs of end organ damage (no lactate/LFT abnormalities)   - plan for CT chest/abdomen/pelvis with contrast today; f/u read    #H. Parainfluenzae infection  - Patients sputum culture with pansensitive H Parainfluenzae  - start ceftriaxone 1 gm qdaily     Renal  #Acute renal failure most likely 2/2 ATN from hypoperfusion, Minimal urine output, on CVVHD, oliguric with 0-5cc/hr. Vancomycin level 37 on arrival so may be component of drug induced nephropathy. Bun/Cr continues to improve while on CVVHD. Pt is clinically fluid overloaded but now improved and hemodynamically stable. No renal infarcts as per CT abdomen/pelvis.  - F/U nephrology recs.  -plan for intermittent HD per nephrology recs; if not tolerating, may restart CVVHD per nephro   - Continue to correct fluid overload with HD  - Monitor I/Os  - currently anuric with no signs of renal recovery  - continue to monitor serum electrolytes  - Last HD 12/5; unable to coordinate with CT scans as above      GI  OG on arrival with 600cc of bilious fluid, abdomen still distended but had several BMs, some loose. CT abdomen revealed no evidence of bowel ischemia or SBO. Sump was DCed and replaced with NG tube. CT abdomen/pelvis revealed "irregular masslike mural thickening of the posterior left lateral wall of the rectum." Surgery consulted, unlikely perirectal abscess. Recommend GI evaluation for possible scope.  - F/u GI consult, f/u recs, when stable will go for flex sig  -C. diff negative   - Sump removed, NG tube in place; currently holding feeds as having high residuals with abdominal distension  -De escelate bowel regimen given copious amount of stools   -wean off fentanyl/opiates as above    #Abdominal Distension  - Patient continues to have abdominal distension with plain films of the abdomen revealing distended gastric bubble filled with air  - Will further evaluate post CT abdomen hopefully today  - Etiology likely Ileus given inability to wean off opiates  - [place] tart reglan renally dosed 5 mg q8h  - Plan to have NGT post pyloric given concern for low gastric motility     Heme  #Anemia  - patient with hemolysis earlier in hospital course likely 2/2 to sepsis and/or DIC  - likely element of anemia of chronic disease as well given current infectious state and renal dysfunction  - continue to monitor; maintain Hgb >7     #Pancytopenia  - 12/6 AM labs revealing pancytopenia confirmed on repeat testing  - Ordered 1 unit PRBC for management of Hgb <7; f/u post transfusion CBC  - Given worsening clinical status, high concern for sepsis; management as per above    Vascular  Vascular surgery consulted in setting of gangrenous distal toes and fingers b/l. Likely 2/2 to pressors.  - per vascular, may need amputation in the future once ischemic tissue fully demarcates, and once patient is medically optimized      Endocrinology  #Adrenal Insufficiency  - taper steroids; c/w hydrocortisone 25 BID   - previously on Hydrocortisone 50mg q8- stress dose steroids    #Hypertriglyceridemia   - Patient with elevated triglycerides to 558 and cholesterol panel with low HDL (12) and LDL (28) with elevated Cholesterol/HDL ratio  - Will repeat triglycerides off propofol; repeat at 418  - Pending improvement in clinical status, will start statin vs fibrate therapy     Lines: right IJ TLC and Ernesto (11/12), Axillary A-line (11/12), L subclavian (11/27-), right peripheral (11/12)    F: None   E: replete K <4, Mg <2  N: Trickle feeds, currently held    GI Ppx: Protonix   DVT Ppx: Heparin   Code status: FULL   Dispo: MICU

## 2019-12-06 NOTE — PROGRESS NOTE ADULT - SUBJECTIVE AND OBJECTIVE BOX
Infectious Diseases Progress Note:    SUBJECTIVE: Patient seen and examined at bedside. Fever to 100.6 overnight.  Less alert this AM, opens eyes to voice.     ENDOCARDITIS/SEPSIS    Endocarditis  CAMERON (acute kidney injury)  Acute endocarditis due to other organism      Allergies    No Known Allergies    Intolerances        ANTIBIOTICS/RELEVANT:  antimicrobials  nafcillin  IVPB 2 Gram(s) IV Intermittent every 4 hours    immunologic:      OTHER:  acetaminophen    Suspension .. 650 milliGRAM(s) Oral every 6 hours PRN  albuterol/ipratropium for Nebulization 3 milliLiter(s) Nebulizer every 4 hours  artificial  tears Solution 1 Drop(s) Both EYES every 6 hours  bisacodyl Suppository 10 milliGRAM(s) Rectal every 24 hours  chlorhexidine 0.12% Liquid 15 milliLiter(s) Oral Mucosa every 12 hours  chlorhexidine 2% Cloths 1 Application(s) Topical daily  chlorproMAZINE    Tablet 25 milliGRAM(s) Oral every 8 hours PRN  dextrose 10%. 1000 milliLiter(s) IV Continuous <Continuous>  dextrose 40% Gel 15 Gram(s) Oral once PRN  dextrose 5%. 1000 milliLiter(s) IV Continuous <Continuous>  dextrose 50% Injectable 12.5 Gram(s) IV Push once  dextrose 50% Injectable 25 Gram(s) IV Push once  dextrose 50% Injectable 25 Gram(s) IV Push once  fentaNYL   Infusion 1 MICROgram(s)/kG/Hr IV Continuous <Continuous>  glucagon  Injectable 1 milliGRAM(s) IntraMuscular once PRN  heparin  Injectable 5000 Unit(s) SubCutaneous every 8 hours  hydrocortisone sodium succinate Injectable 25 milliGRAM(s) IV Push every 12 hours  insulin lispro (HumaLOG) corrective regimen sliding scale   SubCutaneous every 6 hours  LORazepam     Tablet 2 milliGRAM(s) Oral every 6 hours  midodrine 10 milliGRAM(s) Oral every 8 hours  multivitamin/minerals/iron Oral Solution (CENTRUM) 15 milliLiter(s) Enteral Tube daily  norepinephrine Infusion 0.05 MICROgram(s)/kG/Min IV Continuous <Continuous>  pantoprazole   Suspension 40 milliGRAM(s) Oral daily  polyethylene glycol 3350 17 Gram(s) Oral at bedtime  QUEtiapine 100 milliGRAM(s) Oral every 12 hours  sodium chloride 0.9% lock flush 3 milliLiter(s) IV Push every 8 hours  vitamin E 400 International Unit(s) Oral daily  zinc sulfate 220 milliGRAM(s) Oral daily      Objective:  Vital Signs Last 24 Hrs  T(C): 38.2 (06 Dec 2019 10:01), Max: 38.3 (05 Dec 2019 18:49)  T(F): 100.8 (06 Dec 2019 10:01), Max: 100.9 (05 Dec 2019 18:49)  HR: 124 (06 Dec 2019 11:00) (116 - 146)  BP: 91/56 (06 Dec 2019 06:45) (91/56 - 107/72)  BP(mean): 71 (06 Dec 2019 06:45) (71 - 85)  RR: 20 (06 Dec 2019 11:00) (0 - 35)  SpO2: 100% (06 Dec 2019 11:00) (84% - 100%)    PHYSICAL EXAM:  Constitutional: frail, ill appearing  Eyes: No scleral icterus, PERRL  Ear/Nose/Throat: + trach	  Neck: no JVD, supple  Respiratory: Diffuse rhonchi throughout, 2 chest tubes on R, 1 on Left  Cardiovascular: S1S2, RRR, + systolic murmur  Gastrointestinal: soft, NTND, (+) BS, no HSM  Extremities: necrosis of fingers and toes b/l, HD cath R chest      LABS:                        6.0    2.48  )-----------( 83       ( 06 Dec 2019 06:49 )             18.9     12-06    132<L>  |  96  |  27<H>  ----------------------------<  96  3.5   |  21<L>  |  2.20<H>    Ca    7.6<L>      06 Dec 2019 05:13  Phos  3.5     12-06  Mg     1.9     12-06            MICROBIOLOGY:  Culture - Blood (12.04.19 @ 14:25)    Specimen Source: .Blood Blood    Culture Results:   No growth at 1 day.    Culture - Sputum . (12.04.19 @ 14:10)    Gram Stain:   Few epithelial cells  Few-moderate White blood cells  Moderate Gram Negative Rods  Few Yeast    -  Trimethoprim/Sulfamethoxazole: S    -  Levofloxacin: S    -  Ciprofloxacin: S    -  Clarithromycin: S    -  Ceftriaxone: S    -  Chloramphenicol: S    -  Ampicillin/Sulbactam: S    -  Azithromycin: S    -  Ampicillin: S  Culture - Fungal, Bronchial (11.20.19 @ 01:37)    Specimen Source: .Bronchial broncial wash    Culture Results:   Few Yeast  Rare Scedosporium species complex      Specimen Source: .Sputum Sputum    Culture Results:   Moderate Haemophilus parainfluenzae  Accompanied by normal respiratory leann    Organism Identification: Haemophilus parainfluenzae    Organism: Haemophilus parainfluenzae    Method Type: JOHNATHON              RADIOLOGY & ADDITIONAL STUDIES:

## 2019-12-06 NOTE — PROCEDURE NOTE - NSINDICATIONS_GEN_A_CORE
airway protection
critical illness/dialysis/CRRT
critical illness/hemodynamic monitoring/venous access
pleural effusion
cardiac arrest/respiratory failure/airway protection
To facilitate CTA.
critical illness/hemodynamic monitoring/emergency venous access
critical illness/venous access
fluid administration
monitoring purposes/critical patient
pneumothorax
pleural effusion/Parapneumonic effusion

## 2019-12-06 NOTE — PROGRESS NOTE ADULT - ASSESSMENT
38 yo M w/ PMH of IVDU and active smoker who came from [facility] on 11/8/19 in 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  Hospital course complicated by acute hypoxic respiratory failure, acute kidney injury, congestive hepatopathy, multi-system organ failure 2/2 hypoperfusion.       # Anuric CAMERON likely due to ATN in setting of shock  - was initially on CVVHD then switched to IHD  - last HD completed on 12/3 - 1.5 L - blood flow turned down due to tachycardia  - volume and electrolytes noted  - remains anuric  - strict I/O  - recommend to start CVVHD - , UF net even, Pure flow 4k  - recommend to get Tunnel HD cath when feasible      # anemia  - Hb 8.5 > 6.0   - hemolysis likely 2/2 to sepsis and/or DIC as per primary team  - recommend transfusion prn Hb< 7      # renal bone disease  - calcium phos noted- no need for binders

## 2019-12-06 NOTE — PROCEDURE NOTE - NSINFORMCONSENT_GEN_A_CORE
Benefits, risks, and possible complications of procedure explained to patient/caregiver who verbalized understanding and gave verbal consent.
This was an emergent procedure.
This was an emergent procedure.
Benefits, risks, and possible complications of procedure explained to patient/caregiver who verbalized understanding and gave written consent.
Benefits, risks, and possible complications of procedure explained to patient/caregiver who verbalized understanding and gave verbal consent.
Benefits, risks, and possible complications of procedure explained to patient/caregiver who verbalized understanding and gave verbal consent.
Benefits, risks, and possible complications of procedure explained to patient/caregiver who verbalized understanding and gave written consent.
Consent obtained from patient and from mother Candice by phone./Benefits, risks, and possible complications of procedure explained to patient/caregiver who verbalized understanding and gave verbal consent.
This was an emergent procedure.
This was an emergent procedure.

## 2019-12-06 NOTE — PROCEDURE NOTE - NSPROCDETAILS_GEN_ALL_CORE
connected to ventilator/patient pre-oxygenated, tube inserted, placement confirmed
sterile technique, catheter placed/ultrasound guidance
ultrasound assessment of fluid (location)/Seldinger technique/ultrasound assessment of fluid (size)
ultrasound guidance
blood seen on insertion/flushes easily/dressing applied/location identified, draped/prepped, sterile technique used/secured in place/ultrasound utilization
Seldinger technique/all materials/supplies accounted for at end of procedure/connected to a pressurized flush line/ultrasound guidance/sutured in place/location identified, draped/prepped, sterile technique used, needle inserted/introduced/positive blood return obtained via catheter
blood seen on insertion/secured in place/dressing applied/ultrasound utilization/flushes easily
sterile technique, catheter placed/lumen(s) aspirated and flushed/sterile dressing applied/ultrasound guidance/guidewire recovered
lumen(s) aspirated and flushed/ultrasound guidance/sterile dressing applied/guidewire recovered/sterile technique, catheter placed
percutaneous/dressing applied/secured in place/Seldinger technique/sterile dressing applied/supine position
sterile dressing applied/Seldinger technique/ultrasound assessment of fluid (location)/percutaneous/thoracostomy tube placed percutaneously/dressing applied/secured in place

## 2019-12-06 NOTE — PROGRESS NOTE ADULT - SUBJECTIVE AND OBJECTIVE BOX
INCOMPLETE  OVERNIGHT EVENTS:     SUBJECTIVE / INTERVAL HPI: Patient seen and examined at bedside. Awake, alert and oriented but more sedated and less responsive. Opens eyes but appears too fatigued to answer questions appropriately or respond to commands.     PHYSICAL EXAM:    General: deconditioned, fatigued  HEENT: NC/AT; PERRL, mildly icteric sclera; MMM  Neck: supple, trach in place  Cardiovascular: +S1/S2; tachycardic but regular rhythm, systolic murmur at LSB   Respiratory: intubated via trach; diffuse rhonchi/crackles with decreased breath sounds b/l; prominent inspiratory wheezing at RL lung border in the anterior field; chest tubes in place; increased work of breathing with use of abdominal muscles    Gastrointestinal: NGT; NT, + distension; +BS   Extremities: WWP; trace edema of LEs/UEs, LEs>UEs; no clubbing or cyanosis  Derm: ischemia of all 10 distal phalanges of lower extremities; ischemia of distal phalange of R middle finger; dependent edema of lower extreme to the level of the knee b/l; edema of b/l hands more prominent on the dorsal surface    Vascular: 2+ radial, DP/PT pulses B/L  Neurological: AAOx3, moves all extremities, follows commands intermittently       VITAL SIGNS:  Vital Signs Last 24 Hrs  T(C): 37.9 (05 Dec 2019 14:55), Max: 38.3 (04 Dec 2019 17:40)  T(F): 100.2 (05 Dec 2019 14:55), Max: 101 (04 Dec 2019 17:40)  HR: 136 (05 Dec 2019 14:30) (122 - 136)  BP: 107/69 (04 Dec 2019 23:20) (86/53 - 107/69)  BP(mean): 83 (04 Dec 2019 23:20) (59 - 83)  RR: 15 (05 Dec 2019 14:30) (11 - 38)  SpO2: 100% (05 Dec 2019 14:30) (100% - 100%)      MEDICATIONS:  MEDICATIONS  (STANDING):  albumin human 25% IVPB 50 milliLiter(s) IV Intermittent every 1 hour  albuterol/ipratropium for Nebulization 3 milliLiter(s) Nebulizer every 4 hours  artificial  tears Solution 1 Drop(s) Both EYES every 6 hours  bisacodyl Suppository 10 milliGRAM(s) Rectal every 24 hours  chlorhexidine 0.12% Liquid 15 milliLiter(s) Oral Mucosa every 12 hours  chlorhexidine 2% Cloths 1 Application(s) Topical daily  dextrose 5%. 1000 milliLiter(s) (50 mL/Hr) IV Continuous <Continuous>  dextrose 50% Injectable 12.5 Gram(s) IV Push once  dextrose 50% Injectable 25 Gram(s) IV Push once  dextrose 50% Injectable 25 Gram(s) IV Push once  fentaNYL   Infusion. 0.5 MICROgram(s)/kG/Hr (3.68 mL/Hr) IV Continuous <Continuous>  gabapentin   Solution 100 milliGRAM(s) Enteral Tube <User Schedule>  heparin  Injectable 5000 Unit(s) SubCutaneous every 8 hours  hydrocortisone sodium succinate Injectable 25 milliGRAM(s) IV Push every 12 hours  insulin lispro (HumaLOG) corrective regimen sliding scale   SubCutaneous every 6 hours  iohexol 300 mG (iodine)/mL Oral Solution 30 milliLiter(s) Oral once  LORazepam     Tablet 2 milliGRAM(s) Oral every 6 hours  midodrine 10 milliGRAM(s) Oral every 8 hours  multivitamin/minerals/iron Oral Solution (CENTRUM) 15 milliLiter(s) Enteral Tube daily  nafcillin  IVPB 2 Gram(s) IV Intermittent every 4 hours  norepinephrine Infusion 0.05 MICROgram(s)/kG/Min (6.9 mL/Hr) IV Continuous <Continuous>  pantoprazole   Suspension 40 milliGRAM(s) Oral daily  polyethylene glycol 3350 17 Gram(s) Oral at bedtime  propofol Infusion 5 MICROgram(s)/kG/Min (2.208 mL/Hr) IV Continuous <Continuous>  QUEtiapine 100 milliGRAM(s) Oral every 12 hours  sodium chloride 0.9% lock flush 3 milliLiter(s) IV Push every 8 hours  vitamin E 400 International Unit(s) Oral daily  zinc sulfate 220 milliGRAM(s) Oral daily    MEDICATIONS  (PRN):  acetaminophen    Suspension .. 650 milliGRAM(s) Oral every 6 hours PRN Temp greater or equal to 38C (100.4F)  dextrose 40% Gel 15 Gram(s) Oral once PRN Blood Glucose LESS THAN 70 milliGRAM(s)/deciliter  glucagon  Injectable 1 milliGRAM(s) IntraMuscular once PRN Glucose LESS THAN 70 milligrams/deciliter      ALLERGIES:  Allergies    No Known Allergies    Intolerances        LABS:                        8.5    4.00  )-----------( 142      ( 05 Dec 2019 05:26 )             25.4     12-05    136  |  101  |  39<H>  ----------------------------<  106<H>  3.3<L>   |  17<L>  |  2.92<H>    Ca    8.0<L>      05 Dec 2019 05:26  Phos  4.5     12-05  Mg     2.0     12-05    TPro  6.0  /  Alb  2.1<L>  /  TBili  1.2  /  DBili  x   /  AST  22  /  ALT  19  /  AlkPhos  83  12-04        CAPILLARY BLOOD GLUCOSE      POCT Blood Glucose.: 79 mg/dL (05 Dec 2019 11:42)      RADIOLOGY & ADDITIONAL TESTS: Reviewed. OVERNIGHT EVENTS: Patient noted to be tachypneic with belly breathing and worsening hypotension. Given concern for withdrawal, 50 mcg push of fentanyl given with some resolution of symptoms, however, patient continued with hiccups and uncomfortable breathing. Thorazine was ordered PRN for management of the hiccups with resolution of his symptoms. Patient briefly hypotensive this morning with MAPs in the 50s requiring levophed, however, currently off levophed at time of exam in the AM.     SUBJECTIVE / INTERVAL HPI: Patient seen and examined at bedside. More ill appearing, does not open eyes spontaneously. Lethargic but responds to physical stimuli. When questioned, does not endorse pain, however, patient incredibly lethargic and much less communicative than prior days and exams.     PHYSICAL EXAM:    General: deconditioned, fatigued, ill appearing  HEENT: NC/AT; PERRL, mildly icteric sclera; MMM  Neck: supple, trach in place  Cardiovascular: +S1/S2; tachycardic but regular rhythm, systolic murmur at LSB   Respiratory: intubated via trach; diffuse rhonchi/crackles with decreased breath sounds b/l; prominent inspiratory wheezing at RL lung border in the anterior field; chest tubes in place; increased work of breathing with occasional use of abdominal muscles    Gastrointestinal: NGT; NT, + distension; +BS   Extremities: WWP; trace edema of LEs/UEs, LEs>UEs; no clubbing or cyanosis  Derm: ischemia of all 10 distal phalanges of lower extremities; ischemia of distal phalange of R middle finger; dependent edema of lower extreme to the level of the knee b/l; edema of b/l hands more prominent on the dorsal surface    Vascular: 2+ radial, DP/PT pulses B/L  Neurological: AAOx3, moves all extremities, follows commands intermittently       VITAL SIGNS:  Vital Signs Last 24 Hrs  T(C): 38.2 (06 Dec 2019 10:01), Max: 38.3 (05 Dec 2019 18:49)  T(F): 100.8 (06 Dec 2019 10:01), Max: 100.9 (05 Dec 2019 18:49)  HR: 126 (06 Dec 2019 13:00) (116 - 146)  BP: 91/56 (06 Dec 2019 06:45) (91/56 - 107/72)  BP(mean): 71 (06 Dec 2019 06:45) (71 - 85)  RR: 26 (06 Dec 2019 13:00) (0 - 35)  SpO2: 100% (06 Dec 2019 13:00) (84% - 100%)      MEDICATIONS:  MEDICATIONS  (STANDING):  albuterol/ipratropium for Nebulization 3 milliLiter(s) Nebulizer every 4 hours  artificial  tears Solution 1 Drop(s) Both EYES every 6 hours  bisacodyl Suppository 10 milliGRAM(s) Rectal every 24 hours  cefTRIAXone   IVPB 2000 milliGRAM(s) IV Intermittent every 24 hours  chlorhexidine 0.12% Liquid 15 milliLiter(s) Oral Mucosa every 12 hours  chlorhexidine 2% Cloths 1 Application(s) Topical daily  dextrose 10%. 1000 milliLiter(s) (50 mL/Hr) IV Continuous <Continuous>  dextrose 5%. 1000 milliLiter(s) (50 mL/Hr) IV Continuous <Continuous>  dextrose 50% Injectable 12.5 Gram(s) IV Push once  dextrose 50% Injectable 25 Gram(s) IV Push once  dextrose 50% Injectable 25 Gram(s) IV Push once  fentaNYL   Infusion 1 MICROgram(s)/kG/Hr (7.36 mL/Hr) IV Continuous <Continuous>  heparin  Injectable 5000 Unit(s) SubCutaneous every 8 hours  hydrocortisone sodium succinate Injectable 25 milliGRAM(s) IV Push every 12 hours  insulin lispro (HumaLOG) corrective regimen sliding scale   SubCutaneous every 6 hours  LORazepam     Tablet 2 milliGRAM(s) Oral every 6 hours  metoclopramide Injectable 5 milliGRAM(s) IV Push every 8 hours  midodrine 10 milliGRAM(s) Oral every 8 hours  multivitamin/minerals/iron Oral Solution (CENTRUM) 15 milliLiter(s) Enteral Tube daily  nafcillin  IVPB 2 Gram(s) IV Intermittent every 4 hours  norepinephrine Infusion 0.05 MICROgram(s)/kG/Min (6.9 mL/Hr) IV Continuous <Continuous>  pantoprazole   Suspension 40 milliGRAM(s) Oral daily  polyethylene glycol 3350 17 Gram(s) Oral at bedtime  QUEtiapine 100 milliGRAM(s) Oral every 12 hours  sodium chloride 0.9% lock flush 3 milliLiter(s) IV Push every 8 hours  vitamin E 400 International Unit(s) Oral daily  zinc sulfate 220 milliGRAM(s) Oral daily    MEDICATIONS  (PRN):  acetaminophen    Suspension .. 650 milliGRAM(s) Oral every 6 hours PRN Temp greater or equal to 38C (100.4F)  chlorproMAZINE    Tablet 25 milliGRAM(s) Oral every 8 hours PRN hiccups  dextrose 40% Gel 15 Gram(s) Oral once PRN Blood Glucose LESS THAN 70 milliGRAM(s)/deciliter  glucagon  Injectable 1 milliGRAM(s) IntraMuscular once PRN Glucose LESS THAN 70 milligrams/deciliter      ALLERGIES:  Allergies    No Known Allergies    Intolerances        LABS:                        6.0    2.48  )-----------( 83       ( 06 Dec 2019 06:49 )             18.9     12-06    132<L>  |  96  |  27<H>  ----------------------------<  96  3.5   |  21<L>  |  2.20<H>    Ca    7.6<L>      06 Dec 2019 05:13  Phos  3.5     12-06  Mg     1.9     12-06    TPro  5.6<L>  /  Alb  2.5<L>  /  TBili  1.0  /  DBili  0.7<H>  /  AST  22  /  ALT  14  /  AlkPhos  71  12-06        CAPILLARY BLOOD GLUCOSE      POCT Blood Glucose.: 108 mg/dL (06 Dec 2019 11:26)      RADIOLOGY & ADDITIONAL TESTS: Reviewed.

## 2019-12-06 NOTE — PROCEDURE NOTE - NSPOSTCAREGUIDE_GEN_A_CORE
Verbal/written post procedure instructions were given to patient/caregiver/Instructed patient/caregiver to follow-up with primary care physician/Instructed patient/caregiver regarding signs and symptoms of infection/Care for catheter as per unit/ICU protocols/Keep the cast/splint/dressing clean and dry
Verbal/written post procedure instructions were given to patient/caregiver/Instructed patient/caregiver to follow-up with primary care physician/Keep the cast/splint/dressing clean and dry/Instructed patient/caregiver regarding signs and symptoms of infection/Care for catheter as per unit/ICU protocols
Verbal/written post procedure instructions were given to patient/caregiver
Verbal/written post procedure instructions were given to patient/caregiver
Verbal/written post procedure instructions were given to patient/caregiver/Instructed patient/caregiver to follow-up with primary care physician/Instructed patient/caregiver regarding signs and symptoms of infection/Care for catheter as per unit/ICU protocols/Keep the cast/splint/dressing clean and dry

## 2019-12-06 NOTE — PROCEDURE NOTE - NSSITEPREP_SKIN_A_CORE
chlorhexidine
Adherence to aseptic technique: hand hygiene prior to donning barriers (gown, gloves), don cap and mask, sterile drape over patient/chlorhexidine
Adherence to aseptic technique: hand hygiene prior to donning barriers (gown, gloves), don cap and mask, sterile drape over patient/chlorhexidine
chlorhexidine

## 2019-12-06 NOTE — PROCEDURE NOTE - NSANESTHESIA_GEN_A_CORE
1% lidocaine
no anesthesia administered
1% lidocaine

## 2019-12-06 NOTE — PROCEDURE NOTE - NSTOLERANCE_GEN_A_CORE
Patient tolerated procedure well.

## 2019-12-06 NOTE — PROGRESS NOTE ADULT - ASSESSMENT
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 Persistent fevers likely in the setting of necrotic fingers and toes vs inability to obtain source control.  Patient now with sputum broth from 11/23 bronch positive for AFB although not on stain so more likely represents MAC than TB.  Now with yeast ID from 11/20 broch of Scedosporium as well as most recent sputum culture with Haemophilus parainfluenzae    Recommend:  - Continue Nafcillin 2 grams IV q4hrs  - Start Ceftriaxone 1gm q24 for Haemophilus parainfluenza coverage  - Completed 10 days of Acyclovir  - Follow up repeat blood cultures - NGTD  - Pending ID of AFB culture  - Recommend repeating CT chest to evaluate for progression of pulmonary disease as if MAC identified and progressing would consider treatment   - Would repeat serum Fungitell given Scedosporium.  Unclear if contributing to pulmonary disease will follow up on CT before treating and pending susceptibilities  - ID team 1 will continue to follow    Discussed with primary team and Dr. Louis

## 2019-12-06 NOTE — PROCEDURE NOTE - PROCEDURE DATE TIME, MLM
11-Nov-2019
19-Nov-2019 03:01
19-Nov-2019 10:00
06-Dec-2019 00:08
12-Nov-2019 04:00
12-Nov-2019 04:01
12-Nov-2019 14:00
12-Nov-2019 18:10
27-Nov-2019 21:15

## 2019-12-06 NOTE — PROGRESS NOTE ADULT - ATTENDING COMMENTS
oligoanuric ATN]  remains dialysis dependent  tachycardic in 127-130 range  to change renal replacement therapy  modality to CVVHD from intermittent HD to attain more gentle clearances and UF while etiology of his change of hemodynamics gets clarified.  Reviewed with MICU team

## 2019-12-06 NOTE — PROGRESS NOTE ADULT - SUBJECTIVE AND OBJECTIVE BOX
Patient is a 39y Male seen and evaluated at bedside.   Patient remains on vent via trach.  Last HD completed on 12/3 - with UF 1500. Blood flow was turned down to 200 due to tachycardia.    He remains on pressor.  Noted to be tachycardic in the 130s, with fever spikes.        Meds:    acetaminophen    Suspension .. 650 milliGRAM(s) Oral every 6 hours PRN  albuterol/ipratropium for Nebulization 3 milliLiter(s) Nebulizer every 4 hours  artificial  tears Solution 1 Drop(s) Both EYES every 6 hours  bisacodyl Suppository 10 milliGRAM(s) Rectal every 24 hours  cefTRIAXone   IVPB 2000 milliGRAM(s) IV Intermittent every 24 hours  chlorhexidine 0.12% Liquid 15 milliLiter(s) Oral Mucosa every 12 hours  chlorhexidine 2% Cloths 1 Application(s) Topical daily  chlorproMAZINE    Tablet 25 milliGRAM(s) Oral every 8 hours PRN  CRRT Treatment    <Continuous>  dextrose 10%. 1000 milliLiter(s) IV Continuous <Continuous>  dextrose 40% Gel 15 Gram(s) Oral once PRN  dextrose 5%. 1000 milliLiter(s) IV Continuous <Continuous>  dextrose 50% Injectable 12.5 Gram(s) IV Push once  dextrose 50% Injectable 25 Gram(s) IV Push once  dextrose 50% Injectable 25 Gram(s) IV Push once  fentaNYL   Infusion 1 MICROgram(s)/kG/Hr IV Continuous <Continuous>  glucagon  Injectable 1 milliGRAM(s) IntraMuscular once PRN  heparin  Injectable 5000 Unit(s) SubCutaneous every 8 hours  hydrocortisone sodium succinate Injectable 25 milliGRAM(s) IV Push every 12 hours  insulin lispro (HumaLOG) corrective regimen sliding scale   SubCutaneous every 6 hours  LORazepam     Tablet 2 milliGRAM(s) Oral every 6 hours  metoclopramide Injectable 5 milliGRAM(s) IV Push every 8 hours  midodrine 10 milliGRAM(s) Oral every 8 hours  multivitamin/minerals/iron Oral Solution (CENTRUM) 15 milliLiter(s) Enteral Tube daily  nafcillin  IVPB 2 Gram(s) IV Intermittent every 4 hours  norepinephrine Infusion 0.05 MICROgram(s)/kG/Min IV Continuous <Continuous>  pantoprazole   Suspension 40 milliGRAM(s) Oral daily  polyethylene glycol 3350 17 Gram(s) Oral at bedtime  PureFlow Dialysate RFP-400 (K 2 / Ca 3) 5000 milliLiter(s) CRRT <Continuous>  QUEtiapine 100 milliGRAM(s) Oral every 12 hours  sodium chloride 0.9% lock flush 3 milliLiter(s) IV Push every 8 hours  vitamin E 400 International Unit(s) Oral daily  zinc sulfate 220 milliGRAM(s) Oral daily      T(C): 37.5 (12-06-19 @ 13:55), Max: 38.3 (12-05-19 @ 18:49)  HR: 126 (12-06-19 @ 13:00) (116 - 146)  BP: 91/56 (12-06-19 @ 06:45) (91/56 - 107/72)  RR: 26 (12-06-19 @ 13:00) (0 - 35)  SpO2: 100% (12-06-19 @ 13:00) (84% - 100%)    Input/Output      12-05-19 @ 07:01  -  12-06-19 @ 07:00  --------------------------------------------------------  IN: 2100.7 mL / OUT: 4300 mL / NET: -2199.3 mL    12-06-19 @ 07:01  -  12-06-19 @ 15:35  --------------------------------------------------------  IN: 616.8 mL / OUT: 0 mL / NET: 616.8 mL            ROS:     Gen: no fever  Cardiovascular: no chest pain  Respiratory: no cough, no sputum  Gastrointestinal: no nausea, no vomiting, no change in bowel habits  Neurologic: no headache  : no urinary symptoms        ROS:     Gen: fever          PHYSICAL EXAM:  GENERAL: awake,  connected to vent via trach   HEAD:  Atraumatic, Normocephalic,   EYES: Bilateral conjunctiva and sclera normal   Oral cavity: Oral mucosa dry and pink  NECK: trach   CHEST/LUNG: bilateral creps and wheezing  HEART: Regular rate and rhythm. JEFFREY II/VI at LPSB, No gallop, no rub   ABDOMEN: distended   EXTREMITIES: 1+ pitting edema  Neurology: drowsy but arousable  Skin: necrotic  lesions on fingertips    access: R IJ HD cath           LABS:                              6.0    2.48  )-----------( 83       ( 06 Dec 2019 06:49 )             18.9       12-06    132<L>  |  96  |  27<H>  ----------------------------<  96  3.5   |  21<L>  |  2.20<H>    Ca    7.6<L>      06 Dec 2019 05:13  Phos  3.5     12-06  Mg     1.9     12-06    TPro  5.6<L>  /  Alb  2.5<L>  /  TBili  1.0  /  DBili  0.7<H>  /  AST  22  /  ALT  14  /  AlkPhos  71  12-06                                              RADIOLOGY & ADDITIONAL STUDIES:

## 2019-12-06 NOTE — PROCEDURE NOTE - ATTENDING PROVIDER
Taniya (Intensivist)
[facility]
Dr. Lau
Dr. Lau
Jostinati
Manuel De La Garza
Whitney Worley
Dr. Whitney Worley

## 2019-12-07 NOTE — PROGRESS NOTE ADULT - ATTENDING COMMENTS
I independently performed the key portions of the evaluation and management service provided. I agree with the above history, physical, and plan which I have reviewed and edited where appropriate. I find CAMERON, hyponatremia. Restart CVVH. Mix all meds in NS not D5W if possible. See full note. (Patient seen earlier in day.)

## 2019-12-07 NOTE — PROGRESS NOTE ADULT - SUBJECTIVE AND OBJECTIVE BOX
O/N Events: NIELS/ off CVVHD  Subjective:  remained tachycardic and hypotensive, off CVVHD/ anuric   becoming more hyponatremic however on D10 infusion for hypoglycemia, ceftriaxone also in D5W solution   BUN, Cr trending up, last RRT, HD 12/05 with 1.7 L UF  hypokalemia improved after adequate supplementation/ Phos 4.4     VITALS  Vital Signs Last 24 Hrs  T(C): 37.9 (07 Dec 2019 14:00), Max: 38.1 (06 Dec 2019 17:00)  T(F): 100.2 (07 Dec 2019 14:00), Max: 100.5 (06 Dec 2019 17:00)  HR: 122 (07 Dec 2019 14:00) (114 - 132)  BP: 98/60 mmhg  RR: 21 (07 Dec 2019 14:00) (10 - 37)  SpO2: 100% (07 Dec 2019 14:00) (98% - 100%)    PHYSICAL EXAM  GENERAL: drowsy/ arousable   NECK: venting through tracheostomy   CHEST/LUNG: bilateral creps and wheezing  HEART: Regular rate and rhythm, no MGR  ABDOMEN: distended/ NG on suction   EXTREMITIES: 1-2+ pitting edema/ anasarcic   Neurology: drowsy but arousable  Access: right upper chest permcath c/d/i    MEDICATIONS  (STANDING):  albuterol/ipratropium for Nebulization 3 milliLiter(s) Nebulizer every 4 hours  alteplase  Injectable for Pleural Effusion 10 milliGRAM(s) IntraPleural. every 12 hours  artificial  tears Solution 1 Drop(s) Both EYES every 6 hours  bisacodyl Suppository 10 milliGRAM(s) Rectal every 24 hours  cefTRIAXone   IVPB 2000 milliGRAM(s) IV Intermittent every 24 hours  chlorhexidine 0.12% Liquid 15 milliLiter(s) Oral Mucosa every 12 hours  chlorhexidine 2% Cloths 1 Application(s) Topical daily  dextrose 5%. 1000 milliLiter(s) (50 mL/Hr) IV Continuous <Continuous>  dextrose 50% Injectable 12.5 Gram(s) IV Push once  dextrose 50% Injectable 25 Gram(s) IV Push once  dextrose 50% Injectable 25 Gram(s) IV Push once  dornase maikol Solution for Pleural Effusion 5 milliGRAM(s) IntraPleural. every 12 hours  fentaNYL   Infusion 1 MICROgram(s)/kG/Hr (7.36 mL/Hr) IV Continuous <Continuous>  heparin  Injectable 5000 Unit(s) SubCutaneous every 8 hours  hydrocortisone sodium succinate Injectable 25 milliGRAM(s) IV Push every 12 hours  insulin lispro (HumaLOG) corrective regimen sliding scale   SubCutaneous every 6 hours  LORazepam     Tablet 2 milliGRAM(s) Oral every 6 hours  metoclopramide Injectable 5 milliGRAM(s) IV Push every 8 hours  metroNIDAZOLE    Tablet 500 milliGRAM(s) Oral every 8 hours  midodrine 10 milliGRAM(s) Oral every 8 hours  multivitamin/minerals/iron Oral Solution (CENTRUM) 15 milliLiter(s) Enteral Tube daily  nafcillin  IVPB 2 Gram(s) IV Intermittent every 4 hours  norepinephrine Infusion 0.05 MICROgram(s)/kG/Min (6.9 mL/Hr) IV Continuous <Continuous>  pantoprazole   Suspension 40 milliGRAM(s) Oral daily  QUEtiapine 100 milliGRAM(s) Oral every 12 hours  sodium chloride 0.9% lock flush 3 milliLiter(s) IV Push every 8 hours  vitamin E 400 International Unit(s) Oral daily  zinc sulfate 220 milliGRAM(s) Oral daily    MEDICATIONS  (PRN):  acetaminophen    Suspension .. 650 milliGRAM(s) Oral every 6 hours PRN Temp greater or equal to 38C (100.4F)  chlorproMAZINE    Tablet 25 milliGRAM(s) Oral every 8 hours PRN hiccups  dextrose 40% Gel 15 Gram(s) Oral once PRN Blood Glucose LESS THAN 70 milliGRAM(s)/deciliter  glucagon  Injectable 1 milliGRAM(s) IntraMuscular once PRN Glucose LESS THAN 70 milligrams/deciliter      LABS                        7.1    3.39  )-----------( 75       ( 07 Dec 2019 04:38 )             21.4     12-07    125<L>  |  91<L>  |  31<H>  ----------------------------<  109<H>  3.9   |  17<L>  |  2.88<H>    Ca    7.7<L>      07 Dec 2019 10:41  Phos  4.4     12-07  Mg     1.7     12-07    TPro  5.3<L>  /  Alb  2.1<L>  /  TBili  1.0  /  DBili  x   /  AST  19  /  ALT  13  /  AlkPhos  70  12-07    LIVER FUNCTIONS - ( 07 Dec 2019 04:38 )  Alb: 2.1 g/dL / Pro: 5.3 g/dL / ALK PHOS: 70 U/L / ALT: 13 U/L / AST: 19 U/L / GGT: x O/N Events: NIELS/ off CVVHD  Subjective:  remained tachycardic and hypotensive, off CVVHD/ anuric   becoming more hyponatremic however on D10 infusion for hypoglycemia, ceftriaxone also in D5W solution   BUN, Cr trending up, last RRT, HD 12/05 with 1.7 L UF  hypokalemia improved after adequate supplementation/ Phos 4.4     VITALS  Vital Signs Last 24 Hrs  T(C): 37.9 (07 Dec 2019 14:00), Max: 38.1 (06 Dec 2019 17:00)  T(F): 100.2 (07 Dec 2019 14:00), Max: 100.5 (06 Dec 2019 17:00)  HR: 122 (07 Dec 2019 14:00) (114 - 132)  BP: 98/60 mmhg  RR: 21 (07 Dec 2019 14:00) (10 - 37)  SpO2: 100% (07 Dec 2019 14:00) (98% - 100%)    PHYSICAL EXAM  GENERAL: drowsy/ arousable   NECK: venting through tracheostomy   CHEST/LUNG: bilateral creps and wheezing  HEART: Regular rate and rhythm, no MGR  ABDOMEN: distended/ NG on suction   EXTREMITIES: 1-2+ pitting edema/ anasarcic   Neurology: drowsy but arousable  Access: [facility] c/d/i    MEDICATIONS  (STANDING):  albuterol/ipratropium for Nebulization 3 milliLiter(s) Nebulizer every 4 hours  alteplase  Injectable for Pleural Effusion 10 milliGRAM(s) IntraPleural. every 12 hours  artificial  tears Solution 1 Drop(s) Both EYES every 6 hours  bisacodyl Suppository 10 milliGRAM(s) Rectal every 24 hours  cefTRIAXone   IVPB 2000 milliGRAM(s) IV Intermittent every 24 hours  chlorhexidine 0.12% Liquid 15 milliLiter(s) Oral Mucosa every 12 hours  chlorhexidine 2% Cloths 1 Application(s) Topical daily  dextrose 5%. 1000 milliLiter(s) (50 mL/Hr) IV Continuous <Continuous>  dextrose 50% Injectable 12.5 Gram(s) IV Push once  dextrose 50% Injectable 25 Gram(s) IV Push once  dextrose 50% Injectable 25 Gram(s) IV Push once  dornase maikol Solution for Pleural Effusion 5 milliGRAM(s) IntraPleural. every 12 hours  fentaNYL   Infusion 1 MICROgram(s)/kG/Hr (7.36 mL/Hr) IV Continuous <Continuous>  heparin  Injectable 5000 Unit(s) SubCutaneous every 8 hours  hydrocortisone sodium succinate Injectable 25 milliGRAM(s) IV Push every 12 hours  insulin lispro (HumaLOG) corrective regimen sliding scale   SubCutaneous every 6 hours  LORazepam     Tablet 2 milliGRAM(s) Oral every 6 hours  metoclopramide Injectable 5 milliGRAM(s) IV Push every 8 hours  metroNIDAZOLE    Tablet 500 milliGRAM(s) Oral every 8 hours  midodrine 10 milliGRAM(s) Oral every 8 hours  multivitamin/minerals/iron Oral Solution (CENTRUM) 15 milliLiter(s) Enteral Tube daily  nafcillin  IVPB 2 Gram(s) IV Intermittent every 4 hours  norepinephrine Infusion 0.05 MICROgram(s)/kG/Min (6.9 mL/Hr) IV Continuous <Continuous>  pantoprazole   Suspension 40 milliGRAM(s) Oral daily  QUEtiapine 100 milliGRAM(s) Oral every 12 hours  sodium chloride 0.9% lock flush 3 milliLiter(s) IV Push every 8 hours  vitamin E 400 International Unit(s) Oral daily  zinc sulfate 220 milliGRAM(s) Oral daily    MEDICATIONS  (PRN):  acetaminophen    Suspension .. 650 milliGRAM(s) Oral every 6 hours PRN Temp greater or equal to 38C (100.4F)  chlorproMAZINE    Tablet 25 milliGRAM(s) Oral every 8 hours PRN hiccups  dextrose 40% Gel 15 Gram(s) Oral once PRN Blood Glucose LESS THAN 70 milliGRAM(s)/deciliter  glucagon  Injectable 1 milliGRAM(s) IntraMuscular once PRN Glucose LESS THAN 70 milligrams/deciliter      LABS                        7.1    3.39  )-----------( 75       ( 07 Dec 2019 04:38 )             21.4     12-07    125<L>  |  91<L>  |  31<H>  ----------------------------<  109<H>  3.9   |  17<L>  |  2.88<H>    Ca    7.7<L>      07 Dec 2019 10:41  Phos  4.4     12-07  Mg     1.7     12-07    TPro  5.3<L>  /  Alb  2.1<L>  /  TBili  1.0  /  DBili  x   /  AST  19  /  ALT  13  /  AlkPhos  70  12-07    LIVER FUNCTIONS - ( 07 Dec 2019 04:38 )  Alb: 2.1 g/dL / Pro: 5.3 g/dL / ALK PHOS: 70 U/L / ALT: 13 U/L / AST: 19 U/L / GGT: x

## 2019-12-07 NOTE — PROGRESS NOTE ADULT - ATTENDING COMMENTS
Patient seen and examined with house-staff during bedside rounds.  Resident note read, including vitals, physical findings, laboratory data, and radiological reports.   Revisions included below.  Direct personal management at bed side and extensive interpretation of the data.  Plan was outlined and discussed in details with the housestaff.  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

## 2019-12-07 NOTE — PROGRESS NOTE ADULT - ASSESSMENT
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 After being transferred to  CTICU for surgical evaluation, pt was deemed to not be a surgical candidate and transferred to MICU for medical mgmt. ID, Nephrology, heme/onc, surgery, vascular following.    Neuro  Intubated, sedated on Fentanyl gtt. Prior CT head negative for any intracranial embolic events.    - c/w Seroquel to 100 mg q12h  - downtitratng fentanyl as tolerated  - c/w ativan to 2 mg q6h    Cardiovascular   #Hypotension  Most likely 2/2 septic shock from infective endocarditis and RV failure 2/2 tricuspid regurgitation (ELIAN shows EF of 40-50%). Echo with EF 45%. ELIAN with EF 40-45%, 3.8 x1cm vegetation on TR valve, with severe TR, trivial pericardial effusion. Echo with bubble revealed no PFO.   - normotensive   - Maintain MAP>65.   - C/W medical mgmt of infective endocarditis as below.  - c/w Midodrine 10mg q8h  - currently off levophed    Respiratory  #Acute hypoxic respiratory failure   Most likely 2/2 alveolar hemorrhage in the setting of septic embolic causing numerous cavitary lesions on CT chest. CXR with scattered infiltrates and pleural effusions. Sputum culture negative. 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 Bilateral CT in place, confirmed by CXR, continues to drain serosanguinous fluid. Fluid analysis of both CT pleural fluid confirms complex exudate with high cellularity, low pH and low glucose. pleural fluid cultures with staph aureus. Chest tubes in place as above (2 R chest tubes, 1 L chest tube)   - Monitor output of bilateral chest tubes  - R pleural fluid cultures with staph aureus, L pleural fluid cultures with NGTD   - CT chest and cxr as above. 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 Not a surgical candidate for a tricuspid valve replacement at this time as per CT surgery. Initial blood cultures positive for staph epidermidis, likely a contaminant. Initial sputum culture positive for staph aureus  Repeat blood cultures with NGTD. Sputum cx NGTD.  - ID following, C/W Nafcillin 2g q4 (Sensitive to Oxacillin as per OSH records), f/u recs   - repeat Bcxs with NGTD; sputum cx 12/3 with oral leann    - BAL positive for HSV, started on Acyclovir; last day today 12/4   - Per CT surgery pt is too high risk for any surgery and would likely not survive procedure.   - Cardiology consulted to optimize management of his right heart dysfunction, f/u recs      #Positive AFB   - Bronchial washings from 11/23 with positive AFB   - Pending speciation  - Contact precautions     #Fevers  - patient continuing to spike fevers with negative cultures thus far  - given clinically worsening status, concern for superimposed infectious process despite lack of leukocytosis or signs of end organ damage (no lactate/LFT abnormalities)   - plan for CT chest/abdomen/pelvis with contrast today; f/u read    #H. Parainfluenzae infection  - Patients sputum culture with pansensitive H Parainfluenzae  - start ceftriaxone 1 gm qdaily     Renal  #Acute renal failure most likely 2/2 ATN from hypoperfusion, Minimal urine output, on CVVHD, oliguric with 0-5cc/hr. Vancomycin level 37 on arrival so may be component of drug induced nephropathy. Bun/Cr continues to improve while on CVVHD. Pt is clinically fluid overloaded but now improved and hemodynamically stable. No renal infarcts as per CT abdomen/pelvis.  - F/U nephrology recs.  -plan for intermittent HD per nephrology recs; if not tolerating, may restart CVVHD per nephro   - Continue to correct fluid overload with HD  - Monitor I/Os  - currently anuric with no signs of renal recovery  - continue to monitor serum electrolytes  - Last HD 12/5; unable to coordinate with CT scans as above      GI  OG on arrival with 600cc of bilious fluid, abdomen still distended but had several BMs, some loose. CT abdomen revealed no evidence of bowel ischemia or SBO. Sump was DCed and replaced with NG tube. CT abdomen/pelvis revealed "irregular masslike mural thickening of the posterior left lateral wall of the rectum." Surgery consulted, unlikely perirectal abscess. Recommend GI evaluation for possible scope.  - F/u GI consult, f/u recs, when stable will go for flex sig  -C. diff negative   - Sump removed, NG tube in place; currently holding feeds as having high residuals with abdominal distension  -De escelate bowel regimen given copious amount of stools   -wean off fentanyl/opiates as above    #Abdominal Distension  - Patient continues to have abdominal distension with plain films of the abdomen revealing distended gastric bubble filled with air  - Will further evaluate post CT abdomen hopefully today  - Etiology likely Ileus given inability to wean off opiates  - [place] tart reglan renally dosed 5 mg q8h  - Plan to have NGT post pyloric given concern for low gastric motility     Heme  #Anemia  - patient with hemolysis earlier in hospital course likely 2/2 to sepsis and/or DIC  - likely element of anemia of chronic disease as well given current infectious state and renal dysfunction  - continue to monitor; maintain Hgb >7     #Pancytopenia  - 12/6 AM labs revealing pancytopenia confirmed on repeat testing  - Ordered 1 unit PRBC for management of Hgb <7; f/u post transfusion CBC  - Given worsening clinical status, high concern for sepsis; management as per above    Vascular  Vascular surgery consulted in setting of gangrenous distal toes and fingers b/l. Likely 2/2 to pressors.  - per vascular, may need amputation in the future once ischemic tissue fully demarcates, and once patient is medically optimized      Endocrinology  #Adrenal Insufficiency  - taper steroids; c/w hydrocortisone 25 BID   - previously on Hydrocortisone 50mg q8- stress dose steroids    #Hypertriglyceridemia   - Patient with elevated triglycerides to 558 and cholesterol panel with low HDL (12) and LDL (28) with elevated Cholesterol/HDL ratio  - Will repeat triglycerides off propofol; repeat at 418  - Pending improvement in clinical status, will start statin vs fibrate therapy     Lines: right IJ TLC and Ernesto (11/12), Axillary A-line (11/12), L subclavian (11/27-), right peripheral (11/12)    F: None   E: replete K <4, Mg <2  N: Trickle feeds, currently held    GI Ppx: Protonix   DVT Ppx: Heparin   Code status: FULL   Dispo: MICU 38 y/o M w/ PMH of IVDU and active smoker who came from [facility] on 11/8/19 in 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 After being transferred to  CTICU for surgical evaluation, pt was deemed to not be a surgical candidate and transferred to MICU for medical mgmt. ID, Nephrology, heme/onc, surgery, vascular following.    Neuro  Intubated, sedated on Fentanyl gtt. Prior CT head negative for any intracranial embolic events.    - c/w Seroquel to 100 mg q12h  - downtitratng fentanyl as tolerated  - c/w ativan to 2 mg q6h    Cardiovascular   #Hypotension  Most likely 2/2 septic shock from infective endocarditis and RV failure 2/2 tricuspid regurgitation (ELIAN shows EF of 40-50%). Echo with EF 45%. ELIAN with EF 40-45%, 3.8 x1cm vegetation on TR valve, with severe TR, trivial pericardial effusion. Echo with bubble revealed no PFO.   - normotensive   - Maintain MAP>65.   - C/W medical mgmt of infective endocarditis as below.  - c/w Midodrine 10mg q8h  - currently off levophed, although still requires intermittently     Respiratory  #Acute hypoxic respiratory failure   Most likely 2/2 alveolar hemorrhage in the setting of septic embolic causing numerous cavitary lesions on CT chest. CXR with scattered infiltrates and pleural effusions. Sputum culture negative. CT revealed "moderate bilateral pleural effusions and dense bibasilar consolidation with underlying septic emboli/pulmonary abscesses."   - CT chest with b/l loculated pneumothoraces with increase in size of cavitary lesions with worsening lobar PNA   - 2 right chest tubes and 1 left chest tube-->repeat cxr with decrease in size of b/l pneumothoraces   - C/w Duonebs q4  - will flush chest tubes w/ TPA/DNAse today 12/7; chest tubes also reoriented and sutured with suctioning of serosanguinous drainage and repeat CXR showing interval improvement   - trach placed 11/26  - Continue to treat IE as below     #Bilateral pulmonary effusion  Most likely empyema in setting of septic shock 2/2 infective endocarditis. Bilateral CT in place, confirmed by CXR, continues to drain serosanguinous fluid. Fluid analysis of both CT pleural fluid confirms complex exudate with high cellularity, low pH and low glucose. pleural fluid cultures with staph aureus. Chest tubes in place as above (2 R chest tubes, 1 L chest tube)   - Monitor output of bilateral chest tubes  - CT chest and cxr as above. 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 Not a surgical candidate for a tricuspid valve replacement at this time as per CT surgery. Initial blood cultures positive for staph epidermidis, likely a contaminant. Initial sputum culture positive for staph aureus  Repeat blood cultures with NGTD. Sputum cx NGTD.  - ID following, C/W Nafcillin 2g q4 (Sensitive to Oxacillin as per OSH records), f/u recs   - repeat Bcxs with NGTD; sputum cx 12/3 with oral leann    - Per CT surgery pt is too high risk for any surgery and would likely not survive procedure.   - Cardiology consulted to optimize management of his right heart dysfunction, f/u recs      #Positive AFB   - Bronchial washings from 11/23 with positive AFB   - Pending speciation  - Contact precautions     #H. Parainfluenzae infection  - Patients sputum culture with pansensitive H Parainfluenzae  - c/w ceftriaxone 1 gm qdaily     #Scedosporidium  - Patient with serum fungitell growing scedosprodium  - Repeat EKG to evaluate for QTc   - If QTc < 500, will start voriconazole     Renal  #Acute renal failure most likely 2/2 ATN from hypoperfusion, Minimal urine output, on CVVHD, oliguric with 0-5cc/hr. Vancomycin level 37 on arrival so may be component of drug induced nephropathy. Bun/Cr continues to improve while on CVVHD. Pt is clinically fluid overloaded but now improved and hemodynamically stable. No renal infarcts as per CT abdomen/pelvis.  - F/U nephrology recs.  -plan for intermittent HD per nephrology recs; if not tolerating, may restart CVVHD per nephro   - Continue to correct fluid overload with HD  - Monitor I/Os  - currently anuric with no signs of renal recovery  - continue to monitor serum electrolytes  - Last HD 12/5; unable to coordinate with CT scans as above      GI  OG on arrival with 600cc of bilious fluid, abdomen still distended but had several BMs, some loose. CT abdomen revealed no evidence of bowel ischemia or SBO. Sump was DCed and replaced with NG tube. CT abdomen/pelvis revealed "irregular masslike mural thickening of the posterior left lateral wall of the rectum." Surgery consulted, unlikely perirectal abscess. Recommend GI evaluation for possible scope.  - F/u GI consult, f/u recs, when stable will go for flex sig  - Sump removed, NG tube in place; currently holding feeds as having high residuals with abdominal distension  - wean off fentanyl/opiates as above    #Pancolitis  - CT scan revealing inflammation of the entire colonic tract consistent with pancolitis  - per ID recs, will add on flagyl to current antibiotic regimen as ceftriaxone will have coverage for colitis  - continue to monitor     Heme  #Anemia  - patient with hemolysis earlier in hospital course likely 2/2 to sepsis and/or DIC  - likely element of anemia of chronic disease as well given current infectious state and renal dysfunction  - continue to monitor; maintain Hgb >7     #Pancytopenia  - 12/6 AM labs revealing pancytopenia confirmed on repeat testing  - Ordered 1 unit PRBC for management of Hgb <7; f/u post transfusion CBC  - Given worsening clinical status, high concern for sepsis; management as per above    Vascular  Vascular surgery consulted in setting of gangrenous distal toes and fingers b/l. Likely 2/2 to pressors.  - per vascular, may need amputation in the future once ischemic tissue fully demarcates, and once patient is medically optimized      Endocrinology  #Adrenal Insufficiency  - taper steroids; c/w hydrocortisone 25 BID   - previously on Hydrocortisone 50mg q8- stress dose steroids    #Hypertriglyceridemia   - Patient with elevated triglycerides to 558 and cholesterol panel with low HDL (12) and LDL (28) with elevated Cholesterol/HDL ratio  - Will repeat triglycerides off propofol; repeat at 418  - Pending improvement in clinical status, will start statin vs fibrate therapy     Lines: right IJ TLC and Ernesto (11/12), Axillary A-line (11/12), L subclavian (11/27-), right peripheral (11/12)    F: None   E: replete K <4, Mg <2  N: Trickle feeds, currently held    GI Ppx: Protonix   DVT Ppx: Heparin   Code status: FULL   Dispo: MICU

## 2019-12-07 NOTE — PROGRESS NOTE ADULT - ASSESSMENT
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  Hospital course complicated by acute hypoxic respiratory failure, acute kidney injury, congestive hepatopathy, multi-system organ failure 2/2 hypoperfusion.

## 2019-12-07 NOTE — PROGRESS NOTE ADULT - SUBJECTIVE AND OBJECTIVE BOX
INTERVAL HPI/OVERNIGHT EVENTS:    Patient was seen and examined.  Events noted    ROS:    unable to obtain          ANTIBIOTICS/RELEVANT:    MEDICATIONS  (STANDING):  albuterol/ipratropium for Nebulization 3 milliLiter(s) Nebulizer every 4 hours  alteplase  Injectable for Pleural Effusion 10 milliGRAM(s) IntraPleural. every 12 hours  artificial  tears Solution 1 Drop(s) Both EYES every 6 hours  bisacodyl Suppository 10 milliGRAM(s) Rectal every 24 hours  cefTRIAXone   IVPB 2000 milliGRAM(s) IV Intermittent every 24 hours  chlorhexidine 0.12% Liquid 15 milliLiter(s) Oral Mucosa every 12 hours  chlorhexidine 2% Cloths 1 Application(s) Topical daily  dextrose 5%. 1000 milliLiter(s) (50 mL/Hr) IV Continuous <Continuous>  dextrose 50% Injectable 12.5 Gram(s) IV Push once  dextrose 50% Injectable 25 Gram(s) IV Push once  dextrose 50% Injectable 25 Gram(s) IV Push once  dornase maikol Solution for Pleural Effusion 5 milliGRAM(s) IntraPleural. every 12 hours  fentaNYL   Infusion 1 MICROgram(s)/kG/Hr (7.36 mL/Hr) IV Continuous <Continuous>  heparin  Injectable 5000 Unit(s) SubCutaneous every 8 hours  hydrocortisone sodium succinate Injectable 25 milliGRAM(s) IV Push every 12 hours  insulin lispro (HumaLOG) corrective regimen sliding scale   SubCutaneous every 6 hours  LORazepam     Tablet 2 milliGRAM(s) Oral every 6 hours  metoclopramide Injectable 5 milliGRAM(s) IV Push every 8 hours  metroNIDAZOLE    Tablet 500 milliGRAM(s) Oral every 8 hours  midodrine 10 milliGRAM(s) Oral every 8 hours  multivitamin/minerals/iron Oral Solution (CENTRUM) 15 milliLiter(s) Enteral Tube daily  nafcillin  IVPB 2 Gram(s) IV Intermittent every 4 hours  norepinephrine Infusion 0.05 MICROgram(s)/kG/Min (6.9 mL/Hr) IV Continuous <Continuous>  pantoprazole   Suspension 40 milliGRAM(s) Oral daily  QUEtiapine 100 milliGRAM(s) Oral every 12 hours  sodium chloride 0.9% lock flush 3 milliLiter(s) IV Push every 8 hours  vitamin E 400 International Unit(s) Oral daily  zinc sulfate 220 milliGRAM(s) Oral daily    MEDICATIONS  (PRN):  acetaminophen    Suspension .. 650 milliGRAM(s) Oral every 6 hours PRN Temp greater or equal to 38C (100.4F)  chlorproMAZINE    Tablet 25 milliGRAM(s) Oral every 8 hours PRN hiccups  dextrose 40% Gel 15 Gram(s) Oral once PRN Blood Glucose LESS THAN 70 milliGRAM(s)/deciliter  glucagon  Injectable 1 milliGRAM(s) IntraMuscular once PRN Glucose LESS THAN 70 milligrams/deciliter        Vital Signs Last 24 Hrs  T(C): 37.9 (07 Dec 2019 09:10), Max: 38.1 (06 Dec 2019 17:00)  T(F): 100.2 (07 Dec 2019 09:10), Max: 100.5 (06 Dec 2019 17:00)  HR: 122 (07 Dec 2019 09:00) (114 - 132)  BP: --  BP(mean): --  RR: 21 (07 Dec 2019 09:00) (10 - 34)  SpO2: 99% (07 Dec 2019 09:00) (98% - 100%)    PHYSICAL EXAM:  Constitutional:  ill appearing, sedated  Eyes: no icterus   Ear/Nose/Throat: no oral lesions  Neck:  + trach  Respiratory: decreased breath sounds, + chest tubes  Cardiovascular: + murmur  Gastrointestinal: distended  Extremities: + necrotic tips of toes  Vascular: DP Pulse:	right normal; left normal      LABS:                        7.1    3.39  )-----------( 75       ( 07 Dec 2019 04:38 )             21.4     12-07    125<L>  |  91<L>  |  31<H>  ----------------------------<  109<H>  3.9   |  17<L>  |  2.88<H>    Ca    7.7<L>      07 Dec 2019 10:41  Phos  4.4     12-07  Mg     1.7     12-07    TPro  5.3<L>  /  Alb  2.1<L>  /  TBili  1.0  /  DBili  x   /  AST  19  /  ALT  13  /  AlkPhos  70  12-07          MICROBIOLOGY:    Culture - Blood (12.04.19 @ 14:25)    Specimen Source: .Blood Blood    Culture Results:   No growth at 2 days.    Culture - Blood (12.04.19 @ 14:25)    Specimen Source: .Blood Blood    Culture Results:   No growth at 2 days.    Culture - Blood (11.27.19 @ 05:48)    Specimen Source: .Blood Blood    Culture Results:   No growth at 5 days.      Culture - Sputum . (12.04.19 @ 14:10)    Gram Stain:   Few epithelial cells  Few-moderate White blood cells  Moderate Gram Negative Rods  Few Yeast    -  Ampicillin: S    -  Ampicillin/Sulbactam: S    -  Azithromycin: S    -  Ciprofloxacin: S    -  Clarithromycin: S    -  Ceftriaxone: S    -  Chloramphenicol: S    -  Levofloxacin: S    -  Trimethoprim/Sulfamethoxazole: S    Specimen Source: .Sputum Sputum    Culture Results:   Moderate Haemophilus parainfluenzae  Accompanied by normal respiratory leann    Organism Identification: Haemophilus parainfluenzae    Organism: Haemophilus parainfluenzae    Method Type:     Culture - Fungal, Bronchial (11.20.19 @ 01:37)    Specimen Source: .Bronchial broncial wash    Culture Results:   Few Yeast  Rare Scedosporium species complex          RADIOLOGY & ADDITIONAL STUDIES:    < from: CT Chest w/ Oral Cont and w/ IV Cont (12.06.19 @ 22:32) >   Large right-sided pneumothorax which may be partially loculated. 2   pleural catheters present.  2. Moderate left-sided pneumothorax, at least partially loculated. One   pleural catheter present. There  is a small component of pleural fluid in the dependent left hemithorax.  3. Multifocal cystic pulmonary lesions and cavitary nodular opacities,   many of which are peripheral,  presumably septic emboli given history of endocarditis. Alternatively,   these findings could represent  pulmonary infection with atypical organism. Cavitating consolidation in   the dependent lower lobes  bilaterally, suspicious for cavitary pneumonia with possible lung abscess   formation.  4. 1.3 cm filling defect in the proximal right ventricle, adjacent to the   interventricular septum. It is  unclear if this is related to papillary muscle or if this represents   intraventricular thrombus. Consider  echo.

## 2019-12-07 NOTE — PROGRESS NOTE ADULT - ASSESSMENT
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  Patient with disseminated infection including the lungs.  CT shows pneumothorax and has evidence of cavitating pneumonia.  The cavitating lesions could be septic emboli from MSSA.  However a recent bronchoscopy is showing Scedosporium species.  Pathogenic significance of this is unclear.  Scedosporium can colonize airways and is usually seen in severely immune suppressed patients (Bone marrow transplant, AIDS) or patients with chronic granulomatous disease.  His elevated fungal markers are likely due to being colonized with Candida in the airway.  However he remains febrile and has cavitating disease - it's possible this fungus is playing a role    Recommend:  1.  Continue Nafcillin 2 grams IV q4hrs  2.  Can stop Azithromycin.    3.  Continue Ceftriaxone + metronidazole to cover colitis  4.  Ceftriaxone will also cover Hemophilus  5.  Check EKG.  If QTc < 500, can give trial of Voriconazole 300 mg PO/PEG q12 to cover scedosporium    ID team 1 will follow

## 2019-12-07 NOTE — PROGRESS NOTE ADULT - PROBLEM SELECTOR PLAN 1
# Anuric CAMERON likely due to ATN in setting of shock  no signs of recovery remained anuric/ requiring RRT intermittently with Hd and CVVHD  given persistent tachycardia and hypotension would recommend to continue with CVVHD with UF net even to negative 50 per hour if tolerated   - q 6 Lytes and supplementation accordingly  - Tunnel HD cath when more stable   - H&H borderline low, requiring transfusion for Hb< 7/ last transfusion 12/06  Will follow # Anuric CAMERON likely due to ATN in setting of shock  no signs of recovery remained anuric/ requiring RRT intermittently with Hd and CVVHD  given persistent tachycardia and hypotension would recommend to continue with CVVHD with UF net even to negative 50 per hour if tolerated   - q 6 Lytes and supplementation accordingly  - Tunnel HD cath when more stable   - H&H borderline low, requiring transfusion for Hb< 7/ last transfusion 12/06  - please stop D10W and change ceftriaxone order to NS instead of Dextrose solution given worsening of hyponatremia   Will follow

## 2019-12-07 NOTE — PROGRESS NOTE ADULT - SUBJECTIVE AND OBJECTIVE BOX
INCOMPLETE  OVERNIGHT EVENTS:     SUBJECTIVE / INTERVAL HPI: Patient seen and examined at bedside. More ill appearing, does not open eyes spontaneously. Lethargic but responds to physical stimuli. When questioned, does not endorse pain, however, patient incredibly lethargic and much less communicative than prior days and exams.     PHYSICAL EXAM:    General: deconditioned, fatigued, ill appearing  HEENT: NC/AT; PERRL, mildly icteric sclera; MMM  Neck: supple, trach in place  Cardiovascular: +S1/S2; tachycardic but regular rhythm, systolic murmur at LSB   Respiratory: intubated via trach; diffuse rhonchi/crackles with decreased breath sounds b/l; prominent inspiratory wheezing at RL lung border in the anterior field; chest tubes in place; increased work of breathing with occasional use of abdominal muscles    Gastrointestinal: NGT; NT, + distension; +BS   Extremities: WWP; trace edema of LEs/UEs, LEs>UEs; no clubbing or cyanosis  Derm: ischemia of all 10 distal phalanges of lower extremities; ischemia of distal phalange of R middle finger; dependent edema of lower extreme to the level of the knee b/l; edema of b/l hands more prominent on the dorsal surface    Vascular: 2+ radial, DP/PT pulses B/L  Neurological: AAOx3, moves all extremities, follows commands intermittently     VITAL SIGNS:  Vital Signs Last 24 Hrs  T(C): 37.6 (07 Dec 2019 04:00), Max: 38.2 (06 Dec 2019 10:01)  T(F): 99.6 (07 Dec 2019 04:00), Max: 100.8 (06 Dec 2019 10:01)  HR: 122 (07 Dec 2019 06:00) (116 - 132)  BP: 91/56 (06 Dec 2019 06:45) (91/56 - 91/56)  BP(mean): 71 (06 Dec 2019 06:45) (71 - 71)  RR: 28 (07 Dec 2019 06:00) (0 - 34)  SpO2: 100% (07 Dec 2019 06:00) (90% - 100%)      MEDICATIONS:  MEDICATIONS  (STANDING):  albuterol/ipratropium for Nebulization 3 milliLiter(s) Nebulizer every 4 hours  artificial  tears Solution 1 Drop(s) Both EYES every 6 hours  azithromycin  IVPB 500 milliGRAM(s) IV Intermittent every 24 hours  bisacodyl Suppository 10 milliGRAM(s) Rectal every 24 hours  cefTRIAXone   IVPB 2000 milliGRAM(s) IV Intermittent every 24 hours  chlorhexidine 0.12% Liquid 15 milliLiter(s) Oral Mucosa every 12 hours  chlorhexidine 2% Cloths 1 Application(s) Topical daily  dextrose 10%. 1000 milliLiter(s) (50 mL/Hr) IV Continuous <Continuous>  dextrose 5%. 1000 milliLiter(s) (50 mL/Hr) IV Continuous <Continuous>  dextrose 50% Injectable 12.5 Gram(s) IV Push once  dextrose 50% Injectable 25 Gram(s) IV Push once  dextrose 50% Injectable 25 Gram(s) IV Push once  fentaNYL   Infusion 1 MICROgram(s)/kG/Hr (7.36 mL/Hr) IV Continuous <Continuous>  heparin  Injectable 5000 Unit(s) SubCutaneous every 8 hours  hydrocortisone sodium succinate Injectable 25 milliGRAM(s) IV Push every 12 hours  insulin lispro (HumaLOG) corrective regimen sliding scale   SubCutaneous every 6 hours  LORazepam     Tablet 2 milliGRAM(s) Oral every 6 hours  metoclopramide Injectable 5 milliGRAM(s) IV Push every 8 hours  metroNIDAZOLE    Tablet 500 milliGRAM(s) Oral every 8 hours  midodrine 10 milliGRAM(s) Oral every 8 hours  multivitamin/minerals/iron Oral Solution (CENTRUM) 15 milliLiter(s) Enteral Tube daily  nafcillin  IVPB 2 Gram(s) IV Intermittent every 4 hours  norepinephrine Infusion 0.05 MICROgram(s)/kG/Min (6.9 mL/Hr) IV Continuous <Continuous>  pantoprazole   Suspension 40 milliGRAM(s) Oral daily  potassium chloride  20 mEq/100 mL IVPB 20 milliEquivalent(s) IV Intermittent every 2 hours  QUEtiapine 100 milliGRAM(s) Oral every 12 hours  sodium chloride 0.9% lock flush 3 milliLiter(s) IV Push every 8 hours  vitamin E 400 International Unit(s) Oral daily  zinc sulfate 220 milliGRAM(s) Oral daily    MEDICATIONS  (PRN):  acetaminophen    Suspension .. 650 milliGRAM(s) Oral every 6 hours PRN Temp greater or equal to 38C (100.4F)  chlorproMAZINE    Tablet 25 milliGRAM(s) Oral every 8 hours PRN hiccups  dextrose 40% Gel 15 Gram(s) Oral once PRN Blood Glucose LESS THAN 70 milliGRAM(s)/deciliter  glucagon  Injectable 1 milliGRAM(s) IntraMuscular once PRN Glucose LESS THAN 70 milligrams/deciliter      ALLERGIES:  Allergies    No Known Allergies    Intolerances        LABS:                        7.1    3.39  )-----------( 75       ( 07 Dec 2019 04:38 )             21.4     12-07    127<L>  |  93<L>  |  30<H>  ----------------------------<  121<H>  2.9<LL>   |  18<L>  |  2.69<H>    Ca    7.5<L>      07 Dec 2019 04:38  Phos  4.4     12-07  Mg     1.7     12-07    TPro  5.3<L>  /  Alb  2.1<L>  /  TBili  1.0  /  DBili  x   /  AST  19  /  ALT  13  /  AlkPhos  70  12-07        CAPILLARY BLOOD GLUCOSE      POCT Blood Glucose.: 109 mg/dL (06 Dec 2019 23:17)      RADIOLOGY & ADDITIONAL TESTS: Reviewed. OVERNIGHT EVENTS: Patient with CT scan overnight showing evidence of pancolitis and started on flagyl/azithromycin for coverage.     SUBJECTIVE / INTERVAL HPI: Patient seen and examined at bedside. Ill appearing, does not open eyes spontaneously. Lethargic but responds to physical stimuli. When questioned, does not endorse pain, however, patient incredibly lethargic and minimally communicative. Intermittently hypotensive requiring levophed.     PHYSICAL EXAM:    General: deconditioned, fatigued, ill appearing  HEENT: NC/AT; PERRL, mildly icteric sclera; MMM  Neck: supple, trach in place  Cardiovascular: +S1/S2; tachycardic but regular rhythm, systolic murmur at LSB   Respiratory: intubated via trach; diffuse rhonchi/crackles with decreased breath sounds b/l; prominent inspiratory wheezing at RL lung border in the anterior field; chest tubes in place; increased work of breathing with occasional use of abdominal muscles    Gastrointestinal: NGT; NT, + distension; +BS   Extremities: WWP; trace edema of LEs/UEs, LEs>UEs; no clubbing or cyanosis  Derm: ischemia of all 10 distal phalanges of lower extremities; ischemia of distal phalange of R middle finger; dependent edema of lower extreme to the level of the knee b/l; edema of b/l hands more prominent on the dorsal surface    Vascular: 2+ radial, DP/PT pulses B/L  Neurological: AAOx3, moves all extremities, follows commands intermittently     VITAL SIGNS:  Vital Signs Last 24 Hrs  T(C): 37.6 (07 Dec 2019 04:00), Max: 38.2 (06 Dec 2019 10:01)  T(F): 99.6 (07 Dec 2019 04:00), Max: 100.8 (06 Dec 2019 10:01)  HR: 122 (07 Dec 2019 06:00) (116 - 132)  BP: 91/56 (06 Dec 2019 06:45) (91/56 - 91/56)  BP(mean): 71 (06 Dec 2019 06:45) (71 - 71)  RR: 28 (07 Dec 2019 06:00) (0 - 34)  SpO2: 100% (07 Dec 2019 06:00) (90% - 100%)      MEDICATIONS:  MEDICATIONS  (STANDING):  albuterol/ipratropium for Nebulization 3 milliLiter(s) Nebulizer every 4 hours  artificial  tears Solution 1 Drop(s) Both EYES every 6 hours  azithromycin  IVPB 500 milliGRAM(s) IV Intermittent every 24 hours  bisacodyl Suppository 10 milliGRAM(s) Rectal every 24 hours  cefTRIAXone   IVPB 2000 milliGRAM(s) IV Intermittent every 24 hours  chlorhexidine 0.12% Liquid 15 milliLiter(s) Oral Mucosa every 12 hours  chlorhexidine 2% Cloths 1 Application(s) Topical daily  dextrose 10%. 1000 milliLiter(s) (50 mL/Hr) IV Continuous <Continuous>  dextrose 5%. 1000 milliLiter(s) (50 mL/Hr) IV Continuous <Continuous>  dextrose 50% Injectable 12.5 Gram(s) IV Push once  dextrose 50% Injectable 25 Gram(s) IV Push once  dextrose 50% Injectable 25 Gram(s) IV Push once  fentaNYL   Infusion 1 MICROgram(s)/kG/Hr (7.36 mL/Hr) IV Continuous <Continuous>  heparin  Injectable 5000 Unit(s) SubCutaneous every 8 hours  hydrocortisone sodium succinate Injectable 25 milliGRAM(s) IV Push every 12 hours  insulin lispro (HumaLOG) corrective regimen sliding scale   SubCutaneous every 6 hours  LORazepam     Tablet 2 milliGRAM(s) Oral every 6 hours  metoclopramide Injectable 5 milliGRAM(s) IV Push every 8 hours  metroNIDAZOLE    Tablet 500 milliGRAM(s) Oral every 8 hours  midodrine 10 milliGRAM(s) Oral every 8 hours  multivitamin/minerals/iron Oral Solution (CENTRUM) 15 milliLiter(s) Enteral Tube daily  nafcillin  IVPB 2 Gram(s) IV Intermittent every 4 hours  norepinephrine Infusion 0.05 MICROgram(s)/kG/Min (6.9 mL/Hr) IV Continuous <Continuous>  pantoprazole   Suspension 40 milliGRAM(s) Oral daily  potassium chloride  20 mEq/100 mL IVPB 20 milliEquivalent(s) IV Intermittent every 2 hours  QUEtiapine 100 milliGRAM(s) Oral every 12 hours  sodium chloride 0.9% lock flush 3 milliLiter(s) IV Push every 8 hours  vitamin E 400 International Unit(s) Oral daily  zinc sulfate 220 milliGRAM(s) Oral daily    MEDICATIONS  (PRN):  acetaminophen    Suspension .. 650 milliGRAM(s) Oral every 6 hours PRN Temp greater or equal to 38C (100.4F)  chlorproMAZINE    Tablet 25 milliGRAM(s) Oral every 8 hours PRN hiccups  dextrose 40% Gel 15 Gram(s) Oral once PRN Blood Glucose LESS THAN 70 milliGRAM(s)/deciliter  glucagon  Injectable 1 milliGRAM(s) IntraMuscular once PRN Glucose LESS THAN 70 milligrams/deciliter      ALLERGIES:  Allergies    No Known Allergies    Intolerances        LABS:                        7.1    3.39  )-----------( 75       ( 07 Dec 2019 04:38 )             21.4     12-07    127<L>  |  93<L>  |  30<H>  ----------------------------<  121<H>  2.9<LL>   |  18<L>  |  2.69<H>    Ca    7.5<L>      07 Dec 2019 04:38  Phos  4.4     12-07  Mg     1.7     12-07    TPro  5.3<L>  /  Alb  2.1<L>  /  TBili  1.0  /  DBili  x   /  AST  19  /  ALT  13  /  AlkPhos  70  12-07        CAPILLARY BLOOD GLUCOSE      POCT Blood Glucose.: 109 mg/dL (06 Dec 2019 23:17)      RADIOLOGY & ADDITIONAL TESTS: Reviewed.

## 2019-12-08 NOTE — PROGRESS NOTE ADULT - NSHPATTENDINGPLANDISCUSS_GEN_ALL_CORE
fellow
as above
medical styaff

## 2019-12-08 NOTE — PROGRESS NOTE ADULT - SUBJECTIVE AND OBJECTIVE BOX
O/N Events: letahrgic/ back on CVVHD overnight   Subjective:  repeat CTH in and with interval development in large hypodense regions within the bilateral frontal lobes, right greater than left and 5 mm mass effect suspicious for septic embolic   tolerating CVVHD with UF 50 cc negative hourly     VITALS  Vital Signs Last 24 Hrs  T(C): 37.7 (08 Dec 2019 09:47), Max: 37.9 (07 Dec 2019 14:00)  T(F): 99.8 (08 Dec 2019 09:47), Max: 100.2 (07 Dec 2019 14:00)  HR: 114 (08 Dec 2019 13:00) (114 - 130)  BP: 104/68 mmhg  RR: 28 (08 Dec 2019 13:00) (21 - 44)  SpO2: 100% (08 Dec 2019 13:00) (99% - 100%)    PHYSICAL EXAM  GENERAL: lethargic, right gaze deviation   NECK: venting through tracheostomy   CHEST/LUNG: deferred   HEART: tachycardic in 120s, systolic murmur present   ABDOMEN: distended/ NG on suction   EXTREMITIES: 2+ pitting edema/ anasarcic   Neurology: deferred   Access: [facility] c/d/i    MEDICATIONS  (STANDING):  albuterol/ipratropium for Nebulization 3 milliLiter(s) Nebulizer every 4 hours  artificial  tears Solution 1 Drop(s) Both EYES every 6 hours  bisacodyl Suppository 10 milliGRAM(s) Rectal every 24 hours  cefTRIAXone   IVPB 2000 milliGRAM(s) IV Intermittent every 24 hours  chlorhexidine 0.12% Liquid 15 milliLiter(s) Oral Mucosa every 12 hours  chlorhexidine 2% Cloths 1 Application(s) Topical daily  CRRT Treatment    <Continuous>  dextrose 5%. 1000 milliLiter(s) (50 mL/Hr) IV Continuous <Continuous>  dextrose 50% Injectable 12.5 Gram(s) IV Push once  dextrose 50% Injectable 25 Gram(s) IV Push once  dextrose 50% Injectable 25 Gram(s) IV Push once  fentaNYL   Infusion 1 MICROgram(s)/kG/Hr (7.36 mL/Hr) IV Continuous <Continuous>  hydrocortisone sodium succinate Injectable 25 milliGRAM(s) IV Push every 12 hours  insulin lispro (HumaLOG) corrective regimen sliding scale   SubCutaneous every 6 hours  metoclopramide Injectable 5 milliGRAM(s) IV Push every 8 hours  metroNIDAZOLE    Tablet 500 milliGRAM(s) Oral every 8 hours  midodrine 10 milliGRAM(s) Oral every 8 hours  multivitamin/minerals/iron Oral Solution (CENTRUM) 15 milliLiter(s) Enteral Tube daily  nafcillin  IVPB 2 Gram(s) IV Intermittent every 4 hours  norepinephrine Infusion 0.05 MICROgram(s)/kG/Min (6.9 mL/Hr) IV Continuous <Continuous>  pantoprazole   Suspension 40 milliGRAM(s) Oral daily  PureFlow Dialysate RFP-401 (K 4 / Ca 3) 5000 milliLiter(s) (2000 mL/Hr) CRRT <Continuous>  QUEtiapine 100 milliGRAM(s) Oral every 12 hours  sodium chloride 0.9% lock flush 3 milliLiter(s) IV Push every 8 hours  vitamin E 400 International Unit(s) Oral daily  voriconazole 300 milliGRAM(s) Oral every 12 hours  zinc sulfate 220 milliGRAM(s) Oral daily    MEDICATIONS  (PRN):  acetaminophen    Suspension .. 650 milliGRAM(s) Oral every 6 hours PRN Temp greater or equal to 38C (100.4F)  chlorproMAZINE    Tablet 25 milliGRAM(s) Oral every 8 hours PRN hiccups  dextrose 40% Gel 15 Gram(s) Oral once PRN Blood Glucose LESS THAN 70 milliGRAM(s)/deciliter  glucagon  Injectable 1 milliGRAM(s) IntraMuscular once PRN Glucose LESS THAN 70 milligrams/deciliter  LORazepam     Tablet 2 milliGRAM(s) Oral every 12 hours PRN Agitation      LABS                        7.5    5.50  )-----------( 78       ( 08 Dec 2019 04:31 )             22.2     12-    133<L>  |  99  |  22  ----------------------------<  104<H>  3.8   |  20<L>  |  1.85<H>    Ca    8.1<L>      08 Dec 2019 12:54  Phos  3.2     12-08  Mg     1.9     12-    TPro  5.6<L>  /  Alb  1.9<L>  /  TBili  1.3<H>  /  DBili  x   /  AST  27  /  ALT  16  /  AlkPhos  103      LIVER FUNCTIONS - ( 08 Dec 2019 04:31 )  Alb: 1.9 g/dL / Pro: 5.6 g/dL / ALK PHOS: 103 U/L / ALT: 16 U/L / AST: 27 U/L / GGT: x           PT/INR - ( 07 Dec 2019 22:46 )   PT: 18.8 sec;   INR: 1.64          PTT - ( 08 Dec 2019 09:14 )  PTT:54.4 sec  Urinalysis Basic - ( 07 Dec 2019 19:38 )    Color: Yellow / Appearance: Cloudy / S.015 / pH: x  Gluc: x / Ketone: NEGATIVE  / Bili: Moderate / Urobili: 0.2 E.U./dL   Blood: x / Protein: 100 mg/dL / Nitrite: POSITIVE   Leuk Esterase: NEGATIVE / RBC: < 5 /HPF / WBC 5-10 /HPF   Sq Epi: x / Non Sq Epi: 0-5 /HPF / Bacteria: Many /HPF

## 2019-12-08 NOTE — PROGRESS NOTE ADULT - ASSESSMENT
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  CT head shows evidence of septic emboli.      Recommend:  1.  Continue Nafcillin 2 grams IV q4hrs.  This will cross blood brain barrier and should cover any MSSA in the CNS  2.  Continue Ceftriaxone and metronidazole for abdominal coverage.  Ceftriaxone also covers hemophilus  3.  Continue Voriconazole 300 mg PO q12.  Check EKG to asses QTc.  If > 500, would favor holding voriconazole    ID team 1 will follow

## 2019-12-08 NOTE — PROGRESS NOTE ADULT - PROBLEM SELECTOR PLAN 1
# Anuric CAMERON likely due to ATN in setting of shock  no signs of recovery remained anuric/ requiring RRT intermittently with Hd and CVVHD  patient with CT head finding on 12/08 with B/l hypodense lesions R>L associated with mass effect cw septic emboli, per GOC discussion pt made DNR with no plan for invasive measures/ pt will remain ventilated with no plan to withdraw care yet  - will cw CVVHD for now same setting

## 2019-12-08 NOTE — PROGRESS NOTE ADULT - SUBJECTIVE AND OBJECTIVE BOX
INCOMPLETE    OVERNIGHT EVENTS: Patient with CT scan overnight showing evidence of pancolitis and started on flagyl/azithromycin for coverage.     SUBJECTIVE / INTERVAL HPI: Patient seen and examined at bedside. Ill appearing, does not open eyes spontaneously. Lethargic but responds to physical stimuli. When questioned, does not endorse pain, however, patient incredibly lethargic and minimally communicative. Intermittently hypotensive requiring levophed.     PHYSICAL EXAM:    General: deconditioned, fatigued, ill appearing  HEENT: NC/AT; PERRL, mildly icteric sclera; MMM  Neck: supple, trach in place  Cardiovascular: +S1/S2; tachycardic but regular rhythm, systolic murmur at LSB   Respiratory: intubated via trach; diffuse rhonchi/crackles with decreased breath sounds b/l; prominent inspiratory wheezing at RL lung border in the anterior field; chest tubes in place; increased work of breathing with occasional use of abdominal muscles    Gastrointestinal: NGT; NT, + distension; +BS   Extremities: WWP; trace edema of LEs/UEs, LEs>UEs; no clubbing or cyanosis  Derm: ischemia of all 10 distal phalanges of lower extremities; ischemia of distal phalange of R middle finger; dependent edema of lower extreme to the level of the knee b/l; edema of b/l hands more prominent on the dorsal surface    Vascular: 2+ radial, DP/PT pulses B/L  Neurological: AAOx3, moves all extremities, follows commands intermittently     VITAL SIGNS:  Vital Signs Last 24 Hrs  T(C): 37.6 (07 Dec 2019 04:00), Max: 38.2 (06 Dec 2019 10:01)  T(F): 99.6 (07 Dec 2019 04:00), Max: 100.8 (06 Dec 2019 10:01)  HR: 122 (07 Dec 2019 06:00) (116 - 132)  BP: 91/56 (06 Dec 2019 06:45) (91/56 - 91/56)  BP(mean): 71 (06 Dec 2019 06:45) (71 - 71)  RR: 28 (07 Dec 2019 06:00) (0 - 34)  SpO2: 100% (07 Dec 2019 06:00) (90% - 100%)      MEDICATIONS:  MEDICATIONS  (STANDING):  albuterol/ipratropium for Nebulization 3 milliLiter(s) Nebulizer every 4 hours  artificial  tears Solution 1 Drop(s) Both EYES every 6 hours  azithromycin  IVPB 500 milliGRAM(s) IV Intermittent every 24 hours  bisacodyl Suppository 10 milliGRAM(s) Rectal every 24 hours  cefTRIAXone   IVPB 2000 milliGRAM(s) IV Intermittent every 24 hours  chlorhexidine 0.12% Liquid 15 milliLiter(s) Oral Mucosa every 12 hours  chlorhexidine 2% Cloths 1 Application(s) Topical daily  dextrose 10%. 1000 milliLiter(s) (50 mL/Hr) IV Continuous <Continuous>  dextrose 5%. 1000 milliLiter(s) (50 mL/Hr) IV Continuous <Continuous>  dextrose 50% Injectable 12.5 Gram(s) IV Push once  dextrose 50% Injectable 25 Gram(s) IV Push once  dextrose 50% Injectable 25 Gram(s) IV Push once  fentaNYL   Infusion 1 MICROgram(s)/kG/Hr (7.36 mL/Hr) IV Continuous <Continuous>  heparin  Injectable 5000 Unit(s) SubCutaneous every 8 hours  hydrocortisone sodium succinate Injectable 25 milliGRAM(s) IV Push every 12 hours  insulin lispro (HumaLOG) corrective regimen sliding scale   SubCutaneous every 6 hours  LORazepam     Tablet 2 milliGRAM(s) Oral every 6 hours  metoclopramide Injectable 5 milliGRAM(s) IV Push every 8 hours  metroNIDAZOLE    Tablet 500 milliGRAM(s) Oral every 8 hours  midodrine 10 milliGRAM(s) Oral every 8 hours  multivitamin/minerals/iron Oral Solution (CENTRUM) 15 milliLiter(s) Enteral Tube daily  nafcillin  IVPB 2 Gram(s) IV Intermittent every 4 hours  norepinephrine Infusion 0.05 MICROgram(s)/kG/Min (6.9 mL/Hr) IV Continuous <Continuous>  pantoprazole   Suspension 40 milliGRAM(s) Oral daily  potassium chloride  20 mEq/100 mL IVPB 20 milliEquivalent(s) IV Intermittent every 2 hours  QUEtiapine 100 milliGRAM(s) Oral every 12 hours  sodium chloride 0.9% lock flush 3 milliLiter(s) IV Push every 8 hours  vitamin E 400 International Unit(s) Oral daily  zinc sulfate 220 milliGRAM(s) Oral daily    MEDICATIONS  (PRN):  acetaminophen    Suspension .. 650 milliGRAM(s) Oral every 6 hours PRN Temp greater or equal to 38C (100.4F)  chlorproMAZINE    Tablet 25 milliGRAM(s) Oral every 8 hours PRN hiccups  dextrose 40% Gel 15 Gram(s) Oral once PRN Blood Glucose LESS THAN 70 milliGRAM(s)/deciliter  glucagon  Injectable 1 milliGRAM(s) IntraMuscular once PRN Glucose LESS THAN 70 milligrams/deciliter      ALLERGIES:  Allergies    No Known Allergies    Intolerances        LABS:                        7.1    3.39  )-----------( 75       ( 07 Dec 2019 04:38 )             21.4     12-07    127<L>  |  93<L>  |  30<H>  ----------------------------<  121<H>  2.9<LL>   |  18<L>  |  2.69<H>    Ca    7.5<L>      07 Dec 2019 04:38  Phos  4.4     12-07  Mg     1.7     12-07    TPro  5.3<L>  /  Alb  2.1<L>  /  TBili  1.0  /  DBili  x   /  AST  19  /  ALT  13  /  AlkPhos  70  12-07        CAPILLARY BLOOD GLUCOSE      POCT Blood Glucose.: 109 mg/dL (06 Dec 2019 23:17)      RADIOLOGY & ADDITIONAL TESTS: Reviewed. OVERNIGHT EVENTS: Pt noted to be more lethargic    SUBJECTIVE / INTERVAL HPI: Patient seen and examined at bedside. Lethargic not responding to sternal rubs or noxious stimuli. Left gaze deviation noted, concerns for stroke.     PHYSICAL EXAM:    General: deconditioned, ill appearing, unresponsive   HEENT: NC/AT; PERRL, mildly icteric sclera; MMM  Neck: supple, trach in place  Cardiovascular: +S1/S2; tachycardic but regular rhythm, systolic murmur at LSB   Respiratory: intubated via trach; diffuse rhonchi/crackles with decreased breath sounds b/l; prominent inspiratory wheezing at RL lung border in the anterior field; chest tubes in place; increased work of breathing with occasional use of abdominal muscles    Gastrointestinal: NGT; NT, + distension; +BS   Extremities: WWP; trace edema of LEs/UEs, LEs>UEs; no clubbing or cyanosis  Derm: ischemia of all 10 distal phalanges of lower extremities; ischemia of distal phalange of R middle finger; dependent edema of lower extreme to the level of the knee b/l; edema of b/l hands more prominent on the dorsal surface    Vascular: 2+ radial, DP/PT pulses B/L  Neurological: unresponsive to sternal rubs or noxious stimuli; left gaze deviation     VITAL SIGNS:  ICU Vital Signs Last 24 Hrs  T(C): 36.9 (08 Dec 2019 22:40), Max: 37.7 (08 Dec 2019 09:47)  T(F): 98.5 (08 Dec 2019 22:40), Max: 99.8 (08 Dec 2019 09:47)  HR: 122 (08 Dec 2019 23:00) (104 - 130)  BP: --  BP(mean): --  ABP: 80/52 (08 Dec 2019 23:00) (76/48 - 122/80)  ABP(mean): 62 (08 Dec 2019 23:00) (58 - 100)  RR: 33 (08 Dec 2019 23:00) (22 - 44)  SpO2: 97% (08 Dec 2019 23:00) (97% - 100%)    MEDICATIONS:  MEDICATIONS  (STANDING):  albuterol/ipratropium for Nebulization 3 milliLiter(s) Nebulizer every 4 hours  artificial  tears Solution 1 Drop(s) Both EYES every 6 hours  bisacodyl Suppository 10 milliGRAM(s) Rectal every 24 hours  cefTRIAXone   IVPB 2000 milliGRAM(s) IV Intermittent every 24 hours  chlorhexidine 0.12% Liquid 15 milliLiter(s) Oral Mucosa every 12 hours  chlorhexidine 2% Cloths 1 Application(s) Topical daily  CRRT Treatment    <Continuous>  dextrose 5%. 1000 milliLiter(s) (50 mL/Hr) IV Continuous <Continuous>  dextrose 50% Injectable 12.5 Gram(s) IV Push once  dextrose 50% Injectable 25 Gram(s) IV Push once  dextrose 50% Injectable 25 Gram(s) IV Push once  fentaNYL   Infusion 1 MICROgram(s)/kG/Hr (7.36 mL/Hr) IV Continuous <Continuous>  hydrocortisone sodium succinate Injectable 25 milliGRAM(s) IV Push every 12 hours  insulin lispro (HumaLOG) corrective regimen sliding scale   SubCutaneous every 6 hours  metoclopramide Injectable 5 milliGRAM(s) IV Push every 8 hours  metroNIDAZOLE    Tablet 500 milliGRAM(s) Oral every 8 hours  midodrine 10 milliGRAM(s) Oral every 8 hours  multivitamin/minerals/iron Oral Solution (CENTRUM) 15 milliLiter(s) Enteral Tube daily  nafcillin  IVPB 2 Gram(s) IV Intermittent every 4 hours  norepinephrine Infusion 0.05 MICROgram(s)/kG/Min (6.9 mL/Hr) IV Continuous <Continuous>  pantoprazole   Suspension 40 milliGRAM(s) Oral daily  PureFlow Dialysate RFP-401 (K 4 / Ca 3) 5000 milliLiter(s) (2000 mL/Hr) CRRT <Continuous>  QUEtiapine 100 milliGRAM(s) Oral every 12 hours  sodium chloride 0.9% lock flush 3 milliLiter(s) IV Push every 8 hours  vitamin E 400 International Unit(s) Oral daily  voriconazole 300 milliGRAM(s) Oral every 12 hours  zinc sulfate 220 milliGRAM(s) Oral daily    MEDICATIONS  (PRN):  acetaminophen    Suspension .. 650 milliGRAM(s) Oral every 6 hours PRN Temp greater or equal to 38C (100.4F)  chlorproMAZINE    Tablet 25 milliGRAM(s) Oral every 8 hours PRN hiccups  dextrose 40% Gel 15 Gram(s) Oral once PRN Blood Glucose LESS THAN 70 milliGRAM(s)/deciliter  glucagon  Injectable 1 milliGRAM(s) IntraMuscular once PRN Glucose LESS THAN 70 milligrams/deciliter  LORazepam     Tablet 2 milliGRAM(s) Oral every 12 hours PRN Agitation        ALLERGIES:  Allergies    No Known Allergies    Intolerances    .  LABS:                         7.5    5.50  )-----------( 78       ( 08 Dec 2019 04:31 )             22.2     12-    131<L>  |  98  |  18  ----------------------------<  122<H>  3.9   |  20<L>  |  1.58<H>    Ca    8.0<L>      08 Dec 2019 18:37  Phos  2.3       Mg     2.1         TPro  5.6<L>  /  Alb  1.9<L>  /  TBili  1.3<H>  /  DBili  x   /  AST  27  /  ALT  16  /  AlkPhos  103  12-    PT/INR - ( 07 Dec 2019 22:46 )   PT: 18.8 sec;   INR: 1.64          PTT - ( 08 Dec 2019 12:54 )  PTT:41.5 sec  Urinalysis Basic - ( 07 Dec 2019 19:38 )    Color: Yellow / Appearance: Cloudy / S.015 / pH: x  Gluc: x / Ketone: NEGATIVE  / Bili: Moderate / Urobili: 0.2 E.U./dL   Blood: x / Protein: 100 mg/dL / Nitrite: POSITIVE   Leuk Esterase: NEGATIVE / RBC: < 5 /HPF / WBC 5-10 /HPF   Sq Epi: x / Non Sq Epi: 0-5 /HPF / Bacteria: Many /HPF    RADIOLOGY, EKG & ADDITIONAL TESTS: Reviewed.

## 2019-12-08 NOTE — PROVIDER CONTACT NOTE (CHANGE IN STATUS NOTIFICATION) - RECOMMENDATIONS
UF has been decreased to 0 for past half hour. BP still trending down. Recommended rinsing back and stopping CVVHD.

## 2019-12-08 NOTE — PROVIDER CONTACT NOTE (CHANGE IN STATUS NOTIFICATION) - ACTION/TREATMENT ORDERED:
Give 2mg ativan IV
Hold UF and increase fentanyl to maintain RASS -1. Re-evaluate BP in 1 hour and will decide regarding starting the UF at that time.
Per discussion with family this afternoon (DNR, no escalation of care, will decide on further comfort measures tomorrow), leave UF at 0 and do not add vasopressors. If there is clotting or other issues, can discuss stopping CVVHD treatment. Continue to monitor closely and will notify family with advance time.
Physician at bedside and assessing patient. Ordering chest x-ray.

## 2019-12-08 NOTE — PROVIDER CONTACT NOTE (CHANGE IN STATUS NOTIFICATION) - BACKGROUND
pt with endocarditis complicated by septic emboli to pt with endocarditis complicated by intracranial septic emboli

## 2019-12-08 NOTE — PROGRESS NOTE ADULT - PROBLEM SELECTOR PROBLEM 1
[place]ER[place] (acute kidney injury)
Acute endocarditis due to other organism

## 2019-12-08 NOTE — PROGRESS NOTE ADULT - ASSESSMENT
38 y/o M w/ PMH of IVDU and active smoker who came from [facility] on 11/8/19 in 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 After being transferred to  CTICU for surgical evaluation, pt was deemed to not be a surgical candidate and transferred to MICU for medical mgmt. ID, Nephrology, heme/onc, surgery, vascular following.    Neuro  Intubated, sedated on Fentanyl gtt. Prior CT head negative for any intracranial embolic events.    - c/w Seroquel to 100 mg q12h  - downtitratng fentanyl as tolerated  - c/w ativan to 2 mg q6h    Cardiovascular   #Hypotension  Most likely 2/2 septic shock from infective endocarditis and RV failure 2/2 tricuspid regurgitation (ELIAN shows EF of 40-50%). Echo with EF 45%. ELIAN with EF 40-45%, 3.8 x1cm vegetation on TR valve, with severe TR, trivial pericardial effusion. Echo with bubble revealed no PFO.   - normotensive   - Maintain MAP>65.   - C/W medical mgmt of infective endocarditis as below.  - c/w Midodrine 10mg q8h  - currently off levophed, although still requires intermittently     Respiratory  #Acute hypoxic respiratory failure   Most likely 2/2 alveolar hemorrhage in the setting of septic embolic causing numerous cavitary lesions on CT chest. CXR with scattered infiltrates and pleural effusions. Sputum culture negative. CT revealed "moderate bilateral pleural effusions and dense bibasilar consolidation with underlying septic emboli/pulmonary abscesses."   - CT chest with b/l loculated pneumothoraces with increase in size of cavitary lesions with worsening lobar PNA   - 2 right chest tubes and 1 left chest tube-->repeat cxr with decrease in size of b/l pneumothoraces   - C/w Duonebs q4  - will flush chest tubes w/ TPA/DNAse today 12/7; chest tubes also reoriented and sutured with suctioning of serosanguinous drainage and repeat CXR showing interval improvement   - trach placed 11/26  - Continue to treat IE as below     #Bilateral pulmonary effusion  Most likely empyema in setting of septic shock 2/2 infective endocarditis. Bilateral CT in place, confirmed by CXR, continues to drain serosanguinous fluid. Fluid analysis of both CT pleural fluid confirms complex exudate with high cellularity, low pH and low glucose. pleural fluid cultures with staph aureus. Chest tubes in place as above (2 R chest tubes, 1 L chest tube)   - Monitor output of bilateral chest tubes  - CT chest and cxr as above. 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 Not a surgical candidate for a tricuspid valve replacement at this time as per CT surgery. Initial blood cultures positive for staph epidermidis, likely a contaminant. Initial sputum culture positive for staph aureus  Repeat blood cultures with NGTD. Sputum cx NGTD.  - ID following, C/W Nafcillin 2g q4 (Sensitive to Oxacillin as per OSH records), f/u recs   - repeat Bcxs with NGTD; sputum cx 12/3 with oral leann    - Per CT surgery pt is too high risk for any surgery and would likely not survive procedure.   - Cardiology consulted to optimize management of his right heart dysfunction, f/u recs      #Positive AFB   - Bronchial washings from 11/23 with positive AFB   - Pending speciation  - Contact precautions     #H. Parainfluenzae infection  - Patients sputum culture with pansensitive H Parainfluenzae  - c/w ceftriaxone 1 gm qdaily     #Scedosporidium  - Patient with serum fungitell growing scedosprodium  - Repeat EKG to evaluate for QTc   - If QTc < 500, will start voriconazole     Renal  #Acute renal failure most likely 2/2 ATN from hypoperfusion, Minimal urine output, on CVVHD, oliguric with 0-5cc/hr. Vancomycin level 37 on arrival so may be component of drug induced nephropathy. Bun/Cr continues to improve while on CVVHD. Pt is clinically fluid overloaded but now improved and hemodynamically stable. No renal infarcts as per CT abdomen/pelvis.  - F/U nephrology recs.  -plan for intermittent HD per nephrology recs; if not tolerating, may restart CVVHD per nephro   - Continue to correct fluid overload with HD  - Monitor I/Os  - currently anuric with no signs of renal recovery  - continue to monitor serum electrolytes  - Last HD 12/5; unable to coordinate with CT scans as above      GI  OG on arrival with 600cc of bilious fluid, abdomen still distended but had several BMs, some loose. CT abdomen revealed no evidence of bowel ischemia or SBO. Sump was DCed and replaced with NG tube. CT abdomen/pelvis revealed "irregular masslike mural thickening of the posterior left lateral wall of the rectum." Surgery consulted, unlikely perirectal abscess. Recommend GI evaluation for possible scope.  - F/u GI consult, f/u recs, when stable will go for flex sig  - Sump removed, NG tube in place; currently holding feeds as having high residuals with abdominal distension  - wean off fentanyl/opiates as above    #Pancolitis  - CT scan revealing inflammation of the entire colonic tract consistent with pancolitis  - per ID recs, will add on flagyl to current antibiotic regimen as ceftriaxone will have coverage for colitis  - continue to monitor     Heme  #Anemia  - patient with hemolysis earlier in hospital course likely 2/2 to sepsis and/or DIC  - likely element of anemia of chronic disease as well given current infectious state and renal dysfunction  - continue to monitor; maintain Hgb >7     #Pancytopenia  - 12/6 AM labs revealing pancytopenia confirmed on repeat testing  - Ordered 1 unit PRBC for management of Hgb <7; f/u post transfusion CBC  - Given worsening clinical status, high concern for sepsis; management as per above    Vascular  Vascular surgery consulted in setting of gangrenous distal toes and fingers b/l. Likely 2/2 to pressors.  - per vascular, may need amputation in the future once ischemic tissue fully demarcates, and once patient is medically optimized      Endocrinology  #Adrenal Insufficiency  - taper steroids; c/w hydrocortisone 25 BID   - previously on Hydrocortisone 50mg q8- stress dose steroids    #Hypertriglyceridemia   - Patient with elevated triglycerides to 558 and cholesterol panel with low HDL (12) and LDL (28) with elevated Cholesterol/HDL ratio  - Will repeat triglycerides off propofol; repeat at 418  - Pending improvement in clinical status, will start statin vs fibrate therapy     Lines: right IJ TLC and Ernesto (11/12), Axillary A-line (11/12), L subclavian (11/27-), right peripheral (11/12)    F: None   E: replete K <4, Mg <2  N: Trickle feeds, currently held    GI Ppx: Protonix   DVT Ppx: Heparin   Code status: FULL   Dispo: MICU 40 y/o M w/ PMH of IVDU and active smoker who came from [facility] on 11/8/19 in 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 After being transferred to  CTICU for surgical evaluation, pt was deemed to not be a surgical candidate and transferred to MICU for medical mgmt. ID, Nephrology, heme/onc, surgery, vascular following. Now with new imaging findings concerning for intracranial septic embolic with mass effect    Neuro    #Intracranial septic emboli  This AM pt not responsive and noted to have left gaze deviation. CT head showing hypodense billateral frontal mass with mass effect suspicious for septic embolic encephalopathy. To further characterize the region, MRI head will likely be necessary. However GOA were discussed with the family and given pt's poor prognosis decision was made to make pt DNR. To continue current treatment for supportive care however no advancing in care.     #Intubated, sedated on Fentanyl gtt. Prior CT head negative for any intracranial embolic events.    - c/w Seroquel to 100 mg q12h  - c/w fentanyl   - c/w ativan to 2 mg q6h    Cardiovascular   #Hypotension  Most likely 2/2 septic shock from infective endocarditis and RV failure 2/2 tricuspid regurgitation (ELIAN shows EF of 40-50%). Echo with EF 45%. ELIAN with EF 40-45%, 3.8 x1cm vegetation on TR valve, with severe TR, trivial pericardial effusion. Echo with bubble revealed no PFO.   - normotensive   - Maintain MAP>65.   - C/W medical mgmt of infective endocarditis as below.  - c/w Midodrine 10mg q8h  - currently off levophed, although still requires intermittently     Respiratory  #Acute hypoxic respiratory failure   Most likely 2/2 alveolar hemorrhage in the setting of septic embolic causing numerous cavitary lesions on CT chest. CXR with scattered infiltrates and pleural effusions. Sputum culture negative. CT revealed "moderate bilateral pleural effusions and dense bibasilar consolidation with underlying septic emboli/pulmonary abscesses."   - CT chest with b/l loculated pneumothoraces with increase in size of cavitary lesions with worsening lobar PNA   - 2 right chest tubes and 1 left chest tube-->repeat cxr with decrease in size of b/l pneumothoraces   - C/w Duonebs q4  - will flush chest tubes w/ TPA/DNAse today 12/7; chest tubes also reoriented and sutured with suctioning of serosanguinous drainage and repeat CXR showing interval improvement   - trach placed 11/26  - Continue to treat IE as below     #Bilateral pulmonary effusion  Most likely empyema in setting of septic shock 2/2 infective endocarditis. Bilateral CT in place, confirmed by CXR, continues to drain serosanguinous fluid. Fluid analysis of both CT pleural fluid confirms complex exudate with high cellularity, low pH and low glucose. pleural fluid cultures with staph aureus. Chest tubes in place as above (2 R chest tubes, 1 L chest tube)   - Monitor output of bilateral chest tubes  - CT chest and cxr as above. 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 Not a surgical candidate for a tricuspid valve replacement at this time as per CT surgery. Initial blood cultures positive for staph epidermidis, likely a contaminant. Initial sputum culture positive for staph aureus  Repeat blood cultures with NGTD. Sputum cx NGTD.  - ID following, C/W Nafcillin 2g q4 (Sensitive to Oxacillin as per OSH records), f/u recs   - repeat Bcxs with NGTD; sputum cx 12/3 with oral leann    - Per CT surgery pt is too high risk for any surgery and would likely not survive procedure.   - Cardiology consulted to optimize management of his right heart dysfunction, f/u recs      #Positive AFB   - Bronchial washings from 11/23 with positive AFB   - Pending speciation  - Contact precautions     #H. Parainfluenzae infection  - Patients sputum culture with pansensitive H Parainfluenzae  - c/w ceftriaxone 1 gm qdaily     #Scedosporidium  - Patient with serum fungitell growing scedosprodium  - Repeat EKG to evaluate for QTc   - If QTc < 500, will start voriconazole     Renal  #Acute renal failure most likely 2/2 ATN from hypoperfusion, Minimal urine output, on CVVHD, oliguric with 0-5cc/hr. Vancomycin level 37 on arrival so may be component of drug induced nephropathy. Bun/Cr continues to improve while on CVVHD. Pt is clinically fluid overloaded but now improved and hemodynamically stable. No renal infarcts as per CT abdomen/pelvis.  - F/U nephrology recs.  -plan for intermittent HD per nephrology recs; if not tolerating, may restart CVVHD per nephro   - Continue to correct fluid overload with HD  - Monitor I/Os  - currently anuric with no signs of renal recovery  - continue to monitor serum electrolytes  - Last HD 12/5; unable to coordinate with CT scans as above      GI  OG on arrival with 600cc of bilious fluid, abdomen still distended but had several BMs, some loose. CT abdomen revealed no evidence of bowel ischemia or SBO. Sump was DCed and replaced with NG tube. CT abdomen/pelvis revealed "irregular masslike mural thickening of the posterior left lateral wall of the rectum." Surgery consulted, unlikely perirectal abscess. Recommend GI evaluation for possible scope.  - F/u GI consult, f/u recs, when stable will go for flex sig  - Sump removed, NG tube in place; currently holding feeds as having high residuals with abdominal distension  - wean off fentanyl/opiates as above    #Pancolitis  - CT scan revealing inflammation of the entire colonic tract consistent with pancolitis  - per ID recs, will add on flagyl to current antibiotic regimen as ceftriaxone will have coverage for colitis  - continue to monitor     Heme  #Anemia  - patient with hemolysis earlier in hospital course likely 2/2 to sepsis and/or DIC  - likely element of anemia of chronic disease as well given current infectious state and renal dysfunction  - continue to monitor; maintain Hgb >7     #Pancytopenia  - 12/6 AM labs revealing pancytopenia confirmed on repeat testing  - Ordered 1 unit PRBC for management of Hgb <7; f/u post transfusion CBC  - Given worsening clinical status, high concern for sepsis; management as per above    Vascular  Vascular surgery consulted in setting of gangrenous distal toes and fingers b/l. Likely 2/2 to pressors.  - per vascular, may need amputation in the future once ischemic tissue fully demarcates, and once patient is medically optimized      Endocrinology  #Adrenal Insufficiency  - taper steroids; c/w hydrocortisone 25 BID   - previously on Hydrocortisone 50mg q8- stress dose steroids    #Hypertriglyceridemia   - Patient with elevated triglycerides to 558 and cholesterol panel with low HDL (12) and LDL (28) with elevated Cholesterol/HDL ratio  - Will repeat triglycerides off propofol; repeat at 418  - Pending improvement in clinical status, will start statin vs fibrate therapy     Lines: right IJ TLC and Ernesto (11/12), Axillary A-line (11/12), L subclavian (11/27-), right peripheral (11/12)    F: None   E: replete K <4, Mg <2  N: Trickle feeds, currently held    GI Ppx: Protonix   DVT Ppx: Heparin   Code status: FULL   Dispo: MICU

## 2019-12-08 NOTE — GOALS OF CARE CONVERSATION - ADVANCED CARE PLANNING - CONVERSATION DETAILS
This AM patient noted to be minimally interactive with left gaze preference for which CT head done, showing bifrontal hypodense masses suspicious for embolic encepahalitis with mass effect. In light of CT findings, goals of care re-addressed.    Addressed poor prognosis given multiorgan failure and now CT findings. Patient's mother, Candice Mike (healthcare surrogate) stated that invasive procedures would not be in line with goals of care. At the moment will not withdraw care, but will make patient DNR and not pursue further procedures to investigate/treat new CNS findings.    Will continue to address goals of care, will keep patient ventilated mechanically for now.    ROBERT filled out and in chart. This AM patient noted to be minimally interactive with left gaze preference for which CT head done, showing bifrontal hypodense masses suspicious for embolic encepahalitis with mass effect. In light of CT findings, goals of care re-addressed.    Addressed poor prognosis given multiorgan failure and now CT findings. Patient's mother, Candice Mike (healthcare surrogate) stated that invasive procedures would not be in line with goals of care. She agrees it would be in patient's best interest to instate DNR and not pursue further procedures to investigate/treat new CNS findings. Will focus on comfort. Currently on CVVHD, will keep going for now unless unable to continue due to clotting (cannot use hep gtt in the setting of CNS findings).     Will continue to address goals of care, will keep patient ventilated mechanically for now.    ROBERT filled out and in chart.

## 2019-12-08 NOTE — PROGRESS NOTE ADULT - ASSESSMENT
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  Hospital course complicated by acute hypoxic respiratory failure, acute kidney injury, congestive hepatopathy, multi-system organ failure 2/2 hypoperfusion.

## 2019-12-08 NOTE — PROGRESS NOTE ADULT - SUBJECTIVE AND OBJECTIVE BOX
INTERVAL HPI/OVERNIGHT EVENTS:    Patient was seen and examined. Events noted.  Patient more lethargic today    ROS:    unable to obtain           ANTIBIOTICS/RELEVANT:    MEDICATIONS  (STANDING):  albuterol/ipratropium for Nebulization 3 milliLiter(s) Nebulizer every 4 hours  artificial  tears Solution 1 Drop(s) Both EYES every 6 hours  bisacodyl Suppository 10 milliGRAM(s) Rectal every 24 hours  cefTRIAXone   IVPB 2000 milliGRAM(s) IV Intermittent every 24 hours  chlorhexidine 0.12% Liquid 15 milliLiter(s) Oral Mucosa every 12 hours  chlorhexidine 2% Cloths 1 Application(s) Topical daily  CRRT Treatment    <Continuous>  dextrose 5%. 1000 milliLiter(s) (50 mL/Hr) IV Continuous <Continuous>  dextrose 50% Injectable 12.5 Gram(s) IV Push once  dextrose 50% Injectable 25 Gram(s) IV Push once  dextrose 50% Injectable 25 Gram(s) IV Push once  fentaNYL   Infusion 1 MICROgram(s)/kG/Hr (7.36 mL/Hr) IV Continuous <Continuous>  hydrocortisone sodium succinate Injectable 25 milliGRAM(s) IV Push every 12 hours  insulin lispro (HumaLOG) corrective regimen sliding scale   SubCutaneous every 6 hours  metoclopramide Injectable 5 milliGRAM(s) IV Push every 8 hours  metroNIDAZOLE    Tablet 500 milliGRAM(s) Oral every 8 hours  midodrine 10 milliGRAM(s) Oral every 8 hours  multivitamin/minerals/iron Oral Solution (CENTRUM) 15 milliLiter(s) Enteral Tube daily  nafcillin  IVPB 2 Gram(s) IV Intermittent every 4 hours  norepinephrine Infusion 0.05 MICROgram(s)/kG/Min (6.9 mL/Hr) IV Continuous <Continuous>  pantoprazole   Suspension 40 milliGRAM(s) Oral daily  PureFlow Dialysate RFP-401 (K 4 / Ca 3) 5000 milliLiter(s) (2000 mL/Hr) CRRT <Continuous>  QUEtiapine 100 milliGRAM(s) Oral every 12 hours  sodium chloride 0.9% lock flush 3 milliLiter(s) IV Push every 8 hours  vitamin E 400 International Unit(s) Oral daily  voriconazole 300 milliGRAM(s) Oral every 12 hours  zinc sulfate 220 milliGRAM(s) Oral daily    MEDICATIONS  (PRN):  acetaminophen    Suspension .. 650 milliGRAM(s) Oral every 6 hours PRN Temp greater or equal to 38C (100.4F)  chlorproMAZINE    Tablet 25 milliGRAM(s) Oral every 8 hours PRN hiccups  dextrose 40% Gel 15 Gram(s) Oral once PRN Blood Glucose LESS THAN 70 milliGRAM(s)/deciliter  glucagon  Injectable 1 milliGRAM(s) IntraMuscular once PRN Glucose LESS THAN 70 milligrams/deciliter  LORazepam     Tablet 2 milliGRAM(s) Oral every 12 hours PRN Agitation        Vital Signs Last 24 Hrs  T(C): 37.7 (08 Dec 2019 09:47), Max: 37.9 (07 Dec 2019 14:00)  T(F): 99.8 (08 Dec 2019 09:47), Max: 100.2 (07 Dec 2019 14:00)  HR: 130 (08 Dec 2019 11:00) (114 - 132)  BP: --  BP(mean): --  RR: 35 (08 Dec 2019 11:00) (21 - 39)  SpO2: 99% (08 Dec 2019 11:00) (99% - 100%)    PHYSICAL EXAM:  Constitutional:  ill appearing  Eyes: no icterus   Ear/Nose/Throat: no oral lesion, no sinus tenderness on percussion	  Neck:  + trach   Respiratory: CTA hellen  Cardiovascular: S1S2 RRR, no murmurs  Gastrointestinal: distended abdomen   Extremities:  necrotic tips, bilateral chest tubes  Vascular: DP Pulse:	right normal; left normal      LABS:                        7.5    5.50  )-----------( 78       ( 08 Dec 2019 04:31 )             22.2     12-08    131<L>  |  98  |  25<H>  ----------------------------<  95  3.9   |  19<L>  |  2.13<H>    Ca    8.0<L>      08 Dec 2019 04:31  Phos  3.2     12-08  Mg     1.9     12-08    TPro  5.6<L>  /  Alb  1.9<L>  /  TBili  1.3<H>  /  DBili  x   /  AST  27  /  ALT  16  /  AlkPhos  103  12-08    PT/INR - ( 07 Dec 2019 22:46 )   PT: 18.8 sec;   INR: 1.64          PTT - ( 08 Dec 2019 09:14 )  PTT:54.4 sec  Urinalysis Basic - ( 07 Dec 2019 19:38 )    Color: Yellow / Appearance: Cloudy / S.015 / pH: x  Gluc: x / Ketone: NEGATIVE  / Bili: Moderate / Urobili: 0.2 E.U./dL   Blood: x / Protein: 100 mg/dL / Nitrite: POSITIVE   Leuk Esterase: NEGATIVE / RBC: < 5 /HPF / WBC 5-10 /HPF   Sq Epi: x / Non Sq Epi: 0-5 /HPF / Bacteria: Many /HPF        MICROBIOLOGY:    Culture - Blood (19 @ 14:25)    Specimen Source: .Blood Blood    Culture Results:   No growth at 3 days.    Culture - Sputum . (19 @ 14:10)    -  Trimethoprim/Sulfamethoxazole: S    -  Levofloxacin: S    Gram Stain:   Few epithelial cells  Few-moderate White blood cells  Moderate Gram Negative Rods  Few Yeast    -  Ampicillin: S    -  Ampicillin/Sulbactam: S    -  Azithromycin: S    -  Ceftriaxone: S    -  Chloramphenicol: S    -  Ciprofloxacin: S    -  Clarithromycin: S    Specimen Source: .Sputum Sputum    Culture Results:   Moderate Haemophilus parainfluenzae  Accompanied by normal respiratory leann    Organism Identification: Haemophilus parainfluenzae    Organism: Haemophilus parainfluenzae    Method Type: KB        RADIOLOGY & ADDITIONAL STUDIES:    < from: CT Head No Cont (19 @ 10:10) >  Since 2019, there has been interval development of bifrontal   hypodense regions suspicious for septic embolic encephalitis. MRI with   contrast is recommended to evaluate for intracranial abscess.    Above findings were discussed with medical ICU resident just prior to   dictation.    < from: CT Abdomen and Pelvis w/ Oral Cont and w/ IV Cont (19 @ 22:32) >  Large right-sided pneumothorax which may be partially loculated. 2   pleural catheters present.  2. Moderate left-sided pneumothorax, at least partially loculated. One   pleural catheter present. There  is a small component of pleural fluid in the dependent left hemithorax.  3. Multifocal cystic pulmonary lesions and cavitary nodular opacities,   many of which are peripheral,  presumably septic emboli given history of endocarditis. Alternatively,   these findings could represent  pulmonary infection with atypical organism. Cavitating consolidation in   the dependent lower lobes  bilaterally, suspicious for cavitary pneumonia with possible lung abscess   formation.  4. 1.3 cm filling defect in the proximal right ventricle, adjacent to the   interventricular septum. It is  unclear if this is related to papillary muscle or if this represents   intraventricular thrombus. Consider  echo.    < end of copied text >

## 2019-12-08 NOTE — PROGRESS NOTE ADULT - ATTENDING COMMENTS
I independently performed the key portions of the evaluation and management service provided. I agree with the above history, physical, and plan which I have reviewed and edited where appropriate. I find new CVA likely due to septic emboli. CAMERON. Continue CVVH. See full note. (Patient seen earlier in day.)

## 2019-12-09 NOTE — PROGRESS NOTE ADULT - MINUTES
25
35
40
25
35
35
40
40
30
35
35
40
45
50
35
60
60
40
50
35
50
60
35
35

## 2019-12-09 NOTE — PROGRESS NOTE ADULT - SUBJECTIVE AND OBJECTIVE BOX
OVERNIGHT EVENTS: CT of the head revealed multiple hypodensities consistent with septic emboli to the brain. Per [facility] conversation with family, patient DNR at this time with no further escalation of care.     SUBJECTIVE / INTERVAL HPI: Patient seen and examined at bedside. Lethargic not responding to verbal or physical stimuli.     PHYSICAL EXAM:    General: deconditioned, ill appearing, unresponsive   HEENT: NC/AT; PERRL, mildly icteric sclera; MMM  Neck: supple, trach in place  Cardiovascular: +S1/S2; tachycardic but regular rhythm, systolic murmur at LSB   Respiratory: intubated via trach; diffuse rhonchi/crackles with decreased breath sounds b/l; prominent inspiratory wheezing at RL lung border in the anterior field; chest tubes in place  Gastrointestinal: NGT; NT, + distension; +BS   Extremities: WWP; trace edema of LEs/UEs, LEs>UEs; no clubbing or cyanosis  Derm: ischemia of all 10 distal phalanges of lower extremities; ischemia of distal phalange of R middle finger; dependent edema of lower extreme to the level of the knee b/l; edema of b/l hands more prominent on the dorsal surface    Vascular: 2+ radial, DP/PT pulses B/L  Neurological: unresponsive to sternal rubs or noxious stimuli; left gaze deviation       VITAL SIGNS:  Vital Signs Last 24 Hrs  T(C): 37.4 (09 Dec 2019 09:52), Max: 37.9 (09 Dec 2019 05:00)  T(F): 99.3 (09 Dec 2019 09:52), Max: 100.3 (09 Dec 2019 05:00)  HR: 119 (09 Dec 2019 12:14) (104 - 128)  BP: 90/56 (09 Dec 2019 09:30) (90/56 - 90/56)  BP(mean): 62 (09 Dec 2019 09:30) (62 - 62)  RR: 32 (09 Dec 2019 11:30) (22 - 37)  SpO2: 100% (09 Dec 2019 12:14) (97% - 100%)      MEDICATIONS:  MEDICATIONS  (STANDING):  albuterol/ipratropium for Nebulization 3 milliLiter(s) Nebulizer every 4 hours  artificial  tears Solution 1 Drop(s) Both EYES every 6 hours  bisacodyl Suppository 10 milliGRAM(s) Rectal every 24 hours  cefTRIAXone   IVPB 2000 milliGRAM(s) IV Intermittent every 24 hours  chlorhexidine 0.12% Liquid 15 milliLiter(s) Oral Mucosa every 12 hours  chlorhexidine 2% Cloths 1 Application(s) Topical daily  CRRT Treatment    <Continuous>  dextrose 5%. 1000 milliLiter(s) (50 mL/Hr) IV Continuous <Continuous>  dextrose 50% Injectable 12.5 Gram(s) IV Push once  dextrose 50% Injectable 25 Gram(s) IV Push once  dextrose 50% Injectable 25 Gram(s) IV Push once  fentaNYL   Infusion 1 MICROgram(s)/kG/Hr (7.36 mL/Hr) IV Continuous <Continuous>  hydrocortisone sodium succinate Injectable 25 milliGRAM(s) IV Push every 12 hours  insulin lispro (HumaLOG) corrective regimen sliding scale   SubCutaneous every 6 hours  metoclopramide Injectable 5 milliGRAM(s) IV Push every 8 hours  metroNIDAZOLE    Tablet 500 milliGRAM(s) Oral every 8 hours  midodrine 10 milliGRAM(s) Oral every 8 hours  multivitamin/minerals/iron Oral Solution (CENTRUM) 15 milliLiter(s) Enteral Tube daily  nafcillin  IVPB 2 Gram(s) IV Intermittent every 4 hours  pantoprazole   Suspension 40 milliGRAM(s) Oral daily  PureFlow Dialysate RFP-401 (K 4 / Ca 3) 5000 milliLiter(s) (2000 mL/Hr) CRRT <Continuous>  QUEtiapine 100 milliGRAM(s) Oral every 12 hours  sodium chloride 0.9% lock flush 3 milliLiter(s) IV Push every 8 hours  vitamin E 400 International Unit(s) Oral daily  voriconazole 300 milliGRAM(s) Oral every 12 hours  zinc sulfate 220 milliGRAM(s) Oral daily    MEDICATIONS  (PRN):  acetaminophen    Suspension .. 650 milliGRAM(s) Oral every 6 hours PRN Temp greater or equal to 38C (100.4F)  chlorproMAZINE    Tablet 25 milliGRAM(s) Oral every 8 hours PRN hiccups  dextrose 40% Gel 15 Gram(s) Oral once PRN Blood Glucose LESS THAN 70 milliGRAM(s)/deciliter  glucagon  Injectable 1 milliGRAM(s) IntraMuscular once PRN Glucose LESS THAN 70 milligrams/deciliter  LORazepam     Tablet 2 milliGRAM(s) Oral every 12 hours PRN Agitation      ALLERGIES:  Allergies    No Known Allergies    Intolerances        LABS:                        7.5    5.50  )-----------( 78       ( 08 Dec 2019 04:31 )             22.2     12-    131<L>  |  98  |  18  ----------------------------<  122<H>  3.9   |  20<L>  |  1.58<H>    Ca    8.0<L>      08 Dec 2019 18:37  Phos  2.3       Mg     2.1         TPro  5.6<L>  /  Alb  1.9<L>  /  TBili  1.3<H>  /  DBili  x   /  AST  27  /  ALT  16  /  AlkPhos  103  12    PT/INR - ( 07 Dec 2019 22:46 )   PT: 18.8 sec;   INR: 1.64          PTT - ( 08 Dec 2019 12:54 )  PTT:41.5 sec  Urinalysis Basic - ( 07 Dec 2019 19:38 )    Color: Yellow / Appearance: Cloudy / S.015 / pH: x  Gluc: x / Ketone: NEGATIVE  / Bili: Moderate / Urobili: 0.2 E.U./dL   Blood: x / Protein: 100 mg/dL / Nitrite: POSITIVE   Leuk Esterase: NEGATIVE / RBC: < 5 /HPF / WBC 5-10 /HPF   Sq Epi: x / Non Sq Epi: 0-5 /HPF / Bacteria: Many /HPF      CAPILLARY BLOOD GLUCOSE      POCT Blood Glucose.: 128 mg/dL (09 Dec 2019 06:50)      RADIOLOGY & ADDITIONAL TESTS: Reviewed.

## 2019-12-09 NOTE — PROGRESS NOTE ADULT - ATTENDING COMMENTS
Goals of care discussion ongoing. Patient is not to have escalation of care now with cerebral edema from likely septic emboli

## 2019-12-09 NOTE — PROGRESS NOTE ADULT - ASSESSMENT
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 Persistent fevers likely in the setting of necrotic fingers and toes vs inability to obtain source control.  Patient now with sputum broth from 11/23 bronch positive for AFB although not on stain so more likely represents MAC than TB.  Now with yeast ID from 11/20 broch of Scedosporium as well as most recent sputum culture with Haemophilus parainfluenzae.  Colitis on CT.  Per C patient now DNR and family to determine wishes for comfort measures    Recommend:  - Can continue with abx if family wishes align with keeping them as maintaining patient afebrile may make him more comfortable  - Continue Nafcillin 2 grams IV q4hrs  - Continue Ceftriaxone 1gm q24 for Haemophilus parainfluenza coverage  - Continue Flagyl 500 q8  - Continue Voriconazole 300 mg PO q12  - Pending ID of AFB culture  - ID team 1 will sign off, reconsult if questions    Discussed with Dr. Louis

## 2019-12-09 NOTE — PROGRESS NOTE ADULT - SUBJECTIVE AND OBJECTIVE BOX
Infectious Diseases Progress Note:    SUBJECTIVE: Patient seen and examined at bedside. Not arousable this AM, does not respond.  Unable to obtain ROS    ENDOCARDITIS/SEPSIS    Endocarditis  CAMERON (acute kidney injury)  Acute endocarditis due to other organism      Allergies    No Known Allergies    Intolerances        ANTIBIOTICS/RELEVANT:  antimicrobials  cefTRIAXone   IVPB 2000 milliGRAM(s) IV Intermittent every 24 hours  metroNIDAZOLE    Tablet 500 milliGRAM(s) Oral every 8 hours  nafcillin  IVPB 2 Gram(s) IV Intermittent every 4 hours  voriconazole 300 milliGRAM(s) Oral every 12 hours    immunologic:      OTHER:  acetaminophen    Suspension .. 650 milliGRAM(s) Oral every 6 hours PRN  albuterol/ipratropium for Nebulization 3 milliLiter(s) Nebulizer every 4 hours  artificial  tears Solution 1 Drop(s) Both EYES every 6 hours  bisacodyl Suppository 10 milliGRAM(s) Rectal every 24 hours  chlorhexidine 0.12% Liquid 15 milliLiter(s) Oral Mucosa every 12 hours  chlorhexidine 2% Cloths 1 Application(s) Topical daily  chlorproMAZINE    Tablet 25 milliGRAM(s) Oral every 8 hours PRN  CRRT Treatment    <Continuous>  dextrose 40% Gel 15 Gram(s) Oral once PRN  dextrose 5%. 1000 milliLiter(s) IV Continuous <Continuous>  dextrose 50% Injectable 12.5 Gram(s) IV Push once  dextrose 50% Injectable 25 Gram(s) IV Push once  dextrose 50% Injectable 25 Gram(s) IV Push once  fentaNYL   Infusion 1 MICROgram(s)/kG/Hr IV Continuous <Continuous>  glucagon  Injectable 1 milliGRAM(s) IntraMuscular once PRN  hydrocortisone sodium succinate Injectable 25 milliGRAM(s) IV Push every 12 hours  insulin lispro (HumaLOG) corrective regimen sliding scale   SubCutaneous every 6 hours  LORazepam     Tablet 2 milliGRAM(s) Oral every 12 hours PRN  metoclopramide Injectable 5 milliGRAM(s) IV Push every 8 hours  midodrine 10 milliGRAM(s) Oral every 8 hours  multivitamin/minerals/iron Oral Solution (CENTRUM) 15 milliLiter(s) Enteral Tube daily  pantoprazole   Suspension 40 milliGRAM(s) Oral daily  PureFlow Dialysate RFP-401 (K 4 / Ca 3) 5000 milliLiter(s) CRRT <Continuous>  QUEtiapine 100 milliGRAM(s) Oral every 12 hours  sodium chloride 0.9% lock flush 3 milliLiter(s) IV Push every 8 hours  vitamin E 400 International Unit(s) Oral daily  zinc sulfate 220 milliGRAM(s) Oral daily      Objective:  Vital Signs Last 24 Hrs  T(C): 37.4 (09 Dec 2019 09:52), Max: 37.9 (09 Dec 2019 05:00)  T(F): 99.3 (09 Dec 2019 09:52), Max: 100.3 (09 Dec 2019 05:00)  HR: 120 (09 Dec 2019 11:30) (104 - 128)  BP: 90/56 (09 Dec 2019 09:30) (90/56 - 90/56)  BP(mean): 62 (09 Dec 2019 09:30) (62 - 62)  RR: 32 (09 Dec 2019 11:30) (22 - 37)  SpO2: 100% (09 Dec 2019 11:30) (97% - 100%)    PHYSICAL EXAM:  Constitutional: non responsive off sedation  Eyes: L gaze deviation  Ear/Nose/Throat: + trach	  Neck: supple  Respiratory: Diffuse rhonchi throughout, + 3 chest tubes  Cardiovascular: S1S2, RRR, + systolic murmur  Gastrointestinal: soft, NTND, (+) BS, no HSM  Extremities: necrosis of fingers and toes b/l, HD cath R chest C/D/I    LABS:                        7.5    5.50  )-----------( 78       ( 08 Dec 2019 04:31 )             22.2     12-08    131<L>  |  98  |  18  ----------------------------<  122<H>  3.9   |  20<L>  |  1.58<H>    Ca    8.0<L>      08 Dec 2019 18:37  Phos  2.3     12-  Mg     2.1     12-08    TPro  5.6<L>  /  Alb  1.9<L>  /  TBili  1.3<H>  /  DBili  x   /  AST  27  /  ALT  16  /  AlkPhos  103  12-08    PT/INR - ( 07 Dec 2019 22:46 )   PT: 18.8 sec;   INR: 1.64          PTT - ( 08 Dec 2019 12:54 )  PTT:41.5 sec  Urinalysis Basic - ( 07 Dec 2019 19:38 )    Color: Yellow / Appearance: Cloudy / S.015 / pH: x  Gluc: x / Ketone: NEGATIVE  / Bili: Moderate / Urobili: 0.2 E.U./dL   Blood: x / Protein: 100 mg/dL / Nitrite: POSITIVE   Leuk Esterase: NEGATIVE / RBC: < 5 /HPF / WBC 5-10 /HPF   Sq Epi: x / Non Sq Epi: 0-5 /HPF / Bacteria: Many /HPF        MICROBIOLOGY:    GI PCR Panel, Stool (collected 19 @ 12:22)  Source: .Stool Feces  Final Report (19 @ 19:52):    GI PCR Results: NOT detected    *******Please Note:*******    GI panel PCR evaluates for:    Campylobacter, Plesiomonas shigelloides, Salmonella,    Vibrio, Yersinia enterocolitica, Enteroaggregative    Escherichia coli (EAEC), Enteropathogenic E.coli (EPEC),    Enterotoxigenic E. coli (ETEC) lt/st, Shiga-like    toxin-producing E. coli (STEC) stx1/stx2,    Shigella/ Enteroinvasive E. coli (EIEC), Cryptosporidium,    Cyclospora cayetanensis, Entamoeba histolytica,    Giardia lamblia, Adenovirus F 40/41, Astrovirus,    Norovirus GI/GII, Rotavirus A, Sapovirus      Culture Results:   GI PCR Results: NOT detected  *******Please Note:*******  GI panel PCR evaluates for:  Campylobacter, Plesiomonas shigelloides, Salmonella,  Vibrio, Yersinia enterocolitica, Enteroaggregative  Escherichia coli (EAEC), Enteropathogenic E.coli (EPEC),  Enterotoxigenic E. coli (ETEC) lt/st, Shiga-like  toxin-producing E. coli (STEC) stx1/stx2,  Shigella/ Enteroinvasive E. coli (EIEC), Cryptosporidium,  Cyclospora cayetanensis, Entamoeba histolytica,  Giardia lamblia, Adenovirus F 40/41, Astrovirus,  Norovirus GI/GII, Rotavirus A, Sapovirus ( @ 12:22)          RADIOLOGY & ADDITIONAL STUDIES:

## 2019-12-09 NOTE — PROGRESS NOTE ADULT - I WAS PHYSICALLY PRESENT FOR THE KEY PORTIONS OF THE EVALUATION AND MANAGEMENT (E/M) SERVICE PROVIDED.  I AGREE WITH THE ABOVE HISTORY, PHYSICAL, AND PLAN WHICH I HAVE REVIEWED AND EDITED WHERE APPROPRIATE

## 2019-12-09 NOTE — PROGRESS NOTE ADULT - ASSESSMENT
40 y/o M w/ PMH of IVDU and active smoker who came from [facility] on 11/8/19 in 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 After being transferred to  CTICU for surgical evaluation, pt was deemed to not be a surgical candidate and transferred to MICU for medical mgmt. ID, Nephrology, heme/onc, surgery, vascular following. Now with new imaging findings concerning for intracranial septic embolic with mass effect. Given above findings, patient now DNR per GOC conversation with family with no further escalation of care.     Neuro    #Intracranial septic emboli  12/8 pt not responsive and noted to have left gaze deviation. CT head showing hypodense billateral frontal mass with mass effect suspicious for septic embolic encephalopathy. To further characterize the region, MRI head will likely be necessary. However GOC were discussed with the family and given pt's poor prognosis decision was made to make pt DNR. To continue current treatment for supportive care however no advancing in care.   - c/w Seroquel to 100 mg q12h  - c/w fentanyl   - c/w ativan to 2 mg q6h    Cardiovascular   #Hypotension  Most likely 2/2 septic shock from infective endocarditis and RV failure 2/2 tricuspid regurgitation (ELIAN shows EF of 40-50%). Echo with EF 45%. ELIAN with EF 40-45%, 3.8 x1cm vegetation on TR valve, with severe TR, trivial pericardial effusion. Echo with bubble revealed no PFO.   - normotensive   - Maintain MAP>65.   - C/W medical mgmt of infective endocarditis as below.  - c/w Midodrine 10mg q8h  - No levophed per GOC conversation with family     Respiratory  #Acute hypoxic respiratory failure   Most likely 2/2 alveolar hemorrhage in the setting of septic embolic causing numerous cavitary lesions on CT chest. CXR with scattered infiltrates and pleural effusions. Sputum culture negative. CT revealed "moderate bilateral pleural effusions and dense bibasilar consolidation with underlying septic emboli/pulmonary abscesses."   - CT chest with b/l loculated pneumothoraces with increase in size of cavitary lesions with worsening lobar PNA   - 2 right chest tubes and 1 left chest tube-->repeat cxr with decrease in size of b/l pneumothoraces   - C/w Duonebs q4  - will flush chest tubes w/ TPA/DNAse today 12/7; chest tubes also reoriented and sutured with suctioning of serosanguinous drainage and repeat CXR showing interval improvement   - trach placed 11/26  - Continue to treat IE as below     #Bilateral pulmonary effusion  Most likely empyema in setting of septic shock 2/2 infective endocarditis. Bilateral CT in place, confirmed by CXR, continues to drain serosanguinous fluid. Fluid analysis of both CT pleural fluid confirms complex exudate with high cellularity, low pH and low glucose. pleural fluid cultures with staph aureus. Chest tubes in place as above (2 R chest tubes, 1 L chest tube)   - Monitor output of bilateral chest tubes  - CT chest and cxr as above. Chest tubes set to suction    #Hiccups  -c/w thorazine 25 mg TID PRN     ID   #Septic shock 2/2 MSSA endocarditis  IE confirmed with echographic evidence of tricuspid vegetation (3.8cm x 1.1cm by ELIAN) and prior blood cultures positive for MSSA. Not a surgical candidate for a tricuspid valve replacement at this time as per CT surgery. Initial blood cultures positive for staph epidermidis, likely a contaminant. Initial sputum culture positive for staph aureus  Repeat blood cultures with NGTD. Sputum cx NGTD.  - ID following, C/W Nafcillin 2g q4 (Sensitive to Oxacillin as per OSH records)  - repeat Bcxs with NGTD; sputum cx 12/3 with oral leann    - Per CT surgery pt is too high risk for any surgery and would likely not survive procedure.   - will c/w abx per C conversation with family but no further escalation of care     #Positive AFB   - Bronchial washings from 11/23 with positive AFB   - Pending speciation  - Contact precautions     #H. Parainfluenzae infection  - Patients sputum culture with pansensitive H Parainfluenzae  - c/w ceftriaxone 1 gm qdaily     #Scedosporidium  - Patient with serum fungitell growing scedosprodium  - Repeat EKG to evaluate for QTc   - If QTc < 500, will start voriconazole     Renal  #Acute renal failure most likely 2/2 ATN from hypoperfusion, Minimal urine output, on CVVHD, oliguric with 0-5cc/hr. Vancomycin level 37 on arrival so may be component of drug induced nephropathy. Bun/Cr continues to improve while on CVVHD. Pt is clinically fluid overloaded but now improved and hemodynamically stable. No renal infarcts as per CT abdomen/pelvis.  - F/U nephrology recs.  - currently anuric with no signs of renal recovery  - Started CVVHD, however clotted over the weekend. Will hold CVVHD given GOC       GI  #Pancolitis  - CT scan revealing inflammation of the entire colonic tract consistent with pancolitis  - per ID recs, will add on flagyl to current antibiotic regimen as ceftriaxone will have coverage for colitis  - continue to monitor     Heme  #Anemia  - patient with hemolysis earlier in hospital course likely 2/2 to sepsis and/or DIC  - likely element of anemia of chronic disease as well given current infectious state and renal dysfunction  - continue to monitor; maintain Hgb >7     #Pancytopenia  - 12/6 AM labs revealing pancytopenia confirmed on repeat testing  - Ordered 1 unit PRBC for management of Hgb <7; f/u post transfusion CBC  - Given worsening clinical status, high concern for sepsis; management as per above    Vascular  Vascular surgery consulted in setting of gangrenous distal toes and fingers b/l. Likely 2/2 to pressors.    Endocrinology  #Adrenal Insufficiency  - c/w hydrocortisone 25 BID   - previously on Hydrocortisone 50mg q8- stress dose steroids    #Hypertriglyceridemia   - Patient with elevated triglycerides to 558 and cholesterol panel with low HDL (12) and LDL (28) with elevated Cholesterol/HDL ratio  - Will repeat triglycerides off propofol; repeat at 418    Lines: right IJ TLC and Ernesto (11/12), Axillary A-line (11/12), L subclavian (11/27-), right peripheral (11/12)    F: None   E: replete K <4, Mg <2  N: Trickle feeds, currently held    GI Ppx: Protonix   DVT Ppx: Heparin   Code status: FULL   Dispo: MICU

## 2019-12-10 NOTE — DISCHARGE NOTE FOR THE EXPIRED PATIENT - HOSPITAL COURSE
CAUSE OF DEATH: CARDIOPULMONARY ARREST 2/2 SEPTIC SHOCK 2/2 ENDOCARDITIS COMPLICATED BY INTRACRANIAL SEPTIC EMBOLI, ACUTE RESPIRATORY FAILURE, RENAL FAILURE 2/2 ATN     40 y/o M w/ PMH of IVDU and active smoker who came from [facility] on 11/8/19 in 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 After being transferred to  CTICU for surgical evaluation, pt was deemed to not be a surgical candidate and transferred to MICU for medical management. ID, Nephrology, heme/onc, surgery, vascular, cardiology, GI following. Initially bacteremic and in septic shock 2/2 to MSSA. He was started on Nafcillin. Echo with large stable vegetation (3.8cm x 1cm), severe TR, markedly dilated IVC with increased R atrial pressure, EF 60% (increased from previous EF of 40-45% earlier in admission). Patient has remained in acute hypoxic respiratory failure 2/2 septic pulmonary emboli, b/l pleural effusions/consolidations, empyema which was further complicated by b/l loculated pneumothoraces. 2 right chest tubes placed (lateral, anterior) and one left chest tube placed (lateral). Pleural fluid culture with exudate growing MSSA. Patient extubated x1 but due to increased work of breathing was re-intubated. Trach performed on 11/26. Has been receiving intermittent CVVHD in setting of acute renal failure most likely 2/2 ATN from hypoperfusion (makes no urine), electrolyte abnormalities. In terms of antimicrobial coverage patient was on Nafcillin for disseminated MSSA, Ceftriaxone and metronidazole for pancolitis and hemophilus parainfluenza coverage, on Voriconazole for sputum showing Scedosporium. On 12/8 pt not responsive and noted to have left gaze deviation. CT head showing hypodense bilateral frontal mass with mass effect suspicious for septic embolic encephalopathy. To further characterize the region, MRI head will likely be necessary. However GOC were discussed with the family and given pt's poor prognosis decision was made to make pt DNR. To continue current treatment for supportive care however no advancing in care. On 12/10, patient was terminally extubated w/ family at bedside. Patient passed on 12/10 at 3:44pm based on cardiopulmonary criteria.

## 2019-12-10 NOTE — DISCHARGE NOTE FOR THE EXPIRED PATIENT - NS PATIENT DEATH CRITERIA
Patient is dead based on Cardiopulmonary criteria including absent breath sounds, pulselessness and/or asystole/CAUSE OF DEATH: CARDIOPULMONARY ARREST 2/2 SEPTIC SHOCK 2/2 ENDOCARDITIS COMPLICATED BY INTRACRANIAL SEPTIC EMBOLI, ACUTE RESPIRATORY FAILURE, RENAL FAILURE 2/2 ATN

## 2019-12-10 NOTE — CHART NOTE - NSCHARTNOTEFT_GEN_A_CORE
Admitting Diagnosis:   Patient is a 39y old  Male who presents with a chief complaint of Endocarditis h/o IVDU (10 Dec 2019 08:26)      PAST MEDICAL & SURGICAL HISTORY:  Endocarditis  IVDU (intravenous drug user)  Smoker  No significant past surgical history      Current Nutrition Order:  Jevity 1.5 Christian @ 20mL/hr x 24hrs via NGT (480mL TV, 720kcal, 31g pro, 365mL free H2O, 48% RDI)    PO Intake: Good (%) [   ]  Fair (50-75%) [   ] Poor (<25%) [   ]- N/A NPO    GI Issues: Unable to assess at this time 2/2 AMS, trach to vent. TF held d/t elevated residuals  Rectal tube in place    Pain: Unable to assess at this time 2/2 AMS, trach to vent. Sedated, comfort care     Skin Integrity:  Heber 9  IAD  R. toes necrosis  B/L ankle and heel DTIs  L. scapula DTI    Labs:   12-08    131<L>  |  98  |  18  ----------------------------<  122<H>  3.9   |  20<L>  |  1.58<H>    Ca    8.0<L>      08 Dec 2019 18:37  Phos  2.3     12-08  Mg     2.1     12-08      CAPILLARY BLOOD GLUCOSE          Medications:  MEDICATIONS  (STANDING):  albuterol/ipratropium for Nebulization 3 milliLiter(s) Nebulizer every 4 hours  artificial  tears Solution 1 Drop(s) Both EYES every 6 hours  bisacodyl Suppository 10 milliGRAM(s) Rectal every 24 hours  cefTRIAXone   IVPB 2000 milliGRAM(s) IV Intermittent every 24 hours  chlorhexidine 0.12% Liquid 15 milliLiter(s) Oral Mucosa every 12 hours  chlorhexidine 2% Cloths 1 Application(s) Topical daily  dextrose 5%. 1000 milliLiter(s) (50 mL/Hr) IV Continuous <Continuous>  dextrose 50% Injectable 12.5 Gram(s) IV Push once  dextrose 50% Injectable 25 Gram(s) IV Push once  dextrose 50% Injectable 25 Gram(s) IV Push once  fentaNYL   Infusion. 3.5 MICROgram(s)/kG/Hr (25.76 mL/Hr) IV Continuous <Continuous>  hydrocortisone sodium succinate Injectable 25 milliGRAM(s) IV Push every 12 hours  metoclopramide Injectable 5 milliGRAM(s) IV Push every 8 hours  metroNIDAZOLE    Tablet 500 milliGRAM(s) Oral every 8 hours  midodrine 10 milliGRAM(s) Oral every 8 hours  multivitamin/minerals/iron Oral Solution (CENTRUM) 15 milliLiter(s) Enteral Tube daily  nafcillin  IVPB 2 Gram(s) IV Intermittent every 4 hours  pantoprazole   Suspension 40 milliGRAM(s) Oral daily  QUEtiapine 100 milliGRAM(s) Oral every 12 hours  sodium chloride 0.9% lock flush 3 milliLiter(s) IV Push every 8 hours  vitamin E 400 International Unit(s) Oral daily  voriconazole 300 milliGRAM(s) Oral every 12 hours  zinc sulfate 220 milliGRAM(s) Oral daily    MEDICATIONS  (PRN):  acetaminophen    Suspension .. 650 milliGRAM(s) Oral every 6 hours PRN Temp greater or equal to 38C (100.4F)  chlorproMAZINE    Tablet 25 milliGRAM(s) Oral every 8 hours PRN hiccups  dextrose 40% Gel 15 Gram(s) Oral once PRN Blood Glucose LESS THAN 70 milliGRAM(s)/deciliter  glucagon  Injectable 1 milliGRAM(s) IntraMuscular once PRN Glucose LESS THAN 70 milligrams/deciliter  LORazepam     Tablet 2 milliGRAM(s) Oral every 12 hours PRN Agitation      Weight: 71kg (12/5)  66.6kg (12/3)  66.3kg (11/30, dry)  65.3kg (bedscale, 11/20)  77.4kg (11/13)  73.6kg (11/10)    Weight Change:   Suspect actual weight loss vs fluid shifts as pt was noted to be anasarcic, is on CVVHD and has notably severe muscle wasting in upper extremities.     Nutrition Focused Physical Exam: Completed [ X-11/15, 11/20 re-evaluated ]  Not Pertinent [   ]  Muscle Wasting- Temporal [X   ]  Clavicle/Pectoral [  X ]  Shoulder/Deltoid [   ]  Scapula [   ]  Interosseous [   ]  Quadriceps [   ]  Gastrocnemius [   ]  Severe PCM suspected.     Estimated energy needs: ABW (66.6kg) used to estimate needs as pt is <IBW.   Needs increased 2/2 vent, sepsis, HD suspected malnutrition. Fluids per team 2/2 HD  Calories: 25-30 kcal/kg = 3432-5696 kcal/day  Protein: 1.6-1.8 g/kg = 107-119g protein/day    Subjective:   38 yo/male with PMHx IVDA, presented to OSH w/cough, hemoptysis, and N/V. Found to be septic and hypotensive. CT chest +pna, pulmonary nodules w/septic emboli, and ELIAN +vegetation. Pt ultimately intubated and started on pressors. Transferred to [facility] where he was deemed not to be a surgical candidate. Course c/b renal failure and B/L pleural effusions, s/p R. CT placement. Started on CVVH. ELIAN w/bubble negative for PFO. CTA negative for mesenteric ischemia and rectal exam negative for perirectal abscess, per surgery note. Underwent CT A/P 11/18 which demonstrated colonic distension likely 2/2 pseudoobstruction and decompressed small bowel. Per surgery recs, pt to continue w/ rectal tube for colonic decompression and plan for flex sig once stable. CT chest showing hydropneumothorax on R. lung and pneumothorax of L. lung; chest tubes remain to suction. S/p bronch 11/19 w/ copious purulent sputum; repeat bronch 11/22 still w/thick secretions though improved. Remains too high risk for CT surgery intervention at this time. S/p repeat echo on 11/20 showing large stable vegetation, severe TR. Transitioned from CVVHD to IHD. S/p trach on 11/26. Bronch cytology +HSV, started on acyclovir. Pt continues to be decompressed with NGT to LIWS and rectal tube to suction- AXR +distal small bowel dilation, abdomen remains distended. Ileus likely 2/2 opioids. After placement of rectal tube, significant bowel decompression seen on AXR and pt was re-started on EN via dobhoff. Course c/b +AFB sample from bronch on 11/23- possibly MAC vs. TB. Late in day 12/4 pt became increasingly hypotensive and tachycardic. 250cc TF residuals pulled from NGT. Feeds held, abdomen noted to be distended again despite rectal tube. AXR showing gastric bubble.     Previous Nutrition Diagnosis:  Increased protein-calorie needs RT increased demand for protein-calorie intake AEB vent, hypermetabolic state, suspected malnutrition   Active [  x ]  Resolved [   ]    If resolved, new PES:      Goal: Nutrition will be kept aligned with GOC     Recommendations:  1. Keep nutrition aligned with GOC at all times. If GOC change and nutrition support desired, please reconsult dietitian     Education: No visitors at bedside today     Risk Level: High [  ] Moderate [   ] Low [ X  ] Admitting Diagnosis:   Patient is a 39y old  Male who presents with a chief complaint of Endocarditis h/o IVDU (10 Dec 2019 08:26)      PAST MEDICAL & SURGICAL HISTORY:  Endocarditis  IVDU (intravenous drug user)  Smoker  No significant past surgical history      Current Nutrition Order:  Jevity 1.5 Christian @ 20mL/hr x 24hrs via NGT (480mL TV, 720kcal, 31g pro, 365mL free H2O, 48% RDI)    PO Intake: Good (%) [   ]  Fair (50-75%) [   ] Poor (<25%) [   ]- N/A NPO    GI Issues: Unable to assess at this time 2/2 AMS, trach to vent. TF held d/t elevated residuals  Rectal tube in place    Pain: Unable to assess at this time 2/2 AMS, trach to vent. Sedated, comfort care     Skin Integrity:  Heber 9  IAD  R. toes necrosis  B/L ankle and heel DTIs  L. scapula DTI    Labs:   12-08    131<L>  |  98  |  18  ----------------------------<  122<H>  3.9   |  20<L>  |  1.58<H>    Ca    8.0<L>      08 Dec 2019 18:37  Phos  2.3     12-08  Mg     2.1     12-08      CAPILLARY BLOOD GLUCOSE          Medications:  MEDICATIONS  (STANDING):  albuterol/ipratropium for Nebulization 3 milliLiter(s) Nebulizer every 4 hours  artificial  tears Solution 1 Drop(s) Both EYES every 6 hours  bisacodyl Suppository 10 milliGRAM(s) Rectal every 24 hours  cefTRIAXone   IVPB 2000 milliGRAM(s) IV Intermittent every 24 hours  chlorhexidine 0.12% Liquid 15 milliLiter(s) Oral Mucosa every 12 hours  chlorhexidine 2% Cloths 1 Application(s) Topical daily  dextrose 5%. 1000 milliLiter(s) (50 mL/Hr) IV Continuous <Continuous>  dextrose 50% Injectable 12.5 Gram(s) IV Push once  dextrose 50% Injectable 25 Gram(s) IV Push once  dextrose 50% Injectable 25 Gram(s) IV Push once  fentaNYL   Infusion. 3.5 MICROgram(s)/kG/Hr (25.76 mL/Hr) IV Continuous <Continuous>  hydrocortisone sodium succinate Injectable 25 milliGRAM(s) IV Push every 12 hours  metoclopramide Injectable 5 milliGRAM(s) IV Push every 8 hours  metroNIDAZOLE    Tablet 500 milliGRAM(s) Oral every 8 hours  midodrine 10 milliGRAM(s) Oral every 8 hours  multivitamin/minerals/iron Oral Solution (CENTRUM) 15 milliLiter(s) Enteral Tube daily  nafcillin  IVPB 2 Gram(s) IV Intermittent every 4 hours  pantoprazole   Suspension 40 milliGRAM(s) Oral daily  QUEtiapine 100 milliGRAM(s) Oral every 12 hours  sodium chloride 0.9% lock flush 3 milliLiter(s) IV Push every 8 hours  vitamin E 400 International Unit(s) Oral daily  voriconazole 300 milliGRAM(s) Oral every 12 hours  zinc sulfate 220 milliGRAM(s) Oral daily    MEDICATIONS  (PRN):  acetaminophen    Suspension .. 650 milliGRAM(s) Oral every 6 hours PRN Temp greater or equal to 38C (100.4F)  chlorproMAZINE    Tablet 25 milliGRAM(s) Oral every 8 hours PRN hiccups  dextrose 40% Gel 15 Gram(s) Oral once PRN Blood Glucose LESS THAN 70 milliGRAM(s)/deciliter  glucagon  Injectable 1 milliGRAM(s) IntraMuscular once PRN Glucose LESS THAN 70 milligrams/deciliter  LORazepam     Tablet 2 milliGRAM(s) Oral every 12 hours PRN Agitation      Weight: 71kg (12/5)  66.6kg (12/3)  66.3kg (11/30, dry)  65.3kg (bedscale, 11/20)  77.4kg (11/13)  73.6kg (11/10)    Weight Change:   Suspect actual weight loss vs fluid shifts as pt was noted to be anasarcic, is on CVVHD and has notably severe muscle wasting in upper extremities.     Nutrition Focused Physical Exam: Completed [ X-11/15, 11/20 re-evaluated ]  Not Pertinent [   ]  Muscle Wasting- Temporal [X   ]  Clavicle/Pectoral [  X ]  Shoulder/Deltoid [   ]  Scapula [   ]  Interosseous [   ]  Quadriceps [   ]  Gastrocnemius [   ]  Severe PCM suspected.     Estimated energy needs: ABW (66.6kg) used to estimate needs as pt is <IBW.   Needs increased 2/2 vent, sepsis, HD suspected malnutrition. Fluids per team 2/2 HD  Calories: 25-30 kcal/kg = 6477-1062 kcal/day  Protein: 1.6-1.8 g/kg = 107-119g protein/day    Subjective:   38 yo/male with PMHx IVDA, presented to OSH w/cough, hemoptysis, and N/V. Found to be septic and hypotensive. CT chest +pna, pulmonary nodules w/septic emboli, and ELIAN +vegetation. Pt ultimately intubated and started on pressors. Transferred to [facility] CT[facility] where he was deemed not to be a surgical candidate. Course c/b renal failure and B/L pleural effusions, s/p R. CT placement. Started on CVVH. ELIAN w/bubble negative for PFO. CTA negative for mesenteric ischemia and rectal exam negative for perirectal abscess, per surgery note. Underwent CT A/P 11/18 which demonstrated colonic distension likely 2/2 pseudoobstruction and decompressed small bowel. Per surgery recs, pt to continue w/ rectal tube for colonic decompression and plan for flex sig once stable. CT chest showing hydropneumothorax on R. lung and pneumothorax of L. lung; chest tubes remain to suction. S/p bronch 11/19 w/ copious purulent sputum; repeat bronch 11/22 still w/thick secretions though improved. Remains too high risk for CT surgery intervention at this time. S/p repeat echo on 11/20 showing large stable vegetation, severe TR. Transitioned from CVVHD to IHD. S/p trach on 11/26. Bronch cytology +HSV, started on acyclovir. Pt continues to be decompressed with NGT to LIWS and rectal tube to suction- AXR +distal small bowel dilation, abdomen remains distended. Ileus likely 2/2 opioids. After placement of rectal tube, significant bowel decompression seen on AXR and pt was re-started on EN via dobhoff. Course c/b +AFB sample from bronch on 11/23- possibly MAC vs. TB. Late in day 12/4 pt became increasingly hypotensive and tachycardic. 250cc TF residuals pulled from NGT. Feeds held, abdomen noted to be distended again despite rectal tube. AXR showing gastric bubble. Dobhoff placed post-pyorically to attempt to increase tolerance.    On 12/8 pt found to be unresponsive w/L. gaze deviation. CTH + septic emboli, encephalopathy. Family has made pt comfort care/no escalation of care, w/possible palliative extubation later today. He remains trached to vent on VC/AC mode, sedated on fentanyl. Pressors stopped per family request, MAPs trending down. Feeds were running at 20mL/hr though now stopped d/t increased distention and TF residuals mixed w/bile. Rectal tube in place. No labs. Will keep nutrition aligned with GOC at all times.     Previous Nutrition Diagnosis:  Increased protein-calorie needs RT increased demand for protein-calorie intake AEB vent, hypermetabolic state, suspected malnutrition   Active [  x ]  Resolved [   ]    If resolved, new PES:      Goal: Nutrition will be kept aligned with GOC     Recommendations:  1. Keep nutrition aligned with GOC at all times. If GOC change and nutrition support desired, please reconsult dietitian   2. Pain control per team    Education: No visitors at bedside today     Risk Level: High [  ] Moderate [   ] Low [ X  ]

## 2019-12-11 LAB
CULTURE RESULTS: SIGNIFICANT CHANGE UP
SPECIMEN SOURCE: SIGNIFICANT CHANGE UP

## 2019-12-12 DIAGNOSIS — R04.89 HEMORRHAGE FROM OTHER SITES IN RESPIRATORY PASSAGES: ICD-10-CM

## 2019-12-12 DIAGNOSIS — J96.01 ACUTE RESPIRATORY FAILURE WITH HYPOXIA: ICD-10-CM

## 2019-12-12 DIAGNOSIS — G93.41 METABOLIC ENCEPHALOPATHY: ICD-10-CM

## 2019-12-12 DIAGNOSIS — E43 UNSPECIFIED SEVERE PROTEIN-CALORIE MALNUTRITION: ICD-10-CM

## 2019-12-12 DIAGNOSIS — I33.0 ACUTE AND SUBACUTE INFECTIVE ENDOCARDITIS: ICD-10-CM

## 2019-12-12 DIAGNOSIS — K72.90 HEPATIC FAILURE, UNSPECIFIED WITHOUT COMA: ICD-10-CM

## 2019-12-12 DIAGNOSIS — I07.1 RHEUMATIC TRICUSPID INSUFFICIENCY: ICD-10-CM

## 2019-12-12 DIAGNOSIS — N17.0 ACUTE KIDNEY FAILURE WITH TUBULAR NECROSIS: ICD-10-CM

## 2019-12-12 DIAGNOSIS — Z66 DO NOT RESUSCITATE: ICD-10-CM

## 2019-12-12 DIAGNOSIS — F11.10 OPIOID ABUSE, UNCOMPLICATED: ICD-10-CM

## 2019-12-12 DIAGNOSIS — I26.90 SEPTIC PULMONARY EMBOLISM WITHOUT ACUTE COR PULMONALE: ICD-10-CM

## 2019-12-12 DIAGNOSIS — F17.210 NICOTINE DEPENDENCE, CIGARETTES, UNCOMPLICATED: ICD-10-CM

## 2019-12-12 DIAGNOSIS — A41.9 SEPSIS, UNSPECIFIED ORGANISM: ICD-10-CM

## 2019-12-12 DIAGNOSIS — E87.5 HYPERKALEMIA: ICD-10-CM

## 2019-12-12 DIAGNOSIS — J86.9 PYOTHORAX WITHOUT FISTULA: ICD-10-CM

## 2019-12-12 DIAGNOSIS — B95.61 METHICILLIN SUSCEPTIBLE STAPHYLOCOCCUS AUREUS INFECTION AS THE CAUSE OF DISEASES CLASSIFIED ELSEWHERE: ICD-10-CM

## 2019-12-12 DIAGNOSIS — I99.8 OTHER DISORDER OF CIRCULATORY SYSTEM: ICD-10-CM

## 2019-12-12 DIAGNOSIS — I46.9 CARDIAC ARREST, CAUSE UNSPECIFIED: ICD-10-CM

## 2019-12-12 DIAGNOSIS — R65.21 SEVERE SEPSIS WITH SEPTIC SHOCK: ICD-10-CM

## 2019-12-12 DIAGNOSIS — D65 DISSEMINATED INTRAVASCULAR COAGULATION [DEFIBRINATION SYNDROME]: ICD-10-CM

## 2019-12-12 DIAGNOSIS — J90 PLEURAL EFFUSION, NOT ELSEWHERE CLASSIFIED: ICD-10-CM

## 2019-12-12 DIAGNOSIS — E87.2 ACIDOSIS: ICD-10-CM

## 2019-12-19 LAB
CULTURE RESULTS: SIGNIFICANT CHANGE UP
SPECIMEN SOURCE: SIGNIFICANT CHANGE UP

## 2020-01-01 LAB
CULTURE RESULTS: SIGNIFICANT CHANGE UP
SPECIMEN SOURCE: SIGNIFICANT CHANGE UP

## 2020-01-06 LAB
CULTURE RESULTS: SIGNIFICANT CHANGE UP
SPECIMEN SOURCE: SIGNIFICANT CHANGE UP

## 2020-01-07 LAB
CULTURE RESULTS: SIGNIFICANT CHANGE UP
SPECIMEN SOURCE: SIGNIFICANT CHANGE UP

## 2020-01-08 LAB
CULTURE RESULTS: SIGNIFICANT CHANGE UP
SPECIMEN SOURCE: SIGNIFICANT CHANGE UP

## 2020-07-10 NOTE — PROVIDER CONTACT NOTE (CRITICAL VALUE NOTIFICATION) - NS PROVIDER READ BACK
yes Tazorac Pregnancy And Lactation Text: This medication is not safe during pregnancy. It is unknown if this medication is excreted in breast milk.

## 2020-09-26 NOTE — PROGRESS NOTE ADULT - ATTENDING COMMENTS
Pt tachypnic this AM and abd tense and distended with dilated bowel on xray. Left sided pnx noted depite flushing CT and to suction however pt not hypoxic on NCO2. Pulm discussing TPA into left CT to tx loculated pnx and break up fibrinous effusion. In addition NPO and surgery eval appreciated,. CT of abd to r/o obstruction ordered and CVVHD to continue. No rectal mass seen on prior CT felt on rectal exam. Discussed plan with pt and family. Decrease Fentanyl. Pt is moving bowels on bowel regimen so doubt complete obstruction. Yes...

## 2021-03-12 NOTE — PROGRESS NOTE ADULT - ASSESSMENT
40 y/o M w/ PMH of IVDU and active smoker who came from [facility] in septic shock secondary to tricuspid valve endocarditis deemed not a surgical candidate with hospital course complicated by acute hypoxic respiratory failure and multisystem organ failure 2/2 hypoperfusion. Patient now being transferred from to the MICU for further medical management as patient a poor candidate for surgery at this time     Neuro  Sedated on Propofol, Fentanyl     Cardiovascular   Currently on Levophed, will titrate as needed. Echo with EF 45%. ELIAN with EF 40-45%, 3.8 x1cm vegetation on TR valve, with severe TR, trivial pericardial effusion, L sided pleural effusion. Rv failure likely 2/2 TR.   - Maintain MAP>65.   - Titrate Levophed as necessary.   -Continue with Abx for endocarditis coverage     Respiratory  #Acute hypoxic respiratory failure   2/2 alveolar hemorrhage in the setting of septic embolic causing numerous cavitary lesions at OSH CT scan  - CXR with scattered infiltrates and pleural effusions   - remains on Vent   - Stat ABG for CO2 retention given neuromuscular agent used, on VC/AC with PEEP 5 and Fio2 40%, Tidal volume of 470  - s/p bronchoscopy today with bloody mucosa, f/u BAL culture  - Bilateral pleural effusion now s/p right sided chest tube with serosanguinous fluid (700cc) possibly from volume overload, f/u cultures, send for send count, LDH, and protein.    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   - ID consulted, will continued with Nafcillin 2g q4 (Sensitive to Oxacillin as per OSH records), f/u recs   - Remains afebrile though borderline elevated axillary temps  - Obtain rectal Temp    - f/u [facility] bcx cultures   - s/p bronchoscopy, f/u BAL cultures     Renal  #Acute renal failure 2/2 ATN from hypoperfusion  -Minimal urine output, oliguric with 15-20cc/hr  -Require multiple pressors at OSH   -Vancomycin level 37 on arrival so may be component of drug induced nephropathy  -Renal following, c/w CVVHD as tolerated   -Monitor renal function.  -Monitor I/Os    #Electrolytes abnormalities   -Requires q6 BMP, Mg, and Phos while on CVVHD  -Hyperkalemia to 6.1, no EKG changes, will repeat after CVVHD  -Hypocalcemia 5, corrected 7.5 (given Calcium Carbonate)   -Add Phoslo    GI  OG on arrival with 600cc of bilious fluid, no bowel movement since arrival with distended abdomen   -XR abdomen to evaluate for SBO   -NPO   -PPX- protonix  -Miralax BID   -transaminates and coag derangements likely 2/2 to septic shock (congestive hepatopathy)   -f/u direct bili   -do not advise giving Tylenol in setting of transaminitis     Lines: right IJ TLC and Ernesto, Left femoral A-line     F: PureFlow  E: replete K <4, Mg <2  N: NPO   GI Ppx: Protonix   DVT Ppx: Heparin   Code status: FULL   Dispo: MICU Birth Control Pills Pregnancy And Lactation Text: This medication should be avoided if pregnant and for the first 30 days post-partum.

## 2025-02-24 NOTE — OCCUPATIONAL THERAPY INITIAL EVALUATION ADULT - SITTING BALANCE: DYNAMIC
Chart reviewed, immunization record updated.  No new results noted on [facility] or [facility] web site.  Care Everywhere updated.   Patient care coordination note  Next PCP visit Not scheduled  LOV with PCP 10/21/2024    Attempted to contact patient to follow up on a colo-guard that was ordered in October 2024. He is on the [place] colon gap report. He does not have a PCP appointment scheduled at this time. Left a message for a call back.    poor minus/patient sat EOB for 5 minutes with maxA